# Patient Record
Sex: MALE | Race: WHITE | NOT HISPANIC OR LATINO | URBAN - METROPOLITAN AREA
[De-identification: names, ages, dates, MRNs, and addresses within clinical notes are randomized per-mention and may not be internally consistent; named-entity substitution may affect disease eponyms.]

---

## 2017-02-09 ENCOUNTER — HOSPITAL ENCOUNTER (OUTPATIENT)
Dept: CARDIOLOGY | Facility: HOSPITAL | Age: 67
Discharge: HOME | End: 2017-02-09
Attending: INTERNAL MEDICINE | Admitting: INTERNAL MEDICINE

## 2018-03-29 ENCOUNTER — CLINICAL SUPPORT (OUTPATIENT)
Dept: ONCOLOGY | Facility: HOSPITAL | Age: 68
End: 2018-03-29

## 2018-03-29 ENCOUNTER — HOSPITAL ENCOUNTER (OUTPATIENT)
Dept: ONCOLOGY | Facility: HOSPITAL | Age: 68
Discharge: HOME | End: 2018-03-29
Attending: INTERNAL MEDICINE | Admitting: INTERNAL MEDICINE

## 2018-03-29 ENCOUNTER — HOSPITAL ENCOUNTER (OUTPATIENT)
Dept: ONCOLOGY | Facility: CLINIC | Age: 68
Setting detail: INFUSION SERIES
Discharge: HOME | End: 2018-03-29
Attending: INTERNAL MEDICINE | Admitting: INTERNAL MEDICINE

## 2018-03-29 NOTE — PROGRESS NOTES
PATIENTS ONCOLOGY RECORD LOCATED IN [facility]      Subjective     Name:  CARY FARRELL     Date:  2018  Address:  [address] [place] [address]  Home: [phone]  :  1950 AGE:  67 y.o.        RECORDS OBTAINED:  Patients Oncology Record is located in [facility]

## 2018-04-30 ENCOUNTER — HOSPITAL ENCOUNTER (OUTPATIENT)
Dept: ONCOLOGY | Facility: CLINIC | Age: 68
Setting detail: INFUSION SERIES
Discharge: HOME | End: 2018-04-30
Attending: INTERNAL MEDICINE | Admitting: INTERNAL MEDICINE

## 2018-04-30 ENCOUNTER — HOSPITAL ENCOUNTER (OUTPATIENT)
Dept: ONCOLOGY | Facility: HOSPITAL | Age: 68
Discharge: HOME | End: 2018-04-30
Attending: INTERNAL MEDICINE | Admitting: INTERNAL MEDICINE

## 2018-04-30 ENCOUNTER — CLINICAL SUPPORT (OUTPATIENT)
Dept: ONCOLOGY | Facility: HOSPITAL | Age: 68
End: 2018-04-30

## 2018-04-30 LAB — APTT BLD: 27.6 SEC (ref 24–31)

## 2018-04-30 NOTE — PROGRESS NOTES
PATIENTS ONCOLOGY RECORD LOCATED IN [facility]      Subjective     Name:  CARY FARRELL     Date:  2018  Address:  [address] [place] [address]  Home: [phone]  :  1950 AGE:  68 y.o.        RECORDS OBTAINED:  Patients Oncology Record is located in [facility]

## 2018-07-30 ENCOUNTER — CLINICAL SUPPORT (OUTPATIENT)
Dept: ONCOLOGY | Facility: HOSPITAL | Age: 68
End: 2018-07-30

## 2018-07-30 ENCOUNTER — HOSPITAL ENCOUNTER (OUTPATIENT)
Dept: ONCOLOGY | Facility: CLINIC | Age: 68
Setting detail: INFUSION SERIES
Discharge: HOME | End: 2018-07-30
Attending: INTERNAL MEDICINE | Admitting: INTERNAL MEDICINE

## 2018-07-30 NOTE — PROGRESS NOTES
PATIENTS ONCOLOGY RECORD LOCATED IN [facility]      Subjective     Name:  CARY FARRELL     Date:  2018  Address:  [address] [place] [address]  Home: [phone]  :  1950 AGE:  68 y.o.        RECORDS OBTAINED:  Patients Oncology Record is located in [facility]

## 2018-10-29 ENCOUNTER — CLINICAL SUPPORT (OUTPATIENT)
Dept: ONCOLOGY | Facility: HOSPITAL | Age: 68
End: 2018-10-29

## 2018-10-29 ENCOUNTER — HOSPITAL ENCOUNTER (OUTPATIENT)
Dept: ONCOLOGY | Facility: CLINIC | Age: 68
Setting detail: INFUSION SERIES
Discharge: HOME | End: 2018-10-29
Attending: NURSE PRACTITIONER | Admitting: NURSE PRACTITIONER

## 2018-10-29 ENCOUNTER — HOSPITAL ENCOUNTER (OUTPATIENT)
Dept: ONCOLOGY | Facility: HOSPITAL | Age: 68
Discharge: HOME | End: 2018-10-29
Attending: NURSE PRACTITIONER | Admitting: NURSE PRACTITIONER

## 2018-10-29 LAB
ALBUMIN SERPL-MCNC: 4.1 G/DL (ref 3.5–4.8)
ALBUMIN/GLOB SERPL: 1.5 {RATIO} (ref 1–1.7)
ALP SERPL-CCNC: 96 IU/L (ref 32–91)
ALT SERPL-CCNC: 31 IU/L (ref 17–63)
ANION GAP SERPL CALC-SCNC: 13.1 MMOL/L (ref 10–20)
AST SERPL-CCNC: 25 IU/L (ref 15–41)
BILIRUB SERPL-MCNC: 0.5 MG/DL (ref 0.3–1.2)
BUN SERPL-MCNC: 17 MG/DL (ref 8–20)
BUN/CREAT SERPL: 18.9 (ref 6.2–20.3)
CALCIUM SERPL-MCNC: 9 MG/DL (ref 8.9–10.3)
CHLORIDE SERPL-SCNC: 109 MMOL/L (ref 101–111)
CONV CO2: 21 MMOL/L (ref 22–32)
CONV TOTAL PROTEIN: 6.9 G/DL (ref 6.1–7.9)
CREAT UR-MCNC: 0.9 MG/DL (ref 0.7–1.2)
GLOBULIN UR ELPH-MCNC: 2.8 G/DL (ref 2.5–3.8)
GLUCOSE SERPL-MCNC: 111 MG/DL (ref 65–99)
POTASSIUM SERPL-SCNC: 4.1 MMOL/L (ref 3.6–5.1)
SODIUM SERPL-SCNC: 139 MMOL/L (ref 136–144)

## 2018-10-29 NOTE — PROGRESS NOTES
PATIENTS ONCOLOGY RECORD LOCATED IN [facility]      Subjective     Name:  CARY FARRELL     Date:  10/29/2018  Address:  [address] [address]  Home: [phone]  :  1950 AGE:  68 y.o.        RECORDS OBTAINED:  Patients Oncology Record is located in [facility]  
none

## 2019-01-28 ENCOUNTER — CLINICAL SUPPORT (OUTPATIENT)
Dept: ONCOLOGY | Facility: HOSPITAL | Age: 69
End: 2019-01-28

## 2019-01-28 ENCOUNTER — HOSPITAL ENCOUNTER (OUTPATIENT)
Dept: ONCOLOGY | Facility: CLINIC | Age: 69
Setting detail: INFUSION SERIES
Discharge: HOME | End: 2019-01-28
Attending: INTERNAL MEDICINE | Admitting: INTERNAL MEDICINE

## 2020-07-23 PROBLEM — I10 HYPERTENSION: Status: ACTIVE | Noted: 2017-02-08

## 2020-07-23 PROBLEM — F41.9 ANXIETY DISORDER: Status: ACTIVE | Noted: 2017-04-13

## 2020-07-23 PROBLEM — E78.2 MIXED HYPERLIPIDEMIA: Status: ACTIVE | Noted: 2020-07-23

## 2020-07-23 PROBLEM — R00.2 PALPITATIONS: Status: ACTIVE | Noted: 2017-02-08

## 2020-08-13 ENCOUNTER — OFFICE VISIT (OUTPATIENT)
Dept: CARDIOLOGY | Facility: CLINIC | Age: 70
End: 2020-08-13

## 2020-08-13 VITALS
HEART RATE: 51 BPM | WEIGHT: 241 LBS | BODY MASS INDEX: 32.64 KG/M2 | SYSTOLIC BLOOD PRESSURE: 160 MMHG | HEIGHT: 72 IN | DIASTOLIC BLOOD PRESSURE: 84 MMHG

## 2020-08-13 DIAGNOSIS — R00.1 BRADYCARDIA, SINUS: ICD-10-CM

## 2020-08-13 DIAGNOSIS — I10 ESSENTIAL HYPERTENSION: ICD-10-CM

## 2020-08-13 DIAGNOSIS — E78.2 MIXED HYPERLIPIDEMIA: ICD-10-CM

## 2020-08-13 DIAGNOSIS — R00.2 PALPITATIONS: Primary | ICD-10-CM

## 2020-08-13 PROCEDURE — 93000 ELECTROCARDIOGRAM COMPLETE: CPT | Performed by: INTERNAL MEDICINE

## 2020-08-13 PROCEDURE — 99214 OFFICE O/P EST MOD 30 MIN: CPT | Performed by: INTERNAL MEDICINE

## 2020-08-13 RX ORDER — LOSARTAN POTASSIUM 25 MG/1
25 TABLET ORAL DAILY
Qty: 90 TABLET | Refills: 1 | Status: SHIPPED | OUTPATIENT
Start: 2020-08-13 | End: 2020-09-02 | Stop reason: SDUPTHER

## 2020-08-13 RX ORDER — ASPIRIN 81 MG/1
81 TABLET, CHEWABLE ORAL DAILY
COMMUNITY

## 2020-08-13 RX ORDER — MELOXICAM 15 MG/1
15 TABLET ORAL DAILY
COMMUNITY

## 2020-08-13 NOTE — PROGRESS NOTES
Date of Office Visit: 2020  Encounter Provider: Dr. Abimael Greenberg  Place of Service: [facility] CARDIOLOGY [place]  Patient Name: Viktor Atkins  :1950  Gera Manriquez MD    Chief Complaint   Patient presents with   • Palpitations     consult   • Hypertension   • Hyperlipidemia     History of Present Illness    I am pleased to see Mr. Atkins in my office today as a follow up visit    Patient is an 70 year old white gentleman whose past medical history significant for HTN and palpitations who came today for follow-up.    In , patient developed symptom of chest pain and underwent cardiac catheterization which showed nonobstructive CAD.  In 2016, patient was admitted at [facility] with symptom of palpitation and underwent Holter monitor which showed few PVCs.  Echocardiogram showed EF of 60 to 65%.  Mild mitral regurgitation was noted.  Patient was started on beta-blockers and his symptom of palpitation improved.  Patient was also started on Zoloft 25 mg daily.    I last saw the patient in 2017 but he is lost to follow-up until today.    Patient complains of palpitation.  Patient described this as a fluttering of the heart and skipping of the heartbeat.  Patient also complains of occasional dizziness.  Patient denies any orthopnea, PND, syncope or presyncope.  No leg edema noted.    EKG showed sinus bradycardia with heart rate of 51 bpm    I advised the patient to taper off clonidine over.  Of next 2 weeks.  I would start losartan 25 mg daily.  I would proceed with Holter monitor.  Patient is advised to monitor the blood pressure and bring the logbook.  I will see the patient in 6 weeks.  If he continues to have bradycardia after discontinuation of clonidine, I would decrease Lopressor.  His previous ischemic evaluation has been negative.  Patient also complains of snoring and fatigue and possible sleep apnea.  I advised him to discuss with his wife and if his symptoms are verified by  his wife I would recommend proceed with sleep studies      Past Medical History:   Diagnosis Date   • Benign essential HTN    • Hyperlipidemia, mixed    • Palpitations          Past Surgical History:   Procedure Laterality Date   • CARDIAC CATHETERIZATION     • CHOLECYSTECTOMY     • KNEE SURGERY             Current Outpatient Medications:   •  amLODIPine (NORVASC) 10 MG tablet, 1 tablet Daily., Disp: , Rfl:   •  aspirin 81 MG chewable tablet, Chew 81 mg Daily., Disp: , Rfl:   •  atorvastatin (LIPITOR) 20 MG tablet, 1 tablet Daily., Disp: , Rfl:   •  cloNIDine (CATAPRES) 0.1 MG tablet, 1 tablet 2 (two) times a day., Disp: , Rfl:   •  meloxicam (MOBIC) 15 MG tablet, Take 15 mg by mouth Daily., Disp: , Rfl:   •  metoprolol tartrate (LOPRESSOR) 50 MG tablet, 1 tablet 2 (two) times a day., Disp: , Rfl:   •  Multiple Vitamins-Minerals (MULTIVITAMIN ADULT PO), Take  by mouth Daily., Disp: , Rfl:   •  Omeprazole (EQL Omeprazole) 20 MG tablet delayed-release, 1 tablet Daily., Disp: , Rfl:   •  sertraline (Zoloft) 25 MG tablet, 1 tablet Daily., Disp: , Rfl:   •  losartan (Cozaar) 25 MG tablet, Take 1 tablet by mouth Daily., Disp: 90 tablet, Rfl: 1      Social History     Socioeconomic History   • Marital status:      Spouse name: Not on file   • Number of children: Not on file   • Years of education: Not on file   • Highest education level: Not on file   Tobacco Use   • Smoking status: Former Smoker   • Smokeless tobacco: Never Used   Substance and Sexual Activity   • Alcohol use: Never     Frequency: Never   • Drug use: Never   • Sexual activity: Defer         Review of Systems   Constitution: Negative for chills and fever.   HENT: Negative for ear discharge and nosebleeds.    Eyes: Negative for discharge and redness.   Cardiovascular: Positive for palpitations. Negative for chest pain, orthopnea, paroxysmal nocturnal dyspnea and syncope.   Respiratory: Positive for shortness of breath. Negative for cough and  "wheezing.    Endocrine: Negative for heat intolerance.   Skin: Negative for rash.   Musculoskeletal: Positive for arthritis and joint pain. Negative for myalgias.   Gastrointestinal: Negative for abdominal pain, melena, nausea and vomiting.   Genitourinary: Negative for dysuria and hematuria.   Neurological: Negative for dizziness, light-headedness, numbness and tremors.   Psychiatric/Behavioral: Negative for depression. The patient is not nervous/anxious.        Procedures      ECG 12 Lead  Date/Time: 8/13/2020 4:55 PM  Performed by: Abimael Greenberg MD  Authorized by: Abimael Greenberg MD   Previous ECG: no previous ECG available  Rhythm: sinus rhythm and sinus bradycardia    Clinical impression: normal ECG            ECG 12 Lead    (Results Pending)           Objective:    /84   Pulse 51   Ht 182.9 cm (72\")   Wt 109 kg (241 lb)   BMI 32.69 kg/m²         Physical Exam   Constitutional: He is oriented to person, place, and time. He appears well-developed and well-nourished.   HENT:   Head: Normocephalic and atraumatic.   Eyes: No scleral icterus.   Neck: No thyromegaly present.   Cardiovascular: Normal rate, regular rhythm and normal heart sounds. Exam reveals no gallop and no friction rub.   No murmur heard.  Pulmonary/Chest: Effort normal and breath sounds normal. No respiratory distress. He has no wheezes. He has no rales.   Abdominal: There is no tenderness.   Musculoskeletal: He exhibits no edema.   Lymphadenopathy:     He has no cervical adenopathy.   Neurological: He is alert and oriented to person, place, and time.   Skin: No rash noted. No erythema.   Psychiatric: He has a normal mood and affect.           Assessment:       Diagnosis Plan   1. Palpitations  ECG 12 Lead    losartan (Cozaar) 25 MG tablet    Holter Monitor - 24 Hour   2. Mixed hyperlipidemia  ECG 12 Lead    losartan (Cozaar) 25 MG tablet    Holter Monitor - 24 Hour   3. Essential hypertension  ECG 12 Lead    losartan (Cozaar) 25 MG tablet "    Holter Monitor - 24 Hour   4. Bradycardia, sinus  Holter Monitor - 24 Hour            Plan:       I would recommend to start losartan 25 mg daily.  Clonidine would be discontinued.  Holter monitor would be done.  Consider sleep studies in future

## 2020-09-02 ENCOUNTER — OFFICE VISIT (OUTPATIENT)
Dept: CARDIOLOGY | Facility: CLINIC | Age: 70
End: 2020-09-02

## 2020-09-02 VITALS
HEART RATE: 65 BPM | DIASTOLIC BLOOD PRESSURE: 88 MMHG | HEIGHT: 72 IN | BODY MASS INDEX: 32.91 KG/M2 | SYSTOLIC BLOOD PRESSURE: 148 MMHG | WEIGHT: 243 LBS

## 2020-09-02 DIAGNOSIS — E78.2 MIXED HYPERLIPIDEMIA: ICD-10-CM

## 2020-09-02 DIAGNOSIS — G47.33 SLEEP APNEA, OBSTRUCTIVE: ICD-10-CM

## 2020-09-02 DIAGNOSIS — R00.2 PALPITATIONS: ICD-10-CM

## 2020-09-02 DIAGNOSIS — I10 ESSENTIAL HYPERTENSION: Primary | ICD-10-CM

## 2020-09-02 PROCEDURE — 99213 OFFICE O/P EST LOW 20 MIN: CPT | Performed by: INTERNAL MEDICINE

## 2020-09-02 RX ORDER — LOSARTAN POTASSIUM 25 MG/1
25 TABLET ORAL DAILY
Qty: 90 TABLET | Refills: 1 | Status: SHIPPED | OUTPATIENT
Start: 2020-09-02 | End: 2021-03-03 | Stop reason: SDUPTHER

## 2020-09-02 NOTE — PROGRESS NOTES
Date of Office Visit: 2020  Encounter Provider: Dr. Abimael Greenberg  Place of Service: [facility] CARDIOLOGY [place]  Patient Name: Viktor Atkins  :1950  Gera Manriquez MD    Chief Complaint   Patient presents with   • Palpitations     2 week follow up /holter   • Hypertension   • Hyperlipidemia   • Slow Heart Rate     History of Present Illness    I am pleased to see Mr. Atkins in my office today as a follow up visit    Patient is an 70 year old white gentleman whose past medical history significant for HTN and palpitations who came today for follow-up.    In , patient developed symptom of chest pain and underwent cardiac catheterization which showed nonobstructive CAD.  In , patient was admitted at [facility] with symptom of palpitation and underwent Holter monitor which showed few PVCs.  Echocardiogram showed EF of 60 to 65%.  Mild mitral regurgitation was noted.  Patient was started on beta-blockers and his symptom of palpitation improved.  Patient was also started on Zoloft 25 mg daily.    In 2020, patient underwent Holter monitor and it showed relative bradycardia and short run of atrial tachyarrhythmia.    Since then patient is feeling better.  He has quit taking clonidine and his heart rate has improved.  Patient denies any further episode of dizziness and lightheadedness.  Fatigue and tiredness is improving.  No orthopnea PND no syncope or presyncope.    Patient is doing well.  I will continue current therapy.  Patient is advised to monitor the blood pressure at home.  I will refer the patient to sleep studies/sleep center referral.      Past Medical History:   Diagnosis Date   • Benign essential HTN    • Hyperlipidemia, mixed    • Palpitations          Past Surgical History:   Procedure Laterality Date   • CARDIAC CATHETERIZATION     • CHOLECYSTECTOMY     • KNEE SURGERY             Current Outpatient Medications:   •  amLODIPine (NORVASC) 10 MG tablet, 1 tablet  Daily., Disp: , Rfl:   •  aspirin 81 MG chewable tablet, Chew 81 mg Daily., Disp: , Rfl:   •  atorvastatin (LIPITOR) 20 MG tablet, 1 tablet Daily., Disp: , Rfl:   •  losartan (Cozaar) 25 MG tablet, Take 1 tablet by mouth Daily., Disp: 90 tablet, Rfl: 1  •  meloxicam (MOBIC) 15 MG tablet, Take 15 mg by mouth Daily., Disp: , Rfl:   •  metoprolol tartrate (LOPRESSOR) 50 MG tablet, 1 tablet 2 (two) times a day., Disp: , Rfl:   •  Multiple Vitamins-Minerals (MULTIVITAMIN ADULT PO), Take  by mouth Daily., Disp: , Rfl:   •  Omeprazole (EQL Omeprazole) 20 MG tablet delayed-release, 1 tablet Daily., Disp: , Rfl:   •  sertraline (Zoloft) 25 MG tablet, 1 tablet Daily., Disp: , Rfl:       Social History     Socioeconomic History   • Marital status:      Spouse name: Not on file   • Number of children: Not on file   • Years of education: Not on file   • Highest education level: Not on file   Tobacco Use   • Smoking status: Former Smoker   • Smokeless tobacco: Never Used   Substance and Sexual Activity   • Alcohol use: Never     Frequency: Never   • Drug use: Never   • Sexual activity: Defer         Review of Systems   Constitution: Negative for chills and fever.   HENT: Negative for ear discharge and nosebleeds.    Eyes: Negative for discharge and redness.   Cardiovascular: Negative for chest pain, orthopnea, palpitations, paroxysmal nocturnal dyspnea and syncope.   Respiratory: Negative for cough, shortness of breath and wheezing.    Endocrine: Negative for heat intolerance.   Skin: Negative for rash.   Musculoskeletal: Negative for arthritis and myalgias.   Gastrointestinal: Negative for abdominal pain, melena, nausea and vomiting.   Genitourinary: Negative for dysuria and hematuria.   Neurological: Negative for dizziness, light-headedness, numbness and tremors.   Psychiatric/Behavioral: Negative for depression. The patient is not nervous/anxious.        Procedures    Procedures    No orders to display          "  Objective:    /88   Pulse 65   Ht 182.9 cm (72.01\")   Wt 110 kg (243 lb)   BMI 32.95 kg/m²         Physical Exam   Constitutional: He is oriented to person, place, and time. He appears well-developed and well-nourished.   HENT:   Head: Normocephalic and atraumatic.   Eyes: Right eye exhibits no discharge. No scleral icterus.   Neck: No thyromegaly present.   Cardiovascular: Normal rate, regular rhythm and normal heart sounds. Exam reveals no gallop and no friction rub.   No murmur heard.  Pulmonary/Chest: Effort normal and breath sounds normal. No respiratory distress. He has no wheezes. He has no rales.   Abdominal: There is no tenderness.   Musculoskeletal: He exhibits no edema.   Lymphadenopathy:     He has no cervical adenopathy.   Neurological: He is alert and oriented to person, place, and time.   Skin: No rash noted. No erythema.   Psychiatric: He has a normal mood and affect.           Assessment:       Diagnosis Plan   1. Essential hypertension  losartan (Cozaar) 25 MG tablet   2. Palpitations  losartan (Cozaar) 25 MG tablet   3. Mixed hyperlipidemia  losartan (Cozaar) 25 MG tablet            Plan:       Continue current treatment.  Proceed with sleep consultation.  "

## 2021-02-09 ENCOUNTER — TELEPHONE (OUTPATIENT)
Dept: CARDIOLOGY | Facility: CLINIC | Age: 71
End: 2021-02-09

## 2021-02-09 NOTE — TELEPHONE ENCOUNTER
Patient called and stated that Dr Greenberg wanted him to see Dr Jenna Esteves and he had a appointment but the office called and canceled his appointment in January he stated that he hasn't heard back from that office so I gave the patient the phone number to that office so he could call and reschedule a appointment. But the patient wants to know if he should keep his appointment March 3rd with Dr. Greenberg or reschedule.

## 2021-03-02 NOTE — PROGRESS NOTES
Date of Office Visit: 2021  Encounter Provider: Dr. Abimael Greenberg  Place of Service: [facility] CARDIOLOGY [place]  Patient Name: Viktor Atkins  :1950  Gera Manriquez MD    Chief Complaint   Patient presents with   • Palpitations     6 month follow up   • Hypertension   • Hyperlipidemia     History of Present Illness    I am pleased to see Mr. Atkins in my office today as a follow up visit    Patient is an 70 year old white gentleman whose past medical history significant for HTN and palpitations who came today for follow-up.    In , patient developed symptom of chest pain and underwent cardiac catheterization which showed nonobstructive CAD.  In , patient was admitted at [facility] with symptom of palpitation and underwent Holter monitor which showed few PVCs.  Echocardiogram showed EF of 60 to 65%.  Mild mitral regurgitation was noted.  Patient was started on beta-blockers and his symptom of palpitation improved.  Patient was also started on Zoloft 25 mg daily.    In 2020, patient underwent Holter monitor and it showed relative bradycardia and short run of atrial tachyarrhythmia.    Since the previous visit, patient continues to have palpitation.  However palpitation improved when he had Covid 19 infection after 2020.  Patient has recovered well.  Patient denies any chest pain.  No shortness of breath.  No syncope or presyncope.  No dizziness.  Patient however complained of palpitation and dizziness at the time of premature contraction.    EKG showed heart rate of 66 bpm normal sinus rhythm.    At this stage, his bradycardia is much better.  Clonidine has been discontinued and patient is on losartan.  His blood pressure is still high.  I would recommend to increase losartan to 50 mg daily.  Patient unfortunately could not go through sleep studies because of COVID-19 infection.  He is supposed to see pulmonologist and sleep specialist in 2021        Past  Medical History:   Diagnosis Date   • Benign essential HTN    • Hyperlipidemia, mixed    • Palpitations          Past Surgical History:   Procedure Laterality Date   • CARDIAC CATHETERIZATION     • CHOLECYSTECTOMY     • KNEE SURGERY             Current Outpatient Medications:   •  albuterol sulfate  (90 Base) MCG/ACT inhaler, Inhale 1 puff 4 (Four) Times a Day., Disp: , Rfl:   •  amLODIPine (NORVASC) 10 MG tablet, 1 tablet Daily., Disp: , Rfl:   •  aspirin 81 MG chewable tablet, Chew 81 mg Daily., Disp: , Rfl:   •  atorvastatin (LIPITOR) 20 MG tablet, 1 tablet Daily., Disp: , Rfl:   •  losartan (COZAAR) 50 MG tablet, Take 1 tablet by mouth Daily., Disp: 90 tablet, Rfl: 1  •  meloxicam (MOBIC) 15 MG tablet, Take 15 mg by mouth Daily., Disp: , Rfl:   •  metoprolol tartrate (LOPRESSOR) 50 MG tablet, 1 tablet 2 (two) times a day., Disp: , Rfl:   •  Multiple Vitamins-Minerals (MULTIVITAMIN ADULT PO), Take  by mouth Daily., Disp: , Rfl:   •  Omeprazole (EQL Omeprazole) 20 MG tablet delayed-release, 1 tablet Daily., Disp: , Rfl:   •  sertraline (Zoloft) 25 MG tablet, 1 tablet Daily., Disp: , Rfl:       Social History     Socioeconomic History   • Marital status:      Spouse name: Not on file   • Number of children: Not on file   • Years of education: Not on file   • Highest education level: Not on file   Tobacco Use   • Smoking status: Former Smoker   • Smokeless tobacco: Never Used   Substance and Sexual Activity   • Alcohol use: Never     Frequency: Never   • Drug use: Never   • Sexual activity: Defer         Review of Systems   Constitution: Negative for chills and fever.   HENT: Negative for ear discharge and nosebleeds.    Eyes: Negative for discharge and redness.   Cardiovascular: Negative for chest pain, orthopnea, palpitations, paroxysmal nocturnal dyspnea and syncope.   Respiratory: Positive for shortness of breath. Negative for cough and wheezing.    Endocrine: Negative for heat intolerance.   Skin:  "Negative for rash.   Musculoskeletal: Negative for arthritis and myalgias.   Gastrointestinal: Negative for abdominal pain, melena, nausea and vomiting.   Genitourinary: Negative for dysuria and hematuria.   Neurological: Negative for dizziness, light-headedness, numbness and tremors.   Psychiatric/Behavioral: Negative for depression. The patient is not nervous/anxious.        Procedures      ECG 12 Lead    Date/Time: 3/3/2021 3:14 PM  Performed by: Abimael Greenberg MD  Authorized by: Abimael Greenberg MD   Comparison: compared with previous ECG   Similar to previous ECG  Rhythm: sinus rhythm    Clinical impression: normal ECG            ECG 12 Lead    (Results Pending)           Objective:    /86   Pulse 66   Ht 182.9 cm (72.01\")   Wt 114 kg (251 lb)   BMI 34.03 kg/m²         Constitutional:       Appearance: Well-developed.   Eyes:      General: No scleral icterus.        Right eye: No discharge.   HENT:      Head: Normocephalic and atraumatic.   Neck:      Thyroid: No thyromegaly.      Lymphadenopathy: No cervical adenopathy.   Pulmonary:      Effort: Pulmonary effort is normal. No respiratory distress.      Breath sounds: Normal breath sounds. No wheezing. No rales.   Cardiovascular:      Normal rate. Regular rhythm.      No gallop.   Abdominal:      Tenderness: There is no abdominal tenderness.   Skin:     Findings: No erythema or rash.   Neurological:      Mental Status: Alert and oriented to person, place, and time.             Assessment:       Diagnosis Plan   1. Essential hypertension  ECG 12 Lead    losartan (COZAAR) 50 MG tablet   2. Palpitations  ECG 12 Lead    losartan (COZAAR) 50 MG tablet   3. Mixed hyperlipidemia  ECG 12 Lead    losartan (COZAAR) 50 MG tablet            Plan:       MDM:    1.  Palpitation:    Patient is having premature contraction.  I strongly believe that he has obstructive sleep apnea.  He is scheduled to see sleep doctors in April.  Continue beta-blocker.    2.  " Hypertension:    I would increase losartan to 50 mg daily    3.  Hyperlipidemia:    Continue Lipitor.

## 2021-03-03 ENCOUNTER — OFFICE VISIT (OUTPATIENT)
Dept: CARDIOLOGY | Facility: CLINIC | Age: 71
End: 2021-03-03

## 2021-03-03 VITALS
HEART RATE: 66 BPM | SYSTOLIC BLOOD PRESSURE: 156 MMHG | BODY MASS INDEX: 34 KG/M2 | HEIGHT: 72 IN | WEIGHT: 251 LBS | DIASTOLIC BLOOD PRESSURE: 86 MMHG

## 2021-03-03 DIAGNOSIS — I10 ESSENTIAL HYPERTENSION: ICD-10-CM

## 2021-03-03 DIAGNOSIS — R00.2 PALPITATIONS: ICD-10-CM

## 2021-03-03 DIAGNOSIS — E78.2 MIXED HYPERLIPIDEMIA: ICD-10-CM

## 2021-03-03 DIAGNOSIS — I10 ESSENTIAL HYPERTENSION: Primary | ICD-10-CM

## 2021-03-03 PROCEDURE — 99214 OFFICE O/P EST MOD 30 MIN: CPT | Performed by: INTERNAL MEDICINE

## 2021-03-03 PROCEDURE — 93000 ELECTROCARDIOGRAM COMPLETE: CPT | Performed by: INTERNAL MEDICINE

## 2021-03-03 RX ORDER — LOSARTAN POTASSIUM 50 MG/1
50 TABLET ORAL DAILY
Qty: 90 TABLET | Refills: 1 | Status: SHIPPED | OUTPATIENT
Start: 2021-03-03 | End: 2021-03-03 | Stop reason: SDUPTHER

## 2021-03-03 RX ORDER — ALBUTEROL SULFATE 90 UG/1
1 AEROSOL, METERED RESPIRATORY (INHALATION) 4 TIMES DAILY
COMMUNITY
Start: 2021-01-06

## 2021-03-05 RX ORDER — LOSARTAN POTASSIUM 50 MG/1
50 TABLET ORAL DAILY
Qty: 90 TABLET | Refills: 1 | Status: SHIPPED | OUTPATIENT
Start: 2021-03-05 | End: 2021-03-30

## 2021-03-30 RX ORDER — LOSARTAN POTASSIUM 25 MG/1
TABLET ORAL
Qty: 90 TABLET | Refills: 1 | Status: SHIPPED | OUTPATIENT
Start: 2021-03-30

## 2023-07-25 ENCOUNTER — TREATMENT (OUTPATIENT)
Dept: PHYSICAL THERAPY | Facility: CLINIC | Age: 73
End: 2023-07-25
Payer: MEDICARE

## 2023-07-25 DIAGNOSIS — S76.112D STRAIN OF LEFT QUADRICEPS, SUBSEQUENT ENCOUNTER: ICD-10-CM

## 2023-07-25 DIAGNOSIS — M25.562 ACUTE PAIN OF LEFT KNEE: Primary | ICD-10-CM

## 2023-07-25 DIAGNOSIS — R26.2 DIFFICULTY WALKING: ICD-10-CM

## 2023-07-25 DIAGNOSIS — R26.2 DIFFICULTY WALKING UP STAIRS: ICD-10-CM

## 2023-07-25 PROCEDURE — 97110 THERAPEUTIC EXERCISES: CPT | Performed by: PHYSICAL THERAPIST

## 2023-07-25 PROCEDURE — 97140 MANUAL THERAPY 1/> REGIONS: CPT | Performed by: PHYSICAL THERAPIST

## 2023-07-25 NOTE — PROGRESS NOTES
Physical Therapy Daily Treatment Note      [facility] [place]              [address], [address]                [facility] [place]  [address]        Patient: Viktor Atkins   : 1950  Diagnosis/ICD-10 Code:  Acute pain of left knee [M25.562]  Referring practitioner: Gera Manriquez MD  Date of Initial Visit: Type: THERAPY  Noted: 2023  Today's Date: 2023  Patient seen for 4 sessions         Subjective    Viktor Atkins reports: his leg is doing ok.  It is dong better.  It is a 0/10 right now.  He said on Friday it got up to 6-7/10 when he was up on it a lot.           Objective   See Exercise, Manual, and Modality Logs for complete treatment.     Mild tightness lateral rectus femoris and vastus lateralis, mod tightness quad tendon; Tenderness rectus femoris, vastus lateralis, and quad tendon    Assessment/Plan  Decreased tenderness and tightness as noted above.  Able to progress to standing exercises and use of NK table for strengthening.  He tolerated exercises well with mild discomfort with standing hip flexion only.  Will continued to address tightness with STM and progress strengthening as tolerated especially in weightbearing positions to improve functional mobility.     Progress per Plan of Care           Timed:  Manual Therapy:    8     mins  34172;  Therapeutic Exercise:    25     mins  54754;     Neuromuscular Marques:        mins  39594;    Therapeutic Activity:     5     mins  74314;     Gait Training:           mins  37351;     Ultrasound:          mins  51801;     Work Conditioning/Hardening (initial 2 hours)        mins  14398  Work Conditioning/Hardening (each add'l hour)        mins  10105    Untimed:   Electrical Stimulation:         mins  68794 ( );  Traction          mins 12000    Timed Treatment:   38   mins   Total Treatment:     38   mins    Shiela Cochran PTA  Physical Therapist Assistant

## 2023-07-28 ENCOUNTER — TREATMENT (OUTPATIENT)
Dept: PHYSICAL THERAPY | Facility: CLINIC | Age: 73
End: 2023-07-28
Payer: MEDICARE

## 2023-07-28 DIAGNOSIS — R26.2 DIFFICULTY WALKING: ICD-10-CM

## 2023-07-28 DIAGNOSIS — R26.2 DIFFICULTY WALKING UP STAIRS: ICD-10-CM

## 2023-07-28 DIAGNOSIS — M25.562 ACUTE PAIN OF LEFT KNEE: Primary | ICD-10-CM

## 2023-07-28 DIAGNOSIS — S76.112D STRAIN OF LEFT QUADRICEPS, SUBSEQUENT ENCOUNTER: ICD-10-CM

## 2023-07-28 NOTE — PROGRESS NOTES
Physical Therapy Daily Treatment Note      [facility] [place]              [address], [address]                [facility] [place]  [address]        Patient: Viktor Atkins   : 1950  Diagnosis/ICD-10 Code:  Acute pain of left knee [M25.562]  Referring practitioner: Gera Manriquez MD  Date of Initial Visit: Type: THERAPY  Noted: 2023  Today's Date: 2023  Patient seen for 5 sessions         Subjective    Viktor Atkins reports: he is doing pretty good today.           Objective   See Exercise, Manual, and Modality Logs for complete treatment.       Assessment/Plan  Able to progress exercises without complaints.  Continued tightness in (L) quad address with STM. Will continue to address tightness to improve pain with functional activities and progress exercises as able.      Progress per Plan of Care           Timed:  Manual Therapy:    8     mins  25041;  Therapeutic Exercise:    25     mins  48690;     Neuromuscular Marques:        mins  79003;    Therapeutic Activity:          mins  65518;     Gait Training:           mins  70829;     Ultrasound:          mins  58163;     Work Conditioning/Hardening (initial 2 hours)        mins  70828  Work Conditioning/Hardening (each add'l hour)        mins  62550    Untimed:   Electrical Stimulation:         mins  49989 ( );  Traction          mins 12711    Timed Treatment:   33   mins   Total Treatment:     33   mins    Shiela Cochran PTA  Physical Therapist Assistant

## 2023-08-01 ENCOUNTER — TREATMENT (OUTPATIENT)
Dept: PHYSICAL THERAPY | Facility: CLINIC | Age: 73
End: 2023-08-01
Payer: MEDICARE

## 2023-08-01 DIAGNOSIS — S76.112D STRAIN OF LEFT QUADRICEPS, SUBSEQUENT ENCOUNTER: ICD-10-CM

## 2023-08-01 DIAGNOSIS — R26.2 DIFFICULTY WALKING: ICD-10-CM

## 2023-08-01 DIAGNOSIS — M25.562 ACUTE PAIN OF LEFT KNEE: Primary | ICD-10-CM

## 2023-08-01 DIAGNOSIS — R26.2 DIFFICULTY WALKING UP STAIRS: ICD-10-CM

## 2023-08-03 ENCOUNTER — TREATMENT (OUTPATIENT)
Dept: PHYSICAL THERAPY | Facility: CLINIC | Age: 73
End: 2023-08-03
Payer: MEDICARE

## 2023-08-03 DIAGNOSIS — M25.562 ACUTE PAIN OF LEFT KNEE: Primary | ICD-10-CM

## 2023-08-03 DIAGNOSIS — S76.112D STRAIN OF LEFT QUADRICEPS, SUBSEQUENT ENCOUNTER: ICD-10-CM

## 2023-08-03 DIAGNOSIS — R26.2 DIFFICULTY WALKING UP STAIRS: ICD-10-CM

## 2023-08-03 DIAGNOSIS — R26.2 DIFFICULTY WALKING: ICD-10-CM

## 2023-08-08 ENCOUNTER — TREATMENT (OUTPATIENT)
Dept: PHYSICAL THERAPY | Facility: CLINIC | Age: 73
End: 2023-08-08
Payer: MEDICARE

## 2023-08-08 DIAGNOSIS — S76.112D STRAIN OF LEFT QUADRICEPS, SUBSEQUENT ENCOUNTER: ICD-10-CM

## 2023-08-08 DIAGNOSIS — R26.2 DIFFICULTY WALKING: ICD-10-CM

## 2023-08-08 DIAGNOSIS — R26.2 DIFFICULTY WALKING UP STAIRS: ICD-10-CM

## 2023-08-08 DIAGNOSIS — M25.562 ACUTE PAIN OF LEFT KNEE: Primary | ICD-10-CM

## 2023-08-08 NOTE — PATIENT INSTRUCTIONS
Access Code: [email or web address]  URL: [email or web address]  Date: 08/08/2023  Prepared by: Shiela Cochran    Exercises  - Modified Rayray Stretch  - 1 x daily - 7 x weekly - 2 sets - 1m hold  - Supine Knee Extension Strengthening  - 1 x daily - 7 x weekly - 2 sets - 10 reps  - Bent Knee Fallouts  - 1 x daily - 7 x weekly - 10 reps - 10 hold  - Supine Butterfly Groin Stretch  - 1 x daily - 7 x weekly - 2 sets - 1m hold  - Seated Hamstring Stretch  - 1 x daily - 7 x weekly - 1 sets - 4 reps - 20 hold  - Supine Posterior Pelvic Tilt  - 1 x daily - 7 x weekly - 1 sets - 10 reps - 5 hold  - Supine March  - 1 x daily - 7 x weekly - 1 sets - 20 reps  - Supine Active Straight Leg Raise  - 1 x daily - 7 x weekly - 1 sets - 15 reps  - Straight Leg Raise with External Rotation  - 1 x daily - 7 x weekly - 1 sets - 15 reps  - Sidelying Hip Abduction  - 1 x daily - 7 x weekly - 1 sets - 15 reps  - Clamshell  - 1 x daily - 7 x weekly - 1 sets - 15 reps  - Heel Toe Raises with Counter Support  - 1 x daily - 7 x weekly - 1 sets - 20 reps  - Standing Hip Abduction  - 1 x daily - 7 x weekly - 1 sets - 15 reps  - Standing Hip Extension  - 1 x daily - 7 x weekly - 1 sets - 15 reps  - Standing Hip Flexion AROM  - 1 x daily - 7 x weekly - 1 sets - 15 reps  - Mini Squat with Counter Support  - 1 x daily - 7 x weekly - 1 sets - 10 reps

## 2023-08-08 NOTE — PROGRESS NOTES
Physical Therapy Daily Treatment Note      [facility] [place]              [address], [address]                [facility] [place]  [address]        Patient: Viktor Atkins   : 1950  Diagnosis/ICD-10 Code:  Acute pain of left knee [M25.562]  Referring practitioner: Gera Manriquez MD  Date of Initial Visit: Type: THERAPY  Noted: 2023  Today's Date: 2023  Patient seen for 8 sessions         Subjective    Viktor Atkins reports: no problems since last session.  He reported no pain over the weekend.  He feels safe doing his stairs at home without holding on.  He said he feels 100% better and thinks he is as good as he is going to get.     LEFS=65/80       Objective   See Exercise, Manual, and Modality Logs for complete treatment.       Assessment/Plan  Pt reported no pain or limitations in daily routine.  He demonstrated good strength and balance last session. Reviewed and updated HEP to finalized copy.  He was able to return demonstrate proper technique with all exercises.  No questions or concerns with going on hold to trial HEP for self management.     Pt on hold for 2 weeks with D/C if no problems arise.  Pt to call if he needs to return.    Goals  Plan Goals: STGs 4 weeks:  1. Pt to increase strength L quad from 4- to 4/5 to allow reciprocal pattern on steps-MET 8/3/23  2. LEFS score to improve from 42/80 to > 55/80-MET  3. Pt to state >75% improvement in pain and tenderness at quad-MET  4. Pt to be indep in variety of HEP-MET  LTGs 12 weeks:  1. Pt to gain 4+/5 quad strength lifting moderate weights-MET 8/3/23  2. Pt to be able to safely manage steps with no railing-MET  3. LEFS score > 62/80-MET  4. Pt to state > 90% improvement in pain and tenderness at quad-MET       Timed:  Manual Therapy:         mins  64243;  Therapeutic Exercise:    40     mins  36667;     Neuromuscular Marques:        mins  28114;    Therapeutic Activity:          mins  12521;     Gait Training:           mins  61576;      Ultrasound:          mins  22203;     Work Conditioning/Hardening (initial 2 hours)        mins  41584  Work Conditioning/Hardening (each add'l hour)        mins  16214    Untimed:   Electrical Stimulation:         mins  34083 ( );  Traction          mins 78244    Timed Treatment:   40   mins   Total Treatment:     40   mins    Shiela Cochran PTA  Physical Therapist Assistant

## 2024-03-25 ENCOUNTER — APPOINTMENT (OUTPATIENT)
Dept: CT IMAGING | Facility: HOSPITAL | Age: 74
DRG: 330 | End: 2024-03-25
Payer: MEDICARE

## 2024-03-25 ENCOUNTER — HOSPITAL ENCOUNTER (INPATIENT)
Facility: HOSPITAL | Age: 74
LOS: 7 days | Discharge: HOME | DRG: 330 | End: 2024-04-01
Attending: EMERGENCY MEDICINE | Admitting: STUDENT IN AN ORGANIZED HEALTH CARE EDUCATION/TRAINING PROGRAM
Payer: MEDICARE

## 2024-03-25 DIAGNOSIS — K62.5 RECTAL BLEEDING: ICD-10-CM

## 2024-03-25 DIAGNOSIS — R52 PAIN: ICD-10-CM

## 2024-03-25 DIAGNOSIS — K92.2 GASTROINTESTINAL HEMORRHAGE, UNSPECIFIED GASTROINTESTINAL HEMORRHAGE TYPE: Primary | ICD-10-CM

## 2024-03-25 LAB
ABO GROUP BLD: NORMAL
ALBUMIN SERPL-MCNC: 4.6 G/DL (ref 3.5–5.2)
ALBUMIN/GLOB SERPL: 1.9 G/DL
ALP SERPL-CCNC: 120 U/L (ref 39–117)
ALT SERPL W P-5'-P-CCNC: <5 U/L (ref 1–41)
ANION GAP SERPL CALCULATED.3IONS-SCNC: 12 MMOL/L (ref 5–15)
AST SERPL-CCNC: 29 U/L (ref 1–40)
BASOPHILS # BLD AUTO: 0.07 10*3/MM3 (ref 0–0.2)
BASOPHILS NFR BLD AUTO: 0.7 % (ref 0–1.5)
BILIRUB SERPL-MCNC: 0.2 MG/DL (ref 0–1.2)
BLD GP AB SCN SERPL QL: NEGATIVE
BUN SERPL-MCNC: 17 MG/DL (ref 8–23)
BUN/CREAT SERPL: 15.9 (ref 7–25)
CALCIUM SPEC-SCNC: 9 MG/DL (ref 8.6–10.5)
CHLORIDE SERPL-SCNC: 107 MMOL/L (ref 98–107)
CO2 SERPL-SCNC: 21 MMOL/L (ref 22–29)
CREAT SERPL-MCNC: 1.07 MG/DL (ref 0.76–1.27)
DEPRECATED RDW RBC AUTO: 46.8 FL (ref 37–54)
EGFRCR SERPLBLD CKD-EPI 2021: 73.7 ML/MIN/1.73
EOSINOPHIL # BLD AUTO: 0.3 10*3/MM3 (ref 0–0.4)
EOSINOPHIL NFR BLD AUTO: 3.1 % (ref 0.3–6.2)
ERYTHROCYTE [DISTWIDTH] IN BLOOD BY AUTOMATED COUNT: 14.7 % (ref 12.3–15.4)
GLOBULIN UR ELPH-MCNC: 2.4 GM/DL
GLUCOSE SERPL-MCNC: 164 MG/DL (ref 65–99)
HCT VFR BLD AUTO: 43.5 % (ref 37.5–51)
HGB BLD-MCNC: 14.3 G/DL (ref 13–17.7)
IMM GRANULOCYTES # BLD AUTO: 0.03 10*3/MM3 (ref 0–0.05)
IMM GRANULOCYTES NFR BLD AUTO: 0.3 % (ref 0–0.5)
LARGE PLATELETS: NORMAL
LYMPHOCYTES # BLD AUTO: 2.48 10*3/MM3 (ref 0.7–3.1)
LYMPHOCYTES NFR BLD AUTO: 26 % (ref 19.6–45.3)
MCH RBC QN AUTO: 28.5 PG (ref 26.6–33)
MCHC RBC AUTO-ENTMCNC: 32.9 G/DL (ref 31.5–35.7)
MCV RBC AUTO: 86.8 FL (ref 79–97)
MONOCYTES # BLD AUTO: 0.61 10*3/MM3 (ref 0.1–0.9)
MONOCYTES NFR BLD AUTO: 6.4 % (ref 5–12)
NEUTROPHILS NFR BLD AUTO: 6.06 10*3/MM3 (ref 1.7–7)
NEUTROPHILS NFR BLD AUTO: 63.5 % (ref 42.7–76)
NRBC BLD AUTO-RTO: 0 /100 WBC (ref 0–0.2)
PLATELET # BLD AUTO: 160 10*3/MM3 (ref 140–450)
PMV BLD AUTO: 13.5 FL (ref 6–12)
POTASSIUM SERPL-SCNC: 4.3 MMOL/L (ref 3.5–5.2)
PROT SERPL-MCNC: 7 G/DL (ref 6–8.5)
RBC # BLD AUTO: 5.01 10*6/MM3 (ref 4.14–5.8)
RBC MORPH BLD: NORMAL
RH BLD: POSITIVE
SMALL PLATELETS BLD QL SMEAR: ADEQUATE
SODIUM SERPL-SCNC: 140 MMOL/L (ref 136–145)
T&S EXPIRATION DATE: NORMAL
WBC MORPH BLD: NORMAL
WBC NRBC COR # BLD AUTO: 9.55 10*3/MM3 (ref 3.4–10.8)

## 2024-03-25 PROCEDURE — 85007 BL SMEAR W/DIFF WBC COUNT: CPT | Performed by: NURSE PRACTITIONER

## 2024-03-25 PROCEDURE — 86901 BLOOD TYPING SEROLOGIC RH(D): CPT

## 2024-03-25 PROCEDURE — 86850 RBC ANTIBODY SCREEN: CPT | Performed by: NURSE PRACTITIONER

## 2024-03-25 PROCEDURE — 86900 BLOOD TYPING SEROLOGIC ABO: CPT | Performed by: NURSE PRACTITIONER

## 2024-03-25 PROCEDURE — 85025 COMPLETE CBC W/AUTO DIFF WBC: CPT | Performed by: NURSE PRACTITIONER

## 2024-03-25 PROCEDURE — 74177 CT ABD & PELVIS W/CONTRAST: CPT

## 2024-03-25 PROCEDURE — 80053 COMPREHEN METABOLIC PANEL: CPT | Performed by: NURSE PRACTITIONER

## 2024-03-25 PROCEDURE — G0378 HOSPITAL OBSERVATION PER HR: HCPCS

## 2024-03-25 PROCEDURE — 36415 COLL VENOUS BLD VENIPUNCTURE: CPT

## 2024-03-25 PROCEDURE — 86901 BLOOD TYPING SEROLOGIC RH(D): CPT | Performed by: NURSE PRACTITIONER

## 2024-03-25 PROCEDURE — 25510000001 IOPAMIDOL PER 1 ML: Performed by: EMERGENCY MEDICINE

## 2024-03-25 PROCEDURE — 86900 BLOOD TYPING SEROLOGIC ABO: CPT

## 2024-03-25 PROCEDURE — 99285 EMERGENCY DEPT VISIT HI MDM: CPT

## 2024-03-25 PROCEDURE — 93005 ELECTROCARDIOGRAM TRACING: CPT | Performed by: NURSE PRACTITIONER

## 2024-03-25 RX ORDER — METOPROLOL SUCCINATE 100 MG/1
100 TABLET, EXTENDED RELEASE ORAL DAILY
COMMUNITY
Start: 2023-12-14

## 2024-03-25 RX ORDER — SODIUM CHLORIDE 0.9 % (FLUSH) 0.9 %
10 SYRINGE (ML) INJECTION AS NEEDED
Status: DISCONTINUED | OUTPATIENT
Start: 2024-03-25 | End: 2024-04-01 | Stop reason: HOSPADM

## 2024-03-25 RX ADMIN — IOPAMIDOL 100 ML: 755 INJECTION, SOLUTION INTRAVENOUS at 22:34

## 2024-03-26 ENCOUNTER — ON CAMPUS - OUTPATIENT (OUTPATIENT)
Dept: URBAN - METROPOLITAN AREA HOSPITAL 85 | Facility: HOSPITAL | Age: 74
End: 2024-03-26

## 2024-03-26 DIAGNOSIS — R74.8 ABNORMAL LEVELS OF OTHER SERUM ENZYMES: ICD-10-CM

## 2024-03-26 DIAGNOSIS — Z90.49 ACQUIRED ABSENCE OF OTHER SPECIFIED PARTS OF DIGESTIVE TRACT: ICD-10-CM

## 2024-03-26 DIAGNOSIS — K62.5 HEMORRHAGE OF ANUS AND RECTUM: ICD-10-CM

## 2024-03-26 LAB
ANION GAP SERPL CALCULATED.3IONS-SCNC: 12 MMOL/L (ref 5–15)
ANISOCYTOSIS BLD QL: NORMAL
BASOPHILS # BLD AUTO: 0.05 10*3/MM3 (ref 0–0.2)
BASOPHILS NFR BLD AUTO: 0.6 % (ref 0–1.5)
BUN SERPL-MCNC: 18 MG/DL (ref 8–23)
BUN/CREAT SERPL: 16.1 (ref 7–25)
CALCIUM SPEC-SCNC: 8.8 MG/DL (ref 8.6–10.5)
CHLORIDE SERPL-SCNC: 108 MMOL/L (ref 98–107)
CO2 SERPL-SCNC: 22 MMOL/L (ref 22–29)
CREAT SERPL-MCNC: 1.12 MG/DL (ref 0.76–1.27)
DEPRECATED RDW RBC AUTO: 47.7 FL (ref 37–54)
EGFRCR SERPLBLD CKD-EPI 2021: 69.8 ML/MIN/1.73
EOSINOPHIL # BLD AUTO: 0.21 10*3/MM3 (ref 0–0.4)
EOSINOPHIL NFR BLD AUTO: 2.3 % (ref 0.3–6.2)
ERYTHROCYTE [DISTWIDTH] IN BLOOD BY AUTOMATED COUNT: 14.8 % (ref 12.3–15.4)
GGT SERPL-CCNC: 28 U/L (ref 8–61)
GLUCOSE BLDC GLUCOMTR-MCNC: 103 MG/DL (ref 70–105)
GLUCOSE BLDC GLUCOMTR-MCNC: 175 MG/DL (ref 70–105)
GLUCOSE BLDC GLUCOMTR-MCNC: 90 MG/DL (ref 70–105)
GLUCOSE SERPL-MCNC: 114 MG/DL (ref 65–99)
HCT VFR BLD AUTO: 42.1 % (ref 37.5–51)
HGB BLD-MCNC: 13.2 G/DL (ref 13–17.7)
IMM GRANULOCYTES # BLD AUTO: 0.03 10*3/MM3 (ref 0–0.05)
IMM GRANULOCYTES NFR BLD AUTO: 0.3 % (ref 0–0.5)
LARGE PLATELETS: NORMAL
LYMPHOCYTES # BLD AUTO: 3.08 10*3/MM3 (ref 0.7–3.1)
LYMPHOCYTES NFR BLD AUTO: 34 % (ref 19.6–45.3)
MCH RBC QN AUTO: 27.6 PG (ref 26.6–33)
MCHC RBC AUTO-ENTMCNC: 31.4 G/DL (ref 31.5–35.7)
MCV RBC AUTO: 87.9 FL (ref 79–97)
MICROCYTES BLD QL: NORMAL
MONOCYTES # BLD AUTO: 0.71 10*3/MM3 (ref 0.1–0.9)
MONOCYTES NFR BLD AUTO: 7.8 % (ref 5–12)
NEUTROPHILS NFR BLD AUTO: 4.98 10*3/MM3 (ref 1.7–7)
NEUTROPHILS NFR BLD AUTO: 55 % (ref 42.7–76)
NRBC BLD AUTO-RTO: 0 /100 WBC (ref 0–0.2)
PLATELET # BLD AUTO: 143 10*3/MM3 (ref 140–450)
PMV BLD AUTO: 13.4 FL (ref 6–12)
POTASSIUM SERPL-SCNC: 4.4 MMOL/L (ref 3.5–5.2)
QT INTERVAL: 375 MS
QTC INTERVAL: 510 MS
RBC # BLD AUTO: 4.79 10*6/MM3 (ref 4.14–5.8)
SMALL PLATELETS BLD QL SMEAR: ADEQUATE
SODIUM SERPL-SCNC: 142 MMOL/L (ref 136–145)
WBC MORPH BLD: NORMAL
WBC NRBC COR # BLD AUTO: 9.06 10*3/MM3 (ref 3.4–10.8)

## 2024-03-26 PROCEDURE — 63710000001 INSULIN LISPRO (HUMAN) PER 5 UNITS: Performed by: STUDENT IN AN ORGANIZED HEALTH CARE EDUCATION/TRAINING PROGRAM

## 2024-03-26 PROCEDURE — 63710000001 INSULIN LISPRO (HUMAN) PER 5 UNITS: Performed by: NURSE PRACTITIONER

## 2024-03-26 PROCEDURE — 82948 REAGENT STRIP/BLOOD GLUCOSE: CPT | Performed by: NURSE PRACTITIONER

## 2024-03-26 PROCEDURE — 82948 REAGENT STRIP/BLOOD GLUCOSE: CPT

## 2024-03-26 PROCEDURE — 85025 COMPLETE CBC W/AUTO DIFF WBC: CPT | Performed by: EMERGENCY MEDICINE

## 2024-03-26 PROCEDURE — 80048 BASIC METABOLIC PNL TOTAL CA: CPT | Performed by: EMERGENCY MEDICINE

## 2024-03-26 PROCEDURE — 82977 ASSAY OF GGT: CPT | Performed by: NURSE PRACTITIONER

## 2024-03-26 PROCEDURE — 99222 1ST HOSP IP/OBS MODERATE 55: CPT | Performed by: NURSE PRACTITIONER

## 2024-03-26 PROCEDURE — 85007 BL SMEAR W/DIFF WBC COUNT: CPT | Performed by: EMERGENCY MEDICINE

## 2024-03-26 PROCEDURE — G0378 HOSPITAL OBSERVATION PER HR: HCPCS

## 2024-03-26 RX ORDER — SODIUM CHLORIDE 0.9 % (FLUSH) 0.9 %
10 SYRINGE (ML) INJECTION EVERY 12 HOURS SCHEDULED
Status: DISCONTINUED | OUTPATIENT
Start: 2024-03-26 | End: 2024-04-01 | Stop reason: HOSPADM

## 2024-03-26 RX ORDER — POLYETHYLENE GLYCOL 3350 17 G/17G
17 POWDER, FOR SOLUTION ORAL DAILY PRN
Status: DISCONTINUED | OUTPATIENT
Start: 2024-03-26 | End: 2024-04-01 | Stop reason: HOSPADM

## 2024-03-26 RX ORDER — NICOTINE POLACRILEX 4 MG
15 LOZENGE BUCCAL
Status: DISCONTINUED | OUTPATIENT
Start: 2024-03-26 | End: 2024-04-01 | Stop reason: HOSPADM

## 2024-03-26 RX ORDER — INSULIN LISPRO 100 [IU]/ML
2-9 INJECTION, SOLUTION INTRAVENOUS; SUBCUTANEOUS
Status: DISCONTINUED | OUTPATIENT
Start: 2024-03-26 | End: 2024-04-01 | Stop reason: HOSPADM

## 2024-03-26 RX ORDER — AMLODIPINE BESYLATE 5 MG/1
10 TABLET ORAL
Status: DISCONTINUED | OUTPATIENT
Start: 2024-03-26 | End: 2024-03-28

## 2024-03-26 RX ORDER — PANTOPRAZOLE SODIUM 40 MG/1
40 TABLET, DELAYED RELEASE ORAL
Status: DISCONTINUED | OUTPATIENT
Start: 2024-03-26 | End: 2024-04-01 | Stop reason: HOSPADM

## 2024-03-26 RX ORDER — SODIUM CHLORIDE 0.9 % (FLUSH) 0.9 %
10 SYRINGE (ML) INJECTION AS NEEDED
Status: DISCONTINUED | OUTPATIENT
Start: 2024-03-26 | End: 2024-04-01 | Stop reason: HOSPADM

## 2024-03-26 RX ORDER — ASPIRIN 81 MG/1
81 TABLET, CHEWABLE ORAL DAILY
Status: DISCONTINUED | OUTPATIENT
Start: 2024-03-26 | End: 2024-04-01 | Stop reason: HOSPADM

## 2024-03-26 RX ORDER — CHOLECALCIFEROL (VITAMIN D3) 125 MCG
5 CAPSULE ORAL NIGHTLY PRN
Status: DISCONTINUED | OUTPATIENT
Start: 2024-03-26 | End: 2024-04-01 | Stop reason: HOSPADM

## 2024-03-26 RX ORDER — LOSARTAN POTASSIUM 25 MG/1
25 TABLET ORAL DAILY
Status: DISCONTINUED | OUTPATIENT
Start: 2024-03-26 | End: 2024-03-28

## 2024-03-26 RX ORDER — DEXTROSE MONOHYDRATE 25 G/50ML
25 INJECTION, SOLUTION INTRAVENOUS
Status: DISCONTINUED | OUTPATIENT
Start: 2024-03-26 | End: 2024-04-01 | Stop reason: HOSPADM

## 2024-03-26 RX ORDER — IBUPROFEN 600 MG/1
1 TABLET ORAL
Status: DISCONTINUED | OUTPATIENT
Start: 2024-03-26 | End: 2024-04-01 | Stop reason: HOSPADM

## 2024-03-26 RX ORDER — ATORVASTATIN CALCIUM 20 MG/1
20 TABLET, FILM COATED ORAL NIGHTLY
Status: DISCONTINUED | OUTPATIENT
Start: 2024-03-26 | End: 2024-04-01 | Stop reason: HOSPADM

## 2024-03-26 RX ORDER — SORBITOL SOLUTION 70 %
50 SOLUTION, ORAL MISCELLANEOUS ONCE
Status: COMPLETED | OUTPATIENT
Start: 2024-03-27 | End: 2024-03-27

## 2024-03-26 RX ORDER — BISACODYL 10 MG
10 SUPPOSITORY, RECTAL RECTAL DAILY PRN
Status: DISCONTINUED | OUTPATIENT
Start: 2024-03-26 | End: 2024-04-01 | Stop reason: HOSPADM

## 2024-03-26 RX ORDER — ONDANSETRON 2 MG/ML
4 INJECTION INTRAMUSCULAR; INTRAVENOUS EVERY 6 HOURS PRN
Status: DISCONTINUED | OUTPATIENT
Start: 2024-03-26 | End: 2024-03-27 | Stop reason: SDUPTHER

## 2024-03-26 RX ORDER — SERTRALINE HYDROCHLORIDE 25 MG/1
25 TABLET, FILM COATED ORAL DAILY
Status: DISCONTINUED | OUTPATIENT
Start: 2024-03-26 | End: 2024-04-01 | Stop reason: HOSPADM

## 2024-03-26 RX ORDER — AMOXICILLIN 250 MG
2 CAPSULE ORAL 2 TIMES DAILY PRN
Status: DISCONTINUED | OUTPATIENT
Start: 2024-03-26 | End: 2024-04-01 | Stop reason: HOSPADM

## 2024-03-26 RX ORDER — SODIUM CHLORIDE 9 MG/ML
40 INJECTION, SOLUTION INTRAVENOUS AS NEEDED
Status: DISCONTINUED | OUTPATIENT
Start: 2024-03-26 | End: 2024-04-01 | Stop reason: HOSPADM

## 2024-03-26 RX ORDER — SODIUM, POTASSIUM,MAG SULFATES 17.5-3.13G
1 SOLUTION, RECONSTITUTED, ORAL ORAL EVERY 12 HOURS
Status: COMPLETED | OUTPATIENT
Start: 2024-03-26 | End: 2024-03-26

## 2024-03-26 RX ORDER — ACETAMINOPHEN 325 MG/1
650 TABLET ORAL EVERY 4 HOURS PRN
Status: DISCONTINUED | OUTPATIENT
Start: 2024-03-26 | End: 2024-04-01 | Stop reason: HOSPADM

## 2024-03-26 RX ORDER — METOPROLOL SUCCINATE 50 MG/1
100 TABLET, EXTENDED RELEASE ORAL DAILY
Status: DISCONTINUED | OUTPATIENT
Start: 2024-03-26 | End: 2024-03-28

## 2024-03-26 RX ORDER — BISACODYL 5 MG/1
5 TABLET, DELAYED RELEASE ORAL DAILY PRN
Status: DISCONTINUED | OUTPATIENT
Start: 2024-03-26 | End: 2024-04-01 | Stop reason: HOSPADM

## 2024-03-26 RX ADMIN — ATORVASTATIN CALCIUM 20 MG: 20 TABLET, FILM COATED ORAL at 20:41

## 2024-03-26 RX ADMIN — SODIUM SULFATE, POTASSIUM SULFATE, MAGNESIUM SULFATE 1 BOTTLE: 17.5; 3.13; 1.6 SOLUTION, CONCENTRATE ORAL at 23:06

## 2024-03-26 RX ADMIN — SODIUM SULFATE, POTASSIUM SULFATE, MAGNESIUM SULFATE 1 BOTTLE: 17.5; 3.13; 1.6 SOLUTION, CONCENTRATE ORAL at 11:50

## 2024-03-26 RX ADMIN — LOSARTAN POTASSIUM 25 MG: 25 TABLET, FILM COATED ORAL at 11:50

## 2024-03-26 RX ADMIN — SERTRALINE HYDROCHLORIDE 25 MG: 25 TABLET ORAL at 11:50

## 2024-03-26 RX ADMIN — Medication 10 ML: at 01:05

## 2024-03-26 RX ADMIN — INSULIN LISPRO 2 UNITS: 100 INJECTION, SOLUTION INTRAVENOUS; SUBCUTANEOUS at 11:49

## 2024-03-26 RX ADMIN — METOPROLOL SUCCINATE 100 MG: 50 TABLET, EXTENDED RELEASE ORAL at 11:50

## 2024-03-26 RX ADMIN — ASPIRIN 81 MG CHEWABLE TABLET 81 MG: 81 TABLET CHEWABLE at 11:50

## 2024-03-26 RX ADMIN — Medication 10 ML: at 20:42

## 2024-03-26 RX ADMIN — PANTOPRAZOLE SODIUM 40 MG: 40 TABLET, DELAYED RELEASE ORAL at 11:50

## 2024-03-26 RX ADMIN — AMLODIPINE BESYLATE 10 MG: 5 TABLET ORAL at 11:50

## 2024-03-26 RX ADMIN — Medication 10 ML: at 09:15

## 2024-03-26 NOTE — ED NOTES
Nursing report ED to floor  Viktor Atkins  73 y.o.  male    HPI:   Chief Complaint   Patient presents with    Rectal Bleeding     Pt reports had episode of bright red blood rectally and noted in stool, denies taking blood thinners       Admitting doctor:   Thiago Solomon MD    Admitting diagnosis:   The encounter diagnosis was Gastrointestinal hemorrhage, unspecified gastrointestinal hemorrhage type.    Code status:   Current Code Status       Date Active Code Status Order ID Comments User Context       3/25/2024 2316 CPR (Attempt to Resuscitate) [id number]  Shakira York APRN ED        Question Answer    Code Status (Patient has no pulse and is not breathing) CPR (Attempt to Resuscitate)    Medical Interventions (Patient has pulse or is breathing) Full Support    Level Of Support Discussed With Patient                    Allergies:   Patient has no known allergies.    Isolation:  No active isolations     Fall Risk:  Fall Risk Assessment was completed, and patient is at low risk for falls.   Predictive Model Details         7 (Low) Factor Value    Calculated 3/26/2024 00:50 Age 73    Risk of Fall Model Musculoskeletal Assessment WDL     Active Peripheral IV Present     Imaging order in this encounter Present     Skin Assessment WDL     Financial Class Other     Magnesium not on file     Drug Use No     Tobacco Use Quit     Jabier Scale not on file     Peripheral Vascular Assessment WDL     Cardiac Assessment X     Calcium 9 mg/dL     Clinically Relevant Sex Not Female     Gastrointestinal Assessment WDL     Diastolic BP 84     Number of Distinct Medication Classes administered 1     Chloride 107 mmol/L     ALT <5 U/L     Days after Admission 0.177     Potassium 4.3 mmol/L     Creatinine 1.07 mg/dL     Albumin 4.6 g/dL     Total Bilirubin 0.2 mg/dL     Respiratory Rate 15         Weight:       03/25/24 2015   Weight: 115 kg (253 lb 1.4 oz)       Intake and Output  No intake or output data in the 24 hours  ending 03/26/24 0050    Diet:        Most recent vitals:   Vitals:    03/25/24 2147 03/25/24 2202 03/25/24 2317 03/26/24 0047   BP: 160/96 137/83 142/87 143/84   BP Location:  Right arm Right arm Right arm   Patient Position:  Sitting Sitting Sitting   Pulse: 107 105 111 93   Resp:  16 19 15   Temp:       TempSrc:       SpO2: 92% 91% 92% 91%   Weight:       Height:           Active LDAs/IV Access:   Lines, Drains & Airways       Active LDAs       Name Placement date Placement time Site Days    Peripheral IV 03/25/24 2128 Anterior;Left;Upper Arm 03/25/24 2128  Arm  less than 1                    Skin Condition:   Skin Assessments (last day)       None             Labs (abnormal labs have a star):   Labs Reviewed   COMPREHENSIVE METABOLIC PANEL - Abnormal; Notable for the following components:       Result Value    Glucose 164 (*)     CO2 21.0 (*)     Alkaline Phosphatase 120 (*)     All other components within normal limits    Narrative:     GFR Normal >60  Chronic Kidney Disease <60  Kidney Failure <15    The GFR formula is only valid for adults with stable renal function between ages 18 and 70.   CBC WITH AUTO DIFFERENTIAL - Abnormal; Notable for the following components:    MPV 13.5 (*)     All other components within normal limits   SCAN SLIDE   TYPE AND SCREEN   CBC AND DIFFERENTIAL    Narrative:     The following orders were created for panel order CBC & Differential.  Procedure                               Abnormality         Status                     ---------                               -----------         ------                     CBC Auto Differential[[id number]]        Abnormal            Final result               Scan Slide[[id number]]                                       Final result                 Please view results for these tests on the individual orders.       LOC: Person, Place, Time, and Situation    Telemetry:  Observation Unit    Cardiac Monitoring Ordered: yes    EKG:   ECG 12 Lead  Tachycardia   Preliminary Result   HEART RATE= 111  bpm   RR Interval= 541  ms   DE Interval=   ms   P Horizontal Axis=   deg   P Front Axis=   deg   QRSD Interval= 109  ms   QT Interval= 375  ms   QTcB= 510  ms   QRS Axis= 63  deg   T Wave Axis= 52  deg   - ABNORMAL ECG -   Atrial fibrillation   Prolonged QT interval   Electronically Signed By:    Date and Time of Study: 2024-03-25 21:03:30          Medications Given in the ED:   Medications   sodium chloride 0.9 % flush 10 mL (has no administration in time range)   iopamidol (ISOVUE-370) 76 % injection 100 mL (100 mL Intravenous Given 3/25/24 2234)       Imaging results:  CT Abdomen Pelvis With Contrast    Result Date: 3/25/2024  Impression: Findings compatible with constipation. Sigmoid diverticulosis without diverticulitis. Electronically Signed: Julio Perdomo MD  3/25/2024 10:43 PM EDT  Workstation ID: [id number]     Social issues:   Social History     Socioeconomic History    Marital status:    Tobacco Use    Smoking status: Former    Smokeless tobacco: Never   Vaping Use    Vaping status: Never Used   Substance and Sexual Activity    Alcohol use: Never    Drug use: Never    Sexual activity: Defer       NIH Stroke Scale:  Interval: (not recorded)  1a. Level of Consciousness: (not recorded)  1b. LOC Questions: (not recorded)  1c. LOC Commands: (not recorded)  2. Best Gaze: (not recorded)  3. Visual: (not recorded)  4. Facial Palsy: (not recorded)  5a. Motor Arm, Left: (not recorded)  5b. Motor Arm, Right: (not recorded)  6a. Motor Leg, Left: (not recorded)  6b. Motor Leg, Right: (not recorded)  7. Limb Ataxia: (not recorded)  8. Sensory: (not recorded)  9. Best Language: (not recorded)  10. Dysarthria: (not recorded)  11. Extinction and Inattention (formerly Neglect): (not recorded)    Total (NIH Stroke Scale): (not recorded)     Additional notable assessment information:       Nursing report ED to floor:    OBS Nurse, Marilyn Willett LPN    03/26/24 00:50 EDT Nursing report ED to floor  Viktor Atkins  73 y.o.  male    HPI:   Chief Complaint   Patient presents with    Rectal Bleeding     Pt reports had episode of bright red blood rectally and noted in stool, denies taking blood thinners       Admitting doctor:   Thiago Solomon MD    Admitting diagnosis:   The encounter diagnosis was Gastrointestinal hemorrhage, unspecified gastrointestinal hemorrhage type.    Code status:   Current Code Status       Date Active Code Status Order ID Comments User Context       3/25/2024 2316 CPR (Attempt to Resuscitate) [id number]  Shakira York APRN ED        Question Answer    Code Status (Patient has no pulse and is not breathing) CPR (Attempt to Resuscitate)    Medical Interventions (Patient has pulse or is breathing) Full Support    Level Of Support Discussed With Patient                    Allergies:   Patient has no known allergies.    Isolation:  No active isolations     Fall Risk:  Fall Risk Assessment was completed, and patient is at low risk for falls.   Predictive Model Details         7 (Low) Factor Value    Calculated 3/26/2024 00:50 Age 73    Risk of Fall Model Musculoskeletal Assessment WDL     Active Peripheral IV Present     Imaging order in this encounter Present     Skin Assessment WDL     Financial Class Other     Magnesium not on file     Drug Use No     Tobacco Use Quit     Jabier Scale not on file     Peripheral Vascular Assessment WDL     Cardiac Assessment X     Calcium 9 mg/dL     Clinically Relevant Sex Not Female     Gastrointestinal Assessment WDL     Diastolic BP 84     Number of Distinct Medication Classes administered 1     Chloride 107 mmol/L     ALT <5 U/L     Days after Admission 0.177     Potassium 4.3 mmol/L     Creatinine 1.07 mg/dL     Albumin 4.6 g/dL     Total Bilirubin 0.2 mg/dL     Respiratory Rate 15         Weight:       03/25/24 2015   Weight: 115 kg (253 lb 1.4 oz)       Intake and Output  No intake or output  data in the 24 hours ending 03/26/24 0050    Diet:        Most recent vitals:   Vitals:    03/25/24 2147 03/25/24 2202 03/25/24 2317 03/26/24 0047   BP: 160/96 137/83 142/87 143/84   BP Location:  Right arm Right arm Right arm   Patient Position:  Sitting Sitting Sitting   Pulse: 107 105 111 93   Resp:  16 19 15   Temp:       TempSrc:       SpO2: 92% 91% 92% 91%   Weight:       Height:           Active LDAs/IV Access:   Lines, Drains & Airways       Active LDAs       Name Placement date Placement time Site Days    Peripheral IV 03/25/24 2128 Anterior;Left;Upper Arm 03/25/24 2128  Arm  less than 1                    Skin Condition:   Skin Assessments (last day)       None             Labs (abnormal labs have a star):   Labs Reviewed   COMPREHENSIVE METABOLIC PANEL - Abnormal; Notable for the following components:       Result Value    Glucose 164 (*)     CO2 21.0 (*)     Alkaline Phosphatase 120 (*)     All other components within normal limits    Narrative:     GFR Normal >60  Chronic Kidney Disease <60  Kidney Failure <15    The GFR formula is only valid for adults with stable renal function between ages 18 and 70.   CBC WITH AUTO DIFFERENTIAL - Abnormal; Notable for the following components:    MPV 13.5 (*)     All other components within normal limits   SCAN SLIDE   TYPE AND SCREEN   CBC AND DIFFERENTIAL    Narrative:     The following orders were created for panel order CBC & Differential.  Procedure                               Abnormality         Status                     ---------                               -----------         ------                     CBC Auto Differential[[id number]]        Abnormal            Final result               Scan Slide[[id number]]                                       Final result                 Please view results for these tests on the individual orders.       LOC: Person, Place, Time, and Situation    Telemetry:  Observation Unit    Cardiac Monitoring Ordered:  yes    EKG:   ECG 12 Lead Tachycardia   Preliminary Result   HEART RATE= 111  bpm   RR Interval= 541  ms   SC Interval=   ms   P Horizontal Axis=   deg   P Front Axis=   deg   QRSD Interval= 109  ms   QT Interval= 375  ms   QTcB= 510  ms   QRS Axis= 63  deg   T Wave Axis= 52  deg   - ABNORMAL ECG -   Atrial fibrillation   Prolonged QT interval   Electronically Signed By:    Date and Time of Study: 2024-03-25 21:03:30          Medications Given in the ED:   Medications   sodium chloride 0.9 % flush 10 mL (has no administration in time range)   iopamidol (ISOVUE-370) 76 % injection 100 mL (100 mL Intravenous Given 3/25/24 2234)       Imaging results:  CT Abdomen Pelvis With Contrast    Result Date: 3/25/2024  Impression: Findings compatible with constipation. Sigmoid diverticulosis without diverticulitis. Electronically Signed: Julio Perdomo MD  3/25/2024 10:43 PM EDT  Workstation ID: [id number]     Social issues:   Social History     Socioeconomic History    Marital status:    Tobacco Use    Smoking status: Former    Smokeless tobacco: Never   Vaping Use    Vaping status: Never Used   Substance and Sexual Activity    Alcohol use: Never    Drug use: Never    Sexual activity: Defer       NIH Stroke Scale:  Interval: (not recorded)  1a. Level of Consciousness: (not recorded)  1b. LOC Questions: (not recorded)  1c. LOC Commands: (not recorded)  2. Best Gaze: (not recorded)  3. Visual: (not recorded)  4. Facial Palsy: (not recorded)  5a. Motor Arm, Left: (not recorded)  5b. Motor Arm, Right: (not recorded)  6a. Motor Leg, Left: (not recorded)  6b. Motor Leg, Right: (not recorded)  7. Limb Ataxia: (not recorded)  8. Sensory: (not recorded)  9. Best Language: (not recorded)  10. Dysarthria: (not recorded)  11. Extinction and Inattention (formerly Neglect): (not recorded)    Total (NIH Stroke Scale): (not recorded)     Additional notable assessment information:       Nursing report ED to floor:  OBS Nurse Marilyn      Brendan Willett LPN   03/26/24 00:50 EDT

## 2024-03-26 NOTE — CASE MANAGEMENT/SOCIAL WORK
Discharge Planning Assessment   Leif     Patient Name: Viktor Atkins  MRN: [id number]  Today's Date: 3/26/2024    Admit Date: 3/25/2024    Plan: Return home with spouse   Discharge Needs Assessment       Row Name 03/26/24 1130       Living Environment    People in Home spouse    Name(s) of People in Home Ninfa    Current Living Arrangements home    Potentially Unsafe Housing Conditions none    In the past 12 months has the electric, gas, oil, or water company threatened to shut off services in your home? No    Primary Care Provided by self    Provides Primary Care For no one    Family Caregiver if Needed spouse    Quality of Family Relationships helpful;involved;supportive    Able to Return to Prior Arrangements yes       Resource/Environmental Concerns    Resource/Environmental Concerns none    Transportation Concerns none       Transportation Needs    In the past 12 months, has lack of transportation kept you from medical appointments or from getting medications? no    In the past 12 months, has lack of transportation kept you from meetings, work, or from getting things needed for daily living? No       Food Insecurity    Within the past 12 months, you worried that your food would run out before you got the money to buy more. Never true    Within the past 12 months, the food you bought just didn't last and you didn't have money to get more. Never true       Transition Planning    Patient/Family Anticipates Transition to home with family    Patient/Family Anticipated Services at Transition none    Transportation Anticipated car, drives self;family or friend will provide       Discharge Needs Assessment    Readmission Within the Last 30 Days no previous admission in last 30 days    Equipment Currently Used at Home none    Anticipated Changes Related to Illness none    Equipment Needed After Discharge none                   Discharge Plan       Row Name 03/26/24 1131       Plan    Plan Return home with spouse     Patient/Family in Agreement with Plan yes    Plan Comments Barriers: Clear Liquid diet with plans for colonoscopy tomorrow.  GI following. CM met with . and Mrs. Atkins who reside together. He drives and is independent with  no medical equipment. He denies any financial concerns. PCP and Pharmacy verified.                Demographic Summary       Row Name 03/26/24 1130       General Information    Admission Type observation    Arrived From emergency department    Required Notices Provided Observation Status Notice    Referral Source admission list    Reason for Consult discharge planning    Preferred Language English       Contact Information    Permission Granted to Share Info With                    Functional Status       Row Name 03/26/24 1130       Functional Status    Usual Activity Tolerance excellent    Current Activity Tolerance moderate       Functional Status, IADL    Medications independent    Meal Preparation independent;assistive person    Housekeeping independent;assistive person    Laundry independent;assistive person    Shopping independent;assistive person    IADL Comments independent       Mental Status    General Appearance WDL WDL       Mental Status Summary    Recent Changes in Mental Status/Cognitive Functioning no changes                          Kelsy SWANSON,RN Case Manager  [facility]  Phone: Desk- [phone] cell- [phone]

## 2024-03-26 NOTE — ED PROVIDER NOTES
Subjective   Chief Complaint   Patient presents with    Rectal Bleeding     Pt reports had episode of bright red blood rectally and noted in stool, denies taking blood thinners       History of Present Illness  Patient is a 73-year-old male presents emergency department with complaint of bright red rectal bleeding, blood in the stool.  He reports this began this afternoon.  He reports he had rectal bleeding, and also had blood in agreement to have a bowel movement.  He describes it as bright red.  Denies any abdominal pain.  Denies taking any anticoagulation.  He does take aspirin daily.    He denies having a colonoscopy.  Review of Systems   Constitutional:  Negative for fever.   Respiratory:  Negative for shortness of breath.    Cardiovascular:  Negative for chest pain.   Gastrointestinal:  Positive for anal bleeding, blood in stool and diarrhea. Negative for abdominal pain, nausea and vomiting.   Genitourinary:  Negative for dysuria.   Musculoskeletal:  Negative for back pain and neck pain.   Skin:  Negative for color change and rash.   Neurological:  Negative for dizziness, syncope, weakness and light-headedness.       Past Medical History:   Diagnosis Date    Benign essential HTN     Hyperlipidemia, mixed     Palpitations        No Known Allergies    Past Surgical History:   Procedure Laterality Date    CARDIAC CATHETERIZATION      CHOLECYSTECTOMY      KNEE SURGERY         History reviewed. No pertinent family history.    Social History     Socioeconomic History    Marital status:    Tobacco Use    Smoking status: Former    Smokeless tobacco: Never   Vaping Use    Vaping status: Never Used   Substance and Sexual Activity    Alcohol use: Never    Drug use: Never    Sexual activity: Defer           Objective   Physical Exam  Vitals and nursing note reviewed.   Constitutional:       Appearance: Normal appearance. He is not toxic-appearing.   HENT:      Head: Normocephalic and atraumatic.      Nose: Nose  "normal.      Mouth/Throat:      Mouth: Mucous membranes are moist.      Pharynx: Oropharynx is clear.   Eyes:      Extraocular Movements: Extraocular movements intact.      Conjunctiva/sclera: Conjunctivae normal.      Pupils: Pupils are equal, round, and reactive to light.   Cardiovascular:      Rate and Rhythm: Normal rate and regular rhythm.      Heart sounds: Normal heart sounds. No murmur heard.     No friction rub. No gallop.   Pulmonary:      Effort: Pulmonary effort is normal.      Breath sounds: Normal breath sounds.   Abdominal:      General: Bowel sounds are normal.      Palpations: Abdomen is soft.   Genitourinary:     Rectum: Guaiac result positive. No tenderness.      Comments: Chaperone with Morena RN at bedside.  No rectal bleeding noted.  Bedside Hemoccult positive  Musculoskeletal:         General: Normal range of motion.      Cervical back: Normal range of motion and neck supple.   Skin:     General: Skin is warm and dry.      Capillary Refill: Capillary refill takes less than 2 seconds.   Neurological:      Mental Status: He is alert and oriented to person, place, and time.         Procedures           ED Course  ED Course as of 03/26/24 0457   Mon Mar 25, 2024   2308 EKG atrial fibrillation rate 111.  No previous available.  Corroborated with ED attending physician [LB]      ED Course User Index  [LB] Shakira York Alycia, KAREN                                 /95 (BP Location: Right arm, Patient Position: Lying)   Pulse 90   Temp 97.8 °F (36.6 °C) (Oral)   Resp 18   Ht 180.3 cm (70.98\")   Wt 114 kg (250 lb 10.6 oz)   SpO2 93%   BMI 34.98 kg/m²   Medications   sodium chloride 0.9 % flush 10 mL (has no administration in time range)   sodium chloride 0.9 % flush 10 mL (10 mL Intravenous Given 3/26/24 0105)   sodium chloride 0.9 % flush 10 mL (has no administration in time range)   sodium chloride 0.9 % infusion 40 mL (has no administration in time range)   acetaminophen (TYLENOL) " tablet 650 mg (has no administration in time range)   ondansetron (ZOFRAN) injection 4 mg (has no administration in time range)   melatonin tablet 5 mg (has no administration in time range)   sennosides-docusate (PERICOLACE) 8.6-50 MG per tablet 2 tablet (has no administration in time range)     And   polyethylene glycol (MIRALAX) packet 17 g (has no administration in time range)     And   bisacodyl (DULCOLAX) EC tablet 5 mg (has no administration in time range)     And   bisacodyl (DULCOLAX) suppository 10 mg (has no administration in time range)   iopamidol (ISOVUE-370) 76 % injection 100 mL (100 mL Intravenous Given 3/25/24 2234)     CT Abdomen Pelvis With Contrast    Result Date: 3/25/2024  Impression: Findings compatible with constipation. Sigmoid diverticulosis without diverticulitis. Electronically Signed: Julio Perdomo MD  3/25/2024 10:43 PM EDT  Workstation ID: [id number]   Lab Results (last 24 hours)       Procedure Component Value Units Date/Time    CBC & Differential [[id number]]  (Abnormal) Collected: 03/25/24 2130    Specimen: Blood Updated: 03/25/24 2203    Narrative:      The following orders were created for panel order CBC & Differential.  Procedure                               Abnormality         Status                     ---------                               -----------         ------                     CBC Auto Differential[[id number]]        Abnormal            Final result               Scan Slide[[id number]]                                       Final result                 Please view results for these tests on the individual orders.    Comprehensive Metabolic Panel [[id number]]  (Abnormal) Collected: 03/25/24 2130    Specimen: Blood Updated: 03/25/24 2209     Glucose 164 mg/dL      BUN 17 mg/dL      Creatinine 1.07 mg/dL      Sodium 140 mmol/L      Potassium 4.3 mmol/L      Comment: Slight hemolysis detected by analyzer. Result may be falsely elevated.        Chloride 107 mmol/L      CO2  21.0 mmol/L      Calcium 9.0 mg/dL      Total Protein 7.0 g/dL      Albumin 4.6 g/dL      ALT (SGPT) <5 U/L      AST (SGOT) 29 U/L      Comment: Slight hemolysis detected by analyzer. Result may be falsely elevated.        Alkaline Phosphatase 120 U/L      Total Bilirubin 0.2 mg/dL      Globulin 2.4 gm/dL      A/G Ratio 1.9 g/dL      BUN/Creatinine Ratio 15.9     Anion Gap 12.0 mmol/L      eGFR 73.7 mL/min/1.73     Narrative:      GFR Normal >60  Chronic Kidney Disease <60  Kidney Failure <15    The GFR formula is only valid for adults with stable renal function between ages 18 and 70.    CBC Auto Differential [[id number]]  (Abnormal) Collected: 03/25/24 2130    Specimen: Blood Updated: 03/25/24 2203     WBC 9.55 10*3/mm3      RBC 5.01 10*6/mm3      Hemoglobin 14.3 g/dL      Hematocrit 43.5 %      MCV 86.8 fL      MCH 28.5 pg      MCHC 32.9 g/dL      RDW 14.7 %      RDW-SD 46.8 fl      MPV 13.5 fL      Platelets 160 10*3/mm3      Neutrophil % 63.5 %      Lymphocyte % 26.0 %      Monocyte % 6.4 %      Eosinophil % 3.1 %      Basophil % 0.7 %      Immature Grans % 0.3 %      Neutrophils, Absolute 6.06 10*3/mm3      Lymphocytes, Absolute 2.48 10*3/mm3      Monocytes, Absolute 0.61 10*3/mm3      Eosinophils, Absolute 0.30 10*3/mm3      Basophils, Absolute 0.07 10*3/mm3      Immature Grans, Absolute 0.03 10*3/mm3      nRBC 0.0 /100 WBC     Scan Slide [[id number]] Collected: 03/25/24 2130    Specimen: Blood Updated: 03/25/24 2203     RBC Morphology Normal     WBC Morphology Normal     Platelet Estimate Adequate     Large Platelets Slight/1+    Basic Metabolic Panel [[id number]] Collected: 03/26/24 0440    Specimen: Blood from Arm, Left Updated: 03/26/24 0451    CBC Auto Differential [[id number]] Collected: 03/26/24 0440    Specimen: Blood from Arm, Left Updated: 03/26/24 0451    Scan Slide [[id number]] Collected: 03/26/24 0440    Specimen: Blood from Arm, Left Updated: 03/26/24 0455                      Medical Decision  Making  Problems Addressed:  Gastrointestinal hemorrhage, unspecified gastrointestinal hemorrhage type: complicated acute illness or injury    Amount and/or Complexity of Data Reviewed  Labs: ordered.  Radiology: ordered.  ECG/medicine tests: ordered.    Risk  OTC drugs.  Prescription drug management.  Decision regarding hospitalization.      Imaging: CT Abdomen Pelvis With Contrast    Result Date: 3/25/2024  Impression: Findings compatible with constipation. Sigmoid diverticulosis without diverticulitis. Electronically Signed: Julio Perdomo MD  3/25/2024 10:43 PM EDT  Workstation ID: [id number]         Patient presents to the ED for the above complaint, underwent the above, exam and workup.  Placed on appropriate monitoring.  Differential diagnoses considered for patient presentation, this list is not all inclusive of diagnoses considered: Hemorrhoid, fissure, colitis.  Patient has no gross rectal bleeding on exam.  He did have a positive Hemoccult at the bedside.  Will place in ED observation for repeat labs morning, GI consult.  Disposition: Placed in ED observation  Note Disclaimer: At [facility], we believe that sharing information builds trust and better relationships. You are receiving this note because you recently visited [facility]. It is possible you will see health information before a provider has talked with you about it. This kind of information can be easy to misunderstand. To help you fully understand what it means for your health, we urge you to discuss this note with your provider.Note dictated utilizing Dragon Dictation. Appropriate PPE worn during patient interactions.        Final diagnoses:   Gastrointestinal hemorrhage, unspecified gastrointestinal hemorrhage type       ED Disposition  ED Disposition       ED Disposition   Decision to Admit    Condition   --    Comment   --               No follow-up provider specified.       Medication List      No changes were made to your  prescriptions during this visit.            Shakira York, APRN  03/26/24 0458

## 2024-03-26 NOTE — PLAN OF CARE
Goal Outcome Evaluation:  Plan of Care Reviewed With: patient           Outcome Evaluation: Pt has had no complaints this shift. Pt had BM with bright red blood this morning. Scheduled for colonoscopy for 3/27 at 1100. Surprep started at 1150 today. Pt tolerating well. Pt reports that stool is nearly clear approx 5 hours after 1st bottle, but does have bright red blood. He has no c/o pain.

## 2024-03-26 NOTE — H&P
[facility] H&P    Patient Name: Viktor Atkins  : 1950  MRN: [id number]  Primary Care Physician: Gera Manriquez MD  Date of admission: 3/25/2024     Patient Care Team:  Gera Manriquez MD as PCP - General (Internal Medicine)  Gera Manriquez MD as PCP - Family Medicine          Subjective   History Present Illness     Chief Complaint:   Chief Complaint   Patient presents with    Rectal Bleeding     Pt reports had episode of bright red blood rectally and noted in stool, denies taking blood thinners     Rectal bleeding     Rectal Bleeding  Pertinent negatives include no abdominal pain, nausea or vomiting.     ED 2024   History of Present Illness  Patient is a 73-year-old male presents emergency department with complaint of bright red rectal bleeding, blood in the stool.  He reports this began this afternoon.  He reports he had rectal bleeding, and also had blood in agreement to have a bowel movement.  He describes it as bright red.  Denies any abdominal pain.  Denies taking any anticoagulation.  He does take aspirin daily.     Observation 2024  Patient agrees with HPI noted above including significant amount of bright red blood with onset yesterday.  Denies any pain with bowel movements.  Reports that he has had 2 bowel movements this morning and states that blood still present but the amount has decreased.    Review of Systems   Constitutional: Negative for malaise/fatigue.   Gastrointestinal:  Positive for diarrhea and hematochezia. Negative for bloating, abdominal pain, melena, nausea and vomiting.   All other systems reviewed and are negative.          Personal History     Past Medical History:   Past Medical History:   Diagnosis Date    Benign essential HTN     Hyperlipidemia, mixed     Palpitations        Surgical History:      Past Surgical History:   Procedure Laterality Date    CARDIAC CATHETERIZATION      CHOLECYSTECTOMY      KNEE SURGERY             Family History: family  history is not on file. Otherwise pertinent FHx was reviewed and unremarkable.     Social History:  reports that he has quit smoking. He has never used smokeless tobacco. He reports that he does not drink alcohol and does not use drugs.      Medications:  Prior to Admission medications    Medication Sig Start Date End Date Taking? Authorizing Provider   amLODIPine (NORVASC) 10 MG tablet 1 tablet Daily. 2/8/17  Yes Dylon Worley MD   aspirin 81 MG chewable tablet Chew 1 tablet Daily.   Yes Dylon Worley MD   atorvastatin (LIPITOR) 20 MG tablet Take 1 tablet by mouth Every Night. 2/8/17  Yes Dylon Worley MD   losartan (COZAAR) 25 MG tablet TAKE 1 TABLET EVERY DAY 3/30/21  Yes Abimael Greenberg MD   meloxicam (MOBIC) 15 MG tablet Take 1 tablet by mouth Daily.   Yes Dylon Worley MD   metFORMIN (GLUCOPHAGE) 500 MG tablet Take 1 tablet by mouth Daily With Breakfast.   Yes Dylon Worley MD   metoprolol succinate XL (TOPROL-XL) 100 MG 24 hr tablet Take 1 tablet by mouth Daily. 12/14/23  Yes Dylon Worley MD   Multiple Vitamins-Minerals (MULTIVITAMIN ADULT PO) Take 1 tablet by mouth Daily.   Yes Dylon Worley MD   Omeprazole (EQL Omeprazole) 20 MG tablet delayed-release 20 mg Daily. 2/8/17  Yes Dylon Worley MD   sertraline (Zoloft) 25 MG tablet 1 tablet Daily. 4/13/17  Yes Dylon Worley MD       Allergies:  No Known Allergies    Objective   Objective     Vital Signs  Temp:  [96.6 °F (35.9 °C)-98 °F (36.7 °C)] 98 °F (36.7 °C)  Heart Rate:  [] 98  Resp:  [15-19] 18  BP: (137-160)/(83-99) 156/93  SpO2:  [91 %-96 %] 95 %  on   ;   Device (Oxygen Therapy): room air  Body mass index is 34.98 kg/m².    Physical Exam  Vitals and nursing note reviewed.   Constitutional:       Appearance: Normal appearance. He is obese.   HENT:      Head: Normocephalic and atraumatic.      Right Ear: External ear normal.      Left Ear: External ear normal.      Nose: Nose  normal.      Mouth/Throat:      Mouth: Mucous membranes are moist.   Eyes:      Extraocular Movements: Extraocular movements intact.   Cardiovascular:      Rate and Rhythm: Normal rate and regular rhythm.      Pulses: Normal pulses.      Heart sounds: Normal heart sounds.   Pulmonary:      Effort: Pulmonary effort is normal.      Breath sounds: Normal breath sounds.   Abdominal:      General: Abdomen is flat. Bowel sounds are normal.      Palpations: Abdomen is soft.      Comments: Denies any distension   Musculoskeletal:         General: Normal range of motion.      Cervical back: Normal range of motion.   Skin:     General: Skin is warm.   Neurological:      Mental Status: He is alert and oriented to person, place, and time.   Psychiatric:         Behavior: Behavior normal.         Thought Content: Thought content normal.         Judgment: Judgment normal.           Results Review:  I have personally reviewed most recent lab results and radiology images and interpretations and agree with findings, most notably: CMP, CBC, CT abdomen pelvis, EKG.    Results from last 7 days   Lab Units 03/26/24  0440   WBC 10*3/mm3 9.06   HEMOGLOBIN g/dL 13.2   HEMATOCRIT % 42.1   PLATELETS 10*3/mm3 143     Results from last 7 days   Lab Units 03/26/24  0440 03/25/24  2130   SODIUM mmol/L 142 140   POTASSIUM mmol/L 4.4 4.3   CHLORIDE mmol/L 108* 107   CO2 mmol/L 22.0 21.0*   BUN mg/dL 18 17   CREATININE mg/dL 1.12 1.07   GLUCOSE mg/dL 114* 164*   CALCIUM mg/dL 8.8 9.0   ALK PHOS U/L  --  120*   ALT (SGPT) U/L  --  <5   AST (SGOT) U/L  --  29     Estimated Creatinine Clearance: 76.6 mL/min (by C-G formula based on SCr of 1.12 mg/dL).  Brief Urine Lab Results       None            Microbiology Results (last 10 days)       ** No results found for the last 240 hours. **            ECG/EMG Results (most recent)       Procedure Component Value Units Date/Time    ECG 12 Lead Tachycardia [[id number]] Collected: 03/25/24 2103     Updated:  03/26/24 0717     QT Interval 375 ms      QTC Interval 510 ms     Narrative:      HEART RATE= 111  bpm  RR Interval= 541  ms  AL Interval=   ms  P Horizontal Axis=   deg  P Front Axis=   deg  QRSD Interval= 109  ms  QT Interval= 375  ms  QTcB= 510  ms  QRS Axis= 63  deg  T Wave Axis= 52  deg  - ABNORMAL ECG -  Atrial fibrillation  Prolonged QT interval  No previous ECG available for comparison  Electronically Signed By: Thiago Solomon (REDD) 26-Mar-2024 07:17:38  Date and Time of Study: 2024-03-25 21:03:30                    CT Abdomen Pelvis With Contrast    Result Date: 3/25/2024  Impression: Findings compatible with constipation. Sigmoid diverticulosis without diverticulitis. Electronically Signed: Julio Perdomo MD  3/25/2024 10:43 PM EDT  Workstation ID: [id number]       Estimated Creatinine Clearance: 76.6 mL/min (by C-G formula based on SCr of 1.12 mg/dL).    Assessment & Plan   Assessment/Plan       Active Hospital Problems    Diagnosis  POA    **GI bleeding [K92.2]  Yes    Rectal bleeding [K62.5]  Unknown      Resolved Hospital Problems   No resolved problems to display.       Rectal bleeding  Lab Results   Component Value Date    HGB 13.2 03/26/2024    MCV 87.9 03/26/2024    MCHC 31.4 (L) 03/26/2024   -Hemoglobin is stable, 14.3 yesterday and 13.2 today  -CMP generally unremarkable  -CT abdomen and pelvis compatible with constipation.  Sigmoid diverticulosis without diverticulitis  -EKG showed atrial for A-fib with heart rate of 111.  No previous EKG for comparison.  Heart rate 90s in the monitor.  Continue metoprolol. Follow-up with PCP  -Per ED notes patient's Hemoccult was positive for blood  -GI was consulted in the ED  -Plans to proceed with colonoscopy in a.m.  -Clear liquid for now and n.p.o. after midnight  -Bowel prep ordered by ID    Hypertension  -Moderately controlled   BP Readings from Last 1 Encounters:   03/26/24 153/96   - Continue Norvasc and losartan  - Monitor while admitted     Diabetes  mellitus  -Moderately controlled   Lab Results   Component Value Date    GLUCOSE 114 (H) 03/26/2024    GLUCOSE 164 (H) 03/25/2024    GLUCOSE 111 (H) 10/29/2018   -On metformin  -Diabetic diet  -SSI  -Monitor before meals and at bedtime     Hyperlipidemia  -Continue Lipitor    Depression/anxiety  -Continue Zoloft    Obesity  -BMI 34.98  -Lifestyle modifications      VTE Prophylaxis -   Mechanical Order History:        Ordered        03/26/24 0051  Place Sequential Compression Device  Once            03/26/24 0051  Maintain Sequential Compression Device  Continuous                          Pharmalogical Order History:       None            CODE STATUS:    Code Status and Medical Interventions:   Ordered at: 03/25/24 2316     Level Of Support Discussed With:    Patient     Code Status (Patient has no pulse and is not breathing):    CPR (Attempt to Resuscitate)     Medical Interventions (Patient has pulse or is breathing):    Full Support       This patient has been examined wearing personal protective equipment.     I discussed the patient's findings and my recommendations with patient and nursing staff.      Signature:Electronically signed by KAREN Silva, 03/26/24, 10:18 AM EDT.

## 2024-03-26 NOTE — PLAN OF CARE
Problem: Adult Inpatient Plan of Care  Goal: Plan of Care Review  Outcome: Ongoing, Progressing  Flowsheets (Taken 3/26/2024 0120)  Plan of Care Reviewed With: patient  Outcome Evaluation: Patient admitted to room 232. Oriented to unit and call system. Patient states understanding about GI cinsult for the AM and that he needs to remain NPO. Will continue to monitor  Goal: Patient-Specific Goal (Individualized)  Outcome: Ongoing, Progressing  Goal: Absence of Hospital-Acquired Illness or Injury  Outcome: Ongoing, Progressing  Intervention: Identify and Manage Fall Risk  Recent Flowsheet Documentation  Taken 3/26/2024 0112 by Marilyn Iniguez RN  Safety Promotion/Fall Prevention: safety round/check completed  Intervention: Prevent Skin Injury  Recent Flowsheet Documentation  Taken 3/26/2024 0112 by Marilyn Iniguez RN  Body Position: position changed independently  Intervention: Prevent and Manage VTE (Venous Thromboembolism) Risk  Recent Flowsheet Documentation  Taken 3/26/2024 0112 by Marilyn Iniguez RN  Activity Management: ambulated in room  VTE Prevention/Management: sequential compression devices off  Range of Motion: active ROM (range of motion) encouraged  Intervention: Prevent Infection  Recent Flowsheet Documentation  Taken 3/26/2024 0112 by Marilyn Iniguez RN  Infection Prevention:   rest/sleep promoted   hand hygiene promoted   single patient room provided  Goal: Optimal Comfort and Wellbeing  Outcome: Ongoing, Progressing  Intervention: Provide Person-Centered Care  Recent Flowsheet Documentation  Taken 3/26/2024 0112 by Marilyn Iniguez RN  Trust Relationship/Rapport:   care explained   choices provided   questions answered   questions encouraged   reassurance provided   thoughts/feelings acknowledged  Goal: Readiness for Transition of Care  Outcome: Ongoing, Progressing  Intervention: Mutually Develop Transition Plan  Recent Flowsheet Documentation  Taken 3/26/2024 0107 by  Marilyn Iniguez RN  Transportation Anticipated: family or friend will provide  Patient/Family Anticipated Services at Transition: none  Patient/Family Anticipates Transition to: home  Taken 3/26/2024 0106 by Marilyn Iniguez RN  Equipment Currently Used at Home: none     Problem: Fall Injury Risk  Goal: Absence of Fall and Fall-Related Injury  Outcome: Ongoing, Progressing  Intervention: Identify and Manage Contributors  Recent Flowsheet Documentation  Taken 3/26/2024 0112 by Marilyn Iniguez RN  Medication Review/Management: medications reviewed  Intervention: Promote Injury-Free Environment  Recent Flowsheet Documentation  Taken 3/26/2024 0112 by Marilyn Iniguez RN  Safety Promotion/Fall Prevention: safety round/check completed     Problem: Nausea and Vomiting  Goal: Fluid and Electrolyte Balance  Outcome: Ongoing, Progressing     Problem: Adjustment to Illness (Gastrointestinal Bleeding)  Goal: Optimal Coping with Acute Illness  Outcome: Ongoing, Progressing     Problem: Bleeding (Gastrointestinal Bleeding)  Goal: Hemostasis  Outcome: Ongoing, Progressing   Goal Outcome Evaluation:  Plan of Care Reviewed With: patient           Outcome Evaluation: Patient admitted to room 232. Oriented to unit and call system. Patient states understanding about GI cinsult for the AM and that he needs to remain NPO. Will continue to monitor

## 2024-03-26 NOTE — CONSULTS
GI CONSULT  NOTE:    Referring Provider:  Shakira York NP    Chief complaint: Rectal bleeding    Subjective .     History of present illness: Viktor Atkins is a 73 y.o. male with history of hypertension, hyperlipidemia, and cholecystectomy who presents with complaints of rectal bleeding.  The patient reports an acute onset of bright red blood per rectum yesterday.  He reports at least 3 episodes of rectal bleeding yesterday.  Had 1 bowel movement this morning with a smaller amount of bright red blood per rectum.  Denies recent diarrhea.  No abdominal pain.  No nausea/vomiting, heartburn, or dysphagia.  Denies unintentional weight loss.  No family history of colon cancer.      Endo History:  No previous endoscopy.    Past Medical History:  Past Medical History:   Diagnosis Date    Benign essential HTN     Hyperlipidemia, mixed     Palpitations        Past Surgical History:  Past Surgical History:   Procedure Laterality Date    CARDIAC CATHETERIZATION      CHOLECYSTECTOMY      KNEE SURGERY         Social History:  Social History     Tobacco Use    Smoking status: Former    Smokeless tobacco: Never   Vaping Use    Vaping status: Never Used   Substance Use Topics    Alcohol use: Never    Drug use: Never       Family History:  History reviewed. No pertinent family history.    Medications:  Medications Prior to Admission   Medication Sig Dispense Refill Last Dose    amLODIPine (NORVASC) 10 MG tablet 1 tablet Daily.   3/25/2024    aspirin 81 MG chewable tablet Chew 1 tablet Daily.   3/25/2024    atorvastatin (LIPITOR) 20 MG tablet Take 1 tablet by mouth Every Night.   3/25/2024    losartan (COZAAR) 25 MG tablet TAKE 1 TABLET EVERY DAY 90 tablet 1 3/25/2024    meloxicam (MOBIC) 15 MG tablet Take 1 tablet by mouth Daily.   3/25/2024    metFORMIN (GLUCOPHAGE) 500 MG tablet Take 1 tablet by mouth Daily With Breakfast.   3/25/2024    metoprolol succinate XL (TOPROL-XL) 100 MG 24 hr tablet Take 1 tablet by mouth Daily.    "3/25/2024    Multiple Vitamins-Minerals (MULTIVITAMIN ADULT PO) Take 1 tablet by mouth Daily.   3/25/2024    Omeprazole (EQL Omeprazole) 20 MG tablet delayed-release 20 mg Daily.   3/25/2024    sertraline (Zoloft) 25 MG tablet 1 tablet Daily.   3/25/2024       Scheduled Meds:amLODIPine, 10 mg, Oral, Q24H  aspirin, 81 mg, Oral, Daily  atorvastatin, 20 mg, Oral, Nightly  losartan, 25 mg, Oral, Daily  metoprolol succinate XL, 100 mg, Oral, Daily  pantoprazole, 40 mg, Oral, Q AM  sertraline, 25 mg, Oral, Daily  sodium chloride, 10 mL, Intravenous, Q12H  sodium-potassium-magnesium sulfates, 1 bottle, Oral, Q12H      Continuous Infusions:   PRN Meds:.  acetaminophen    senna-docusate sodium **AND** polyethylene glycol **AND** bisacodyl **AND** bisacodyl    melatonin    ondansetron    [COMPLETED] Insert Peripheral IV **AND** sodium chloride    sodium chloride    sodium chloride    ALLERGIES:  Patient has no known allergies.    ROS:  The following systems were reviewed and negative;   Constitution:  No fevers, chills, no unintentional weight loss  Skin: no rash, no jaundice  Eyes:  No blurry vision, no eye pain  HENT:  No change in hearing or smell  Resp:  No dyspnea or cough  CV:  No chest pain or palpitations  :  No dysuria, hematuria  Musculoskeletal:  No leg cramps or arthralgias  Neuro:  No tremor, no numbness  Psych:  No depression or confusion    Objective     Vital Signs:   Vitals:    03/25/24 2317 03/26/24 0047 03/26/24 0112 03/26/24 0500   BP: 142/87 143/84 148/95 156/93   BP Location: Right arm Right arm Right arm Right arm   Patient Position: Sitting Sitting Lying Lying   Pulse: 111 93 90 98   Resp: 19 15 18 18   Temp:   97.8 °F (36.6 °C) 98 °F (36.7 °C)   TempSrc:   Oral Oral   SpO2: 92% 91% 93% 95%   Weight:   114 kg (250 lb 10.6 oz)    Height:   180.3 cm (70.98\")        Physical Exam:       General Appearance:    Awake and alert, in no acute distress   Head:    Normocephalic, without obvious abnormality, " "atraumatic   Throat:   No oral lesions, no thrush, oral mucosa moist   Lungs:     Respirations regular, even and unlabored   Chest Wall:    No abnormalities observed   Abdomen:     Soft, non-tender, no rebound or guarding, non-distended   Rectal:     Deferred   Extremities:   Moves all extremities, no edema, no cyanosis   Pulses:   Pulses palpable and equal bilaterally   Skin:   No rash, no jaundice, normal palpation   Lymph nodes:   No cervical, supraclavicular or submandibular palpable adenopathy   Neurologic:   Cranial nerves 2 - 12 grossly intact, no asterixis       Results Review:   I reviewed the patient's labs and imaging.  CBC    Results from last 7 days   Lab Units 03/26/24  0440 03/25/24  2130   WBC 10*3/mm3 9.06 9.55   HEMOGLOBIN g/dL 13.2 14.3   PLATELETS 10*3/mm3 143 160     CMP   Results from last 7 days   Lab Units 03/26/24  0440 03/25/24  2130   SODIUM mmol/L 142 140   POTASSIUM mmol/L 4.4 4.3   CHLORIDE mmol/L 108* 107   CO2 mmol/L 22.0 21.0*   BUN mg/dL 18 17   CREATININE mg/dL 1.12 1.07   GLUCOSE mg/dL 114* 164*   ALBUMIN g/dL  --  4.6   BILIRUBIN mg/dL  --  0.2   ALK PHOS U/L  --  120*   AST (SGOT) U/L  --  29   ALT (SGPT) U/L  --  <5     Cr Clearance Estimated Creatinine Clearance: 76.6 mL/min (by C-G formula based on SCr of 1.12 mg/dL).  Coag     HbA1C No results found for: \"HGBA1C\"  Blood Glucose No results found for: \"POCGLU\"  Infection     UA      Imaging Results (Last 72 Hours)       Procedure Component Value Units Date/Time    CT Abdomen Pelvis With Contrast [[id number]] Collected: 03/25/24 2238     Updated: 03/25/24 2246    Narrative:      CT ABDOMEN PELVIS W CONTRAST    Date of Exam: 3/25/2024 10:15 PM EDT    Indication: gi bleeding.    Comparison: None available.    Technique: Axial CT images were obtained of the abdomen and pelvis following the uneventful intravenous administration of iodinated contrast. Sagittal and coronal reconstructions were performed.  Automated exposure control " and iterative reconstruction   methods were used.        Findings:    Lung Bases:  No focal consolidation or effusion. Cardiac silhouette appears enlarged.    Peritoneum:  No free intraperitoneal air or fluid.     Abdominal wall:  Unremarkable.    Liver:  Liver is normal in size and contour. No focal lesions. The portal vein is patent.    Biliary/Gallbladder:  The gallbladder is surgically absent. The biliary tree is nondilated.    Pancreas:  Pancreas is within normal limits. There is no evidence of pancreatic mass or peripancreatic inflammatory changes.    Spleen:  Spleen is normal in size and contour.    Gastrointestinal/Mesentery:   No evidence of bowel obstruction or gross inflammatory changes. The appendix appears within normal limits. There is mild diffuse fecal retention. There is sigmoid diverticulosis without evidence of diverticulitis.    Adrenals:  Adrenal glands are unremarkable.    Kidneys:  The kidneys are in anatomic position. No evidence of nephrolithiasis. No evidence of hydronephrosis or significant perinephric fat stranding.    Bladder:   The urinary bladder is unremarkable.    Reproductive organs:    The prostate and seminal vesicles are within normal limits.    Lymph Nodes:  No significant adenopathy is identified.     Vasculature:  Unremarkable.    Bony Structures:    No acute fracture or aggressive lesions.        Impression:      Impression:  Findings compatible with constipation.    Sigmoid diverticulosis without diverticulitis.            Electronically Signed: Julio Perdomo MD    3/25/2024 10:43 PM EDT    Workstation ID: [id number]            ASSESSMENT:  -Rectal bleeding  -Hypertension  -Hyperlipidemia  -Elevated alk phos  -History of cholecystectomy    PLAN:  Patient is a 73-year-old male with history of hypertension, hyperlipidemia, and cholecystectomy who presented on 3/25 with complaints of rectal bleeding.    CT abdomen/pelvis W on admission shows constipation and diverticulosis  without evidence of acute diverticulitis.  Hemoglobin 13.2.  Continue to monitor H/H and transfuse as needed.  We will plan colonoscopy for further evaluation.  Clear liquid diet.  N.p.o. following completion of bowel prep.  Mild elevation of alk phos noted.  Check GGT.  Continue PPI.  Supportive care.      I discussed the patients findings and my recommendations with the patient.  I will discuss case with Dr. Quintero and change plan accordingly.    We appreciate the referral.    Electronically signed by KAREN Marr, 03/26/24, 9:56 AM EDT.

## 2024-03-27 ENCOUNTER — APPOINTMENT (OUTPATIENT)
Dept: CT IMAGING | Facility: HOSPITAL | Age: 74
DRG: 330 | End: 2024-03-27
Payer: MEDICARE

## 2024-03-27 ENCOUNTER — ANESTHESIA EVENT (OUTPATIENT)
Dept: GASTROENTEROLOGY | Facility: HOSPITAL | Age: 74
End: 2024-03-27
Payer: MEDICARE

## 2024-03-27 ENCOUNTER — ANESTHESIA (OUTPATIENT)
Dept: GASTROENTEROLOGY | Facility: HOSPITAL | Age: 74
End: 2024-03-27
Payer: MEDICARE

## 2024-03-27 PROBLEM — K92.2 GI BLEED: Status: ACTIVE | Noted: 2024-03-27

## 2024-03-27 LAB
ALBUMIN SERPL-MCNC: 4.6 G/DL (ref 3.5–5.2)
ALBUMIN/GLOB SERPL: 1.8 G/DL
ALP SERPL-CCNC: 107 U/L (ref 39–117)
ALT SERPL W P-5'-P-CCNC: 46 U/L (ref 1–41)
ANION GAP SERPL CALCULATED.3IONS-SCNC: 13 MMOL/L (ref 5–15)
AST SERPL-CCNC: 40 U/L (ref 1–40)
BASOPHILS # BLD AUTO: 0.05 10*3/MM3 (ref 0–0.2)
BASOPHILS NFR BLD AUTO: 0.5 % (ref 0–1.5)
BILIRUB SERPL-MCNC: 0.6 MG/DL (ref 0–1.2)
BUN SERPL-MCNC: 15 MG/DL (ref 8–23)
BUN/CREAT SERPL: 15.5 (ref 7–25)
CALCIUM SPEC-SCNC: 9.3 MG/DL (ref 8.6–10.5)
CEA SERPL-MCNC: 5.21 NG/ML
CHLORIDE SERPL-SCNC: 107 MMOL/L (ref 98–107)
CO2 SERPL-SCNC: 22 MMOL/L (ref 22–29)
CREAT SERPL-MCNC: 0.97 MG/DL (ref 0.76–1.27)
DEPRECATED RDW RBC AUTO: 47.7 FL (ref 37–54)
EGFRCR SERPLBLD CKD-EPI 2021: 82.9 ML/MIN/1.73
EOSINOPHIL # BLD AUTO: 0.23 10*3/MM3 (ref 0–0.4)
EOSINOPHIL NFR BLD AUTO: 2.5 % (ref 0.3–6.2)
ERYTHROCYTE [DISTWIDTH] IN BLOOD BY AUTOMATED COUNT: 14.9 % (ref 12.3–15.4)
GLOBULIN UR ELPH-MCNC: 2.6 GM/DL
GLUCOSE BLDC GLUCOMTR-MCNC: 115 MG/DL (ref 70–105)
GLUCOSE BLDC GLUCOMTR-MCNC: 88 MG/DL (ref 70–105)
GLUCOSE BLDC GLUCOMTR-MCNC: 98 MG/DL (ref 70–105)
GLUCOSE SERPL-MCNC: 100 MG/DL (ref 65–99)
HCT VFR BLD AUTO: 46.6 % (ref 37.5–51)
HGB BLD-MCNC: 14.5 G/DL (ref 13–17.7)
IMM GRANULOCYTES # BLD AUTO: 0.03 10*3/MM3 (ref 0–0.05)
IMM GRANULOCYTES NFR BLD AUTO: 0.3 % (ref 0–0.5)
LARGE PLATELETS: NORMAL
LYMPHOCYTES # BLD AUTO: 3.34 10*3/MM3 (ref 0.7–3.1)
LYMPHOCYTES NFR BLD AUTO: 36.3 % (ref 19.6–45.3)
MAGNESIUM SERPL-MCNC: 2.4 MG/DL (ref 1.6–2.4)
MCH RBC QN AUTO: 27.2 PG (ref 26.6–33)
MCHC RBC AUTO-ENTMCNC: 31.1 G/DL (ref 31.5–35.7)
MCV RBC AUTO: 87.3 FL (ref 79–97)
MONOCYTES # BLD AUTO: 0.67 10*3/MM3 (ref 0.1–0.9)
MONOCYTES NFR BLD AUTO: 7.3 % (ref 5–12)
NEUTROPHILS NFR BLD AUTO: 4.88 10*3/MM3 (ref 1.7–7)
NEUTROPHILS NFR BLD AUTO: 53.1 % (ref 42.7–76)
NRBC BLD AUTO-RTO: 0 /100 WBC (ref 0–0.2)
PLATELET # BLD AUTO: 164 10*3/MM3 (ref 140–450)
PMV BLD AUTO: 13.6 FL (ref 6–12)
POTASSIUM SERPL-SCNC: 4.4 MMOL/L (ref 3.5–5.2)
PROT SERPL-MCNC: 7.2 G/DL (ref 6–8.5)
QT INTERVAL: 333 MS
QTC INTERVAL: 443 MS
RBC # BLD AUTO: 5.34 10*6/MM3 (ref 4.14–5.8)
RBC MORPH BLD: NORMAL
SMALL PLATELETS BLD QL SMEAR: ADEQUATE
SODIUM SERPL-SCNC: 142 MMOL/L (ref 136–145)
TSH SERPL DL<=0.05 MIU/L-ACNC: 4.85 UIU/ML (ref 0.27–4.2)
WBC MORPH BLD: NORMAL
WBC NRBC COR # BLD AUTO: 9.2 10*3/MM3 (ref 3.4–10.8)

## 2024-03-27 PROCEDURE — 88342 IMHCHEM/IMCYTCHM 1ST ANTB: CPT | Performed by: INTERNAL MEDICINE

## 2024-03-27 PROCEDURE — 83735 ASSAY OF MAGNESIUM: CPT | Performed by: NURSE PRACTITIONER

## 2024-03-27 PROCEDURE — 25810000003 SODIUM CHLORIDE 0.9 % SOLUTION: Performed by: INTERNAL MEDICINE

## 2024-03-27 PROCEDURE — 80053 COMPREHEN METABOLIC PANEL: CPT | Performed by: NURSE PRACTITIONER

## 2024-03-27 PROCEDURE — 84443 ASSAY THYROID STIM HORMONE: CPT | Performed by: NURSE PRACTITIONER

## 2024-03-27 PROCEDURE — 25010000002 PROPOFOL 1000 MG/100ML EMULSION: Performed by: NURSE ANESTHETIST, CERTIFIED REGISTERED

## 2024-03-27 PROCEDURE — 88305 TISSUE EXAM BY PATHOLOGIST: CPT | Performed by: INTERNAL MEDICINE

## 2024-03-27 PROCEDURE — 0DBP8ZX EXCISION OF RECTUM, VIA NATURAL OR ARTIFICIAL OPENING ENDOSCOPIC, DIAGNOSTIC: ICD-10-PCS | Performed by: INTERNAL MEDICINE

## 2024-03-27 PROCEDURE — 82378 CARCINOEMBRYONIC ANTIGEN: CPT | Performed by: INTERNAL MEDICINE

## 2024-03-27 PROCEDURE — 85025 COMPLETE CBC W/AUTO DIFF WBC: CPT | Performed by: NURSE PRACTITIONER

## 2024-03-27 PROCEDURE — 93005 ELECTROCARDIOGRAM TRACING: CPT | Performed by: ANESTHESIOLOGY

## 2024-03-27 PROCEDURE — 0DBK8ZX EXCISION OF ASCENDING COLON, VIA NATURAL OR ARTIFICIAL OPENING ENDOSCOPIC, DIAGNOSTIC: ICD-10-PCS | Performed by: INTERNAL MEDICINE

## 2024-03-27 PROCEDURE — 82948 REAGENT STRIP/BLOOD GLUCOSE: CPT

## 2024-03-27 PROCEDURE — 0DBN8ZX EXCISION OF SIGMOID COLON, VIA NATURAL OR ARTIFICIAL OPENING ENDOSCOPIC, DIAGNOSTIC: ICD-10-PCS | Performed by: INTERNAL MEDICINE

## 2024-03-27 PROCEDURE — 82948 REAGENT STRIP/BLOOD GLUCOSE: CPT | Performed by: INTERNAL MEDICINE

## 2024-03-27 PROCEDURE — 71250 CT THORAX DX C-: CPT

## 2024-03-27 PROCEDURE — 85007 BL SMEAR W/DIFF WBC COUNT: CPT | Performed by: NURSE PRACTITIONER

## 2024-03-27 PROCEDURE — 0DBL8ZX EXCISION OF TRANSVERSE COLON, VIA NATURAL OR ARTIFICIAL OPENING ENDOSCOPIC, DIAGNOSTIC: ICD-10-PCS | Performed by: INTERNAL MEDICINE

## 2024-03-27 RX ORDER — EPHEDRINE SULFATE 5 MG/ML
5 INJECTION INTRAVENOUS ONCE AS NEEDED
Status: DISCONTINUED | OUTPATIENT
Start: 2024-03-27 | End: 2024-03-27 | Stop reason: HOSPADM

## 2024-03-27 RX ORDER — LABETALOL HYDROCHLORIDE 5 MG/ML
5 INJECTION, SOLUTION INTRAVENOUS
Status: DISCONTINUED | OUTPATIENT
Start: 2024-03-27 | End: 2024-03-27 | Stop reason: HOSPADM

## 2024-03-27 RX ORDER — METOPROLOL TARTRATE 1 MG/ML
5 INJECTION, SOLUTION INTRAVENOUS ONCE
Status: COMPLETED | OUTPATIENT
Start: 2024-03-27 | End: 2024-03-27

## 2024-03-27 RX ORDER — ONDANSETRON 2 MG/ML
4 INJECTION INTRAMUSCULAR; INTRAVENOUS EVERY 6 HOURS PRN
Status: DISCONTINUED | OUTPATIENT
Start: 2024-03-27 | End: 2024-03-30 | Stop reason: SDUPTHER

## 2024-03-27 RX ORDER — HYDRALAZINE HYDROCHLORIDE 20 MG/ML
5 INJECTION INTRAMUSCULAR; INTRAVENOUS
Status: DISCONTINUED | OUTPATIENT
Start: 2024-03-27 | End: 2024-03-27 | Stop reason: HOSPADM

## 2024-03-27 RX ORDER — ONDANSETRON 2 MG/ML
4 INJECTION INTRAMUSCULAR; INTRAVENOUS ONCE AS NEEDED
Status: DISCONTINUED | OUTPATIENT
Start: 2024-03-27 | End: 2024-03-27 | Stop reason: HOSPADM

## 2024-03-27 RX ORDER — IPRATROPIUM BROMIDE AND ALBUTEROL SULFATE 2.5; .5 MG/3ML; MG/3ML
3 SOLUTION RESPIRATORY (INHALATION) ONCE AS NEEDED
Status: DISCONTINUED | OUTPATIENT
Start: 2024-03-27 | End: 2024-03-27 | Stop reason: HOSPADM

## 2024-03-27 RX ORDER — SODIUM CHLORIDE 9 MG/ML
9 INJECTION, SOLUTION INTRAVENOUS ONCE
Status: COMPLETED | OUTPATIENT
Start: 2024-03-27 | End: 2024-03-27

## 2024-03-27 RX ORDER — PROPOFOL 10 MG/ML
INJECTION, EMULSION INTRAVENOUS AS NEEDED
Status: DISCONTINUED | OUTPATIENT
Start: 2024-03-27 | End: 2024-03-27 | Stop reason: SURG

## 2024-03-27 RX ORDER — ONDANSETRON 4 MG/1
4 TABLET, ORALLY DISINTEGRATING ORAL EVERY 6 HOURS PRN
Status: DISCONTINUED | OUTPATIENT
Start: 2024-03-27 | End: 2024-04-01 | Stop reason: HOSPADM

## 2024-03-27 RX ORDER — DIPHENHYDRAMINE HYDROCHLORIDE 50 MG/ML
12.5 INJECTION INTRAMUSCULAR; INTRAVENOUS
Status: DISCONTINUED | OUTPATIENT
Start: 2024-03-27 | End: 2024-03-27 | Stop reason: HOSPADM

## 2024-03-27 RX ORDER — LIDOCAINE HYDROCHLORIDE 20 MG/ML
INJECTION, SOLUTION EPIDURAL; INFILTRATION; INTRACAUDAL; PERINEURAL AS NEEDED
Status: DISCONTINUED | OUTPATIENT
Start: 2024-03-27 | End: 2024-03-27 | Stop reason: SURG

## 2024-03-27 RX ADMIN — SODIUM CHLORIDE 700 ML: 9 INJECTION, SOLUTION INTRAVENOUS at 13:22

## 2024-03-27 RX ADMIN — PROPOFOL INJECTABLE EMULSION 80 MG: 10 INJECTION, EMULSION INTRAVENOUS at 12:59

## 2024-03-27 RX ADMIN — ATORVASTATIN CALCIUM 20 MG: 20 TABLET, FILM COATED ORAL at 21:34

## 2024-03-27 RX ADMIN — PROPOFOL INJECTABLE EMULSION 100 MG: 10 INJECTION, EMULSION INTRAVENOUS at 13:06

## 2024-03-27 RX ADMIN — LIDOCAINE HYDROCHLORIDE 60 MG: 20 INJECTION, SOLUTION EPIDURAL; INFILTRATION; INTRACAUDAL; PERINEURAL at 12:54

## 2024-03-27 RX ADMIN — AMLODIPINE BESYLATE 10 MG: 5 TABLET ORAL at 14:59

## 2024-03-27 RX ADMIN — SODIUM CHLORIDE 9 ML/HR: 9 INJECTION, SOLUTION INTRAVENOUS at 11:29

## 2024-03-27 RX ADMIN — SODIUM CHLORIDE 100 ML/HR: 9 INJECTION, SOLUTION INTRAVENOUS at 12:49

## 2024-03-27 RX ADMIN — METOROPROLOL TARTRATE 5 MG: 5 INJECTION, SOLUTION INTRAVENOUS at 12:49

## 2024-03-27 RX ADMIN — PROPOFOL INJECTABLE EMULSION 120 MG: 10 INJECTION, EMULSION INTRAVENOUS at 12:54

## 2024-03-27 RX ADMIN — SERTRALINE HYDROCHLORIDE 25 MG: 25 TABLET ORAL at 14:59

## 2024-03-27 RX ADMIN — Medication 10 ML: at 09:00

## 2024-03-27 RX ADMIN — PROPOFOL INJECTABLE EMULSION 100 MG: 10 INJECTION, EMULSION INTRAVENOUS at 13:13

## 2024-03-27 RX ADMIN — LOSARTAN POTASSIUM 25 MG: 25 TABLET, FILM COATED ORAL at 14:59

## 2024-03-27 RX ADMIN — SORBITOL SOLUTION (BULK) 50 ML: 70 SOLUTION at 04:17

## 2024-03-27 NOTE — ANESTHESIA POSTPROCEDURE EVALUATION
Patient: Viktor Atkins    Procedure Summary       Date: 03/27/24 Room / Location: [facility] ENDOSCOPY 1 / [facility] ENDOSCOPY    Anesthesia Start: 1249 Anesthesia Stop: 1331    Procedure: COLONOSCOPY with polypectomy x 18 and sigmoid colon mass biopsies with endscopic spot tattooing Diagnosis:       Rectal bleeding      (Rectal bleeding [K62.5])    Surgeons: Fili Quintero MD Provider: Shorty Luong MD    Anesthesia Type: general, MAC ASA Status: 3            Anesthesia Type: general, MAC    Vitals  Vitals Value Taken Time   /81 03/27/24 1347   Temp     Pulse 98 03/27/24 1347   Resp 14 03/27/24 1347   SpO2 98 % 03/27/24 1347           Post Anesthesia Care and Evaluation    Patient location during evaluation: PACU  Patient participation: complete - patient participated  Level of consciousness: awake  Pain scale: See nurse's notes for pain score.  Pain management: adequate    Airway patency: patent  Anesthetic complications: No anesthetic complications  PONV Status: none  Cardiovascular status: acceptable  Respiratory status: acceptable and spontaneous ventilation  Hydration status: acceptable    Comments: Patient seen and examined postoperatively; vital signs stable; SpO2 greater than or equal to 90%; cardiopulmonary status stable; nausea/vomiting adequately controlled; pain adequately controlled; no apparent anesthesia complications; patient discharged from anesthesia care when discharge criteria were met

## 2024-03-27 NOTE — PROGRESS NOTES
[facility] H&P    Patient Name: Viktor Atkins  : 1950  MRN: [id number]  Primary Care Physician: Gera Manriquez MD  Date of admission: 3/25/2024     Patient Care Team:  Gera Manriquez MD as PCP - General (Internal Medicine)  Gera Manriquez MD as PCP - Family Medicine          Subjective   History Present Illness     Chief Complaint:   Chief Complaint   Patient presents with    Rectal Bleeding     Pt reports had episode of bright red blood rectally and noted in stool, denies taking blood thinners     Rectal bleeding    History of Present Illness    ED 2024   History of Present Illness  Patient is a 73-year-old male presents emergency department with complaint of bright red rectal bleeding, blood in the stool.  He reports this began this afternoon.  He reports he had rectal bleeding, and also had blood in agreement to have a bowel movement.  He describes it as bright red.  Denies any abdominal pain.  Denies taking any anticoagulation.  He does take aspirin daily.     Observation 2024  Patient agrees with HPI noted above including significant amount of bright red blood with onset yesterday.  Denies any pain with bowel movements.  Reports that he has had 2 bowel movements this morning and states that blood still present but the amount has decreased.    Observation 3/27/24  GI following, colonoscopy performed. Mass was seen. Colorectal surgery consulted. Cardiology consulted due to new onset afib.      ROS  Constitutional: Negative for malaise/fatigue.   Gastrointestinal:  Positive for diarrhea and hematochezia. Negative for bloating, abdominal pain, melena, nausea and vomiting.   All other systems reviewed and are negative        Personal History     Past Medical History:   Past Medical History:   Diagnosis Date    Benign essential HTN     Hyperlipidemia, mixed     Palpitations        Surgical History:      Past Surgical History:   Procedure Laterality Date    CARDIAC CATHETERIZATION       CHOLECYSTECTOMY      KNEE SURGERY             Family History: family history is not on file. Otherwise pertinent FHx was reviewed and unremarkable.     Social History:  reports that he has quit smoking. He has never used smokeless tobacco. He reports that he does not drink alcohol and does not use drugs.      Medications:  Prior to Admission medications    Medication Sig Start Date End Date Taking? Authorizing Provider   amLODIPine (NORVASC) 10 MG tablet 1 tablet Daily. 2/8/17  Yes Dylon Worley MD   aspirin 81 MG chewable tablet Chew 1 tablet Daily.   Yes Dylon Worley MD   atorvastatin (LIPITOR) 20 MG tablet Take 1 tablet by mouth Every Night. 2/8/17  Yes Dylon Worley MD   losartan (COZAAR) 25 MG tablet TAKE 1 TABLET EVERY DAY 3/30/21  Yes Abimael Greenberg MD   meloxicam (MOBIC) 15 MG tablet Take 1 tablet by mouth Daily.   Yes Dylon Worley MD   metFORMIN (GLUCOPHAGE) 500 MG tablet Take 1 tablet by mouth Daily With Breakfast.   Yes Dylon Worley MD   metoprolol succinate XL (TOPROL-XL) 100 MG 24 hr tablet Take 1 tablet by mouth Daily. 12/14/23  Yes Dylon Worley MD   Multiple Vitamins-Minerals (MULTIVITAMIN ADULT PO) Take 1 tablet by mouth Daily.   Yes Dylon Worley MD   Omeprazole (EQL Omeprazole) 20 MG tablet delayed-release 20 mg Daily. 2/8/17  Yes Dylon Worley MD   sertraline (Zoloft) 25 MG tablet 1 tablet Daily. 4/13/17  Yes Dylon Worley MD       Allergies:  No Known Allergies    Objective   Objective     Vital Signs  Temp:  [97.4 °F (36.3 °C)-98.5 °F (36.9 °C)] 97.6 °F (36.4 °C)  Heart Rate:  [] 102  Resp:  [11-20] 13  BP: (112-155)/() 148/90  SpO2:  [94 %-100 %] 99 %  on   ;   Device (Oxygen Therapy): room air  Body mass index is 34.98 kg/m².    Physical Exam  Vitals and nursing note reviewed.   Constitutional:       Appearance: Normal appearance. He is obese.   HENT:      Head: Normocephalic and atraumatic.      Right  Ear: External ear normal.      Left Ear: External ear normal.      Nose: Nose normal.      Mouth/Throat:      Mouth: Mucous membranes are moist.   Eyes:      Extraocular Movements: Extraocular movements intact.   Cardiovascular:      Rate and Rhythm: Normal rate and regular rhythm.      Pulses: Normal pulses.      Heart sounds: Normal heart sounds.   Pulmonary:      Effort: Pulmonary effort is normal.      Breath sounds: Normal breath sounds.   Abdominal:      General: Abdomen is flat. Bowel sounds are normal.      Palpations: Abdomen is soft.      Comments: Denies any distension   Musculoskeletal:         General: Normal range of motion.      Cervical back: Normal range of motion.   Skin:     General: Skin is warm.   Neurological:      Mental Status: He is alert and oriented to person, place, and time.   Psychiatric:         Behavior: Behavior normal.         Thought Content: Thought content normal.         Judgment: Judgment normal.     Results Review:  I have personally reviewed most recent cardiac tracings, lab results, and radiology images and interpretations and agree with findings, most notably: cbc, cmp, ct abd, ekg.    Results from last 7 days   Lab Units 03/27/24  0442   WBC 10*3/mm3 9.20   HEMOGLOBIN g/dL 14.5   HEMATOCRIT % 46.6   PLATELETS 10*3/mm3 164     Results from last 7 days   Lab Units 03/27/24  0442   SODIUM mmol/L 142   POTASSIUM mmol/L 4.4   CHLORIDE mmol/L 107   CO2 mmol/L 22.0   BUN mg/dL 15   CREATININE mg/dL 0.97   GLUCOSE mg/dL 100*   CALCIUM mg/dL 9.3   ALK PHOS U/L 107   ALT (SGPT) U/L 46*   AST (SGOT) U/L 40     Estimated Creatinine Clearance: 88.4 mL/min (by C-G formula based on SCr of 0.97 mg/dL).  Brief Urine Lab Results       None            Microbiology Results (last 10 days)       ** No results found for the last 240 hours. **            ECG/EMG Results (most recent)       Procedure Component Value Units Date/Time    ECG 12 Lead Tachycardia [[id number]] Collected: 03/25/24 2615      Updated: 03/26/24 0717     QT Interval 375 ms      QTC Interval 510 ms     Narrative:      HEART RATE= 111  bpm  RR Interval= 541  ms  WI Interval=   ms  P Horizontal Axis=   deg  P Front Axis=   deg  QRSD Interval= 109  ms  QT Interval= 375  ms  QTcB= 510  ms  QRS Axis= 63  deg  T Wave Axis= 52  deg  - ABNORMAL ECG -  Atrial fibrillation  Prolonged QT interval  No previous ECG available for comparison  Electronically Signed By: Thiago Solomon (REDD) 26-Mar-2024 07:17:38  Date and Time of Study: 2024-03-25 21:03:30    ECG 12 Lead Rhythm Change [[id number]] Collected: 03/27/24 1150     Updated: 03/27/24 1151     QT Interval 333 ms      QTC Interval 443 ms     Narrative:      HEART RATE= 106  bpm  RR Interval= 564  ms  WI Interval=   ms  P Horizontal Axis=   deg  P Front Axis=   deg  QRSD Interval= 102  ms  QT Interval= 333  ms  QTcB= 443  ms  QRS Axis= 55  deg  T Wave Axis= 81  deg  - ABNORMAL ECG -  Atrial fibrillation  Ventricular premature complex  Low voltage, extremity leads  When compared with ECG of 25-Mar-2024 21:03:30,  Significant repolarization change  Significant axis, voltage or hypertrophy change  Electronically Signed By:   Date and Time of Study: 2024-03-27 11:50:13                    CT Abdomen Pelvis With Contrast    Result Date: 3/25/2024  Impression: Findings compatible with constipation. Sigmoid diverticulosis without diverticulitis. Electronically Signed: Julio Perdomo MD  3/25/2024 10:43 PM EDT  Workstation ID: [id number]       Estimated Creatinine Clearance: 88.4 mL/min (by C-G formula based on SCr of 0.97 mg/dL).    Assessment & Plan   Assessment/Plan       Active Hospital Problems    Diagnosis  POA    **GI bleeding [K92.2]  Yes    Rectal bleeding [K62.5]  Unknown      Resolved Hospital Problems   No resolved problems to display.     Rectal bleeding        Lab Results   Component Value Date     HGB 13.2 03/26/2024     MCV 87.9 03/26/2024     MCHC 31.4 (L) 03/26/2024   -Hemoglobin is stable, 14.3  yesterday and 13.2 today  -CMP generally unremarkable  -CT abdomen and pelvis compatible with constipation.  Sigmoid diverticulosis without diverticulitis  -EKG showed atrial for A-fib with heart rate of 111.  No previous EKG for comparison.  Heart rate 90s in the monitor.  Continue metoprolol. Follow-up with PCP  -Per ED notes patient's Hemoccult was positive for blood  -GI was consulted   -Plans to proceed with colonoscopy in a.m.  -Clear liquid for now and n.p.o. after midnight  - colonoscopy performed and mass was seen  - colorectal surgery consulted     Afib with rvr  - no hx of afib  - cardiology consulted  - echo pending  - resume BB, will start anticoagulation therapy when cleared by GI, colorectal sx    Hypertension  -Moderately controlled       BP Readings from Last 1 Encounters:   03/26/24 153/96   - Continue Norvasc and losartan  - Monitor while admitted      Diabetes mellitus  -Moderately controlled         Lab Results   Component Value Date     GLUCOSE 114 (H) 03/26/2024     GLUCOSE 164 (H) 03/25/2024     GLUCOSE 111 (H) 10/29/2018   -On metformin  -Diabetic diet  -SSI  -Monitor before meals and at bedtime      Hyperlipidemia  -Continue Lipitor     Depression/anxiety  -Continue Zoloft     Obesity  -BMI 34.98  -Lifestyle modifications            VTE Prophylaxis -   Mechanical Order History:        Ordered        03/26/24 0051  Place Sequential Compression Device  Once            03/26/24 0051  Maintain Sequential Compression Device  Continuous                          Pharmalogical Order History:       None            CODE STATUS:    Code Status and Medical Interventions:   Ordered at: 03/25/24 6256     Level Of Support Discussed With:    Patient     Code Status (Patient has no pulse and is not breathing):    CPR (Attempt to Resuscitate)     Medical Interventions (Patient has pulse or is breathing):    Full Support       This patient has been examined wearing personal protective equipment.     I  discussed the patient's findings and my recommendations with patient and nursing staff.      Signature:Electronically signed by Agatha Seo PA-C, 03/27/24, 2:31 PM EDT.

## 2024-03-27 NOTE — ANESTHESIA PREPROCEDURE EVALUATION
Anesthesia Evaluation     Patient summary reviewed and Nursing notes reviewed   NPO Solid Status: > 8 hours  NPO Liquid Status: > 8 hours           Airway   Mallampati: II  TM distance: >3 FB  Neck ROM: full  No difficulty expected  Dental - normal exam     Pulmonary    Cardiovascular     (+) hypertension, hyperlipidemia      Neuro/Psych  (+) psychiatric history Anxiety  GI/Hepatic/Renal/Endo    (+) obesity, GI bleeding     Musculoskeletal     Abdominal    Substance History      OB/GYN          Other                    Anesthesia Plan    ASA 3     general and MAC     intravenous induction     Anesthetic plan, risks, benefits, and alternatives have been provided, discussed and informed consent has been obtained with: patient.    Plan discussed with CRNA.    CODE STATUS:    Level Of Support Discussed With: Patient  Code Status (Patient has no pulse and is not breathing): CPR (Attempt to Resuscitate)  Medical Interventions (Patient has pulse or is breathing): Full Support

## 2024-03-27 NOTE — PLAN OF CARE
Goal Outcome Evaluation:  Plan of Care Reviewed With: patient           Outcome Evaluation: Pt has rested throughout the night without complaints VSS Pt scheduled for a colonoscopy this am at 1100  continues with bowel prep almost clear still having some bright red blood  will await further orders from MD

## 2024-03-27 NOTE — PROGRESS NOTES
"    [facility] Medicine Services  Consult Note    Patient Name: Viktor Atkins  : 1950  MRN: [id number]  Primary Care Physician:  Gera Manriquez MD  Referring Physician: No Known Provider  Date of admission: 3/25/2024  Date and Time of Care: 3/27/2024 at 3:36pm    Consults      Reason for Consult/ Chief Complaint: admit to inpatient    Consult Requested By: ZAY Monroy    Subjective:     History of Present Illness:   Per the documentation by Irma Chu APRN, dated 2024 A/P section:,  \"Hemoglobin is stable, 14.3 yesterday and 13.2 today  -CMP generally unremarkable  -CT abdomen and pelvis compatible with constipation.  Sigmoid diverticulosis without diverticulitis  -EKG showed atrial for A-fib with heart rate of 111.  No previous EKG for comparison.  Heart rate 90s in the monitor.  Continue metoprolol. Follow-up with PCP  -Per ED notes patient's Hemoccult was positive for blood  -GI was consulted in the ED  -Plans to proceed with colonoscopy in a.m.  -Clear liquid for now and n.p.o. after midnight  -Bowel prep ordered by ID    Hypertension  -Moderately controlled       BP Readings from Last 1 Encounters:   24 153/96   - Continue Norvasc and losartan  - Monitor while admitted      Diabetes mellitus  -Moderately controlled  -On metformin  -Diabetic diet  -SSI  -Monitor before meals and at bedtime      Hyperlipidemia  -Continue Lipitor     Depression/anxiety  -Continue Zoloft     Obesity  -BMI 34.98  -Lifestyle modifications    \"    Patient is accompanied by his family.  Patient reports that he started having bowel movements on Monday, 3 days ago.,  When he came to the emergency room.  Today he had a colonoscopy and 18 polyps removed and he was told that he has a mass and will now need to speak with a surgeon.  States that this was his first colonoscopy.  Continues to have scant hematochezia after the colonoscopy.  Takes aspirin every day but no other blood thinners.    On " arrival to the emergency room he was diagnosed with A-fib with RVR, new per patient.  States that he previously had some flutters but this is the first time he is learned about A-fib.  We discussed [id number] score of 2, indication for anticoagulation and GI bleed at this time, possible surgery coming up.  Will defer the anticoagulation after his surgery.  We discussed sleep study as outpatient eventually.  Denies any fevers, chills, night sweats, dyspnea, chest pain, nausea, abdominal pain, dysuria, hematuria, or edema.     Review of Systems:   Review of Systems    Personal History:     Past Medical History:   Diagnosis Date    Benign essential HTN     Hyperlipidemia, mixed     Palpitations        Past Surgical History:   Procedure Laterality Date    CARDIAC CATHETERIZATION      CHOLECYSTECTOMY      KNEE SURGERY         Family History: family history is not on file. Otherwise pertinent FHx was reviewed and not pertinent to current issue.    Social History:  reports that he has quit smoking. He has never used smokeless tobacco. He reports that he does not drink alcohol and does not use drugs.    Home Medications:   Omeprazole, amLODIPine, aspirin, atorvastatin, losartan, meloxicam, metFORMIN, metoprolol succinate XL, multivitamin with minerals, and sertraline    Allergies:  No Known Allergies      Objective:     Vital Signs  Temp:  [97.4 °F (36.3 °C)-98.5 °F (36.9 °C)] 97.6 °F (36.4 °C)  Heart Rate:  [] 102  Resp:  [11-20] 13  BP: (112-155)/() 148/90   Body mass index is 34.98 kg/m².    Physical Exam  General: Well appearing, well nourished, in no distress. Appears stated age     HEENT: Head: Normocephalic, atraumatic. Conjunctiva clear, sclera non-icteric, EOM intact, PERRL, hearing intact. Nose without external lesions. Mucous membranes moist, no mucosal lesions,    Neck: Supple, without lesions   Heart: normal rate, irregular rhythm, no murmurs / rubs / gallops    Lungs: No signs of respiratory  distress. Moving air well. Clear without crackles, rhonchi, wheezes   Abdomen: Bowel sounds normal, no tenderness, no distension, no masses   Musculoskeletal: No edema. No joint swelling, or pain on palpation. No tenderness on palpation over the spinal processes  Skin: Warm, dry, intact without rashes or lesions. Appropriate color for ethnicity.   Neurologic: Alert and oriented x3, CN 2-12 normal. Motor function intact bilaterally. Sensation intact bilaterally.   Psychiatric: Appropriate mood and affect.     Scheduled Meds   amLODIPine, 10 mg, Oral, Q24H  aspirin, 81 mg, Oral, Daily  atorvastatin, 20 mg, Oral, Nightly  insulin lispro, 2-9 Units, Subcutaneous, 4x Daily AC & at Bedtime  losartan, 25 mg, Oral, Daily  metoprolol succinate XL, 100 mg, Oral, Daily  pantoprazole, 40 mg, Oral, Q AM  sertraline, 25 mg, Oral, Daily  sodium chloride, 10 mL, Intravenous, Q12H       PRN Meds     acetaminophen    senna-docusate sodium **AND** polyethylene glycol **AND** bisacodyl **AND** bisacodyl    dextrose    dextrose    glucagon (human recombinant)    melatonin    ondansetron ODT **OR** ondansetron    [COMPLETED] Insert Peripheral IV **AND** sodium chloride    sodium chloride    sodium chloride   Infusions         Diagnostic Data    Results from last 7 days   Lab Units 03/27/24  0442   WBC 10*3/mm3 9.20   HEMOGLOBIN g/dL 14.5   HEMATOCRIT % 46.6   PLATELETS 10*3/mm3 164   GLUCOSE mg/dL 100*   CREATININE mg/dL 0.97   BUN mg/dL 15   SODIUM mmol/L 142   POTASSIUM mmol/L 4.4   AST (SGOT) U/L 40   ALT (SGPT) U/L 46*   ALK PHOS U/L 107   BILIRUBIN mg/dL 0.6   ANION GAP mmol/L 13.0       CT Abdomen Pelvis With Contrast    Result Date: 3/25/2024  Impression: Findings compatible with constipation. Sigmoid diverticulosis without diverticulitis. Electronically Signed: Julio Perdomo MD  3/25/2024 10:43 PM EDT  Workstation ID: [id number]       I reviewed the patient's new clinical results.  I reviewed the patient's new imaging results and  agree with the interpretation.    Assessment/Plan:     Active and Resolved Problems  Active Hospital Problems    Diagnosis  POA    **GI bleeding [K92.2]  Yes    Rectal bleeding [K62.5]  Unknown      Resolved Hospital Problems   No resolved problems to display.       72yo man with history of CAD, hypertension, hyperlipidemia, depression who presented with GI bleed found to be in A-fib with RVR as well now s/p colonoscopy pending evaluation by colorectal surgery.    # GI bleed  # Sigmoid colon mass  Patient with acute onset of red blood per rectum 3 days ago, on aspirin for his CAD, with colonoscopy today s/p 18 polyps removed, with distal sigmoid colon mass, likely culprit.  CT abdomen pelvis on admission with contrast without evidence of metastatic disease.  Colorectal surgery consulted.  Follow-up their recommendations.  Not anemic at this time.     - follow up pathology, CEA  - AM labs: CBC, BMP    #Afib    Was in RVR on presentation, resolved.     CHADS2-Vasc score 2, 2.2% stroke risk per year. Discussed anticoagulation, will defer after patient surgery.  Diabetes patient is agreeable to it after his surgery.  Rates are now well-controlled.  Continue Toprol 100 mg daily.  Echo completed but pending.    Cardiology following as well.    - telemetry  - follow up TTE  - Continue Toprol 100mg daily  - sleep study as outpatient    # CAD  # Hypertension  Chest pain free.   - continue home ASA, losartan, Toprol, and statin    # Hyperlipidemia  Continue home statin.    # Depression  Continue Zoloft.        DVT prophylaxis:  Mechanical DVT prophylaxis orders are present.         Code status is   Code Status and Medical Interventions:   Ordered at: 03/25/24 4089     Level Of Support Discussed With:    Patient     Code Status (Patient has no pulse and is not breathing):    CPR (Attempt to Resuscitate)     Medical Interventions (Patient has pulse or is breathing):    Full Support       Plan for disposition:TBD.    Time: 30  minutes        Signature: Electronically signed by Dayana Lowry MD, 03/27/24, 15:36 EDT.  [facility] Hospitalist Team

## 2024-03-27 NOTE — CASE MANAGEMENT/SOCIAL WORK
Continued Stay Note  [facility]     Patient Name: Viktor Atkins  MRN: [id number]  Today's Date: 3/27/2024    Admit Date: 3/25/2024    Plan: Return home with spouse   Discharge Plan       Row Name 03/27/24 1429       Plan    Plan Comments Barriers: Colonoscopy today then Colorectal Surgery Consult and General Surgery consult for mass.                          Kelsy SWANSON,RN Case Manager  [facility]  Phone: Desk- [phone] cell- [phone]

## 2024-03-27 NOTE — CONSULTS
Referring Provider: Baltazar Menendez MD    Reason for Consultation:      Atrial fibrillation  Preprocedure risk assessment requested      Patient Care Team:  Gera Manriquez MD as PCP - General (Internal Medicine)  Gera Manriquez MD as PCP - Family Medicine      SUBJECTIVE     Chief Complaint: Rectal bleeding    History of present illness:    Patient is 73 years old white gentleman whose past medical history is significant for hypertension, hyperlipidemia, nonobstructive CAD, who was admitted with rectal bleed and was recommended to have sigmoidoscopy/colonoscopy.  Cardiology consultation is requested because of new onset atrial fibrillation on admission.    Patient denies any symptom of chest pain or tightness or heaviness.  Mild shortness of breath noted.  Decreased exercise capacity.  No orthopnea PND no leg edema noted.  Mild shortness of breath reported.    In 2012, patient developed symptom of chest pain and underwent cardiac catheterization which showed nonobstructive CAD.  In 2016, patient was admitted at [facility] with symptom of palpitation and underwent Holter monitor which showed few PVCs.  Echocardiogram showed EF of 60 to 65%.  Mild mitral regurgitation was noted.  Patient was started on beta-blockers and his symptom of palpitation improved.  Patient was also started on Zoloft 25 mg daily.     In July 2020, patient underwent Holter monitor and it showed relative bradycardia and short run of atrial tachyarrhythmia.    Review of Systems   Constitutional: Negative for chills and fever.   HENT:  Negative for ear discharge and nosebleeds.    Eyes:  Negative for discharge and redness.   Cardiovascular:  Positive for palpitations. Negative for chest pain, orthopnea, paroxysmal nocturnal dyspnea and syncope.   Respiratory:  Positive for shortness of breath. Negative for cough and wheezing.    Endocrine: Negative for heat intolerance.   Skin:  Negative for rash.   Musculoskeletal:  Negative  for arthritis and myalgias.   Gastrointestinal:  Negative for abdominal pain, melena, nausea and vomiting.   Genitourinary:  Negative for dysuria and hematuria.   Neurological:  Negative for dizziness, light-headedness, numbness and tremors.   Psychiatric/Behavioral:  Negative for depression. The patient is not nervous/anxious.            Review of systems:    Constitutional: C/o recent weakness, fatigue, denies fever, rigors, chills   Eyes: No vision changes, eye pain   ENT/oropharynx: No difficulty swallowing, sore throat, epistaxis, changes in hearing   Cardiovascular: No chest pain, chest tightness, palpitations, paroxysmal nocturnal dyspnea, orthopnea, diaphoresis, dizziness / syncopal episode   Respiratory: No shortness of breath, dyspnea on exertion, cough, wheezing, hemoptysis   Gastrointestinal: No abdominal pain, nausea, vomiting, diarrhea, c/o bloody stools   Genitourinary: No hematuria, dysuria   Neurological: No headache, tremors, numbness, one-sided weakness    Musculoskeletal: No cramps, myalgias, joint pain, joint swelling   Integument: No rash, edema        Personal History:      Past Medical History:   Diagnosis Date    Benign essential HTN     Hyperlipidemia, mixed     Palpitations        Past Surgical History:   Procedure Laterality Date    CARDIAC CATHETERIZATION      CHOLECYSTECTOMY      KNEE SURGERY         History reviewed. No pertinent family history.    Social History     Tobacco Use    Smoking status: Former    Smokeless tobacco: Never   Vaping Use    Vaping status: Never Used   Substance Use Topics    Alcohol use: Never    Drug use: Never        Home meds:  Prior to Admission medications    Medication Sig Start Date End Date Taking? Authorizing Provider   amLODIPine (NORVASC) 10 MG tablet 1 tablet Daily. 2/8/17  Yes ProviderDylon MD   aspirin 81 MG chewable tablet Chew 1 tablet Daily.   Yes ProviderDylon MD   atorvastatin (LIPITOR) 20 MG tablet Take 1 tablet by mouth Every  Night. 2/8/17  Yes Dylon Worley MD   losartan (COZAAR) 25 MG tablet TAKE 1 TABLET EVERY DAY 3/30/21  Yes Abimael Greenberg MD   meloxicam (MOBIC) 15 MG tablet Take 1 tablet by mouth Daily.   Yes Dylon Worley MD   metFORMIN (GLUCOPHAGE) 500 MG tablet Take 1 tablet by mouth Daily With Breakfast.   Yes Dylon Worley MD   metoprolol succinate XL (TOPROL-XL) 100 MG 24 hr tablet Take 1 tablet by mouth Daily. 12/14/23  Yes Dylon Worley MD   Multiple Vitamins-Minerals (MULTIVITAMIN ADULT PO) Take 1 tablet by mouth Daily.   Yes Dylon Worley MD   Omeprazole (EQL Omeprazole) 20 MG tablet delayed-release 20 mg Daily. 2/8/17  Yes Dylon Worley MD   sertraline (Zoloft) 25 MG tablet 1 tablet Daily. 4/13/17  Yes Dylon Worley MD       Allergies:     Patient has no known allergies.    Scheduled Meds:aspirin, 81 mg, Oral, Daily  atorvastatin, 20 mg, Oral, Nightly  insulin lispro, 2-9 Units, Subcutaneous, 4x Daily AC & at Bedtime  metoprolol tartrate, 25 mg, Oral, Q6H  pantoprazole, 40 mg, Oral, Q AM  sertraline, 25 mg, Oral, Daily  sodium chloride, 10 mL, Intravenous, Q12H      Continuous Infusions:dilTIAZem, 5-15 mg/hr, Last Rate: 5 mg/hr (03/28/24 0813)      PRN Meds:  acetaminophen    senna-docusate sodium **AND** polyethylene glycol **AND** bisacodyl **AND** bisacodyl    dextrose    dextrose    glucagon (human recombinant)    melatonin    ondansetron ODT **OR** ondansetron    [COMPLETED] Insert Peripheral IV **AND** sodium chloride    sodium chloride    sodium chloride      OBJECTIVE    Vital Signs  Vitals:    03/27/24 2100 03/28/24 0129 03/28/24 0627 03/28/24 0825   BP: 137/81 132/87 138/81 126/69   BP Location: Left arm Left arm Left arm    Patient Position: Lying Lying Lying    Pulse: 112 100 (!) 146 94   Resp: 16 16 17    Temp: 97.1 °F (36.2 °C) 97.5 °F (36.4 °C) 97.8 °F (36.6 °C)    TempSrc: Oral Oral Oral    SpO2: 95% 96% 94% 96%   Weight:       Height:      "      Flowsheet Rows      Flowsheet Row First Filed Value   Admission Height 180.3 cm (71\") Documented at 03/25/2024 2015   Admission Weight 115 kg (253 lb 1.4 oz) Documented at 03/25/2024 2015            No intake or output data in the 24 hours ending 03/28/24 0954       Telemetry:  AF intermittent RVR    Physical Exam:  The patient is alert, oriented and in no distress.  Vital signs as noted above.  Head and neck revealed no carotid bruits or jugular venous distention.    Lungs clear.  No wheezing.  Breath sounds are normal bilaterally.  Heart: Giller Marcelino irregular rhythm mildly tachycardic  Abdomen: Soft and nontender.  No organomegaly is present.  Extremities with good peripheral pulses without any pedal edema.  Skin: Warm and dry.  Musculoskeletal system is grossly normal.  CNS grossly normal.     Constitutional:       Appearance: Well-developed.   Eyes:      General: No scleral icterus.        Right eye: No discharge.   HENT:      Head: Normocephalic and atraumatic.   Neck:      Thyroid: No thyromegaly.      Lymphadenopathy: No cervical adenopathy.   Pulmonary:      Effort: Pulmonary effort is normal. No respiratory distress.      Breath sounds: Normal breath sounds. No wheezing. No rales.   Cardiovascular:      Normal rate. Irregularly irregular rhythm.      No gallop.    Edema:     Peripheral edema absent.   Abdominal:      Tenderness: There is no abdominal tenderness.   Skin:     Findings: No erythema or rash.   Neurological:      Mental Status: Alert and oriented to person, place, and time.           Results Review:  I have personally reviewed the results from the time of this admission to 3/28/2024 09:54 EDT and agree with these findings:  [x]  Laboratory  []  Microbiology  []  Radiology  [x]  EKG/Telemetry   [x]  Cardiology/Vascular   []  Pathology  []  Old records  []  Other:    Most notable findings include:     Lab Results (last 24 hours)       Procedure Component Value Units Date/Time    POC Glucose " 4x Daily Before Meals & at Bedtime [[id number]]  (Abnormal) Collected: 03/28/24 0812    Specimen: Blood Updated: 03/28/24 0813     Glucose 109 mg/dL      Comment: Serial Number: [id number]:  [id number]       CBC & Differential [[id number]]  (Abnormal) Collected: 03/28/24 0401    Specimen: Blood Updated: 03/28/24 0603    Narrative:      The following orders were created for panel order CBC & Differential.  Procedure                               Abnormality         Status                     ---------                               -----------         ------                     CBC Auto Differential[[id number]]        Abnormal            Final result               Scan Slide[[id number]]                                       Final result                 Please view results for these tests on the individual orders.    CBC Auto Differential [[id number]]  (Abnormal) Collected: 03/28/24 0401    Specimen: Blood Updated: 03/28/24 0603     WBC 10.06 10*3/mm3      RBC 4.76 10*6/mm3      Hemoglobin 12.9 g/dL      Hematocrit 41.8 %      MCV 87.8 fL      MCH 27.1 pg      MCHC 30.9 g/dL      RDW 14.6 %      RDW-SD 47.4 fl      MPV 13.8 fL      Platelets 137 10*3/mm3      Neutrophil % 58.6 %      Lymphocyte % 31.6 %      Monocyte % 7.3 %      Eosinophil % 1.9 %      Basophil % 0.3 %      Immature Grans % 0.3 %      Neutrophils, Absolute 5.90 10*3/mm3      Lymphocytes, Absolute 3.18 10*3/mm3      Monocytes, Absolute 0.73 10*3/mm3      Eosinophils, Absolute 0.19 10*3/mm3      Basophils, Absolute 0.03 10*3/mm3      Immature Grans, Absolute 0.03 10*3/mm3      nRBC 0.0 /100 WBC     Scan Slide [[id number]] Collected: 03/28/24 0401    Specimen: Blood Updated: 03/28/24 0603     Anisocytosis Slight/1+     Dacrocytes Slight/1+     Microcytes Slight/1+     Poikilocytes Slight/1+     WBC Morphology Normal     Platelet Estimate Decreased    Basic Metabolic Panel [[id number]]  (Abnormal) Collected: 03/28/24 0401    Specimen: Blood Updated:  03/28/24 0453     Glucose 95 mg/dL      BUN 13 mg/dL      Creatinine 0.89 mg/dL      Sodium 143 mmol/L      Potassium 4.2 mmol/L      Chloride 108 mmol/L      CO2 24.0 mmol/L      Calcium 8.8 mg/dL      BUN/Creatinine Ratio 14.6     Anion Gap 11.0 mmol/L      eGFR 91.1 mL/min/1.73     Narrative:      GFR Normal >60  Chronic Kidney Disease <60  Kidney Failure <15    The GFR formula is only valid for adults with stable renal function between ages 18 and 70.    POC Glucose Once [[id number]]  (Normal) Collected: 03/27/24 2126    Specimen: Blood Updated: 03/27/24 2128     Glucose 88 mg/dL      Comment: Serial Number: [id number]:  [id number]       CEA [[id number]] Collected: 03/27/24 1427    Specimen: Blood Updated: 03/27/24 2045     CEA 5.21 ng/mL     Narrative:      CEA Reference Range:    Non Smokers:   Less than 3 ng/mL  Smokers:       Less than 5 ng/mL  Results may be falsely decreased if patient taking Biotin.    Testing Method: [employer] Electrochemiluminescence Immunoassay(ECLIA)  Values obtained with different assay methods or kits cannot be used interchangeably.    POC Glucose 4x Daily Before Meals & at Bedtime [[id number]]  (Abnormal) Collected: 03/27/24 1701    Specimen: Blood Updated: 03/27/24 1702     Glucose 115 mg/dL      Comment: Serial Number: [id number]:  [id number]       Tissue Pathology Exam [[id number]] Collected: 03/27/24 1309    Specimen: Polyp from Large Intestine, Right / Ascending Colon; Polyp from Large Intestine, Transverse Colon; Polyp from Large Intestine, Sigmoid Colon; Tissue from Large Intestine, Sigmoid Colon; Tissue from Large Intestine, Rectum Updated: 03/27/24 1432    TSH [[id number]]  (Abnormal) Collected: 03/27/24 0442    Specimen: Blood from Hand, Right Updated: 03/27/24 1253     TSH 4.850 uIU/mL     Magnesium [[id number]]  (Normal) Collected: 03/27/24 0442    Specimen: Blood from Hand, Right Updated: 03/27/24 1241     Magnesium 2.4 mg/dL             Imaging Results  (Last 24 Hours)       Procedure Component Value Units Date/Time    CT Chest Without Contrast Diagnostic [[id number]] Collected: 03/27/24 1651     Updated: 03/27/24 1701    Narrative:      CT CHEST WO CONTRAST DIAGNOSTIC    Date of Exam: 3/27/2024 4:49 PM EDT    Indication: colon mass, assess for metastatic disease.    Comparison: Chest radiograph performed on December 28, 2020    Technique: Axial CT images were obtained of the chest without contrast administration.  Sagittal and coronal reconstructions were performed.  Automated exposure control and iterative reconstruction methods were used.      Findings:    Thyroid: The visualized portion of the thyroid is unremarkable.    Hilum, Mediastinum, Heart, and Great vessels: No pathologically enlarged lymph nodes. Normal heart size. No pericardial effusion. Unremarkable thoracic aorta and pulmonary arteries.    Lung Parenchyma and Pleura: No focal consolidation. No suspicious pulmonary nodules. No significant pleural effusion.  Central airways are patent.     Upper Abdomen: No acute process.     Soft tissues: Unremarkable.    Osseous structures: No acute fracture or aggressive lesions. Mild to moderate degenerative changes of the thoracic spine are visualized.      Impression:      Impression:    No acute chest process evident. No CT evidence of metastatic disease to the chest.      Electronically Signed: Julio Perdomo MD    3/27/2024 4:57 PM EDT    Workstation ID: [id number]            LAB RESULTS (LAST 7 DAYS)    CBC  Results from last 7 days   Lab Units 03/28/24  0401 03/27/24  0442 03/26/24  0440 03/25/24  2130   WBC 10*3/mm3 10.06 9.20 9.06 9.55   RBC 10*6/mm3 4.76 5.34 4.79 5.01   HEMOGLOBIN g/dL 12.9* 14.5 13.2 14.3   HEMATOCRIT % 41.8 46.6 42.1 43.5   MCV fL 87.8 87.3 87.9 86.8   PLATELETS 10*3/mm3 137* 164 143 160       BMP  Results from last 7 days   Lab Units 03/28/24  0401 03/27/24  0442 03/26/24  0440 03/25/24  2130   SODIUM mmol/L 143 142 142 140   POTASSIUM  mmol/L 4.2 4.4 4.4 4.3   CHLORIDE mmol/L 108* 107 108* 107   CO2 mmol/L 24.0 22.0 22.0 21.0*   BUN mg/dL 13 15 18 17   CREATININE mg/dL 0.89 0.97 1.12 1.07   GLUCOSE mg/dL 95 100* 114* 164*   MAGNESIUM mg/dL  --  2.4  --   --        CMP   Results from last 7 days   Lab Units 03/28/24  0401 03/27/24  0442 03/26/24  0440 03/25/24  2130   SODIUM mmol/L 143 142 142 140   POTASSIUM mmol/L 4.2 4.4 4.4 4.3   CHLORIDE mmol/L 108* 107 108* 107   CO2 mmol/L 24.0 22.0 22.0 21.0*   BUN mg/dL 13 15 18 17   CREATININE mg/dL 0.89 0.97 1.12 1.07   GLUCOSE mg/dL 95 100* 114* 164*   ALBUMIN g/dL  --  4.6  --  4.6   BILIRUBIN mg/dL  --  0.6  --  0.2   ALK PHOS U/L  --  107  --  120*   AST (SGOT) U/L  --  40  --  29   ALT (SGPT) U/L  --  46*  --  <5       BNP        TROPONIN        CoAg        Creatinine Clearance  Estimated Creatinine Clearance: 96.4 mL/min (by C-G formula based on SCr of 0.89 mg/dL).    ABG          Radiology  CT Chest Without Contrast Diagnostic    Result Date: 3/27/2024  Impression: No acute chest process evident. No CT evidence of metastatic disease to the chest. Electronically Signed: Julio Perdomo MD  3/27/2024 4:57 PM EDT  Workstation ID: [id number]       EKG  I personally viewed and interpreted the patient's EKG/Telemetry data:  ECG 12 Lead Rhythm Change   Preliminary Result   HEART RATE= 106  bpm   RR Interval= 564  ms   HI Interval=   ms   P Horizontal Axis=   deg   P Front Axis=   deg   QRSD Interval= 102  ms   QT Interval= 333  ms   QTcB= 443  ms   QRS Axis= 55  deg   T Wave Axis= 81  deg   - ABNORMAL ECG -   Atrial fibrillation   Ventricular premature complex   Low voltage, extremity leads   When compared with ECG of 25-Mar-2024 21:03:30,   Significant repolarization change   Significant axis, voltage or hypertrophy change   Electronically Signed By:    Date and Time of Study: 2024-03-27 11:50:13      ECG 12 Lead Tachycardia   Final Result   HEART RATE= 111  bpm   RR Interval= 541  ms   HI Interval=   ms    P Horizontal Axis=   deg   P Front Axis=   deg   QRSD Interval= 109  ms   QT Interval= 375  ms   QTcB= 510  ms   QRS Axis= 63  deg   T Wave Axis= 52  deg   - ABNORMAL ECG -   Atrial fibrillation   Prolonged QT interval   No previous ECG available for comparison   Electronically Signed By: Thiago Solomon (REDD) 26-Mar-2024 07:17:38   Date and Time of Study: 2024-03-25 21:03:30      Telemetry Scan   Final Result      Telemetry Scan   Final Result                      Echocardiogram:          Stress Test:        Cardiac Catheterization:  No results found for this or any previous visit.    [id number] SCORE   [id number] Score: 2 (3/27/2024  1:22 PM)        Other:      ASSESSMENT & PLAN:    Principal Problem:    GI bleeding  Active Problems:    Rectal bleeding    GI bleed    Atrial fibrillation with RVR  Duration unknown  Was present on admission 3/25/2024  No prior history of A-fib  [id number] equals 2  .  Echocardiogram will be obtained    Rectal bleeding  Colonoscopy pending  Preprocedure cardiovascular risk assessment requested  Patient is not having any signs or symptoms to suggest unstable angina  No prior history of known CAD or MI  Ventricular rates are mildly elevated  Did not receive home beta-blocker this morning due to n.p.o. status  Will give one-time IV Lopressor  Okay to proceed with colonoscopy    Hypertension  Blood pressure is mildly elevated  She has not received oral medications this morning due to n.p.o. status    Coronary artery disease  Reported to be nonobstructive by previous cath in 2012 done at [facility]      Plan:  Echocardiogram to be obtained  Resume home beta-blocker when taking oral medications  Unclear duration of atrial fibrillation but consistent with patient's complaints of recent fatigue, activity intolerance and shortness of breath  Okay to proceed with colonoscopy  Will consider anticoagulation for atrial fibrillation when/if okay with gastroenterology  Consider  noninvasive ischemic evaluation in future  We will consider additional antiarrhythmic therapy for atrial fibrillation after echocardiogram is reviewed  Will check TSH and magnesium level  Additional recommendations per Dr. Greenberg    Patient is seen and examined and findings are verified.  All data is reviewed by me personally.  Assessment and plan formulated by APC was done after discussion with attending.  I spent more than 50% of time in taking care of the patient.    Patient is admitted with rectal bleed and was noted to be in atrial fibrillation.  Patient was admitted 2 days ago but they called for the cardiology consultation prior to the procedure yesterday.    Heart rate is irregular and in 100 to 110 bpm.  Blood pressure is stable.    MDM:    1.  Newly diagnosed atrial fibrillation:    Patient does not have previous history of atrial fibrillation.  He is currently in A-fib rate is relatively controlled.  I would recommend to give intravenous Lopressor and control the heart rate and proceed with procedure.  I would consider anticoagulation after the procedure.    2.  Preoperative/procedure clearance:    I would recommend that patient is clinically doing well no sign and symptom of angina pectoris or congestive heart failure patient is cleared for the surgery.    I would recommend that patient should proceed with echocardiogram today.    3.  Colonic mass:    Patient is being considered for possible colonic mass resection.  Patient is not having active chest pain I would recommend to proceed with echocardiogram if echocardiogram is unremarkable patient is cleared for the surgery.  I would recommend that if possible patient should proceed with surgery sooner than later.  Otherwise patient has to go on anticoagulation with Eliquis.  Patient Lauri VASc is high.    4.  Hypertension:    Blood pressure is controlled.    Electronically signed by Abimael Greenberg MD, 03/28/24, 9:54 AM EDT.          Abimael Greenberg,  MD  03/28/24  09:54 EDT

## 2024-03-28 ENCOUNTER — INPATIENT HOSPITAL (OUTPATIENT)
Dept: URBAN - METROPOLITAN AREA HOSPITAL 84 | Facility: HOSPITAL | Age: 74
End: 2024-03-28

## 2024-03-28 ENCOUNTER — APPOINTMENT (OUTPATIENT)
Dept: CARDIOLOGY | Facility: HOSPITAL | Age: 74
DRG: 330 | End: 2024-03-28
Payer: MEDICARE

## 2024-03-28 DIAGNOSIS — Z90.49 ACQUIRED ABSENCE OF OTHER SPECIFIED PARTS OF DIGESTIVE TRACT: ICD-10-CM

## 2024-03-28 DIAGNOSIS — R74.8 ABNORMAL LEVELS OF OTHER SERUM ENZYMES: ICD-10-CM

## 2024-03-28 DIAGNOSIS — K63.89 OTHER SPECIFIED DISEASES OF INTESTINE: ICD-10-CM

## 2024-03-28 LAB
ANION GAP SERPL CALCULATED.3IONS-SCNC: 11 MMOL/L (ref 5–15)
ANISOCYTOSIS BLD QL: NORMAL
BASOPHILS # BLD AUTO: 0.03 10*3/MM3 (ref 0–0.2)
BASOPHILS NFR BLD AUTO: 0.3 % (ref 0–1.5)
BH CV ECHO MEAS - ACS: 2.14 CM
BH CV ECHO MEAS - AO MAX PG: 2.7 MMHG
BH CV ECHO MEAS - AO MEAN PG: 1.71 MMHG
BH CV ECHO MEAS - AO ROOT DIAM: 4.4 CM
BH CV ECHO MEAS - AO V2 MAX: 82.5 CM/SEC
BH CV ECHO MEAS - AO V2 VTI: 14.7 CM
BH CV ECHO MEAS - AVA(I,D): 3.4 CM2
BH CV ECHO MEAS - EDV(CUBED): 145.9 ML
BH CV ECHO MEAS - EDV(MOD-SP2): 129.7 ML
BH CV ECHO MEAS - EDV(MOD-SP4): 172.4 ML
BH CV ECHO MEAS - EF(MOD-BP): 46 %
BH CV ECHO MEAS - EF(MOD-SP2): 48 %
BH CV ECHO MEAS - EF(MOD-SP4): 50.3 %
BH CV ECHO MEAS - ESV(CUBED): 51.1 ML
BH CV ECHO MEAS - ESV(MOD-SP2): 67.5 ML
BH CV ECHO MEAS - ESV(MOD-SP4): 85.6 ML
BH CV ECHO MEAS - FS: 29.5 %
BH CV ECHO MEAS - IVS/LVPW: 1.03 CM
BH CV ECHO MEAS - IVSD: 1.11 CM
BH CV ECHO MEAS - LA DIMENSION: 4.9 CM
BH CV ECHO MEAS - LV MASS(C)D: 222.2 GRAMS
BH CV ECHO MEAS - LV MAX PG: 1.68 MMHG
BH CV ECHO MEAS - LV MEAN PG: 0.96 MMHG
BH CV ECHO MEAS - LV V1 MAX: 64.8 CM/SEC
BH CV ECHO MEAS - LV V1 VTI: 11.9 CM
BH CV ECHO MEAS - LVIDD: 5.3 CM
BH CV ECHO MEAS - LVIDS: 3.7 CM
BH CV ECHO MEAS - LVOT AREA: 4.2 CM2
BH CV ECHO MEAS - LVOT DIAM: 2.31 CM
BH CV ECHO MEAS - LVPWD: 1.07 CM
BH CV ECHO MEAS - MR MAX PG: 70.8 MMHG
BH CV ECHO MEAS - MR MAX VEL: 420.7 CM/SEC
BH CV ECHO MEAS - MV DEC TIME: 0.18 SEC
BH CV ECHO MEAS - MV E MAX VEL: 92.7 CM/SEC
BH CV ECHO MEAS - MV MAX PG: 3.4 MMHG
BH CV ECHO MEAS - MV MEAN PG: 1.25 MMHG
BH CV ECHO MEAS - MV V2 VTI: 21.8 CM
BH CV ECHO MEAS - MVA(VTI): 2.3 CM2
BH CV ECHO MEAS - PA ACC TIME: 0.1 SEC
BH CV ECHO MEAS - PA V2 MAX: 67.2 CM/SEC
BH CV ECHO MEAS - PI END-D VEL: 132.7 CM/SEC
BH CV ECHO MEAS - QP/QS: 1.37
BH CV ECHO MEAS - RAP SYSTOLE: 28 MMHG
BH CV ECHO MEAS - RV MAX PG: 1.25 MMHG
BH CV ECHO MEAS - RV V1 MAX: 55.8 CM/SEC
BH CV ECHO MEAS - RV V1 VTI: 10.3 CM
BH CV ECHO MEAS - RVDD: 3.3 CM
BH CV ECHO MEAS - RVOT DIAM: 2.9 CM
BH CV ECHO MEAS - RVSP: 3 MMHG
BH CV ECHO MEAS - SV(LVOT): 50.1 ML
BH CV ECHO MEAS - SV(MOD-SP2): 62.2 ML
BH CV ECHO MEAS - SV(MOD-SP4): 86.7 ML
BH CV ECHO MEAS - SV(RVOT): 68.6 ML
BH CV ECHO MEAS - TR MAX PG: 25.3 MMHG
BH CV ECHO MEAS - TR MAX VEL: 251.6 CM/SEC
BUN SERPL-MCNC: 13 MG/DL (ref 8–23)
BUN/CREAT SERPL: 14.6 (ref 7–25)
CALCIUM SPEC-SCNC: 8.8 MG/DL (ref 8.6–10.5)
CHLORIDE SERPL-SCNC: 108 MMOL/L (ref 98–107)
CO2 SERPL-SCNC: 24 MMOL/L (ref 22–29)
CREAT SERPL-MCNC: 0.89 MG/DL (ref 0.76–1.27)
DACRYOCYTES BLD QL SMEAR: NORMAL
DEPRECATED RDW RBC AUTO: 47.4 FL (ref 37–54)
EGFRCR SERPLBLD CKD-EPI 2021: 91.1 ML/MIN/1.73
EOSINOPHIL # BLD AUTO: 0.19 10*3/MM3 (ref 0–0.4)
EOSINOPHIL NFR BLD AUTO: 1.9 % (ref 0.3–6.2)
ERYTHROCYTE [DISTWIDTH] IN BLOOD BY AUTOMATED COUNT: 14.6 % (ref 12.3–15.4)
GLUCOSE BLDC GLUCOMTR-MCNC: 109 MG/DL (ref 70–105)
GLUCOSE BLDC GLUCOMTR-MCNC: 132 MG/DL (ref 70–105)
GLUCOSE BLDC GLUCOMTR-MCNC: 134 MG/DL (ref 70–105)
GLUCOSE BLDC GLUCOMTR-MCNC: 89 MG/DL (ref 70–105)
GLUCOSE SERPL-MCNC: 95 MG/DL (ref 65–99)
HCT VFR BLD AUTO: 41.8 % (ref 37.5–51)
HGB BLD-MCNC: 12.9 G/DL (ref 13–17.7)
IMM GRANULOCYTES # BLD AUTO: 0.03 10*3/MM3 (ref 0–0.05)
IMM GRANULOCYTES NFR BLD AUTO: 0.3 % (ref 0–0.5)
LYMPHOCYTES # BLD AUTO: 3.18 10*3/MM3 (ref 0.7–3.1)
LYMPHOCYTES NFR BLD AUTO: 31.6 % (ref 19.6–45.3)
MCH RBC QN AUTO: 27.1 PG (ref 26.6–33)
MCHC RBC AUTO-ENTMCNC: 30.9 G/DL (ref 31.5–35.7)
MCV RBC AUTO: 87.8 FL (ref 79–97)
MICROCYTES BLD QL: NORMAL
MONOCYTES # BLD AUTO: 0.73 10*3/MM3 (ref 0.1–0.9)
MONOCYTES NFR BLD AUTO: 7.3 % (ref 5–12)
NEUTROPHILS NFR BLD AUTO: 5.9 10*3/MM3 (ref 1.7–7)
NEUTROPHILS NFR BLD AUTO: 58.6 % (ref 42.7–76)
NRBC BLD AUTO-RTO: 0 /100 WBC (ref 0–0.2)
PLATELET # BLD AUTO: 137 10*3/MM3 (ref 140–450)
PMV BLD AUTO: 13.8 FL (ref 6–12)
POIKILOCYTOSIS BLD QL SMEAR: NORMAL
POTASSIUM SERPL-SCNC: 4.2 MMOL/L (ref 3.5–5.2)
RBC # BLD AUTO: 4.76 10*6/MM3 (ref 4.14–5.8)
SMALL PLATELETS BLD QL SMEAR: NORMAL
SODIUM SERPL-SCNC: 143 MMOL/L (ref 136–145)
WBC MORPH BLD: NORMAL
WBC NRBC COR # BLD AUTO: 10.06 10*3/MM3 (ref 3.4–10.8)

## 2024-03-28 PROCEDURE — 93306 TTE W/DOPPLER COMPLETE: CPT

## 2024-03-28 PROCEDURE — 99222 1ST HOSP IP/OBS MODERATE 55: CPT | Performed by: INTERNAL MEDICINE

## 2024-03-28 PROCEDURE — 82948 REAGENT STRIP/BLOOD GLUCOSE: CPT

## 2024-03-28 PROCEDURE — 85007 BL SMEAR W/DIFF WBC COUNT: CPT | Performed by: INTERNAL MEDICINE

## 2024-03-28 PROCEDURE — 25010000002 SULFUR HEXAFLUORIDE MICROSPH 60.7-25 MG RECONSTITUTED SUSPENSION: Performed by: INTERNAL MEDICINE

## 2024-03-28 PROCEDURE — 82948 REAGENT STRIP/BLOOD GLUCOSE: CPT | Performed by: INTERNAL MEDICINE

## 2024-03-28 PROCEDURE — 80048 BASIC METABOLIC PNL TOTAL CA: CPT | Performed by: INTERNAL MEDICINE

## 2024-03-28 PROCEDURE — 93306 TTE W/DOPPLER COMPLETE: CPT | Performed by: INTERNAL MEDICINE

## 2024-03-28 PROCEDURE — 99232 SBSQ HOSP IP/OBS MODERATE 35: CPT | Performed by: NURSE PRACTITIONER

## 2024-03-28 PROCEDURE — 99222 1ST HOSP IP/OBS MODERATE 55: CPT | Performed by: STUDENT IN AN ORGANIZED HEALTH CARE EDUCATION/TRAINING PROGRAM

## 2024-03-28 PROCEDURE — 85025 COMPLETE CBC W/AUTO DIFF WBC: CPT | Performed by: INTERNAL MEDICINE

## 2024-03-28 RX ORDER — DILTIAZEM HCL/D5W 125 MG/125
5-15 PLASTIC BAG, INJECTION (ML) INTRAVENOUS
Status: DISCONTINUED | OUTPATIENT
Start: 2024-03-28 | End: 2024-03-29

## 2024-03-28 RX ORDER — DILTIAZEM HYDROCHLORIDE 5 MG/ML
10 INJECTION INTRAVENOUS ONCE
Status: COMPLETED | OUTPATIENT
Start: 2024-03-28 | End: 2024-03-28

## 2024-03-28 RX ADMIN — LOSARTAN POTASSIUM 25 MG: 25 TABLET, FILM COATED ORAL at 08:12

## 2024-03-28 RX ADMIN — ASPIRIN 81 MG CHEWABLE TABLET 81 MG: 81 TABLET CHEWABLE at 08:12

## 2024-03-28 RX ADMIN — METOPROLOL TARTRATE 25 MG: 25 TABLET, FILM COATED ORAL at 08:12

## 2024-03-28 RX ADMIN — Medication 5 MG/HR: at 08:12

## 2024-03-28 RX ADMIN — METOPROLOL TARTRATE 25 MG: 25 TABLET, FILM COATED ORAL at 21:48

## 2024-03-28 RX ADMIN — SERTRALINE HYDROCHLORIDE 25 MG: 25 TABLET ORAL at 08:13

## 2024-03-28 RX ADMIN — DILTIAZEM HYDROCHLORIDE 10 MG: 5 INJECTION INTRAVENOUS at 08:12

## 2024-03-28 RX ADMIN — PANTOPRAZOLE SODIUM 40 MG: 40 TABLET, DELAYED RELEASE ORAL at 08:17

## 2024-03-28 RX ADMIN — Medication 10 ML: at 08:13

## 2024-03-28 RX ADMIN — METOPROLOL TARTRATE 25 MG: 25 TABLET, FILM COATED ORAL at 14:49

## 2024-03-28 RX ADMIN — ATORVASTATIN CALCIUM 20 MG: 20 TABLET, FILM COATED ORAL at 21:48

## 2024-03-28 RX ADMIN — SULFUR HEXAFLUORIDE 4 ML: KIT at 11:35

## 2024-03-28 NOTE — PROGRESS NOTES
Patient continues to have bleeding with BM.   Echo done. Plan for stress test tomorrow.   Hopefully pathology returns tomorrow and we will plan for OR on 3/30 for robotic low anterior resection,     - continue with liquid diet   - follow up with echo results

## 2024-03-28 NOTE — PROGRESS NOTES
[facility] MEDICINE SERVICE  DAILY PROGRESS NOTE    NAME: Viktor Atkins  : 1950  MRN: [id number]      LOS: 1 day     PROVIDER OF SERVICE: Baltazar Menendez MD    Chief Complaint: GI bleeding    Subjective:     Interval History:  History taken from: patient    Patient is accompanied by his family.  Patient reports that he started having bowel movements on Monday, 3 days ago.,  When he came to the emergency room.  Today he had a colonoscopy and 18 polyps removed and he was told that he has a mass and will now need to speak with a surgeon.  States that this was his first colonoscopy.  Continues to have scant hematochezia after the colonoscopy.  Takes aspirin every day but no other blood thinners.     On arrival to the emergency room he was diagnosed with A-fib with RVR, new per patient.  States that he previously had some flutters but this is the first time he is learned about A-fib.  We discussed [id number] score of 2, indication for anticoagulation and GI bleed at this time, possible surgery coming up.  Will defer the anticoagulation after his surgery.  We discussed sleep study as outpatient eventually.  Denies any fevers, chills, night sweats, dyspnea, chest pain, nausea, abdominal pain, dysuria, hematuria, or edema.     3/28/24 patient seen and examined in bed no acute distress, vital signs stable, discussed with RN, discussed with cardiology.  He is cleared for surgery.  Awaiting for surgical input.      Review of Systems   Constitutional: Negative.  Negative for appetite change, chills and fever.   HENT:  Negative for congestion.    Eyes:  Negative for pain and redness.   Respiratory: Negative.  Negative for cough, chest tightness and shortness of breath.    Gastrointestinal:  Positive for abdominal pain. Negative for diarrhea, nausea and vomiting.   Endocrine: Negative for cold intolerance and heat intolerance.   Genitourinary:  Negative for dysuria, flank pain and frequency.    Musculoskeletal:  Negative for back pain and myalgias.   Neurological:  Negative for dizziness, weakness and headaches.   Psychiatric/Behavioral:  Negative for sleep disturbance. The patient is not nervous/anxious.      Objective:     Vital Signs  Temp:  [96.9 °F (36.1 °C)-98.5 °F (36.9 °C)] 97.8 °F (36.6 °C)  Heart Rate:  [] 94  Resp:  [11-17] 17  BP: (112-148)/(69-97) 126/69   Body mass index is 34.89 kg/m².    Physical Exam  General: Well appearing, well nourished, in no distress. Appears stated age     HEENT: Head: Normocephalic, atraumatic. Conjunctiva clear, sclera non-icteric, EOM intact, PERRL, hearing intact. Nose without external lesions. Mucous membranes moist, no mucosal lesions,    Neck: Supple, without lesions   Heart: normal rate, irregular rhythm, no murmurs / rubs / gallops    Lungs: No signs of respiratory distress. Moving air well. Clear without crackles, rhonchi, wheezes   Abdomen: Bowel sounds normal, no tenderness, no distension, no masses   Musculoskeletal: No edema. No joint swelling, or pain on palpation. No tenderness on palpation over the spinal processes  Skin: Warm, dry, intact without rashes or lesions. Appropriate color for ethnicity.   Neurologic: Alert and oriented x3, CN 2-12 normal. Motor function intact bilaterally. Sensation intact bilaterally.   Psychiatric: Appropriate mood and affect.   Scheduled Meds   aspirin, 81 mg, Oral, Daily  atorvastatin, 20 mg, Oral, Nightly  insulin lispro, 2-9 Units, Subcutaneous, 4x Daily AC & at Bedtime  metoprolol tartrate, 25 mg, Oral, Q6H  pantoprazole, 40 mg, Oral, Q AM  sertraline, 25 mg, Oral, Daily  sodium chloride, 10 mL, Intravenous, Q12H       PRN Meds     acetaminophen    senna-docusate sodium **AND** polyethylene glycol **AND** bisacodyl **AND** bisacodyl    dextrose    dextrose    glucagon (human recombinant)    melatonin    ondansetron ODT **OR** ondansetron    [COMPLETED] Insert Peripheral IV **AND** sodium chloride    sodium  "chloride    sodium chloride   Infusions  dilTIAZem, 5-15 mg/hr, Last Rate: 5 mg/hr (03/28/24 0813)          Diagnostic Data    Results from last 7 days   Lab Units 03/28/24  0401 03/27/24  0442   WBC 10*3/mm3 10.06 9.20   HEMOGLOBIN g/dL 12.9* 14.5   HEMATOCRIT % 41.8 46.6   PLATELETS 10*3/mm3 137* 164   GLUCOSE mg/dL 95 100*   CREATININE mg/dL 0.89 0.97   BUN mg/dL 13 15   SODIUM mmol/L 143 142   POTASSIUM mmol/L 4.2 4.4   AST (SGOT) U/L  --  40   ALT (SGPT) U/L  --  46*   ALK PHOS U/L  --  107   BILIRUBIN mg/dL  --  0.6   ANION GAP mmol/L 11.0 13.0     CT Chest Without Contrast Diagnostic    Result Date: 3/27/2024  Impression: No acute chest process evident. No CT evidence of metastatic disease to the chest. Electronically Signed: Julio Perdomo MD  3/27/2024 4:57 PM EDT  Workstation ID: [id number]     I reviewed the patient's new clinical results.    Assessment/Plan:     Active and Resolved Problems  Active Hospital Problems    Diagnosis  POA    **GI bleeding [K92.2]  Yes    GI bleed [K92.2]  Yes    Rectal bleeding [K62.5]  Unknown      Resolved Hospital Problems   No resolved problems to display.     History of Present Illness:   Per the documentation by Irma Chu APRN, dated 03/26/2024 A/P section:,  \"Hemoglobin is stable, 14.3 yesterday and 13.2 today  -CMP generally unremarkable  -CT abdomen and pelvis compatible with constipation.  Sigmoid diverticulosis without diverticulitis  -EKG showed atrial for A-fib with heart rate of 111.  No previous EKG for comparison.  Heart rate 90s in the monitor.  Continue metoprolol. Follow-up with PCP  -Per ED notes patient's Hemoccult was positive for blood  -GI was consulted in the ED  -Plans to proceed with colonoscopy >  Suspect rectal bleeding secondary to distal sigmoid colon mass.  CT abdomen pelvis on admission with contrast without evidence of metastatic disease.  Colon polyps x 18  Mild sigmoid diverticulosis  Internal hemorrhoids  Per surgery>long discussion " regarding the colonoscopy findings with the patient and his family members. Pathology results are pending at the moment. CT scans of the chest, abdomen, and pelvis revealed no signs of metastatic disease or spread.   -Since the patient is hemodynamically stable and there's no active GI bleed, we will await the pathology results to determine the appropriate surgical approach. While oncological sigmoid resection seems inevitable, the MSI status of the tumor and findings from other polyps could alter our surgical strategy and influence the risk assessment for metachronous colon cancer.  -Clear liquid for now and n.p.o. after midnight  -Bowel prep ordered by ID     Hypertension-Moderately controlled   - Continue Norvasc and losartan  - Monitor while admitted      Diabetes mellitus-Moderately controlled  -On metformin  -Diabetic diet  -SSI  -Monitor before meals and at bedtime      Hyperlipidemia  -Continue Lipitor     Depression/anxiety  -Continue Zoloft     Obesity-BMI 34.98  -Lifestyle modifications     DVT prophylaxis:  Mechanical DVT prophylaxis orders are present.    Code status is   Code Status and Medical Interventions:   Ordered at: 03/25/24 6151     Level Of Support Discussed With:    Patient     Code Status (Patient has no pulse and is not breathing):    CPR (Attempt to Resuscitate)     Medical Interventions (Patient has pulse or is breathing):    Full Support       Plan for disposition:home in 2 days    Time: 30 minutes    Signature: Electronically signed by Baltazar Menendez MD, 03/28/24, 10:56 EDT.  [facility] Hospitalist Team

## 2024-03-28 NOTE — PLAN OF CARE
Problem: Adult Inpatient Plan of Care  Goal: Absence of Hospital-Acquired Illness or Injury  Intervention: Identify and Manage Fall Risk  Recent Flowsheet Documentation  Taken 3/28/2024 1400 by Shauna Acosta RN  Safety Promotion/Fall Prevention: safety round/check completed  Taken 3/28/2024 1200 by Shauna Acosta RN  Safety Promotion/Fall Prevention: safety round/check completed  Taken 3/28/2024 1000 by Shauna Acosta RN  Safety Promotion/Fall Prevention: safety round/check completed  Taken 3/28/2024 0800 by Shauna Acosta RN  Safety Promotion/Fall Prevention: safety round/check completed  Intervention: Prevent Skin Injury  Recent Flowsheet Documentation  Taken 3/28/2024 1200 by Shauna Acosta RN  Body Position: position changed independently  Taken 3/28/2024 0800 by Shauna Acosta RN  Body Position: position changed independently  Intervention: Prevent and Manage VTE (Venous Thromboembolism) Risk  Recent Flowsheet Documentation  Taken 3/28/2024 1200 by Shauna Acosta RN  Activity Management: sitting, edge of bed  Taken 3/28/2024 0800 by Shauna Acosta RN  Activity Management: up in chair  Range of Motion: active ROM (range of motion) encouraged  Goal: Optimal Comfort and Wellbeing  Intervention: Provide Person-Centered Care  Recent Flowsheet Documentation  Taken 3/28/2024 0800 by Shauna Acosta RN  Trust Relationship/Rapport:   care explained   emotional support provided   choices provided   questions answered   empathic listening provided   questions encouraged   reassurance provided   thoughts/feelings acknowledged   Goal Outcome Evaluation:            Patient to have stress test in the am.

## 2024-03-28 NOTE — CASE MANAGEMENT/SOCIAL WORK
Continued Stay Note  [facility]     Patient Name: Viktor Atkins  MRN: [id number]  Today's Date: 3/28/2024    Admit Date: 3/25/2024    Plan: Return home with spouse   Discharge Plan       Row Name 03/28/24 1145       Plan    Plan Comments Barriers: Stress test pending. CM met with  and Mrs. Atkins, explained IMM and Mrs. Atkins signed copy.                          Kelsy SWANSON,RN Case Manager  [facility]  Phone: Desk- [phone] cell- [phone]

## 2024-03-28 NOTE — CONSULTS
Colorectal Surgery Consultation Note    ID:  Viktor Atkins;   : 1950  DATE OF VISIT: 3/28/2024    Chief Complaint  Rectal Bleeding (Pt reports had episode of bright red blood rectally and noted in stool, denies taking blood thinners)       History of Present Illness  Viktor Atkins is a 73 y.o. male who I was asked to see in consultation by Dr. Worley, No Known to evaluate for sigmoid mass with bleeding.    Patient started to have bleeding with bowel movement since 3/25/2024.  He reports similar episodes in the past on and off.  Denies any weight loss.  Denies any abdominal pain.  Reports no loss of appetite, no nausea or vomiting.  Patient never had colonoscopy before.     Upon presentation he was noted to have a hemoglobin of 14.3.  A CT abdomen pelvis was performed and showed no colonic mass, sigmoid diverticulosis without diverticulitis.     Patient underwent colonoscopy with Dr. Quintero and was noted to have 18 polyps ranging in size from 5 to 18 mm as well as a sigmoid mass measuring 4 cm, ulcerated, 25 cm from the anal verge.    A staging CT of the chest shows no metastatic disease.     He denies any family history of any malignancy.     CEA and biopsy is currently pending.      Past Medical History  Past Medical History:   Diagnosis Date    Benign essential HTN     Hyperlipidemia, mixed     Palpitations      Past Surgical History  Past Surgical History:   Procedure Laterality Date    CARDIAC CATHETERIZATION      CHOLECYSTECTOMY      KNEE SURGERY       Family History  History reviewed. No pertinent family history.  No colorectal cancer history in immediate family members.  Social History  Social History     Tobacco Use    Smoking status: Former    Smokeless tobacco: Never   Vaping Use    Vaping status: Never Used   Substance Use Topics    Alcohol use: Never    Drug use: Never     For further details please see Health History Questionnaire scanned in Epic.  Medication  List  @medcurrent@  Allergies  No Known Allergies  Review of Systems  All systems reviewed and are otherwise negative, pertinent positives noted in the HPI and Health History Questionnaire scanned in Epic.    Physical Exam  General:  No acute distress  Head: Normocephalic, atraumatic  Neuro: Alert and oriented    Abdomen:  Soft, non-tender, non-distended, no masses, no hernias, no hepatomegaly, no splenomegaly. No abnormal, audible bowel sounds.      Assessment  -sigmoid colon mass and 18 polyps on colonoscopy   - atrial fibrillation     Plan / Recommendations      I had a long discussion regarding the colonoscopy findings with the patient and his family members. Pathology results are pending at the moment. CT scans of the chest, abdomen, and pelvis revealed no signs of metastatic disease or spread.    Since the patient is hemodynamically stable and there's no active GI bleed, we will await the pathology results to determine the appropriate surgical approach. While oncological sigmoid resection seems inevitable, the MSI status of the tumor and findings from other polyps could alter our surgical strategy and influence the risk assessment for metachronous colon cancer.    3. Cardiology workup and optimization for surgery    4. Follow up CEA marker     5. Ok for discharge from surgical stand point if no major episode of bleeding. I will plan on seeing him in clinic early next week to optimize him medically and nutritionally before surgery. We will coordinate with cardiology regarding time of surgery and if there is a need for anticoagulation.     6. I have also personally reviewed the colonoscopy reports, laboratory studies, CT scans noting ulcerated sigmoid mass and no metastatic spread.     Level of service justified based on 60 minutes spent in patient care on this date of service including, but not limited to: preparing to see the patient, obtaining and/or reviewing history, performing medically appropriate  examination, ordering tests/medicine/procedures, independently interpreting results, documenting clinical information in EHR, and counseling/education of patient/family/caregiver (excluding time spent on other separate services such as performing procedures or test interpretation, if applicable)     Aakash Burden MD  Colon and Rectal Surgery   [address], [address]  T: [phone]

## 2024-03-28 NOTE — PROGRESS NOTES
LOS: 1 day   Patient Care Team:  Gera Manriquez MD as PCP - General (Internal Medicine)  Gera Manriquez MD as PCP - Family Medicine      Subjective     Interval History:     Subjective: Patient with no new complaints.  Denies any abdominal pain or nausea/vomiting.  No overt GI bleeding.      ROS:   No chest pain, shortness of breath, or cough.        Medication Review:     Current Facility-Administered Medications:     acetaminophen (TYLENOL) tablet 650 mg, 650 mg, Oral, Q4H PRN, Fili Quintero MD    aspirin chewable tablet 81 mg, 81 mg, Oral, Daily, Fili Quintero MD, 81 mg at 03/28/24 0812    atorvastatin (LIPITOR) tablet 20 mg, 20 mg, Oral, Nightly, Fili Quintero MD, 20 mg at 03/27/24 2134    sennosides-docusate (PERICOLACE) 8.6-50 MG per tablet 2 tablet, 2 tablet, Oral, BID PRN **AND** polyethylene glycol (MIRALAX) packet 17 g, 17 g, Oral, Daily PRN **AND** bisacodyl (DULCOLAX) EC tablet 5 mg, 5 mg, Oral, Daily PRN **AND** bisacodyl (DULCOLAX) suppository 10 mg, 10 mg, Rectal, Daily PRN, Fili Quintero MD    dextrose (D50W) (25 g/50 mL) IV injection 25 g, 25 g, Intravenous, Q15 Min PRN, Fili Quintero MD    dextrose (GLUTOSE) oral gel 15 g, 15 g, Oral, Q15 Min PRN, Fili Quintero MD    dilTIAZem (CARDIZEM) 125 mg in 125 mL D5W infusion, 5-15 mg/hr, Intravenous, Titrated, Abimael Greenberg MD, Last Rate: 5 mL/hr at 03/28/24 0813, 5 mg/hr at 03/28/24 0813    glucagon (GLUCAGEN) injection 1 mg, 1 mg, Intramuscular, Q15 Min PRN, Fili Quintero MD    insulin lispro (HUMALOG/ADMELOG) injection 2-9 Units, 2-9 Units, Subcutaneous, 4x Daily AC & at Bedtime, Fili Quintero MD, 2 Units at 03/26/24 1149    melatonin tablet 5 mg, 5 mg, Oral, Nightly PRN, Fili Quintero MD    metoprolol tartrate (LOPRESSOR) tablet 25 mg, 25 mg, Oral, Q6H, Abimael Greenberg MD, 25 mg at 03/28/24 0812    ondansetron ODT (ZOFRAN-ODT) disintegrating tablet 4 mg, 4 mg, Oral, Q6H PRN **OR**  "ondansetron (ZOFRAN) injection 4 mg, 4 mg, Intravenous, Q6H PRN, Fili Quintero MD    pantoprazole (PROTONIX) EC tablet 40 mg, 40 mg, Oral, Q AM, Fili Quintero MD, 40 mg at 03/28/24 0817    sertraline (ZOLOFT) tablet 25 mg, 25 mg, Oral, Daily, Fili Quintero MD, 25 mg at 03/28/24 0813    [COMPLETED] Insert Peripheral IV, , , Once **AND** sodium chloride 0.9 % flush 10 mL, 10 mL, Intravenous, PRN, Fili Quintero MD    sodium chloride 0.9 % flush 10 mL, 10 mL, Intravenous, Q12H, Fili Quintero MD, 10 mL at 03/28/24 0813    sodium chloride 0.9 % flush 10 mL, 10 mL, Intravenous, PRN, Fili Quintero MD    sodium chloride 0.9 % infusion 40 mL, 40 mL, Intravenous, PRN, Fili Quintero MD, 700 mL at 03/27/24 1322      Objective     Vital Signs  Vitals:    03/27/24 2100 03/28/24 0129 03/28/24 0627 03/28/24 0825   BP: 137/81 132/87 138/81 126/69   BP Location: Left arm Left arm Left arm    Patient Position: Lying Lying Lying    Pulse: 112 100 (!) 146 94   Resp: 16 16 17    Temp: 97.1 °F (36.2 °C) 97.5 °F (36.4 °C) 97.8 °F (36.6 °C)    TempSrc: Oral Oral Oral    SpO2: 95% 96% 94% 96%   Weight:  113 kg (250 lb)     Height:  180.3 cm (70.98\")         Physical Exam:     General Appearance:    Awake and alert, in no acute distress   Head:    Normocephalic, without obvious abnormality   Eyes:          Conjunctivae normal, anicteric sclera   Throat:   No oral lesions, no thrush, oral mucosa moist   Neck:   No adenopathy, supple, no JVD   Lungs:     respirations regular, even and unlabored   Abdomen:     Soft, non-tender, no rebound or guarding, non-distended   Rectal:     Deferred   Extremities:   No edema, no cyanosis   Skin:   No bruising or rash, no jaundice        Results Review:    CBC    Results from last 7 days   Lab Units 03/28/24  0401 03/27/24  0442 03/26/24  0440 03/25/24  2130   WBC 10*3/mm3 10.06 9.20 9.06 9.55   HEMOGLOBIN g/dL 12.9* 14.5 13.2 14.3   PLATELETS 10*3/mm3 " "137* 164 143 160     CMP   Results from last 7 days   Lab Units 03/28/24  0401 03/27/24  0442 03/26/24  0440 03/25/24  2130   SODIUM mmol/L 143 142 142 140   POTASSIUM mmol/L 4.2 4.4 4.4 4.3   CHLORIDE mmol/L 108* 107 108* 107   CO2 mmol/L 24.0 22.0 22.0 21.0*   BUN mg/dL 13 15 18 17   CREATININE mg/dL 0.89 0.97 1.12 1.07   GLUCOSE mg/dL 95 100* 114* 164*   ALBUMIN g/dL  --  4.6  --  4.6   BILIRUBIN mg/dL  --  0.6  --  0.2   ALK PHOS U/L  --  107  --  120*   AST (SGOT) U/L  --  40  --  29   ALT (SGPT) U/L  --  46*  --  <5   MAGNESIUM mg/dL  --  2.4  --   --      Cr Clearance Estimated Creatinine Clearance: 95.9 mL/min (by C-G formula based on SCr of 0.89 mg/dL).  Coag     HbA1C No results found for: \"HGBA1C\"      Infection     UA      Microbiology Results (last 10 days)       ** No results found for the last 240 hours. **          Imaging Results (Last 72 Hours)       Procedure Component Value Units Date/Time    CT Chest Without Contrast Diagnostic [[id number]] Collected: 03/27/24 1651     Updated: 03/27/24 1701    Narrative:      CT CHEST WO CONTRAST DIAGNOSTIC    Date of Exam: 3/27/2024 4:49 PM EDT    Indication: colon mass, assess for metastatic disease.    Comparison: Chest radiograph performed on December 28, 2020    Technique: Axial CT images were obtained of the chest without contrast administration.  Sagittal and coronal reconstructions were performed.  Automated exposure control and iterative reconstruction methods were used.      Findings:    Thyroid: The visualized portion of the thyroid is unremarkable.    Hilum, Mediastinum, Heart, and Great vessels: No pathologically enlarged lymph nodes. Normal heart size. No pericardial effusion. Unremarkable thoracic aorta and pulmonary arteries.    Lung Parenchyma and Pleura: No focal consolidation. No suspicious pulmonary nodules. No significant pleural effusion.  Central airways are patent.     Upper Abdomen: No acute process.     Soft tissues: " Unremarkable.    Osseous structures: No acute fracture or aggressive lesions. Mild to moderate degenerative changes of the thoracic spine are visualized.      Impression:      Impression:    No acute chest process evident. No CT evidence of metastatic disease to the chest.      Electronically Signed: Julio Perdomo MD    3/27/2024 4:57 PM EDT    Workstation ID: [id number]    CT Abdomen Pelvis With Contrast [[id number]] Collected: 03/25/24 2238     Updated: 03/25/24 2246    Narrative:      CT ABDOMEN PELVIS W CONTRAST    Date of Exam: 3/25/2024 10:15 PM EDT    Indication: gi bleeding.    Comparison: None available.    Technique: Axial CT images were obtained of the abdomen and pelvis following the uneventful intravenous administration of iodinated contrast. Sagittal and coronal reconstructions were performed.  Automated exposure control and iterative reconstruction   methods were used.        Findings:    Lung Bases:  No focal consolidation or effusion. Cardiac silhouette appears enlarged.    Peritoneum:  No free intraperitoneal air or fluid.     Abdominal wall:  Unremarkable.    Liver:  Liver is normal in size and contour. No focal lesions. The portal vein is patent.    Biliary/Gallbladder:  The gallbladder is surgically absent. The biliary tree is nondilated.    Pancreas:  Pancreas is within normal limits. There is no evidence of pancreatic mass or peripancreatic inflammatory changes.    Spleen:  Spleen is normal in size and contour.    Gastrointestinal/Mesentery:   No evidence of bowel obstruction or gross inflammatory changes. The appendix appears within normal limits. There is mild diffuse fecal retention. There is sigmoid diverticulosis without evidence of diverticulitis.    Adrenals:  Adrenal glands are unremarkable.    Kidneys:  The kidneys are in anatomic position. No evidence of nephrolithiasis. No evidence of hydronephrosis or significant perinephric fat stranding.    Bladder:   The urinary bladder is  unremarkable.    Reproductive organs:    The prostate and seminal vesicles are within normal limits.    Lymph Nodes:  No significant adenopathy is identified.     Vasculature:  Unremarkable.    Bony Structures:    No acute fracture or aggressive lesions.        Impression:      Impression:  Findings compatible with constipation.    Sigmoid diverticulosis without diverticulitis.            Electronically Signed: Julio Perdomo MD    3/25/2024 10:43 PM EDT    Workstation ID: [id number]            Assessment & Plan     ASSESSMENT:  -Rectal bleeding  -Hypertension  -Hyperlipidemia  -Elevated alk phos  -New onset A-fib  -History of cholecystectomy     PLAN:  Patient is a 73-year-old male with history of hypertension, hyperlipidemia, and cholecystectomy who presented on 3/25 with complaints of rectal bleeding. CT abdomen/pelvis W on admission shows constipation and diverticulosis without evidence of acute diverticulitis.     Patient with no new complaints.  Denies abdominal pain, nausea/vomiting, or overt GI bleeding.  S/p colonoscopy yesterday showing distal sigmoid colon mass, colon polyps x 18, diverticulosis, and internal hemorrhoids.  CT chest does not show any evidence of metastatic disease.  CEA normal at 5.21.  Colorectal surgery was consulted planning resection once patient has been cleared from a cardiac standpoint.  Cardiology following.  2D echo planned for today.  Okay for diet as tolerated from GI standpoint.  Not much else to add from a GI standpoint as an inpatient at this time.  Will be available as needed.      Electronically signed by KAREN Marr, 03/28/24, 11:13 AM EDT.

## 2024-03-29 ENCOUNTER — APPOINTMENT (OUTPATIENT)
Dept: NUCLEAR MEDICINE | Facility: HOSPITAL | Age: 74
DRG: 330 | End: 2024-03-29
Payer: MEDICARE

## 2024-03-29 ENCOUNTER — ANESTHESIA EVENT (OUTPATIENT)
Dept: PERIOP | Facility: HOSPITAL | Age: 74
End: 2024-03-29
Payer: MEDICARE

## 2024-03-29 LAB
ANION GAP SERPL CALCULATED.3IONS-SCNC: 10 MMOL/L (ref 5–15)
BASOPHILS # BLD AUTO: 0.03 10*3/MM3 (ref 0–0.2)
BASOPHILS NFR BLD AUTO: 0.3 % (ref 0–1.5)
BH CV NUCLEAR PRIOR STUDY: 3
BH CV REST NUCLEAR ISOTOPE DOSE: 11 MCI
BH CV STRESS BP STAGE 1: NORMAL
BH CV STRESS BP STAGE 2: NORMAL
BH CV STRESS COMMENTS STAGE 1: NORMAL
BH CV STRESS COMMENTS STAGE 2: NORMAL
BH CV STRESS DOSE REGADENOSON STAGE 1: 0.4
BH CV STRESS DURATION MIN STAGE 1: 0
BH CV STRESS DURATION MIN STAGE 2: 4
BH CV STRESS DURATION SEC STAGE 1: 10
BH CV STRESS DURATION SEC STAGE 2: 0
BH CV STRESS HR STAGE 1: 124
BH CV STRESS HR STAGE 2: 132
BH CV STRESS NUCLEAR ISOTOPE DOSE: 33 MCI
BH CV STRESS PROTOCOL 1: NORMAL
BH CV STRESS RECOVERY BP: NORMAL MMHG
BH CV STRESS RECOVERY HR: 126 BPM
BH CV STRESS STAGE 1: 1
BH CV STRESS STAGE 2: 2
BUN SERPL-MCNC: 15 MG/DL (ref 8–23)
BUN/CREAT SERPL: 14.6 (ref 7–25)
CALCIUM SPEC-SCNC: 9 MG/DL (ref 8.6–10.5)
CHLORIDE SERPL-SCNC: 108 MMOL/L (ref 98–107)
CO2 SERPL-SCNC: 23 MMOL/L (ref 22–29)
CREAT SERPL-MCNC: 1.03 MG/DL (ref 0.76–1.27)
DEPRECATED RDW RBC AUTO: 47.9 FL (ref 37–54)
EGFRCR SERPLBLD CKD-EPI 2021: 77.2 ML/MIN/1.73
EOSINOPHIL # BLD AUTO: 0.23 10*3/MM3 (ref 0–0.4)
EOSINOPHIL NFR BLD AUTO: 2.4 % (ref 0.3–6.2)
ERYTHROCYTE [DISTWIDTH] IN BLOOD BY AUTOMATED COUNT: 14.9 % (ref 12.3–15.4)
GLUCOSE BLDC GLUCOMTR-MCNC: 100 MG/DL (ref 70–105)
GLUCOSE BLDC GLUCOMTR-MCNC: 103 MG/DL (ref 70–105)
GLUCOSE BLDC GLUCOMTR-MCNC: 121 MG/DL (ref 70–105)
GLUCOSE BLDC GLUCOMTR-MCNC: 171 MG/DL (ref 70–105)
GLUCOSE BLDC GLUCOMTR-MCNC: 97 MG/DL (ref 70–105)
GLUCOSE SERPL-MCNC: 101 MG/DL (ref 65–99)
HCT VFR BLD AUTO: 40.2 % (ref 37.5–51)
HGB BLD-MCNC: 12.6 G/DL (ref 13–17.7)
IMM GRANULOCYTES # BLD AUTO: 0.03 10*3/MM3 (ref 0–0.05)
IMM GRANULOCYTES NFR BLD AUTO: 0.3 % (ref 0–0.5)
LARGE PLATELETS: NORMAL
LV EF NUC BP: 32 %
LYMPHOCYTES # BLD AUTO: 3.37 10*3/MM3 (ref 0.7–3.1)
LYMPHOCYTES NFR BLD AUTO: 34.7 % (ref 19.6–45.3)
MAXIMAL PREDICTED HEART RATE: 148 BPM
MCH RBC QN AUTO: 27.6 PG (ref 26.6–33)
MCHC RBC AUTO-ENTMCNC: 31.3 G/DL (ref 31.5–35.7)
MCV RBC AUTO: 88 FL (ref 79–97)
MONOCYTES # BLD AUTO: 0.84 10*3/MM3 (ref 0.1–0.9)
MONOCYTES NFR BLD AUTO: 8.6 % (ref 5–12)
NEUTROPHILS NFR BLD AUTO: 5.22 10*3/MM3 (ref 1.7–7)
NEUTROPHILS NFR BLD AUTO: 53.7 % (ref 42.7–76)
NRBC BLD AUTO-RTO: 0 /100 WBC (ref 0–0.2)
PERCENT MAX PREDICTED HR: 89.19 %
PLATELET # BLD AUTO: 150 10*3/MM3 (ref 140–450)
PMV BLD AUTO: 14 FL (ref 6–12)
POTASSIUM SERPL-SCNC: 4.5 MMOL/L (ref 3.5–5.2)
RBC # BLD AUTO: 4.57 10*6/MM3 (ref 4.14–5.8)
RBC MORPH BLD: NORMAL
SMALL PLATELETS BLD QL SMEAR: ADEQUATE
SODIUM SERPL-SCNC: 141 MMOL/L (ref 136–145)
STRESS BASELINE BP: NORMAL MMHG
STRESS BASELINE HR: 125 BPM
STRESS PERCENT HR: 105 %
STRESS POST PEAK BP: NORMAL MMHG
STRESS POST PEAK HR: 132 BPM
STRESS TARGET HR: 126 BPM
WBC MORPH BLD: NORMAL
WBC NRBC COR # BLD AUTO: 9.72 10*3/MM3 (ref 3.4–10.8)

## 2024-03-29 PROCEDURE — A9502 TC99M TETROFOSMIN: HCPCS | Performed by: INTERNAL MEDICINE

## 2024-03-29 PROCEDURE — 80048 BASIC METABOLIC PNL TOTAL CA: CPT | Performed by: INTERNAL MEDICINE

## 2024-03-29 PROCEDURE — 0 TECHNETIUM TETROFOSMIN KIT: Performed by: INTERNAL MEDICINE

## 2024-03-29 PROCEDURE — 93017 CV STRESS TEST TRACING ONLY: CPT

## 2024-03-29 PROCEDURE — 78452 HT MUSCLE IMAGE SPECT MULT: CPT

## 2024-03-29 PROCEDURE — 25010000002 REGADENOSON 0.4 MG/5ML SOLUTION: Performed by: INTERNAL MEDICINE

## 2024-03-29 PROCEDURE — 78452 HT MUSCLE IMAGE SPECT MULT: CPT | Performed by: INTERNAL MEDICINE

## 2024-03-29 PROCEDURE — 99233 SBSQ HOSP IP/OBS HIGH 50: CPT | Performed by: STUDENT IN AN ORGANIZED HEALTH CARE EDUCATION/TRAINING PROGRAM

## 2024-03-29 PROCEDURE — 85025 COMPLETE CBC W/AUTO DIFF WBC: CPT | Performed by: INTERNAL MEDICINE

## 2024-03-29 PROCEDURE — 82948 REAGENT STRIP/BLOOD GLUCOSE: CPT | Performed by: INTERNAL MEDICINE

## 2024-03-29 PROCEDURE — 93018 CV STRESS TEST I&R ONLY: CPT | Performed by: INTERNAL MEDICINE

## 2024-03-29 PROCEDURE — 99232 SBSQ HOSP IP/OBS MODERATE 35: CPT | Performed by: INTERNAL MEDICINE

## 2024-03-29 PROCEDURE — 82948 REAGENT STRIP/BLOOD GLUCOSE: CPT

## 2024-03-29 PROCEDURE — 85007 BL SMEAR W/DIFF WBC COUNT: CPT | Performed by: INTERNAL MEDICINE

## 2024-03-29 RX ORDER — NEOMYCIN SULFATE 500 MG/1
1000 TABLET ORAL
Qty: 6 TABLET | Refills: 0 | Status: COMPLETED | OUTPATIENT
Start: 2024-03-29 | End: 2024-03-29

## 2024-03-29 RX ORDER — ERYTHROMYCIN 250 MG/1
500 CAPSULE, DELAYED RELEASE ORAL
Qty: 6 CAPSULE | Refills: 0 | Status: COMPLETED | OUTPATIENT
Start: 2024-03-29 | End: 2024-03-29

## 2024-03-29 RX ORDER — REGADENOSON 0.08 MG/ML
0.4 INJECTION, SOLUTION INTRAVENOUS
Status: COMPLETED | OUTPATIENT
Start: 2024-03-29 | End: 2024-03-29

## 2024-03-29 RX ADMIN — Medication 5 MG/HR: at 03:43

## 2024-03-29 RX ADMIN — ATORVASTATIN CALCIUM 20 MG: 20 TABLET, FILM COATED ORAL at 21:18

## 2024-03-29 RX ADMIN — TETROFOSMIN 1 DOSE: 1.38 INJECTION, POWDER, LYOPHILIZED, FOR SOLUTION INTRAVENOUS at 09:10

## 2024-03-29 RX ADMIN — ERYTHROMYCIN 500 MG: 250 CAPSULE, DELAYED RELEASE PELLETS ORAL at 17:40

## 2024-03-29 RX ADMIN — NEOMYCIN SULFATE 1000 MG: 500 TABLET ORAL at 19:53

## 2024-03-29 RX ADMIN — METOPROLOL TARTRATE 25 MG: 25 TABLET, FILM COATED ORAL at 16:08

## 2024-03-29 RX ADMIN — ERYTHROMYCIN 500 MG: 250 CAPSULE, DELAYED RELEASE PELLETS ORAL at 19:53

## 2024-03-29 RX ADMIN — NEOMYCIN SULFATE 1000 MG: 500 TABLET ORAL at 17:40

## 2024-03-29 RX ADMIN — Medication 10 ML: at 09:53

## 2024-03-29 RX ADMIN — Medication 10 ML: at 21:55

## 2024-03-29 RX ADMIN — SERTRALINE HYDROCHLORIDE 25 MG: 25 TABLET ORAL at 09:53

## 2024-03-29 RX ADMIN — TETROFOSMIN 1 DOSE: 1.38 INJECTION, POWDER, LYOPHILIZED, FOR SOLUTION INTRAVENOUS at 08:13

## 2024-03-29 RX ADMIN — METOPROLOL TARTRATE 25 MG: 25 TABLET, FILM COATED ORAL at 09:53

## 2024-03-29 RX ADMIN — METOPROLOL TARTRATE 75 MG: 50 TABLET, FILM COATED ORAL at 21:18

## 2024-03-29 RX ADMIN — NEOMYCIN SULFATE 1000 MG: 500 TABLET ORAL at 18:45

## 2024-03-29 RX ADMIN — REGADENOSON 0.4 MG: 0.08 INJECTION, SOLUTION INTRAVENOUS at 09:10

## 2024-03-29 RX ADMIN — ERYTHROMYCIN 500 MG: 250 CAPSULE, DELAYED RELEASE PELLETS ORAL at 18:45

## 2024-03-29 RX ADMIN — PANTOPRAZOLE SODIUM 40 MG: 40 TABLET, DELAYED RELEASE ORAL at 05:59

## 2024-03-29 NOTE — CONSULTS
"Nutrition Services    Patient Name: Viktor Atkins  YOB: 1950  MRN: [id number]  Admission date: 3/25/2024    Monitor for clinical readiness for diet advancement.     NUTRITION SCREENING      Trending Narrative: 3/29: Pt assessed due to ongoing NPO and/or liquid diet status. Pt with concern for GI bleeding and upcoming GI surgery; currently on clears, but likely able to advance back to solid food diet soon. When well, pt normally eats a regular-type diet, without any chewing/swallowing problems or other barriers to intake. Pt had recent colonoscopy and a mass was found - surgical intervention planned for 3/30.        PO Diet: Diet: Liquid; Clear Liquid; Fluid Consistency: Thin (IDDSI 0)   PO Supplements: None currently    Trending PO Intake:  Minimal by nature of clear liquid diet        Nutritionally-Pertinent Medications RDN Reviewed, C/W clinical course         Labs (reviewed below): Reviewed      Results from last 7 days   Lab Units 03/29/24 0425 03/28/24 0401 03/27/24 0442 03/26/24 0440 03/25/24  2130   SODIUM mmol/L 141 143 142   < > 140   POTASSIUM mmol/L 4.5 4.2 4.4   < > 4.3   CHLORIDE mmol/L 108* 108* 107   < > 107   CO2 mmol/L 23.0 24.0 22.0   < > 21.0*   BUN mg/dL 15 13 15   < > 17   CREATININE mg/dL 1.03 0.89 0.97   < > 1.07   CALCIUM mg/dL 9.0 8.8 9.3   < > 9.0   BILIRUBIN mg/dL  --   --  0.6  --  0.2   ALK PHOS U/L  --   --  107  --  120*   ALT (SGPT) U/L  --   --  46*  --  <5   AST (SGOT) U/L  --   --  40  --  29   GLUCOSE mg/dL 101* 95 100*   < > 164*    < > = values in this interval not displayed.     Results from last 7 days   Lab Units 03/29/24 0425 03/28/24 0401 03/27/24 0442   MAGNESIUM mg/dL  --   --  2.4   HEMOGLOBIN g/dL 12.6*   < > 14.5   HEMATOCRIT % 40.2   < > 46.6    < > = values in this interval not displayed.     No results found for: \"HGBA1C\"       GI Function:  BM 3/28 (ongoing bloody stools)        Skin: Incision       Weight Review: Estimated body mass index is " "34.89 kg/m² as calculated from the following:    Height as of this encounter: 180.3 cm (70.98\").    Weight as of this encounter: 113 kg (250 lb).    Comment:   3/29: Scale weight 113 kg -- limited extended weight Hx     Wt Readings from Last 30 Encounters:   03/28/24 0129 113 kg (250 lb)   03/26/24 0112 114 kg (250 lb 10.6 oz)   03/25/24 2015 115 kg (253 lb 1.4 oz)   03/03/21 1408 114 kg (251 lb)   09/02/20 1345 110 kg (243 lb)   08/13/20 1312 109 kg (241 lb)              Trending Physical   Appearance, NFPE 3/29: NFPE completed and not consistent with nutrition diagnosis of malnutrition at this time using AND/ASPEN criteria               Nutrition Problem Statement: Altered GI function R/t alteration in GI anatomical structure; as evidenced by mass and ongoing GIB; as evidenced by clinical course and planned GI surgical intervention.         Nutrition Intervention: Continue clear liquids until approved by surgeon for diet advancement.           Monitoring/Evaluation PO intake, Pertinent labs, Weight, Skin status, GI status, POC/GOC        RD to follow up per protocol.    Electronically signed by:  Domonique Morales RD  03/29/24 15:53 EDT      "

## 2024-03-29 NOTE — PROGRESS NOTES
CARDIOLOGY FOLLOW-UP PROGRESS NOTE      Reason for follow-up:    Atrial Fibrillation  Preoperative cardiovascular risk assessment     Attending: Baltazar Menendez MD      Subjective .     Denies chest pain or dyspnea     Review of Systems   Constitutional: Negative for chills and fever.   HENT:  Negative for ear discharge and nosebleeds.    Eyes:  Negative for discharge and redness.   Cardiovascular:  Negative for chest pain, orthopnea, palpitations, paroxysmal nocturnal dyspnea and syncope.   Respiratory:  Negative for cough, shortness of breath and wheezing.    Endocrine: Negative for heat intolerance.   Skin:  Negative for rash.   Musculoskeletal:  Negative for arthritis and myalgias.   Gastrointestinal:  Negative for abdominal pain, melena, nausea and vomiting.   Genitourinary:  Negative for dysuria and hematuria.   Neurological:  Negative for dizziness, light-headedness, numbness and tremors.   Psychiatric/Behavioral:  Negative for depression. The patient is not nervous/anxious.      Pertinent items are noted in HPI, all other systems reviewed and negative  Allergies: Patient has no known allergies.    Scheduled Meds:aspirin, 81 mg, Oral, Daily  atorvastatin, 20 mg, Oral, Nightly  insulin lispro, 2-9 Units, Subcutaneous, 4x Daily AC & at Bedtime  metoprolol tartrate, 25 mg, Oral, Q6H  pantoprazole, 40 mg, Oral, Q AM  sertraline, 25 mg, Oral, Daily  sodium chloride, 10 mL, Intravenous, Q12H        Continuous Infusions:dilTIAZem, 5-15 mg/hr, Last Rate: 5 mg/hr (03/29/24 0343)        PRN Meds:.  acetaminophen    senna-docusate sodium **AND** polyethylene glycol **AND** bisacodyl **AND** bisacodyl    dextrose    dextrose    glucagon (human recombinant)    melatonin    ondansetron ODT **OR** ondansetron    [COMPLETED] Insert Peripheral IV **AND** sodium chloride    sodium chloride    sodium chloride    Objective     VITAL SIGNS  Patient Vitals for the past 24 hrs:   BP Temp Temp src Pulse Resp SpO2   03/29/24  "0547 147/91 97.8 °F (36.6 °C) Oral 95 19 92 %   03/29/24 0357 145/87 98 °F (36.7 °C) -- 90 18 98 %   03/28/24 2242 147/90 98.1 °F (36.7 °C) Oral 93 18 96 %   03/28/24 2149 145/82 -- -- -- 20 --   03/28/24 1805 121/83 99.5 °F (37.5 °C) Oral 86 18 97 %   03/28/24 1654 119/84 98 °F (36.7 °C) Oral 70 18 97 %   03/28/24 1221 152/74 -- -- 87 18 95 %   03/28/24 1130 127/77 -- -- 79 -- 96 %   03/28/24 1100 122/71 -- -- 85 -- 96 %        Flowsheet Rows      Flowsheet Row First Filed Value   Admission Height 180.3 cm (71\") Documented at 03/25/2024 2015   Admission Weight 115 kg (253 lb 1.4 oz) Documented at 03/25/2024 2015            Body mass index is 34.89 kg/m².    No intake or output data in the 24 hours ending 03/29/24 0921     TELEMETRY:     Atrial fibrillation with RVR    Physical Exam:  Cardiovascular:      Irregularly irregular rhythm.            Results Review:   I reviewed the patient's new clinical results.    CBC    Results from last 7 days   Lab Units 03/29/24 0425 03/28/24 0401 03/27/24 0442 03/26/24 0440 03/25/24  2130   WBC 10*3/mm3 9.72 10.06 9.20 9.06 9.55   HEMOGLOBIN g/dL 12.6* 12.9* 14.5 13.2 14.3   PLATELETS 10*3/mm3 150 137* 164 143 160     BMP   Results from last 7 days   Lab Units 03/29/24 0425 03/28/24 0401 03/27/24 0442 03/26/24 0440 03/25/24  2130   SODIUM mmol/L 141 143 142 142 140   POTASSIUM mmol/L 4.5 4.2 4.4 4.4 4.3   CHLORIDE mmol/L 108* 108* 107 108* 107   CO2 mmol/L 23.0 24.0 22.0 22.0 21.0*   BUN mg/dL 15 13 15 18 17   CREATININE mg/dL 1.03 0.89 0.97 1.12 1.07   GLUCOSE mg/dL 101* 95 100* 114* 164*   MAGNESIUM mg/dL  --   --  2.4  --   --      Cr Clearance Estimated Creatinine Clearance: 82.9 mL/min (by C-G formula based on SCr of 1.03 mg/dL).  Coag     HbA1C No results found for: \"HGBA1C\"  Blood Glucose   Glucose   Date/Time Value Ref Range Status   03/29/2024 0719 103 70 - 105 mg/dL Final     Comment:     Serial Number: [id number]:  [id number]   03/28/2024 2030 134 (H) 70 " "- 105 mg/dL Final     Comment:     Serial Number: [id number]:  [id number]   03/28/2024 1651 89 70 - 105 mg/dL Final     Comment:     Serial Number: [id number]:  [id number]   03/28/2024 1220 132 (H) 70 - 105 mg/dL Final     Comment:     Serial Number: [id number]:  [id number]   03/28/2024 0812 109 (H) 70 - 105 mg/dL Final     Comment:     Serial Number: [id number]:  [id number]   03/27/2024 2126 88 70 - 105 mg/dL Final     Comment:     Serial Number: [id number]:  [id number]   03/27/2024 1701 115 (H) 70 - 105 mg/dL Final     Comment:     Serial Number: [id number]:  [id number]   03/27/2024 0727 98 70 - 105 mg/dL Final     Comment:     Serial Number: [id number]:  [id number]     Infection     CMP   Results from last 7 days   Lab Units 03/29/24  0425 03/28/24  0401 03/27/24  0442 03/26/24  0440 03/25/24  2130   SODIUM mmol/L 141 143 142 142 140   POTASSIUM mmol/L 4.5 4.2 4.4 4.4 4.3   CHLORIDE mmol/L 108* 108* 107 108* 107   CO2 mmol/L 23.0 24.0 22.0 22.0 21.0*   BUN mg/dL 15 13 15 18 17   CREATININE mg/dL 1.03 0.89 0.97 1.12 1.07   GLUCOSE mg/dL 101* 95 100* 114* 164*   ALBUMIN g/dL  --   --  4.6  --  4.6   BILIRUBIN mg/dL  --   --  0.6  --  0.2   ALK PHOS U/L  --   --  107  --  120*   AST (SGOT) U/L  --   --  40  --  29   ALT (SGPT) U/L  --   --  46*  --  <5     ABG      UA      ERIK  No results found for: \"POCMETH\", \"POCAMPHET\", \"POCBARBITUR\", \"POCBENZO\", \"POCCOCAINE\", \"POCOPIATES\", \"POCOXYCODO\", \"POCPHENCYC\", \"POCPROPOXY\", \"POCTHC\", \"POCTRICYC\"  Lysis Labs   Results from last 7 days   Lab Units 03/29/24  0425 03/28/24  0401 03/27/24  0442 03/26/24  0440 03/25/24  2130   HEMOGLOBIN g/dL 12.6* 12.9* 14.5 13.2 14.3   PLATELETS 10*3/mm3 150 137* 164 143 160   CREATININE mg/dL 1.03 0.89 0.97 1.12 1.07     Radiology(recent) CT Chest Without Contrast Diagnostic    Result Date: 3/27/2024  Impression: No acute chest process evident. No CT evidence of metastatic disease to the chest. " Electronically Signed: Julio Perdomo MD  3/27/2024 4:57 PM EDT  Workstation ID: [id number]     Imaging Results (Last 24 Hours)       ** No results found for the last 24 hours. **                  EKG                     I personally viewed and interpreted the patient's EKG/Telemetry data:        ECHOCARDIOGRAM:     Results for orders placed during the hospital encounter of 03/25/24    Adult Transthoracic Echo Complete W/ Cont if Necessary Per Protocol    Interpretation Summary    Left ventricular systolic function is low normal. Calculated left ventricular EF = 46% Left ventricular ejection fraction appears to be 46 - 50%.    The left ventricular cavity is mildly dilated.    Left ventricular diastolic dysfunction is noted.    The left atrial cavity is dilated.    Estimated right ventricular systolic pressure from tricuspid regurgitation is normal (<35 mmHg).    Dilation of the aortic root is present.  4.4 cm    Patient in atrial fibrillation during this study.        STRESS MYOVIEW:      CARDIAC CATHETERIZATION:      OTHER:         Assessment & Plan            GI bleeding    Rectal bleeding    GI bleed        ASSESSMENT:    Atrial fibrillation with RVR  Duration unknown  Was present on admission 3/25/2024  No prior history of A-fib  LQT5HC2-MBCw equals 2  Echocardiogram EF 45 to 50% aortic root 4.4 cm  Currently on IV Cardizem    Preop cardiovascular risk assessment requested  No signs or symptoms to suggest unstable angina  Lexiscan Myoview pending     Rectal bleeding  Colonoscopy revealed: Mass   Colorectal surgery following  preprocedure cardiovascular risk assessment requested  Patient is not having any signs or symptoms to suggest unstable angina     Hypertension  Continue current therapy     Coronary artery disease  Reported to be nonobstructive by previous cath in 2012 done at [facility]         PLAN:    Dr. Greenberg to review stress test  Surgery now has been moved up with anticipated surgeryTomorrow  if cleared  Stress test showed no reversible ischemia.  Acceptable risk to proceed with surgery  Timing still being determined  Post operatively cardiology will need to see pt; will need to start anticoagulation for atrial fibrillation when ok from a surgical standpoint post op  If surgery deferred, ok for d/c from cards standpoint  Would continue oral metoprolol and resume home losartan  Mild LV dysfunction noted on echo EF 45-50%    Patient is seen and examined and findings are verified.  All data is reviewed by me personally.  Assessment and plan formulated by APC was done after discussion with attending.  I spent more than 50% of time in taking care of the patient.    MDM:    1.  Preop clearance:    Patient underwent stress test and it showed no ischemia.  Fixed apical defect noted.  Mild LV enlargement and dysfunction was noted.  Patient can be cleared for surgery continue beta-blocker.    2.  Cardiomyopathy:    Patient has mild left ventricular dysfunction with EF of 45 to 50%.  This may be due to tachycardia mediated cardiomyopathy due to uncontrolled atrial fibrillation.    3.  Persistent atrial fibrillation:    Patient has relatively newly diagnosed atrial fibrillation.  After the surgery tomorrow next week, patient should be started back on anticoagulation.  I would consider cardioversion in future    4.  Hypertension:    Blood pressure is controlled    5.  Colonic mass:    Patient is cleared for the surgery       I discussed the patient's findings and my recommendations with patient and family                  Electronically signed by KAREN Corea, 03/29/24, 9:21 AM EDT.          KAREN Corea  03/29/24  09:21 EDT

## 2024-03-29 NOTE — PROGRESS NOTES
Colorectal Surgery Progress Note    Pt. Name/Age/:  Viktor Atkins   73 y.o.    1950         Med. Record Number:   [id number]  Date of admission:  3/25/2024      Service(s): Colorectal Surgery      Subjective    Continues to have bloody B,   Hemodynamically stable     Path with tubular adenoma in polyps and invasive adenocarcinoma, MSI status pending     Echo results noted     Objective  Vitals:     Patient Vitals for the past 24 hrs:   BP Temp Temp src Pulse Resp SpO2   24 0547 147/91 97.8 °F (36.6 °C) Oral 95 19 92 %   24 0357 145/87 98 °F (36.7 °C) -- 90 18 98 %   24 2242 147/90 98.1 °F (36.7 °C) Oral 93 18 96 %   24 2149 145/82 -- -- -- 20 --   24 1805 121/83 99.5 °F (37.5 °C) Oral 86 18 97 %   24 1654 119/84 98 °F (36.7 °C) Oral 70 18 97 %   24 1221 152/74 -- -- 87 18 95 %   24 1130 127/77 -- -- 79 -- 96 %   24 1100 122/71 -- -- 85 -- 96 %   24 0825 126/69 -- -- 94 -- 96 %           Wt. Admission: Weight: 115 kg (253 lb 1.4 oz)     Wt. Current: Weight: 113 kg (250 lb)   Body mass index is 34.89 kg/m².      I&O:No intake or output data in the 24 hours ending 24 0734  No intake/output data recorded.      Exam  General:  No acute distress  Head: Normocephalic, atraumatic  Neuro: Alert and oriented     Abdomen:  Soft, non-tender, non-distended, no masses, no hernias, no hepatomegaly, no splenomegaly. No abnormal, audible bowel sounds.       Labs:     [unfilled]          Scheduled Meds:aspirin, 81 mg, Oral, Daily  atorvastatin, 20 mg, Oral, Nightly  insulin lispro, 2-9 Units, Subcutaneous, 4x Daily AC & at Bedtime  metoprolol tartrate, 25 mg, Oral, Q6H  pantoprazole, 40 mg, Oral, Q AM  sertraline, 25 mg, Oral, Daily  sodium chloride, 10 mL, Intravenous, Q12H      Continuous Infusions:dilTIAZem, 5-15 mg/hr, Last Rate: 5 mg/hr (24 0343)      PRN Meds:.  acetaminophen    senna-docusate sodium **AND** polyethylene glycol **AND** bisacodyl  **AND** bisacodyl    dextrose    dextrose    glucagon (human recombinant)    melatonin    ondansetron ODT **OR** ondansetron    [COMPLETED] Insert Peripheral IV **AND** sodium chloride    sodium chloride    sodium chloride          Assessment  -sigmoid colon mass and 18 polyps on colonoscopy   - atrial fibrillation     Plan / Recommendations    - Plan for OR tomorrow if cleared by cardiology and no optimization required   - ERAS protocol, will order PO antibiotics today, multimodal pain regimen   - ensure immuno nutrition and probiotics   - recommend DVT prophylaxis with Lovenox   - NPO after midnight     07:34 EDT; 3/29/2024        Aakash Burden MD  Colon and Rectal Surgery   [facility]

## 2024-03-29 NOTE — THERAPY DISCHARGE NOTE
Patient Name: Viktor Atkins  : 1950    MRN: [id number]                              Today's Date: 3/29/2024       Admit Date: 3/25/2024    Visit Dx:     ICD-10-CM ICD-9-CM   1. Gastrointestinal hemorrhage, unspecified gastrointestinal hemorrhage type  K92.2 578.9   2. Rectal bleeding  K62.5 569.3     Patient Active Problem List   Diagnosis    Anxiety disorder    Essential hypertension    Palpitations    Mixed hyperlipidemia    GI bleeding    Rectal bleeding    GI bleed     Past Medical History:   Diagnosis Date    Benign essential HTN     Hyperlipidemia, mixed     Palpitations      Past Surgical History:   Procedure Laterality Date    CARDIAC CATHETERIZATION      CHOLECYSTECTOMY      COLONOSCOPY N/A 3/27/2024    Procedure: COLONOSCOPY with polypectomy x 18 and sigmoid colon mass biopsies with endscopic spot tattooing;  Surgeon: Fili Quintero MD;  [facility] ENDOSCOPY;  Service: Gastroenterology;  Laterality: N/A;  sigmoid mass at 25 cm    KNEE SURGERY        General Information    No documentation.                  Mobility    No documentation.                  Obj/Interventions    No documentation.                  Goals/Plan    No documentation.                  Clinical Impression       Row Name 24 1039          Plan of Care Review    Outcome Evaluation Pt and family report he has been walking around room independently. Pt denies weakness, falls, or unsteadiness. Does not feel he needs PT. Family concurs. Will sign off.  -CM               User Key  (r) = Recorded By, (t) = Taken By, (c) = Cosigned By      Initials Name Provider Type    Clara Cazares, PT Physical Therapist                   Outcome Measures    No documentation.                   PT Recommendation and Plan     Outcome Evaluation: Pt and family report he has been walking around room independently. Pt denies weakness, falls, or unsteadiness. Does not feel he needs PT. Family concurs. Will sign off.     Time  Calculation:              PT G-Codes  AM-PAC 6 Clicks Score (PT): 18         Clara Sellers, PT  3/29/2024

## 2024-03-29 NOTE — PLAN OF CARE
Problem: Adult Inpatient Plan of Care  Goal: Plan of Care Review  Outcome: Ongoing, Progressing  Flowsheets (Taken 3/29/2024 0050)  Outcome Evaluation: plans for myoview in am . continues [facility] cardiezem gtt.family at side  Goal: Patient-Specific Goal (Individualized)  Outcome: Ongoing, Progressing  Goal: Absence of Hospital-Acquired Illness or Injury  Outcome: Ongoing, Progressing  Intervention: Identify and Manage Fall Risk  Recent Flowsheet Documentation  Taken 3/29/2024 0000 by Chery Guerrero RN  Safety Promotion/Fall Prevention: safety round/check completed  Taken 3/28/2024 2200 by Chery Guerrero RN  Safety Promotion/Fall Prevention:   safety round/check completed   nonskid shoes/slippers when out of bed   lighting adjusted  Taken 3/28/2024 2000 by Chery Guerrero RN  Safety Promotion/Fall Prevention:   safety round/check completed   nonskid shoes/slippers when out of bed   lighting adjusted  Intervention: Prevent Skin Injury  Recent Flowsheet Documentation  Taken 3/28/2024 2000 by Chery Guerrero RN  Body Position: (in bedside chair) dangle, side of bed  Intervention: Prevent and Manage VTE (Venous Thromboembolism) Risk  Recent Flowsheet Documentation  Taken 3/29/2024 0000 by Chery Guerrero RN  Activity Management:   up in chair   back to bed  Taken 3/28/2024 2200 by Chery Guerrero RN  Activity Management: bedrest  Taken 3/28/2024 2000 by Chery Guerrero RN  Activity Management: bedrest  Intervention: Prevent Infection  Recent Flowsheet Documentation  Taken 3/29/2024 0000 by Chery Guerrero RN  Infection Prevention:   single patient room provided   rest/sleep promoted  Taken 3/28/2024 2000 by Chery Guerrero RN  Infection Prevention:   rest/sleep promoted   single patient room provided  Goal: Optimal Comfort and Wellbeing  3/29/2024 0050 by Chery Guerrero RN  Outcome: Ongoing, Progressing  3/29/2024 0049 by Chery Guerrero RN  Outcome: Ongoing,  Progressing  Goal: Readiness for Transition of Care  3/29/2024 0050 by Chery Guerrero RN  Outcome: Ongoing, Progressing  3/29/2024 0049 by Chery Guerrero RN  Outcome: Ongoing, Progressing     Problem: Fall Injury Risk  Goal: Absence of Fall and Fall-Related Injury  3/29/2024 0050 by Chery Guerrero RN  Outcome: Ongoing, Progressing  3/29/2024 0049 by Chery Guerrero RN  Outcome: Ongoing, Progressing  Intervention: Promote Injury-Free Environment  Recent Flowsheet Documentation  Taken 3/29/2024 0000 by Chery Guerrero RN  Safety Promotion/Fall Prevention: safety round/check completed  Taken 3/28/2024 2200 by Chery Guerrero RN  Safety Promotion/Fall Prevention:   safety round/check completed   nonskid shoes/slippers when out of bed   lighting adjusted  Taken 3/28/2024 2000 by Chery Guerrero RN  Safety Promotion/Fall Prevention:   safety round/check completed   nonskid shoes/slippers when out of bed   lighting adjusted     Problem: Nausea and Vomiting  Goal: Fluid and Electrolyte Balance  3/29/2024 0050 by Chery Guerrero RN  Outcome: Ongoing, Progressing  3/29/2024 0049 by Chery Guerrero RN  Outcome: Ongoing, Progressing     Problem: Adjustment to Illness (Gastrointestinal Bleeding)  Goal: Optimal Coping with Acute Illness  3/29/2024 0050 by Chery Guerrero RN  Outcome: Ongoing, Progressing  3/29/2024 0049 by Chery Guerrero RN  Outcome: Ongoing, Progressing     Problem: Bleeding (Gastrointestinal Bleeding)  Goal: Hemostasis  3/29/2024 0050 by Chery Guerrero RN  Outcome: Ongoing, Progressing  3/29/2024 0049 by Chery Guerrero RN  Outcome: Ongoing, Progressing     Problem: Adjustment to Illness (Sepsis/Septic Shock)  Goal: Optimal Coping  3/29/2024 0050 by Chery Guerrero RN  Outcome: Ongoing, Progressing  3/29/2024 0049 by Chery Guerrero RN  Outcome: Ongoing, Progressing     Problem: Bleeding (Sepsis/Septic Shock)  Goal: Absence of Bleeding  3/29/2024  0050 by Chery Guerrero RN  Outcome: Ongoing, Progressing  3/29/2024 0049 by Chery Guerrero RN  Outcome: Ongoing, Progressing     Problem: Glycemic Control Impaired (Sepsis/Septic Shock)  Goal: Blood Glucose Level Within Desired Range  3/29/2024 0050 by Chery Guerrero RN  Outcome: Ongoing, Progressing  3/29/2024 0049 by Chery Guerrero RN  Outcome: Ongoing, Progressing     Problem: Infection Progression (Sepsis/Septic Shock)  Goal: Absence of Infection Signs and Symptoms  3/29/2024 0050 by Chery Guerrero RN  Outcome: Ongoing, Progressing  3/29/2024 0049 by Chery Guerrero RN  Outcome: Ongoing, Progressing  Intervention: Initiate Sepsis Management  Recent Flowsheet Documentation  Taken 3/29/2024 0000 by Chery Guerrero RN  Infection Prevention:   single patient room provided   rest/sleep promoted  Taken 3/28/2024 2000 by Chery Guerrero RN  Infection Prevention:   rest/sleep promoted   single patient room provided  Intervention: Promote Recovery  Recent Flowsheet Documentation  Taken 3/29/2024 0000 by Chery Guerrero RN  Activity Management:   up in chair   back to bed  Taken 3/28/2024 2200 by Chery Guerrero RN  Activity Management: bedrest  Taken 3/28/2024 2000 by Chery Guerrero RN  Activity Management: bedrest     Problem: Nutrition Impaired (Sepsis/Septic Shock)  Goal: Optimal Nutrition Intake  3/29/2024 0050 by Chery Guerrero RN  Outcome: Ongoing, Progressing  3/29/2024 0049 by Chery Guerrero RN  Outcome: Ongoing, Progressing     Problem: Skin Injury Risk Increased  Goal: Skin Health and Integrity  3/29/2024 0050 by Chery Guerrero RN  Outcome: Ongoing, Progressing  3/29/2024 0049 by Chery Guerrero RN  Outcome: Ongoing, Progressing  Intervention: Optimize Skin Protection  Recent Flowsheet Documentation  Taken 3/28/2024 2000 by Chery Guerrero RN  Head of Bed (HOB) Positioning: HOB at 45 degrees   Goal Outcome Evaluation:              Outcome  Evaluation: plans for myoview in am . continues [facility] cardiezem gtt.family at side

## 2024-03-29 NOTE — PROGRESS NOTES
[facility] MEDICINE SERVICE  DAILY PROGRESS NOTE    NAME: Viktor Atkins  : 1950  MRN: [id number]      LOS: 2 days     PROVIDER OF SERVICE: Baltazar Menendez MD    Chief Complaint: GI bleeding    Subjective:     Interval History:  History taken from: patient    Patient is accompanied by his family.  Patient reports that he started having bowel movements on Monday, 3 days ago.,  When he came to the emergency room.  Today he had a colonoscopy and 18 polyps removed and he was told that he has a mass and will now need to speak with a surgeon.  States that this was his first colonoscopy.  Continues to have scant hematochezia after the colonoscopy.  Takes aspirin every day but no other blood thinners.     On arrival to the emergency room he was diagnosed with A-fib with RVR, new per patient.  States that he previously had some flutters but this is the first time he is learned about A-fib.  We discussed [id number] score of 2, indication for anticoagulation and GI bleed at this time, possible surgery coming up.  Will defer the anticoagulation after his surgery.  We discussed sleep study as outpatient eventually.  Denies any fevers, chills, night sweats, dyspnea, chest pain, nausea, abdominal pain, dysuria, hematuria, or edema.     3/28/24 patient seen and examined in bed no acute distress, vital signs stable, discussed with RN, discussed with cardiology.  He is cleared for surgery.  Awaiting for surgical input.  3/29/24 patient seen and examined in bed no acute distress, vital signs stable, discussed with RN, for possible surgery tomorrow.    Review of Systems   Constitutional: Negative.  Negative for appetite change, chills and fever.   HENT:  Negative for congestion.    Eyes:  Negative for pain and redness.   Respiratory: Negative.  Negative for cough, chest tightness and shortness of breath.    Gastrointestinal:  Positive for abdominal pain. Negative for diarrhea, nausea and vomiting.   Endocrine:  Negative for cold intolerance and heat intolerance.   Genitourinary:  Negative for dysuria, flank pain and frequency.   Musculoskeletal:  Negative for back pain and myalgias.   Neurological:  Negative for dizziness, weakness and headaches.   Psychiatric/Behavioral:  Negative for sleep disturbance. The patient is not nervous/anxious.      Objective:     Vital Signs  Temp:  [96.7 °F (35.9 °C)-99.5 °F (37.5 °C)] 96.7 °F (35.9 °C)  Heart Rate:  [] 84  Resp:  [15-20] 15  BP: (119-152)/(74-91) 134/78   Body mass index is 34.89 kg/m².    Physical Exam  General: Well appearing, well nourished, in no distress. Appears stated age     HEENT: Head: Normocephalic, atraumatic. Conjunctiva clear, sclera non-icteric, EOM intact, PERRL, hearing intact. Nose without external lesions. Mucous membranes moist, no mucosal lesions,    Neck: Supple, without lesions   Heart: normal rate, irregular rhythm, no murmurs / rubs / gallops    Lungs: No signs of respiratory distress. Moving air well. Clear without crackles, rhonchi, wheezes   Abdomen: Bowel sounds normal, no tenderness, no distension, no masses   Musculoskeletal: No edema. No joint swelling, or pain on palpation. No tenderness on palpation over the spinal processes  Skin: Warm, dry, intact without rashes or lesions. Appropriate color for ethnicity.   Neurologic: Alert and oriented x3, CN 2-12 normal. Motor function intact bilaterally. Sensation intact bilaterally.   Psychiatric: Appropriate mood and affect.   Scheduled Meds   aspirin, 81 mg, Oral, Daily  atorvastatin, 20 mg, Oral, Nightly  insulin lispro, 2-9 Units, Subcutaneous, 4x Daily AC & at Bedtime  metoprolol tartrate, 25 mg, Oral, Q6H  pantoprazole, 40 mg, Oral, Q AM  sertraline, 25 mg, Oral, Daily  sodium chloride, 10 mL, Intravenous, Q12H       PRN Meds     acetaminophen    senna-docusate sodium **AND** polyethylene glycol **AND** bisacodyl **AND** bisacodyl    dextrose    dextrose    glucagon (human recombinant)     "melatonin    ondansetron ODT **OR** ondansetron    [COMPLETED] Insert Peripheral IV **AND** sodium chloride    sodium chloride    sodium chloride   Infusions  dilTIAZem, 5-15 mg/hr, Last Rate: 5 mg/hr (03/29/24 0343)        Diagnostic Data    Results from last 7 days   Lab Units 03/29/24  0425 03/28/24  0401 03/27/24  0442   WBC 10*3/mm3 9.72   < > 9.20   HEMOGLOBIN g/dL 12.6*   < > 14.5   HEMATOCRIT % 40.2   < > 46.6   PLATELETS 10*3/mm3 150   < > 164   GLUCOSE mg/dL 101*   < > 100*   CREATININE mg/dL 1.03   < > 0.97   BUN mg/dL 15   < > 15   SODIUM mmol/L 141   < > 142   POTASSIUM mmol/L 4.5   < > 4.4   AST (SGOT) U/L  --   --  40   ALT (SGPT) U/L  --   --  46*   ALK PHOS U/L  --   --  107   BILIRUBIN mg/dL  --   --  0.6   ANION GAP mmol/L 10.0   < > 13.0    < > = values in this interval not displayed.     CT Chest Without Contrast Diagnostic    Result Date: 3/27/2024  Impression: No acute chest process evident. No CT evidence of metastatic disease to the chest. Electronically Signed: Julio Perdomo MD  3/27/2024 4:57 PM EDT  Workstation ID: [id number]     I reviewed the patient's new clinical results.    Assessment/Plan:     Active and Resolved Problems  Active Hospital Problems    Diagnosis  POA    **GI bleeding [K92.2]  Yes    GI bleed [K92.2]  Yes    Rectal bleeding [K62.5]  Unknown      Resolved Hospital Problems   No resolved problems to display.     History of Present Illness:   Per the documentation by Irma Chu APRN, dated 03/26/2024 A/P section:,  \"Hemoglobin is stable, 14.3 yesterday and 13.2 today  -CMP generally unremarkable  -CT abdomen and pelvis compatible with constipation.  Sigmoid diverticulosis without diverticulitis  -EKG showed atrial for A-fib with heart rate of 111.  No previous EKG for comparison.  Heart rate 90s in the monitor.  Continue metoprolol. Follow-up with PCP  -Per ED notes patient's Hemoccult was positive for blood  -GI was consulted in the ED  -Plans to proceed with " colonoscopy >  Suspect rectal bleeding secondary to distal sigmoid colon mass.  CT abdomen pelvis on admission with contrast without evidence of metastatic disease.  Colon polyps x 18  Mild sigmoid diverticulosis  Internal hemorrhoids  Per surgery>long discussion regarding the colonoscopy findings with the patient and his family members. Pathology results are pending at the moment. CT scans of the chest, abdomen, and pelvis revealed no signs of metastatic disease or spread.   -Since the patient is hemodynamically stable and there's no active GI bleed, we will await the pathology results to determine the appropriate surgical approach. While oncological sigmoid resection seems inevitable, the MSI status of the tumor and findings from other polyps could alter our surgical strategy and influence the risk assessment for metachronous colon cancer.  -Clear liquid for now and n.p.o. after midnight>Plan for OR tomorrow if cleared by cardiology and no optimization required   -Bowel prep ordered by ID     Hypertension-Moderately controlled   - Continue Norvasc and losartan  - Monitor while admitted      Diabetes mellitus-Moderately controlled  -On metformin  -Diabetic diet  -SSI  -Monitor before meals and at bedtime      Hyperlipidemia  -Continue Lipitor     Depression/anxiety  -Continue Zoloft     Obesity-BMI 34.98  -Lifestyle modifications     DVT prophylaxis:  Mechanical DVT prophylaxis orders are present.    Code status is   Code Status and Medical Interventions:   Ordered at: 03/25/24 1225     Level Of Support Discussed With:    Patient     Code Status (Patient has no pulse and is not breathing):    CPR (Attempt to Resuscitate)     Medical Interventions (Patient has pulse or is breathing):    Full Support       Plan for disposition:home in 2 days    Time: 30 minutes    Signature: Electronically signed by Baltazar Menendez MD, 03/29/24, 11:58 EDT.  [facility]ist Team

## 2024-03-29 NOTE — CASE MANAGEMENT/SOCIAL WORK
Continued Stay Note  [facility]     Patient Name: Viktor Atkins  MRN: [id number]  Today's Date: 3/29/2024    Admit Date: 3/25/2024    Plan: Return home with spouse   Discharge Plan       Row Name 03/29/24 1124       Plan    Plan Comments Barriers:  Tomorrow COLON RESECTION LOW ANTERIOR LAPAROSCOPIC, COLONAL ANASTOMOSIS, DIVERTING LOOP ILEOSTOMY WITH DAVINCI ROBOT; SIGMOIDOSCOPY                          Kelsy SWANSON,RN Case Manager  [facility]  Phone: Desk- [phone] cell- [phone]

## 2024-03-30 ENCOUNTER — ANESTHESIA (OUTPATIENT)
Dept: PERIOP | Facility: HOSPITAL | Age: 74
End: 2024-03-30
Payer: MEDICARE

## 2024-03-30 LAB
ANION GAP SERPL CALCULATED.3IONS-SCNC: 11 MMOL/L (ref 5–15)
ANION GAP SERPL CALCULATED.3IONS-SCNC: 13 MMOL/L (ref 5–15)
BASOPHILS # BLD AUTO: 0.02 10*3/MM3 (ref 0–0.2)
BASOPHILS # BLD AUTO: 0.04 10*3/MM3 (ref 0–0.2)
BASOPHILS NFR BLD AUTO: 0.2 % (ref 0–1.5)
BASOPHILS NFR BLD AUTO: 0.4 % (ref 0–1.5)
BUN SERPL-MCNC: 13 MG/DL (ref 8–23)
BUN SERPL-MCNC: 13 MG/DL (ref 8–23)
BUN/CREAT SERPL: 11.5 (ref 7–25)
BUN/CREAT SERPL: 12.4 (ref 7–25)
CALCIUM SPEC-SCNC: 8.2 MG/DL (ref 8.6–10.5)
CALCIUM SPEC-SCNC: 8.9 MG/DL (ref 8.6–10.5)
CHLORIDE SERPL-SCNC: 108 MMOL/L (ref 98–107)
CHLORIDE SERPL-SCNC: 108 MMOL/L (ref 98–107)
CO2 SERPL-SCNC: 18 MMOL/L (ref 22–29)
CO2 SERPL-SCNC: 21 MMOL/L (ref 22–29)
CREAT SERPL-MCNC: 1.05 MG/DL (ref 0.76–1.27)
CREAT SERPL-MCNC: 1.13 MG/DL (ref 0.76–1.27)
DEPRECATED RDW RBC AUTO: 48 FL (ref 37–54)
DEPRECATED RDW RBC AUTO: 48.3 FL (ref 37–54)
EGFRCR SERPLBLD CKD-EPI 2021: 69.1 ML/MIN/1.73
EGFRCR SERPLBLD CKD-EPI 2021: 75.4 ML/MIN/1.73
ELLIPTOCYTES BLD QL SMEAR: NORMAL
EOSINOPHIL # BLD AUTO: 0 10*3/MM3 (ref 0–0.4)
EOSINOPHIL # BLD AUTO: 0.21 10*3/MM3 (ref 0–0.4)
EOSINOPHIL NFR BLD AUTO: 0 % (ref 0.3–6.2)
EOSINOPHIL NFR BLD AUTO: 1.9 % (ref 0.3–6.2)
ERYTHROCYTE [DISTWIDTH] IN BLOOD BY AUTOMATED COUNT: 14.9 % (ref 12.3–15.4)
ERYTHROCYTE [DISTWIDTH] IN BLOOD BY AUTOMATED COUNT: 14.9 % (ref 12.3–15.4)
GLUCOSE BLDC GLUCOMTR-MCNC: 141 MG/DL (ref 70–105)
GLUCOSE BLDC GLUCOMTR-MCNC: 143 MG/DL (ref 70–105)
GLUCOSE SERPL-MCNC: 206 MG/DL (ref 65–99)
GLUCOSE SERPL-MCNC: 98 MG/DL (ref 65–99)
HCT VFR BLD AUTO: 38 % (ref 37.5–51)
HCT VFR BLD AUTO: 42.5 % (ref 37.5–51)
HGB BLD-MCNC: 11.8 G/DL (ref 13–17.7)
HGB BLD-MCNC: 13.2 G/DL (ref 13–17.7)
IMM GRANULOCYTES # BLD AUTO: 0.04 10*3/MM3 (ref 0–0.05)
IMM GRANULOCYTES # BLD AUTO: 0.05 10*3/MM3 (ref 0–0.05)
IMM GRANULOCYTES NFR BLD AUTO: 0.3 % (ref 0–0.5)
IMM GRANULOCYTES NFR BLD AUTO: 0.4 % (ref 0–0.5)
LYMPHOCYTES # BLD AUTO: 1.57 10*3/MM3 (ref 0.7–3.1)
LYMPHOCYTES # BLD AUTO: 3.28 10*3/MM3 (ref 0.7–3.1)
LYMPHOCYTES NFR BLD AUTO: 12.7 % (ref 19.6–45.3)
LYMPHOCYTES NFR BLD AUTO: 28.9 % (ref 19.6–45.3)
MCH RBC QN AUTO: 27.4 PG (ref 26.6–33)
MCH RBC QN AUTO: 27.6 PG (ref 26.6–33)
MCHC RBC AUTO-ENTMCNC: 31.1 G/DL (ref 31.5–35.7)
MCHC RBC AUTO-ENTMCNC: 31.1 G/DL (ref 31.5–35.7)
MCV RBC AUTO: 88.4 FL (ref 79–97)
MCV RBC AUTO: 88.8 FL (ref 79–97)
MONOCYTES # BLD AUTO: 0.62 10*3/MM3 (ref 0.1–0.9)
MONOCYTES # BLD AUTO: 0.94 10*3/MM3 (ref 0.1–0.9)
MONOCYTES NFR BLD AUTO: 5 % (ref 5–12)
MONOCYTES NFR BLD AUTO: 8.3 % (ref 5–12)
NEUTROPHILS NFR BLD AUTO: 10.11 10*3/MM3 (ref 1.7–7)
NEUTROPHILS NFR BLD AUTO: 6.83 10*3/MM3 (ref 1.7–7)
NEUTROPHILS NFR BLD AUTO: 60.1 % (ref 42.7–76)
NEUTROPHILS NFR BLD AUTO: 81.8 % (ref 42.7–76)
NRBC BLD AUTO-RTO: 0 /100 WBC (ref 0–0.2)
NRBC BLD AUTO-RTO: 0 /100 WBC (ref 0–0.2)
PLATELET # BLD AUTO: 140 10*3/MM3 (ref 140–450)
PLATELET # BLD AUTO: 141 10*3/MM3 (ref 140–450)
PMV BLD AUTO: 13.5 FL (ref 6–12)
PMV BLD AUTO: 13.9 FL (ref 6–12)
POTASSIUM SERPL-SCNC: 4.3 MMOL/L (ref 3.5–5.2)
POTASSIUM SERPL-SCNC: 4.4 MMOL/L (ref 3.5–5.2)
RBC # BLD AUTO: 4.28 10*6/MM3 (ref 4.14–5.8)
RBC # BLD AUTO: 4.81 10*6/MM3 (ref 4.14–5.8)
SMALL PLATELETS BLD QL SMEAR: ADEQUATE
SODIUM SERPL-SCNC: 139 MMOL/L (ref 136–145)
SODIUM SERPL-SCNC: 140 MMOL/L (ref 136–145)
WBC MORPH BLD: NORMAL
WBC NRBC COR # BLD AUTO: 11.35 10*3/MM3 (ref 3.4–10.8)
WBC NRBC COR # BLD AUTO: 12.36 10*3/MM3 (ref 3.4–10.8)

## 2024-03-30 PROCEDURE — 25010000002 ONDANSETRON PER 1 MG: Performed by: ANESTHESIOLOGIST ASSISTANT

## 2024-03-30 PROCEDURE — 25810000003 SODIUM CHLORIDE 0.9 % SOLUTION: Performed by: ANESTHESIOLOGIST ASSISTANT

## 2024-03-30 PROCEDURE — 99232 SBSQ HOSP IP/OBS MODERATE 35: CPT | Performed by: INTERNAL MEDICINE

## 2024-03-30 PROCEDURE — P9041 ALBUMIN (HUMAN),5%, 50ML: HCPCS | Performed by: ANESTHESIOLOGIST ASSISTANT

## 2024-03-30 PROCEDURE — 0DBN4ZZ EXCISION OF SIGMOID COLON, PERCUTANEOUS ENDOSCOPIC APPROACH: ICD-10-PCS | Performed by: STUDENT IN AN ORGANIZED HEALTH CARE EDUCATION/TRAINING PROGRAM

## 2024-03-30 PROCEDURE — 25010000002 HYDROMORPHONE 1 MG/ML SOLUTION: Performed by: ANESTHESIOLOGIST ASSISTANT

## 2024-03-30 PROCEDURE — 25010000002 PROPOFOL 200 MG/20ML EMULSION: Performed by: ANESTHESIOLOGIST ASSISTANT

## 2024-03-30 PROCEDURE — 15860 IV NJX TST VASC FLO FLAP/GRF: CPT | Performed by: STUDENT IN AN ORGANIZED HEALTH CARE EDUCATION/TRAINING PROGRAM

## 2024-03-30 PROCEDURE — 25010000002 ALBUMIN HUMAN 5% PER 50 ML: Performed by: ANESTHESIOLOGIST ASSISTANT

## 2024-03-30 PROCEDURE — 82948 REAGENT STRIP/BLOOD GLUCOSE: CPT

## 2024-03-30 PROCEDURE — 85025 COMPLETE CBC W/AUTO DIFF WBC: CPT | Performed by: INTERNAL MEDICINE

## 2024-03-30 PROCEDURE — 85025 COMPLETE CBC W/AUTO DIFF WBC: CPT | Performed by: STUDENT IN AN ORGANIZED HEALTH CARE EDUCATION/TRAINING PROGRAM

## 2024-03-30 PROCEDURE — 44207 L COLECTOMY/COLOPROCTOSTOMY: CPT | Performed by: STUDENT IN AN ORGANIZED HEALTH CARE EDUCATION/TRAINING PROGRAM

## 2024-03-30 PROCEDURE — 0DJD8ZZ INSPECTION OF LOWER INTESTINAL TRACT, VIA NATURAL OR ARTIFICIAL OPENING ENDOSCOPIC: ICD-10-PCS | Performed by: STUDENT IN AN ORGANIZED HEALTH CARE EDUCATION/TRAINING PROGRAM

## 2024-03-30 PROCEDURE — 25010000002 HEPARIN (PORCINE) 1000-0.9 UT/500ML-% SOLUTION: Performed by: ANESTHESIOLOGY

## 2024-03-30 PROCEDURE — 25010000002 HEPARIN (PORCINE) PER 1000 UNITS: Performed by: STUDENT IN AN ORGANIZED HEALTH CARE EDUCATION/TRAINING PROGRAM

## 2024-03-30 PROCEDURE — 25010000002 FENTANYL CITRATE (PF) 100 MCG/2ML SOLUTION: Performed by: ANESTHESIOLOGIST ASSISTANT

## 2024-03-30 PROCEDURE — 25010000002 INDOCYANINE GREEN 25 MG RECONSTITUTED SOLUTION: Performed by: ANESTHESIOLOGIST ASSISTANT

## 2024-03-30 PROCEDURE — 25010000002 SUGAMMADEX 200 MG/2ML SOLUTION: Performed by: ANESTHESIOLOGIST ASSISTANT

## 2024-03-30 PROCEDURE — 88309 TISSUE EXAM BY PATHOLOGIST: CPT | Performed by: STUDENT IN AN ORGANIZED HEALTH CARE EDUCATION/TRAINING PROGRAM

## 2024-03-30 PROCEDURE — 25010000002 DEXAMETHASONE PER 1 MG: Performed by: ANESTHESIOLOGIST ASSISTANT

## 2024-03-30 PROCEDURE — 80048 BASIC METABOLIC PNL TOTAL CA: CPT | Performed by: INTERNAL MEDICINE

## 2024-03-30 PROCEDURE — 25810000003 SODIUM CHLORIDE 0.9 % SOLUTION 250 ML FLEX CONT: Performed by: ANESTHESIOLOGIST ASSISTANT

## 2024-03-30 PROCEDURE — 25010000002 BUPIVACAINE 0.25 % SOLUTION: Performed by: STUDENT IN AN ORGANIZED HEALTH CARE EDUCATION/TRAINING PROGRAM

## 2024-03-30 PROCEDURE — 25810000003 LACTATED RINGERS PER 1000 ML: Performed by: ANESTHESIOLOGIST ASSISTANT

## 2024-03-30 PROCEDURE — 8E0W4CZ ROBOTIC ASSISTED PROCEDURE OF TRUNK REGION, PERCUTANEOUS ENDOSCOPIC APPROACH: ICD-10-PCS | Performed by: STUDENT IN AN ORGANIZED HEALTH CARE EDUCATION/TRAINING PROGRAM

## 2024-03-30 PROCEDURE — 80048 BASIC METABOLIC PNL TOTAL CA: CPT | Performed by: STUDENT IN AN ORGANIZED HEALTH CARE EDUCATION/TRAINING PROGRAM

## 2024-03-30 PROCEDURE — 25010000002 PHENYLEPHRINE 10 MG/ML SOLUTION 5 ML VIAL: Performed by: ANESTHESIOLOGIST ASSISTANT

## 2024-03-30 PROCEDURE — 25010000002 ESMOLOL 100 MG/10ML SOLUTION: Performed by: ANESTHESIOLOGIST ASSISTANT

## 2024-03-30 PROCEDURE — 0DBP4ZZ EXCISION OF RECTUM, PERCUTANEOUS ENDOSCOPIC APPROACH: ICD-10-PCS | Performed by: STUDENT IN AN ORGANIZED HEALTH CARE EDUCATION/TRAINING PROGRAM

## 2024-03-30 PROCEDURE — 88305 TISSUE EXAM BY PATHOLOGIST: CPT | Performed by: STUDENT IN AN ORGANIZED HEALTH CARE EDUCATION/TRAINING PROGRAM

## 2024-03-30 PROCEDURE — 85007 BL SMEAR W/DIFF WBC COUNT: CPT | Performed by: STUDENT IN AN ORGANIZED HEALTH CARE EDUCATION/TRAINING PROGRAM

## 2024-03-30 PROCEDURE — 25010000002 CEFTRIAXONE PER 250 MG: Performed by: STUDENT IN AN ORGANIZED HEALTH CARE EDUCATION/TRAINING PROGRAM

## 2024-03-30 PROCEDURE — 45330 DIAGNOSTIC SIGMOIDOSCOPY: CPT | Performed by: STUDENT IN AN ORGANIZED HEALTH CARE EDUCATION/TRAINING PROGRAM

## 2024-03-30 DEVICE — STAPLER 60 RELOAD GREEN
Type: IMPLANTABLE DEVICE | Site: ABDOMEN | Status: FUNCTIONAL
Brand: SUREFORM

## 2024-03-30 DEVICE — DEV WND/CLS CONTRL TISS STRATAFIX SYMM PDS PLS CTX 60CM VIL: Type: IMPLANTABLE DEVICE | Site: ABDOMEN | Status: FUNCTIONAL

## 2024-03-30 DEVICE — CELLULAR STAPLER WITH TRI-STAPLE TECHNOLOGY
Type: IMPLANTABLE DEVICE | Site: ABDOMEN | Status: FUNCTIONAL
Brand: EEA

## 2024-03-30 RX ORDER — FENTANYL CITRATE 50 UG/ML
INJECTION, SOLUTION INTRAMUSCULAR; INTRAVENOUS AS NEEDED
Status: DISCONTINUED | OUTPATIENT
Start: 2024-03-30 | End: 2024-03-30 | Stop reason: SURG

## 2024-03-30 RX ORDER — OXYCODONE HYDROCHLORIDE 5 MG/1
10 TABLET ORAL EVERY 4 HOURS PRN
Status: DISCONTINUED | OUTPATIENT
Start: 2024-03-30 | End: 2024-03-30 | Stop reason: HOSPADM

## 2024-03-30 RX ORDER — PREGABALIN 75 MG/1
75 CAPSULE ORAL ONCE
Status: COMPLETED | OUTPATIENT
Start: 2024-03-30 | End: 2024-03-30

## 2024-03-30 RX ORDER — ONDANSETRON 2 MG/ML
4 INJECTION INTRAMUSCULAR; INTRAVENOUS ONCE AS NEEDED
Status: DISCONTINUED | OUTPATIENT
Start: 2024-03-30 | End: 2024-03-30 | Stop reason: HOSPADM

## 2024-03-30 RX ORDER — SCOLOPAMINE TRANSDERMAL SYSTEM 1 MG/1
1 PATCH, EXTENDED RELEASE TRANSDERMAL CONTINUOUS
Status: DISCONTINUED | OUTPATIENT
Start: 2024-03-30 | End: 2024-04-01 | Stop reason: HOSPADM

## 2024-03-30 RX ORDER — PROMETHAZINE HYDROCHLORIDE 25 MG/1
25 TABLET ORAL ONCE AS NEEDED
Status: DISCONTINUED | OUTPATIENT
Start: 2024-03-30 | End: 2024-03-30 | Stop reason: HOSPADM

## 2024-03-30 RX ORDER — EPHEDRINE SULFATE 5 MG/ML
5 INJECTION INTRAVENOUS ONCE AS NEEDED
Status: DISCONTINUED | OUTPATIENT
Start: 2024-03-30 | End: 2024-03-30 | Stop reason: HOSPADM

## 2024-03-30 RX ORDER — FENTANYL CITRATE 50 UG/ML
100 INJECTION, SOLUTION INTRAMUSCULAR; INTRAVENOUS
Status: DISCONTINUED | OUTPATIENT
Start: 2024-03-30 | End: 2024-03-30 | Stop reason: HOSPADM

## 2024-03-30 RX ORDER — DIPHENHYDRAMINE HYDROCHLORIDE 50 MG/ML
12.5 INJECTION INTRAMUSCULAR; INTRAVENOUS ONCE AS NEEDED
Status: DISCONTINUED | OUTPATIENT
Start: 2024-03-30 | End: 2024-03-30 | Stop reason: HOSPADM

## 2024-03-30 RX ORDER — BUPIVACAINE HYDROCHLORIDE 2.5 MG/ML
INJECTION, SOLUTION INFILTRATION; PERINEURAL AS NEEDED
Status: DISCONTINUED | OUTPATIENT
Start: 2024-03-30 | End: 2024-03-30 | Stop reason: HOSPADM

## 2024-03-30 RX ORDER — NALOXONE HCL 0.4 MG/ML
0.4 VIAL (ML) INJECTION
Status: DISCONTINUED | OUTPATIENT
Start: 2024-03-30 | End: 2024-04-01 | Stop reason: HOSPADM

## 2024-03-30 RX ORDER — FLUMAZENIL 0.1 MG/ML
0.5 INJECTION INTRAVENOUS AS NEEDED
Status: DISCONTINUED | OUTPATIENT
Start: 2024-03-30 | End: 2024-03-30 | Stop reason: HOSPADM

## 2024-03-30 RX ORDER — NALOXONE HCL 0.4 MG/ML
0.4 VIAL (ML) INJECTION AS NEEDED
Status: DISCONTINUED | OUTPATIENT
Start: 2024-03-30 | End: 2024-03-30 | Stop reason: HOSPADM

## 2024-03-30 RX ORDER — HEPARIN SODIUM 5000 [USP'U]/ML
5000 INJECTION, SOLUTION INTRAVENOUS; SUBCUTANEOUS ONCE
Status: COMPLETED | OUTPATIENT
Start: 2024-03-30 | End: 2024-03-30

## 2024-03-30 RX ORDER — ALVIMOPAN 12 MG/1
12 CAPSULE ORAL ONCE
Status: COMPLETED | OUTPATIENT
Start: 2024-03-30 | End: 2024-03-30

## 2024-03-30 RX ORDER — FENTANYL CITRATE 50 UG/ML
50 INJECTION, SOLUTION INTRAMUSCULAR; INTRAVENOUS
Status: DISCONTINUED | OUTPATIENT
Start: 2024-03-30 | End: 2024-03-30 | Stop reason: HOSPADM

## 2024-03-30 RX ORDER — DEXAMETHASONE SODIUM PHOSPHATE 4 MG/ML
INJECTION, SOLUTION INTRA-ARTICULAR; INTRALESIONAL; INTRAMUSCULAR; INTRAVENOUS; SOFT TISSUE AS NEEDED
Status: DISCONTINUED | OUTPATIENT
Start: 2024-03-30 | End: 2024-03-30 | Stop reason: SURG

## 2024-03-30 RX ORDER — OXYCODONE HYDROCHLORIDE 5 MG/1
5 TABLET ORAL ONCE AS NEEDED
Status: DISCONTINUED | OUTPATIENT
Start: 2024-03-30 | End: 2024-03-30 | Stop reason: HOSPADM

## 2024-03-30 RX ORDER — SODIUM CHLORIDE 9 MG/ML
INJECTION, SOLUTION INTRAVENOUS CONTINUOUS PRN
Status: DISCONTINUED | OUTPATIENT
Start: 2024-03-30 | End: 2024-03-30 | Stop reason: SURG

## 2024-03-30 RX ORDER — DIPHENHYDRAMINE HCL 25 MG
25 CAPSULE ORAL
Status: DISCONTINUED | OUTPATIENT
Start: 2024-03-30 | End: 2024-03-30 | Stop reason: HOSPADM

## 2024-03-30 RX ORDER — ONDANSETRON 2 MG/ML
4 INJECTION INTRAMUSCULAR; INTRAVENOUS EVERY 6 HOURS PRN
Status: DISCONTINUED | OUTPATIENT
Start: 2024-03-30 | End: 2024-04-01 | Stop reason: HOSPADM

## 2024-03-30 RX ORDER — ALVIMOPAN 12 MG/1
12 CAPSULE ORAL 2 TIMES DAILY
Qty: 14 CAPSULE | Refills: 0 | Status: DISCONTINUED | OUTPATIENT
Start: 2024-03-31 | End: 2024-04-01 | Stop reason: HOSPADM

## 2024-03-30 RX ORDER — ONDANSETRON 2 MG/ML
INJECTION INTRAMUSCULAR; INTRAVENOUS AS NEEDED
Status: DISCONTINUED | OUTPATIENT
Start: 2024-03-30 | End: 2024-03-30 | Stop reason: SURG

## 2024-03-30 RX ORDER — OXYCODONE HYDROCHLORIDE 5 MG/1
7.5 TABLET ORAL EVERY 4 HOURS PRN
Status: DISCONTINUED | OUTPATIENT
Start: 2024-03-30 | End: 2024-04-01 | Stop reason: HOSPADM

## 2024-03-30 RX ORDER — ACETAMINOPHEN 325 MG/1
650 TABLET ORAL ONCE AS NEEDED
Status: DISCONTINUED | OUTPATIENT
Start: 2024-03-30 | End: 2024-03-30 | Stop reason: HOSPADM

## 2024-03-30 RX ORDER — INDOCYANINE GREEN AND WATER 25 MG
KIT INJECTION AS NEEDED
Status: DISCONTINUED | OUTPATIENT
Start: 2024-03-30 | End: 2024-03-30 | Stop reason: SURG

## 2024-03-30 RX ORDER — PROMETHAZINE HYDROCHLORIDE 25 MG/1
25 SUPPOSITORY RECTAL ONCE AS NEEDED
Status: DISCONTINUED | OUTPATIENT
Start: 2024-03-30 | End: 2024-03-30 | Stop reason: HOSPADM

## 2024-03-30 RX ORDER — ROCURONIUM BROMIDE 10 MG/ML
INJECTION, SOLUTION INTRAVENOUS AS NEEDED
Status: DISCONTINUED | OUTPATIENT
Start: 2024-03-30 | End: 2024-03-30 | Stop reason: SURG

## 2024-03-30 RX ORDER — METHOCARBAMOL 750 MG/1
750 TABLET, FILM COATED ORAL 4 TIMES DAILY
Status: DISCONTINUED | OUTPATIENT
Start: 2024-03-30 | End: 2024-04-01 | Stop reason: HOSPADM

## 2024-03-30 RX ORDER — LIDOCAINE HYDROCHLORIDE 10 MG/ML
INJECTION, SOLUTION EPIDURAL; INFILTRATION; INTRACAUDAL; PERINEURAL AS NEEDED
Status: DISCONTINUED | OUTPATIENT
Start: 2024-03-30 | End: 2024-03-30 | Stop reason: SURG

## 2024-03-30 RX ORDER — MEPERIDINE HYDROCHLORIDE 25 MG/ML
12.5 INJECTION INTRAMUSCULAR; INTRAVENOUS; SUBCUTANEOUS
Status: DISCONTINUED | OUTPATIENT
Start: 2024-03-30 | End: 2024-03-30 | Stop reason: HOSPADM

## 2024-03-30 RX ORDER — PREGABALIN 25 MG/1
25 CAPSULE ORAL EVERY 8 HOURS SCHEDULED
Status: DISCONTINUED | OUTPATIENT
Start: 2024-03-30 | End: 2024-04-01 | Stop reason: HOSPADM

## 2024-03-30 RX ORDER — ACETAMINOPHEN 650 MG/1
650 SUPPOSITORY RECTAL EVERY 4 HOURS PRN
Status: DISCONTINUED | OUTPATIENT
Start: 2024-03-30 | End: 2024-03-30 | Stop reason: HOSPADM

## 2024-03-30 RX ORDER — DIPHENHYDRAMINE HYDROCHLORIDE 50 MG/ML
12.5 INJECTION INTRAMUSCULAR; INTRAVENOUS
Status: DISCONTINUED | OUTPATIENT
Start: 2024-03-30 | End: 2024-03-30 | Stop reason: HOSPADM

## 2024-03-30 RX ORDER — MIDAZOLAM HYDROCHLORIDE 1 MG/ML
1 INJECTION INTRAMUSCULAR; INTRAVENOUS
Status: DISCONTINUED | OUTPATIENT
Start: 2024-03-30 | End: 2024-03-30 | Stop reason: HOSPADM

## 2024-03-30 RX ORDER — PHENYLEPHRINE HCL IN 0.9% NACL 1 MG/10 ML
SYRINGE (ML) INTRAVENOUS AS NEEDED
Status: DISCONTINUED | OUTPATIENT
Start: 2024-03-30 | End: 2024-03-30 | Stop reason: SURG

## 2024-03-30 RX ORDER — SODIUM CHLORIDE, SODIUM LACTATE, POTASSIUM CHLORIDE, CALCIUM CHLORIDE 600; 310; 30; 20 MG/100ML; MG/100ML; MG/100ML; MG/100ML
INJECTION, SOLUTION INTRAVENOUS CONTINUOUS PRN
Status: DISCONTINUED | OUTPATIENT
Start: 2024-03-30 | End: 2024-03-30 | Stop reason: SURG

## 2024-03-30 RX ORDER — ACETAMINOPHEN 500 MG
1000 TABLET ORAL EVERY 6 HOURS
Status: DISCONTINUED | OUTPATIENT
Start: 2024-03-30 | End: 2024-04-01 | Stop reason: HOSPADM

## 2024-03-30 RX ORDER — ALBUMIN, HUMAN INJ 5% 5 %
SOLUTION INTRAVENOUS CONTINUOUS PRN
Status: DISCONTINUED | OUTPATIENT
Start: 2024-03-30 | End: 2024-03-30 | Stop reason: SURG

## 2024-03-30 RX ORDER — METRONIDAZOLE 500 MG/100ML
500 INJECTION, SOLUTION INTRAVENOUS ONCE
Status: DISCONTINUED | OUTPATIENT
Start: 2024-03-30 | End: 2024-03-31

## 2024-03-30 RX ORDER — HYDRALAZINE HYDROCHLORIDE 20 MG/ML
5 INJECTION INTRAMUSCULAR; INTRAVENOUS
Status: DISCONTINUED | OUTPATIENT
Start: 2024-03-30 | End: 2024-03-30 | Stop reason: HOSPADM

## 2024-03-30 RX ORDER — IPRATROPIUM BROMIDE AND ALBUTEROL SULFATE 2.5; .5 MG/3ML; MG/3ML
3 SOLUTION RESPIRATORY (INHALATION) ONCE AS NEEDED
Status: DISCONTINUED | OUTPATIENT
Start: 2024-03-30 | End: 2024-03-30 | Stop reason: HOSPADM

## 2024-03-30 RX ORDER — ACETAMINOPHEN 500 MG
1000 TABLET ORAL EVERY 6 HOURS
Status: DISCONTINUED | OUTPATIENT
Start: 2024-03-30 | End: 2024-03-30 | Stop reason: HOSPADM

## 2024-03-30 RX ORDER — LABETALOL HYDROCHLORIDE 5 MG/ML
5 INJECTION, SOLUTION INTRAVENOUS
Status: DISCONTINUED | OUTPATIENT
Start: 2024-03-30 | End: 2024-03-30 | Stop reason: HOSPADM

## 2024-03-30 RX ORDER — HEPARIN SODIUM 200 [USP'U]/100ML
INJECTION, SOLUTION INTRAVENOUS
Status: COMPLETED | OUTPATIENT
Start: 2024-03-30 | End: 2024-03-30

## 2024-03-30 RX ORDER — LORAZEPAM 1 MG/1
1 TABLET ORAL EVERY 6 HOURS PRN
Status: DISCONTINUED | OUTPATIENT
Start: 2024-03-30 | End: 2024-03-30 | Stop reason: HOSPADM

## 2024-03-30 RX ORDER — PROPOFOL 10 MG/ML
INJECTION, EMULSION INTRAVENOUS AS NEEDED
Status: DISCONTINUED | OUTPATIENT
Start: 2024-03-30 | End: 2024-03-30 | Stop reason: SURG

## 2024-03-30 RX ORDER — HEPARIN SODIUM 5000 [USP'U]/ML
5000 INJECTION, SOLUTION INTRAVENOUS; SUBCUTANEOUS EVERY 8 HOURS SCHEDULED
Status: DISCONTINUED | OUTPATIENT
Start: 2024-03-31 | End: 2024-04-01 | Stop reason: HOSPADM

## 2024-03-30 RX ORDER — ESMOLOL HYDROCHLORIDE 10 MG/ML
INJECTION INTRAVENOUS AS NEEDED
Status: DISCONTINUED | OUTPATIENT
Start: 2024-03-30 | End: 2024-03-30 | Stop reason: SURG

## 2024-03-30 RX ADMIN — Medication 150 MCG: at 12:12

## 2024-03-30 RX ADMIN — HEPARIN SODIUM 5000 UNITS: 5000 INJECTION, SOLUTION INTRAVENOUS; SUBCUTANEOUS at 07:31

## 2024-03-30 RX ADMIN — Medication 200 MCG: at 08:16

## 2024-03-30 RX ADMIN — ONDANSETRON 4 MG: 2 INJECTION INTRAMUSCULAR; INTRAVENOUS at 12:13

## 2024-03-30 RX ADMIN — SCOPALAMINE 1 PATCH: 1 PATCH, EXTENDED RELEASE TRANSDERMAL at 07:32

## 2024-03-30 RX ADMIN — DEXAMETHASONE SODIUM PHOSPHATE 4 MG: 4 INJECTION, SOLUTION INTRAMUSCULAR; INTRAVENOUS at 08:17

## 2024-03-30 RX ADMIN — ROCURONIUM BROMIDE 10 MG: 50 INJECTION INTRAVENOUS at 11:45

## 2024-03-30 RX ADMIN — ACETAMINOPHEN 1000 MG: 500 TABLET, FILM COATED ORAL at 16:11

## 2024-03-30 RX ADMIN — SODIUM CHLORIDE: 9 INJECTION, SOLUTION INTRAVENOUS at 12:17

## 2024-03-30 RX ADMIN — Medication 100 MCG: at 11:07

## 2024-03-30 RX ADMIN — ROCURONIUM BROMIDE 20 MG: 50 INJECTION INTRAVENOUS at 09:09

## 2024-03-30 RX ADMIN — Medication 100 MCG: at 11:14

## 2024-03-30 RX ADMIN — ROCURONIUM BROMIDE 20 MG: 50 INJECTION INTRAVENOUS at 10:38

## 2024-03-30 RX ADMIN — METHOCARBAMOL 750 MG: 750 TABLET, FILM COATED ORAL at 21:35

## 2024-03-30 RX ADMIN — Medication 150 MCG: at 11:45

## 2024-03-30 RX ADMIN — FENTANYL CITRATE 50 MCG: 50 INJECTION, SOLUTION INTRAMUSCULAR; INTRAVENOUS at 12:42

## 2024-03-30 RX ADMIN — PROPOFOL 50 MG: 10 INJECTION, EMULSION INTRAVENOUS at 10:55

## 2024-03-30 RX ADMIN — METHOCARBAMOL 750 MG: 750 TABLET, FILM COATED ORAL at 17:58

## 2024-03-30 RX ADMIN — ROCURONIUM BROMIDE 10 MG: 50 INJECTION INTRAVENOUS at 09:30

## 2024-03-30 RX ADMIN — Medication 150 MCG: at 08:37

## 2024-03-30 RX ADMIN — ROCURONIUM BROMIDE 20 MG: 50 INJECTION INTRAVENOUS at 08:41

## 2024-03-30 RX ADMIN — PROPOFOL 150 MCG/KG/MIN: 10 INJECTION, EMULSION INTRAVENOUS at 08:09

## 2024-03-30 RX ADMIN — ROCURONIUM BROMIDE 50 MG: 50 INJECTION INTRAVENOUS at 08:05

## 2024-03-30 RX ADMIN — ALBUMIN (HUMAN): 12.5 INJECTION, SOLUTION INTRAVENOUS at 08:32

## 2024-03-30 RX ADMIN — ATORVASTATIN CALCIUM 20 MG: 20 TABLET, FILM COATED ORAL at 21:36

## 2024-03-30 RX ADMIN — INDOCYANINE GREEN AND WATER 5 MG: KIT at 11:05

## 2024-03-30 RX ADMIN — Medication 5 MG: at 21:36

## 2024-03-30 RX ADMIN — SUGAMMADEX 200 MG: 100 INJECTION, SOLUTION INTRAVENOUS at 12:17

## 2024-03-30 RX ADMIN — METOPROLOL TARTRATE 75 MG: 50 TABLET, FILM COATED ORAL at 21:35

## 2024-03-30 RX ADMIN — SODIUM CHLORIDE: 9 INJECTION, SOLUTION INTRAVENOUS at 08:13

## 2024-03-30 RX ADMIN — ROCURONIUM BROMIDE 20 MG: 50 INJECTION INTRAVENOUS at 09:58

## 2024-03-30 RX ADMIN — LIDOCAINE HYDROCHLORIDE 50 MG: 10 INJECTION, SOLUTION EPIDURAL; INFILTRATION; INTRACAUDAL; PERINEURAL at 08:05

## 2024-03-30 RX ADMIN — Medication 150 MCG: at 09:41

## 2024-03-30 RX ADMIN — ALBUMIN (HUMAN): 12.5 INJECTION, SOLUTION INTRAVENOUS at 10:34

## 2024-03-30 RX ADMIN — HYDROMORPHONE HYDROCHLORIDE 0.5 MG: 1 INJECTION, SOLUTION INTRAMUSCULAR; INTRAVENOUS; SUBCUTANEOUS at 13:05

## 2024-03-30 RX ADMIN — FENTANYL CITRATE 50 MCG: 50 INJECTION, SOLUTION INTRAMUSCULAR; INTRAVENOUS at 08:03

## 2024-03-30 RX ADMIN — FENTANYL CITRATE 50 MCG: 50 INJECTION, SOLUTION INTRAMUSCULAR; INTRAVENOUS at 10:57

## 2024-03-30 RX ADMIN — ACETAMINOPHEN 1000 MG: 500 TABLET, FILM COATED ORAL at 21:36

## 2024-03-30 RX ADMIN — PROPOFOL 50 MCG/KG/MIN: 10 INJECTION, EMULSION INTRAVENOUS at 09:32

## 2024-03-30 RX ADMIN — PREGABALIN 25 MG: 25 CAPSULE ORAL at 16:11

## 2024-03-30 RX ADMIN — Medication 150 MCG: at 08:27

## 2024-03-30 RX ADMIN — Medication 10 ML: at 21:36

## 2024-03-30 RX ADMIN — FENTANYL CITRATE 50 MCG: 50 INJECTION, SOLUTION INTRAMUSCULAR; INTRAVENOUS at 08:05

## 2024-03-30 RX ADMIN — HYDROMORPHONE HYDROCHLORIDE 0.5 MG: 1 INJECTION, SOLUTION INTRAMUSCULAR; INTRAVENOUS; SUBCUTANEOUS at 13:47

## 2024-03-30 RX ADMIN — HEPARIN SODIUM 1000 UNITS: 200 INJECTION, SOLUTION INTRAVENOUS at 08:13

## 2024-03-30 RX ADMIN — Medication 200 MCG: at 08:13

## 2024-03-30 RX ADMIN — PREGABALIN 25 MG: 25 CAPSULE ORAL at 21:36

## 2024-03-30 RX ADMIN — ROCURONIUM BROMIDE 10 MG: 50 INJECTION INTRAVENOUS at 11:15

## 2024-03-30 RX ADMIN — SODIUM CHLORIDE: 9 INJECTION, SOLUTION INTRAVENOUS at 11:26

## 2024-03-30 RX ADMIN — PHENYLEPHRINE HYDROCHLORIDE 0.5 MCG/KG/MIN: 10 INJECTION INTRAVENOUS at 08:17

## 2024-03-30 RX ADMIN — Medication 150 MCG: at 12:00

## 2024-03-30 RX ADMIN — Medication 150 MCG: at 09:14

## 2024-03-30 RX ADMIN — CEFTRIAXONE SODIUM 2 G: 2 INJECTION, POWDER, FOR SOLUTION INTRAMUSCULAR; INTRAVENOUS at 08:02

## 2024-03-30 RX ADMIN — HYDROMORPHONE HYDROCHLORIDE 0.5 MG: 1 INJECTION, SOLUTION INTRAMUSCULAR; INTRAVENOUS; SUBCUTANEOUS at 13:25

## 2024-03-30 RX ADMIN — ACETAMINOPHEN 1000 MG: 500 TABLET, FILM COATED ORAL at 07:31

## 2024-03-30 RX ADMIN — ALVIMOPAN 12 MG: 12 CAPSULE ORAL at 07:31

## 2024-03-30 RX ADMIN — PROPOFOL 200 MG: 10 INJECTION, EMULSION INTRAVENOUS at 08:05

## 2024-03-30 RX ADMIN — Medication 100 MCG: at 11:36

## 2024-03-30 RX ADMIN — SODIUM CHLORIDE, SODIUM LACTATE, POTASSIUM CHLORIDE, AND CALCIUM CHLORIDE: .6; .31; .03; .02 INJECTION, SOLUTION INTRAVENOUS at 07:58

## 2024-03-30 RX ADMIN — PREGABALIN 75 MG: 75 CAPSULE ORAL at 07:31

## 2024-03-30 RX ADMIN — ESMOLOL HYDROCHLORIDE 20 MG: 100 INJECTION, SOLUTION INTRAVENOUS at 08:11

## 2024-03-30 NOTE — PROGRESS NOTES
Referring Provider: Baltazar Menendez MD    Reason for follow-up:  Preoperative and perioperative cardiovascular follow-up.  Colon carcinoma.     Patient Care Team:  Gera Manriquez MD as PCP - General (Internal Medicine)  Gera Manriquez MD as PCP - Family Medicine    Subjective .      ROS  Abdominal surgeries.    Today the patient has been without any chest discomfort ,shortness of breath, palpitations, dizziness or syncope.  Denies having any headache ,,nausea, vomiting , diarrhea constipation, loss of weight or loss of appetite.  Denies having any excessive bruising ,hematuria or blood in the stool.    Review of all systems negative except as indicated    History  Past Medical History:   Diagnosis Date    Benign essential HTN     Hyperlipidemia, mixed     Palpitations        Past Surgical History:   Procedure Laterality Date    CARDIAC CATHETERIZATION      CHOLECYSTECTOMY      COLONOSCOPY N/A 3/27/2024    Procedure: COLONOSCOPY with polypectomy x 18 and sigmoid colon mass biopsies with endscopic spot tattooing;  Surgeon: Fili Quintero MD;  Location: [facility] ENDOSCOPY;  Service: Gastroenterology;  Laterality: N/A;  sigmoid mass at 25 cm    KNEE SURGERY         History reviewed. No pertinent family history.    Social History     Tobacco Use    Smoking status: Former    Smokeless tobacco: Never   Vaping Use    Vaping status: Never Used   Substance Use Topics    Alcohol use: Never    Drug use: Never        Medications Prior to Admission   Medication Sig Dispense Refill Last Dose    amLODIPine (NORVASC) 10 MG tablet 1 tablet Daily.   3/25/2024    aspirin 81 MG chewable tablet Chew 1 tablet Daily.   3/25/2024    atorvastatin (LIPITOR) 20 MG tablet Take 1 tablet by mouth Every Night.   3/25/2024    losartan (COZAAR) 25 MG tablet TAKE 1 TABLET EVERY DAY 90 tablet 1 3/25/2024    meloxicam (MOBIC) 15 MG tablet Take 1 tablet by mouth Daily.   3/25/2024    metFORMIN (GLUCOPHAGE) 500 MG tablet Take 1 tablet by  mouth Daily With Breakfast.   3/25/2024    metoprolol succinate XL (TOPROL-XL) 100 MG 24 hr tablet Take 1 tablet by mouth Daily.   3/25/2024    Multiple Vitamins-Minerals (MULTIVITAMIN ADULT PO) Take 1 tablet by mouth Daily.   3/25/2024    Omeprazole (EQL Omeprazole) 20 MG tablet delayed-release 20 mg Daily.   3/25/2024    sertraline (Zoloft) 25 MG tablet 1 tablet Daily.   3/25/2024       Allergies  Patient has no known allergies.    Scheduled Meds:[Transfer Hold] aspirin, 81 mg, Oral, Daily  [Transfer Hold] atorvastatin, 20 mg, Oral, Nightly  [Transfer Hold] insulin lispro, 2-9 Units, Subcutaneous, 4x Daily AC & at Bedtime  metoprolol tartrate, 75 mg, Oral, Q12H  metroNIDAZOLE, 500 mg, Intravenous, Once  [Transfer Hold] pantoprazole, 40 mg, Oral, Q AM  [Transfer Hold] sertraline, 25 mg, Oral, Daily  [Transfer Hold] sodium chloride, 10 mL, Intravenous, Q12H      Continuous Infusions:phenylephrine, 0.5-3 mcg/kg/min  Scopolamine, 1 patch      PRN Meds:.  acetaminophen **OR** acetaminophen    [Transfer Hold] acetaminophen    [Transfer Hold] senna-docusate sodium **AND** [Transfer Hold] polyethylene glycol **AND** [Transfer Hold] bisacodyl **AND** [Transfer Hold] bisacodyl    [Transfer Hold] dextrose    [Transfer Hold] dextrose    diphenhydrAMINE    diphenhydrAMINE    diphenhydrAMINE    ePHEDrine Sulfate (Pressors)    fentaNYL citrate (PF)    fentanyl    flumazenil    [Transfer Hold] glucagon (human recombinant)    hydrALAZINE    HYDROmorphone    ipratropium-albuterol    labetalol    lidocaine (cardiac)    LORazepam    [Transfer Hold] melatonin    meperidine    metoprolol tartrate    midazolam    naloxone    [Transfer Hold] ondansetron ODT **OR** [Transfer Hold] ondansetron    ondansetron    oxyCODONE    oxyCODONE    phenylephrine    promethazine **OR** promethazine    [COMPLETED] Insert Peripheral IV **AND** [Transfer Hold] sodium chloride    [Transfer Hold] sodium chloride    [Transfer Hold] sodium  "chloride    Objective     VITAL SIGNS  Vitals:    03/30/24 1345 03/30/24 1350 03/30/24 1355 03/30/24 1400   BP: 133/76 120/77 110/74 118/79   BP Location:    Left arm   Patient Position:    Lying   Pulse: 105 103 110 108   Resp: 15 15 17 18   Temp:    97.6 °F (36.4 °C)   TempSrc:    Oral   SpO2: 93% 93% 93% 94%   Weight:       Height:           Flowsheet Rows      Flowsheet Row First Filed Value   Admission Height 180.3 cm (71\") Documented at 03/25/2024 2015   Admission Weight 115 kg (253 lb 1.4 oz) Documented at 03/25/2024 2015              Intake/Output Summary (Last 24 hours) at 3/30/2024 1418  Last data filed at 3/30/2024 1242  Gross per 24 hour   Intake 3100 ml   Output 750 ml   Net 2350 ml        TELEMETRY: Atrial fibrillation  Physical Exam:  The patient is alert, oriented and in no distress.  Vital signs as noted above.  Exogenous obesity (BMI 35)  Head and neck revealed no carotid bruits or jugular venous distention.  No thyromegaly or lymphadenopathy is present  Lungs clear.  No wheezing.  Breath sounds are normal bilaterally.  Heart normal first and second heart sounds.  No murmur. No precordial rub is present.  No gallop is present.  Abdomen soft and nontender.  No organomegaly is present.  Extremities with good peripheral pulses without any pedal edema.  Skin warm and dry.  Musculoskeletal system is grossly normal  CNS grossly normal    Reviewed and updated.    Results Review:   I reviewed the patient's new clinical results.  Lab Results (last 24 hours)       Procedure Component Value Units Date/Time    Basic Metabolic Panel [[id number]]  (Abnormal) Collected: 03/30/24 0459    Specimen: Blood Updated: 03/30/24 0627     Glucose 98 mg/dL      BUN 13 mg/dL      Creatinine 1.05 mg/dL      Sodium 140 mmol/L      Potassium 4.3 mmol/L      Chloride 108 mmol/L      CO2 21.0 mmol/L      Calcium 8.9 mg/dL      BUN/Creatinine Ratio 12.4     Anion Gap 11.0 mmol/L      eGFR 75.4 mL/min/1.73     Narrative:      GFR " Normal >60  Chronic Kidney Disease <60  Kidney Failure <15    The GFR formula is only valid for adults with stable renal function between ages 18 and 70.    CBC & Differential [[id number]]  (Abnormal) Collected: 03/30/24 0459    Specimen: Blood Updated: 03/30/24 0614    Narrative:      The following orders were created for panel order CBC & Differential.  Procedure                               Abnormality         Status                     ---------                               -----------         ------                     CBC Auto Differential[[id number]]        Abnormal            Final result               Scan Slide[[id number]]                                                                    Please view results for these tests on the individual orders.    CBC Auto Differential [[id number]]  (Abnormal) Collected: 03/30/24 0459    Specimen: Blood Updated: 03/30/24 0614     WBC 11.35 10*3/mm3      RBC 4.81 10*6/mm3      Hemoglobin 13.2 g/dL      Hematocrit 42.5 %      MCV 88.4 fL      MCH 27.4 pg      MCHC 31.1 g/dL      RDW 14.9 %      RDW-SD 48.0 fl      MPV 13.9 fL      Platelets 141 10*3/mm3      Neutrophil % 60.1 %      Lymphocyte % 28.9 %      Monocyte % 8.3 %      Eosinophil % 1.9 %      Basophil % 0.4 %      Immature Grans % 0.4 %      Neutrophils, Absolute 6.83 10*3/mm3      Lymphocytes, Absolute 3.28 10*3/mm3      Monocytes, Absolute 0.94 10*3/mm3      Eosinophils, Absolute 0.21 10*3/mm3      Basophils, Absolute 0.04 10*3/mm3      Immature Grans, Absolute 0.05 10*3/mm3      nRBC 0.0 /100 WBC     POC Glucose Once [[id number]]  (Abnormal) Collected: 03/29/24 2017    Specimen: Blood Updated: 03/29/24 2018     Glucose 121 mg/dL      Comment: Serial Number: [id number]:  [id number]       POC Glucose Once [[id number]]  (Normal) Collected: 03/29/24 1624    Specimen: Blood Updated: 03/29/24 1626     Glucose 97 mg/dL      Comment: Serial Number: [id number]:  [id number]               Imaging Results (Last  24 Hours)       ** No results found for the last 24 hours. **        LAB RESULTS (LAST 7 DAYS)    CBC  Results from last 7 days   Lab Units 03/30/24 0459 03/29/24 0425 03/28/24 0401 03/27/24 0442 03/26/24 0440 03/25/24  2130   WBC 10*3/mm3 11.35* 9.72 10.06 9.20 9.06 9.55   RBC 10*6/mm3 4.81 4.57 4.76 5.34 4.79 5.01   HEMOGLOBIN g/dL 13.2 12.6* 12.9* 14.5 13.2 14.3   HEMATOCRIT % 42.5 40.2 41.8 46.6 42.1 43.5   MCV fL 88.4 88.0 87.8 87.3 87.9 86.8   PLATELETS 10*3/mm3 141 150 137* 164 143 160       BMP  Results from last 7 days   Lab Units 03/30/24 0459 03/29/24 0425 03/28/24 0401 03/27/24 0442 03/26/24 0440 03/25/24  2130   SODIUM mmol/L 140 141 143 142 142 140   POTASSIUM mmol/L 4.3 4.5 4.2 4.4 4.4 4.3   CHLORIDE mmol/L 108* 108* 108* 107 108* 107   CO2 mmol/L 21.0* 23.0 24.0 22.0 22.0 21.0*   BUN mg/dL 13 15 13 15 18 17   CREATININE mg/dL 1.05 1.03 0.89 0.97 1.12 1.07   GLUCOSE mg/dL 98 101* 95 100* 114* 164*   MAGNESIUM mg/dL  --   --   --  2.4  --   --        CMP   Results from last 7 days   Lab Units 03/30/24 0459 03/29/24 0425 03/28/24 0401 03/27/24 0442 03/26/24 0440 03/25/24  2130   SODIUM mmol/L 140 141 143 142 142 140   POTASSIUM mmol/L 4.3 4.5 4.2 4.4 4.4 4.3   CHLORIDE mmol/L 108* 108* 108* 107 108* 107   CO2 mmol/L 21.0* 23.0 24.0 22.0 22.0 21.0*   BUN mg/dL 13 15 13 15 18 17   CREATININE mg/dL 1.05 1.03 0.89 0.97 1.12 1.07   GLUCOSE mg/dL 98 101* 95 100* 114* 164*   ALBUMIN g/dL  --   --   --  4.6  --  4.6   BILIRUBIN mg/dL  --   --   --  0.6  --  0.2   ALK PHOS U/L  --   --   --  107  --  120*   AST (SGOT) U/L  --   --   --  40  --  29   ALT (SGPT) U/L  --   --   --  46*  --  <5         BNP        TROPONIN        CoAg        Creatinine Clearance  Estimated Creatinine Clearance: 81.3 mL/min (by C-G formula based on SCr of 1.05 mg/dL).    ABG        Radiology  No radiology results for the last day        EKG      I personally viewed and interpreted the patient's EKG/Telemetry data:  "Atrial fibrillation    ECHOCARDIOGRAM:    Results for orders placed during the hospital encounter of 03/25/24    Adult Transthoracic Echo Complete W/ Cont if Necessary Per Protocol    Interpretation Summary    Left ventricular systolic function is low normal. Calculated left ventricular EF = 46% Left ventricular ejection fraction appears to be 46 - 50%.    The left ventricular cavity is mildly dilated.    Left ventricular diastolic dysfunction is noted.    The left atrial cavity is dilated.    Estimated right ventricular systolic pressure from tricuspid regurgitation is normal (<35 mmHg).    Dilation of the aortic root is present.  4.4 cm    Patient in atrial fibrillation during this study.          STRESS TEST  Results for orders placed during the hospital encounter of 03/25/24    Stress Test With Myocardial Perfusion One Day    Interpretation Summary    Myocardial perfusion imaging indicates a normal myocardial perfusion study with no evidence of ischemia. Impressions are consistent with a low risk study.    Left ventricular ejection fraction is moderately reduced (Calculated EF = 32%).    This is normal Cardiolite imaging stress test with no evidence of ischemia or myocardial infarction.  Small fixed apical defect is noted which is thought to be attenuation artifact left ventricle size and function is normal on gated SPECT imaging.  No wall motion abnormality was noted.  Clinical correlation recommended.  Further recommendation as per ordering physician. .    Findings consistent with an equivocal ECG stress test.        Cardiolite (Tc-99m sestamibi) stress test    CARDIAC CATHETERIZATION  No results found for this or any previous visit.                OTHER:         Assessment & Plan     Principal Problem:    GI bleeding  Active Problems:    Rectal bleeding    GI bleed    Reviewed primary cardiologist note.    \"Atrial fibrillation with RVR  Duration unknown  Was present on admission 3/25/2024  No prior history of " A-fib  [id number] equals 2  Echocardiogram EF 45 to 50% aortic root 4.4 cm  Currently on IV Cardizem     Preop cardiovascular risk assessment requested  No signs or symptoms to suggest unstable angina  Lexiscan Myoview pending     Rectal bleeding  Colonoscopy revealed: Mass   Colorectal surgery following  preprocedure cardiovascular risk assessment requested  Patient is not having any signs or symptoms to suggest unstable angina     Hypertension  Continue current therapy     Coronary artery disease  Reported to be nonobstructive by previous cath in 2012 done at [facility]           PLAN:     Dr. Greenberg to review stress test  Surgery now has been moved up with anticipated surgeryTomorrow if cleared  Stress test showed no reversible ischemia.  Acceptable risk to proceed with surgery  Timing still being determined  Post operatively cardiology will need to see pt; will need to start anticoagulation for atrial fibrillation when ok from a surgical standpoint post op  If surgery deferred, ok for d/c from cards standpoint  Would continue oral metoprolol and resume home losartan  Mild LV dysfunction noted on echo EF 45-50%     Patient is seen and examined and findings are verified.  All data is reviewed by me personally.  Assessment and plan formulated by APC was done after discussion with attending.  I spent more than 50% of time in taking care of the patient.     MDM:     1.  Preop clearance:     Patient underwent stress test and it showed no ischemia.  Fixed apical defect noted.  Mild LV enlargement and dysfunction was noted.  Patient can be cleared for surgery continue beta-blocker.     2.  Cardiomyopathy:     Patient has mild left ventricular dysfunction with EF of 45 to 50%.  This may be due to tachycardia mediated cardiomyopathy due to uncontrolled atrial fibrillation.     3.  Persistent atrial fibrillation:     Patient has relatively newly diagnosed atrial fibrillation.  After the surgery tomorrow next  "week, patient should be started back on anticoagulation.  I would consider cardioversion in future     4.  Hypertension:     Blood pressure is controlled     5.  Colonic mass:     Patient is cleared for the surgery\"    ]]]]]]]]]]]]]]]]]]]]]]  3/30/2024.  Patient recently presented with rectal bleed.  Patient was found to have colon carcinoma.  Patient had colon resection 3/30/2024.  Patient did not have any perioperative cardiovascular problems.    Persistent and probably chronic atrial fibrillation-asymptomatic.  Not sure of the exact duration.    Cardiomyopathy left ventricle ejection fraction 45 to 50%.  Probably tachycardia induced.    Hypertension-well-controlled.    Current medications aspirin atorvastatin subcu heparin insulin metoprolol tartrate 75 mg p.o. every 12 hours pantoprazole sertraline    Observe her rhythm closely.    Anticoagulation and attempts to convert to sinus rhythm after patient surgical  Intervention improved.    Have discussed with patient's family at bedside.    ]]]]]]]]]]]]]]]]]]]]]]]                Krissy Montemayor MD  03/30/24  14:18 EDT                "

## 2024-03-30 NOTE — PLAN OF CARE
Goal Outcome Evaluation:  Plan of Care Reviewed With: patient        Progress: no change  Outcome Evaluation: pt stable and abed. VSS tolerating clears. will continue to monitor.

## 2024-03-30 NOTE — ANESTHESIA PROCEDURE NOTES
Arterial Line    Pre-sedation assessment completed: 3/30/2024 8:00 AM    Patient reassessed immediately prior to procedure    Patient location during procedure: OR  Start time: 3/30/2024 8:08 AM  Stop Time:3/30/2024 8:13 AM       Line placed for hemodynamic monitoring.  Performed By   Anesthesiologist: Tomás Hunt MD   Preanesthetic Checklist  Completed: patient identified, IV checked, site marked, risks and benefits discussed, surgical consent, monitors and equipment checked, pre-op evaluation and timeout performed  Arterial Line Prep    Sterile Tech: cap, gloves, sterile barriers and mask  Prep: ChloraPrep  Patient monitoring: EKG, continuous pulse oximetry and blood pressure monitoring  Arterial Line Procedure   Laterality:right  Location:  radial artery  Catheter size: 20 G   Guidance: palpation technique  Number of attempts: 1  Successful placement: yes  Heparin (Porcine) in NaCl 1000-0.9 UT/500ML-% for arterial line pressure bag - Intra-arterial, Wrist Right   1,000 Units - 3/30/2024 8:13:00 AM Images: still images not obtained  Post Assessment   Dressing Type: wrist guard applied, secured with tape and occlusive dressing applied.   Complications no  Circ/Move/Sens Assessment: normal and unchanged.   Patient Tolerance: patient tolerated the procedure well with no apparent complications

## 2024-03-30 NOTE — OP NOTE
CRS Operative Note   Viktor Atkins    Age/Gender 73 y.o. male MRN [id number]   Location [facility] MAIN OR Attending Baltazar Menendez MD   Date of Surgery: 3/30/2024  Pre-op Diagnosis: GI bleeding [K92.2]  Gastrointestinal hemorrhage, unspecified gastrointestinal hemorrhage type [K92.2]  GI bleed [K92.2]   Post-op Diagnosis: same  Procedure:   Robotic low anterior resection  Flexible Sigmoidoscopy   ICG use to test for blood supply   Surgeon: Aakash Burden MD   Assistants: Dr. Beto Gorman, who assisted in abdominal entry, port placement, suction, irrigation, retraction, anastomosis, abdominal closure.   Anesthesia: General and Local  IV Fluids: refer to anesthesia record  Estimated Blood Loss: 75 cc   Drains: none  Findings:   No metastatic disease   Tattoo noted 5 cm distal to the tumor   Bulky mesenteric and abdominal wall and pelvic sidewall adhesion   Tumor in the Recto-sigmoid/upper rectum, with anterior surface dimpling and scarring   Complications: No complications  Specimens: Sigmoid colon and upper rectum   Grafts or implants: none    SCOAP [Rectal cancer]:     1. Procedure was not done for palliation.  2. Preoperative neoadjuvant treatment was not used.  3. Preoperative fecal continence was adequate.  4. Patient did not have radiation therapy.  5. There is no metastatic disease on staging CT   6. The tumor was not fixed to underlying structures.   7. A total mesorectal excision was performed.  8. The TME capsule was intact.  9. A protective stoma was not used.  10. The anastomosis was air-leak tested.   11. Proximal and distal bowel was tested for perfusion with ICG.     Indications  73 y.o. male who presented to the hospital with GI bleed. Colonoscopy showed a colon mass in the rectosigmoid. Biopsy was consistent with invasive adenocarcinoma. Staging workup didn't show metastatic disease. He continued to have bleeding per rectum.     Description of Procedure  After appropriate consent  including risks, benefits and alternatives, patient was brought to the OR, and placed in lithotomy position. Anesthesia was administered.    All pao prominences were padded, antibiotics were given, and scds were placed.     The patient was prepped and drapped in usual sterile fashion.  A 4 cm pfannenstiel incision was made. Dissection was carried down to the level of anterior sheath with blunt and electrocautery dissection. Incision was made just on the anterior sheath, superior and inferior fascial flaps were raised being careful to preserve the rectus abdominis, and abdominal entry was obtained with a muscle-splitting technique.    A small wound protector and Faisal cap were placed. A 12 mm port was placed here and the abdomen insufflated to 15 mm Hg.    The abdomen was explored laparoscopically. Adhesions were seen in the pelvis from the left pelvic side wall to the proximal sigmoid colon. There was no liver or abdominal wall mass.     Local anesthetic was applied at the level of peritoneum, 10 cc for each port. A total of three 8mm and one 12mm robotic ports were placed under direct vision in a transverse line from the LUQ to the RLQ. The right-most port was 12 mm. An Airseal port was placed via the Faisal cap.     The patient was positioned in steep trendelenberg and right side down. The small bowel was moved out of the pelvis with laparoscopic bowel graspers. The Robot Xi was docked to the left of the patient.     The peritoneum along the right side of the rectosigmoid was incised with monopolar scissors. The left colon was mobilized off the retroperitoneum in a medial to lateral technique. The left ureter was identified and dissected away from the mesentery. The inferior mesenteric artery and vein were isolated and divided with the robotic vessel sealer at their origin for high ligation. The dissection was carried laterally towards the abdominal wall.  The dissection continued from the lateral to medial  approach, dissecting the white line of toldt and again, identifying the left ureter. Then the lateral peritoneal attachments to the rectum were then incised with cautery.     The tumor was noted in rectosigmoid junction/upper rectum. There was distal tattoo from the tumor. The lateral attachments were incised. The posterior plan was developed to pelvic floor. The anterior peritoneal reflection was excised sharply.      A distal transection point was chosen 5 cm below the tumor and slightly below the tattoo. The mesentery of the rectum was divided with vessel sealer to the rectal wall. The rectum was divided with 60 mm robotic green load stapler in two fires.    A proximal transection point proximal to the area of the TONY pedicle was chosen. The mesentery of the distal descending colon was divided with vessel sealer to the colon  wall. IcG was then injected to ensure adequate perfusion proximal to the level of our desired proximal transection point. IcG also ensured adequate perfusion to the rectal stump.     In preparation for intra-corporeal anastomosis, a colotomy was created in the specimen, large enough to pass the anvil. 4 cm proximal to the colotomy a small colotomy was made. The avil was dropped into the abdominal cavity via the pfannenstiel incision. A empty clip applier was used to grasp and place the anvil into the colon. This was gently moved proximally and the anvil guided out of the colon.     Proximal transection of colon was completed with a 60 mm robotic green load stapler. A 28 mm EEA stapler was then passed per rectum and mated with the anvil. Stapler was fired and the presence of two complete donuts were confirmed.    Flexible sigmoidoscopy showed a colorectal anastomosis that was hemostatic. Air leak test was negative.  The abdomen was exsufflated and robot was undocked.    We then identified the stapled colon end and brought out the specimen via the pfannenstiel incision. The RLQ 12 mm port was  closed with a 0 vicryl passed with a Carlitos-Dae needle under laparoscopic guidance.       Both surgeons then changed gown and glove and switched to clean closure tray. The pfannenstiel incision was closed with 2x 0 vicryl to close peritoneum and #1 stratifix to close fascia and irrigated. Skin was closed with 4-0 monocryl and dermabond.     The rest of the local anesthetic was applied and the case concluded.    Counts: Instrument, sponge, and needle counts were reported to be correct prior to closure and at the conclusion of the case.  Disposition  The patient was taken to Recovery Room in good condition.      Aakash Burden MD  Colon and Rectal Surgery   [facility]

## 2024-03-30 NOTE — ANESTHESIA PROCEDURE NOTES
Airway  Urgency: elective    Date/Time: 3/30/2024 8:07 AM  End Time:3/30/2024 8:07 AM  Airway not difficult    General Information and Staff    Patient location during procedure: OR  Anesthesiologist: Tomás Hunt MD  CRNA/CAA: Kathy Storey CAA    Indications and Patient Condition  Indications for airway management: airway protection    Preoxygenated: yes  Mask difficulty assessment: 1 - vent by mask    Final Airway Details  Final airway type: endotracheal airway      Successful airway: ETT  Cuffed: yes   Successful intubation technique: video laryngoscopy  Facilitating devices/methods: intubating stylet  Endotracheal tube insertion site: oral  Blade: Martinez  Blade size: 4  ETT size (mm): 7.5  Cormack-Lehane Classification: grade I - full view of glottis  Placement verified by: chest auscultation, capnometry and palpation of cuff   Measured from: lips  ETT/EBT  to lips (cm): 22  Number of attempts at approach: 1  Assessment: lips, teeth, and gum same as pre-op and atraumatic intubation

## 2024-03-30 NOTE — ANESTHESIA POSTPROCEDURE EVALUATION
Patient: Viktor Atkins    Procedure Summary       Date: 03/30/24 Room / Location: [facility] OR 08 / [facility] MAIN OR    Anesthesia Start: 0758 Anesthesia Stop: 1254    Procedures:       COLON RESECTION LOW ANTERIOR LAPAROSCOPIC, COLONAL ANASTOMOSIS, DIVERTING LOOP ILEOSTOMY WITH DAVINCI ROBOT (Abdomen)      SIGMOIDOSCOPY (Anus) Diagnosis:     Surgeons: Aakash Burden MD Provider: Tomás Hunt MD    Anesthesia Type: generalAmisha ASA Status: 3            Anesthesia Type: general, [place]    Vitals  Vitals Value Taken Time   /62 03/30/24 1317   Temp 97.2 °F (36.2 °C) 03/30/24 1249   Pulse 89 03/30/24 1320   Resp 20 03/30/24 1310   SpO2 94 % 03/30/24 1320   Vitals shown include unfiled device data.        Post Anesthesia Care and Evaluation    Patient location during evaluation: PACU  Patient participation: complete - patient participated  Level of consciousness: awake  Pain scale: See nurse's notes for pain score.  Pain management: adequate    Airway patency: patent  Anesthetic complications: No anesthetic complications  PONV Status: none  Cardiovascular status: acceptable  Respiratory status: acceptable and spontaneous ventilation  Hydration status: acceptable    Comments: Patient seen and examined postoperatively; vital signs stable; SpO2 greater than or equal to 90%; cardiopulmonary status stable; nausea/vomiting adequately controlled; pain adequately controlled; no apparent anesthesia complications; patient discharged from anesthesia care when discharge criteria were met

## 2024-03-30 NOTE — ANESTHESIA PREPROCEDURE EVALUATION
Anesthesia Evaluation     NPO Solid Status: > 8 hours  NPO Liquid Status: > 8 hours           Airway   Mallampati: I  TM distance: >3 FB  Neck ROM: full  No difficulty expected  Dental - normal exam     Pulmonary - normal exam   Cardiovascular - normal exam    (+) hypertension well controlled, dysrhythmias Atrial Fib, hyperlipidemia      Neuro/Psych  (+) psychiatric history Anxiety  GI/Hepatic/Renal/Endo    (+) GI bleeding lower     ROS Comment: Colon cancer    Musculoskeletal     Abdominal  - normal exam    Bowel sounds: normal.   Substance History      OB/GYN          Other                    Anesthesia Plan    ASA 3     general and Amisha   total IV anesthesia  intravenous induction     Anesthetic plan, risks, benefits, and alternatives have been provided, discussed and informed consent has been obtained with: patient.  Pre-procedure education provided  Plan discussed with CRNA.    CODE STATUS:    Level Of Support Discussed With: Patient  Code Status (Patient has no pulse and is not breathing): CPR (Attempt to Resuscitate)  Medical Interventions (Patient has pulse or is breathing): Full Support

## 2024-03-30 NOTE — PLAN OF CARE
Goal Outcome Evaluation:  Plan of Care Reviewed With: patient           Outcome Evaluation: Pt alert x 4 able to voice all needs. Pt has been awake most of the night no complains of pain or discomfort. Pt has surgery this am I gianni continue to silvestre.

## 2024-03-30 NOTE — PROGRESS NOTES
[facility] MEDICINE SERVICE  DAILY PROGRESS NOTE    NAME: Viktor Atkins  : 1950  MRN: [id number]      LOS: 3 days     PROVIDER OF SERVICE: Baltazar Menendez MD    Chief Complaint: GI bleeding    Subjective:     Interval History:  History taken from: patient    Patient is accompanied by his family.  Patient reports that he started having bowel movements on Monday, 3 days ago.,  When he came to the emergency room.  Today he had a colonoscopy and 18 polyps removed and he was told that he has a mass and will now need to speak with a surgeon.  States that this was his first colonoscopy.  Continues to have scant hematochezia after the colonoscopy.  Takes aspirin every day but no other blood thinners.     On arrival to the emergency room he was diagnosed with A-fib with RVR, new per patient.  States that he previously had some flutters but this is the first time he is learned about A-fib.  We discussed [id number] score of 2, indication for anticoagulation and GI bleed at this time, possible surgery coming up.  Will defer the anticoagulation after his surgery.  We discussed sleep study as outpatient eventually.  Denies any fevers, chills, night sweats, dyspnea, chest pain, nausea, abdominal pain, dysuria, hematuria, or edema.     3/28/24 patient seen and examined in bed no acute distress, vital signs stable, discussed with RN, discussed with cardiology.  He is cleared for surgery.  Awaiting for surgical input.  3/29/24 patient seen and examined in bed no acute distress, vital signs stable, discussed with RN, for possible surgery tomorrow.  3/30/24 Plan for OR today, COLON RESECTION LOW ANTERIOR LAPAROSCOPIC, COLONAL ANASTOMOSIS, DIVERTING LOOP ILEOSTOMY WITH DAVINCI ROBOT; SIGMOIDOSCOPY DW RN, VSS,    Review of Systems   Constitutional: Negative.  Negative for appetite change, chills and fever.   HENT:  Negative for congestion.    Eyes:  Negative for pain and redness.   Respiratory: Negative.  Negative  for cough, chest tightness and shortness of breath.    Gastrointestinal:  Positive for abdominal pain. Negative for diarrhea, nausea and vomiting.   Endocrine: Negative for cold intolerance and heat intolerance.   Genitourinary:  Negative for dysuria, flank pain and frequency.   Musculoskeletal:  Negative for back pain and myalgias.   Neurological:  Negative for dizziness, weakness and headaches.   Psychiatric/Behavioral:  Negative for sleep disturbance. The patient is not nervous/anxious.      Objective:     Vital Signs  Temp:  [96.4 °F (35.8 °C)-98.7 °F (37.1 °C)] 97.2 °F (36.2 °C)  Heart Rate:  [] 104  Resp:  [12-27] 20  BP: ()/(59-93) 98/59  Flow (L/min):  [6] 6   Body mass index is 34.89 kg/m².    Physical Exam  General: Well appearing, well nourished, in no distress. Appears stated age     HEENT: Head: Normocephalic, atraumatic. Conjunctiva clear, sclera non-icteric, EOM intact, PERRL, hearing intact. Nose without external lesions. Mucous membranes moist, no mucosal lesions,    Neck: Supple, without lesions   Heart: normal rate, irregular rhythm, no murmurs / rubs / gallops    Lungs: No signs of respiratory distress. Moving air well. Clear without crackles, rhonchi, wheezes   Abdomen: Bowel sounds normal, no tenderness, no distension, no masses   Musculoskeletal: No edema. No joint swelling, or pain on palpation. No tenderness on palpation over the spinal processes  Skin: Warm, dry, intact without rashes or lesions. Appropriate color for ethnicity.   Neurologic: Alert and oriented x3, CN 2-12 normal. Motor function intact bilaterally. Sensation intact bilaterally.   Psychiatric: Appropriate mood and affect.   Scheduled Meds   [Transfer Hold] aspirin, 81 mg, Oral, Daily  [Transfer Hold] atorvastatin, 20 mg, Oral, Nightly  [Transfer Hold] insulin lispro, 2-9 Units, Subcutaneous, 4x Daily AC & at Bedtime  metoprolol tartrate, 75 mg, Oral, Q12H  metroNIDAZOLE, 500 mg, Intravenous, Once  [Transfer Hold]  pantoprazole, 40 mg, Oral, Q AM  [Transfer Hold] sertraline, 25 mg, Oral, Daily  [Transfer Hold] sodium chloride, 10 mL, Intravenous, Q12H       PRN Meds     acetaminophen **OR** acetaminophen    [Transfer Hold] acetaminophen    [Transfer Hold] senna-docusate sodium **AND** [Transfer Hold] polyethylene glycol **AND** [Transfer Hold] bisacodyl **AND** [Transfer Hold] bisacodyl    bupivacaine    [Transfer Hold] dextrose    [Transfer Hold] dextrose    diphenhydrAMINE    diphenhydrAMINE    diphenhydrAMINE    ePHEDrine Sulfate (Pressors)    fentaNYL citrate (PF)    fentanyl    flumazenil    [Transfer Hold] glucagon (human recombinant)    hydrALAZINE    HYDROmorphone    ipratropium-albuterol    labetalol    lidocaine (cardiac)    LORazepam    [Transfer Hold] melatonin    meperidine    metoprolol tartrate    midazolam    naloxone    [Transfer Hold] ondansetron ODT **OR** [Transfer Hold] ondansetron    ondansetron    oxyCODONE    oxyCODONE    phenylephrine    promethazine **OR** promethazine    [COMPLETED] Insert Peripheral IV **AND** [Transfer Hold] sodium chloride    [Transfer Hold] sodium chloride    [Transfer Hold] sodium chloride   Infusions  phenylephrine, 0.5-3 mcg/kg/min  Scopolamine, 1 patch        Diagnostic Data    Results from last 7 days   Lab Units 03/30/24  0459 03/28/24  0401 03/27/24  0442   WBC 10*3/mm3 11.35*   < > 9.20   HEMOGLOBIN g/dL 13.2   < > 14.5   HEMATOCRIT % 42.5   < > 46.6   PLATELETS 10*3/mm3 141   < > 164   GLUCOSE mg/dL 98   < > 100*   CREATININE mg/dL 1.05   < > 0.97   BUN mg/dL 13   < > 15   SODIUM mmol/L 140   < > 142   POTASSIUM mmol/L 4.3   < > 4.4   AST (SGOT) U/L  --   --  40   ALT (SGPT) U/L  --   --  46*   ALK PHOS U/L  --   --  107   BILIRUBIN mg/dL  --   --  0.6   ANION GAP mmol/L 11.0   < > 13.0    < > = values in this interval not displayed.     No radiology results for the last day    I reviewed the patient's new clinical results.    Assessment/Plan:     Active and Resolved  "Problems  Active Hospital Problems    Diagnosis  POA    **GI bleeding [K92.2]  Yes    GI bleed [K92.2]  Yes    Rectal bleeding [K62.5]  Unknown      Resolved Hospital Problems   No resolved problems to display.     History of Present Illness:   Per the documentation by Irma Chu APRN, dated 03/26/2024 A/P section:,  \"Hemoglobin is stable, 14.3 yesterday and 13.2 today  -CMP generally unremarkable  -CT abdomen and pelvis compatible with constipation.  Sigmoid diverticulosis without diverticulitis  -EKG showed atrial for A-fib with heart rate of 111.  No previous EKG for comparison.  Heart rate 90s in the monitor.  Continue metoprolol. Follow-up with PCP  -Per ED notes patient's Hemoccult was positive for blood  -GI was consulted in the ED  -Plans to proceed with colonoscopy >  Suspect rectal bleeding secondary to distal sigmoid colon mass.  CT abdomen pelvis on admission with contrast without evidence of metastatic disease.  Colon polyps x 18  Mild sigmoid diverticulosis  Internal hemorrhoids  Per surgery>long discussion regarding the colonoscopy findings with the patient and his family members. Pathology results are pending at the moment. CT scans of the chest, abdomen, and pelvis revealed no signs of metastatic disease or spread.   -Since the patient is hemodynamically stable and there's no active GI bleed, we will await the pathology results to determine the appropriate surgical approach. While oncological sigmoid resection seems inevitable, the MSI status of the tumor and findings from other polyps could alter our surgical strategy and influence the risk assessment for metachronous colon cancer.  -Clear liquid for now and n.p.o. after midnight>Plan for OR COLON RESECTION LOW ANTERIOR LAPAROSCOPIC, COLONAL ANASTOMOSIS, DIVERTING LOOP ILEOSTOMY WITH DAVINCI ROBOT; SIGMOIDOSCOPY      Hypertension-Moderately controlled   - Continue Norvasc and losartan  - Monitor while admitted      Diabetes " mellitus-Moderately controlled  -On metformin  -Diabetic diet  -SSI  -Monitor before meals and at bedtime      Hyperlipidemia  -Continue Lipitor     Depression/anxiety  -Continue Zoloft     Obesity-BMI 34.98  -Lifestyle modifications     DVT prophylaxis:  Mechanical DVT prophylaxis orders are present.    Code status is   Code Status and Medical Interventions:   Ordered at: 03/25/24 3018     Level Of Support Discussed With:    Patient     Code Status (Patient has no pulse and is not breathing):    CPR (Attempt to Resuscitate)     Medical Interventions (Patient has pulse or is breathing):    Full Support       Plan for disposition:home in 2 days    Time: 30 minutes    Signature: Electronically signed by Baltazar Menendez MD, 03/30/24, 13:14 EDT.  [facility] Hospitalist Team

## 2024-03-31 LAB
ANION GAP SERPL CALCULATED.3IONS-SCNC: 13 MMOL/L (ref 5–15)
BASOPHILS # BLD AUTO: 0.04 10*3/MM3 (ref 0–0.2)
BASOPHILS NFR BLD AUTO: 0.3 % (ref 0–1.5)
BUN SERPL-MCNC: 22 MG/DL (ref 8–23)
BUN/CREAT SERPL: 15.5 (ref 7–25)
CALCIUM SPEC-SCNC: 8.8 MG/DL (ref 8.6–10.5)
CHLORIDE SERPL-SCNC: 105 MMOL/L (ref 98–107)
CO2 SERPL-SCNC: 20 MMOL/L (ref 22–29)
CREAT SERPL-MCNC: 1.42 MG/DL (ref 0.76–1.27)
D-LACTATE SERPL-SCNC: 1.1 MMOL/L (ref 0.5–2)
DACRYOCYTES BLD QL SMEAR: NORMAL
DEPRECATED RDW RBC AUTO: 49.9 FL (ref 37–54)
EGFRCR SERPLBLD CKD-EPI 2021: 52.5 ML/MIN/1.73
EOSINOPHIL # BLD AUTO: 0.04 10*3/MM3 (ref 0–0.4)
EOSINOPHIL NFR BLD AUTO: 0.3 % (ref 0.3–6.2)
ERYTHROCYTE [DISTWIDTH] IN BLOOD BY AUTOMATED COUNT: 15.2 % (ref 12.3–15.4)
GLUCOSE BLDC GLUCOMTR-MCNC: 105 MG/DL (ref 70–105)
GLUCOSE BLDC GLUCOMTR-MCNC: 119 MG/DL (ref 70–105)
GLUCOSE BLDC GLUCOMTR-MCNC: 125 MG/DL (ref 70–105)
GLUCOSE BLDC GLUCOMTR-MCNC: 95 MG/DL (ref 70–105)
GLUCOSE SERPL-MCNC: 136 MG/DL (ref 65–99)
HCT VFR BLD AUTO: 36.4 % (ref 37.5–51)
HGB BLD-MCNC: 11.3 G/DL (ref 13–17.7)
IMM GRANULOCYTES # BLD AUTO: 0.07 10*3/MM3 (ref 0–0.05)
IMM GRANULOCYTES NFR BLD AUTO: 0.5 % (ref 0–0.5)
LARGE PLATELETS: NORMAL
LYMPHOCYTES # BLD AUTO: 2.69 10*3/MM3 (ref 0.7–3.1)
LYMPHOCYTES NFR BLD AUTO: 17.6 % (ref 19.6–45.3)
MCH RBC QN AUTO: 27.5 PG (ref 26.6–33)
MCHC RBC AUTO-ENTMCNC: 31 G/DL (ref 31.5–35.7)
MCV RBC AUTO: 88.6 FL (ref 79–97)
MONOCYTES # BLD AUTO: 0.8 10*3/MM3 (ref 0.1–0.9)
MONOCYTES NFR BLD AUTO: 5.2 % (ref 5–12)
NEUTROPHILS NFR BLD AUTO: 11.63 10*3/MM3 (ref 1.7–7)
NEUTROPHILS NFR BLD AUTO: 76.1 % (ref 42.7–76)
NRBC BLD AUTO-RTO: 0 /100 WBC (ref 0–0.2)
PLATELET # BLD AUTO: 137 10*3/MM3 (ref 140–450)
PMV BLD AUTO: 13.8 FL (ref 6–12)
POIKILOCYTOSIS BLD QL SMEAR: NORMAL
POTASSIUM SERPL-SCNC: 4 MMOL/L (ref 3.5–5.2)
RBC # BLD AUTO: 4.11 10*6/MM3 (ref 4.14–5.8)
SMALL PLATELETS BLD QL SMEAR: NORMAL
SODIUM SERPL-SCNC: 138 MMOL/L (ref 136–145)
WBC MORPH BLD: NORMAL
WBC NRBC COR # BLD AUTO: 15.27 10*3/MM3 (ref 3.4–10.8)

## 2024-03-31 PROCEDURE — 82948 REAGENT STRIP/BLOOD GLUCOSE: CPT

## 2024-03-31 PROCEDURE — 80048 BASIC METABOLIC PNL TOTAL CA: CPT | Performed by: STUDENT IN AN ORGANIZED HEALTH CARE EDUCATION/TRAINING PROGRAM

## 2024-03-31 PROCEDURE — 25010000002 HEPARIN (PORCINE) PER 1000 UNITS: Performed by: STUDENT IN AN ORGANIZED HEALTH CARE EDUCATION/TRAINING PROGRAM

## 2024-03-31 PROCEDURE — 83605 ASSAY OF LACTIC ACID: CPT

## 2024-03-31 PROCEDURE — 82948 REAGENT STRIP/BLOOD GLUCOSE: CPT | Performed by: STUDENT IN AN ORGANIZED HEALTH CARE EDUCATION/TRAINING PROGRAM

## 2024-03-31 PROCEDURE — 99232 SBSQ HOSP IP/OBS MODERATE 35: CPT | Performed by: INTERNAL MEDICINE

## 2024-03-31 PROCEDURE — 85007 BL SMEAR W/DIFF WBC COUNT: CPT | Performed by: STUDENT IN AN ORGANIZED HEALTH CARE EDUCATION/TRAINING PROGRAM

## 2024-03-31 PROCEDURE — 85025 COMPLETE CBC W/AUTO DIFF WBC: CPT | Performed by: STUDENT IN AN ORGANIZED HEALTH CARE EDUCATION/TRAINING PROGRAM

## 2024-03-31 RX ORDER — DILTIAZEM HYDROCHLORIDE 120 MG/1
120 CAPSULE, COATED, EXTENDED RELEASE ORAL
Status: DISCONTINUED | OUTPATIENT
Start: 2024-03-31 | End: 2024-04-01 | Stop reason: HOSPADM

## 2024-03-31 RX ORDER — METHOCARBAMOL 750 MG/1
750 TABLET, FILM COATED ORAL 4 TIMES DAILY PRN
Qty: 40 TABLET | Refills: 0 | Status: SHIPPED | OUTPATIENT
Start: 2024-03-31 | End: 2024-05-10

## 2024-03-31 RX ORDER — OXYCODONE HYDROCHLORIDE 5 MG/1
5 TABLET ORAL EVERY 8 HOURS PRN
Qty: 30 TABLET | Refills: 0 | Status: SHIPPED | OUTPATIENT
Start: 2024-03-31 | End: 2024-04-08

## 2024-03-31 RX ADMIN — METOPROLOL TARTRATE 75 MG: 50 TABLET, FILM COATED ORAL at 09:55

## 2024-03-31 RX ADMIN — SERTRALINE HYDROCHLORIDE 25 MG: 25 TABLET ORAL at 09:55

## 2024-03-31 RX ADMIN — PREGABALIN 25 MG: 25 CAPSULE ORAL at 06:12

## 2024-03-31 RX ADMIN — METHOCARBAMOL 750 MG: 750 TABLET, FILM COATED ORAL at 12:52

## 2024-03-31 RX ADMIN — ACETAMINOPHEN 1000 MG: 500 TABLET, FILM COATED ORAL at 14:58

## 2024-03-31 RX ADMIN — ASPIRIN 81 MG CHEWABLE TABLET 81 MG: 81 TABLET CHEWABLE at 09:55

## 2024-03-31 RX ADMIN — PREGABALIN 25 MG: 25 CAPSULE ORAL at 14:58

## 2024-03-31 RX ADMIN — PANTOPRAZOLE SODIUM 40 MG: 40 TABLET, DELAYED RELEASE ORAL at 06:12

## 2024-03-31 RX ADMIN — PREGABALIN 25 MG: 25 CAPSULE ORAL at 21:06

## 2024-03-31 RX ADMIN — HEPARIN SODIUM 5000 UNITS: 5000 INJECTION INTRAVENOUS; SUBCUTANEOUS at 21:06

## 2024-03-31 RX ADMIN — ATORVASTATIN CALCIUM 20 MG: 20 TABLET, FILM COATED ORAL at 21:06

## 2024-03-31 RX ADMIN — METHOCARBAMOL 750 MG: 750 TABLET, FILM COATED ORAL at 17:47

## 2024-03-31 RX ADMIN — ALVIMOPAN 12 MG: 12 CAPSULE ORAL at 09:55

## 2024-03-31 RX ADMIN — ACETAMINOPHEN 1000 MG: 500 TABLET, FILM COATED ORAL at 21:07

## 2024-03-31 RX ADMIN — METOPROLOL TARTRATE 75 MG: 50 TABLET, FILM COATED ORAL at 21:06

## 2024-03-31 RX ADMIN — ACETAMINOPHEN 1000 MG: 500 TABLET, FILM COATED ORAL at 04:08

## 2024-03-31 RX ADMIN — HEPARIN SODIUM 5000 UNITS: 5000 INJECTION INTRAVENOUS; SUBCUTANEOUS at 15:14

## 2024-03-31 RX ADMIN — ACETAMINOPHEN 1000 MG: 500 TABLET, FILM COATED ORAL at 09:55

## 2024-03-31 RX ADMIN — DILTIAZEM HYDROCHLORIDE 120 MG: 120 CAPSULE, EXTENDED RELEASE ORAL at 12:52

## 2024-03-31 RX ADMIN — METHOCARBAMOL 750 MG: 750 TABLET, FILM COATED ORAL at 21:06

## 2024-03-31 RX ADMIN — METHOCARBAMOL 750 MG: 750 TABLET, FILM COATED ORAL at 09:55

## 2024-03-31 RX ADMIN — HEPARIN SODIUM 5000 UNITS: 5000 INJECTION INTRAVENOUS; SUBCUTANEOUS at 06:12

## 2024-03-31 RX ADMIN — Medication 10 ML: at 21:07

## 2024-03-31 NOTE — PLAN OF CARE
Goal Outcome Evaluation:              Outcome Evaluation: Pt resting in room with family at bedside. No complaints of pain. VSS, call light within reach, pt able to make needs known. Plan of care on going.

## 2024-03-31 NOTE — PROGRESS NOTES
Colorectal Surgery Progress Note    Pt. Name/Age/:  Viktor Atkins   73 y.o.    1950         Med. Record Number:   [id number]  Date of admission:  3/25/2024      Service(s): Colorectal Surgery      Subjective    Patient doing well  Out of bed and ambulating   No nausea   Tolerating diet   Passing flatus   Voided freely   Some soreness over incisions     Objective  Vitals:     Patient Vitals for the past 24 hrs:   BP Temp Temp src Pulse Resp SpO2   24 0737 113/78 97.5 °F (36.4 °C) Oral 90 19 95 %   24 0403 109/73 97.4 °F (36.3 °C) Oral 83 18 95 %   24 0005 109/73 98.2 °F (36.8 °C) Oral 90 20 93 %   24 2126 146/90 98 °F (36.7 °C) Oral 118 20 96 %   24 1630 127/85 98.5 °F (36.9 °C) Oral 104 18 96 %   24 1452 131/76 98.8 °F (37.1 °C) Oral 96 18 95 %   24 1400 118/79 97.6 °F (36.4 °C) Oral 108 18 94 %   24 1355 110/74 -- -- 110 17 93 %   24 1350 120/77 -- -- 103 15 93 %   24 1345 133/76 -- -- 105 15 93 %   24 1340 125/76 -- -- 103 16 92 %   24 1335 119/77 -- -- 104 18 92 %   24 1330 -- -- -- -- -- 92 %   24 1325 98/74 -- -- 107 15 94 %   24 1320 106/67 -- -- 89 18 94 %   24 1315 104/62 -- -- 92 16 94 %   24 1310 98/59 -- -- 104 20 93 %   24 1305 106/69 -- -- 99 20 95 %   24 1300 108/61 -- -- 104 26 93 %   24 1255 112/65 -- -- 89 27 93 %   24 1249 113/60 97.2 °F (36.2 °C) Oral 104 21 93 %           Wt. Admission: Weight: 115 kg (253 lb 1.4 oz)     Wt. Current: Weight: 113 kg (250 lb)   Body mass index is 34.89 kg/m².      I&O:  Intake/Output Summary (Last 24 hours) at 3/31/2024 0746  Last data filed at 3/31/2024 0005  Gross per 24 hour   Intake 3100 ml   Output 1250 ml   Net 1850 ml     1901 -  0700  In: 3100 [I.V.:2500]  Out: 1250 [Urine:1100]      Exam  General:  No acute distress, resting comfortably.  Cardiovascular: regular rate.  Respiratory: no increased work of  breathing.  Abdomen:  Soft, appropriate incisional tenderness, non-distended. No diffuse guarding or rebound tenderness. Incisions c/d/I.  Extremities: warm, well-perfused extremities, no pitting edema.      Labs:     [unfilled]          Scheduled Meds:acetaminophen, 1,000 mg, Oral, Q6H  alvimopan, 12 mg, Oral, BID  aspirin, 81 mg, Oral, Daily  atorvastatin, 20 mg, Oral, Nightly  heparin (porcine), 5,000 Units, Subcutaneous, Q8H  insulin lispro, 2-9 Units, Subcutaneous, 4x Daily AC & at Bedtime  methocarbamol, 750 mg, Oral, 4x Daily  metoprolol tartrate, 75 mg, Oral, Q12H  metroNIDAZOLE, 500 mg, Intravenous, Once  pantoprazole, 40 mg, Oral, Q AM  pregabalin, 25 mg, Oral, Q8H  sertraline, 25 mg, Oral, Daily  sodium chloride, 10 mL, Intravenous, Q12H      Continuous Infusions:phenylephrine, 0.5-3 mcg/kg/min  Scopolamine, 1 patch      PRN Meds:.  acetaminophen    senna-docusate sodium **AND** polyethylene glycol **AND** bisacodyl **AND** bisacodyl    dextrose    dextrose    glucagon (human recombinant)    HYDROmorphone **AND** naloxone    melatonin    ondansetron    ondansetron ODT **OR** [DISCONTINUED] ondansetron    oxyCODONE    phenylephrine    [COMPLETED] Insert Peripheral IV **AND** sodium chloride    sodium chloride    sodium chloride          Assessment  73 y.o. male who presented with GI bleed was found to have invasive adenocarcinoma. Staging workup didn't show metastatic disease. He is post robotic low anterior resection with anastomosis   Plan / Recommendations    - recovering well  - continue with regular diet   - encourage out of bed and incentive spirometry   - plan for anticoagulation on POD 5   - Clear for discharge from colorectal surgery stand point. Pain meds sent to pharmacy.       07:46 EDT; 3/31/2024        Aakash Burden MD  Colon and Rectal Surgery   [facility]

## 2024-03-31 NOTE — PROGRESS NOTES
Referring Provider: Jacqueline Dietrich MD    Reason for follow-up:  Preoperative and perioperative cardiovascular follow-up.  Colon carcinoma.  Status post colectomy  Atrial fibrillation  Anticoagulation management     Patient Care Team:  Gera Manriquez MD as PCP - General (Internal Medicine)  Gera Manriquez MD as PCP - Family Medicine    Subjective .      ROS    Abdominal soreness.    Today, the patient has been without any chest discomfort ,shortness of breath, palpitations, dizziness or syncope.  Denies having any headache ,,nausea, vomiting , diarrhea constipation, loss of weight or loss of appetite.  Denies having any excessive bruising ,hematuria or blood in the stool.    Review of all systems negative except as indicated    History  Past Medical History:   Diagnosis Date    Benign essential HTN     Hyperlipidemia, mixed     Palpitations        Past Surgical History:   Procedure Laterality Date    CARDIAC CATHETERIZATION      CHOLECYSTECTOMY      COLONOSCOPY N/A 3/27/2024    Procedure: COLONOSCOPY with polypectomy x 18 and sigmoid colon mass biopsies with endscopic spot tattooing;  Surgeon: Fili Quintero MD;  Location: [facility] ENDOSCOPY;  Service: Gastroenterology;  Laterality: N/A;  sigmoid mass at 25 cm    KNEE SURGERY         History reviewed. No pertinent family history.    Social History     Tobacco Use    Smoking status: Former    Smokeless tobacco: Never   Vaping Use    Vaping status: Never Used   Substance Use Topics    Alcohol use: Never    Drug use: Never        Medications Prior to Admission   Medication Sig Dispense Refill Last Dose    amLODIPine (NORVASC) 10 MG tablet 1 tablet Daily.   3/25/2024    aspirin 81 MG chewable tablet Chew 1 tablet Daily.   3/25/2024    atorvastatin (LIPITOR) 20 MG tablet Take 1 tablet by mouth Every Night.   3/25/2024    losartan (COZAAR) 25 MG tablet TAKE 1 TABLET EVERY DAY 90 tablet 1 3/25/2024    meloxicam (MOBIC) 15 MG tablet Take 1 tablet by mouth Daily.    3/25/2024    metFORMIN (GLUCOPHAGE) 500 MG tablet Take 1 tablet by mouth Daily With Breakfast.   3/25/2024    metoprolol succinate XL (TOPROL-XL) 100 MG 24 hr tablet Take 1 tablet by mouth Daily.   3/25/2024    Multiple Vitamins-Minerals (MULTIVITAMIN ADULT PO) Take 1 tablet by mouth Daily.   3/25/2024    Omeprazole (EQL Omeprazole) 20 MG tablet delayed-release 20 mg Daily.   3/25/2024    sertraline (Zoloft) 25 MG tablet 1 tablet Daily.   3/25/2024       Allergies  Patient has no known allergies.    Scheduled Meds:acetaminophen, 1,000 mg, Oral, Q6H  alvimopan, 12 mg, Oral, BID  aspirin, 81 mg, Oral, Daily  atorvastatin, 20 mg, Oral, Nightly  heparin (porcine), 5,000 Units, Subcutaneous, Q8H  insulin lispro, 2-9 Units, Subcutaneous, 4x Daily AC & at Bedtime  methocarbamol, 750 mg, Oral, 4x Daily  metoprolol tartrate, 75 mg, Oral, Q12H  metroNIDAZOLE, 500 mg, Intravenous, Once  pantoprazole, 40 mg, Oral, Q AM  pregabalin, 25 mg, Oral, Q8H  sertraline, 25 mg, Oral, Daily  sodium chloride, 10 mL, Intravenous, Q12H      Continuous Infusions:phenylephrine, 0.5-3 mcg/kg/min  Scopolamine, 1 patch      PRN Meds:.  acetaminophen    senna-docusate sodium **AND** polyethylene glycol **AND** bisacodyl **AND** bisacodyl    dextrose    dextrose    glucagon (human recombinant)    HYDROmorphone **AND** naloxone    melatonin    ondansetron    ondansetron ODT **OR** [DISCONTINUED] ondansetron    oxyCODONE    phenylephrine    [COMPLETED] Insert Peripheral IV **AND** sodium chloride    sodium chloride    sodium chloride    Objective     VITAL SIGNS  Vitals:    03/30/24 2126 03/31/24 0005 03/31/24 0403 03/31/24 0737   BP: 146/90 109/73 109/73 113/78   BP Location: Left arm Left arm Left arm Left arm   Patient Position: Lying Lying Lying Sitting   Pulse: 118 90 83 90   Resp: 20 20 18 19   Temp: 98 °F (36.7 °C) 98.2 °F (36.8 °C) 97.4 °F (36.3 °C) 97.5 °F (36.4 °C)   TempSrc: Oral Oral Oral Oral   SpO2: 96% 93% 95% 95%   Weight:      "  Height:           Flowsheet Rows      Flowsheet Row First Filed Value   Admission Height 180.3 cm (71\") Documented at 03/25/2024 2015   Admission Weight 115 kg (253 lb 1.4 oz) Documented at 03/25/2024 2015              Intake/Output Summary (Last 24 hours) at 3/31/2024 0742  Last data filed at 3/31/2024 0005  Gross per 24 hour   Intake 3100 ml   Output 1250 ml   Net 1850 ml        TELEMETRY: Atrial fibrillation with controlled ventricular response.    Physical Exam:  The patient is alert, oriented and in no distress.  Vital signs as noted above.  Exogenous obesity (BMI 35)  Head and neck revealed no carotid bruits or jugular venous distention.  No thyromegaly or lymphadenopathy is present  Lungs clear.  No wheezing.  Breath sounds are normal bilaterally.  Heart normal first and second heart sounds.  No murmur. No precordial rub is present.  No gallop is present.  Abdomen soft and nontender.  No organomegaly is present.  Extremities with good peripheral pulses without any pedal edema.  Skin warm and dry.  Musculoskeletal system is grossly normal  CNS grossly normal    Reviewed and updated.    Results Review:   I reviewed the patient's new clinical results.  Lab Results (last 24 hours)       Procedure Component Value Units Date/Time    POC Glucose 4x Daily Before Meals & at Bedtime [[id number]]  (Normal) Collected: 03/31/24 0737    Specimen: Blood Updated: 03/31/24 0738     Glucose 105 mg/dL      Comment: Serial Number: [id number]:  [id number]       CBC & Differential [[id number]]  (Abnormal) Collected: 03/30/24 2254    Specimen: Blood from Arm, Right Updated: 03/30/24 2336    Narrative:      The following orders were created for panel order CBC & Differential.  Procedure                               Abnormality         Status                     ---------                               -----------         ------                     CBC Auto Differential[[id number]]        Abnormal            Final result         "       Scan Slide[[id number]]                                       Final result                 Please view results for these tests on the individual orders.    CBC Auto Differential [[id number]]  (Abnormal) Collected: 03/30/24 2254    Specimen: Blood from Arm, Right Updated: 03/30/24 2336     WBC 12.36 10*3/mm3      RBC 4.28 10*6/mm3      Hemoglobin 11.8 g/dL      Hematocrit 38.0 %      MCV 88.8 fL      MCH 27.6 pg      MCHC 31.1 g/dL      RDW 14.9 %      RDW-SD 48.3 fl      MPV 13.5 fL      Platelets 140 10*3/mm3      Comment: Result checked          Neutrophil % 81.8 %      Lymphocyte % 12.7 %      Monocyte % 5.0 %      Eosinophil % 0.0 %      Basophil % 0.2 %      Immature Grans % 0.3 %      Neutrophils, Absolute 10.11 10*3/mm3      Lymphocytes, Absolute 1.57 10*3/mm3      Monocytes, Absolute 0.62 10*3/mm3      Eosinophils, Absolute 0.00 10*3/mm3      Basophils, Absolute 0.02 10*3/mm3      Immature Grans, Absolute 0.04 10*3/mm3      nRBC 0.0 /100 WBC     Scan Slide [[id number]] Collected: 03/30/24 2254    Specimen: Blood from Arm, Right Updated: 03/30/24 2336     Elliptocytes Slight/1+     WBC Morphology Normal     Platelet Estimate Adequate    Basic Metabolic Panel [[id number]]  (Abnormal) Collected: 03/30/24 2254    Specimen: Blood from Arm, Right Updated: 03/30/24 2332     Glucose 206 mg/dL      BUN 13 mg/dL      Creatinine 1.13 mg/dL      Sodium 139 mmol/L      Potassium 4.4 mmol/L      Chloride 108 mmol/L      CO2 18.0 mmol/L      Calcium 8.2 mg/dL      BUN/Creatinine Ratio 11.5     Anion Gap 13.0 mmol/L      eGFR 69.1 mL/min/1.73     Narrative:      GFR Normal >60  Chronic Kidney Disease <60  Kidney Failure <15    The GFR formula is only valid for adults with stable renal function between ages 18 and 70.    POC Glucose Once [[id number]]  (Abnormal) Collected: 03/30/24 2137    Specimen: Blood Updated: 03/30/24 2139     Glucose 143 mg/dL      Comment: Serial Number: [id number]:  [id number]       POC Glucose  Once [[id number]]  (Abnormal) Collected: 03/30/24 1633    Specimen: Blood Updated: 03/30/24 1634     Glucose 141 mg/dL      Comment: Serial Number: [id number]:  [id number]               Imaging Results (Last 24 Hours)       ** No results found for the last 24 hours. **        LAB RESULTS (LAST 7 DAYS)    CBC  Results from last 7 days   Lab Units 03/30/24 2254 03/30/24 0459 03/29/24 0425 03/28/24 0401 03/27/24 0442 03/26/24 0440 03/25/24  2130   WBC 10*3/mm3 12.36* 11.35* 9.72 10.06 9.20 9.06 9.55   RBC 10*6/mm3 4.28 4.81 4.57 4.76 5.34 4.79 5.01   HEMOGLOBIN g/dL 11.8* 13.2 12.6* 12.9* 14.5 13.2 14.3   HEMATOCRIT % 38.0 42.5 40.2 41.8 46.6 42.1 43.5   MCV fL 88.8 88.4 88.0 87.8 87.3 87.9 86.8   PLATELETS 10*3/mm3 140 141 150 137* 164 143 160       BMP  Results from last 7 days   Lab Units 03/30/24 2254 03/30/24 0459 03/29/24 0425 03/28/24 0401 03/27/24 0442 03/26/24 0440 03/25/24  2130   SODIUM mmol/L 139 140 141 143 142 142 140   POTASSIUM mmol/L 4.4 4.3 4.5 4.2 4.4 4.4 4.3   CHLORIDE mmol/L 108* 108* 108* 108* 107 108* 107   CO2 mmol/L 18.0* 21.0* 23.0 24.0 22.0 22.0 21.0*   BUN mg/dL 13 13 15 13 15 18 17   CREATININE mg/dL 1.13 1.05 1.03 0.89 0.97 1.12 1.07   GLUCOSE mg/dL 206* 98 101* 95 100* 114* 164*   MAGNESIUM mg/dL  --   --   --   --  2.4  --   --        CMP   Results from last 7 days   Lab Units 03/30/24 2254 03/30/24 0459 03/29/24  0425 03/28/24  0401 03/27/24  0442 03/26/24  0440 03/25/24  2130   SODIUM mmol/L 139 140 141 143 142 142 140   POTASSIUM mmol/L 4.4 4.3 4.5 4.2 4.4 4.4 4.3   CHLORIDE mmol/L 108* 108* 108* 108* 107 108* 107   CO2 mmol/L 18.0* 21.0* 23.0 24.0 22.0 22.0 21.0*   BUN mg/dL 13 13 15 13 15 18 17   CREATININE mg/dL 1.13 1.05 1.03 0.89 0.97 1.12 1.07   GLUCOSE mg/dL 206* 98 101* 95 100* 114* 164*   ALBUMIN g/dL  --   --   --   --  4.6  --  4.6   BILIRUBIN mg/dL  --   --   --   --  0.6  --  0.2   ALK PHOS U/L  --   --   --   --  107  --  120*   AST (SGOT) U/L  --   --    --   --  40  --  29   ALT (SGPT) U/L  --   --   --   --  46*  --  <5         BNP        TROPONIN        CoAg        Creatinine Clearance  Estimated Creatinine Clearance: 75.6 mL/min (by C-G formula based on SCr of 1.13 mg/dL).    ABG        Radiology  No radiology results for the last day        EKG      I personally viewed and interpreted the patient's EKG/Telemetry data: Atrial fibrillation    ECHOCARDIOGRAM:    Results for orders placed during the hospital encounter of 03/25/24    Adult Transthoracic Echo Complete W/ Cont if Necessary Per Protocol    Interpretation Summary    Left ventricular systolic function is low normal. Calculated left ventricular EF = 46% Left ventricular ejection fraction appears to be 46 - 50%.    The left ventricular cavity is mildly dilated.    Left ventricular diastolic dysfunction is noted.    The left atrial cavity is dilated.    Estimated right ventricular systolic pressure from tricuspid regurgitation is normal (<35 mmHg).    Dilation of the aortic root is present.  4.4 cm    Patient in atrial fibrillation during this study.          STRESS TEST  Results for orders placed during the hospital encounter of 03/25/24    Stress Test With Myocardial Perfusion One Day    Interpretation Summary    Myocardial perfusion imaging indicates a normal myocardial perfusion study with no evidence of ischemia. Impressions are consistent with a low risk study.    Left ventricular ejection fraction is moderately reduced (Calculated EF = 32%).    This is normal Cardiolite imaging stress test with no evidence of ischemia or myocardial infarction.  Small fixed apical defect is noted which is thought to be attenuation artifact left ventricle size and function is normal on gated SPECT imaging.  No wall motion abnormality was noted.  Clinical correlation recommended.  Further recommendation as per ordering physician. .    Findings consistent with an equivocal ECG stress test.        Cardiolite (Tc-99m  "sestamibi) stress test    CARDIAC CATHETERIZATION  No results found for this or any previous visit.                OTHER:         Assessment & Plan     Principal Problem:    GI bleeding  Active Problems:    Rectal bleeding    GI bleed    Reviewed primary cardiologist note.    \"Atrial fibrillation with RVR  Duration unknown  Was present on admission 3/25/2024  No prior history of A-fib  [id number] equals 2  Echocardiogram EF 45 to 50% aortic root 4.4 cm  Currently on IV Cardizem     Preop cardiovascular risk assessment requested  No signs or symptoms to suggest unstable angina  Lexiscan Myoview pending     Rectal bleeding  Colonoscopy revealed: Mass   Colorectal surgery following  preprocedure cardiovascular risk assessment requested  Patient is not having any signs or symptoms to suggest unstable angina     Hypertension  Continue current therapy     Coronary artery disease  Reported to be nonobstructive by previous cath in 2012 done at [facility]           PLAN:     Dr. Greenberg to review stress test  Surgery now has been moved up with anticipated surgeryTomorrow if cleared  Stress test showed no reversible ischemia.  Acceptable risk to proceed with surgery  Timing still being determined  Post operatively cardiology will need to see pt; will need to start anticoagulation for atrial fibrillation when ok from a surgical standpoint post op  If surgery deferred, ok for d/c from cards standpoint  Would continue oral metoprolol and resume home losartan  Mild LV dysfunction noted on echo EF 45-50%     Patient is seen and examined and findings are verified.  All data is reviewed by me personally.  Assessment and plan formulated by APC was done after discussion with attending.  I spent more than 50% of time in taking care of the patient.     MDM:     1.  Preop clearance:     Patient underwent stress test and it showed no ischemia.  Fixed apical defect noted.  Mild LV enlargement and dysfunction was noted.  Patient " "can be cleared for surgery continue beta-blocker.     2.  Cardiomyopathy:     Patient has mild left ventricular dysfunction with EF of 45 to 50%.  This may be due to tachycardia mediated cardiomyopathy due to uncontrolled atrial fibrillation.     3.  Persistent atrial fibrillation:     Patient has relatively newly diagnosed atrial fibrillation.  After the surgery tomorrow next week, patient should be started back on anticoagulation.  I would consider cardioversion in future     4.  Hypertension:     Blood pressure is controlled     5.  Colonic mass:     Patient is cleared for the surgery\"    ]]]]]]]]]]]]]]]]]]]]]]  3/31/2024.  Patient recently presented with rectal bleed.  Patient was found to have colon carcinoma.  Patient had colon resection 3/30/2024.  Patient did not have any perioperative cardiovascular problems.    Persistent and probably chronic atrial fibrillation-asymptomatic.  Not sure of the exact duration.    Cardiomyopathy left ventricle ejection fraction 45 to 50%.  Probably tachycardia induced.    Hypertension-well-controlled.  113/78    Hemoglobin 11.8  BUN/creatinine 13/1.13    Current medications aspirin atorvastatin subcu heparin insulin metoprolol tartrate 75 mg p.o. every 12 hours pantoprazole sertraline    Observe her rhythm closely.    Anticoagulation and attempts to convert to sinus rhythm after patient surgical  Intervention improved.    Have discussed with patient's family at bedside    Reviewed and updated-3/31/2024.    Dr. Greenberg primary cardiologist will see the patient tomorrow.    ]]]]]]]]]]]]]]]]]]]]]]]                Krissy Montemayor MD  03/31/24  07:42 EDT                "

## 2024-03-31 NOTE — DISCHARGE INSTRUCTIONS
Post-Operative Discharge Instructions: Colon resection  [facility]      Your successful surgery marks an important step toward your recovery. To ensure a smooth and nielsen recovery process, please follow these post-operative discharge instructions:    1. Wound Care:    -Keep the incision areas clean and dry.  -Report any signs of infection such as increased redness, swelling, or discharge.  - Its ok to take a shower.     2. Pain Management:    -Take prescribed pain medications as directed.  - Take tylenol and motrin alternatively every three hours. You can take prescribed narcotics only if you have breakthrough pain.   - Do not drive if you have taken narcotic pain meds.       3. Activity:    - Begin with light activities like short walks around the house.  - Gradually increase activity levels as tolerated, but avoid strenuous activities for the first few weeks.  - Avoid heavy lifting (more than 10 pounds) until cleared by your surgeon.    4. Diet:    - Continue with regular diet. If you start feeling bloated or nauseas, back down to clear liquid diet and progress to a full liquid diet as tolerated.Gradually reintroduce solid foods.  - Stay well-hydrated and avoid carbonated beverages initially.    5. Medications:    -Resume regular medications as instructed by your healthcare provider.    6. Follow-up Appointments:    - Schedule and attend all follow-up appointments with your surgeon and healthcare team.  - Discuss any concerns or questions you may have about your recovery.    7. Signs of Complications:    Seek immediate medical attention if you experience:  -Persistent fever  -Increased abdominal pain  -Worsening redness, swelling, or discharge at the incision site  -Persistent nausea or vomiting  -Difficulty breathing or chest pain    8. Rest and Self-Care:    -Allow yourself time to rest and recover.  -Prioritize self-care and listen to your body.  -Reach out to friends, family, or support groups for  assistance and emotional support.    Remember, these instructions are general guidelines, and your surgeon may provide specific recommendations tailored to your case. If you have any questions or concerns during your recovery, don't hesitate to contact your healthcare provider.    Wishing you a smooth and speedy recovery!  Aakash Burden MD   Colon and Rectal Surgery   [facility] [place], [address]  T: [phone]

## 2024-03-31 NOTE — PLAN OF CARE
Goal Outcome Evaluation:            Patient is A & O X 4. The patient has no complaints of pain on this shift. Patient has been up ambulating in the room and back to bed. Patient did sit up in the chair for a few hrs before bed time. The patients wife is at bedside with him. Patient is able to make his needs known and the call light is with in reach. Will continue with the patients care.

## 2024-03-31 NOTE — PROGRESS NOTES
[facility] MEDICINE SERVICE  DAILY PROGRESS NOTE    NAME: Viktor Atkins  : 1950  MRN: [id number]      LOS: 4 days     PROVIDER OF SERVICE: Jacqueline Dietrich MD    Chief Complaint: GI bleeding    Subjective:     Interval History:  History taken from: patient    Patient is accompanied by his family.  Patient reports that he started having bowel movements on Monday, 3 days ago.,  When he came to the emergency room.  Today he had a colonoscopy and 18 polyps removed and he was told that he has a mass and will now need to speak with a surgeon.  States that this was his first colonoscopy.  Continues to have scant hematochezia after the colonoscopy.  Takes aspirin every day but no other blood thinners.     On arrival to the emergency room he was diagnosed with A-fib with RVR, new per patient.  States that he previously had some flutters but this is the first time he is learned about A-fib.  We discussed [id number] score of 2, indication for anticoagulation and GI bleed at this time, possible surgery coming up.  Will defer the anticoagulation after his surgery.  We discussed sleep study as outpatient eventually.  Denies any fevers, chills, night sweats, dyspnea, chest pain, nausea, abdominal pain, dysuria, hematuria, or edema.     3/28/24 patient seen and examined in bed no acute distress, vital signs stable, discussed with RN, discussed with cardiology.  He is cleared for surgery.  Awaiting for surgical input.  3/29/24 patient seen and examined in bed no acute distress, vital signs stable, discussed with RN, for possible surgery tomorrow.  3/30/24 Plan for OR today, COLON RESECTION LOW ANTERIOR LAPAROSCOPIC, COLONAL ANASTOMOSIS, DIVERTING LOOP ILEOSTOMY WITH DAVINCI ROBOT; SIGMOIDOSCOPY DW RN, VSS      3/31 patient seen and examined, resting in bed comfortably.  Denying any pain in the abdomen, tolerating diet okay.  States that he had a bowel movement but it was grossly bloody.      Objective:     Vital  Signs  Temp:  [97.2 °F (36.2 °C)-98.8 °F (37.1 °C)] 98.7 °F (37.1 °C)  Heart Rate:  [] 99  Resp:  [14-27] 14  BP: ()/(59-90) 120/70  Flow (L/min):  [1-6] 1   Body mass index is 34.89 kg/m².    Physical Exam  General: Well appearing, well nourished, in no distress. Appears stated age     HEENT: Head: Normocephalic, atraumatic. Conjunctiva clear, sclera non-icteric, EOM intact, PERRL, hearing intact. Nose without external lesions. Mucous membranes moist, no mucosal lesions,    Neck: Supple, without lesions   Heart: normal rate, irregular rhythm, no murmurs / rubs / gallops    Lungs: No signs of respiratory distress. Moving air well. Clear without crackles, rhonchi, wheezes   Abdomen: Bowel sounds normal, no tenderness, no distension, no masses   Musculoskeletal: No edema. No joint swelling, or pain on palpation. No tenderness on palpation over the spinal processes  Skin: Warm, dry, intact without rashes or lesions. Appropriate color for ethnicity.   Neurologic: Alert and oriented x3, CN 2-12 normal. Motor function intact bilaterally. Sensation intact bilaterally.   Psychiatric: Appropriate mood and affect.   Scheduled Meds   acetaminophen, 1,000 mg, Oral, Q6H  alvimopan, 12 mg, Oral, BID  aspirin, 81 mg, Oral, Daily  atorvastatin, 20 mg, Oral, Nightly  dilTIAZem CD, 120 mg, Oral, Q24H  heparin (porcine), 5,000 Units, Subcutaneous, Q8H  insulin lispro, 2-9 Units, Subcutaneous, 4x Daily AC & at Bedtime  methocarbamol, 750 mg, Oral, 4x Daily  metoprolol tartrate, 75 mg, Oral, Q12H  metroNIDAZOLE, 500 mg, Intravenous, Once  pantoprazole, 40 mg, Oral, Q AM  pregabalin, 25 mg, Oral, Q8H  sertraline, 25 mg, Oral, Daily  sodium chloride, 10 mL, Intravenous, Q12H       PRN Meds     acetaminophen    senna-docusate sodium **AND** polyethylene glycol **AND** bisacodyl **AND** bisacodyl    dextrose    dextrose    glucagon (human recombinant)    HYDROmorphone **AND** naloxone    melatonin    ondansetron    ondansetron  ODT **OR** [DISCONTINUED] ondansetron    oxyCODONE    phenylephrine    [COMPLETED] Insert Peripheral IV **AND** sodium chloride    sodium chloride    sodium chloride   Infusions  phenylephrine, 0.5-3 mcg/kg/min  Scopolamine, 1 patch        Diagnostic Data    Results from last 7 days   Lab Units 03/30/24  2254 03/28/24  0401 03/27/24  0442   WBC 10*3/mm3 12.36*   < > 9.20   HEMOGLOBIN g/dL 11.8*   < > 14.5   HEMATOCRIT % 38.0   < > 46.6   PLATELETS 10*3/mm3 140   < > 164   GLUCOSE mg/dL 206*   < > 100*   CREATININE mg/dL 1.13   < > 0.97   BUN mg/dL 13   < > 15   SODIUM mmol/L 139   < > 142   POTASSIUM mmol/L 4.4   < > 4.4   AST (SGOT) U/L  --   --  40   ALT (SGPT) U/L  --   --  46*   ALK PHOS U/L  --   --  107   BILIRUBIN mg/dL  --   --  0.6   ANION GAP mmol/L 13.0   < > 13.0    < > = values in this interval not displayed.     No radiology results for the last day    I reviewed the patient's new clinical results.    Assessment/Plan:     Active and Resolved Problems  Active Hospital Problems    Diagnosis  POA    **GI bleeding [K92.2]  Yes    GI bleed [K92.2]  Yes    Rectal bleeding [K62.5]  Unknown      Resolved Hospital Problems   No resolved problems to display.       73-year-old male presented with GI bleed, found to have invasive adenocarcinoma, had staging workup done that did not show any metastatic disease, he is s/p robotic low anterior resection with anastomosis.  Abdomen currently looks good.  Colorectal surgery has signed off.  --Patient is tolerating diet okay.  --Denying any nausea vomiting and has had bowel movement ever continues to be grossly bloody.    Patient was also found to have new onset A-fib with RVR, was on Cardizem drip prior to the surgery, and the Cardizem drip has not been turned off however his heart rate is uncontrolled, will start him on oral Cardizem long-acting.  Monitor heart rate.  -He will likely require anticoagulation however currently having grossly bloody bowel movements which  is expected after surgery.,   -Will defer further to cardiology and surgery-the timing for initiation for blood thinners    Leukocytosis, slightly worse as compared to yesterday, likely reactive.  Monitor.     Hypertension-Moderately controlled   - Continue Norvasc and losartan  - Monitor while admitted      Diabetes mellitus-Moderately controlled  -On metformin  -Diabetic and cardiac diet  -SSI  -Monitor before meals and at bedtime      Hyperlipidemia  -Continue Lipitor     Depression/anxiety  -Continue Zoloft     Obesity-BMI 34.98  -Lifestyle modifications     DVT prophylaxis:  Medical and mechanical DVT prophylaxis orders are present.    Code status is   Code Status and Medical Interventions:   Ordered at: 03/30/24 1444     Level Of Support Discussed With:    Patient     Code Status (Patient has no pulse and is not breathing):    CPR (Attempt to Resuscitate)     Medical Interventions (Patient has pulse or is breathing):    Full       Plan for disposition: Once heart rate controlled and blood thinners initiated    Time: 30 minutes    Signature: Electronically signed by Jacqueline Dietrich MD, 03/31/24, 12:17 EDT.  [facility] Hospitalist Team

## 2024-04-01 ENCOUNTER — READMISSION MANAGEMENT (OUTPATIENT)
Dept: CALL CENTER | Facility: HOSPITAL | Age: 74
End: 2024-04-01
Payer: MEDICARE

## 2024-04-01 VITALS
HEIGHT: 71 IN | OXYGEN SATURATION: 97 % | TEMPERATURE: 98.7 F | RESPIRATION RATE: 24 BRPM | SYSTOLIC BLOOD PRESSURE: 124 MMHG | BODY MASS INDEX: 35 KG/M2 | DIASTOLIC BLOOD PRESSURE: 73 MMHG | HEART RATE: 95 BPM | WEIGHT: 250 LBS

## 2024-04-01 LAB
GLUCOSE BLDC GLUCOMTR-MCNC: 114 MG/DL (ref 70–105)
GLUCOSE BLDC GLUCOMTR-MCNC: 132 MG/DL (ref 70–105)
LAB AP CASE REPORT: NORMAL
LAB AP DIAGNOSIS COMMENT: NORMAL
PATH REPORT.ADDENDUM SPEC: NORMAL
PATH REPORT.FINAL DX SPEC: NORMAL
PATH REPORT.GROSS SPEC: NORMAL

## 2024-04-01 PROCEDURE — 25010000002 HEPARIN (PORCINE) PER 1000 UNITS: Performed by: STUDENT IN AN ORGANIZED HEALTH CARE EDUCATION/TRAINING PROGRAM

## 2024-04-01 PROCEDURE — 82948 REAGENT STRIP/BLOOD GLUCOSE: CPT | Performed by: STUDENT IN AN ORGANIZED HEALTH CARE EDUCATION/TRAINING PROGRAM

## 2024-04-01 RX ORDER — DILTIAZEM HYDROCHLORIDE 180 MG/1
180 CAPSULE, EXTENDED RELEASE ORAL DAILY
Qty: 90 CAPSULE | Refills: 1 | Status: SHIPPED | OUTPATIENT
Start: 2024-04-01

## 2024-04-01 RX ORDER — DILTIAZEM HYDROCHLORIDE 120 MG/1
120 CAPSULE, COATED, EXTENDED RELEASE ORAL
Status: COMPLETED | OUTPATIENT
Start: 2024-04-01 | End: 2024-04-01

## 2024-04-01 RX ORDER — DABIGATRAN ETEXILATE 150 MG/1
150 CAPSULE ORAL 2 TIMES DAILY
Qty: 60 CAPSULE | Refills: 3 | Status: SHIPPED | OUTPATIENT
Start: 2024-04-01 | End: 2024-04-01 | Stop reason: HOSPADM

## 2024-04-01 RX ADMIN — METHOCARBAMOL 750 MG: 750 TABLET, FILM COATED ORAL at 11:56

## 2024-04-01 RX ADMIN — SERTRALINE HYDROCHLORIDE 25 MG: 25 TABLET ORAL at 09:59

## 2024-04-01 RX ADMIN — ACETAMINOPHEN 1000 MG: 500 TABLET, FILM COATED ORAL at 03:38

## 2024-04-01 RX ADMIN — ALVIMOPAN 12 MG: 12 CAPSULE ORAL at 09:59

## 2024-04-01 RX ADMIN — HEPARIN SODIUM 5000 UNITS: 5000 INJECTION INTRAVENOUS; SUBCUTANEOUS at 13:47

## 2024-04-01 RX ADMIN — PANTOPRAZOLE SODIUM 40 MG: 40 TABLET, DELAYED RELEASE ORAL at 06:00

## 2024-04-01 RX ADMIN — ASPIRIN 81 MG CHEWABLE TABLET 81 MG: 81 TABLET CHEWABLE at 09:59

## 2024-04-01 RX ADMIN — DILTIAZEM HYDROCHLORIDE 120 MG: 120 CAPSULE, EXTENDED RELEASE ORAL at 09:59

## 2024-04-01 RX ADMIN — PREGABALIN 25 MG: 25 CAPSULE ORAL at 06:00

## 2024-04-01 RX ADMIN — METOPROLOL TARTRATE 75 MG: 50 TABLET, FILM COATED ORAL at 09:59

## 2024-04-01 RX ADMIN — DILTIAZEM HYDROCHLORIDE 120 MG: 120 CAPSULE, EXTENDED RELEASE ORAL at 11:56

## 2024-04-01 RX ADMIN — PREGABALIN 25 MG: 25 CAPSULE ORAL at 13:47

## 2024-04-01 RX ADMIN — ACETAMINOPHEN 1000 MG: 500 TABLET, FILM COATED ORAL at 09:58

## 2024-04-01 RX ADMIN — HEPARIN SODIUM 5000 UNITS: 5000 INJECTION INTRAVENOUS; SUBCUTANEOUS at 06:00

## 2024-04-01 RX ADMIN — METHOCARBAMOL 750 MG: 750 TABLET, FILM COATED ORAL at 09:59

## 2024-04-01 RX ADMIN — Medication 10 ML: at 10:01

## 2024-04-01 NOTE — PLAN OF CARE
PT screened pt as pt was seen ambulating within hallway ad laney. He reports his mobility is at/near baseline and denies difficulty with balance or LE strength. Discussed log roll and abdominal incision precautions and pt was receptive of education. PT will complete orders at this time.    Satisfactory

## 2024-04-01 NOTE — PLAN OF CARE
Goal Outcome Evaluation:      Patient is A & O X 4. The patient has had no complaints of pain on this shift. Patient has been up ambulating in his room, to the bathroom and back to the chair or bed. The patient is able to make his needs known and the call light is with in reach. Will continue with the patients care.

## 2024-04-01 NOTE — CASE MANAGEMENT/SOCIAL WORK
Case Management Discharge Note      Final Note: HOME         Selected Continued Care - Discharged on 4/1/2024 Admission date: 3/25/2024 - Discharge disposition: Home or Self Care            Transportation Services  Private: Car    Final Discharge Disposition Code: 01 - home or self-care

## 2024-04-01 NOTE — PROGRESS NOTES
Colorectal Surgery Progress Note    Pt. Name/Age/:  Viktor Atkins   73 y.o.    1950         Med. Record Number:   [id number]  Date of admission:  3/25/2024      Service(s): Colorectal Surgery      Subjective    Patient doing well  Out of bed and ambulating   No nausea   Tolerating diet   Passing flatus   Voided freely   Some soreness over incisions   Had some blood mixed stool yesterday     Objective  Vitals:     Patient Vitals for the past 24 hrs:   BP Temp Temp src Pulse Resp SpO2   24 1117 124/73 98.7 °F (37.1 °C) Oral 95 24 97 %   24 1001 143/76 -- -- 114 -- --   24 1950 159/84 98.6 °F (37 °C) Oral -- (!) 30 98 %           Wt. Admission: Weight: 115 kg (253 lb 1.4 oz)     Wt. Current: Weight: 113 kg (250 lb)   Body mass index is 34.89 kg/m².      I&O:  Intake/Output Summary (Last 24 hours) at 2024 1158  Last data filed at 2024 0940  Gross per 24 hour   Intake 720 ml   Output --   Net 720 ml      1901 -  0700  In: 720 [P.O.:720]  Out: 500 [Urine:500]      Exam  General:  No acute distress, resting comfortably.  Cardiovascular: regular rate.  Respiratory: no increased work of breathing.  Abdomen:  Soft, appropriate incisional tenderness, non-distended. No diffuse guarding or rebound tenderness. Incisions c/d/I.  Extremities: warm, well-perfused extremities, no pitting edema.      Labs:     [unfilled]          Scheduled Meds:acetaminophen, 1,000 mg, Oral, Q6H  alvimopan, 12 mg, Oral, BID  aspirin, 81 mg, Oral, Daily  atorvastatin, 20 mg, Oral, Nightly  dilTIAZem CD, 120 mg, Oral, Q24H  heparin (porcine), 5,000 Units, Subcutaneous, Q8H  insulin lispro, 2-9 Units, Subcutaneous, 4x Daily AC & at Bedtime  methocarbamol, 750 mg, Oral, 4x Daily  metoprolol tartrate, 75 mg, Oral, Q12H  pantoprazole, 40 mg, Oral, Q AM  pregabalin, 25 mg, Oral, Q8H  sertraline, 25 mg, Oral, Daily  sodium chloride, 10 mL, Intravenous, Q12H      Continuous Infusions:phenylephrine, 0.5-3  mcg/kg/min  Scopolamine, 1 patch      PRN Meds:.  acetaminophen    senna-docusate sodium **AND** polyethylene glycol **AND** bisacodyl **AND** bisacodyl    dextrose    dextrose    glucagon (human recombinant)    HYDROmorphone **AND** naloxone    melatonin    ondansetron    ondansetron ODT **OR** [DISCONTINUED] ondansetron    oxyCODONE    phenylephrine    [COMPLETED] Insert Peripheral IV **AND** sodium chloride    sodium chloride    sodium chloride          Assessment  73 y.o. male who presented with GI bleed was found to have invasive adenocarcinoma. Staging workup didn't show metastatic disease. He is post robotic low anterior resection with anastomosis   Plan / Recommendations    - recovering well  - continue with regular diet   - encourage out of bed and incentive spirometry   - plan for anticoagulation on POD 5   - Clear for discharge from colorectal surgery stand point. Pain meds sent to pharmacy.       11:58 EDT; 4/1/2024        Aakash Burden MD  Colon and Rectal Surgery   Jackie Yadav

## 2024-04-01 NOTE — PROGRESS NOTES
Nutrition Services  Patient Name: Viktor Atkins  YOB: 1950  MRN: [id number]  Admission date: 3/25/2024    PROGRESS NOTE      Encounter Information: Progress note to check on diet advancement. Pt has been able to resume solid food (Healthy Heart) diet and is eating well, with 100% intake x 2 meals today. Will continue to monitor intakes. So far tolerating well per provider notes.       PO Diet: Diet: Cardiac; Healthy Heart (2-3 Na+); Fluid Consistency: Thin (IDDSI 0)   PO Supplements: None ordered    PO Intake:  100% x 2 meals since diet advanced        Current nutrition support:    Nutrition support review:        Labs (reviewed below): Reviewed and C/W clinical course    Noted new hyperglycemia >200mg/dL today -- possibly R/t stressors; monitor       GI Function:  Stool Output  Stool Unmeasured Occurrence: 1 (03/28/24 1451)  Bowel Incontinence: No (03/27/24 1028)  Stool Amount: small (03/28/24 1451)          Nutrition Intervention Updates: Continue diet as tolerated.       Results from last 7 days   Lab Units 03/30/24  2254 03/30/24  0459 03/29/24  0425 03/28/24  0401 03/27/24  0442 03/26/24  0440 03/25/24  2130   SODIUM mmol/L 139 140 141   < > 142   < > 140   POTASSIUM mmol/L 4.4 4.3 4.5   < > 4.4   < > 4.3   CHLORIDE mmol/L 108* 108* 108*   < > 107   < > 107   CO2 mmol/L 18.0* 21.0* 23.0   < > 22.0   < > 21.0*   BUN mg/dL 13 13 15   < > 15   < > 17   CREATININE mg/dL 1.13 1.05 1.03   < > 0.97   < > 1.07   CALCIUM mg/dL 8.2* 8.9 9.0   < > 9.3   < > 9.0   BILIRUBIN mg/dL  --   --   --   --  0.6  --  0.2   ALK PHOS U/L  --   --   --   --  107  --  120*   ALT (SGPT) U/L  --   --   --   --  46*  --  <5   AST (SGOT) U/L  --   --   --   --  40  --  29   GLUCOSE mg/dL 206* 98 101*   < > 100*   < > 164*    < > = values in this interval not displayed.     Results from last 7 days   Lab Units 03/31/24  2219 03/28/24  0401 03/27/24  0442   MAGNESIUM mg/dL  --   --  2.4   HEMOGLOBIN g/dL 11.3*   < > 14.5  "  HEMATOCRIT % 36.4*   < > 46.6    < > = values in this interval not displayed.     No results found for: \"COVID19\"  No results found for: \"HGBA1C\"    RD to follow up per protocol.    Electronically signed by:  Domonique Morales RD  03/31/24     "

## 2024-04-01 NOTE — DISCHARGE SUMMARY
[facility] Medicine Services  Discharge Summary    Date of Service: 2024  Patient Name: Viktor Atkins  : 1950  MRN: [id number]    Date of Admission: 3/25/2024  Discharge Diagnosis:   Acute lower GI bleed secondary to colorectal mass  Invasive moderately differentiated adenocarcinoma of the colon  New onset A-fib with RVR  Reactive leukocytosis  Hypertension  DM type II, controlled  Dyslipidemia  Depression with anxiety  Obesity    Date of Discharge: 2024  Primary Care Physician: Gera Manriquez MD      Presenting Problem:   GI bleeding [K92.2]  Gastrointestinal hemorrhage, unspecified gastrointestinal hemorrhage type [K92.2]  GI bleed [K92.2]    Active and Resolved Hospital Problems:  Active Hospital Problems    Diagnosis POA    **GI bleeding [K92.2] Yes    GI bleed [K92.2] Yes    Rectal bleeding [K62.5] Unknown      Resolved Hospital Problems   No resolved problems to display.         Hospital Course     HPI:  Patient is a 73-year-old male who presented to the hospital complaints of lower GI bleeding with hematochezia.  Please see H&P for details.    Hospital Course:  The patient underwent CT of the abdomen which showed evidence of constipation.  He was seen by GI for hematochezia and underwent colonoscopy on 3/27 which showed several polyps that were excised as well as a distal sigmoid mass, concerning for malignancy.  Pathology confirms invasive moderately differentiated adenocarcinoma of the colon.  Staging showed no other evidence of distal metastatic disease.  He was seen by colorectal surgery and underwent partial colectomy with anastomosis on 3/30.  Postoperatively the patient is doing well.  He did have some loose stool but otherwise was tolerating p.o. intake well with minimal pain.  During his hospital stay he also developed new onset A-fib with RVR.  He was rate controlled with metoprolol and will be transitioned back to his home dose of oral Toprol-XL.  He was also  started on Cardizem for additional rate control.  He will follow-up with colorectal surgery in 2 to 3 days to determine the next course of action based on final pathology results from surgery.  He will also need to follow-up with cardiology in 1 month.  He is otherwise stable for discharge.        DISCHARGE Follow Up Recommendations for labs and diagnostics: Follow-up with cardiology in 1 month and follow-up with colorectal surgery in 2 to 3 days.      Reasons For Change In Medications and Indications for New Medications:      Day of Discharge     Vital Signs:  Temp:  [98.6 °F (37 °C)-98.7 °F (37.1 °C)] 98.7 °F (37.1 °C)  Heart Rate:  [] 95  Resp:  [24-30] 24  BP: (124-159)/(73-84) 124/73    Physical Exam:  Physical Exam   General Appearance:  Alert, cooperative, no distress, appears stated age  Head:  Normocephalic, without obvious abnormality, atraumatic  Eyes:  PERRL, conjunctiva/corneas clear, EOM's intact, fundi benign, both eyes  Ears:  Normal TM's and external ear canals, both ears  Nose: Nares normal, septum midline, mucosa normal, no drainage or sinus tenderness  Throat: Lips, mucosa, and tongue normal; teeth and gums normal  Neck: Supple, symmetrical, trachea midline, no adenopathy, thyroid: not enlarged, symmetric, no tenderness/mass/nodules, no carotid bruit or JVD  Lungs:   Clear to auscultation bilaterally, respirations unlabored  Heart:  Regular rate and rhythm, S1, S2 normal, no murmur, rub or gallop  Abdomen:  Soft, non-tender, bowel sounds active all four quadrants,  no masses, no organomegaly, surgical abdominal incisions intact  Extremities: Extremities normal, atraumatic, no cyanosis or edema  Pulses: 2+ and symmetric  Skin: Skin color, texture, turgor normal, no rashes or lesions  Neurologic: Normal        Pertinent  and/or Most Recent Results     LAB RESULTS:      Lab 03/31/24  2310 03/31/24  2219 03/30/24  2254 03/30/24  0459 03/29/24  0425 03/28/24  0401   WBC  --  15.27* 12.36* 11.35*  9.72 10.06   HEMOGLOBIN  --  11.3* 11.8* 13.2 12.6* 12.9*   HEMATOCRIT  --  36.4* 38.0 42.5 40.2 41.8   PLATELETS  --  137* 140 141 150 137*   NEUTROS ABS  --  11.63* 10.11* 6.83 5.22 5.90   IMMATURE GRANS (ABS)  --  0.07* 0.04 0.05 0.03 0.03   LYMPHS ABS  --  2.69 1.57 3.28* 3.37* 3.18*   MONOS ABS  --  0.80 0.62 0.94* 0.84 0.73   EOS ABS  --  0.04 0.00 0.21 0.23 0.19   MCV  --  88.6 88.8 88.4 88.0 87.8   LACTATE 1.1  --   --   --   --   --          Lab 03/31/24 2219 03/30/24 2254 03/30/24 0459 03/29/24 0425 03/28/24  0401 03/27/24 0442   SODIUM 138 139 140 141 143 142   POTASSIUM 4.0 4.4 4.3 4.5 4.2 4.4   CHLORIDE 105 108* 108* 108* 108* 107   CO2 20.0* 18.0* 21.0* 23.0 24.0 22.0   ANION GAP 13.0 13.0 11.0 10.0 11.0 13.0   BUN 22 13 13 15 13 15   CREATININE 1.42* 1.13 1.05 1.03 0.89 0.97   EGFR 52.5* 69.1 75.4 77.2 91.1 82.9   GLUCOSE 136* 206* 98 101* 95 100*   CALCIUM 8.8 8.2* 8.9 9.0 8.8 9.3   MAGNESIUM  --   --   --   --   --  2.4   TSH  --   --   --   --   --  4.850*         Lab 03/27/24 0442 03/25/24 2130   TOTAL PROTEIN 7.2 7.0   ALBUMIN 4.6 4.6   GLOBULIN 2.6 2.4   ALT (SGPT) 46* <5   AST (SGOT) 40 29   BILIRUBIN 0.6 0.2   ALK PHOS 107 120*                 Lab 03/25/24 2130   ABO TYPING O   RH TYPING Positive   ANTIBODY SCREEN Negative         Brief Urine Lab Results       None          Microbiology Results (last 10 days)       ** No results found for the last 240 hours. **            CT Chest Without Contrast Diagnostic    Result Date: 3/27/2024  Impression: Impression: No acute chest process evident. No CT evidence of metastatic disease to the chest. Electronically Signed: Julio Perdomo MD  3/27/2024 4:57 PM EDT  Workstation ID: [id number]    CT Abdomen Pelvis With Contrast    Result Date: 3/25/2024  Impression: Impression: Findings compatible with constipation. Sigmoid diverticulosis without diverticulitis. Electronically Signed: Julio Perdomo MD  3/25/2024 10:43 PM EDT  Workstation ID: [id number]              Results for orders placed during the hospital encounter of 03/25/24    Adult Transthoracic Echo Complete W/ Cont if Necessary Per Protocol    Interpretation Summary    Left ventricular systolic function is low normal. Calculated left ventricular EF = 46% Left ventricular ejection fraction appears to be 46 - 50%.    The left ventricular cavity is mildly dilated.    Left ventricular diastolic dysfunction is noted.    The left atrial cavity is dilated.    Estimated right ventricular systolic pressure from tricuspid regurgitation is normal (<35 mmHg).    Dilation of the aortic root is present.  4.4 cm    Patient in atrial fibrillation during this study.      Labs Pending at Discharge:  Pending Labs       Order Current Status    Tissue Pathology Exam In process            Procedures Performed  Procedure(s):  COLON RESECTION LOW ANTERIOR LAPAROSCOPIC, COLONAL ANASTOMOSIS, DIVERTING LOOP ILEOSTOMY WITH DAVINCI ROBOT  SIGMOIDOSCOPY         Consults:   Consults       Date and Time Order Name Status Description    3/27/2024  2:37 PM Inpatient Hospitalist Consult      3/27/2024  1:58 PM Inpatient Colorectal Surgery Consult Completed     3/27/2024  1:32 PM Inpatient General Surgery Consult      3/27/2024 11:57 AM Inpatient Cardiology Consult Completed     3/26/2024 12:51 AM Inpatient Gastroenterology Consult Completed               Discharge Details        Discharge Medications        New Medications        Instructions Start Date   apixaban 5 MG tablet tablet  Commonly known as: ELIQUIS   5 mg, Oral, 2 Times Daily      dilTIAZem  MG 24 hr capsule  Commonly known as: DILACOR XR   180 mg, Oral, Daily      methocarbamol 750 MG tablet  Commonly known as: ROBAXIN   750 mg, Oral, 4 Times Daily PRN      oxyCODONE 5 MG immediate release tablet  Commonly known as: Roxicodone   5 mg, Oral, Every 8 Hours PRN             Continue These Medications        Instructions Start Date   aspirin 81 MG chewable tablet   81 mg,  Oral, Daily      atorvastatin 20 MG tablet  Commonly known as: LIPITOR   20 mg, Oral, Nightly      EQL Omeprazole 20 MG tablet delayed-release  Generic drug: Omeprazole   1 tablet, Daily      losartan 25 MG tablet  Commonly known as: COZAAR   TAKE 1 TABLET EVERY DAY      meloxicam 15 MG tablet  Commonly known as: MOBIC   15 mg, Oral, Daily      metFORMIN 500 MG tablet  Commonly known as: GLUCOPHAGE   500 mg, Oral, Daily With Breakfast      metoprolol succinate  MG 24 hr tablet  Commonly known as: TOPROL-XL   100 mg, Oral, Daily      multivitamin with minerals tablet tablet   1 tablet, Oral, Daily      Zoloft 25 MG tablet  Generic drug: sertraline   1 tablet, Daily             Stop These Medications      amLODIPine 10 MG tablet  Commonly known as: NORVASC              No Known Allergies      Discharge Disposition:     Home or Self Care    Diet:  Hospital:  Diet Order   Procedures    Diet: Cardiac; Healthy Heart (2-3 Na+); Fluid Consistency: Thin (IDDSI 0)         Discharge Activity:         CODE STATUS:  Code Status and Medical Interventions:   Ordered at: 03/30/24 1444     Level Of Support Discussed With:    Patient     Code Status (Patient has no pulse and is not breathing):    CPR (Attempt to Resuscitate)     Medical Interventions (Patient has pulse or is breathing):    Full         Future Appointments   Date Time Provider Department Center   4/4/2024  1:15 PM Aakash Burden MD [facility]       Additional Instructions for the Follow-ups that You Need to Schedule       Discharge Follow-up with Specialty: Follow-up with colorectal surgery in 2 to 3 days; 2 Days   As directed      Specialty: Follow-up with colorectal surgery in 2 to 3 days   Follow Up: 2 Days        Discharge Follow-up with Specified Provider: Follow-up with cardiology Dr. Greenberg in 1 month; 1 Month   As directed      To: Follow-up with cardiology Dr. Greenberg in 1 month   Follow Up: 1 Month                Time spent on Discharge including face to  face service:  >30 minutes    Signature: Electronically signed by Peyman Castorena MD, 04/01/24, 12:53 EDT.  [facility] Hospitalist Team

## 2024-04-02 LAB
LAB AP CASE REPORT: NORMAL
LAB AP SYNOPTIC CHECKLIST: NORMAL
PATH REPORT.FINAL DX SPEC: NORMAL
PATH REPORT.GROSS SPEC: NORMAL

## 2024-04-02 NOTE — OUTREACH NOTE
Prep Survey      Flowsheet Row Responses   [facility] facility patient discharged from? [facility]   Is LACE score < 7 ? No   Eligibility Readm Mgmt   Discharge diagnosis GI bleeding,  COLON RESECTION LOW ANTERIOR LAPAROSCOPIC, COLONAL ANASTOMOSIS, DIVERTING LOOP ILEOSTOMY   Does the patient have one of the following disease processes/diagnoses(primary or secondary)? General Surgery   Does the patient have Home health ordered? No   Is there a DME ordered? No   Prep survey completed? Yes            Santa SHORE - Registered Nurse

## 2024-04-03 ENCOUNTER — LAB (OUTPATIENT)
Dept: LAB | Facility: HOSPITAL | Age: 74
End: 2024-04-03
Payer: MEDICARE

## 2024-04-03 DIAGNOSIS — C18.7 MALIGNANT NEOPLASM OF SIGMOID COLON: Primary | ICD-10-CM

## 2024-04-03 DIAGNOSIS — C18.7 MALIGNANT NEOPLASM OF SIGMOID COLON: ICD-10-CM

## 2024-04-03 LAB
ANION GAP SERPL CALCULATED.3IONS-SCNC: 15 MMOL/L (ref 5–15)
BASOPHILS # BLD AUTO: 0.03 10*3/MM3 (ref 0–0.2)
BASOPHILS NFR BLD AUTO: 0.2 % (ref 0–1.5)
BUN SERPL-MCNC: 13 MG/DL (ref 8–23)
BUN/CREAT SERPL: 12.5 (ref 7–25)
CALCIUM SPEC-SCNC: 9 MG/DL (ref 8.6–10.5)
CHLORIDE SERPL-SCNC: 108 MMOL/L (ref 98–107)
CO2 SERPL-SCNC: 19 MMOL/L (ref 22–29)
CREAT SERPL-MCNC: 1.04 MG/DL (ref 0.76–1.27)
DEPRECATED RDW RBC AUTO: 45 FL (ref 37–54)
EGFRCR SERPLBLD CKD-EPI 2021: 76.3 ML/MIN/1.73
EOSINOPHIL # BLD AUTO: 0.18 10*3/MM3 (ref 0–0.4)
EOSINOPHIL NFR BLD AUTO: 1.3 % (ref 0.3–6.2)
ERYTHROCYTE [DISTWIDTH] IN BLOOD BY AUTOMATED COUNT: 14.5 % (ref 12.3–15.4)
GLUCOSE SERPL-MCNC: 126 MG/DL (ref 65–99)
HCT VFR BLD AUTO: 34.8 % (ref 37.5–51)
HGB BLD-MCNC: 11.4 G/DL (ref 13–17.7)
IMM GRANULOCYTES # BLD AUTO: 0.11 10*3/MM3 (ref 0–0.05)
IMM GRANULOCYTES NFR BLD AUTO: 0.8 % (ref 0–0.5)
LYMPHOCYTES # BLD AUTO: 3.48 10*3/MM3 (ref 0.7–3.1)
LYMPHOCYTES NFR BLD AUTO: 25.5 % (ref 19.6–45.3)
MCH RBC QN AUTO: 27.9 PG (ref 26.6–33)
MCHC RBC AUTO-ENTMCNC: 32.8 G/DL (ref 31.5–35.7)
MCV RBC AUTO: 85.1 FL (ref 79–97)
MONOCYTES # BLD AUTO: 0.93 10*3/MM3 (ref 0.1–0.9)
MONOCYTES NFR BLD AUTO: 6.8 % (ref 5–12)
NEUTROPHILS NFR BLD AUTO: 65.4 % (ref 42.7–76)
NEUTROPHILS NFR BLD AUTO: 8.93 10*3/MM3 (ref 1.7–7)
NRBC BLD AUTO-RTO: 0 /100 WBC (ref 0–0.2)
PLATELET # BLD AUTO: 259 10*3/MM3 (ref 140–450)
PMV BLD AUTO: 14.3 FL (ref 6–12)
POTASSIUM SERPL-SCNC: 3.5 MMOL/L (ref 3.5–5.2)
RBC # BLD AUTO: 4.09 10*6/MM3 (ref 4.14–5.8)
SODIUM SERPL-SCNC: 142 MMOL/L (ref 136–145)
WBC NRBC COR # BLD AUTO: 13.66 10*3/MM3 (ref 3.4–10.8)

## 2024-04-03 PROCEDURE — 85025 COMPLETE CBC W/AUTO DIFF WBC: CPT

## 2024-04-03 PROCEDURE — 36415 COLL VENOUS BLD VENIPUNCTURE: CPT

## 2024-04-03 PROCEDURE — 80048 BASIC METABOLIC PNL TOTAL CA: CPT

## 2024-04-04 ENCOUNTER — OFFICE VISIT (OUTPATIENT)
Age: 74
End: 2024-04-04
Payer: MEDICARE

## 2024-04-04 ENCOUNTER — HOSPITAL ENCOUNTER (OUTPATIENT)
Dept: CT IMAGING | Facility: HOSPITAL | Age: 74
Discharge: HOME | End: 2024-04-04
Admitting: STUDENT IN AN ORGANIZED HEALTH CARE EDUCATION/TRAINING PROGRAM
Payer: MEDICARE

## 2024-04-04 VITALS
SYSTOLIC BLOOD PRESSURE: 121 MMHG | DIASTOLIC BLOOD PRESSURE: 82 MMHG | BODY MASS INDEX: 33.74 KG/M2 | OXYGEN SATURATION: 99 % | HEIGHT: 71 IN | TEMPERATURE: 98 F | HEART RATE: 93 BPM | WEIGHT: 241 LBS

## 2024-04-04 DIAGNOSIS — C18.7 MALIGNANT NEOPLASM OF SIGMOID COLON: Primary | ICD-10-CM

## 2024-04-04 PROCEDURE — 25510000001 IOPAMIDOL PER 1 ML: Performed by: STUDENT IN AN ORGANIZED HEALTH CARE EDUCATION/TRAINING PROGRAM

## 2024-04-04 PROCEDURE — 74177 CT ABD & PELVIS W/CONTRAST: CPT

## 2024-04-04 PROCEDURE — 3074F SYST BP LT 130 MM HG: CPT | Performed by: STUDENT IN AN ORGANIZED HEALTH CARE EDUCATION/TRAINING PROGRAM

## 2024-04-04 PROCEDURE — 99024 POSTOP FOLLOW-UP VISIT: CPT | Performed by: STUDENT IN AN ORGANIZED HEALTH CARE EDUCATION/TRAINING PROGRAM

## 2024-04-04 PROCEDURE — 3079F DIAST BP 80-89 MM HG: CPT | Performed by: STUDENT IN AN ORGANIZED HEALTH CARE EDUCATION/TRAINING PROGRAM

## 2024-04-04 RX ORDER — METOCLOPRAMIDE 10 MG/1
10 TABLET ORAL
Qty: 30 TABLET | Refills: 0 | Status: SHIPPED | OUTPATIENT
Start: 2024-04-04 | End: 2024-04-18

## 2024-04-04 RX ORDER — METOCLOPRAMIDE 10 MG/1
10 TABLET ORAL
Qty: 14 TABLET | Refills: 0 | Status: SHIPPED | OUTPATIENT
Start: 2024-04-04 | End: 2024-04-04

## 2024-04-04 RX ORDER — SIMETHICONE 80 MG
80 TABLET,CHEWABLE ORAL EVERY 6 HOURS PRN
Qty: 30 TABLET | Refills: 2 | Status: SHIPPED | OUTPATIENT
Start: 2024-04-04 | End: 2024-05-04

## 2024-04-04 RX ORDER — ALPRAZOLAM 0.25 MG/1
0.25 TABLET ORAL 3 TIMES DAILY PRN
Qty: 10 TABLET | Refills: 0 | Status: SHIPPED | OUTPATIENT
Start: 2024-04-04 | End: 2024-04-14

## 2024-04-04 RX ORDER — SCOLOPAMINE TRANSDERMAL SYSTEM 1 MG/1
1 PATCH, EXTENDED RELEASE TRANSDERMAL
Qty: 4 PATCH | Refills: 1 | Status: SHIPPED | OUTPATIENT
Start: 2024-04-04 | End: 2025-04-04

## 2024-04-04 RX ORDER — ONDANSETRON 4 MG/1
4 TABLET, FILM COATED ORAL DAILY PRN
Qty: 30 TABLET | Refills: 1 | Status: SHIPPED | OUTPATIENT
Start: 2024-04-04 | End: 2025-04-04

## 2024-04-04 RX ADMIN — IOPAMIDOL 100 ML: 755 INJECTION, SOLUTION INTRAVENOUS at 15:29

## 2024-04-05 ENCOUNTER — READMISSION MANAGEMENT (OUTPATIENT)
Dept: CALL CENTER | Facility: HOSPITAL | Age: 74
End: 2024-04-05
Payer: MEDICARE

## 2024-04-05 ENCOUNTER — PREP FOR SURGERY (OUTPATIENT)
Dept: OTHER | Facility: HOSPITAL | Age: 74
End: 2024-04-05
Payer: MEDICARE

## 2024-04-05 ENCOUNTER — HOSPITAL ENCOUNTER (OUTPATIENT)
Facility: HOSPITAL | Age: 74
Setting detail: HOSPITAL OUTPATIENT SURGERY
End: 2024-04-05
Attending: STUDENT IN AN ORGANIZED HEALTH CARE EDUCATION/TRAINING PROGRAM | Admitting: STUDENT IN AN ORGANIZED HEALTH CARE EDUCATION/TRAINING PROGRAM
Payer: MEDICARE

## 2024-04-05 DIAGNOSIS — C18.7 MALIGNANT NEOPLASM OF SIGMOID COLON: ICD-10-CM

## 2024-04-05 DIAGNOSIS — C18.7 MALIGNANT NEOPLASM OF SIGMOID COLON: Primary | ICD-10-CM

## 2024-04-05 PROBLEM — C18.9 COLON CANCER: Status: ACTIVE | Noted: 2024-04-05

## 2024-04-05 RX ORDER — SODIUM CHLORIDE 0.9 % (FLUSH) 0.9 %
3-10 SYRINGE (ML) INJECTION AS NEEDED
OUTPATIENT
Start: 2024-04-05

## 2024-04-05 RX ORDER — SODIUM CHLORIDE 0.9 % (FLUSH) 0.9 %
3 SYRINGE (ML) INJECTION EVERY 12 HOURS SCHEDULED
OUTPATIENT
Start: 2024-04-05

## 2024-04-05 RX ORDER — SODIUM CHLORIDE 9 MG/ML
40 INJECTION, SOLUTION INTRAVENOUS AS NEEDED
OUTPATIENT
Start: 2024-04-05

## 2024-04-05 NOTE — OUTREACH NOTE
General Surgery Week 1 Survey      Flowsheet Row Responses   [facility] patient discharged from? [facility]   Does the patient have one of the following disease processes/diagnoses(primary or secondary)? General Surgery   Week 1 attempt successful? No   Unsuccessful attempts Attempt 1            Jessica SPRAGUE - Registered Nurse

## 2024-04-05 NOTE — PROGRESS NOTES
Colorectal Surgery Followup Note    ID:  Viktor Atkins;   : 1950  DATE OF VISIT: 2024    Chief Complaint  Post-op (COLON RESECTION LOW ANTERIOR LAPAROSCOPIC, COLONAL ANASTOMOSIS, DIVERTING LOOP ILEOSTOMY WITH DAVINCI ROBOT)       Subjective    Patient is very anxious to discuss pathology report.  He reports passing flatus and moving his bowels.  His bowel movement has been none bloody.  He reports nausea and belching.  He reports decreased appetite.  He has been out of bed and ambulating.  Denies any fevers or chills.  Reports pain over the incision sites.   Exam  General: Anxious and in distress  Head: Normocephalic, atraumatic  Neuro: Alert and oriented    Abdomen:  Soft, non-tender, non-distended, no hernias, crepitus over the bilateral flank and scrotum, ecchymosis over the incision sites    Tissue Pathology Exam: [id number]  Order: [id number]  Status: Final result       Visible to patient: No (scheduled for 2024  3:55 PM)       Next appt: 2024 at 01:00 PM in Cardiology (Abimael Greenberg MD)       Dx: Pain    Specimen Information: A: Large Intestine, Sigmoid Colon; Tissue    B: Large Intestine, Left / Descending Colon; Tissue    C: Large Intestine, Left / Descending Colon; Tissue   0 Result Notes      Component    Case Report   Surgical Pathology Report                         Case: [id number]                                   Authorizing Provider:  Aakash Burden MD           Collected:           2024 12:12 PM           Ordering Location:     [facility] MAIN  Received:            2024 11:28 AM                                  OR                                                                           Pathologist:           Anish Brown MD                                                             Specimens:   1) - Large Intestine, Sigmoid Colon, and upper rectum  - fresh for permanent                        2) - Large Intestine, Left / Descending Colon, DISTAL RING OF  "ANASTAMOSIS COLON                      3) - Large Intestine, Left / Descending Colon, PROXIMAL RING ANASTAMOSIS COLON            Final Diagnosis   Specimen 1 (sigmoid and upper rectum, low anterior resection):  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), completely excised  See synoptic report for additional details     Specimen 2 (\"distal ring of anastomosis\", resection):  Colonic mucosa with no significant pathologic changes     Specimen 3 (quotes proximal ring anastomosis\", resection):  Colonic mucosa with no significant pathologic changes     RAMEZ   Electronically signed by Anish Brown MD on 4/2/2024 at 1555   Synoptic Checklist   COLON AND RECTUM: Resection   8th Edition - Protocol posted: 12/13/2023COLON AND RECTUM: RESECTION - All Specimens  SPECIMEN   Procedure  Low anterior resection   Macroscopic Evaluation of Mesorectum  Complete   TUMOR   Tumor Site  Sigmoid colon   Histologic Type  Adenocarcinoma   Histologic Grade  G2, moderately differentiated   Tumor Size  Greatest dimension (Centimeters): 3.8 cm   Additional Dimension (Centimeters)  3.6 cm     1 cm   Tumor Extent  Invades through muscularis propria into the pericolonic or perirectal tissue   Macroscopic Tumor Perforation  Not identified   Lymphatic and / or Vascular Invasion  Not identified   Perineural Invasion  Not identified   Tumor Budding Score  High (10 or more)   Treatment Effect  No known presurgical therapy   MARGINS   Margin Status for Invasive Carcinoma  All margins negative for invasive carcinoma   Closest Margin(s) to Invasive Carcinoma  Distal   Distance from Invasive Carcinoma to Closest Margin  At least 4 cm   Margin Status for Non-Invasive Tumor  All margins negative for high-grade dysplasia / intramucosal carcinoma and low-grade dysplasia   REGIONAL LYMPH NODES   Regional Lymph Node Status  Tumor present in regional lymph node(s)   Number of Lymph Nodes with Tumor  4   Number of Lymph Nodes Examined  22 "   Tumor Deposits  Not identified   pTNM CLASSIFICATION (AJCC 8th Edition)   Reporting of pT, pN, and (when applicable) pM categories is based on information available to the pathologist at the time the report is issued. As per the AJCC (Chapter 1, 8th Ed.) it is the managing physician’s responsibility to establish the final pathologic stage based upon all pertinent information, including but potentially not limited to this pathology report.   pT Category  pT3   pN Category  pN2a   ADDITIONAL FINDINGS   Additional Findings  None identified   .      Gross Description    1. Large Intestine, Sigmoid Colon.  Received in formalin designated sigmoid colon and upper rectum is a segment of large bowel received without orientation which measures 18 cm in length and varies from 2.2 to 3 cm in diameter.  Both ends are stapled, the serosa is pink and glistening.  There is a generous amount of yellowish-brown pericolonic fatty tissue.  Toward 1 undesignated resection margin is a defect in the wall of the bowel measuring 2.8 cm in greatest dimension and is located 1.5 cm from the nearest undesignated resection margin.  The fat is stripped revealing an area of black tattoo ink toward 1 undesignated end.  The bowel is opened revealing tan mucosa with usual rugal folds.  No lesions are found associated with the defect in the bowel however toward the opposite margin is an ulcerated mass lesion which measures 3.8 x 3.6 x 1 cm and is located 4 cm from the closest resection margin.  No other polyps or masses are identified.  Sectioning through the mass reveals a whitish cut surface.  Grossly the lesion extends through muscularis propria into pericolonic fatty tissue.  The margin closest to the luminal defect is submitted in cassette A, the opposite margin closer to the mass is submitted in cassette B, representative sections of tumor are submitted in cassettes C through D, lymph node candidates are submitted as follows: Cassettes F, G  and H grossly positive nodes, serially sectioned 1 per cassette, cassettes I through K smaller nodes submitted whole.  RAMEZ     2. Large Intestine, Left / Descending Colon.  Received in formalin designated distal ring of anastomosis is a glistening yellowish-tan fragment of mucosa measuring 2.2 x 1.7 x 1 cm.  Sectioning reveals no abnormalities.  Representative tissue is submitted in 1 cassette.  RAMEZ     3. Large Intestine, Left / Descending Colon.  Received in formalin designated proximal ring anastomosis: Is an ovoid portion of glistening yellowish-tan tissue measuring 1.1 x 1 x 0.4 cm. It is bisected and submitted in 1 cassette.  RAMEZ        Assessment  -73-year-old male with rectosigmoid malignancy status post robotic low anterior resection, postop day 6    Plan / Recommendations  -Clinically doing okay however I am concerned that he is still belching and nauseous.  He is however passing flatus and moving his bowels without any fevers or chills.  His vital signs have been normal.  I have reviewed his laboratory work from yesterday which showed a white count of 13 which is down from 15 upon discharge.  I have obtained a CT abdomen pelvis to rule out any postoperative complication.  CT abdomen pelvis shows colonic ileus with a small postoperative fluid measuring 2 x 2 cm in the pelvis.     -I have sent scopolamine patch and Zofran to help him with nausea.     -Instructed him to ambulate    -Minimize narcotic use    -If he is not improving I will obtain rectal contrast enema to rule out anastomotic complication.     -I have reviewed his pathology report in detail with him and his family members.  His pathology shows pT3pN2 with tumor budding without any lymphovascular invasion.  Margins were adequate and negative.  Given these results, he will need postoperative adjuvant chemotherapy.  However refer him to medical oncology.  We will plan on placing a Chemo-Port before the start of anticoagulation.     Aakash Burden,  MD  Colon and Rectal Surgery   Jackie Yadav

## 2024-04-08 ENCOUNTER — OFFICE VISIT (OUTPATIENT)
Age: 74
End: 2024-04-08
Payer: MEDICARE

## 2024-04-08 ENCOUNTER — HOSPITAL ENCOUNTER (INPATIENT)
Facility: HOSPITAL | Age: 74
LOS: 11 days | Discharge: HOME | End: 2024-04-19
Attending: EMERGENCY MEDICINE | Admitting: HOSPITALIST
Payer: MEDICARE

## 2024-04-08 ENCOUNTER — APPOINTMENT (OUTPATIENT)
Dept: CT IMAGING | Facility: HOSPITAL | Age: 74
End: 2024-04-08
Payer: MEDICARE

## 2024-04-08 VITALS
SYSTOLIC BLOOD PRESSURE: 97 MMHG | HEIGHT: 71 IN | BODY MASS INDEX: 32.62 KG/M2 | DIASTOLIC BLOOD PRESSURE: 68 MMHG | TEMPERATURE: 96.8 F | WEIGHT: 233 LBS | OXYGEN SATURATION: 97 % | HEART RATE: 86 BPM

## 2024-04-08 DIAGNOSIS — K91.89 LEAK OF ANASTOMOSIS BETWEEN GASTROINTESTINAL STRUCTURES: ICD-10-CM

## 2024-04-08 DIAGNOSIS — C18.7 MALIGNANT NEOPLASM OF SIGMOID COLON: Primary | ICD-10-CM

## 2024-04-08 DIAGNOSIS — L08.9 WOUND INFECTION: ICD-10-CM

## 2024-04-08 DIAGNOSIS — E87.6 HYPOKALEMIA: ICD-10-CM

## 2024-04-08 DIAGNOSIS — T14.8XXA WOUND INFECTION: ICD-10-CM

## 2024-04-08 DIAGNOSIS — I48.91 ATRIAL FIBRILLATION WITH RAPID VENTRICULAR RESPONSE: Primary | ICD-10-CM

## 2024-04-08 LAB
ALBUMIN SERPL-MCNC: 3.7 G/DL (ref 3.5–5.2)
ALBUMIN/GLOB SERPL: 1.1 G/DL
ALP SERPL-CCNC: 135 U/L (ref 39–117)
ALT SERPL W P-5'-P-CCNC: 63 U/L (ref 1–41)
ANION GAP SERPL CALCULATED.3IONS-SCNC: 18 MMOL/L (ref 5–15)
AST SERPL-CCNC: 67 U/L (ref 1–40)
BACTERIA UR QL AUTO: ABNORMAL /HPF
BASOPHILS # BLD AUTO: 0.07 10*3/MM3 (ref 0–0.2)
BASOPHILS NFR BLD AUTO: 0.4 % (ref 0–1.5)
BILIRUB SERPL-MCNC: 0.5 MG/DL (ref 0–1.2)
BILIRUB UR QL STRIP: NEGATIVE
BUN SERPL-MCNC: 19 MG/DL (ref 8–23)
BUN/CREAT SERPL: 13.3 (ref 7–25)
CALCIUM SPEC-SCNC: 9.7 MG/DL (ref 8.6–10.5)
CHLORIDE SERPL-SCNC: 104 MMOL/L (ref 98–107)
CLARITY UR: CLEAR
CO2 SERPL-SCNC: 18 MMOL/L (ref 22–29)
COLOR UR: YELLOW
CREAT SERPL-MCNC: 1.43 MG/DL (ref 0.76–1.27)
DEPRECATED RDW RBC AUTO: 51.8 FL (ref 37–54)
EGFRCR SERPLBLD CKD-EPI 2021: 52.1 ML/MIN/1.73
EOSINOPHIL # BLD AUTO: 0.26 10*3/MM3 (ref 0–0.4)
EOSINOPHIL NFR BLD AUTO: 1.3 % (ref 0.3–6.2)
ERYTHROCYTE [DISTWIDTH] IN BLOOD BY AUTOMATED COUNT: 16.6 % (ref 12.3–15.4)
GLOBULIN UR ELPH-MCNC: 3.3 GM/DL
GLUCOSE SERPL-MCNC: 136 MG/DL (ref 65–99)
GLUCOSE UR STRIP-MCNC: NEGATIVE MG/DL
HCT VFR BLD AUTO: 39.8 % (ref 37.5–51)
HGB BLD-MCNC: 12.6 G/DL (ref 13–17.7)
HGB UR QL STRIP.AUTO: NEGATIVE
HYALINE CASTS UR QL AUTO: ABNORMAL /LPF
IMM GRANULOCYTES # BLD AUTO: 0.13 10*3/MM3 (ref 0–0.05)
IMM GRANULOCYTES NFR BLD AUTO: 0.7 % (ref 0–0.5)
KETONES UR QL STRIP: ABNORMAL
LEUKOCYTE ESTERASE UR QL STRIP.AUTO: ABNORMAL
LIPASE SERPL-CCNC: 38 U/L (ref 13–60)
LYMPHOCYTES # BLD AUTO: 4.13 10*3/MM3 (ref 0.7–3.1)
LYMPHOCYTES NFR BLD AUTO: 20.8 % (ref 19.6–45.3)
MAGNESIUM SERPL-MCNC: 2.4 MG/DL (ref 1.6–2.4)
MCH RBC QN AUTO: 27.3 PG (ref 26.6–33)
MCHC RBC AUTO-ENTMCNC: 31.7 G/DL (ref 31.5–35.7)
MCV RBC AUTO: 86.3 FL (ref 79–97)
MONOCYTES # BLD AUTO: 1.1 10*3/MM3 (ref 0.1–0.9)
MONOCYTES NFR BLD AUTO: 5.5 % (ref 5–12)
NEUTROPHILS NFR BLD AUTO: 14.2 10*3/MM3 (ref 1.7–7)
NEUTROPHILS NFR BLD AUTO: 71.3 % (ref 42.7–76)
NITRITE UR QL STRIP: NEGATIVE
NRBC BLD AUTO-RTO: 0 /100 WBC (ref 0–0.2)
PH UR STRIP.AUTO: 6.5 [PH] (ref 5–8)
PLATELET # BLD AUTO: 409 10*3/MM3 (ref 140–450)
PMV BLD AUTO: 12 FL (ref 6–12)
POTASSIUM SERPL-SCNC: 2.5 MMOL/L (ref 3.5–5.2)
PROT SERPL-MCNC: 7 G/DL (ref 6–8.5)
PROT UR QL STRIP: ABNORMAL
RBC # BLD AUTO: 4.61 10*6/MM3 (ref 4.14–5.8)
RBC # UR STRIP: ABNORMAL /HPF
REF LAB TEST METHOD: ABNORMAL
SODIUM SERPL-SCNC: 140 MMOL/L (ref 136–145)
SP GR UR STRIP: 1.02 (ref 1–1.03)
SQUAMOUS #/AREA URNS HPF: ABNORMAL /HPF
UROBILINOGEN UR QL STRIP: ABNORMAL
WBC # UR STRIP: ABNORMAL /HPF
WBC NRBC COR # BLD AUTO: 19.89 10*3/MM3 (ref 3.4–10.8)
WHOLE BLOOD HOLD COAG: NORMAL

## 2024-04-08 PROCEDURE — 99024 POSTOP FOLLOW-UP VISIT: CPT | Performed by: STUDENT IN AN ORGANIZED HEALTH CARE EDUCATION/TRAINING PROGRAM

## 2024-04-08 PROCEDURE — 83690 ASSAY OF LIPASE: CPT | Performed by: PHYSICIAN ASSISTANT

## 2024-04-08 PROCEDURE — 87077 CULTURE AEROBIC IDENTIFY: CPT | Performed by: PHYSICIAN ASSISTANT

## 2024-04-08 PROCEDURE — 36415 COLL VENOUS BLD VENIPUNCTURE: CPT

## 2024-04-08 PROCEDURE — 25010000002 PIPERACILLIN SOD-TAZOBACTAM PER 1 G: Performed by: EMERGENCY MEDICINE

## 2024-04-08 PROCEDURE — 87186 SC STD MICRODIL/AGAR DIL: CPT | Performed by: PHYSICIAN ASSISTANT

## 2024-04-08 PROCEDURE — 25510000001 IOPAMIDOL PER 1 ML: Performed by: EMERGENCY MEDICINE

## 2024-04-08 PROCEDURE — 87040 BLOOD CULTURE FOR BACTERIA: CPT | Performed by: PHYSICIAN ASSISTANT

## 2024-04-08 PROCEDURE — 80053 COMPREHEN METABOLIC PANEL: CPT | Performed by: PHYSICIAN ASSISTANT

## 2024-04-08 PROCEDURE — 74177 CT ABD & PELVIS W/CONTRAST: CPT

## 2024-04-08 PROCEDURE — 99291 CRITICAL CARE FIRST HOUR: CPT

## 2024-04-08 PROCEDURE — 87070 CULTURE OTHR SPECIMN AEROBIC: CPT | Performed by: PHYSICIAN ASSISTANT

## 2024-04-08 PROCEDURE — 93005 ELECTROCARDIOGRAM TRACING: CPT | Performed by: EMERGENCY MEDICINE

## 2024-04-08 PROCEDURE — 25010000002 POTASSIUM CHLORIDE 10 MEQ/100ML SOLUTION: Performed by: EMERGENCY MEDICINE

## 2024-04-08 PROCEDURE — 83735 ASSAY OF MAGNESIUM: CPT | Performed by: EMERGENCY MEDICINE

## 2024-04-08 PROCEDURE — 87075 CULTR BACTERIA EXCEPT BLOOD: CPT | Performed by: INTERNAL MEDICINE

## 2024-04-08 PROCEDURE — 25810000003 SODIUM CHLORIDE 0.9 % SOLUTION: Performed by: INTERNAL MEDICINE

## 2024-04-08 PROCEDURE — 25810000003 LACTATED RINGERS SOLUTION: Performed by: EMERGENCY MEDICINE

## 2024-04-08 PROCEDURE — 87205 SMEAR GRAM STAIN: CPT | Performed by: PHYSICIAN ASSISTANT

## 2024-04-08 PROCEDURE — 81001 URINALYSIS AUTO W/SCOPE: CPT | Performed by: PHYSICIAN ASSISTANT

## 2024-04-08 PROCEDURE — 85025 COMPLETE CBC W/AUTO DIFF WBC: CPT | Performed by: PHYSICIAN ASSISTANT

## 2024-04-08 RX ORDER — POTASSIUM CHLORIDE 7.45 MG/ML
10 INJECTION INTRAVENOUS ONCE
Qty: 100 ML | Refills: 0 | Status: COMPLETED | OUTPATIENT
Start: 2024-04-08 | End: 2024-04-09

## 2024-04-08 RX ORDER — NITROGLYCERIN 0.4 MG/1
0.4 TABLET SUBLINGUAL
Status: DISCONTINUED | OUTPATIENT
Start: 2024-04-08 | End: 2024-04-19 | Stop reason: HOSPADM

## 2024-04-08 RX ORDER — METHOCARBAMOL 500 MG/1
500 TABLET, FILM COATED ORAL EVERY 8 HOURS PRN
Status: DISCONTINUED | OUTPATIENT
Start: 2024-04-08 | End: 2024-04-10

## 2024-04-08 RX ORDER — ONDANSETRON 4 MG/1
4 TABLET, ORALLY DISINTEGRATING ORAL EVERY 8 HOURS PRN
Status: DISCONTINUED | OUTPATIENT
Start: 2024-04-08 | End: 2024-04-19 | Stop reason: HOSPADM

## 2024-04-08 RX ORDER — POTASSIUM CHLORIDE 7.45 MG/ML
10 INJECTION INTRAVENOUS ONCE
Qty: 100 ML | Refills: 0 | Status: COMPLETED | OUTPATIENT
Start: 2024-04-08 | End: 2024-04-08

## 2024-04-08 RX ORDER — AMOXICILLIN 250 MG
2 CAPSULE ORAL 2 TIMES DAILY PRN
Status: DISCONTINUED | OUTPATIENT
Start: 2024-04-08 | End: 2024-04-19 | Stop reason: HOSPADM

## 2024-04-08 RX ORDER — SODIUM CHLORIDE 9 MG/ML
40 INJECTION, SOLUTION INTRAVENOUS AS NEEDED
Status: DISCONTINUED | OUTPATIENT
Start: 2024-04-08 | End: 2024-04-19 | Stop reason: HOSPADM

## 2024-04-08 RX ORDER — SIMETHICONE 80 MG
80 TABLET,CHEWABLE ORAL EVERY 6 HOURS PRN
Status: DISCONTINUED | OUTPATIENT
Start: 2024-04-08 | End: 2024-04-19 | Stop reason: HOSPADM

## 2024-04-08 RX ORDER — BISACODYL 10 MG
10 SUPPOSITORY, RECTAL RECTAL DAILY PRN
Status: DISCONTINUED | OUTPATIENT
Start: 2024-04-08 | End: 2024-04-19 | Stop reason: HOSPADM

## 2024-04-08 RX ORDER — SODIUM CHLORIDE 0.9 % (FLUSH) 0.9 %
10 SYRINGE (ML) INJECTION AS NEEDED
Status: DISCONTINUED | OUTPATIENT
Start: 2024-04-08 | End: 2024-04-19 | Stop reason: HOSPADM

## 2024-04-08 RX ORDER — DILTIAZEM HYDROCHLORIDE 5 MG/ML
25 INJECTION INTRAVENOUS ONCE
Status: COMPLETED | OUTPATIENT
Start: 2024-04-08 | End: 2024-04-08

## 2024-04-08 RX ORDER — MORPHINE SULFATE 2 MG/ML
2 INJECTION, SOLUTION INTRAMUSCULAR; INTRAVENOUS EVERY 4 HOURS PRN
Status: ACTIVE | OUTPATIENT
Start: 2024-04-08 | End: 2024-04-15

## 2024-04-08 RX ORDER — SODIUM CHLORIDE 9 MG/ML
125 INJECTION, SOLUTION INTRAVENOUS CONTINUOUS
Status: DISPENSED | OUTPATIENT
Start: 2024-04-08 | End: 2024-04-09

## 2024-04-08 RX ORDER — PANTOPRAZOLE SODIUM 40 MG/1
40 TABLET, DELAYED RELEASE ORAL
Status: DISCONTINUED | OUTPATIENT
Start: 2024-04-09 | End: 2024-04-10

## 2024-04-08 RX ORDER — CHOLECALCIFEROL (VITAMIN D3) 125 MCG
5 CAPSULE ORAL NIGHTLY PRN
Status: DISCONTINUED | OUTPATIENT
Start: 2024-04-08 | End: 2024-04-19 | Stop reason: HOSPADM

## 2024-04-08 RX ORDER — BISACODYL 5 MG/1
5 TABLET, DELAYED RELEASE ORAL DAILY PRN
Status: DISCONTINUED | OUTPATIENT
Start: 2024-04-08 | End: 2024-04-19 | Stop reason: HOSPADM

## 2024-04-08 RX ORDER — DILTIAZEM HCL/D5W 125 MG/125
5-15 PLASTIC BAG, INJECTION (ML) INTRAVENOUS
Status: DISCONTINUED | OUTPATIENT
Start: 2024-04-08 | End: 2024-04-16

## 2024-04-08 RX ORDER — POLYETHYLENE GLYCOL 3350 17 G/17G
17 POWDER, FOR SOLUTION ORAL DAILY PRN
Status: DISCONTINUED | OUTPATIENT
Start: 2024-04-08 | End: 2024-04-19 | Stop reason: HOSPADM

## 2024-04-08 RX ORDER — DILTIAZEM HYDROCHLORIDE 5 MG/ML
20 INJECTION INTRAVENOUS ONCE
Status: COMPLETED | OUTPATIENT
Start: 2024-04-08 | End: 2024-04-08

## 2024-04-08 RX ORDER — METOPROLOL SUCCINATE 25 MG/1
25 TABLET, EXTENDED RELEASE ORAL DAILY
Status: DISCONTINUED | OUTPATIENT
Start: 2024-04-08 | End: 2024-04-09

## 2024-04-08 RX ORDER — SERTRALINE HYDROCHLORIDE 25 MG/1
25 TABLET, FILM COATED ORAL DAILY
Status: DISCONTINUED | OUTPATIENT
Start: 2024-04-09 | End: 2024-04-19 | Stop reason: HOSPADM

## 2024-04-08 RX ORDER — METOCLOPRAMIDE 10 MG/1
10 TABLET ORAL
Status: DISCONTINUED | OUTPATIENT
Start: 2024-04-09 | End: 2024-04-10

## 2024-04-08 RX ORDER — LOSARTAN POTASSIUM 25 MG/1
25 TABLET ORAL DAILY
COMMUNITY
End: 2024-04-19 | Stop reason: HOSPADM

## 2024-04-08 RX ORDER — SCOLOPAMINE TRANSDERMAL SYSTEM 1 MG/1
1 PATCH, EXTENDED RELEASE TRANSDERMAL
Qty: 120 PATCH | Refills: 0 | Status: DISCONTINUED | OUTPATIENT
Start: 2024-04-10 | End: 2024-04-15

## 2024-04-08 RX ORDER — ATORVASTATIN CALCIUM 20 MG/1
20 TABLET, FILM COATED ORAL NIGHTLY
Status: DISCONTINUED | OUTPATIENT
Start: 2024-04-08 | End: 2024-04-19 | Stop reason: HOSPADM

## 2024-04-08 RX ORDER — ALPRAZOLAM 0.25 MG/1
0.25 TABLET ORAL 3 TIMES DAILY PRN
Status: DISPENSED | OUTPATIENT
Start: 2024-04-08 | End: 2024-04-14

## 2024-04-08 RX ORDER — ASPIRIN 81 MG/1
81 TABLET, CHEWABLE ORAL DAILY
Status: DISCONTINUED | OUTPATIENT
Start: 2024-04-09 | End: 2024-04-19 | Stop reason: HOSPADM

## 2024-04-08 RX ORDER — SODIUM CHLORIDE 0.9 % (FLUSH) 0.9 %
10 SYRINGE (ML) INJECTION EVERY 12 HOURS SCHEDULED
Status: DISCONTINUED | OUTPATIENT
Start: 2024-04-08 | End: 2024-04-19 | Stop reason: HOSPADM

## 2024-04-08 RX ORDER — OXYCODONE HYDROCHLORIDE 5 MG/1
5 TABLET ORAL EVERY 8 HOURS PRN
Status: DISCONTINUED | OUTPATIENT
Start: 2024-04-08 | End: 2024-04-09 | Stop reason: SDUPTHER

## 2024-04-08 RX ADMIN — SODIUM CHLORIDE, POTASSIUM CHLORIDE, SODIUM LACTATE AND CALCIUM CHLORIDE 1000 ML: 600; 310; 30; 20 INJECTION, SOLUTION INTRAVENOUS at 18:04

## 2024-04-08 RX ADMIN — IOPAMIDOL 100 ML: 755 INJECTION, SOLUTION INTRAVENOUS at 16:36

## 2024-04-08 RX ADMIN — POTASSIUM CHLORIDE 10 MEQ: 7.46 INJECTION, SOLUTION INTRAVENOUS at 21:37

## 2024-04-08 RX ADMIN — POTASSIUM CHLORIDE 10 MEQ: 7.46 INJECTION, SOLUTION INTRAVENOUS at 19:14

## 2024-04-08 RX ADMIN — Medication 5 MG/HR: at 17:20

## 2024-04-08 RX ADMIN — ATORVASTATIN CALCIUM 20 MG: 20 TABLET, FILM COATED ORAL at 20:16

## 2024-04-08 RX ADMIN — METOPROLOL SUCCINATE 25 MG: 25 TABLET, FILM COATED, EXTENDED RELEASE ORAL at 20:16

## 2024-04-08 RX ADMIN — SODIUM CHLORIDE 125 ML/HR: 9 INJECTION, SOLUTION INTRAVENOUS at 21:41

## 2024-04-08 RX ADMIN — DILTIAZEM HYDROCHLORIDE 25 MG: 5 INJECTION INTRAVENOUS at 18:05

## 2024-04-08 RX ADMIN — OXYCODONE 5 MG: 5 TABLET ORAL at 20:16

## 2024-04-08 RX ADMIN — POTASSIUM CHLORIDE 10 MEQ: 10 INJECTION, SOLUTION INTRAVENOUS at 22:51

## 2024-04-08 RX ADMIN — Medication 15 MG/HR: at 19:43

## 2024-04-08 RX ADMIN — POTASSIUM CHLORIDE 10 MEQ: 10 INJECTION, SOLUTION INTRAVENOUS at 23:54

## 2024-04-08 RX ADMIN — DILTIAZEM HYDROCHLORIDE 20 MG: 5 INJECTION INTRAVENOUS at 17:14

## 2024-04-08 RX ADMIN — Medication 10 MG/HR: at 18:31

## 2024-04-08 RX ADMIN — POTASSIUM CHLORIDE 10 MEQ: 7.46 INJECTION, SOLUTION INTRAVENOUS at 18:05

## 2024-04-08 RX ADMIN — PIPERACILLIN AND TAZOBACTAM 3.38 G: 3; .375 INJECTION, POWDER, FOR SOLUTION INTRAVENOUS at 17:26

## 2024-04-08 NOTE — ED PROVIDER NOTES
Subjective Due to significant overcrowding in the emergency department patient was initially seen and evaluated in triage.  Provider in triage recommended patient placement in the treatment area to initiate therapy and movement to an ER bed as soon as possible.    Provider in Triage Note  Patient is a 73-year-old male who was sent over by Dr. Burden.  He had surgery last week to remove a mass on his colon.  He saw his surgeon today who reports that he drained possible infection and sent him over for CT imaging and blood work.      History of Present Illness  Chief complaint provider in triage note reviewed and agreed.  Chief complaint is drainage from abdominal wound sent by my surgeon    History of present illness this is a 73-year-old gentleman who was admitted at the end of March 2024 he had a GI bleed he was found to have a colon mass and colon cancer he had partial colectomy he went home 1 April.  Patient had been doing well to the last few days has had some drainage from the wound that look like purulent drainage.  He went to the surgeon's office today.  He reports that this was opened up and packed and he had a lot of drainage out of it and he was sent to the ER for some blood work and CT.  Patient denies any fevers he denies any chills or sweats he denies any vomiting or diarrhea no black or bloody stool.  No urinary complaints.  No cough congestion chest pain or shortness of breath        Review of Systems   Constitutional:  Negative for chills and fever.   Respiratory:  Negative for chest tightness and shortness of breath.    Cardiovascular:  Negative for chest pain and palpitations.   Gastrointestinal:  Negative for abdominal pain and vomiting.   Genitourinary:  Negative for difficulty urinating and dysuria.   Skin:  Positive for wound.   Neurological:  Negative for dizziness and light-headedness.       Past Medical History:   Diagnosis Date    Arthritis     Benign essential HTN     Cancer     Diabetes  mellitus     GERD (gastroesophageal reflux disease)     Hyperlipidemia, mixed     Palpitations        No Known Allergies    Past Surgical History:   Procedure Laterality Date    CARDIAC CATHETERIZATION      CHOLECYSTECTOMY      COLON RESECTION WITH ILEOSTOMY N/A 3/30/2024    Procedure: COLON RESECTION LOW ANTERIOR LAPAROSCOPIC, COLONAL ANASTOMOSIS, DIVERTING LOOP ILEOSTOMY WITH DAVINCI ROBOT;  Surgeon: Aakash Burden MD;  Location: [facility] MAIN OR;  Service: Robotics - DaVinci;  Laterality: N/A;    COLONOSCOPY N/A 3/27/2024    Procedure: COLONOSCOPY with polypectomy x 18 and sigmoid colon mass biopsies with endscopic spot tattooing;  Surgeon: Fili Quintero MD;  Location: [facility] ENDOSCOPY;  Service: Gastroenterology;  Laterality: N/A;  sigmoid mass at 25 cm    KNEE SURGERY      SIGMOIDOSCOPY N/A 3/30/2024    Procedure: SIGMOIDOSCOPY;  Surgeon: Aakash Burden MD;  Location: [facility] MAIN OR;  Service: Gastroenterology;  Laterality: N/A;       No family history on file.    Social History     Socioeconomic History    Marital status:    Tobacco Use    Smoking status: Former    Smokeless tobacco: Never   Vaping Use    Vaping status: Never Used   Substance and Sexual Activity    Alcohol use: Never    Drug use: Never    Sexual activity: Defer     Prior to Admission medications    Medication Sig Start Date End Date Taking? Authorizing Provider   aspirin 81 MG chewable tablet Chew 1 tablet Daily.   Yes ProviderDylon MD   atorvastatin (LIPITOR) 20 MG tablet Take 1 tablet by mouth Every Night. 2/8/17  Yes yDlon Worley MD   dilTIAZem XR (DILACOR XR) 180 MG 24 hr capsule Take 1 capsule by mouth Daily. 4/1/24  Yes Peyman Castorena MD   losartan (COZAAR) 25 MG tablet Take 1 tablet by mouth Daily.   Yes ProviderDylon MD   metFORMIN (GLUCOPHAGE) 500 MG tablet Take 1 tablet by mouth Daily With Breakfast.   Yes Dylon Worley MD   methocarbamol (ROBAXIN) 750 MG tablet Take 1 tablet by mouth 4  (Four) Times a Day As Needed for Muscle Spasms for up to 40 days. 3/31/24 5/10/24 Yes Aakash Burden MD   metoclopramide (REGLAN) 10 MG tablet Take 1 tablet by mouth 3 (Three) Times a Day With Meals for 14 days. 4/4/24 4/18/24 Yes Aakash Burden MD   metoprolol succinate XL (TOPROL-XL) 100 MG 24 hr tablet Take 1 tablet by mouth Daily. 12/14/23  Yes Dylon Worley MD   Multiple Vitamins-Minerals (MULTIVITAMIN ADULT PO) Take 1 tablet by mouth Daily.   Yes Dylon Worley MD   Omeprazole (EQL Omeprazole) 20 MG tablet delayed-release 20 mg Daily. 2/8/17  Yes Dylon Worley MD   ondansetron (Zofran) 4 MG tablet Take 1 tablet by mouth Daily As Needed for Nausea or Vomiting. 4/4/24 4/4/25 Yes Aakash Burden MD   Scopolamine 1 MG/3DAYS patch Place 1 patch on the skin as directed by provider Every 72 (Seventy-Two) Hours. 4/4/24 4/4/25 Yes Aakash Burden MD   sertraline (Zoloft) 25 MG tablet 1 tablet Daily. 4/13/17  Yes Dylon Worley MD   ALPRAZolam (Xanax) 0.25 MG tablet Take 1 tablet by mouth 3 (Three) Times a Day As Needed for Anxiety for up to 10 days. 4/4/24 4/14/24  Aakash Burden MD   simethicone (Gas-X) 80 MG chewable tablet Chew 1 tablet Every 6 (Six) Hours As Needed for Flatulence for up to 30 days. 4/4/24 5/4/24  Aakash Burden MD   apixaban (ELIQUIS) 5 MG tablet tablet Take 1 tablet by mouth 2 (Two) Times a Day. 4/1/24 4/8/24  Peyman Castorena MD   losartan (COZAAR) 25 MG tablet TAKE 1 TABLET EVERY DAY 3/30/21 4/8/24  Abimael Greenberg MD   meloxicam (MOBIC) 15 MG tablet Take 1 tablet by mouth Daily.  4/8/24  Dylon Worley MD   oxyCODONE (Roxicodone) 5 MG immediate release tablet Take 1 tablet by mouth Every 8 (Eight) Hours As Needed for Moderate Pain for up to 30 days. 3/31/24 4/8/24  Aakash Burden MD          Objective   Physical Exam  Constitutional this is a 73-year-old gentleman awake alert no acute distress.  Triage vital signs reviewed.  HEENT extraocular muscles are intact pupils  equal react sclera clear neck supple no adenopathy no JVD no bruits or meningeal signs lungs clear no retraction heart irregular tachycardic without murmur abdomen soft nontender good bowel sounds no peritoneal findings he has a wound in the lower abdomen has been open and packed there is no current drainage from it.  No surrounding erythema.  Extremities pulses equal upper and lower extremities no edema cords or Homans' sign skin warm dry without rashes neurologic awake alert follows commands no facial symmetry speech normal without focal weakness.  Procedures           ED Course  ED Course as of 04/08/24 2357   Mon Apr 08, 2024   1600 Spoke with Dr. Burden who wanted CT with contrast rectal enema. CT tech was going to discuss with radiologist due to recent surgery.  [MG]      ED Course User Index  [MG] Kelsy Camejo PA-C      Results for orders placed or performed during the hospital encounter of 04/08/24   Wound Culture - Swab, Abdominal Wall    Specimen: Abdominal Wall; Swab   Result Value Ref Range    Gram Stain Few (2+) WBCs per low power field    Comprehensive Metabolic Panel    Specimen: Blood   Result Value Ref Range    Glucose 136 (H) 65 - 99 mg/dL    BUN 19 8 - 23 mg/dL    Creatinine 1.43 (H) 0.76 - 1.27 mg/dL    Sodium 140 136 - 145 mmol/L    Potassium 2.5 (C) 3.5 - 5.2 mmol/L    Chloride 104 98 - 107 mmol/L    CO2 18.0 (L) 22.0 - 29.0 mmol/L    Calcium 9.7 8.6 - 10.5 mg/dL    Total Protein 7.0 6.0 - 8.5 g/dL    Albumin 3.7 3.5 - 5.2 g/dL    ALT (SGPT) 63 (H) 1 - 41 U/L    AST (SGOT) 67 (H) 1 - 40 U/L    Alkaline Phosphatase 135 (H) 39 - 117 U/L    Total Bilirubin 0.5 0.0 - 1.2 mg/dL    Globulin 3.3 gm/dL    A/G Ratio 1.1 g/dL    BUN/Creatinine Ratio 13.3 7.0 - 25.0    Anion Gap 18.0 (H) 5.0 - 15.0 mmol/L    eGFR 52.1 (L) >60.0 mL/min/1.73   Lipase    Specimen: Blood   Result Value Ref Range    Lipase 38 13 - 60 U/L   Urinalysis With Microscopic If Indicated (No Culture) - Urine, Clean Catch     Specimen: Urine, Clean Catch   Result Value Ref Range    Color, UA Yellow Yellow, Straw    Appearance, UA Clear Clear    pH, UA 6.5 5.0 - 8.0    Specific Gravity, UA 1.019 1.005 - 1.030    Glucose, UA Negative Negative    Ketones, UA Trace (A) Negative    Bilirubin, UA Negative Negative    Blood, UA Negative Negative    Protein, UA 30 mg/dL (1+) (A) Negative    Leuk Esterase, UA Trace (A) Negative    Nitrite, UA Negative Negative    Urobilinogen, UA 0.2 E.U./dL 0.2 - 1.0 E.U./dL   CBC Auto Differential    Specimen: Blood   Result Value Ref Range    WBC 19.89 (H) 3.40 - 10.80 10*3/mm3    RBC 4.61 4.14 - 5.80 10*6/mm3    Hemoglobin 12.6 (L) 13.0 - 17.7 g/dL    Hematocrit 39.8 37.5 - 51.0 %    MCV 86.3 79.0 - 97.0 fL    MCH 27.3 26.6 - 33.0 pg    MCHC 31.7 31.5 - 35.7 g/dL    RDW 16.6 (H) 12.3 - 15.4 %    RDW-SD 51.8 37.0 - 54.0 fl    MPV 12.0 6.0 - 12.0 fL    Platelets 409 140 - 450 10*3/mm3    Neutrophil % 71.3 42.7 - 76.0 %    Lymphocyte % 20.8 19.6 - 45.3 %    Monocyte % 5.5 5.0 - 12.0 %    Eosinophil % 1.3 0.3 - 6.2 %    Basophil % 0.4 0.0 - 1.5 %    Immature Grans % 0.7 (H) 0.0 - 0.5 %    Neutrophils, Absolute 14.20 (H) 1.70 - 7.00 10*3/mm3    Lymphocytes, Absolute 4.13 (H) 0.70 - 3.10 10*3/mm3    Monocytes, Absolute 1.10 (H) 0.10 - 0.90 10*3/mm3    Eosinophils, Absolute 0.26 0.00 - 0.40 10*3/mm3    Basophils, Absolute 0.07 0.00 - 0.20 10*3/mm3    Immature Grans, Absolute 0.13 (H) 0.00 - 0.05 10*3/mm3    nRBC 0.0 0.0 - 0.2 /100 WBC   Urinalysis, Microscopic Only - Urine, Clean Catch    Specimen: Urine, Clean Catch   Result Value Ref Range    RBC, UA 0-2 None Seen, 0-2 /HPF    WBC, UA 3-5 (A) None Seen, 0-2 /HPF    Bacteria, UA None Seen None Seen /HPF    Squamous Epithelial Cells, UA 0-2 None Seen, 0-2 /HPF    Hyaline Casts, UA 3-6 None Seen /LPF    Methodology Manual Light Microscopy    Magnesium    Specimen: Blood   Result Value Ref Range    Magnesium 2.4 1.6 - 2.4 mg/dL   ECG 12 Lead Tachycardia   Result Value  Ref Range    QT Interval 314 ms    QTC Interval 518 ms   Light Blue Top   Result Value Ref Range    Extra Tube Hold for add-ons.      CT Abdomen Pelvis With Contrast    Result Date: 4/8/2024  Impression: Postsurgical changes of rectosigmoid mass resection. The previously described presacral fluid collection contains oral contrast consistent with leak. This collection measures 2 x 3.2 x 4 cm. Collection is minimally enlarged in size when compared to prior  study. Findings compatible with small bowel and colonic ileus. Electronically Signed: Julio Perdomo MD  4/8/2024 4:55 PM EDT  Workstation ID: [id number]   Medications   sodium chloride 0.9 % flush 10 mL (has no administration in time range)   dilTIAZem (CARDIZEM) 125 mg in 125 mL D5W infusion (15 mg/hr Intravenous New Bag 4/8/24 1943)   potassium chloride 10 mEq in 100 mL IVPB (10 mEq Intravenous New Bag 4/8/24 1805)     And   potassium chloride 10 mEq in 100 mL IVPB (10 mEq Intravenous New Bag 4/8/24 1914)     And   potassium chloride 10 mEq in 100 mL IVPB (10 mEq Intravenous New Bag 4/8/24 2137)     And   potassium chloride 10 mEq in 100 mL IVPB (10 mEq Intravenous New Bag 4/8/24 2251)     And   potassium chloride 10 mEq in 100 mL IVPB (10 mEq Intravenous New Bag 4/8/24 2354)     And   potassium chloride 10 mEq in 100 mL IVPB (has no administration in time range)   ALPRAZolam (XANAX) tablet 0.25 mg (has no administration in time range)   aspirin chewable tablet 81 mg (has no administration in time range)   atorvastatin (LIPITOR) tablet 20 mg (20 mg Oral Given 4/8/24 2016)   methocarbamol (ROBAXIN) tablet 500 mg (has no administration in time range)   metoclopramide (REGLAN) tablet 10 mg (has no administration in time range)   metoprolol succinate XL (TOPROL-XL) 24 hr tablet 25 mg (25 mg Oral Given 4/8/24 2016)   pantoprazole (PROTONIX) EC tablet 40 mg (has no administration in time range)   ondansetron ODT (ZOFRAN-ODT) disintegrating tablet 4 mg (has no  administration in time range)   oxyCODONE (ROXICODONE) immediate release tablet 5 mg (5 mg Oral Given 4/8/24 2016)   scopolamine patch 1 mg/72 hr (has no administration in time range)   sertraline (ZOLOFT) tablet 25 mg (has no administration in time range)   simethicone (MYLICON) chewable tablet 80 mg (has no administration in time range)   sodium chloride 0.9 % flush 10 mL (has no administration in time range)   sodium chloride 0.9 % flush 10 mL (has no administration in time range)   sodium chloride 0.9 % infusion 40 mL (has no administration in time range)   nitroglycerin (NITROSTAT) SL tablet 0.4 mg (has no administration in time range)   Potassium Replacement - Follow Nurse / BPA Driven Protocol (has no administration in time range)   Magnesium Standard Dose Replacement - Follow Nurse / BPA Driven Protocol (has no administration in time range)   Phosphorus Replacement - Follow Nurse / BPA Driven Protocol (has no administration in time range)   Calcium Replacement - Follow Nurse / BPA Driven Protocol (has no administration in time range)   sennosides-docusate (PERICOLACE) 8.6-50 MG per tablet 2 tablet (has no administration in time range)     And   polyethylene glycol (MIRALAX) packet 17 g (has no administration in time range)     And   bisacodyl (DULCOLAX) EC tablet 5 mg (has no administration in time range)     And   bisacodyl (DULCOLAX) suppository 10 mg (has no administration in time range)   melatonin tablet 5 mg (has no administration in time range)   sodium chloride 0.9 % infusion (125 mL/hr Intravenous New Bag 4/8/24 2141)   morphine injection 2 mg (has no administration in time range)   iopamidol (ISOVUE-370) 76 % injection 100 mL (100 mL Intravenous Given 4/8/24 1636)   lactated ringers bolus 1,000 mL (1,000 mL Intravenous New Bag 4/8/24 1804)   dilTIAZem (CARDIZEM) injection 20 mg (20 mg Intravenous Given 4/8/24 1714)   piperacillin-tazobactam (ZOSYN) 3.375 g IVPB in 100 mL NS MBP (CD) (3.375 g  Intravenous New Bag 4/8/24 1726)   dilTIAZem (CARDIZEM) injection 25 mg (25 mg Intravenous Given 4/8/24 1805)                                     EKG my interpretation atrial fibrillation rate of 164 incomplete left bundle branch block QTc of 518 abnormal EKG but unchanged from previous EKG on 3/27/2024 other than faster rate.       Medical Decision Making  Medical decision making.  IV established.  Labs obtained all independent reviewed by me.  Basic metabolic profile unremarkable and a potassium of 2.5 CO2 of 18 blood sugar 136 creatinine 1.4 lipase normal patient ALT is 63 AST is 67 alk phos 135 urine negative.  CBC white count was 19.8 hemoglobin 12.6.  Patient had an EKG my interpretation showed atrial fibrillation rapid ventricular response rate of 1 6043 left bundle branch block is unchanged from previous on 3/27/2024 other than the rate is faster today's abnormal.  The patient underwent a CT abdomen pelvis read by the radiologist reviewed by me postsurgical changes in the rectosigmoid mass resection previously described presacral fluid can collection contains oral contrast consistent with a leak 2 x 3.2 x 4 cm.  There is some ileus as well.  The patient had been started on Cardizem patient was given 20 mg IV and placed on a drip at 5.  The patient's heart rate still remained in the 140s despite that he had a rebolus of 25 mg IV and a drip increased as well.  Patient's rate did come down to about the 100-1 10 range.  And I suspect that this A-fib is acting up secondary to his infection.  Patient has been started on Zosyn per surgeon's request.  Surgeon here Dr. Burden seen the patient in the ER.  Patient will be admitted and he will go to surgery tomorrow.  Continued on IV Cardizem to help control with atrial fibrillation antibiotics I will see evidence of any acute peritonitis on exam to pneumonia or CHF or DVT or pulmonary embolism right dissection although not a complete list of all possibilities.  Stable  otherwise unremarkable ER course.  Critical care 30 minutes.  Patient also started on IV potassium replacement.    Problems Addressed:  Atrial fibrillation with rapid ventricular response: complicated acute illness or injury  Hypokalemia: complicated acute illness or injury  Leak of anastomosis between gastrointestinal structures: complicated acute illness or injury  Wound infection: complicated acute illness or injury    Amount and/or Complexity of Data Reviewed  External Data Reviewed: ECG.  Labs: ordered. Decision-making details documented in ED Course.  Radiology: ordered and independent interpretation performed. Decision-making details documented in ED Course.  ECG/medicine tests: ordered and independent interpretation performed. Decision-making details documented in ED Course.    Risk  Drug therapy requiring intensive monitoring for toxicity.  Decision regarding hospitalization.        Final diagnoses:   Atrial fibrillation with rapid ventricular response   Hypokalemia   Wound infection   Leak of anastomosis between gastrointestinal structures       ED Disposition  ED Disposition       ED Disposition   Decision to Admit    Condition   --    Comment   Level of Care: Med/Surg [1]   Diagnosis: Wound infection [[id number]]   Admitting Physician: NIC DAVILA [[id number]]   Attending Physician: NIC DAVILA [[id number]]   Certification: I Certify That Inpatient Hospital Services Are Medically Necessary For Greater Than 2 Midnights                 No follow-up provider specified.       Medication List        ASK your doctor about these medications      losartan 25 MG tablet  Commonly known as: COZAAR  Ask about: Which instructions should I use?                 Thiago Solomon MD  04/08/24 2126

## 2024-04-08 NOTE — H&P
[facility] Medicine Services  History & Physical    Patient Name: Viktor Atkins  : 1950  MRN: [id number]  Primary Care Physician:  Gera Manriquez MD  Date of admission: 2024  Date and Time of Service: 2024 at 5:30pm    Subjective      Chief Complaint: discharge from incision     History of Present Illness: Viktor Atkins is a 73 y.o. male with multiple medical problems, PMH significant for HTN, DM, HLD who presented to [facility] on 2024 who was sent over by Dr. Burden.  He had surgery last week to remove a mass on his colon.  He saw his surgeon today who reports that he drained possible infection and packet the wound, patient was sent over for CT imaging and blood work. .in ER patient developed AF RVR was started on Cardizem drip.     EKG: atrial fibrillation rate of 164 incomplete left bundle branch block QTc of 518 abnormal EKG but unchanged from previous EKG on 3/27/2024 other than faster rate.   CT-Postsurgical changes of rectosigmoid mass resection. The previously described presacral fluid collection contains oral contrast consistent with leak. This collection measures 2 x 3.2 x 4 cm. Collection is minimally enlarged in size when compared to prior  study. Findings compatible with small bowel and colonic ileus.       Review of Systems   Constitutional: Negative.  Negative for appetite change, chills and fever.   HENT:  Negative for congestion.    Eyes:  Negative for pain and redness.   Respiratory: Negative.  Negative for cough, chest tightness and shortness of breath.    Gastrointestinal:  Negative for abdominal pain, diarrhea, nausea and vomiting.   Endocrine: Negative for cold intolerance and heat intolerance.   Genitourinary:  Negative for dysuria, flank pain and frequency.   Musculoskeletal:  Negative for back pain and myalgias.   Neurological:  Negative for dizziness, weakness and headaches.   Psychiatric/Behavioral:  Negative for sleep disturbance. The patient is  not nervous/anxious.        Personal History     Past Medical History:   Diagnosis Date    Arthritis     Benign essential HTN     Cancer     Diabetes mellitus     GERD (gastroesophageal reflux disease)     Hyperlipidemia, mixed     Palpitations        Past Surgical History:   Procedure Laterality Date    CARDIAC CATHETERIZATION      CHOLECYSTECTOMY      COLON RESECTION WITH ILEOSTOMY N/A 3/30/2024    Procedure: COLON RESECTION LOW ANTERIOR LAPAROSCOPIC, COLONAL ANASTOMOSIS, DIVERTING LOOP ILEOSTOMY WITH DAVINCI ROBOT;  Surgeon: Aakash Burden MD;  Location: [facility] MAIN OR;  Service: Robotics - DaVinci;  Laterality: N/A;    COLONOSCOPY N/A 3/27/2024    Procedure: COLONOSCOPY with polypectomy x 18 and sigmoid colon mass biopsies with endscopic spot tattooing;  Surgeon: Fili Quintero MD;  Location: [facility] ENDOSCOPY;  Service: Gastroenterology;  Laterality: N/A;  sigmoid mass at 25 cm    KNEE SURGERY      SIGMOIDOSCOPY N/A 3/30/2024    Procedure: SIGMOIDOSCOPY;  Surgeon: Aakash Burden MD;  Location: [facility] MAIN OR;  Service: Gastroenterology;  Laterality: N/A;       Family History: family history is not on file. Otherwise pertinent FHx was reviewed and not pertinent to current issue.    Social History:  reports that he has quit smoking. He has never used smokeless tobacco. He reports that he does not drink alcohol and does not use drugs.    Home Medications:  Prior to Admission Medications       Prescriptions Last Dose Informant Patient Reported? Taking?    ALPRAZolam (Xanax) 0.25 MG tablet   No No    Take 1 tablet by mouth 3 (Three) Times a Day As Needed for Anxiety for up to 10 days.    apixaban (ELIQUIS) 5 MG tablet tablet   No No    Take 1 tablet by mouth 2 (Two) Times a Day.    aspirin 81 MG chewable tablet   Yes No    Chew 1 tablet Daily.    atorvastatin (LIPITOR) 20 MG tablet   Yes No    Take 1 tablet by mouth Every Night.    dilTIAZem XR (DILACOR XR) 180 MG 24 hr capsule   No No    Take 1 capsule by mouth  Daily.    losartan (COZAAR) 25 MG tablet   No No    TAKE 1 TABLET EVERY DAY    meloxicam (MOBIC) 15 MG tablet   Yes No    Take 1 tablet by mouth Daily.    metFORMIN (GLUCOPHAGE) 500 MG tablet   Yes No    Take 1 tablet by mouth Daily With Breakfast.    methocarbamol (ROBAXIN) 750 MG tablet   No No    Take 1 tablet by mouth 4 (Four) Times a Day As Needed for Muscle Spasms for up to 40 days.    metoclopramide (REGLAN) 10 MG tablet   No No    Take 1 tablet by mouth 3 (Three) Times a Day With Meals for 14 days.    metoprolol succinate XL (TOPROL-XL) 100 MG 24 hr tablet   Yes No    Take 1 tablet by mouth Daily.    Multiple Vitamins-Minerals (MULTIVITAMIN ADULT PO)   Yes No    Take 1 tablet by mouth Daily.    Omeprazole (EQL Omeprazole) 20 MG tablet delayed-release   Yes No    20 mg Daily.    ondansetron (Zofran) 4 MG tablet   No No    Take 1 tablet by mouth Daily As Needed for Nausea or Vomiting.    oxyCODONE (Roxicodone) 5 MG immediate release tablet   No No    Take 1 tablet by mouth Every 8 (Eight) Hours As Needed for Moderate Pain for up to 30 days.    Scopolamine 1 MG/3DAYS patch   No No    Place 1 patch on the skin as directed by provider Every 72 (Seventy-Two) Hours.    sertraline (Zoloft) 25 MG tablet   Yes No    1 tablet Daily.    simethicone (Gas-X) 80 MG chewable tablet   No No    Chew 1 tablet Every 6 (Six) Hours As Needed for Flatulence for up to 30 days.              Allergies:  No Known Allergies    Objective      Vitals:   Temp:  [96.8 °F (36 °C)-97.6 °F (36.4 °C)] 97.6 °F (36.4 °C)  Heart Rate:  [] 117  Resp:  [20] 20  BP: ()/(67-81) 127/73  Body mass index is 32.32 kg/m².  Physical Exam  Vitals and nursing note reviewed.   Constitutional:       General: He is not in acute distress.     Appearance: He is well-developed. He is obese. He is not ill-appearing, toxic-appearing or diaphoretic.   HENT:      Head: Normocephalic and atraumatic.      Nose: Nose normal. No congestion or rhinorrhea.       Mouth/Throat:      Mouth: Mucous membranes are moist.      Pharynx: No oropharyngeal exudate.   Eyes:      General: No scleral icterus.        Right eye: No discharge.         Left eye: No discharge.      Extraocular Movements: Extraocular movements intact.      Conjunctiva/sclera: Conjunctivae normal.      Pupils: Pupils are equal, round, and reactive to light.   Neck:      Thyroid: No thyromegaly.      Vascular: No carotid bruit or JVD.      Trachea: No tracheal deviation.   Cardiovascular:      Rate and Rhythm: Normal rate and regular rhythm.      Pulses: Normal pulses.      Heart sounds: Normal heart sounds. No murmur heard.     No friction rub. No gallop.   Pulmonary:      Effort: Pulmonary effort is normal. No respiratory distress.      Breath sounds: Normal breath sounds. No stridor. No wheezing, rhonchi or rales.   Chest:      Chest wall: No tenderness.   Abdominal:      General: Bowel sounds are normal. There is no distension.      Palpations: Abdomen is soft. There is no mass.      Tenderness: There is no abdominal tenderness. There is no guarding or rebound.      Hernia: No hernia is present.   Musculoskeletal:         General: No swelling, tenderness, deformity or signs of injury. Normal range of motion.      Cervical back: Normal range of motion and neck supple. No rigidity. No muscular tenderness.      Right lower leg: No edema.      Left lower leg: No edema.   Lymphadenopathy:      Cervical: No cervical adenopathy.   Skin:     General: Skin is warm and dry.      Coloration: Skin is not jaundiced or pale.      Findings: Bruising present. No erythema or rash.      Comments: Abd wound packet, saturated gauze     Neurological:      General: No focal deficit present.      Mental Status: He is alert and oriented to person, place, and time. Mental status is at baseline.      Cranial Nerves: No cranial nerve deficit.      Sensory: No sensory deficit.      Motor: No weakness or abnormal muscle tone.       Coordination: Coordination normal.   Psychiatric:         Mood and Affect: Mood normal.         Behavior: Behavior normal.         Thought Content: Thought content normal.         Judgment: Judgment normal.       Diagnostic Data:  Lab Results (last 24 hours)       Procedure Component Value Units Date/Time    Magnesium [[id number]]  (Normal) Collected: 04/08/24 1529    Specimen: Blood Updated: 04/08/24 1754     Magnesium 2.4 mg/dL     Extra Tubes [[id number]] Collected: 04/08/24 1529    Specimen: Blood from Arm, Right Updated: 04/08/24 1631    Narrative:      The following orders were created for panel order Extra Tubes.  Procedure                               Abnormality         Status                     ---------                               -----------         ------                     Light Blue Top[[id number]]                                   Final result                 Please view results for these tests on the individual orders.    Light Blue Top [[id number]] Collected: 04/08/24 1529    Specimen: Blood from Arm, Right Updated: 04/08/24 1631     Extra Tube Hold for add-ons.     Comment: Auto resulted       Urinalysis, Microscopic Only - Urine, Clean Catch [[id number]]  (Abnormal) Collected: 04/08/24 1549    Specimen: Urine, Clean Catch Updated: 04/08/24 1629     RBC, UA 0-2 /HPF      WBC, UA 3-5 /HPF      Bacteria, UA None Seen /HPF      Squamous Epithelial Cells, UA 0-2 /HPF      Hyaline Casts, UA 3-6 /LPF      Methodology Manual Light Microscopy    Urinalysis With Microscopic If Indicated (No Culture) - Urine, Clean Catch [[id number]]  (Abnormal) Collected: 04/08/24 1549    Specimen: Urine, Clean Catch Updated: 04/08/24 1610     Color, UA Yellow     Appearance, UA Clear     pH, UA 6.5     Specific Gravity, UA 1.019     Glucose, UA Negative     Ketones, UA Trace     Bilirubin, UA Negative     Blood, UA Negative     Protein, UA 30 mg/dL (1+)     Leuk Esterase, UA Trace     Nitrite, UA Negative     Urobilinogen,  UA 0.2 E.U./dL    Comprehensive Metabolic Panel [[id number]]  (Abnormal) Collected: 04/08/24 1529    Specimen: Blood Updated: 04/08/24 1607     Glucose 136 mg/dL      BUN 19 mg/dL      Creatinine 1.43 mg/dL      Sodium 140 mmol/L      Potassium 2.5 mmol/L      Chloride 104 mmol/L      CO2 18.0 mmol/L      Calcium 9.7 mg/dL      Total Protein 7.0 g/dL      Albumin 3.7 g/dL      ALT (SGPT) 63 U/L      AST (SGOT) 67 U/L      Alkaline Phosphatase 135 U/L      Total Bilirubin 0.5 mg/dL      Globulin 3.3 gm/dL      A/G Ratio 1.1 g/dL      BUN/Creatinine Ratio 13.3     Anion Gap 18.0 mmol/L      eGFR 52.1 mL/min/1.73     Narrative:      GFR Normal >60  Chronic Kidney Disease <60  Kidney Failure <15    The GFR formula is only valid for adults with stable renal function between ages 18 and 70.    Lipase [[id number]]  (Normal) Collected: 04/08/24 1529    Specimen: Blood Updated: 04/08/24 1604     Lipase 38 U/L     CBC & Differential [[id number]]  (Abnormal) Collected: 04/08/24 1529    Specimen: Blood Updated: 04/08/24 1544    Narrative:      The following orders were created for panel order CBC & Differential.  Procedure                               Abnormality         Status                     ---------                               -----------         ------                     CBC Auto Differential[[id number]]        Abnormal            Final result                 Please view results for these tests on the individual orders.    CBC Auto Differential [[id number]]  (Abnormal) Collected: 04/08/24 1529    Specimen: Blood Updated: 04/08/24 1544     WBC 19.89 10*3/mm3      RBC 4.61 10*6/mm3      Hemoglobin 12.6 g/dL      Hematocrit 39.8 %      MCV 86.3 fL      MCH 27.3 pg      MCHC 31.7 g/dL      RDW 16.6 %      RDW-SD 51.8 fl      MPV 12.0 fL      Platelets 409 10*3/mm3      Neutrophil % 71.3 %      Lymphocyte % 20.8 %      Monocyte % 5.5 %      Eosinophil % 1.3 %      Basophil % 0.4 %      Immature Grans % 0.7 %       Neutrophils, Absolute 14.20 10*3/mm3      Lymphocytes, Absolute 4.13 10*3/mm3      Monocytes, Absolute 1.10 10*3/mm3      Eosinophils, Absolute 0.26 10*3/mm3      Basophils, Absolute 0.07 10*3/mm3      Immature Grans, Absolute 0.13 10*3/mm3      nRBC 0.0 /100 WBC     Blood Culture - Blood, Arm, Right [[id number]] Collected: 04/08/24 1529    Specimen: Blood from Arm, Right Updated: 04/08/24 1538    Blood Culture - Blood, Arm, Right [[id number]] Collected: 04/08/24 1529    Specimen: Blood from Arm, Right Updated: 04/08/24 1538             Imaging Results (Last 24 Hours)       Procedure Component Value Units Date/Time    CT Abdomen Pelvis With Contrast [[id number]] Collected: 04/08/24 1645     Updated: 04/08/24 1657    Narrative:      CT ABDOMEN PELVIS W CONTRAST    Date of Exam: 4/8/2024 4:33 PM EDT    Indication: colon mass last saturday [facility] concerned for infection.    Comparison: Contrast-enhanced CT of the abdomen and pelvis performed on April 4, 2024. Additional comparison with contrast-enhanced CT of the abdomen and pelvis performed on March 25, 2024    Technique: Axial CT images were obtained of the abdomen and pelvis following the uneventful intravenous administration of iodinated contrast. Sagittal and coronal reconstructions were performed.  Automated exposure control and iterative reconstruction   methods were used.        Findings:    Lung Bases:  The visualized lung bases and lower mediastinal structures are unremarkable.    Peritoneum:  No free intraperitoneal air or fluid.     Abdominal wall:  Subcutaneous air in the anterior lateral abdominal wall bilaterally is again visualized, decreased in volume when compared to prior study.    Liver:  Liver is normal in size and contour. No focal lesions.    Biliary/Gallbladder:  The gallbladder is surgically absent. The biliary tree is nondilated.    Pancreas:  Pancreas is within normal limits. There is no evidence of pancreatic mass or peripancreatic inflammatory  changes.    Spleen:  Spleen is normal in size and contour.    Gastrointestinal/Mesentery:   Postsurgical changes of rectosigmoid neoplasm resection are again visualized. Previously described presacral collection is again visualized and contains oral contrast. Findings compatible with leak. This collection measures 2 x 3.2 x 4 cm in maximal AP,   transverse, and craniocaudal dimension. There is mild to moderate dilatation of the distal small bowel loops and diffuse moderate dilatation of the colon. Anastomosis appears patent. Oral contrast is visualized throughout the colon and in the rectum.   Sigmoid diverticulosis is again visualized without evidence of diverticulitis.    Adrenals:  Adrenal glands are unremarkable.    Kidneys:  The kidneys are in anatomic position. No evidence of nephrolithiasis. No evidence of hydronephrosis or significant perinephric fat stranding.    Bladder:   The urinary bladder is unremarkable.    Reproductive organs:    The prostate and seminal vesicles are within normal limits.    Lymph Nodes:  No significant adenopathy is identified.     Vasculature:  Mild aortic atheromatous changes.    Bony Structures:    No acute fracture or aggressive lesions. Moderate degenerative changes of the lumbar spine are visualized.        Impression:      Impression:    Postsurgical changes of rectosigmoid mass resection. The previously described presacral fluid collection contains oral contrast consistent with leak. This collection measures 2 x 3.2 x 4 cm. Collection is minimally enlarged in size when compared to prior   study.    Findings compatible with small bowel and colonic ileus.                Electronically Signed: Julio Perdomo MD    4/8/2024 4:55 PM EDT    Workstation ID: [id number]              Assessment & Plan        This is a 73 y.o. male with:    Active and Resolved Problems  Active Hospital Problems    Diagnosis  POA    Atrial fibrillation with rapid ventricular response [I48.91]  Unknown     Colon cancer [C18.9]  Yes    Mixed hyperlipidemia [E78.2]  Yes    Anxiety disorder [F41.9]  Yes    Essential hypertension [I10]  Yes    Palpitations [R00.2]  Yes      Resolved Hospital Problems   No resolved problems to display.     Wound infection,   s/p robotic low anterior resection of bleeding rectosigmoid mass.   - path is T3N2. In light of his pathology findings and need for chemotherapy,  sub clinical anastomotic leak and wound infection.    Colorectal surgeon consulting: Will need diversion to very least.   Ct abd-Postsurgical changes of rectosigmoid mass resection. The previously described presacral fluid collection contains oral contrast consistent with leak. This collection measures 2 x 3.2 x 4 cm. Collection is minimally enlarged in size when compared to prior study. Findings compatible with small bowel and colonic ileus.  - NPO excepts meds  - IVF   -IV abx  -infectious disease consult  -blood cultures, wound cultures  - electrolyte replacements     #AF RVR   -cardizem drip  -cardiology consult  -serial trop    #HTN-hold home meds, monitor BP closely, 103/59, on cardizem drip    #HLD-continue statins, check lipid p    #DM2-RISS glucometer AC/HS hold po meds    #Hypokalemia replace an monitor    Plan per surgery  for OR tomorrow for diagnostic laparoscopy, washout drain placement and diversion  - ostomy marking and teaching in am     DVT prophylaxis:  No DVT prophylaxis order currently exists.      The patient desires to be as follows:    CODE STATUS:           Ninfa Atkins (Spouse)[phone] (Mobile) , who can be contacted, is the designated person to make medical decisions on the patient's behalf if He is incapable of doing so. This was clarified with patient and/or next of kin on 4/8/2024 during the course of this H&P.    Admission Status:  I believe this patient meets ADMIT status.    Expected Length of Stay: 5    PDMP and Medication Dispenses via Sidebar reviewed and consistent with patient  reported medications.    I discussed the patient's findings and my recommendations with patient.      Signature:     This document has been electronically signed by Baltazar Menendez MD on April 8, 2024 18:18 EDT   [facility]

## 2024-04-08 NOTE — PROGRESS NOTES
Patient well known to my service, he s/p robotic low anterior resection of bleeding rectosigmoid mass. His path is T3N2. Unfortunately he has developed sub clinical anastomotic leak and wound infection. In light of his pathology findings and need for chemotherapy, he will need diversion to very least.     Plan  - NPO excepts meds  - IVF   - electrolyte replacements   - afib control   - plan for OR tomorrow for diagnostic laparoscopy, washout drain placement and diversion  - ostomy marking and teaching in am

## 2024-04-08 NOTE — ED NOTES
Bedside shift report done with Nany; pt requesting ice chips, provider notified; awaiting callback

## 2024-04-08 NOTE — PROGRESS NOTES
Colorectal Surgery Followup Note    ID:  Viktor Atkins;   : 1950  DATE OF VISIT: 2024    Chief Complaint  Follow-up (Drainage from incision site/)       Subjective    Mr. Atkins presented because of discharge from incision. He reports no fevers or chills. He is still passing flatus and moving his bowel. Reports on and off nausea. Reports poor appetite   Exam  General:  No acute distress  Head: Normocephalic, atraumatic  Neuro: Alert and oriented    Abdomen:  Soft, non-tender, non-distended, pfannenstiel incision with purulent discharge.    Incision open at bedside and copious purulent discharge expresses, Fascia intact. Wound irrigated and washed and packed with curlex.        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       Aakash Burden MD  Colon and Rectal Surgery   Jackie Yadav

## 2024-04-08 NOTE — Clinical Note
Level of Care: Med/Surg [1]   Diagnosis: Wound infection [882232]   Admitting Physician: NIC DAVILA [[id number]]   Attending Physician: NIC DAVILA [[id number]]   Certification: I Certify That Inpatient Hospital Services Are Medically Necessary For Greater Than 2 Midnights

## 2024-04-09 ENCOUNTER — ANESTHESIA EVENT (OUTPATIENT)
Dept: PERIOP | Facility: HOSPITAL | Age: 74
End: 2024-04-09
Payer: MEDICARE

## 2024-04-09 ENCOUNTER — READMISSION MANAGEMENT (OUTPATIENT)
Dept: CALL CENTER | Facility: HOSPITAL | Age: 74
End: 2024-04-09
Payer: MEDICARE

## 2024-04-09 ENCOUNTER — ANESTHESIA (OUTPATIENT)
Dept: PERIOP | Facility: HOSPITAL | Age: 74
End: 2024-04-09
Payer: MEDICARE

## 2024-04-09 LAB
AMYLASE SERPL-CCNC: 56 U/L (ref 28–100)
ANION GAP SERPL CALCULATED.3IONS-SCNC: 15 MMOL/L (ref 5–15)
BUN SERPL-MCNC: 18 MG/DL (ref 8–23)
BUN/CREAT SERPL: 15.8 (ref 7–25)
CA-I SERPL ISE-MCNC: 1.22 MMOL/L (ref 1.2–1.3)
CALCIUM SPEC-SCNC: 8.7 MG/DL (ref 8.6–10.5)
CHLORIDE SERPL-SCNC: 107 MMOL/L (ref 98–107)
CHOLEST SERPL-MCNC: 96 MG/DL (ref 0–200)
CK SERPL-CCNC: 73 U/L (ref 20–200)
CO2 SERPL-SCNC: 18 MMOL/L (ref 22–29)
CREAT SERPL-MCNC: 1.14 MG/DL (ref 0.76–1.27)
D-LACTATE SERPL-SCNC: 0.9 MMOL/L (ref 0.5–2)
EGFRCR SERPLBLD CKD-EPI 2021: 68.3 ML/MIN/1.73
FERRITIN SERPL-MCNC: 281.4 NG/ML (ref 30–400)
GLUCOSE BLDC GLUCOMTR-MCNC: 118 MG/DL (ref 70–105)
GLUCOSE BLDC GLUCOMTR-MCNC: 134 MG/DL (ref 70–105)
GLUCOSE BLDC GLUCOMTR-MCNC: 155 MG/DL (ref 70–105)
GLUCOSE BLDC GLUCOMTR-MCNC: 155 MG/DL (ref 70–105)
GLUCOSE SERPL-MCNC: 118 MG/DL (ref 65–99)
HBA1C MFR BLD: 5.8 % (ref 4.8–5.6)
HDLC SERPL-MCNC: 23 MG/DL (ref 40–60)
IRON 24H UR-MRATE: 22 MCG/DL (ref 59–158)
IRON SATN MFR SERPL: 9 % (ref 20–50)
LDLC SERPL CALC-MCNC: 44 MG/DL (ref 0–100)
LDLC/HDLC SERPL: 1.7 {RATIO}
NT-PROBNP SERPL-MCNC: 643.4 PG/ML (ref 0–900)
POTASSIUM SERPL-SCNC: 2.5 MMOL/L (ref 3.5–5.2)
POTASSIUM SERPL-SCNC: 3.1 MMOL/L (ref 3.5–5.2)
PROCALCITONIN SERPL-MCNC: 0.19 NG/ML (ref 0–0.25)
QT INTERVAL: 314 MS
QTC INTERVAL: 518 MS
SODIUM SERPL-SCNC: 140 MMOL/L (ref 136–145)
TIBC SERPL-MCNC: 235 MCG/DL (ref 298–536)
TRANSFERRIN SERPL-MCNC: 158 MG/DL (ref 200–360)
TRIGL SERPL-MCNC: 170 MG/DL (ref 0–150)
TROPONIN T SERPL HS-MCNC: 18 NG/L
TROPONIN T SERPL HS-MCNC: 22 NG/L
TSH SERPL DL<=0.05 MIU/L-ACNC: 2.15 UIU/ML (ref 0.27–4.2)
VLDLC SERPL-MCNC: 29 MG/DL (ref 5–40)

## 2024-04-09 PROCEDURE — 82150 ASSAY OF AMYLASE: CPT | Performed by: INTERNAL MEDICINE

## 2024-04-09 PROCEDURE — 82948 REAGENT STRIP/BLOOD GLUCOSE: CPT

## 2024-04-09 PROCEDURE — 25810000003 SODIUM CHLORIDE 0.9 % SOLUTION: Performed by: NURSE ANESTHETIST, CERTIFIED REGISTERED

## 2024-04-09 PROCEDURE — 94799 UNLISTED PULMONARY SVC/PX: CPT

## 2024-04-09 PROCEDURE — 25010000002 PIPERACILLIN SOD-TAZOBACTAM PER 1 G: Performed by: STUDENT IN AN ORGANIZED HEALTH CARE EDUCATION/TRAINING PROGRAM

## 2024-04-09 PROCEDURE — 80048 BASIC METABOLIC PNL TOTAL CA: CPT | Performed by: INTERNAL MEDICINE

## 2024-04-09 PROCEDURE — 25010000002 BUPIVACAINE (PF) 0.25 % SOLUTION: Performed by: STUDENT IN AN ORGANIZED HEALTH CARE EDUCATION/TRAINING PROGRAM

## 2024-04-09 PROCEDURE — 25810000003 SODIUM CHLORIDE 0.9 % SOLUTION: Performed by: INTERNAL MEDICINE

## 2024-04-09 PROCEDURE — 25010000002 ONDANSETRON PER 1 MG: Performed by: NURSE ANESTHETIST, CERTIFIED REGISTERED

## 2024-04-09 PROCEDURE — 25010000002 DEXAMETHASONE PER 1 MG: Performed by: NURSE ANESTHETIST, CERTIFIED REGISTERED

## 2024-04-09 PROCEDURE — 83036 HEMOGLOBIN GLYCOSYLATED A1C: CPT | Performed by: INTERNAL MEDICINE

## 2024-04-09 PROCEDURE — 84443 ASSAY THYROID STIM HORMONE: CPT | Performed by: INTERNAL MEDICINE

## 2024-04-09 PROCEDURE — 25810000003 LACTATED RINGERS PER 1000 ML: Performed by: NURSE ANESTHETIST, CERTIFIED REGISTERED

## 2024-04-09 PROCEDURE — 83605 ASSAY OF LACTIC ACID: CPT | Performed by: INTERNAL MEDICINE

## 2024-04-09 PROCEDURE — 83540 ASSAY OF IRON: CPT | Performed by: INTERNAL MEDICINE

## 2024-04-09 PROCEDURE — 25010000002 PROPOFOL 200 MG/20ML EMULSION: Performed by: NURSE ANESTHETIST, CERTIFIED REGISTERED

## 2024-04-09 PROCEDURE — 0D9E40Z DRAINAGE OF LARGE INTESTINE WITH DRAINAGE DEVICE, PERCUTANEOUS ENDOSCOPIC APPROACH: ICD-10-PCS | Performed by: STUDENT IN AN ORGANIZED HEALTH CARE EDUCATION/TRAINING PROGRAM

## 2024-04-09 PROCEDURE — 99222 1ST HOSP IP/OBS MODERATE 55: CPT | Performed by: NURSE PRACTITIONER

## 2024-04-09 PROCEDURE — 25010000002 HYDROMORPHONE 1 MG/ML SOLUTION: Performed by: NURSE ANESTHETIST, CERTIFIED REGISTERED

## 2024-04-09 PROCEDURE — 84484 ASSAY OF TROPONIN QUANT: CPT | Performed by: HOSPITALIST

## 2024-04-09 PROCEDURE — 82728 ASSAY OF FERRITIN: CPT | Performed by: INTERNAL MEDICINE

## 2024-04-09 PROCEDURE — 44188 LAP COLOSTOMY: CPT | Performed by: STUDENT IN AN ORGANIZED HEALTH CARE EDUCATION/TRAINING PROGRAM

## 2024-04-09 PROCEDURE — 84466 ASSAY OF TRANSFERRIN: CPT | Performed by: INTERNAL MEDICINE

## 2024-04-09 PROCEDURE — 25010000002 SUGAMMADEX 200 MG/2ML SOLUTION: Performed by: NURSE ANESTHETIST, CERTIFIED REGISTERED

## 2024-04-09 PROCEDURE — 82550 ASSAY OF CK (CPK): CPT | Performed by: INTERNAL MEDICINE

## 2024-04-09 PROCEDURE — 82948 REAGENT STRIP/BLOOD GLUCOSE: CPT | Performed by: NURSE ANESTHETIST, CERTIFIED REGISTERED

## 2024-04-09 PROCEDURE — 25010000002 POTASSIUM CHLORIDE 10 MEQ/100ML SOLUTION: Performed by: EMERGENCY MEDICINE

## 2024-04-09 PROCEDURE — 25010000002 POTASSIUM CHLORIDE 10 MEQ/100ML SOLUTION: Performed by: HOSPITALIST

## 2024-04-09 PROCEDURE — 97162 PT EVAL MOD COMPLEX 30 MIN: CPT | Performed by: PHYSICAL THERAPIST

## 2024-04-09 PROCEDURE — 84132 ASSAY OF SERUM POTASSIUM: CPT | Performed by: HOSPITALIST

## 2024-04-09 PROCEDURE — 25010000002 PHENYLEPHRINE 10 MG/ML SOLUTION 5 ML VIAL: Performed by: NURSE ANESTHETIST, CERTIFIED REGISTERED

## 2024-04-09 PROCEDURE — 25010000002 SUCCINYLCHOLINE PER 20 MG: Performed by: NURSE ANESTHETIST, CERTIFIED REGISTERED

## 2024-04-09 PROCEDURE — 80061 LIPID PANEL: CPT | Performed by: INTERNAL MEDICINE

## 2024-04-09 PROCEDURE — 94640 AIRWAY INHALATION TREATMENT: CPT

## 2024-04-09 PROCEDURE — 82948 REAGENT STRIP/BLOOD GLUCOSE: CPT | Performed by: HOSPITALIST

## 2024-04-09 PROCEDURE — 84145 PROCALCITONIN (PCT): CPT | Performed by: INTERNAL MEDICINE

## 2024-04-09 PROCEDURE — 25810000003 LACTATED RINGERS PER 1000 ML: Performed by: STUDENT IN AN ORGANIZED HEALTH CARE EDUCATION/TRAINING PROGRAM

## 2024-04-09 PROCEDURE — 25010000002 HEPARIN (PORCINE) PER 1000 UNITS: Performed by: STUDENT IN AN ORGANIZED HEALTH CARE EDUCATION/TRAINING PROGRAM

## 2024-04-09 PROCEDURE — 82330 ASSAY OF CALCIUM: CPT | Performed by: INTERNAL MEDICINE

## 2024-04-09 PROCEDURE — 84484 ASSAY OF TROPONIN QUANT: CPT | Performed by: INTERNAL MEDICINE

## 2024-04-09 PROCEDURE — 25010000002 MAGNESIUM SULFATE 2 GM/50ML SOLUTION: Performed by: NURSE ANESTHETIST, CERTIFIED REGISTERED

## 2024-04-09 PROCEDURE — 0D1L4Z4 BYPASS TRANSVERSE COLON TO CUTANEOUS, PERCUTANEOUS ENDOSCOPIC APPROACH: ICD-10-PCS | Performed by: STUDENT IN AN ORGANIZED HEALTH CARE EDUCATION/TRAINING PROGRAM

## 2024-04-09 PROCEDURE — 83880 ASSAY OF NATRIURETIC PEPTIDE: CPT | Performed by: INTERNAL MEDICINE

## 2024-04-09 PROCEDURE — 25010000002 FENTANYL CITRATE (PF) 100 MCG/2ML SOLUTION: Performed by: NURSE ANESTHETIST, CERTIFIED REGISTERED

## 2024-04-09 PROCEDURE — 25810000003 SODIUM CHLORIDE 0.9 % SOLUTION 250 ML FLEX CONT: Performed by: NURSE ANESTHETIST, CERTIFIED REGISTERED

## 2024-04-09 DEVICE — PROXIMATE RELOADABLE LINEAR CUTTER WITH SAFETY LOCK-OUT, 75MM
Type: IMPLANTABLE DEVICE | Site: ABDOMEN | Status: FUNCTIONAL
Brand: PROXIMATE

## 2024-04-09 RX ORDER — HYDRALAZINE HYDROCHLORIDE 20 MG/ML
5 INJECTION INTRAMUSCULAR; INTRAVENOUS
Status: DISCONTINUED | OUTPATIENT
Start: 2024-04-09 | End: 2024-04-09 | Stop reason: HOSPADM

## 2024-04-09 RX ORDER — PROPOFOL 10 MG/ML
INJECTION, EMULSION INTRAVENOUS AS NEEDED
Status: DISCONTINUED | OUTPATIENT
Start: 2024-04-09 | End: 2024-04-09 | Stop reason: SURG

## 2024-04-09 RX ORDER — FLUMAZENIL 0.1 MG/ML
0.2 INJECTION INTRAVENOUS AS NEEDED
Status: DISCONTINUED | OUTPATIENT
Start: 2024-04-09 | End: 2024-04-09 | Stop reason: HOSPADM

## 2024-04-09 RX ORDER — BUPIVACAINE HYDROCHLORIDE 2.5 MG/ML
INJECTION, SOLUTION EPIDURAL; INFILTRATION; INTRACAUDAL AS NEEDED
Status: DISCONTINUED | OUTPATIENT
Start: 2024-04-09 | End: 2024-04-09 | Stop reason: HOSPADM

## 2024-04-09 RX ORDER — IPRATROPIUM BROMIDE AND ALBUTEROL SULFATE 2.5; .5 MG/3ML; MG/3ML
3 SOLUTION RESPIRATORY (INHALATION) ONCE AS NEEDED
Status: COMPLETED | OUTPATIENT
Start: 2024-04-09 | End: 2024-04-09

## 2024-04-09 RX ORDER — POTASSIUM CHLORIDE 20 MEQ/1
40 TABLET, EXTENDED RELEASE ORAL EVERY 4 HOURS
Qty: 6 TABLET | Refills: 0 | Status: DISPENSED | OUTPATIENT
Start: 2024-04-09 | End: 2024-04-09

## 2024-04-09 RX ORDER — DEXTROSE MONOHYDRATE 25 G/50ML
25 INJECTION, SOLUTION INTRAVENOUS
Status: DISCONTINUED | OUTPATIENT
Start: 2024-04-09 | End: 2024-04-19 | Stop reason: HOSPADM

## 2024-04-09 RX ORDER — LABETALOL HYDROCHLORIDE 5 MG/ML
5 INJECTION, SOLUTION INTRAVENOUS
Status: DISCONTINUED | OUTPATIENT
Start: 2024-04-09 | End: 2024-04-09 | Stop reason: HOSPADM

## 2024-04-09 RX ORDER — LIDOCAINE HYDROCHLORIDE 20 MG/ML
INJECTION, SOLUTION EPIDURAL; INFILTRATION; INTRACAUDAL; PERINEURAL AS NEEDED
Status: DISCONTINUED | OUTPATIENT
Start: 2024-04-09 | End: 2024-04-09 | Stop reason: SURG

## 2024-04-09 RX ORDER — DROPERIDOL 2.5 MG/ML
0.62 INJECTION, SOLUTION INTRAMUSCULAR; INTRAVENOUS
Status: DISCONTINUED | OUTPATIENT
Start: 2024-04-09 | End: 2024-04-09 | Stop reason: HOSPADM

## 2024-04-09 RX ORDER — ONDANSETRON 2 MG/ML
4 INJECTION INTRAMUSCULAR; INTRAVENOUS ONCE AS NEEDED
Status: DISCONTINUED | OUTPATIENT
Start: 2024-04-09 | End: 2024-04-09 | Stop reason: HOSPADM

## 2024-04-09 RX ORDER — SODIUM CHLORIDE, SODIUM LACTATE, POTASSIUM CHLORIDE, CALCIUM CHLORIDE 600; 310; 30; 20 MG/100ML; MG/100ML; MG/100ML; MG/100ML
50 INJECTION, SOLUTION INTRAVENOUS CONTINUOUS
Status: DISCONTINUED | OUTPATIENT
Start: 2024-04-09 | End: 2024-04-12

## 2024-04-09 RX ORDER — ONDANSETRON 2 MG/ML
INJECTION INTRAMUSCULAR; INTRAVENOUS AS NEEDED
Status: DISCONTINUED | OUTPATIENT
Start: 2024-04-09 | End: 2024-04-09 | Stop reason: SURG

## 2024-04-09 RX ORDER — ONDANSETRON 2 MG/ML
4 INJECTION INTRAMUSCULAR; INTRAVENOUS EVERY 6 HOURS PRN
Status: DISCONTINUED | OUTPATIENT
Start: 2024-04-09 | End: 2024-04-19 | Stop reason: HOSPADM

## 2024-04-09 RX ORDER — NALOXONE HCL 0.4 MG/ML
0.2 VIAL (ML) INJECTION AS NEEDED
Status: DISCONTINUED | OUTPATIENT
Start: 2024-04-09 | End: 2024-04-09 | Stop reason: HOSPADM

## 2024-04-09 RX ORDER — NICOTINE POLACRILEX 4 MG
15 LOZENGE BUCCAL
Status: DISCONTINUED | OUTPATIENT
Start: 2024-04-09 | End: 2024-04-19 | Stop reason: HOSPADM

## 2024-04-09 RX ORDER — SODIUM CHLORIDE 9 MG/ML
INJECTION, SOLUTION INTRAVENOUS CONTINUOUS PRN
Status: DISCONTINUED | OUTPATIENT
Start: 2024-04-09 | End: 2024-04-09 | Stop reason: SURG

## 2024-04-09 RX ORDER — DIPHENHYDRAMINE HYDROCHLORIDE 50 MG/ML
12.5 INJECTION INTRAMUSCULAR; INTRAVENOUS
Status: DISCONTINUED | OUTPATIENT
Start: 2024-04-09 | End: 2024-04-09 | Stop reason: HOSPADM

## 2024-04-09 RX ORDER — ACETAMINOPHEN 500 MG
1000 TABLET ORAL EVERY 6 HOURS
Status: DISCONTINUED | OUTPATIENT
Start: 2024-04-09 | End: 2024-04-18

## 2024-04-09 RX ORDER — HYDROCODONE BITARTRATE AND ACETAMINOPHEN 5; 325 MG/1; MG/1
1 TABLET ORAL ONCE AS NEEDED
Status: DISCONTINUED | OUTPATIENT
Start: 2024-04-09 | End: 2024-04-09 | Stop reason: HOSPADM

## 2024-04-09 RX ORDER — PROMETHAZINE HYDROCHLORIDE 25 MG/1
25 SUPPOSITORY RECTAL ONCE AS NEEDED
Status: DISCONTINUED | OUTPATIENT
Start: 2024-04-09 | End: 2024-04-09 | Stop reason: HOSPADM

## 2024-04-09 RX ORDER — PROMETHAZINE HYDROCHLORIDE 25 MG/1
25 TABLET ORAL ONCE AS NEEDED
Status: DISCONTINUED | OUTPATIENT
Start: 2024-04-09 | End: 2024-04-09 | Stop reason: HOSPADM

## 2024-04-09 RX ORDER — NALOXONE HCL 0.4 MG/ML
0.4 VIAL (ML) INJECTION
Status: DISCONTINUED | OUTPATIENT
Start: 2024-04-09 | End: 2024-04-19 | Stop reason: HOSPADM

## 2024-04-09 RX ORDER — IBUPROFEN 600 MG/1
1 TABLET ORAL
Status: DISCONTINUED | OUTPATIENT
Start: 2024-04-09 | End: 2024-04-19 | Stop reason: HOSPADM

## 2024-04-09 RX ORDER — SODIUM CHLORIDE, SODIUM LACTATE, POTASSIUM CHLORIDE, CALCIUM CHLORIDE 600; 310; 30; 20 MG/100ML; MG/100ML; MG/100ML; MG/100ML
INJECTION, SOLUTION INTRAVENOUS CONTINUOUS PRN
Status: DISCONTINUED | OUTPATIENT
Start: 2024-04-09 | End: 2024-04-09 | Stop reason: SURG

## 2024-04-09 RX ORDER — SUCCINYLCHOLINE CHLORIDE 20 MG/ML
INJECTION INTRAMUSCULAR; INTRAVENOUS AS NEEDED
Status: DISCONTINUED | OUTPATIENT
Start: 2024-04-09 | End: 2024-04-09 | Stop reason: SURG

## 2024-04-09 RX ORDER — FENTANYL CITRATE 50 UG/ML
INJECTION, SOLUTION INTRAMUSCULAR; INTRAVENOUS AS NEEDED
Status: DISCONTINUED | OUTPATIENT
Start: 2024-04-09 | End: 2024-04-09 | Stop reason: SURG

## 2024-04-09 RX ORDER — DEXAMETHASONE SODIUM PHOSPHATE 4 MG/ML
INJECTION, SOLUTION INTRA-ARTICULAR; INTRALESIONAL; INTRAMUSCULAR; INTRAVENOUS; SOFT TISSUE AS NEEDED
Status: DISCONTINUED | OUTPATIENT
Start: 2024-04-09 | End: 2024-04-09 | Stop reason: SURG

## 2024-04-09 RX ORDER — POTASSIUM CHLORIDE 7.45 MG/ML
10 INJECTION INTRAVENOUS
Status: COMPLETED | OUTPATIENT
Start: 2024-04-09 | End: 2024-04-09

## 2024-04-09 RX ORDER — BUPIVACAINE HYDROCHLORIDE AND EPINEPHRINE 2.5; 5 MG/ML; UG/ML
INJECTION, SOLUTION EPIDURAL; INFILTRATION; INTRACAUDAL; PERINEURAL AS NEEDED
Status: DISCONTINUED | OUTPATIENT
Start: 2024-04-09 | End: 2024-04-09 | Stop reason: HOSPADM

## 2024-04-09 RX ORDER — FENTANYL CITRATE 50 UG/ML
50 INJECTION, SOLUTION INTRAMUSCULAR; INTRAVENOUS
Status: DISCONTINUED | OUTPATIENT
Start: 2024-04-09 | End: 2024-04-09 | Stop reason: HOSPADM

## 2024-04-09 RX ORDER — POTASSIUM CHLORIDE 29.8 MG/ML
20 INJECTION INTRAVENOUS ONCE
Status: CANCELLED | OUTPATIENT
Start: 2024-04-09 | End: 2024-04-09

## 2024-04-09 RX ORDER — MAGNESIUM SULFATE HEPTAHYDRATE 40 MG/ML
INJECTION, SOLUTION INTRAVENOUS AS NEEDED
Status: DISCONTINUED | OUTPATIENT
Start: 2024-04-09 | End: 2024-04-09 | Stop reason: SURG

## 2024-04-09 RX ORDER — ROCURONIUM BROMIDE 10 MG/ML
INJECTION, SOLUTION INTRAVENOUS AS NEEDED
Status: DISCONTINUED | OUTPATIENT
Start: 2024-04-09 | End: 2024-04-09 | Stop reason: SURG

## 2024-04-09 RX ORDER — HEPARIN SODIUM 5000 [USP'U]/ML
5000 INJECTION, SOLUTION INTRAVENOUS; SUBCUTANEOUS EVERY 8 HOURS SCHEDULED
Status: DISCONTINUED | OUTPATIENT
Start: 2024-04-09 | End: 2024-04-10

## 2024-04-09 RX ORDER — OXYCODONE HYDROCHLORIDE 5 MG/1
5 TABLET ORAL EVERY 4 HOURS PRN
Status: DISPENSED | OUTPATIENT
Start: 2024-04-09 | End: 2024-04-14

## 2024-04-09 RX ORDER — INSULIN LISPRO 100 [IU]/ML
2-7 INJECTION, SOLUTION INTRAVENOUS; SUBCUTANEOUS EVERY 6 HOURS SCHEDULED
Status: DISCONTINUED | OUTPATIENT
Start: 2024-04-09 | End: 2024-04-19 | Stop reason: HOSPADM

## 2024-04-09 RX ADMIN — SERTRALINE HYDROCHLORIDE 25 MG: 25 TABLET ORAL at 09:11

## 2024-04-09 RX ADMIN — METOCLOPRAMIDE 10 MG: 10 TABLET ORAL at 12:36

## 2024-04-09 RX ADMIN — POTASSIUM CHLORIDE 10 MEQ: 7.46 INJECTION, SOLUTION INTRAVENOUS at 11:48

## 2024-04-09 RX ADMIN — PHENYLEPHRINE HYDROCHLORIDE 1 MCG/KG/MIN: 10 INJECTION INTRAVENOUS at 13:40

## 2024-04-09 RX ADMIN — HEPARIN SODIUM 5000 UNITS: 5000 INJECTION INTRAVENOUS; SUBCUTANEOUS at 07:38

## 2024-04-09 RX ADMIN — PROPOFOL 200 MG: 10 INJECTION, EMULSION INTRAVENOUS at 13:40

## 2024-04-09 RX ADMIN — Medication 15 MG/HR: at 02:28

## 2024-04-09 RX ADMIN — SODIUM CHLORIDE, POTASSIUM CHLORIDE, SODIUM LACTATE AND CALCIUM CHLORIDE 50 ML/HR: 600; 310; 30; 20 INJECTION, SOLUTION INTRAVENOUS at 19:19

## 2024-04-09 RX ADMIN — ROCURONIUM BROMIDE 10 MG: 10 INJECTION, SOLUTION INTRAVENOUS at 15:07

## 2024-04-09 RX ADMIN — LIDOCAINE HYDROCHLORIDE 100 MG: 20 INJECTION, SOLUTION EPIDURAL; INFILTRATION; INTRACAUDAL; PERINEURAL at 13:40

## 2024-04-09 RX ADMIN — ACETAMINOPHEN 1000 MG: 500 TABLET, FILM COATED ORAL at 21:22

## 2024-04-09 RX ADMIN — ASPIRIN 81 MG CHEWABLE TABLET 81 MG: 81 TABLET CHEWABLE at 09:11

## 2024-04-09 RX ADMIN — HYDROMORPHONE HYDROCHLORIDE 0.2 MG: 1 INJECTION, SOLUTION INTRAMUSCULAR; INTRAVENOUS; SUBCUTANEOUS at 16:08

## 2024-04-09 RX ADMIN — SUGAMMADEX 200 MG: 100 INJECTION, SOLUTION INTRAVENOUS at 15:49

## 2024-04-09 RX ADMIN — HYDROMORPHONE HYDROCHLORIDE 0.2 MG: 1 INJECTION, SOLUTION INTRAMUSCULAR; INTRAVENOUS; SUBCUTANEOUS at 15:36

## 2024-04-09 RX ADMIN — FENTANYL CITRATE 100 MCG: 50 INJECTION, SOLUTION INTRAMUSCULAR; INTRAVENOUS at 13:40

## 2024-04-09 RX ADMIN — HEPARIN SODIUM 5000 UNITS: 5000 INJECTION INTRAVENOUS; SUBCUTANEOUS at 21:36

## 2024-04-09 RX ADMIN — METOPROLOL TARTRATE 25 MG: 25 TABLET, FILM COATED ORAL at 09:11

## 2024-04-09 RX ADMIN — POTASSIUM CHLORIDE 40 MEQ: 1500 TABLET, EXTENDED RELEASE ORAL at 07:23

## 2024-04-09 RX ADMIN — HYDROMORPHONE HYDROCHLORIDE 0.2 MG: 1 INJECTION, SOLUTION INTRAMUSCULAR; INTRAVENOUS; SUBCUTANEOUS at 15:45

## 2024-04-09 RX ADMIN — DEXAMETHASONE SODIUM PHOSPHATE 4 MG: 4 INJECTION, SOLUTION INTRAMUSCULAR; INTRAVENOUS at 13:45

## 2024-04-09 RX ADMIN — PIPERACILLIN AND TAZOBACTAM 3.38 G: 3; .375 INJECTION, POWDER, LYOPHILIZED, FOR SOLUTION INTRAVENOUS at 13:50

## 2024-04-09 RX ADMIN — POTASSIUM CHLORIDE 10 MEQ: 7.46 INJECTION, SOLUTION INTRAVENOUS at 09:59

## 2024-04-09 RX ADMIN — Medication 10 ML: at 21:36

## 2024-04-09 RX ADMIN — POTASSIUM CHLORIDE 10 MEQ: 7.46 INJECTION, SOLUTION INTRAVENOUS at 01:07

## 2024-04-09 RX ADMIN — ROCURONIUM BROMIDE 50 MG: 10 INJECTION, SOLUTION INTRAVENOUS at 13:45

## 2024-04-09 RX ADMIN — ONDANSETRON 4 MG: 2 INJECTION INTRAMUSCULAR; INTRAVENOUS at 16:00

## 2024-04-09 RX ADMIN — SODIUM CHLORIDE 125 ML/HR: 9 INJECTION, SOLUTION INTRAVENOUS at 07:30

## 2024-04-09 RX ADMIN — POTASSIUM CHLORIDE 40 MEQ: 1500 TABLET, EXTENDED RELEASE ORAL at 10:00

## 2024-04-09 RX ADMIN — PANTOPRAZOLE SODIUM 40 MG: 40 TABLET, DELAYED RELEASE ORAL at 05:06

## 2024-04-09 RX ADMIN — MAGNESIUM SULFATE HEPTAHYDRATE 2 G: 40 INJECTION, SOLUTION INTRAVENOUS at 13:45

## 2024-04-09 RX ADMIN — ONDANSETRON 4 MG: 2 INJECTION INTRAMUSCULAR; INTRAVENOUS at 13:45

## 2024-04-09 RX ADMIN — SUCCINYLCHOLINE CHLORIDE 100 MG: 20 INJECTION, SOLUTION INTRAMUSCULAR; INTRAVENOUS at 13:40

## 2024-04-09 RX ADMIN — SODIUM CHLORIDE, SODIUM LACTATE, POTASSIUM CHLORIDE, AND CALCIUM CHLORIDE: .6; .31; .03; .02 INJECTION, SOLUTION INTRAVENOUS at 14:20

## 2024-04-09 RX ADMIN — IPRATROPIUM BROMIDE AND ALBUTEROL SULFATE 3 ML: .5; 3 SOLUTION RESPIRATORY (INHALATION) at 16:42

## 2024-04-09 RX ADMIN — HYDROMORPHONE HYDROCHLORIDE 0.2 MG: 1 INJECTION, SOLUTION INTRAMUSCULAR; INTRAVENOUS; SUBCUTANEOUS at 16:01

## 2024-04-09 RX ADMIN — Medication 15 MG/HR: at 11:42

## 2024-04-09 RX ADMIN — SODIUM CHLORIDE: 9 INJECTION, SOLUTION INTRAVENOUS at 13:45

## 2024-04-09 RX ADMIN — ATORVASTATIN CALCIUM 20 MG: 20 TABLET, FILM COATED ORAL at 21:36

## 2024-04-09 RX ADMIN — METOCLOPRAMIDE 10 MG: 10 TABLET ORAL at 07:23

## 2024-04-09 RX ADMIN — HYDROMORPHONE HYDROCHLORIDE 0.2 MG: 1 INJECTION, SOLUTION INTRAMUSCULAR; INTRAVENOUS; SUBCUTANEOUS at 15:52

## 2024-04-09 RX ADMIN — PIPERACILLIN AND TAZOBACTAM 3.38 G: 3; .375 INJECTION, POWDER, LYOPHILIZED, FOR SOLUTION INTRAVENOUS at 15:50

## 2024-04-09 RX ADMIN — ROCURONIUM BROMIDE 10 MG: 10 INJECTION, SOLUTION INTRAVENOUS at 14:36

## 2024-04-09 NOTE — ANESTHESIA PREPROCEDURE EVALUATION
Anesthesia Evaluation     NPO Solid Status: > 8 hours  NPO Liquid Status: > 8 hours           Airway   Mallampati: I  TM distance: >3 FB  Neck ROM: full  No difficulty expected  Dental - normal exam     Pulmonary - normal exam   Cardiovascular - normal exam    (+) hypertension well controlled, dysrhythmias Atrial Fib, hyperlipidemia      Neuro/Psych  (+) psychiatric history Anxiety  GI/Hepatic/Renal/Endo    (+) GERD, diabetes mellitus type 2    Musculoskeletal     Abdominal  - normal exam    Bowel sounds: normal.   Substance History      OB/GYN          Other   arthritis,   history of cancer                Anesthesia Plan    ASA 4     general     intravenous induction     Anesthetic plan, risks, benefits, and alternatives have been provided, discussed and informed consent has been obtained with: patient.  Pre-procedure education provided  Plan discussed with CRNA.    CODE STATUS:    Code Status (Patient has no pulse and is not breathing): CPR (Attempt to Resuscitate)  Medical Interventions (Patient has pulse or is breathing): Full Support

## 2024-04-09 NOTE — NURSING NOTE
73-year-old male with history of colorectal cancer.  Patient presented to the hospital with purulence noted from his incision.  A consult was received to assess the patient and lalit for possible transverse as well as possible ileostomy.  Patient was marked in both upper quadrant as well as the right lower quadrant and basic teaching was initiated with the patient and family members.  We will continue to follow the patient for ostomy education following surgery

## 2024-04-09 NOTE — CASE MANAGEMENT/SOCIAL WORK
Discharge Planning Assessment   Leif     Patient Name: Viktor Atkins  MRN: [id number]  Today's Date: 4/9/2024    Admit Date: 4/8/2024    Plan: D/C Plan: Return home with spouse. Needs help with Eliquis- can't afford.   Discharge Needs Assessment       Row Name 04/09/24 0951       Living Environment    People in Home spouse    Name(s) of People in Home Ninfa    Current Living Arrangements home    Potentially Unsafe Housing Conditions none    In the past 12 months has the electric, gas, oil, or water company threatened to shut off services in your home? No    Primary Care Provided by self    Provides Primary Care For no one    Family Caregiver if Needed spouse;child(skyler), adult    Family Caregiver Names Innfa and Audra    Quality of Family Relationships helpful;involved;supportive    Able to Return to Prior Arrangements yes       Resource/Environmental Concerns    Resource/Environmental Concerns none    Transportation Concerns none       Transportation Needs    In the past 12 months, has lack of transportation kept you from medical appointments or from getting medications? no    In the past 12 months, has lack of transportation kept you from meetings, work, or from getting things needed for daily living? No       Food Insecurity    Within the past 12 months, you worried that your food would run out before you got the money to buy more. Never true    Within the past 12 months, the food you bought just didn't last and you didn't have money to get more. Never true       Transition Planning    Patient/Family Anticipates Transition to home with family    Patient/Family Anticipated Services at Transition none    Transportation Anticipated family or friend will provide       Discharge Needs Assessment    Readmission Within the Last 30 Days previous discharge plan unsuccessful    Equipment Currently Used at Home none    Concerns to be Addressed denies needs/concerns at this time    Anticipated Changes Related to  Illness none    Equipment Needed After Discharge none    Provided Post Acute Provider List? N/A    Provided Post Acute Provider Quality & Resource List? N/A                   Discharge Plan       Row Name 04/09/24 0952       Plan    Plan D/C Plan: Return home with spouse. Needs help with Eliquis- can't afford.    Provided Post Acute Provider List? N/A    Provided Post Acute Provider Quality & Resource List? N/A    Plan Comments CM spoke with patient;s daughter on the phone to discuss admission assessment and discharge planning. Daughter confirms PCP and pharmacy. Daughter has declined meds to bed program at this time. Patient did mention the patient will not be able to afford the Eliquis the doctor plans to start becuse it will be $600/month. Patient denies any additional needs for services or DME at this time. Daughter can transport at d/c. D/C barriers: 4/9 surgery, IV medications, IV drips, K+ 2.5.               Expected Discharge Date and Time       Expected Discharge Date Expected Discharge Time    Apr 11, 2024            Demographic Summary       Row Name 04/09/24 0950       General Information    Admission Type inpatient    Arrived From emergency department    Referral Source admission list    Reason for Consult discharge planning    Preferred Language English       Contact Information    Permission Granted to Share Info With                    Functional Status       Row Name 04/09/24 0950       Functional Status    Usual Activity Tolerance good    Current Activity Tolerance good       Functional Status, IADL    Medications independent    Meal Preparation independent    Housekeeping independent    Laundry independent    Shopping independent       Mental Status    General Appearance WDL WDL       Mental Status Summary    Recent Changes in Mental Status/Cognitive Functioning no changes       Employment/    Employment Status retired;, previous service           Current or  Previous  Service none                   Patient Forms       Row Name 04/09/24 0856       Patient Forms    Important Message from Medicare (IMM) Delivered  IMM delivered by LILA Sheehan RN      [address] [address]  Phone: [phone]  Fax: [phone]

## 2024-04-09 NOTE — BRIEF OP NOTE
COLOSTOMY LAPAROSCOPIC  Progress Note    Viktor Atkins  4/9/2024    Pre-op Diagnosis:   Superficial surgical site infection   Ileus        Post-Op Diagnosis Codes:   Same     Procedure/CPT® Codes:        Procedure(s):  DIAGNOSTIC LAPAROSCOPY, DIVERTING colostomy, drain placement               Surgeon(s):  Beto Gorman MD Almaz, Biruk, MD    Anesthesia: General    Staff:   Circulator: Shavonne Christina RN; Arlin Escobedo RN; Lewis Ramirez RN  Scrub Person: Nikki Monroe; Loco Osman RN  Assistant: Dixie Ayala  Assistant: Dixie Ayala      Estimated Blood Loss: 100 mL    Urine Voided: 300 mL    Specimens:                None           Drains:   Closed/Suction Drain 1 RLQ Bulb 19 Fr. (Active)   Site Description Unable to view 04/09/24 1647   Dressing Status Clean 04/09/24 1647   Drainage Appearance Serosanguineous 04/09/24 1647   Status To bulb suction 04/09/24 1647   Output (mL) 75 mL 04/09/24 1617       Open Drain Inferior;Midline Abdomen (Active)   Site Description Unable to view 04/09/24 1647   Dressing Status Clean;Dry;Intact 04/09/24 1647       Colostomy LUQ (Active)   Stomal Appliance 2 piece;Clean;Dry;Intact 04/09/24 1647   Stoma Appearance moist;red;protruding above skin level 04/09/24 1647   Peristomal Assessment TROY 04/09/24 1647   Stoma Function no stool 04/09/24 1647       [REMOVED] Urethral Catheter Silicone 16 Fr. (Removed)   Daily Indications Selected surgeries ( tract, abdomen) 03/31/24 0059   Site Assessment Clean;Skin intact 03/31/24 0059   Collection Container Standard drainage bag 03/31/24 0059   Securement Method Securing device 03/31/24 0059   Catheter care complete Yes 03/31/24 0059   Irrigation/Instillation Indication patency maintenance 03/30/24 1249   Output (mL) 50 mL 03/31/24 0005       [REMOVED] Urethral Catheter Silicone 16 Fr. (Removed)       Findings:     No intraabdominal collection   Colonic and small bowel ileus   Transverse end loop colostomy          Complications: None seen     Assistant: Dixie Ayala  was responsible for performing the following activities: Retraction, Suction, Irrigation, and Suturing and their skilled assistance was necessary for the success of this case.    Aakash Burden MD     Date: 4/9/2024  Time: 16:54 EDT

## 2024-04-09 NOTE — CONSULTS
Hematology/Oncology Inpatient Consultation    Patient name: Viktor Atkins  : 1950  MRN: [id number]  Referring Provider: Dr. Denny  Reason for Consultation: Rectosigmoid adenocarcinoma    Chief complaint: Abdominal wound infection    History of present illness:    Viktor Atkins is a 73 y.o. male who presented to [facility] on 2024 from colorectal surgery office.  Patient had robotic resection of rectosigmoid mass on 3/30/2024.  He saw his surgeon who gaited and wash the wound.  Recommended ED evaluation to evaluate anastomosis.      CT abdomen pelvis with contrast on 2024 showed interval postsurgical changes of postresection of a rectosigmoid mass.  Small fluid collection in the presacral region posterior to the anastomotic site with an air-fluid level measuring 2.8 x 2 x 2 3.5 cm.  Colon ileus.  Extensive subcutaneous air along the abdominal wall and extending into the scrotal fat.    2024 CT abdomen pelvis with contrast once again showed postsurgical changes of rectosigmoid mass resection.  Previously described presacral fluid collection contains oral contrast consistent with leak.  The collection measures 2 x 3.2 x 4 cm and is minimally enlarged when compared to prior study.  Findings also compatible with small bowel and colonic ileus.    24  Hematology/Oncology was consulted.     Patient initially presented to the hospital 3/25/2024 with acute lower GI bleed.  CT imaging had findings compatible with constipation, sigmoid diverticulosis without diverticulitis.  No significant adenopathy was identified.  Patient underwent colonoscopy 3/27/2024 which showed several polyps that were excised as well as a distal sigmoid mass, concerning for malignancy.  CEA was 5.21 ng/mL staging scans showed no other evidence of distal metastatic disease.  He was seen by Dr. Burden colorectal surgeon, and underwent partial colectomy with anastomosis on 3/30/2024. Final pathology  (SF  [id number]) confirmed invasive moderately differentiated adenocarcinoma, pT3N2a disease.  No loss of nuclear expression of MMR proteins: Low probability of microsatellite instability high.   Postoperatively the patient did well.  He during that hospitalization he also developed new onset atrial fibrillation with RVR.  He was rate controlled with metoprolol and transition back to his home dose of oral Toprol XL.  He was also started on Cardizem.  Eliquis has been on hold due to surgical interventions.    He/She  has a past medical history of Arthritis, Benign essential HTN, Cancer, Diabetes mellitus, GERD (gastroesophageal reflux disease), Hyperlipidemia, mixed, and Palpitations.    PCP: Gera Manriquez MD    History:  Past Medical History:   Diagnosis Date    Arthritis     Benign essential HTN     Cancer     Diabetes mellitus     GERD (gastroesophageal reflux disease)     Hyperlipidemia, mixed     Palpitations    ,   Past Surgical History:   Procedure Laterality Date    CARDIAC CATHETERIZATION      CHOLECYSTECTOMY      COLON RESECTION WITH ILEOSTOMY N/A 3/30/2024    Procedure: COLON RESECTION LOW ANTERIOR LAPAROSCOPIC, COLONAL ANASTOMOSIS, DIVERTING LOOP ILEOSTOMY WITH DAVINCI ROBOT;  Surgeon: Aakash Burden MD;  Location: [facility] MAIN OR;  Service: Robotics - DaVinci;  Laterality: N/A;    COLONOSCOPY N/A 3/27/2024    Procedure: COLONOSCOPY with polypectomy x 18 and sigmoid colon mass biopsies with endscopic spot tattooing;  Surgeon: Fili Quintero MD;  Location: [facility] ENDOSCOPY;  Service: Gastroenterology;  Laterality: N/A;  sigmoid mass at 25 cm    KNEE SURGERY      SIGMOIDOSCOPY N/A 3/30/2024    Procedure: SIGMOIDOSCOPY;  Surgeon: Aakash Burden MD;  Location: [facility] MAIN OR;  Service: Gastroenterology;  Laterality: N/A;   , History reviewed. No pertinent family history.,   Social History     Tobacco Use    Smoking status: Former    Smokeless tobacco: Never   Vaping Use    Vaping status: Never Used   Substance  Use Topics    Alcohol use: Never    Drug use: Never   ,   Medications Prior to Admission   Medication Sig Dispense Refill Last Dose    aspirin 81 MG chewable tablet Chew 1 tablet Daily.       atorvastatin (LIPITOR) 20 MG tablet Take 1 tablet by mouth Every Night.       dilTIAZem XR (DILACOR XR) 180 MG 24 hr capsule Take 1 capsule by mouth Daily. 90 capsule 1     losartan (COZAAR) 25 MG tablet Take 1 tablet by mouth Daily.       metFORMIN (GLUCOPHAGE) 500 MG tablet Take 1 tablet by mouth Daily With Breakfast.       methocarbamol (ROBAXIN) 750 MG tablet Take 1 tablet by mouth 4 (Four) Times a Day As Needed for Muscle Spasms for up to 40 days. 40 tablet 0     metoclopramide (REGLAN) 10 MG tablet Take 1 tablet by mouth 3 (Three) Times a Day With Meals for 14 days. 30 tablet 0     metoprolol succinate XL (TOPROL-XL) 100 MG 24 hr tablet Take 1 tablet by mouth Daily.       Multiple Vitamins-Minerals (MULTIVITAMIN ADULT PO) Take 1 tablet by mouth Daily.       Omeprazole (EQL Omeprazole) 20 MG tablet delayed-release 20 mg Daily.       ondansetron (Zofran) 4 MG tablet Take 1 tablet by mouth Daily As Needed for Nausea or Vomiting. 30 tablet 1     Scopolamine 1 MG/3DAYS patch Place 1 patch on the skin as directed by provider Every 72 (Seventy-Two) Hours. 4 patch 1     sertraline (Zoloft) 25 MG tablet 1 tablet Daily.       ALPRAZolam (Xanax) 0.25 MG tablet Take 1 tablet by mouth 3 (Three) Times a Day As Needed for Anxiety for up to 10 days. 10 tablet 0     simethicone (Gas-X) 80 MG chewable tablet Chew 1 tablet Every 6 (Six) Hours As Needed for Flatulence for up to 30 days. 30 tablet 2    , Scheduled Meds:  [Transfer Hold] aspirin, 81 mg, Oral, Daily  [Transfer Hold] atorvastatin, 20 mg, Oral, Nightly  [Transfer Hold] heparin (porcine), 5,000 Units, Subcutaneous, Q8H  [Transfer Hold] insulin lispro, 2-7 Units, Subcutaneous, Q6H  [Transfer Hold] metoclopramide, 10 mg, Oral, TID With Meals  metoprolol tartrate, 25 mg, Oral,  "Q8H  [Transfer Hold] pantoprazole, 40 mg, Oral, Q AM  piperacillin-tazobactam, 3.375 g, Intravenous, Q8H  potassium chloride ER, 40 mEq, Oral, Q4H  [Transfer Hold] Scopolamine, 1 patch, Transdermal, Q72H  [Transfer Hold] sertraline, 25 mg, Oral, Daily  [Transfer Hold] sodium chloride, 10 mL, Intravenous, Q12H    , Continuous Infusions:  dilTIAZem, 5-15 mg/hr, Last Rate: 15 mg/hr (04/09/24 1142)  sodium chloride, 125 mL/hr, Last Rate: 125 mL/hr (04/09/24 1142)    , PRN Meds:    [Transfer Hold] ALPRAZolam    [Transfer Hold] senna-docusate sodium **AND** [Transfer Hold] polyethylene glycol **AND** [Transfer Hold] bisacodyl **AND** [Transfer Hold] bisacodyl    [Transfer Hold] Calcium Replacement - Follow Nurse / BPA Driven Protocol    [Transfer Hold] dextrose    [Transfer Hold] dextrose    [Transfer Hold] glucagon (human recombinant)    [Transfer Hold] Magnesium Standard Dose Replacement - Follow Nurse / BPA Driven Protocol    [Transfer Hold] melatonin    [Transfer Hold] methocarbamol    [Transfer Hold] Morphine    [Transfer Hold] nitroglycerin    [Transfer Hold] ondansetron ODT    [Transfer Hold] oxyCODONE    [Transfer Hold] Phosphorus Replacement - Follow Nurse / BPA Driven Protocol    Potassium Replacement - Follow Nurse / BPA Driven Protocol    [Transfer Hold] simethicone    [COMPLETED] Insert Peripheral IV **AND** [Transfer Hold] sodium chloride    [Transfer Hold] sodium chloride    [Transfer Hold] sodium chloride   Allergies:  Patient has no known allergies.    Subjective     ROS:  Review of Systems     Objective   Vital Signs:   /69 (BP Location: Right arm, Patient Position: Lying)   Pulse 94   Temp 97.9 °F (36.6 °C) (Oral)   Resp 18   Ht 180.3 cm (71\")   Wt 105 kg (231 lb 7.7 oz)   SpO2 95%   BMI 32.29 kg/m²     Physical Exam: (performed by MD)  Physical Exam    Results Review:  Lab Results (last 48 hours)       Procedure Component Value Units Date/Time    POC Glucose Once [[id number]]  (Abnormal) " Collected: 04/09/24 1309    Specimen: Blood Updated: 04/09/24 1311     Glucose 118 mg/dL      Comment: Serial Number: [id number]:  [id number]       Single High Sensitivity Troponin T [[id number]]  (Abnormal) Collected: 04/09/24 1201    Specimen: Blood from Arm, Left Updated: 04/09/24 1232     HS Troponin T 22 ng/L     Narrative:      High Sensitive Troponin T Reference Range:  <14.0 ng/L- Negative Female for AMI  <22.0 ng/L- Negative Male for AMI  >=14 - Abnormal Female indicating possible myocardial injury.  >=22 - Abnormal Male indicating possible myocardial injury.   Clinicians would have to utilize clinical acumen, EKG, Troponin, and serial changes to determine if it is an Acute Myocardial Infarction or myocardial injury due to an underlying chronic condition.         Potassium [[id number]]  (Abnormal) Collected: 04/09/24 1201    Specimen: Blood from Arm, Left Updated: 04/09/24 1230     Potassium 3.1 mmol/L     Anaerobic Culture - Swab, Abdominal Wall [[id number]] Collected: 04/08/24 1934    Specimen: Swab from Abdominal Wall Updated: 04/09/24 1157    Wound Culture - Swab, Abdominal Wall [[id number]] Collected: 04/08/24 1934    Specimen: Swab from Abdominal Wall Updated: 04/09/24 0933     Wound Culture Growth present, too young to evaluate     Gram Stain Few (2+) WBCs per low power field    Hemoglobin A1c [[id number]]  (Abnormal) Collected: 04/09/24 0506    Specimen: Blood Updated: 04/09/24 0625     Hemoglobin A1C 5.80 %     Basic Metabolic Panel [[id number]]  (Abnormal) Collected: 04/09/24 0506    Specimen: Blood Updated: 04/09/24 0607     Glucose 118 mg/dL      BUN 18 mg/dL      Creatinine 1.14 mg/dL      Sodium 140 mmol/L      Potassium 2.5 mmol/L      Chloride 107 mmol/L      CO2 18.0 mmol/L      Calcium 8.7 mg/dL      BUN/Creatinine Ratio 15.8     Anion Gap 15.0 mmol/L      eGFR 68.3 mL/min/1.73     Narrative:      GFR Normal >60  Chronic Kidney Disease <60  Kidney Failure <15    The GFR formula is only  valid for adults with stable renal function between ages 18 and 70.    BNP [[id number]]  (Normal) Collected: 04/09/24 0506    Specimen: Blood Updated: 04/09/24 0554     proBNP 643.4 pg/mL     Narrative:      This assay is used as an aid in the diagnosis of individuals suspected of having heart failure. It can be used as an aid in the diagnosis of acute decompensated heart failure (ADHF) in patients presenting with signs and symptoms of ADHF to the emergency department (ED). In addition, NT-proBNP of <300 pg/mL indicates ADHF is not likely.    Age Range Result Interpretation  NT-proBNP Concentration (pg/mL:      <50             Positive            >450                   Gray                 300-450                    Negative             <300    50-75           Positive            >900                  Gray                300-900                  Negative            <300      >75             Positive            >1800                  Gray                300-1800                  Negative            <300    Ferritin [[id number]]  (Normal) Collected: 04/09/24 0506    Specimen: Blood Updated: 04/09/24 0554     Ferritin 281.40 ng/mL     Narrative:      Results may be falsely decreased if patient taking Biotin.      Procalcitonin [[id number]]  (Normal) Collected: 04/09/24 0506    Specimen: Blood Updated: 04/09/24 0554     Procalcitonin 0.19 ng/mL     Narrative:      As a Marker for Sepsis (Non-Neonates):    1. <0.5 ng/mL represents a low risk of severe sepsis and/or septic shock.  2. >2 ng/mL represents a high risk of severe sepsis and/or septic shock.    As a Marker for Lower Respiratory Tract Infections that require antibiotic therapy:    PCT on Admission    Antibiotic Therapy       6-12 Hrs later    >0.5                Strongly Recommended  >0.25 - <0.5        Recommended   0.1 - 0.25          Discouraged              Remeasure/reassess PCT  <0.1                Strongly Discouraged     Remeasure/reassess PCT    As 28 day  "mortality risk marker: \"Change in Procalcitonin Result\" (>80% or <=80%) if Day 0 (or Day 1) and Day 4 values are available. Refer to [email or web address]    Change in PCT <=80%  A decrease of PCT levels below or equal to 80% defines a positive change in PCT test result representing a higher risk for 28-day all-cause mortality of patients diagnosed with severe sepsis for septic shock.    Change in PCT >80%  A decrease of PCT levels of more than 80% defines a negative change in PCT result representing a lower risk for 28-day all-cause mortality of patients diagnosed with severe sepsis or septic shock.       TSH [[id number]]  (Normal) Collected: 04/09/24 0506    Specimen: Blood Updated: 04/09/24 0554     TSH 2.150 uIU/mL     High Sensitivity Troponin T [[id number]]  (Normal) Collected: 04/09/24 0506    Specimen: Blood Updated: 04/09/24 0554     HS Troponin T 18 ng/L     Narrative:      High Sensitive Troponin T Reference Range:  <14.0 ng/L- Negative Female for AMI  <22.0 ng/L- Negative Male for AMI  >=14 - Abnormal Female indicating possible myocardial injury.  >=22 - Abnormal Male indicating possible myocardial injury.   Clinicians would have to utilize clinical acumen, EKG, Troponin, and serial changes to determine if it is an Acute Myocardial Infarction or myocardial injury due to an underlying chronic condition.         CK [[id number]]  (Normal) Collected: 04/09/24 0506    Specimen: Blood Updated: 04/09/24 0553     Creatine Kinase 73 U/L     Amylase [[id number]]  (Normal) Collected: 04/09/24 0506    Specimen: Blood Updated: 04/09/24 0553     Amylase 56 U/L     Iron Profile [[id number]]  (Abnormal) Collected: 04/09/24 0506    Specimen: Blood Updated: 04/09/24 0553     Iron 22 mcg/dL      Iron Saturation (TSAT) 9 %      Transferrin 158 mg/dL      TIBC 235 mcg/dL     Lipid Panel [[id number]]  (Abnormal) Collected: 04/09/24 0506    Specimen: Blood Updated: 04/09/24 0553     Total Cholesterol 96 mg/dL      " Triglycerides 170 mg/dL      HDL Cholesterol 23 mg/dL      LDL Cholesterol  44 mg/dL      VLDL Cholesterol 29 mg/dL      LDL/HDL Ratio 1.70    Narrative:      Cholesterol Reference Ranges  (U.S. Department of Health and Human Services ATP III Classifications)    Desirable          <200 mg/dL  Borderline High    200-239 mg/dL  High Risk          >240 mg/dL      Triglyceride Reference Ranges  (U.S. Department of Health and Human Services ATP III Classifications)    Normal           <150 mg/dL  Borderline High  150-199 mg/dL  High             200-499 mg/dL  Very High        >500 mg/dL    HDL Reference Ranges  (U.S. Department of Health and Human Services ATP III Classifications)    Low     <40 mg/dl (major risk factor for CHD)  High    >60 mg/dl ('negative' risk factor for CHD)        LDL Reference Ranges  (U.S. Department of Health and Human Services ATP III Classifications)    Optimal          <100 mg/dL  Near Optimal     100-129 mg/dL  Borderline High  130-159 mg/dL  High             160-189 mg/dL  Very High        >189 mg/dL    Lactic Acid, Plasma [[id number]]  (Normal) Collected: 04/09/24 0506    Specimen: Blood Updated: 04/09/24 0549     Lactate 0.9 mmol/L     Calcium, Ionized [[id number]]  (Normal) Collected: 04/09/24 0506    Specimen: Blood Updated: 04/09/24 0532     Ionized Calcium 1.22 mmol/L     Magnesium [[id number]]  (Normal) Collected: 04/08/24 1529    Specimen: Blood Updated: 04/08/24 1754     Magnesium 2.4 mg/dL     Extra Tubes [[id number]] Collected: 04/08/24 1529    Specimen: Blood from Arm, Right Updated: 04/08/24 1631    Narrative:      The following orders were created for panel order Extra Tubes.  Procedure                               Abnormality         Status                     ---------                               -----------         ------                     Light Blue Top[[id number]]                                   Final result                 Please view results for these tests on the  individual orders.    Light Blue Top [[id number]] Collected: 04/08/24 1529    Specimen: Blood from Arm, Right Updated: 04/08/24 1631     Extra Tube Hold for add-ons.     Comment: Auto resulted       Urinalysis, Microscopic Only - Urine, Clean Catch [[id number]]  (Abnormal) Collected: 04/08/24 1549    Specimen: Urine, Clean Catch Updated: 04/08/24 1629     RBC, UA 0-2 /HPF      WBC, UA 3-5 /HPF      Bacteria, UA None Seen /HPF      Squamous Epithelial Cells, UA 0-2 /HPF      Hyaline Casts, UA 3-6 /LPF      Methodology Manual Light Microscopy    Urinalysis With Microscopic If Indicated (No Culture) - Urine, Clean Catch [[id number]]  (Abnormal) Collected: 04/08/24 1549    Specimen: Urine, Clean Catch Updated: 04/08/24 1610     Color, UA Yellow     Appearance, UA Clear     pH, UA 6.5     Specific Gravity, UA 1.019     Glucose, UA Negative     Ketones, UA Trace     Bilirubin, UA Negative     Blood, UA Negative     Protein, UA 30 mg/dL (1+)     Leuk Esterase, UA Trace     Nitrite, UA Negative     Urobilinogen, UA 0.2 E.U./dL    Comprehensive Metabolic Panel [[id number]]  (Abnormal) Collected: 04/08/24 1529    Specimen: Blood Updated: 04/08/24 1607     Glucose 136 mg/dL      BUN 19 mg/dL      Creatinine 1.43 mg/dL      Sodium 140 mmol/L      Potassium 2.5 mmol/L      Chloride 104 mmol/L      CO2 18.0 mmol/L      Calcium 9.7 mg/dL      Total Protein 7.0 g/dL      Albumin 3.7 g/dL      ALT (SGPT) 63 U/L      AST (SGOT) 67 U/L      Alkaline Phosphatase 135 U/L      Total Bilirubin 0.5 mg/dL      Globulin 3.3 gm/dL      A/G Ratio 1.1 g/dL      BUN/Creatinine Ratio 13.3     Anion Gap 18.0 mmol/L      eGFR 52.1 mL/min/1.73     Narrative:      GFR Normal >60  Chronic Kidney Disease <60  Kidney Failure <15    The GFR formula is only valid for adults with stable renal function between ages 18 and 70.    Lipase [[id number]]  (Normal) Collected: 04/08/24 1529    Specimen: Blood Updated: 04/08/24 1604     Lipase 38 U/L     CBC &  Differential [[id number]]  (Abnormal) Collected: 04/08/24 1529    Specimen: Blood Updated: 04/08/24 1544    Narrative:      The following orders were created for panel order CBC & Differential.  Procedure                               Abnormality         Status                     ---------                               -----------         ------                     CBC Auto Differential[[id number]]        Abnormal            Final result                 Please view results for these tests on the individual orders.    CBC Auto Differential [[id number]]  (Abnormal) Collected: 04/08/24 1529    Specimen: Blood Updated: 04/08/24 1544     WBC 19.89 10*3/mm3      RBC 4.61 10*6/mm3      Hemoglobin 12.6 g/dL      Hematocrit 39.8 %      MCV 86.3 fL      MCH 27.3 pg      MCHC 31.7 g/dL      RDW 16.6 %      RDW-SD 51.8 fl      MPV 12.0 fL      Platelets 409 10*3/mm3      Neutrophil % 71.3 %      Lymphocyte % 20.8 %      Monocyte % 5.5 %      Eosinophil % 1.3 %      Basophil % 0.4 %      Immature Grans % 0.7 %      Neutrophils, Absolute 14.20 10*3/mm3      Lymphocytes, Absolute 4.13 10*3/mm3      Monocytes, Absolute 1.10 10*3/mm3      Eosinophils, Absolute 0.26 10*3/mm3      Basophils, Absolute 0.07 10*3/mm3      Immature Grans, Absolute 0.13 10*3/mm3      nRBC 0.0 /100 WBC     Blood Culture - Blood, Arm, Right [[id number]] Collected: 04/08/24 1529    Specimen: Blood from Arm, Right Updated: 04/08/24 1538    Blood Culture - Blood, Arm, Right [[id number]] Collected: 04/08/24 1529    Specimen: Blood from Arm, Right Updated: 04/08/24 1538             Pending Results:     Imaging Reviewed:   CT Abdomen Pelvis With Contrast    Result Date: 4/8/2024  Impression: Postsurgical changes of rectosigmoid mass resection. The previously described presacral fluid collection contains oral contrast consistent with leak. This collection measures 2 x 3.2 x 4 cm. Collection is minimally enlarged in size when compared to prior  study. Findings  compatible with small bowel and colonic ileus. Electronically Signed: Julio Perdomo MD  4/8/2024 4:55 PM EDT  Workstation ID: [id number]    CT Abdomen Pelvis With Contrast    Result Date: 4/4/2024  Impression: 1. Interval surgical changes status post resection of a rectosigmoid mass. There is a small focal fluid collection in the presacral region posterior to the anastomotic site with an air-fluid level measuring 2.8 x 2 x 3.5 cm. Further evaluation with a CT abdomen pelvis with oral contrast may provide more detailed delineation of the postsurgical anatomy and suspected fluid collection. 2. Colon ileus 3. Trace free fluid in the abdomen and pelvis and minimal residual free air 4. Extensive subcutaneous air along the abdominal wall and extending into the scrotal fat partially outside of the field-of-view Electronically Signed: James Saldana MD  4/4/2024 4:05 PM EDT  Workstation ID: [id number]          Assessment & Plan   ASSESSMENT  Rectosigmoid adenocarcinoma.  Status post robotic anterior resection for bleeding rectosigmoid mass on 3/30/2024.  Pathology consistent with invasive moderately differentiated adenocarcinoma, pT3N2a disease.    Postop abdominal wound infection.  Colorectal surgery is following.  Planning for OR for diagnostic laparoscopy, washout drain placement and diversion.  Infectious disease following on IV Zosyn awaiting wound and blood cultures.  Reactive leukocytosis secondary to above.  Atrial fibrillation RVR.  Eliquis on hold with upcoming surgery.  Cardiology following    PLAN  Colorectal surgery recommendations planning for OR  Patient may be candidate for adjuvant systemic therapy  Continue to monitor CBC  Supportive care management per primary team  Further recommendations per Dr. Dunn    Electronically signed by Cristin Hook PA-C, 04/09/24        Thank you for this consult. We will be happy to follow along with you.

## 2024-04-09 NOTE — CONSULTS
Referring Provider: Abimael Denny MD    Reason for Consultation:      Atrial fibrillation with RVR      Patient Care Team:  Gera Manriquez MD as PCP - General (Internal Medicine)  Gera Manriquez MD as PCP - Family Medicine      SUBJECTIVE     Chief Complaint: Drainage from surgical incision    History of present illness:  Viktor Atkins is a 73 y.o. male with a history of atrial fibrillation who presented to [facility] with complaint of concern for wound infection.     Patient is 73 years old white gentleman whose past medical history is significant for hypertension, hyperlipidemia, nonobstructive CAD    Patient was recently admitted with rectal bleed and was recommended to have sigmoidoscopy/colonoscopy.  Cardiology consultation was requested at that time because of new onset atrial fibrillation on admission.  Patient was found to have a colon mass and went on to have robotic low anterior resection    Patient was seen as an outpatient by Dr. Burden  he was seen in the office and reported discharge from his incision.  Patient underwent CT of his abdomen and pelvis which showed a 2 x 3.2 x 4 cm fluid collection and findings compatible with small bowel and colonic ileus     Patient denies any symptom of chest pain or tightness or heaviness.  Mild shortness of breath noted.  Decreased exercise capacity.  No orthopnea PND no leg edema noted.  Mild shortness of breath reported.    Overnight he developed RVR and was started on IV Cardizem     In 2012, patient developed symptom of chest pain and underwent cardiac catheterization which showed nonobstructive CAD.  Echocardiogram done on March 28 demonstrated ejection fraction of 45 to 50% dilatation of the aortic root to 4.4 cm and diastolic dysfunction was noted.  Stress Myoview completed on March 29, 2024 showed no reversible ischemia           Review of systems:    Constitutional: No weakness, fatigue, fever, rigors, chills   Eyes: No vision changes,  eye pain   ENT/oropharynx: No difficulty swallowing, sore throat, epistaxis, changes in hearing   Cardiovascular: No chest pain, chest tightness, palpitations, paroxysmal nocturnal dyspnea, orthopnea, diaphoresis, dizziness / syncopal episode   Respiratory: No shortness of breath, dyspnea on exertion, cough, wheezing, hemoptysis   Gastrointestinal: No abdominal pain, nausea, vomiting, diarrhea, bloody stools   Genitourinary: No hematuria, dysuria   Neurological: No headache, tremors, numbness, one-sided weakness    Musculoskeletal: No cramps, myalgias, joint pain, joint swelling   Integument: No rash, edema reported drainage from surgical incision        Personal History:      Past Medical History:   Diagnosis Date    Arthritis     Benign essential HTN     Cancer     Diabetes mellitus     GERD (gastroesophageal reflux disease)     Hyperlipidemia, mixed     Palpitations        Past Surgical History:   Procedure Laterality Date    CARDIAC CATHETERIZATION      CHOLECYSTECTOMY      COLON RESECTION WITH ILEOSTOMY N/A 3/30/2024    Procedure: COLON RESECTION LOW ANTERIOR LAPAROSCOPIC, COLONAL ANASTOMOSIS, DIVERTING LOOP ILEOSTOMY WITH DAVINCI ROBOT;  Surgeon: Aakash Burden MD;  Location: [facility] MAIN OR;  Service: Robotics - DaVinci;  Laterality: N/A;    COLONOSCOPY N/A 3/27/2024    Procedure: COLONOSCOPY with polypectomy x 18 and sigmoid colon mass biopsies with endscopic spot tattooing;  Surgeon: Fili Quintero MD;  Location: [facility] ENDOSCOPY;  Service: Gastroenterology;  Laterality: N/A;  sigmoid mass at 25 cm    KNEE SURGERY      SIGMOIDOSCOPY N/A 3/30/2024    Procedure: SIGMOIDOSCOPY;  Surgeon: Aakash Burden MD;  Location: [facility] MAIN OR;  Service: Gastroenterology;  Laterality: N/A;       History reviewed. No pertinent family history.    Social History     Tobacco Use    Smoking status: Former    Smokeless tobacco: Never   Vaping Use    Vaping status: Never Used   Substance Use Topics    Alcohol use: Never     Drug use: Never        Home meds:  Prior to Admission medications    Medication Sig Start Date End Date Taking? Authorizing Provider   aspirin 81 MG chewable tablet Chew 1 tablet Daily.   Yes Dylon Worley MD   atorvastatin (LIPITOR) 20 MG tablet Take 1 tablet by mouth Every Night. 2/8/17  Yes Dylon Worley MD   dilTIAZem XR (DILACOR XR) 180 MG 24 hr capsule Take 1 capsule by mouth Daily. 4/1/24  Yes Peyman Castorena MD   losartan (COZAAR) 25 MG tablet Take 1 tablet by mouth Daily.   Yes Dylon Worley MD   metFORMIN (GLUCOPHAGE) 500 MG tablet Take 1 tablet by mouth Daily With Breakfast.   Yes Dylon Worley MD   methocarbamol (ROBAXIN) 750 MG tablet Take 1 tablet by mouth 4 (Four) Times a Day As Needed for Muscle Spasms for up to 40 days. 3/31/24 5/10/24 Yes Aakash Burden MD   metoclopramide (REGLAN) 10 MG tablet Take 1 tablet by mouth 3 (Three) Times a Day With Meals for 14 days. 4/4/24 4/18/24 Yes Aakash Burden MD   metoprolol succinate XL (TOPROL-XL) 100 MG 24 hr tablet Take 1 tablet by mouth Daily. 12/14/23  Yes Dylon Worley MD   Multiple Vitamins-Minerals (MULTIVITAMIN ADULT PO) Take 1 tablet by mouth Daily.   Yes Dylon Worley MD   Omeprazole (EQL Omeprazole) 20 MG tablet delayed-release 20 mg Daily. 2/8/17  Yes Dylon Worley MD   ondansetron (Zofran) 4 MG tablet Take 1 tablet by mouth Daily As Needed for Nausea or Vomiting. 4/4/24 4/4/25 Yes Aakash Burden MD   Scopolamine 1 MG/3DAYS patch Place 1 patch on the skin as directed by provider Every 72 (Seventy-Two) Hours. 4/4/24 4/4/25 Yes Aakash Burden MD   sertraline (Zoloft) 25 MG tablet 1 tablet Daily. 4/13/17  Yes Dylon Worley MD   ALPRAZolam (Xanax) 0.25 MG tablet Take 1 tablet by mouth 3 (Three) Times a Day As Needed for Anxiety for up to 10 days. 4/4/24 4/14/24  Aakash Burden MD   simethicone (Gas-X) 80 MG chewable tablet Chew 1 tablet Every 6 (Six) Hours As Needed for Flatulence for up to 30  "days. 4/4/24 5/4/24  Aakash Burden MD       Allergies:     Patient has no known allergies.    Scheduled Meds:aspirin, 81 mg, Oral, Daily  atorvastatin, 20 mg, Oral, Nightly  heparin (porcine), 5,000 Units, Subcutaneous, Q8H  metoclopramide, 10 mg, Oral, TID With Meals  metoprolol tartrate, 25 mg, Oral, Q8H  pantoprazole, 40 mg, Oral, Q AM  piperacillin-tazobactam, 3.375 g, Intravenous, Q8H  potassium chloride ER, 40 mEq, Oral, Q4H  potassium chloride, 10 mEq, Intravenous, Q1H  [START ON 4/10/2024] Scopolamine, 1 patch, Transdermal, Q72H  sertraline, 25 mg, Oral, Daily  sodium chloride, 10 mL, Intravenous, Q12H      Continuous Infusions:dilTIAZem, 5-15 mg/hr, Last Rate: 15 mg/hr (04/09/24 0228)  sodium chloride, 125 mL/hr, Last Rate: 125 mL/hr (04/09/24 0730)      PRN Meds:  ALPRAZolam    senna-docusate sodium **AND** polyethylene glycol **AND** bisacodyl **AND** bisacodyl    Calcium Replacement - Follow Nurse / BPA Driven Protocol    Magnesium Standard Dose Replacement - Follow Nurse / BPA Driven Protocol    melatonin    methocarbamol    Morphine    nitroglycerin    ondansetron ODT    oxyCODONE    Phosphorus Replacement - Follow Nurse / BPA Driven Protocol    Potassium Replacement - Follow Nurse / BPA Driven Protocol    simethicone    [COMPLETED] Insert Peripheral IV **AND** sodium chloride    sodium chloride    sodium chloride      OBJECTIVE    Vital Signs  Vitals:    04/09/24 0400 04/09/24 0526 04/09/24 0700 04/09/24 0838   BP: 120/66   128/70   BP Location:    Right arm   Patient Position:    Lying   Pulse: 104      Resp: 18  18 23   Temp: 98 °F (36.7 °C)      TempSrc: Oral      SpO2: 95%   95%   Weight:  105 kg (231 lb 7.7 oz)     Height:           Flowsheet Rows      Flowsheet Row First Filed Value   Admission Height 180.3 cm (71\") Documented at 04/08/2024 1426   Admission Weight 105 kg (231 lb 11.3 oz) Documented at 04/08/2024 1426            No intake or output data in the 24 hours ending 04/09/24 1005   "   Telemetry: A-fib with RVR    Physical Exam:  The patient is alert, oriented and in no distress.  Vital signs as noted above.  Head and neck revealed no carotid bruits or jugular venous distention.  No thyromegaly or lymphadenopathy is present  Lungs clear.  No wheezing.  Breath sounds are normal bilaterally.  Heart: Irregularly irregular rhythm tachycardic   abdomen: Distended and tender no organomegaly is present.  Extremities with good peripheral pulses without any pedal edema.  Skin: Warm and dry.  Musculoskeletal system is grossly normal.  CNS grossly normal.       Results Review:  I have personally reviewed the results from the time of this admission to 4/9/2024 10:05 EDT and agree with these findings:  []  Laboratory  []  Microbiology  []  Radiology  []  EKG/Telemetry   []  Cardiology/Vascular   []  Pathology  []  Old records  []  Other:    Most notable findings include:     Lab Results (last 24 hours)       Procedure Component Value Units Date/Time    Wound Culture - Swab, Abdominal Wall [[id number]] Collected: 04/08/24 1934    Specimen: Swab from Abdominal Wall Updated: 04/09/24 0933     Wound Culture Growth present, too young to evaluate     Gram Stain Few (2+) WBCs per low power field    Hemoglobin A1c [[id number]]  (Abnormal) Collected: 04/09/24 0506    Specimen: Blood Updated: 04/09/24 0625     Hemoglobin A1C 5.80 %     Basic Metabolic Panel [[id number]]  (Abnormal) Collected: 04/09/24 0506    Specimen: Blood Updated: 04/09/24 0607     Glucose 118 mg/dL      BUN 18 mg/dL      Creatinine 1.14 mg/dL      Sodium 140 mmol/L      Potassium 2.5 mmol/L      Chloride 107 mmol/L      CO2 18.0 mmol/L      Calcium 8.7 mg/dL      BUN/Creatinine Ratio 15.8     Anion Gap 15.0 mmol/L      eGFR 68.3 mL/min/1.73     Narrative:      GFR Normal >60  Chronic Kidney Disease <60  Kidney Failure <15    The GFR formula is only valid for adults with stable renal function between ages 18 and 70.    BNP [[id number]]  (Normal)  "Collected: 04/09/24 0506    Specimen: Blood Updated: 04/09/24 0554     proBNP 643.4 pg/mL     Narrative:      This assay is used as an aid in the diagnosis of individuals suspected of having heart failure. It can be used as an aid in the diagnosis of acute decompensated heart failure (ADHF) in patients presenting with signs and symptoms of ADHF to the emergency department (ED). In addition, NT-proBNP of <300 pg/mL indicates ADHF is not likely.    Age Range Result Interpretation  NT-proBNP Concentration (pg/mL:      <50             Positive            >450                   Gray                 300-450                    Negative             <300    50-75           Positive            >900                  Gray                300-900                  Negative            <300      >75             Positive            >1800                  Gray                300-1800                  Negative            <300    Ferritin [[id number]]  (Normal) Collected: 04/09/24 0506    Specimen: Blood Updated: 04/09/24 0554     Ferritin 281.40 ng/mL     Narrative:      Results may be falsely decreased if patient taking Biotin.      Procalcitonin [[id number]]  (Normal) Collected: 04/09/24 0506    Specimen: Blood Updated: 04/09/24 0554     Procalcitonin 0.19 ng/mL     Narrative:      As a Marker for Sepsis (Non-Neonates):    1. <0.5 ng/mL represents a low risk of severe sepsis and/or septic shock.  2. >2 ng/mL represents a high risk of severe sepsis and/or septic shock.    As a Marker for Lower Respiratory Tract Infections that require antibiotic therapy:    PCT on Admission    Antibiotic Therapy       6-12 Hrs later    >0.5                Strongly Recommended  >0.25 - <0.5        Recommended   0.1 - 0.25          Discouraged              Remeasure/reassess PCT  <0.1                Strongly Discouraged     Remeasure/reassess PCT    As 28 day mortality risk marker: \"Change in Procalcitonin Result\" (>80% or <=80%) if Day 0 (or Day 1) and " Day 4 values are available. Refer to http://www.[facility]-pct-calculator.com    Change in PCT <=80%  A decrease of PCT levels below or equal to 80% defines a positive change in PCT test result representing a higher risk for 28-day all-cause mortality of patients diagnosed with severe sepsis for septic shock.    Change in PCT >80%  A decrease of PCT levels of more than 80% defines a negative change in PCT result representing a lower risk for 28-day all-cause mortality of patients diagnosed with severe sepsis or septic shock.       TSH [[id number]]  (Normal) Collected: 04/09/24 0506    Specimen: Blood Updated: 04/09/24 0554     TSH 2.150 uIU/mL     High Sensitivity Troponin T [[id number]]  (Normal) Collected: 04/09/24 0506    Specimen: Blood Updated: 04/09/24 0554     HS Troponin T 18 ng/L     Narrative:      High Sensitive Troponin T Reference Range:  <14.0 ng/L- Negative Female for AMI  <22.0 ng/L- Negative Male for AMI  >=14 - Abnormal Female indicating possible myocardial injury.  >=22 - Abnormal Male indicating possible myocardial injury.   Clinicians would have to utilize clinical acumen, EKG, Troponin, and serial changes to determine if it is an Acute Myocardial Infarction or myocardial injury due to an underlying chronic condition.         CK [[id number]]  (Normal) Collected: 04/09/24 0506    Specimen: Blood Updated: 04/09/24 0553     Creatine Kinase 73 U/L     Amylase [[id number]]  (Normal) Collected: 04/09/24 0506    Specimen: Blood Updated: 04/09/24 0553     Amylase 56 U/L     Iron Profile [[id number]]  (Abnormal) Collected: 04/09/24 0506    Specimen: Blood Updated: 04/09/24 0553     Iron 22 mcg/dL      Iron Saturation (TSAT) 9 %      Transferrin 158 mg/dL      TIBC 235 mcg/dL     Lipid Panel [[id number]]  (Abnormal) Collected: 04/09/24 0506    Specimen: Blood Updated: 04/09/24 0553     Total Cholesterol 96 mg/dL      Triglycerides 170 mg/dL      HDL Cholesterol 23 mg/dL      LDL Cholesterol  44 mg/dL      VLDL  Cholesterol 29 mg/dL      LDL/HDL Ratio 1.70    Narrative:      Cholesterol Reference Ranges  (U.S. Department of Health and Human Services ATP III Classifications)    Desirable          <200 mg/dL  Borderline High    200-239 mg/dL  High Risk          >240 mg/dL      Triglyceride Reference Ranges  (U.S. Department of Health and Human Services ATP III Classifications)    Normal           <150 mg/dL  Borderline High  150-199 mg/dL  High             200-499 mg/dL  Very High        >500 mg/dL    HDL Reference Ranges  (U.S. Department of Health and Human Services ATP III Classifications)    Low     <40 mg/dl (major risk factor for CHD)  High    >60 mg/dl ('negative' risk factor for CHD)        LDL Reference Ranges  (U.S. Department of Health and Human Services ATP III Classifications)    Optimal          <100 mg/dL  Near Optimal     100-129 mg/dL  Borderline High  130-159 mg/dL  High             160-189 mg/dL  Very High        >189 mg/dL    Lactic Acid, Plasma [[id number]]  (Normal) Collected: 04/09/24 0506    Specimen: Blood Updated: 04/09/24 0549     Lactate 0.9 mmol/L     Calcium, Ionized [[id number]]  (Normal) Collected: 04/09/24 0506    Specimen: Blood Updated: 04/09/24 0532     Ionized Calcium 1.22 mmol/L     Magnesium [[id number]]  (Normal) Collected: 04/08/24 1529    Specimen: Blood Updated: 04/08/24 1754     Magnesium 2.4 mg/dL     Extra Tubes [[id number]] Collected: 04/08/24 1529    Specimen: Blood from Arm, Right Updated: 04/08/24 1631    Narrative:      The following orders were created for panel order Extra Tubes.  Procedure                               Abnormality         Status                     ---------                               -----------         ------                     Light Blue Top[[id number]]                                   Final result                 Please view results for these tests on the individual orders.    Light Blue Top [[id number]] Collected: 04/08/24 1529    Specimen: Blood from  Arm, Right Updated: 04/08/24 1631     Extra Tube Hold for add-ons.     Comment: Auto resulted       Urinalysis, Microscopic Only - Urine, Clean Catch [[id number]]  (Abnormal) Collected: 04/08/24 1549    Specimen: Urine, Clean Catch Updated: 04/08/24 1629     RBC, UA 0-2 /HPF      WBC, UA 3-5 /HPF      Bacteria, UA None Seen /HPF      Squamous Epithelial Cells, UA 0-2 /HPF      Hyaline Casts, UA 3-6 /LPF      Methodology Manual Light Microscopy    Urinalysis With Microscopic If Indicated (No Culture) - Urine, Clean Catch [[id number]]  (Abnormal) Collected: 04/08/24 1549    Specimen: Urine, Clean Catch Updated: 04/08/24 1610     Color, UA Yellow     Appearance, UA Clear     pH, UA 6.5     Specific Gravity, UA 1.019     Glucose, UA Negative     Ketones, UA Trace     Bilirubin, UA Negative     Blood, UA Negative     Protein, UA 30 mg/dL (1+)     Leuk Esterase, UA Trace     Nitrite, UA Negative     Urobilinogen, UA 0.2 E.U./dL    Comprehensive Metabolic Panel [[id number]]  (Abnormal) Collected: 04/08/24 1529    Specimen: Blood Updated: 04/08/24 1607     Glucose 136 mg/dL      BUN 19 mg/dL      Creatinine 1.43 mg/dL      Sodium 140 mmol/L      Potassium 2.5 mmol/L      Chloride 104 mmol/L      CO2 18.0 mmol/L      Calcium 9.7 mg/dL      Total Protein 7.0 g/dL      Albumin 3.7 g/dL      ALT (SGPT) 63 U/L      AST (SGOT) 67 U/L      Alkaline Phosphatase 135 U/L      Total Bilirubin 0.5 mg/dL      Globulin 3.3 gm/dL      A/G Ratio 1.1 g/dL      BUN/Creatinine Ratio 13.3     Anion Gap 18.0 mmol/L      eGFR 52.1 mL/min/1.73     Narrative:      GFR Normal >60  Chronic Kidney Disease <60  Kidney Failure <15    The GFR formula is only valid for adults with stable renal function between ages 18 and 70.    Lipase [[id number]]  (Normal) Collected: 04/08/24 1529    Specimen: Blood Updated: 04/08/24 1604     Lipase 38 U/L     CBC & Differential [[id number]]  (Abnormal) Collected: 04/08/24 1529    Specimen: Blood Updated: 04/08/24 1544     Narrative:      The following orders were created for panel order CBC & Differential.  Procedure                               Abnormality         Status                     ---------                               -----------         ------                     CBC Auto Differential[[id number]]        Abnormal            Final result                 Please view results for these tests on the individual orders.    CBC Auto Differential [[id number]]  (Abnormal) Collected: 04/08/24 1529    Specimen: Blood Updated: 04/08/24 1544     WBC 19.89 10*3/mm3      RBC 4.61 10*6/mm3      Hemoglobin 12.6 g/dL      Hematocrit 39.8 %      MCV 86.3 fL      MCH 27.3 pg      MCHC 31.7 g/dL      RDW 16.6 %      RDW-SD 51.8 fl      MPV 12.0 fL      Platelets 409 10*3/mm3      Neutrophil % 71.3 %      Lymphocyte % 20.8 %      Monocyte % 5.5 %      Eosinophil % 1.3 %      Basophil % 0.4 %      Immature Grans % 0.7 %      Neutrophils, Absolute 14.20 10*3/mm3      Lymphocytes, Absolute 4.13 10*3/mm3      Monocytes, Absolute 1.10 10*3/mm3      Eosinophils, Absolute 0.26 10*3/mm3      Basophils, Absolute 0.07 10*3/mm3      Immature Grans, Absolute 0.13 10*3/mm3      nRBC 0.0 /100 WBC     Blood Culture - Blood, Arm, Right [[id number]] Collected: 04/08/24 1529    Specimen: Blood from Arm, Right Updated: 04/08/24 1538    Blood Culture - Blood, Arm, Right [[id number]] Collected: 04/08/24 1529    Specimen: Blood from Arm, Right Updated: 04/08/24 1538            Imaging Results (Last 24 Hours)       Procedure Component Value Units Date/Time    CT Abdomen Pelvis With Contrast [[id number]] Collected: 04/08/24 1645     Updated: 04/08/24 1657    Narrative:      CT ABDOMEN PELVIS W CONTRAST    Date of Exam: 4/8/2024 4:33 PM EDT    Indication: colon mass last saturday [facility] concerned for infection.    Comparison: Contrast-enhanced CT of the abdomen and pelvis performed on April 4, 2024. Additional comparison with contrast-enhanced CT of the abdomen and pelvis  performed on March 25, 2024    Technique: Axial CT images were obtained of the abdomen and pelvis following the uneventful intravenous administration of iodinated contrast. Sagittal and coronal reconstructions were performed.  Automated exposure control and iterative reconstruction   methods were used.        Findings:    Lung Bases:  The visualized lung bases and lower mediastinal structures are unremarkable.    Peritoneum:  No free intraperitoneal air or fluid.     Abdominal wall:  Subcutaneous air in the anterior lateral abdominal wall bilaterally is again visualized, decreased in volume when compared to prior study.    Liver:  Liver is normal in size and contour. No focal lesions.    Biliary/Gallbladder:  The gallbladder is surgically absent. The biliary tree is nondilated.    Pancreas:  Pancreas is within normal limits. There is no evidence of pancreatic mass or peripancreatic inflammatory changes.    Spleen:  Spleen is normal in size and contour.    Gastrointestinal/Mesentery:   Postsurgical changes of rectosigmoid neoplasm resection are again visualized. Previously described presacral collection is again visualized and contains oral contrast. Findings compatible with leak. This collection measures 2 x 3.2 x 4 cm in maximal AP,   transverse, and craniocaudal dimension. There is mild to moderate dilatation of the distal small bowel loops and diffuse moderate dilatation of the colon. Anastomosis appears patent. Oral contrast is visualized throughout the colon and in the rectum.   Sigmoid diverticulosis is again visualized without evidence of diverticulitis.    Adrenals:  Adrenal glands are unremarkable.    Kidneys:  The kidneys are in anatomic position. No evidence of nephrolithiasis. No evidence of hydronephrosis or significant perinephric fat stranding.    Bladder:   The urinary bladder is unremarkable.    Reproductive organs:    The prostate and seminal vesicles are within normal limits.    Lymph Nodes:  No  significant adenopathy is identified.     Vasculature:  Mild aortic atheromatous changes.    Bony Structures:    No acute fracture or aggressive lesions. Moderate degenerative changes of the lumbar spine are visualized.        Impression:      Impression:    Postsurgical changes of rectosigmoid mass resection. The previously described presacral fluid collection contains oral contrast consistent with leak. This collection measures 2 x 3.2 x 4 cm. Collection is minimally enlarged in size when compared to prior   study.    Findings compatible with small bowel and colonic ileus.                Electronically Signed: Julio Perdomo MD    4/8/2024 4:55 PM EDT    Workstation ID: [id number]            LAB RESULTS (LAST 7 DAYS)    CBC  Results from last 7 days   Lab Units 04/08/24  1529 04/03/24  1429   WBC 10*3/mm3 19.89* 13.66*   RBC 10*6/mm3 4.61 4.09*   HEMOGLOBIN g/dL 12.6* 11.4*   HEMATOCRIT % 39.8 34.8*   MCV fL 86.3 85.1   PLATELETS 10*3/mm3 409 259       BMP  Results from last 7 days   Lab Units 04/09/24  0506 04/08/24  1529 04/03/24  1429   SODIUM mmol/L 140 140 142   POTASSIUM mmol/L 2.5* 2.5* 3.5   CHLORIDE mmol/L 107 104 108*   CO2 mmol/L 18.0* 18.0* 19.0*   BUN mg/dL 18 19 13   CREATININE mg/dL 1.14 1.43* 1.04   GLUCOSE mg/dL 118* 136* 126*   MAGNESIUM mg/dL  --  2.4  --        CMP   Results from last 7 days   Lab Units 04/09/24  0506 04/08/24  1529 04/03/24  1429   SODIUM mmol/L 140 140 142   POTASSIUM mmol/L 2.5* 2.5* 3.5   CHLORIDE mmol/L 107 104 108*   CO2 mmol/L 18.0* 18.0* 19.0*   BUN mg/dL 18 19 13   CREATININE mg/dL 1.14 1.43* 1.04   GLUCOSE mg/dL 118* 136* 126*   ALBUMIN g/dL  --  3.7  --    BILIRUBIN mg/dL  --  0.5  --    ALK PHOS U/L  --  135*  --    AST (SGOT) U/L  --  67*  --    ALT (SGPT) U/L  --  63*  --    AMYLASE U/L 56  --   --    LIPASE U/L  --  38  --        BNP        TROPONIN  Results from last 7 days   Lab Units 04/09/24  0506   CK TOTAL U/L 73   HSTROP T ng/L 18       [facility]         Creatinine Clearance  Estimated Creatinine Clearance: 72.2 mL/min (by C-G formula based on SCr of 1.14 mg/dL).    ABG          Radiology  CT Abdomen Pelvis With Contrast    Result Date: 4/8/2024  Impression: Postsurgical changes of rectosigmoid mass resection. The previously described presacral fluid collection contains oral contrast consistent with leak. This collection measures 2 x 3.2 x 4 cm. Collection is minimally enlarged in size when compared to prior  study. Findings compatible with small bowel and colonic ileus. Electronically Signed: Julio Perdomo MD  4/8/2024 4:55 PM EDT  Workstation ID: [id number]       EKG  I personally viewed and interpreted the patient's EKG/Telemetry data:  ECG 12 Lead Tachycardia   Final Result   HEART RATE= 164  bpm   RR Interval= 367  ms   NJ Interval=   ms   P Horizontal Axis=   deg   P Front Axis=   deg   QRSD Interval= 108  ms   QT Interval= 314  ms   QTcB= 518  ms   QRS Axis= 20  deg   T Wave Axis= 205  deg   - ABNORMAL ECG -   Atrial fibrillation with rapid V-rate   Incomplete left bundle branch block   Low voltage, extremity leads   The appearance of BBB may be rate related   When compared with ECG of 27-Mar-2024 11:50:13,   Significant change in rhythm   Electronically Signed By: Thiago Solomon (REDD) 09-Apr-2024 07:30:05   Date and Time of Study: 2024-04-08 16:49:10                  Echocardiogram:    Results for orders placed during the hospital encounter of 03/25/24    Adult Transthoracic Echo Complete W/ Cont if Necessary Per Protocol    Interpretation Summary    Left ventricular systolic function is low normal. Calculated left ventricular EF = 46% Left ventricular ejection fraction appears to be 46 - 50%.    The left ventricular cavity is mildly dilated.    Left ventricular diastolic dysfunction is noted.    The left atrial cavity is dilated.    Estimated right ventricular systolic pressure from tricuspid regurgitation is normal (<35 mmHg).    Dilation of the aortic  root is present.  4.4 cm    Patient in atrial fibrillation during this study.        Stress Test:  Results for orders placed during the hospital encounter of 03/25/24    Stress Test With Myocardial Perfusion One Day    Interpretation Summary    Myocardial perfusion imaging indicates a normal myocardial perfusion study with no evidence of ischemia. Impressions are consistent with a low risk study.    Left ventricular ejection fraction is moderately reduced (Calculated EF = 32%).    This is normal Cardiolite imaging stress test with no evidence of ischemia or myocardial infarction.  Small fixed apical defect is noted which is thought to be attenuation artifact left ventricle size and function is normal on gated SPECT imaging.  No wall motion abnormality was noted.  Clinical correlation recommended.  Further recommendation as per ordering physician. .    Findings consistent with an equivocal ECG stress test.        Cardiac Catheterization:  No results found for this or any previous visit.        Other:      ASSESSMENT & PLAN:    Principal Problem:    Wound infection  Active Problems:    Anxiety disorder    Essential hypertension    Palpitations    Mixed hyperlipidemia    Colon cancer    Atrial fibrillation with rapid ventricular response    Atrial fibrillation with RVR  Duration unknown  Was present on admission 3/25/2024  No prior history of A-fib  [id number] equals 2  Has been anticoagulated with Eliquis but will have to reevaluate postop as he cannot afford it  Currently on IV Cardizem    Recent anterior resection of bleeding rectosigmoid mass  Now with wound infection  Now with drainage from surgical incision and fluid collection concerning for abscess   With ileus     hypertension  Will follow     Coronary artery disease  Reported to be nonobstructive by previous cath in 2012 done at [facility]  Stress Myoview last month with no reversible ischemia  Denies chest pain    Type 2  diabetes    Hypokalemia  Replace and monitor    Cardiac standpoint patient is acceptable risk to proceed with surgery as planned  Will follow postoperatively  Resume oral beta-blockers when able to take p.o.  Continue IV Cardizem for now  Additional recommendations per Dr. Dionicio De Anda, APRN  04/09/24  10:05 EDT

## 2024-04-09 NOTE — PLAN OF CARE
Problem: Adult Inpatient Plan of Care  Goal: Plan of Care Review  Outcome: Ongoing, Progressing  Goal: Patient-Specific Goal (Individualized)  Outcome: Ongoing, Progressing  Goal: Absence of Hospital-Acquired Illness or Injury  Outcome: Ongoing, Progressing  Intervention: Identify and Manage Fall Risk  Recent Flowsheet Documentation  Taken 4/9/2024 1200 by Saturnino Garcia RN  Safety Promotion/Fall Prevention: safety round/check completed  Taken 4/9/2024 1000 by Saturnino Garcia RN  Safety Promotion/Fall Prevention: safety round/check completed  Taken 4/9/2024 0800 by Saturnino Garcia RN  Safety Promotion/Fall Prevention: safety round/check completed  Intervention: Prevent Skin Injury  Recent Flowsheet Documentation  Taken 4/9/2024 0800 by Saturnino Garcia RN  Body Position: position changed independently  Intervention: Prevent and Manage VTE (Venous Thromboembolism) Risk  Recent Flowsheet Documentation  Taken 4/9/2024 0800 by Saturnino Garcia RN  Activity Management: bedrest  Goal: Optimal Comfort and Wellbeing  Outcome: Ongoing, Progressing  Intervention: Provide Person-Centered Care  Recent Flowsheet Documentation  Taken 4/9/2024 1200 by Saturnino Garcia RN  Trust Relationship/Rapport: care explained  Taken 4/9/2024 0800 by Saturnino Garcia RN  Trust Relationship/Rapport: care explained  Goal: Readiness for Transition of Care  Outcome: Ongoing, Progressing   Goal Outcome Evaluation:   Pt progressing

## 2024-04-09 NOTE — PROGRESS NOTES
Colorectal Surgery Progress Note    Pt. Name/Age/:  Viktor Atkins   73 y.o.    1950         Med. Record Number:   [id number]  Date of admission:  2024      Service(s): Colorectal Surgery      Subjective    Seen and examined today   Has no complaints     Remains afebrile , normotensive in afib     Objective  Vitals:     Patient Vitals for the past 24 hrs:   BP Temp Temp src Pulse Resp SpO2 Height Weight   24 0526 -- -- -- -- -- -- -- 105 kg (231 lb 7.7 oz)   24 0400 120/66 98 °F (36.7 °C) Oral 104 18 95 % -- --   24 0215 121/69 -- -- 111 -- 94 % -- --   24 0200 123/66 -- -- 100 -- 94 % -- --   24 0105 114/68 -- -- 115 -- 93 % -- --   24 0100 -- -- -- 107 -- 95 % -- --   24 0045 -- -- -- 104 -- 94 % -- --   24 0030 127/66 -- -- 115 -- 95 % -- --   24 0015 128/61 -- -- 95 -- 93 % -- --   24 0000 117/55 -- -- 108 -- 94 % -- --   24 2345 129/78 -- -- 104 -- 94 % -- --   24 2330 132/69 -- -- 109 -- 94 % -- --   24 2315 130/75 -- -- 110 -- 95 % -- --   24 2300 125/75 -- -- 113 -- 93 % -- --   24 2245 136/67 -- -- 117 -- 94 % -- --   24 2230 133/82 -- -- (!) 129 -- 94 % -- --   24 2215 128/66 -- -- (!) 121 -- 92 % -- --   24 133/71 -- -- 110 -- 95 % -- --   245 116/74 -- -- 115 -- 95 % -- --   24 2130 134/73 -- -- 110 -- 96 % -- --   24 124/63 -- -- (!) 126 -- 95 % -- --   24 2100 129/66 -- -- 115 -- 94 % -- --   24 204 128/75 -- -- (!) 125 -- 97 % -- --   24 110/71 -- -- (!) 130 -- 97 % -- --   24 96/60 -- -- (!) 125 -- 98 % -- --   24 136/57 -- -- (!) 130 -- 96 % -- --   24 -- -- -- (!) 132 -- 98 % -- --   24 1943 139/82 -- -- (!) 127 -- -- -- --   24 1930 139/82 -- -- 113 -- 96 % -- --   24 1915 128/68 -- -- (!) 125 -- 94 % -- --   24 1902 121/63 -- -- (!) 126 -- 95 % -- --   24  "103/59 -- -- 120 -- 96 % -- --   04/08/24 1832 134/77 -- -- (!) 145 -- 94 % -- --   04/08/24 1831 134/77 -- -- (!) 135 -- -- -- --   04/08/24 1828 -- -- -- 119 -- 97 % -- --   04/08/24 1818 121/64 -- -- (!) 140 -- 96 % -- --   04/08/24 1816 -- -- -- 117 -- 94 % -- --   04/08/24 1751 -- -- -- (!) 138 -- 93 % -- --   04/08/24 1747 127/73 -- -- (!) 136 -- 95 % -- --   04/08/24 1732 125/67 -- -- (!) 151 -- 97 % -- --   04/08/24 1720 -- -- -- (!) 141 -- -- -- --   04/08/24 1714 114/67 -- -- (!) 167 -- -- -- --   04/08/24 1427 -- 97.6 °F (36.4 °C) -- -- -- -- -- --   04/08/24 1426 113/81 -- Oral 89 20 92 % 180.3 cm (71\") 105 kg (231 lb 11.3 oz)           Wt. Admission: Weight: 105 kg (231 lb 11.3 oz)     Wt. Current: Weight: 105 kg (231 lb 7.7 oz)   Body mass index is 32.29 kg/m².      I&O:No intake or output data in the 24 hours ending 04/09/24 0723  No intake/output data recorded.      Exam  General:  No acute distress, resting comfortably.  Cardiovascular: afib  Respiratory: no increased work of breathing.  Abdomen:  Soft, slightly distended, non tender   Extremities: warm, well-perfused extremities, no pitting edema.      Labs:     [unfilled]          Scheduled Meds:aspirin, 81 mg, Oral, Daily  atorvastatin, 20 mg, Oral, Nightly  metoclopramide, 10 mg, Oral, TID With Meals  metoprolol succinate XL, 25 mg, Oral, Daily  pantoprazole, 40 mg, Oral, Q AM  potassium chloride ER, 40 mEq, Oral, Q4H  [START ON 4/10/2024] Scopolamine, 1 patch, Transdermal, Q72H  sertraline, 25 mg, Oral, Daily  sodium chloride, 10 mL, Intravenous, Q12H      Continuous Infusions:dilTIAZem, 5-15 mg/hr, Last Rate: 15 mg/hr (04/09/24 0228)  sodium chloride, 125 mL/hr, Last Rate: 125 mL/hr (04/08/24 2141)      PRN Meds:.  ALPRAZolam    senna-docusate sodium **AND** polyethylene glycol **AND** bisacodyl **AND** bisacodyl    Calcium Replacement - Follow Nurse / BPA Driven Protocol    Magnesium Standard Dose Replacement - Follow Nurse / BPA Driven " Protocol    melatonin    methocarbamol    Morphine    nitroglycerin    ondansetron ODT    oxyCODONE    Phosphorus Replacement - Follow Nurse / BPA Driven Protocol    Potassium Replacement - Follow Nurse / BPA Driven Protocol    simethicone    [COMPLETED] Insert Peripheral IV **AND** sodium chloride    sodium chloride    sodium chloride          Assessment  73 y.o. male with stage 3 rectosigmoid cancer     Plan / Recommendations    - please repeat K+ level, goal > 4, given in afib. Getting 120 PO this am and received multiple runs last night     - ostomy marking     - DVT prophylaxis ordered     - antibiotics: zosyn ordered    - plan for OR today       07:23 EDT; 4/9/2024        Aakash Burden MD  Colon and Rectal Surgery   [facility]

## 2024-04-09 NOTE — OP NOTE
[facility] Operative Note   Name: Viktor Atkins  : 1950  Date of Surgery: 2024  Pre-op Diagnosis: Hypokalemia [E87.6]  A-fib [I48.91]  Wound infection [T14.8XXA, L08.9]  Atrial fibrillation with rapid ventricular response [I48.91]  Leak of anastomosis between gastrointestinal structures [K91.89]   Post-op Diagnosis: same  Procedure:   Diagnostic Laparoscopy  Pelvic drain placement   Laparoscopic end-loop colostomy  Pulse lavage and washout of pfannenstiel incision wound     Surgeon: Aakash Burden MD ; Beto Gorman MD   Assistants:  Dixie Ayala  was responsible for performing the following activities: Retraction, Suction, Irrigation, and Suturing and their skilled assistance was necessary for the success of this case.   Anesthesia: General  IV Fluids: refer to anesthesia record  Estimated Blood Loss: <50cc  Drains: 19 Fr Gabriel   Implants: none  Specimen: None   Findings     No intraabdominal collection   Colonic and small bowel ileus   Transverse end loop colostomy     Complications   No complications    Procedure:  After appropriate consent including risks, benefits and alternatives was obtained, the patient was brought to the OR, and placed in supine position. Anesthesia was then administered.  All pao prominences were padded, antibiotics were given, and scds placed.     The patient was prepped and drapped in usual sterile fashion.    The verse need was used to insufflate to 15mm Hg. 5 mm optiview trocar was used in the supra umbilicus just to the right of midline using his previous incision.     The abdomen was then explored.  An additional 5 mm port was placed under direct vision of the laparoscope in the left upper quadrant where his colostomy marking was, another 5 mm trocar was placed in the right lower quadrant through his old incision.    The abdomen was explored. There was no intra-abdominal fluid collection. Th colon and small bowel were noted to be dilated.     The patient was placed in  steep trendelenburg, The pelvis was noted with postoperative changes. No narciso purulent or enteral content to suggest anastomotic leak. However given sub clinic leak finding on radiographic and superficial surgical site infection, I elected to divert him with transverse end loop colostomy.     A 19Fr drain was placed into the pelvis using the right lower quadrant port site.     The transverse colon was significantly dilated. The omentum was mobilized off the the distal transverse colon using ligature device.     At this point the colon was able to reach to the anterior abdominal wall. The colostomy was then fashioned by removing the 5 mm port and then excising a circular portion of the overlying skin with division longitudinally of the anterior and posterior fascia with splitting of the rectus muscle. Two fingers were able to be easily passed through this defect.     A small Faisal wound retractor was placed and the abdomen explored once again. The field was hemostatic.    The colon was brought out through the incision however due to the dilatation, this was challenging to exteriorize completely. The fascia was extended proximally. A purse string using 3-0 vicryl was applied over the colon. A decompressive colotomy was placed and the colon was then decompressed.A window was then created into the mesentery. The colon was divided with a firing of the NORA (blue) stapler. Due to the fascia being wide, we placed interrupted 2-0 PDS. All ports were removed and skin closed with skin staples.    The proximal portion of the bowel was brought out as an ostomy by excising a small segment of the bowel along with the staple line and affixing it superiorly and laterally to the dermis with 3-0 vicryl interrupted sutures. The tip of the distal segment of bowel was excised and brought out as a mucous fistula by affixing to left lateral and to the dermis. The inferior aspect of proximal bowel (colostomy) was affixed to the superior  aspect of the mucous fistula. Intervening interrupted 3-0 vicryl stitches were placed radially to secure the colostomy and mucous fistula to the dermis.     An ostomy bag was applied.     The pfannenstiel incision was then irrigated and suctioned with irrecept. A pulse lavage was then used to to debride and wash the wound with saline solution. 1/2 inch penrose drain on both corner of the wound was secured and the skin approximated with skin staples. Dressing was applied.     A debriefing was carried and case concluded.     Counts: Instrument, sponge, and needle counts were reported to be correct prior to closure and at the conclusion of the case.  Disposition  The patient was taken to PACU in good condition.      Aakash Burden MD  Colon and Rectal Surgery   [facility]

## 2024-04-09 NOTE — ANESTHESIA PROCEDURE NOTES
Airway  Urgency: elective    Date/Time: 4/9/2024 1:41 PM  Airway not difficult    General Information and Staff    Patient location during procedure: OR  CRNA/CAA: Ember Jasso, CRNA    Indications and Patient Condition  Indications for airway management: airway protection    Preoxygenated: yes  Mask difficulty assessment: 0 - not attempted    Final Airway Details  Final airway type: endotracheal airway      Successful airway: ETT  Cuffed: yes   Successful intubation technique: RSI and video laryngoscopy  Facilitating devices/methods: intubating stylet  Endotracheal tube insertion site: oral  Blade: Martinez  Blade size: 4  ETT size (mm): 7.5  Cormack-Lehane Classification: grade I - full view of glottis  Placement verified by: chest auscultation and capnometry   Cuff volume (mL): 8  Measured from: lips  ETT/EBT  to lips (cm): 21  Number of attempts at approach: 1    Additional Comments  Cricoid held by Dr. Hunt until confirmation of ETT placement and balloon inflated. Atraumatic placement of ETT, dentition unchanged from preop.

## 2024-04-09 NOTE — CONSULTS
Infectious Diseases Consult Note    Referring Provider: Abimael Denny MD    Reason for Consultation: Abdominal abscess    Patient Care Team:  Gera Manriquez MD as PCP - General (Internal Medicine)  Gera Manriquez MD as PCP - Family Medicine    Chief complaint drainage from suprapubic wound    Subjective     History of present illness:      This is 73-year-old male who was diagnosed with colorectal cancer and required to have robotic surgery for tumor resection on March 30, 2024.  The patient started noticing purulent drainage from the super pubic incision.  He was evaluated at the outpatient by his surgeon on April 8, 2024 and had bedside I&D and was sent to the hospital for surgery.  His CT scan of abdomen pelvis showed anastomosis leak.  Patient was supposed to have surgery today but was postponed since he was hypokalemic.  The patient is hemodynamically stable.    Review of Systems   Review of Systems   Constitutional: Negative.    HENT: Negative.     Eyes: Negative.    Respiratory: Negative.     Cardiovascular: Negative.    Gastrointestinal: Negative.    Genitourinary: Negative.    Musculoskeletal: Negative.    Skin:  Positive for wound.   Neurological: Negative.    Hematological: Negative.    Psychiatric/Behavioral: Negative.         Medications  (Not in a hospital admission)      History  Past Medical History:   Diagnosis Date    Arthritis     Benign essential HTN     Cancer     Diabetes mellitus     GERD (gastroesophageal reflux disease)     Hyperlipidemia, mixed     Palpitations      Past Surgical History:   Procedure Laterality Date    CARDIAC CATHETERIZATION      CHOLECYSTECTOMY      COLON RESECTION WITH ILEOSTOMY N/A 3/30/2024    Procedure: COLON RESECTION LOW ANTERIOR LAPAROSCOPIC, COLONAL ANASTOMOSIS, DIVERTING LOOP ILEOSTOMY WITH DAVINCI ROBOT;  Surgeon: Aakash Burden MD;  Location: [facility] MAIN OR;  Service: Robotics - DaVinci;  Laterality: N/A;    COLONOSCOPY N/A 3/27/2024    Procedure:  COLONOSCOPY with polypectomy x 18 and sigmoid colon mass biopsies with endscopic spot tattooing;  Surgeon: Fili Quintero MD;  Location: [facility] ENDOSCOPY;  Service: Gastroenterology;  Laterality: N/A;  sigmoid mass at 25 cm    KNEE SURGERY      SIGMOIDOSCOPY N/A 3/30/2024    Procedure: SIGMOIDOSCOPY;  Surgeon: Aakash Burden MD;  Location: [facility] MAIN OR;  Service: Gastroenterology;  Laterality: N/A;       Family History  History reviewed. No pertinent family history.    Social History   reports that he has quit smoking. He has never used smokeless tobacco. He reports that he does not drink alcohol and does not use drugs.    Allergies  Patient has no known allergies.    Objective     Vital Signs   Vital Signs (last 24 hours)         04/08 0700  04/09 0659 04/09 0700 04/09 1147   Most Recent      Temp (°F) 97.6 -  98       98 (36.7) 04/09 0400    Heart Rate 89 -  167    94 -  114     94 04/09 1140    Resp 18 -  20    18 -  23     23 04/09 0838    BP 96/60 -  139/82    126/69 -  143/74     126/69 04/09 1140    SpO2 (%) 92 -  98      95     95 04/09 0838            Physical Exam:  Physical Exam  Vitals and nursing note reviewed.   Constitutional:       Appearance: He is well-developed.   HENT:      Head: Normocephalic and atraumatic.   Eyes:      Pupils: Pupils are equal, round, and reactive to light.   Cardiovascular:      Rate and Rhythm: Normal rate and regular rhythm.      Heart sounds: Normal heart sounds.   Pulmonary:      Effort: Pulmonary effort is normal. No respiratory distress.      Breath sounds: Normal breath sounds. No wheezing or rales.   Abdominal:      General: Bowel sounds are normal. There is no distension.      Palpations: Abdomen is soft. There is no mass.      Tenderness: There is no abdominal tenderness. There is no guarding or rebound.      Comments: Incision in the suprapubic area with purulent material.  Wound was packed   Musculoskeletal:         General: No deformity. Normal range of  motion.      Cervical back: Normal range of motion and neck supple.   Skin:     General: Skin is warm.      Findings: No erythema or rash.   Neurological:      Mental Status: He is alert and oriented to person, place, and time.      Cranial Nerves: No cranial nerve deficit.         Microbiology  Microbiology Results (last 10 days)       Procedure Component Value - Date/Time    Wound Culture - Swab, Abdominal Wall [[id number]] Collected: 04/08/24 1934    Lab Status: Preliminary result Specimen: Swab from Abdominal Wall Updated: 04/09/24 0933     Wound Culture Growth present, too young to evaluate     Gram Stain Few (2+) WBCs per low power field            Laboratory  Results from last 7 days   Lab Units 04/08/24  1529   WBC 10*3/mm3 19.89*   HEMOGLOBIN g/dL 12.6*   HEMATOCRIT % 39.8   PLATELETS 10*3/mm3 409     Results from last 7 days   Lab Units 04/09/24  0506   SODIUM mmol/L 140   POTASSIUM mmol/L 2.5*   CHLORIDE mmol/L 107   CO2 mmol/L 18.0*   BUN mg/dL 18   CREATININE mg/dL 1.14   GLUCOSE mg/dL 118*   CALCIUM mg/dL 8.7     Results from last 7 days   Lab Units 04/09/24  0506   SODIUM mmol/L 140   POTASSIUM mmol/L 2.5*   CHLORIDE mmol/L 107   CO2 mmol/L 18.0*   BUN mg/dL 18   CREATININE mg/dL 1.14   GLUCOSE mg/dL 118*   CALCIUM mg/dL 8.7     Results from last 7 days   Lab Units 04/09/24  0506   CK TOTAL U/L 73               Radiology  Imaging Results (Last 72 Hours)       Procedure Component Value Units Date/Time    CT Abdomen Pelvis With Contrast [[id number]] Collected: 04/08/24 1645     Updated: 04/08/24 1657    Narrative:      CT ABDOMEN PELVIS W CONTRAST    Date of Exam: 4/8/2024 4:33 PM EDT    Indication: colon mass last saturday [facility] concerned for infection.    Comparison: Contrast-enhanced CT of the abdomen and pelvis performed on April 4, 2024. Additional comparison with contrast-enhanced CT of the abdomen and pelvis performed on March 25, 2024    Technique: Axial CT images were obtained of the abdomen and  pelvis following the uneventful intravenous administration of iodinated contrast. Sagittal and coronal reconstructions were performed.  Automated exposure control and iterative reconstruction   methods were used.        Findings:    Lung Bases:  The visualized lung bases and lower mediastinal structures are unremarkable.    Peritoneum:  No free intraperitoneal air or fluid.     Abdominal wall:  Subcutaneous air in the anterior lateral abdominal wall bilaterally is again visualized, decreased in volume when compared to prior study.    Liver:  Liver is normal in size and contour. No focal lesions.    Biliary/Gallbladder:  The gallbladder is surgically absent. The biliary tree is nondilated.    Pancreas:  Pancreas is within normal limits. There is no evidence of pancreatic mass or peripancreatic inflammatory changes.    Spleen:  Spleen is normal in size and contour.    Gastrointestinal/Mesentery:   Postsurgical changes of rectosigmoid neoplasm resection are again visualized. Previously described presacral collection is again visualized and contains oral contrast. Findings compatible with leak. This collection measures 2 x 3.2 x 4 cm in maximal AP,   transverse, and craniocaudal dimension. There is mild to moderate dilatation of the distal small bowel loops and diffuse moderate dilatation of the colon. Anastomosis appears patent. Oral contrast is visualized throughout the colon and in the rectum.   Sigmoid diverticulosis is again visualized without evidence of diverticulitis.    Adrenals:  Adrenal glands are unremarkable.    Kidneys:  The kidneys are in anatomic position. No evidence of nephrolithiasis. No evidence of hydronephrosis or significant perinephric fat stranding.    Bladder:   The urinary bladder is unremarkable.    Reproductive organs:    The prostate and seminal vesicles are within normal limits.    Lymph Nodes:  No significant adenopathy is identified.     Vasculature:  Mild aortic atheromatous  changes.    Bony Structures:    No acute fracture or aggressive lesions. Moderate degenerative changes of the lumbar spine are visualized.        Impression:      Impression:    Postsurgical changes of rectosigmoid mass resection. The previously described presacral fluid collection contains oral contrast consistent with leak. This collection measures 2 x 3.2 x 4 cm. Collection is minimally enlarged in size when compared to prior   study.    Findings compatible with small bowel and colonic ileus.                Electronically Signed: Julio Perdomo MD    4/8/2024 4:55 PM EDT    Workstation ID: [id number]            Cardiology      Results Review:  I have reviewed all clinical data, test, lab, and imaging results.       Schedule Meds  aspirin, 81 mg, Oral, Daily  atorvastatin, 20 mg, Oral, Nightly  heparin (porcine), 5,000 Units, Subcutaneous, Q8H  metoclopramide, 10 mg, Oral, TID With Meals  metoprolol tartrate, 25 mg, Oral, Q8H  pantoprazole, 40 mg, Oral, Q AM  piperacillin-tazobactam, 3.375 g, Intravenous, Q8H  potassium chloride ER, 40 mEq, Oral, Q4H  potassium chloride, 10 mEq, Intravenous, Q1H  [START ON 4/10/2024] Scopolamine, 1 patch, Transdermal, Q72H  sertraline, 25 mg, Oral, Daily  sodium chloride, 10 mL, Intravenous, Q12H        Infusion Meds  dilTIAZem, 5-15 mg/hr, Last Rate: 15 mg/hr (04/09/24 1142)  sodium chloride, 125 mL/hr, Last Rate: 125 mL/hr (04/09/24 1142)        PRN Meds    ALPRAZolam    senna-docusate sodium **AND** polyethylene glycol **AND** bisacodyl **AND** bisacodyl    Calcium Replacement - Follow Nurse / BPA Driven Protocol    Magnesium Standard Dose Replacement - Follow Nurse / BPA Driven Protocol    melatonin    methocarbamol    Morphine    nitroglycerin    ondansetron ODT    oxyCODONE    Phosphorus Replacement - Follow Nurse / BPA Driven Protocol    Potassium Replacement - Follow Nurse / BPA Driven Protocol    simethicone    [COMPLETED] Insert Peripheral IV **AND** sodium chloride     sodium chloride    sodium chloride      Assessment & Plan       Assessment    Infected abdomen wall incision with anastomosis leak.  Wound cultures are pending    Reactive leukocytosis secondary to above    S/p resection of colon cancer on March 30, 2024    Atrial fibrillation    Diabetes mellitus type 2    Plan    Continue IV Zosyn 3.375 g every 8 hours waiting on wound culture results  Microbiology lab was contacted to set up the sample  for anaerobic culture  Supportive care  A.m. labs  Case was discussed with family at bedside  The patient is scheduled for surgery but waiting on his potassium to improve    David Borrego MD  04/09/24  11:47 EDT    Note is dictated utilizing voice recognition software/Dragon

## 2024-04-09 NOTE — PROGRESS NOTES
[facility] MEDICINE SERVICE  DAILY PROGRESS NOTE    NAME: Viktor Atkins  : 1950  MRN: [id number]      LOS: 1 day     PROVIDER OF SERVICE: Abimael Denny MD    Chief Complaint: Wound infection    Subjective:     Interval History:  History taken from: patient chart family  Overnight events reviewed  No palpitations no chest pain shortness of breath pain controlled    Review of Systems:   Review of Systems  All negative except as above  Objective:     Vital Signs  Temp:  [96.8 °F (36 °C)-98 °F (36.7 °C)] 97.9 °F (36.6 °C)  Heart Rate:  [] 94  Resp:  [18-23] 18  BP: ()/(55-86) 126/69   Body mass index is 32.29 kg/m².    Physical Exam  Physical Exam  AOx3 NAD  Irregular rhythm rate controlled S1 and S2 audible  Lungs with fair air entry  Abdomen mild generalized tenderness wound covered with dressing  Scheduled Meds   aspirin, 81 mg, Oral, Daily  atorvastatin, 20 mg, Oral, Nightly  heparin (porcine), 5,000 Units, Subcutaneous, Q8H  metoclopramide, 10 mg, Oral, TID With Meals  metoprolol tartrate, 25 mg, Oral, Q8H  pantoprazole, 40 mg, Oral, Q AM  piperacillin-tazobactam, 3.375 g, Intravenous, Q8H  potassium chloride ER, 40 mEq, Oral, Q4H  [START ON 4/10/2024] Scopolamine, 1 patch, Transdermal, Q72H  sertraline, 25 mg, Oral, Daily  sodium chloride, 10 mL, Intravenous, Q12H       PRN Meds     ALPRAZolam    senna-docusate sodium **AND** polyethylene glycol **AND** bisacodyl **AND** bisacodyl    Calcium Replacement - Follow Nurse / BPA Driven Protocol    Magnesium Standard Dose Replacement - Follow Nurse / BPA Driven Protocol    melatonin    methocarbamol    Morphine    nitroglycerin    ondansetron ODT    oxyCODONE    Phosphorus Replacement - Follow Nurse / BPA Driven Protocol    Potassium Replacement - Follow Nurse / BPA Driven Protocol    simethicone    [COMPLETED] Insert Peripheral IV **AND** sodium chloride    sodium chloride    sodium chloride   Infusions  dilTIAZem, 5-15 mg/hr, Last  Rate: 15 mg/hr (04/09/24 1142)  sodium chloride, 125 mL/hr, Last Rate: 125 mL/hr (04/09/24 1142)          Diagnostic Data    Results from last 7 days   Lab Units 04/09/24  1201 04/09/24  0506 04/08/24  1529   WBC 10*3/mm3  --   --  19.89*   HEMOGLOBIN g/dL  --   --  12.6*   HEMATOCRIT %  --   --  39.8   PLATELETS 10*3/mm3  --   --  409   GLUCOSE mg/dL  --  118* 136*   CREATININE mg/dL  --  1.14 1.43*   BUN mg/dL  --  18 19   SODIUM mmol/L  --  140 140   POTASSIUM mmol/L 3.1* 2.5* 2.5*   AST (SGOT) U/L  --   --  67*   ALT (SGPT) U/L  --   --  63*   ALK PHOS U/L  --   --  135*   BILIRUBIN mg/dL  --   --  0.5   ANION GAP mmol/L  --  15.0 18.0*       CT Abdomen Pelvis With Contrast    Result Date: 4/8/2024  Impression: Postsurgical changes of rectosigmoid mass resection. The previously described presacral fluid collection contains oral contrast consistent with leak. This collection measures 2 x 3.2 x 4 cm. Collection is minimally enlarged in size when compared to prior  study. Findings compatible with small bowel and colonic ileus. Electronically Signed: Julio Perdomo MD  4/8/2024 4:55 PM EDT  Workstation ID: [id number]       I reviewed the patient's new clinical results.  I reviewed the patient's new imaging results and agree with the interpretation.    Assessment/Plan:     Active and Resolved Problems  Active Hospital Problems    Diagnosis  POA    **Wound infection [T14.8XXA, L08.9]  Yes    Atrial fibrillation with rapid ventricular response [I48.91]  Unknown    Colon cancer [C18.9]  Yes    Mixed hyperlipidemia [E78.2]  Yes    Anxiety disorder [F41.9]  Yes    Essential hypertension [I10]  Yes    Palpitations [R00.2]  Yes      Resolved Hospital Problems   No resolved problems to display.       #Postop abdominal wound infection  s/p robotic low anterior resection of bleeding rectosigmoid mass.  3/30/2024  CT abdomen pelvis presacral fluid collection 2 x 3.2 x 4 cm minimally enlarged compared to prior imaging 4/4  CRS  following tentative plan for or today pending hypokalemia correction  Infectious disease on board continue IV Zosyn  Follow infectious workup      #Rectosigmoid adenocarcinoma  Status post robotic anterior resection rectosigmoid mass 3/30  Path consistent with invasive moderately differentiated adenocarcinoma  Consult oncology     # A-fib RVR  Currently Eliquis on hold given plan for surgery  Remains on IV Cardizem GGT.  Metoprolol 25 every 8  Cardiology on board     #HTN-monitor BP currently on metoprolol.  Losartan on hold     #HLD: Continue statins     #DM2-ISS lispro low correctional every 6.  POC every 6.  Hypoglycemia protocol     #Hypokalemia replace aggressively with IV and p.o. recheck    DVT prophylaxis:  Medical and mechanical DVT prophylaxis orders are present.         Code status is   Code Status and Medical Interventions:   Ordered at: 04/09/24 1200     Code Status (Patient has no pulse and is not breathing):    CPR (Attempt to Resuscitate)     Medical Interventions (Patient has pulse or is breathing):    Full Support       Plan for disposition:> 2 days    Time: 30 minutes    Signature: Electronically signed by Abimael Denny MD, 04/09/24, 12:54 EDT.  [facility] Hospitalist Team

## 2024-04-09 NOTE — THERAPY EVALUATION
Patient Name: Viktor Atkins  : 1950    MRN: [id number]                              Today's Date: 2024       Admit Date: 2024    Visit Dx:     ICD-10-CM ICD-9-CM   1. Atrial fibrillation with rapid ventricular response  I48.91 427.31   2. Hypokalemia  E87.6 276.8   3. Wound infection  T14.8XXA 958.3    L08.9    4. Leak of anastomosis between gastrointestinal structures  K91.89 997.49     Patient Active Problem List   Diagnosis    Anxiety disorder    Essential hypertension    Palpitations    Mixed hyperlipidemia    GI bleeding    Rectal bleeding    GI bleed    Colon cancer    Atrial fibrillation with rapid ventricular response    Wound infection     Past Medical History:   Diagnosis Date    Arthritis     Benign essential HTN     Cancer     Diabetes mellitus     GERD (gastroesophageal reflux disease)     Hyperlipidemia, mixed     Palpitations      Past Surgical History:   Procedure Laterality Date    CARDIAC CATHETERIZATION      CHOLECYSTECTOMY      COLON RESECTION WITH ILEOSTOMY N/A 3/30/2024    Procedure: COLON RESECTION LOW ANTERIOR LAPAROSCOPIC, COLONAL ANASTOMOSIS, DIVERTING LOOP ILEOSTOMY WITH DAVINCI ROBOT;  Surgeon: Aakash Burden MD;  Location: [facility] MAIN OR;  Service: Robotics - DaVinci;  Laterality: N/A;    COLONOSCOPY N/A 3/27/2024    Procedure: COLONOSCOPY with polypectomy x 18 and sigmoid colon mass biopsies with endscopic spot tattooing;  Surgeon: Fili Quintero MD;  Location: [facility] ENDOSCOPY;  Service: Gastroenterology;  Laterality: N/A;  sigmoid mass at 25 cm    KNEE SURGERY      SIGMOIDOSCOPY N/A 3/30/2024    Procedure: SIGMOIDOSCOPY;  Surgeon: Aakash Burden MD;  Location: [facility] MAIN OR;  Service: Gastroenterology;  Laterality: N/A;      General Information       Row Name 24 1558          Physical Therapy Time and Intention    Document Type evaluation  -AD     Mode of Treatment physical therapy  -AD       Row Name 24 1558          General Information    Patient  Profile Reviewed yes  -AD     Prior Level of Function independent:;gait;community mobility  wife helps put socks and shoes on  -AD     Existing Precautions/Restrictions no known precautions/restrictions  -AD     Barriers to Rehab none identified  -AD       Row Name 04/09/24 1558          Living Environment    People in Home spouse  -AD       Row Name 04/09/24 1558          Home Main Entrance    Number of Stairs, Main Entrance none  -AD       Row Name 04/09/24 1558          Safety Issues, Functional Mobility    Impairments Affecting Function (Mobility) coordination;endurance/activity tolerance;range of motion (ROM);strength  -AD               User Key  (r) = Recorded By, (t) = Taken By, (c) = Cosigned By      Initials Name Provider Type    Krystina Monroy, PT Physical Therapist                   Mobility       Row Name 04/09/24 1559          Bed Mobility    Bed Mobility bed mobility (all) activities  -AD     All Activities, [place] (Bed Mobility) supervision  -AD       Row Name 04/09/24 1559          Sit-Stand Transfer    Sit-Stand [place] (Transfers) supervision  -AD       Row Name 04/09/24 1559          Gait/Stairs (Locomotion)    [place] Level (Gait) contact guard  -AD     Patient was able to Ambulate yes  pt walked to the bathroom with nursing earlier in the shift, however pt would not walk with PT d/t IVs being troublesome (per pt) despite nursing clearing PT to work with pt  -AD     Distance in Feet (Gait) 2  -AD               User Key  (r) = Recorded By, (t) = Taken By, (c) = Cosigned By      Initials Name Provider Type    Krystina Monroy PT Physical Therapist                   Obj/Interventions       Row Name 04/09/24 1600          Range of Motion Comprehensive    Comment, General Range of Motion 25% dec in hip ROM (B)  -AD       Row Name 04/09/24 1600          Strength Comprehensive (MMT)    Comment, General Manual Muscle Testing (MMT) Assessment 4/5 grossly BLE  -AD       Row Name  04/09/24 1600          Balance    Balance Assessment sitting static balance  -AD     Static Sitting Balance independent  -AD               User Key  (r) = Recorded By, (t) = Taken By, (c) = Cosigned By      Initials Name Provider Type    Krystina Monroy PT Physical Therapist                   Goals/Plan       Row Name 04/09/24 1603          Bed Mobility Goal 1 (PT)    Activity/Assistive Device (Bed Mobility Goal 1, PT) bed mobility activities, all  -AD     [place] Level/Cues Needed (Bed Mobility Goal 1, PT) independent  -AD     Time Frame (Bed Mobility Goal 1, PT) long term goal (LTG)  -AD       Row Name 04/09/24 1603          Transfer Goal 1 (PT)    Activity/Assistive Device (Transfer Goal 1, PT) transfers, all  -AD     [place] Level/Cues Needed (Transfer Goal 1, PT) independent  -AD     Time Frame (Transfer Goal 1, PT) long term goal (LTG)  -AD       Row Name 04/09/24 1603          Gait Training Goal 1 (PT)    Activity/Assistive Device (Gait Training Goal 1, PT) gait (walking locomotion)  -AD     [place] Level (Gait Training Goal 1, PT) independent  -AD     Distance (Gait Training Goal 1, PT) 50  -AD     Time Frame (Gait Training Goal 1, PT) long term goal (LTG)  -AD       Row Name 04/09/24 1603          Therapy Assessment/Plan (PT)    Planned Therapy Interventions (PT) balance training;bed mobility training;gait training;postural re-education;patient/family education;neuromuscular re-education;ROM (range of motion);strengthening;stretching;transfer training  -AD               User Key  (r) = Recorded By, (t) = Taken By, (c) = Cosigned By      Initials Name Provider Type    Krystina Monroy, LISSETTE Physical Therapist                   Clinical Impression       Row Name 04/09/24 1600          Pain    Pretreatment Pain Rating 0/10 - no pain  -AD     Posttreatment Pain Rating 0/10 - no pain  -AD       Row Name 04/09/24 1600          Plan of Care Review    Plan of Care Reviewed With patient  -AD      Progress no change  -AD     Outcome Evaluation 73 y.o. male with multiple medical problems, PMH significant for HTN, DM, HLD who presented to [facility] on 4/8/2024 who was sent over by Dr. Burden.  He had surgery last week to remove a mass on his colon.  He saw his surgeon today who reports that he drained possible infection and packet the wound, patient was sent over for CT imaging and blood work. .in ER patient developed AF RVR was started on Cardizem drip. PLOF is (I) with ADLs aside from socks and shoes which his wife helps with, and uses no AD for ambulation at home or in the community. He has 0STE home and lives with his wife. Today he required SBA for bed mobility, sit<>stand, and ambulation 2 feet to reposition to [facility], however pt did not walk in the room with PT d/t his IVs being troublesome and didn't want to mess them up again. PT did clear ambulation and tx with the nurse prior to eval. Pt's nurse reported he did walk to the bathroom with nursing earlier. Recommend home at discharge.  -AD       Row Name 04/09/24 1600          Therapy Assessment/Plan (PT)    Patient/Family Therapy Goals Statement (PT) go home  -AD     Rehab Potential (PT) good, to achieve stated therapy goals  -AD     Criteria for Skilled Interventions Met (PT) yes;skilled treatment is necessary  -AD     Therapy Frequency (PT) 3 times/wk  -AD               User Key  (r) = Recorded By, (t) = Taken By, (c) = Cosigned By      Initials Name Provider Type    AD Krystina Vail, PT Physical Therapist                   Outcome Measures       Row Name 04/09/24 1603 04/09/24 0800       How much help from another person do you currently need...    Turning from your back to your side while in flat bed without using bedrails? 4  -AD 4  -MC    Moving from lying on back to sitting on the side of a flat bed without bedrails? 4  -AD 4  -MC    Moving to and from a bed to a chair (including a wheelchair)? 3  -AD 3  -MC    Standing up from a  chair using your arms (e.g., wheelchair, bedside chair)? 3  -AD 3  -MC    Climbing 3-5 steps with a railing? 3  -AD 3  -MC    To walk in hospital room? 3  -AD 3  -MC    AM-PAC 6 Clicks Score (PT) 20  -AD 20  -MC    Highest Level of Mobility Goal 6 --> Walk 10 steps or more  -AD 6 --> Walk 10 steps or more  -MC      Row Name 04/09/24 1603          Functional Assessment    Outcome Measure Options AM-PAC 6 Clicks Basic Mobility (PT)  -AD               User Key  (r) = Recorded By, (t) = Taken By, (c) = Cosigned By      Initials Name Provider Type    AD Krystina Vail, LISSETTE Physical Therapist    Saturnino Alexander, RN Registered Nurse                                 Physical Therapy Education       Title: PT OT SLP Therapies (In Progress)       Topic: Physical Therapy (In Progress)       Point: Mobility training (Done)       Learning Progress Summary             Patient Acceptance, E, VU by AD at 4/9/2024 1604                         Point: Home exercise program (Not Started)       Learner Progress:  Not documented in this visit.              Point: Body mechanics (Not Started)       Learner Progress:  Not documented in this visit.              Point: Precautions (Not Started)       Learner Progress:  Not documented in this visit.                              User Key       Initials Effective Dates Name Provider Type Discipline    AD 07/11/23 -  Krystina Vail, LISSETTE Physical Therapist PT                  PT Recommendation and Plan  Planned Therapy Interventions (PT): balance training, bed mobility training, gait training, postural re-education, patient/family education, neuromuscular re-education, ROM (range of motion), strengthening, stretching, transfer training  Plan of Care Reviewed With: patient  Progress: no change  Outcome Evaluation: 73 y.o. male with multiple medical problems, PMH significant for HTN, DM, HLD who presented to [facility] on 4/8/2024 who was sent over by Dr. Burden.  He had surgery last  week to remove a mass on his colon.  He saw his surgeon today who reports that he drained possible infection and packet the wound, patient was sent over for CT imaging and blood work. .in ER patient developed AF RVR was started on Cardizem drip. PLOF is (I) with ADLs aside from socks and shoes which his wife helps with, and uses no AD for ambulation at home or in the community. He has 0STE home and lives with his wife. Today he required SBA for bed mobility, sit<>stand, and ambulation 2 feet to reposition to [facility], however pt did not walk in the room with PT d/t his IVs being troublesome and didn't want to mess them up again. PT did clear ambulation and tx with the nurse prior to eval. Pt's nurse reported he did walk to the bathroom with nursing earlier. Recommend home at discharge.     Time Calculation:   PT Evaluation Complexity  History, PT Evaluation Complexity: 1-2 personal factors and/or comorbidities  Examination of Body Systems (PT Eval Complexity): total of 3 or more elements  Clinical Presentation (PT Evaluation Complexity): evolving  Clinical Decision Making (PT Evaluation Complexity): moderate complexity  Overall Complexity (PT Evaluation Complexity): moderate complexity     PT Charges       Row Name 04/09/24 1557             Time Calculation    Start Time 1201  -AD      Stop Time 1210  -AD      Time Calculation (min) 9 min  -AD      PT Received On 04/09/24  -AD      PT - Next Appointment 04/10/24  -AD      PT Goal Re-Cert Due Date 04/23/24  -AD         Time Calculation- PT    Total Timed Code Minutes- PT 0 minute(s)  -AD                User Key  (r) = Recorded By, (t) = Taken By, (c) = Cosigned By      Initials Name Provider Type    AD Krystina Vail, PT Physical Therapist                  Therapy Charges for Today       Code Description Service Date Service Provider Modifiers Qty    [id number] HC PT EVAL MOD COMPLEXITY 3 4/9/2024 Krystina Vail, PT GP 1            PT G-Codes  Outcome Measure  Options: AM-PAC 6 Clicks Basic Mobility (PT)  AM-PAC 6 Clicks Score (PT): 20  PT Discharge Summary  Anticipated Discharge Disposition (PT): home    Krystina Vail, PT  4/9/2024

## 2024-04-09 NOTE — PLAN OF CARE
Goal Outcome Evaluation:  Plan of Care Reviewed With: patient        Progress: no change  Outcome Evaluation: 73 y.o. male with multiple medical problems, PMH significant for HTN, DM, HLD who presented to [facility] on 4/8/2024 who was sent over by Dr. Burden.  He had surgery last week to remove a mass on his colon.  He saw his surgeon today who reports that he drained possible infection and packet the wound, patient was sent over for CT imaging and blood work. .in ER patient developed AF RVR was started on Cardizem drip. PLOF is (I) with ADLs aside from socks and shoes which his wife helps with, and uses no AD for ambulation at home or in the community. He has 0STE home and lives with his wife. Today he required SBA for bed mobility, sit<>stand, and ambulation 2 feet to reposition to HOB, however pt did not walk in the room with PT d/t his IVs being troublesome and didn't want to mess them up again. PT did clear ambulation and tx with the nurse prior to eval. Pt's nurse reported he did walk to the bathroom with nursing earlier. Recommend home at discharge.      Anticipated Discharge Disposition (PT): home

## 2024-04-09 NOTE — PLAN OF CARE
Problem: Adult Inpatient Plan of Care  Goal: Plan of Care Review  Outcome: Ongoing, Progressing  Goal: Patient-Specific Goal (Individualized)  Outcome: Ongoing, Progressing  Goal: Absence of Hospital-Acquired Illness or Injury  Outcome: Ongoing, Progressing  Intervention: Identify and Manage Fall Risk  Recent Flowsheet Documentation  Taken 4/9/2024 0400 by Nany Weber RN  Safety Promotion/Fall Prevention: safety round/check completed  Taken 4/9/2024 0200 by Nany Weber RN  Safety Promotion/Fall Prevention: safety round/check completed  Taken 4/9/2024 0000 by Nany Weber RN  Safety Promotion/Fall Prevention: safety round/check completed  Taken 4/8/2024 2200 by Nany Weber RN  Safety Promotion/Fall Prevention: safety round/check completed  Taken 4/8/2024 2000 by Nany Weber RN  Safety Promotion/Fall Prevention: safety round/check completed  Intervention: Prevent Infection  Recent Flowsheet Documentation  Taken 4/9/2024 0400 by Nany Weber RN  Infection Prevention:   single patient room provided   rest/sleep promoted   personal protective equipment utilized   hand hygiene promoted   environmental surveillance performed   equipment surfaces disinfected  Goal: Optimal Comfort and Wellbeing  Outcome: Ongoing, Progressing  Goal: Readiness for Transition of Care  Outcome: Ongoing, Progressing  Intervention: Mutually Develop Transition Plan  Recent Flowsheet Documentation  Taken 4/9/2024 0155 by Nany Weber RN  Equipment Currently Used at Home: none  Transportation Anticipated: family or friend will provide  Patient/Family Anticipated Services at Transition: none  Patient/Family Anticipates Transition to: home with family     Problem: Pain Acute  Goal: Acceptable Pain Control and Functional Ability  Outcome: Ongoing, Progressing     Problem: Dysrhythmia  Goal: Normalized Cardiac Rhythm  Outcome: Ongoing, Progressing   Goal Outcome Evaluation:

## 2024-04-10 ENCOUNTER — APPOINTMENT (OUTPATIENT)
Dept: GENERAL RADIOLOGY | Facility: HOSPITAL | Age: 74
End: 2024-04-10
Payer: MEDICARE

## 2024-04-10 LAB
ALBUMIN SERPL-MCNC: 3.4 G/DL (ref 3.5–5.2)
ALBUMIN/GLOB SERPL: 1.2 G/DL
ALP SERPL-CCNC: 128 U/L (ref 39–117)
ALT SERPL W P-5'-P-CCNC: 54 U/L (ref 1–41)
ANION GAP SERPL CALCULATED.3IONS-SCNC: 12 MMOL/L (ref 5–15)
AST SERPL-CCNC: 37 U/L (ref 1–40)
BASOPHILS # BLD AUTO: 0.03 10*3/MM3 (ref 0–0.2)
BASOPHILS NFR BLD AUTO: 0.1 % (ref 0–1.5)
BILIRUB SERPL-MCNC: 0.3 MG/DL (ref 0–1.2)
BUN SERPL-MCNC: 17 MG/DL (ref 8–23)
BUN/CREAT SERPL: 14.8 (ref 7–25)
CALCIUM SPEC-SCNC: 8.4 MG/DL (ref 8.6–10.5)
CHLORIDE SERPL-SCNC: 110 MMOL/L (ref 98–107)
CO2 SERPL-SCNC: 19 MMOL/L (ref 22–29)
CREAT SERPL-MCNC: 1.15 MG/DL (ref 0.76–1.27)
DEPRECATED RDW RBC AUTO: 51.9 FL (ref 37–54)
EGFRCR SERPLBLD CKD-EPI 2021: 67.6 ML/MIN/1.73
EOSINOPHIL # BLD AUTO: 0.01 10*3/MM3 (ref 0–0.4)
EOSINOPHIL NFR BLD AUTO: 0 % (ref 0.3–6.2)
ERYTHROCYTE [DISTWIDTH] IN BLOOD BY AUTOMATED COUNT: 16.7 % (ref 12.3–15.4)
GLOBULIN UR ELPH-MCNC: 2.8 GM/DL
GLUCOSE BLDC GLUCOMTR-MCNC: 121 MG/DL (ref 70–105)
GLUCOSE BLDC GLUCOMTR-MCNC: 124 MG/DL (ref 70–105)
GLUCOSE BLDC GLUCOMTR-MCNC: 127 MG/DL (ref 70–105)
GLUCOSE BLDC GLUCOMTR-MCNC: 137 MG/DL (ref 70–105)
GLUCOSE SERPL-MCNC: 139 MG/DL (ref 65–99)
HCT VFR BLD AUTO: 33 % (ref 37.5–51)
HGB BLD-MCNC: 10.7 G/DL (ref 13–17.7)
IMM GRANULOCYTES # BLD AUTO: 0.14 10*3/MM3 (ref 0–0.05)
IMM GRANULOCYTES NFR BLD AUTO: 0.7 % (ref 0–0.5)
LYMPHOCYTES # BLD AUTO: 4.01 10*3/MM3 (ref 0.7–3.1)
LYMPHOCYTES NFR BLD AUTO: 18.8 % (ref 19.6–45.3)
MAGNESIUM SERPL-MCNC: 2.8 MG/DL (ref 1.6–2.4)
MCH RBC QN AUTO: 27.8 PG (ref 26.6–33)
MCHC RBC AUTO-ENTMCNC: 32.4 G/DL (ref 31.5–35.7)
MCV RBC AUTO: 85.7 FL (ref 79–97)
MONOCYTES # BLD AUTO: 0.69 10*3/MM3 (ref 0.1–0.9)
MONOCYTES NFR BLD AUTO: 3.2 % (ref 5–12)
NEUTROPHILS NFR BLD AUTO: 16.47 10*3/MM3 (ref 1.7–7)
NEUTROPHILS NFR BLD AUTO: 77.2 % (ref 42.7–76)
NRBC BLD AUTO-RTO: 0 /100 WBC (ref 0–0.2)
PHOSPHATE SERPL-MCNC: 3.9 MG/DL (ref 2.5–4.5)
PLATELET # BLD AUTO: 395 10*3/MM3 (ref 140–450)
PMV BLD AUTO: 11.7 FL (ref 6–12)
POTASSIUM SERPL-SCNC: 3.6 MMOL/L (ref 3.5–5.2)
PROT SERPL-MCNC: 6.2 G/DL (ref 6–8.5)
RBC # BLD AUTO: 3.85 10*6/MM3 (ref 4.14–5.8)
SODIUM SERPL-SCNC: 141 MMOL/L (ref 136–145)
WBC NRBC COR # BLD AUTO: 21.35 10*3/MM3 (ref 3.4–10.8)

## 2024-04-10 PROCEDURE — 84100 ASSAY OF PHOSPHORUS: CPT | Performed by: STUDENT IN AN ORGANIZED HEALTH CARE EDUCATION/TRAINING PROGRAM

## 2024-04-10 PROCEDURE — 82948 REAGENT STRIP/BLOOD GLUCOSE: CPT

## 2024-04-10 PROCEDURE — 25010000002 ONDANSETRON PER 1 MG: Performed by: STUDENT IN AN ORGANIZED HEALTH CARE EDUCATION/TRAINING PROGRAM

## 2024-04-10 PROCEDURE — 25010000002 PIPERACILLIN SOD-TAZOBACTAM PER 1 G: Performed by: STUDENT IN AN ORGANIZED HEALTH CARE EDUCATION/TRAINING PROGRAM

## 2024-04-10 PROCEDURE — 74018 RADEX ABDOMEN 1 VIEW: CPT

## 2024-04-10 PROCEDURE — 80053 COMPREHEN METABOLIC PANEL: CPT | Performed by: STUDENT IN AN ORGANIZED HEALTH CARE EDUCATION/TRAINING PROGRAM

## 2024-04-10 PROCEDURE — 25010000002 METOCLOPRAMIDE PER 10 MG: Performed by: INTERNAL MEDICINE

## 2024-04-10 PROCEDURE — 25810000003 LACTATED RINGERS PER 1000 ML: Performed by: STUDENT IN AN ORGANIZED HEALTH CARE EDUCATION/TRAINING PROGRAM

## 2024-04-10 PROCEDURE — 83735 ASSAY OF MAGNESIUM: CPT | Performed by: STUDENT IN AN ORGANIZED HEALTH CARE EDUCATION/TRAINING PROGRAM

## 2024-04-10 PROCEDURE — 82948 REAGENT STRIP/BLOOD GLUCOSE: CPT | Performed by: STUDENT IN AN ORGANIZED HEALTH CARE EDUCATION/TRAINING PROGRAM

## 2024-04-10 PROCEDURE — 25010000002 HEPARIN (PORCINE) PER 1000 UNITS: Performed by: STUDENT IN AN ORGANIZED HEALTH CARE EDUCATION/TRAINING PROGRAM

## 2024-04-10 PROCEDURE — 25010000002 ENOXAPARIN PER 10 MG: Performed by: INTERNAL MEDICINE

## 2024-04-10 PROCEDURE — 99232 SBSQ HOSP IP/OBS MODERATE 35: CPT | Performed by: INTERNAL MEDICINE

## 2024-04-10 PROCEDURE — 85025 COMPLETE CBC W/AUTO DIFF WBC: CPT | Performed by: STUDENT IN AN ORGANIZED HEALTH CARE EDUCATION/TRAINING PROGRAM

## 2024-04-10 RX ORDER — METHOCARBAMOL 100 MG/ML
500 INJECTION, SOLUTION INTRAMUSCULAR; INTRAVENOUS EVERY 8 HOURS PRN
Status: DISCONTINUED | OUTPATIENT
Start: 2024-04-10 | End: 2024-04-10

## 2024-04-10 RX ORDER — ENOXAPARIN SODIUM 150 MG/ML
1 INJECTION SUBCUTANEOUS EVERY 12 HOURS
Status: DISCONTINUED | OUTPATIENT
Start: 2024-04-10 | End: 2024-04-11

## 2024-04-10 RX ORDER — PANTOPRAZOLE SODIUM 40 MG/10ML
40 INJECTION, POWDER, LYOPHILIZED, FOR SOLUTION INTRAVENOUS
Status: DISCONTINUED | OUTPATIENT
Start: 2024-04-11 | End: 2024-04-11

## 2024-04-10 RX ORDER — POTASSIUM CHLORIDE 20 MEQ/1
40 TABLET, EXTENDED RELEASE ORAL EVERY 4 HOURS
Status: COMPLETED | OUTPATIENT
Start: 2024-04-10 | End: 2024-04-10

## 2024-04-10 RX ORDER — METOCLOPRAMIDE HYDROCHLORIDE 5 MG/ML
10 INJECTION INTRAMUSCULAR; INTRAVENOUS
Status: DISCONTINUED | OUTPATIENT
Start: 2024-04-10 | End: 2024-04-10

## 2024-04-10 RX ORDER — DILTIAZEM HYDROCHLORIDE 180 MG/1
180 CAPSULE, COATED, EXTENDED RELEASE ORAL
Status: DISCONTINUED | OUTPATIENT
Start: 2024-04-10 | End: 2024-04-10

## 2024-04-10 RX ORDER — METOCLOPRAMIDE HYDROCHLORIDE 5 MG/ML
10 INJECTION INTRAMUSCULAR; INTRAVENOUS EVERY 8 HOURS
Status: DISCONTINUED | OUTPATIENT
Start: 2024-04-10 | End: 2024-04-12

## 2024-04-10 RX ADMIN — ATORVASTATIN CALCIUM 20 MG: 20 TABLET, FILM COATED ORAL at 21:33

## 2024-04-10 RX ADMIN — ACETAMINOPHEN 1000 MG: 500 TABLET, FILM COATED ORAL at 06:00

## 2024-04-10 RX ADMIN — METOPROLOL TARTRATE 2.5 MG: 1 INJECTION, SOLUTION INTRAVENOUS at 17:31

## 2024-04-10 RX ADMIN — METOPROLOL TARTRATE 25 MG: 25 TABLET, FILM COATED ORAL at 01:22

## 2024-04-10 RX ADMIN — ASPIRIN 81 MG CHEWABLE TABLET 81 MG: 81 TABLET CHEWABLE at 08:00

## 2024-04-10 RX ADMIN — OXYCODONE 5 MG: 5 TABLET ORAL at 07:06

## 2024-04-10 RX ADMIN — METOCLOPRAMIDE HYDROCHLORIDE 10 MG: 5 INJECTION INTRAMUSCULAR; INTRAVENOUS at 13:39

## 2024-04-10 RX ADMIN — POTASSIUM CHLORIDE 40 MEQ: 1500 TABLET, EXTENDED RELEASE ORAL at 06:00

## 2024-04-10 RX ADMIN — METOCLOPRAMIDE HYDROCHLORIDE 10 MG: 5 INJECTION INTRAMUSCULAR; INTRAVENOUS at 21:33

## 2024-04-10 RX ADMIN — PIPERACILLIN AND TAZOBACTAM 3.38 G: 3; .375 INJECTION, POWDER, LYOPHILIZED, FOR SOLUTION INTRAVENOUS at 10:52

## 2024-04-10 RX ADMIN — PANTOPRAZOLE SODIUM 40 MG: 40 TABLET, DELAYED RELEASE ORAL at 06:00

## 2024-04-10 RX ADMIN — PIPERACILLIN AND TAZOBACTAM 3.38 G: 3; .375 INJECTION, POWDER, LYOPHILIZED, FOR SOLUTION INTRAVENOUS at 17:31

## 2024-04-10 RX ADMIN — ENOXAPARIN SODIUM 105 MG: 150 INJECTION SUBCUTANEOUS at 21:33

## 2024-04-10 RX ADMIN — METOPROLOL TARTRATE 25 MG: 25 TABLET, FILM COATED ORAL at 08:00

## 2024-04-10 RX ADMIN — PIPERACILLIN AND TAZOBACTAM 3.38 G: 3; .375 INJECTION, POWDER, LYOPHILIZED, FOR SOLUTION INTRAVENOUS at 01:22

## 2024-04-10 RX ADMIN — Medication 5 MG/HR: at 11:49

## 2024-04-10 RX ADMIN — ENOXAPARIN SODIUM 105 MG: 150 INJECTION SUBCUTANEOUS at 10:52

## 2024-04-10 RX ADMIN — Medication 10 ML: at 08:01

## 2024-04-10 RX ADMIN — SERTRALINE HYDROCHLORIDE 25 MG: 25 TABLET ORAL at 08:01

## 2024-04-10 RX ADMIN — POTASSIUM CHLORIDE 40 MEQ: 1500 TABLET, EXTENDED RELEASE ORAL at 08:43

## 2024-04-10 RX ADMIN — Medication 10 MG/HR: at 01:22

## 2024-04-10 RX ADMIN — SODIUM CHLORIDE, POTASSIUM CHLORIDE, SODIUM LACTATE AND CALCIUM CHLORIDE 50 ML/HR: 600; 310; 30; 20 INJECTION, SOLUTION INTRAVENOUS at 14:27

## 2024-04-10 RX ADMIN — ONDANSETRON 4 MG: 2 INJECTION INTRAMUSCULAR; INTRAVENOUS at 08:50

## 2024-04-10 RX ADMIN — METOCLOPRAMIDE 10 MG: 10 TABLET ORAL at 08:01

## 2024-04-10 RX ADMIN — SCOPALAMINE 1 PATCH: 1 PATCH, EXTENDED RELEASE TRANSDERMAL at 08:00

## 2024-04-10 RX ADMIN — HEPARIN SODIUM 5000 UNITS: 5000 INJECTION INTRAVENOUS; SUBCUTANEOUS at 06:00

## 2024-04-10 NOTE — PLAN OF CARE
Goal Outcome Evaluation:              Outcome Evaluation: pt very distended this morning and complaining of pain and nausea. NG tube placed to low intermittent suction. pt NPO with ice chips only. pt unable to void, straight cathed at 0830 and got 350ml out. pt still unable to void on own this evening. maldonado catheter placed per Dr. Castorena orders. pt pleasant and cooperative. cardizem gtt at 10 and LR going at 50.

## 2024-04-10 NOTE — NURSING NOTE
Pt was able to void 100mls of urine at this time. Explained to pt that staff may still need to perform an in and out cath since he only voided so little. Pt refused.

## 2024-04-10 NOTE — PROGRESS NOTES
Infectious Diseases Progress Note      LOS: 2 days   Patient Care Team:  Gera Manriquez MD as PCP - General (Internal Medicine)  Gera Manriquez MD as PCP - Family Medicine    Chief Complaint: Incisional infection    Subjective       The patient has been afebrile for the last 24 hours.  The patient is on 2 L of oxygen by nasal cannula hemodynamically stable, and is tolerating antimicrobial therapy.  Patient is currently in the chair and is feeling better today.  Status post surgery yesterday      Review of Systems:   Review of Systems   Constitutional: Negative.    HENT: Negative.     Eyes: Negative.    Respiratory: Negative.     Cardiovascular: Negative.    Gastrointestinal: Negative.    Endocrine: Negative.    Genitourinary: Negative.    Musculoskeletal: Negative.    Skin:  Positive for wound.   Neurological: Negative.    Psychiatric/Behavioral: Negative.     All other systems reviewed and are negative.       Objective     Vital Signs  Temp:  [96.7 °F (35.9 °C)-98.4 °F (36.9 °C)] 96.7 °F (35.9 °C)  Heart Rate:  [] 111  Resp:  [17-24] 24  BP: (113-146)/(63-93) 130/78    Physical Exam:  Physical Exam  Vitals and nursing note reviewed.   Constitutional:       General: He is not in acute distress.     Appearance: Normal appearance. He is well-developed and normal weight. He is not diaphoretic.   HENT:      Head: Normocephalic and atraumatic.   Eyes:      Conjunctiva/sclera: Conjunctivae normal.      Pupils: Pupils are equal, round, and reactive to light.   Cardiovascular:      Rate and Rhythm: Normal rate and regular rhythm.      Heart sounds: Normal heart sounds, S1 normal and S2 normal.   Pulmonary:      Effort: Pulmonary effort is normal. No respiratory distress.      Breath sounds: Normal breath sounds. No stridor. No wheezing or rales.   Abdominal:      General: Bowel sounds are normal. There is no distension.      Palpations: Abdomen is soft. There is no mass.      Tenderness: There is abdominal  tenderness. There is no guarding.      Comments: Surgical dressing in place.    Drain in place    Colostomy    NG tube   Musculoskeletal:         General: No deformity. Normal range of motion.      Cervical back: Neck supple.   Skin:     General: Skin is warm and dry.      Coloration: Skin is not pale.      Findings: No erythema or rash.   Neurological:      Mental Status: He is alert and oriented to person, place, and time.      Cranial Nerves: No cranial nerve deficit.   Psychiatric:         Mood and Affect: Mood normal.          Results Review:    I have reviewed all clinical data, test, lab, and imaging results.     Radiology  XR Abdomen KUB    Result Date: 4/10/2024  XR ABDOMEN KUB Date of Exam: 4/10/2024 10:18 AM EDT Indication: NGT Comparison: CT 4/8/2024 and prior studies Findings: NG tube is seen extending to just below the diaphragm. [place] appears to be at or just below the level of the GE junction. Mildly distended loops of small bowel are noted. Structures are unremarkable     Impression: NG tube projects just below the diaphragm in the expected position of the body of the stomach. Advancement no additional 5 cm could be considered when clinically feasible Electronically Signed: Loco Mensah MD  4/10/2024 10:47 AM EDT  Workstation ID: [id number]     Cardiology    Laboratory    Results from last 7 days   Lab Units 04/10/24  0142 04/08/24  1529   WBC 10*3/mm3 21.35* 19.89*   HEMOGLOBIN g/dL 10.7* 12.6*   HEMATOCRIT % 33.0* 39.8   PLATELETS 10*3/mm3 395 409     Results from last 7 days   Lab Units 04/10/24  0142 04/09/24  1201 04/09/24  0506 04/08/24  1529   SODIUM mmol/L 141  --  140 140   POTASSIUM mmol/L 3.6 3.1* 2.5* 2.5*   CHLORIDE mmol/L 110*  --  107 104   CO2 mmol/L 19.0*  --  18.0* 18.0*   BUN mg/dL 17  --  18 19   CREATININE mg/dL 1.15  --  1.14 1.43*   GLUCOSE mg/dL 139*  --  118* 136*   ALBUMIN g/dL 3.4*  --   --  3.7   BILIRUBIN mg/dL 0.3  --   --  0.5   ALK PHOS U/L 128*  --   --  135*    AST (SGOT) U/L 37  --   --  67*   ALT (SGPT) U/L 54*  --   --  63*   AMYLASE U/L  --   --  56  --    LIPASE U/L  --   --   --  38   CALCIUM mg/dL 8.4*  --  8.7 9.7     Results from last 7 days   Lab Units 04/09/24  0506   CK TOTAL U/L 73             Microbiology   Microbiology Results (last 10 days)       Procedure Component Value - Date/Time    Wound Culture - Swab, Abdominal Wall [[id number]]  (Abnormal) Collected: 04/08/24 1934    Lab Status: Preliminary result Specimen: Swab from Abdominal Wall Updated: 04/10/24 0939     Wound Culture Rare Gram Positive Cocci     Gram Stain Few (2+) WBCs per low power field    Blood Culture - Blood, Arm, Right [[id number]]  (Normal) Collected: 04/08/24 1529    Lab Status: Preliminary result Specimen: Blood from Arm, Right Updated: 04/10/24 1545     Blood Culture No growth at 2 days    Narrative:      Less than seven (7) mL's of blood was collected.  Insufficient quantity may yield false negative results.    Blood Culture - Blood, Arm, Right [[id number]]  (Normal) Collected: 04/08/24 1529    Lab Status: Preliminary result Specimen: Blood from Arm, Right Updated: 04/10/24 1545     Blood Culture No growth at 2 days    Narrative:      Less than seven (7) mL's of blood was collected.  Insufficient quantity may yield false negative results.            Medication Review:       Schedule Meds  acetaminophen, 1,000 mg, Oral, Q6H  aspirin, 81 mg, Oral, Daily  atorvastatin, 20 mg, Oral, Nightly  enoxaparin, 1 mg/kg, Subcutaneous, Q12H  insulin lispro, 2-7 Units, Subcutaneous, Q6H  metoclopramide, 10 mg, Intravenous, Q8H  metoprolol tartrate, 2.5 mg, Intravenous, Q6H  [START ON 4/11/2024] pantoprazole, 40 mg, Intravenous, Q AM  piperacillin-tazobactam, 3.375 g, Intravenous, Q8H  Scopolamine, 1 patch, Transdermal, Q72H  sertraline, 25 mg, Oral, Daily  sodium chloride, 10 mL, Intravenous, Q12H        Infusion Meds  dilTIAZem, 5-15 mg/hr, Last Rate: 10 mg/hr (04/10/24 7871)  lactated ringers, 50  mL/hr, Last Rate: 50 mL/hr (04/10/24 8007)        PRN Meds    ALPRAZolam    senna-docusate sodium **AND** polyethylene glycol **AND** bisacodyl **AND** bisacodyl    Calcium Replacement - Follow Nurse / BPA Driven Protocol    dextrose    dextrose    glucagon (human recombinant)    HYDROmorphone **AND** naloxone    Magnesium Standard Dose Replacement - Follow Nurse / BPA Driven Protocol    melatonin    Morphine    nitroglycerin    ondansetron    ondansetron ODT    oxyCODONE    Phosphorus Replacement - Follow Nurse / BPA Driven Protocol    Potassium Replacement - Follow Nurse / BPA Driven Protocol    simethicone    [COMPLETED] Insert Peripheral IV **AND** sodium chloride    sodium chloride    sodium chloride        Assessment & Plan       Antimicrobial Therapy   1.  IV Zosyn        2.        3.        4.        5.          Assessment     Infected abdomen wall incision with anastomosis leak.  Wound cultures are positive bacilli.  Blood cultures are negative so far.  Patient is status post diagnostic laparoscopic diverting colostomy, pelvic washout and drain placement on 4/10/2024-no intra abdomen abscess seen     Reactive leukocytosis secondary to above     S/p resection of colon cancer on March 30, 2024     Diabetes mellitus type 2-A1c is 5.8     Plan     Continue IV Zosyn 3.375 g every 8 hours   Waiting on wound culture results including anaerobic culture  Supportive care  A.mJan labs  Case was discussed with family at bedside        Alejandra Alonso, KAREN  04/10/24  16:18 EDT    Note is dictated utilizing voice recognition software/Dragon

## 2024-04-10 NOTE — CASE MANAGEMENT/SOCIAL WORK
Continued Stay Note   Leif     Patient Name: Viktor Atkins  MRN: [id number]  Today's Date: 4/10/2024    Admit Date: 4/8/2024    Plan: Anticipate routine home with spouse.   Discharge Plan       Row Name 04/10/24 1240       Plan    Plan Anticipate routine home with spouse.    Plan Comments Per previous CM, patient reportedly unable to afford Eliquis. CM updated MD, RN, and pharmacist in unit rounds. DC Barriers: POD #1, NG tube, lovenox.                Lupe Asher RN     Office Phone: [phone]  Office Cell: [phone]

## 2024-04-10 NOTE — PROGRESS NOTES
[facility] MEDICINE SERVICE  DAILY PROGRESS NOTE    NAME: Viktor Atkins  : 1950  MRN: [id number]      LOS: 2 days     PROVIDER OF SERVICE: Peyman Castorena MD    Chief Complaint: Wound infection    Subjective:     Interval History: This morning patient developed intractable nausea and vomiting NG tube placed to low wall suction with significant output immediately.    Review of Systems:   Review of Systems  All negative except as above  Objective:     Vital Signs  Temp:  [97 °F (36.1 °C)-98.4 °F (36.9 °C)] 98 °F (36.7 °C)  Heart Rate:  [] 118  Resp:  [17-24] 21  BP: (113-142)/(63-93) 138/81  Flow (L/min):  [2-10] 2   Body mass index is 33.36 kg/m².    Physical Exam  Physical Exam  General Appearance:  Alert, cooperative, no distress, appears stated age  Head:  Normocephalic, without obvious abnormality, atraumatic  Eyes:  PERRL, conjunctiva/corneas clear, EOM's intact, fundi benign, both eyes  Ears:  Normal TM's and external ear canals, both ears  Nose: Nares normal, septum midline, mucosa normal, no drainage or sinus tenderness, NG tube in place  Throat: Lips, mucosa, and tongue normal; teeth and gums normal  Neck: Supple, symmetrical, trachea midline, no adenopathy, thyroid: not enlarged, symmetric, no tenderness/mass/nodules, no carotid bruit or JVD  Lungs:   Clear to auscultation bilaterally, respirations unlabored  Heart:  Regular rate and rhythm, S1, S2 normal, no murmur, rub or gallop  Abdomen:  Soft, distended, diffusely tender, decreased bowel sounds, colostomy with abdominal binder  Extremities: Extremities normal, atraumatic, no cyanosis or edema  Pulses: 2+ and symmetric  Skin: Skin color, texture, turgor normal, no rashes or lesions  Neurologic: Normal    Scheduled Meds   acetaminophen, 1,000 mg, Oral, Q6H  aspirin, 81 mg, Oral, Daily  atorvastatin, 20 mg, Oral, Nightly  enoxaparin, 1 mg/kg, Subcutaneous, Q12H  insulin lispro, 2-7 Units, Subcutaneous, Q6H  metoclopramide, 10 mg,  Intravenous, Q8H  metoprolol tartrate, 2.5 mg, Intravenous, Q6H  [START ON 4/11/2024] pantoprazole, 40 mg, Intravenous, Q AM  piperacillin-tazobactam, 3.375 g, Intravenous, Q8H  Scopolamine, 1 patch, Transdermal, Q72H  sertraline, 25 mg, Oral, Daily  sodium chloride, 10 mL, Intravenous, Q12H       PRN Meds     ALPRAZolam    senna-docusate sodium **AND** polyethylene glycol **AND** bisacodyl **AND** bisacodyl    Calcium Replacement - Follow Nurse / BPA Driven Protocol    dextrose    dextrose    glucagon (human recombinant)    HYDROmorphone **AND** naloxone    Magnesium Standard Dose Replacement - Follow Nurse / BPA Driven Protocol    melatonin    Morphine    nitroglycerin    ondansetron    ondansetron ODT    oxyCODONE    Phosphorus Replacement - Follow Nurse / BPA Driven Protocol    Potassium Replacement - Follow Nurse / BPA Driven Protocol    simethicone    [COMPLETED] Insert Peripheral IV **AND** sodium chloride    sodium chloride    sodium chloride   Infusions  dilTIAZem, 5-15 mg/hr, Last Rate: 5 mg/hr (04/10/24 1149)  lactated ringers, 50 mL/hr, Last Rate: 50 mL/hr (04/09/24 1919)          Diagnostic Data    Results from last 7 days   Lab Units 04/10/24  0142   WBC 10*3/mm3 21.35*   HEMOGLOBIN g/dL 10.7*   HEMATOCRIT % 33.0*   PLATELETS 10*3/mm3 395   GLUCOSE mg/dL 139*   CREATININE mg/dL 1.15   BUN mg/dL 17   SODIUM mmol/L 141   POTASSIUM mmol/L 3.6   AST (SGOT) U/L 37   ALT (SGPT) U/L 54*   ALK PHOS U/L 128*   BILIRUBIN mg/dL 0.3   ANION GAP mmol/L 12.0       XR Abdomen KUB    Result Date: 4/10/2024  Impression: NG tube projects just below the diaphragm in the expected position of the body of the stomach. Advancement no additional 5 cm could be considered when clinically feasible Electronically Signed: Loco Mensah MD  4/10/2024 10:47 AM EDT  Workstation ID: [id number]    CT Abdomen Pelvis With Contrast    Result Date: 4/8/2024  Impression: Postsurgical changes of rectosigmoid mass resection. The previously  described presacral fluid collection contains oral contrast consistent with leak. This collection measures 2 x 3.2 x 4 cm. Collection is minimally enlarged in size when compared to prior  study. Findings compatible with small bowel and colonic ileus. Electronically Signed: Julio Perdomo MD  4/8/2024 4:55 PM EDT  Workstation ID: [id number]       I reviewed the patient's new clinical results.  I reviewed the patient's new imaging results and agree with the interpretation.    Assessment/Plan:     Active and Resolved Problems  Active Hospital Problems    Diagnosis  POA    **Wound infection [T14.8XXA, L08.9]  Yes    Atrial fibrillation with rapid ventricular response [I48.91]  Unknown    Colon cancer [C18.9]  Yes    Mixed hyperlipidemia [E78.2]  Yes    Anxiety disorder [F41.9]  Yes    Essential hypertension [I10]  Yes    Palpitations [R00.2]  Yes      Resolved Hospital Problems   No resolved problems to display.       Postop abdominal wound infection  -Patient underwent low anterior section of a bleeding rectosigmoid mass on 3/30/2024 with pathology showing invasive moderate differentiated adenocarcinoma  -Returned to this hospital for worsening abdominal pain and wound with CT abdomen pelvis indicating presacral fluid collection 2 x 3.2 x 4 cm, concerning for abdominal wall abscess  -Patient underwent diagnostic laparoscopy with pelvic drain placement and laparoscopic and loop colostomy and pulse lavage of incision wound  -Continue IV antibiotics with Zosyn  -Follow-up surgical and blood cultures  -Patient may have some postoperative ileus and NG tube was placed to low suction with significant output  -Continue n.p.o. until bowel function returns    Rectosigmoid adenocarcinoma  -As above, patient underwent resection of rectosigmoid mass on 3/30 with pathology consistent with invasive moderately differentiated adenocarcinoma, pT3N2a  -Oncology consult appreciated  -Patient will need initiation of treatment postdischarge  once he is medically stable     A-fib with RVR  -Patient currently on maximum dose Cardizem  -I will initiate on IV metoprolol as well to attempt to wean off of IV Cardizem  -Once patient is able to tolerate p.o., resume oral metoprolol and Cardizem doses     Hypertension  -Continue IV metoprolol as above, holding losartan until patient is able to tolerate p.o.     Dyslipidemia  -Continue statin therapy once patient able to tolerate p.o.     DM type II, controlled  -Continue sliding scale insulin     Acute hypokalemia  -Repleted    DVT prophylaxis:  Medical and mechanical DVT prophylaxis orders are present.         Code status is   Code Status and Medical Interventions:   Ordered at: 04/09/24 1912     Level Of Support Discussed With:    Patient     Code Status (Patient has no pulse and is not breathing):    CPR (Attempt to Resuscitate)     Medical Interventions (Patient has pulse or is breathing):    Full       Plan for disposition: Pending clinical course, anticipate he will be here for several more days    Time: 30 minutes    Signature: Electronically signed by Peyman Castorena MD, 04/10/24, 13:35 EDT.  [facility] Hospitalist Team

## 2024-04-10 NOTE — NURSING NOTE
Numerous staff attempted to bladder scan pt since he had not had a void yet this shift. Bladder not distended and pt stated he did not feel like he needed to void. Staff unable to obtain accurate bladder scan. Pt requested just a little more time to attempt to void on his own.

## 2024-04-10 NOTE — PLAN OF CARE
Problem: Adult Inpatient Plan of Care  Goal: Plan of Care Review  Outcome: Ongoing, Progressing  Flowsheets (Taken 4/10/2024 0513)  Plan of Care Reviewed With: patient  Goal: Patient-Specific Goal (Individualized)  Outcome: Ongoing, Progressing  Goal: Absence of Hospital-Acquired Illness or Injury  Outcome: Ongoing, Progressing  Intervention: Identify and Manage Fall Risk  Recent Flowsheet Documentation  Taken 4/10/2024 0400 by Sandy Rico RN  Safety Promotion/Fall Prevention:   fall prevention program maintained   safety round/check completed  Taken 4/10/2024 0200 by Sandy Rico RN  Safety Promotion/Fall Prevention:   fall prevention program maintained   safety round/check completed  Taken 4/10/2024 0000 by Sandy Rico RN  Safety Promotion/Fall Prevention:   fall prevention program maintained   safety round/check completed  Taken 4/9/2024 2200 by Sandy Rico RN  Safety Promotion/Fall Prevention:   fall prevention program maintained   safety round/check completed  Taken 4/9/2024 2000 by Sandy Rico RN  Safety Promotion/Fall Prevention:   fall prevention program maintained   safety round/check completed  Intervention: Prevent Skin Injury  Recent Flowsheet Documentation  Taken 4/10/2024 0400 by Sandy Rico RN  Skin Protection: tubing/devices free from skin contact  Taken 4/10/2024 0000 by Sandy Rico RN  Skin Protection:   tubing/devices free from skin contact   skin-to-device areas padded  Taken 4/9/2024 2000 by Sandy Rico RN  Skin Protection:   skin-to-device areas padded   tubing/devices free from skin contact  Intervention: Prevent and Manage VTE (Venous Thromboembolism) Risk  Recent Flowsheet Documentation  Taken 4/10/2024 0400 by Sandy Rico RN  Activity Management: bedrest  VTE Prevention/Management: sequential compression devices off  Range of Motion: active ROM (range of motion) encouraged  Taken 4/10/2024 0000 by Sandy Rico RN  Activity Management: bedrest  VTE Prevention/Management: sequential  compression devices on  Range of Motion: active ROM (range of motion) encouraged  Taken 4/9/2024 2000 by Sandy Rico RN  VTE Prevention/Management:   sequential compression devices on   bilateral  Range of Motion: active ROM (range of motion) encouraged  Intervention: Prevent Infection  Recent Flowsheet Documentation  Taken 4/10/2024 0400 by Sandy Rico RN  Infection Prevention:   environmental surveillance performed   equipment surfaces disinfected   hand hygiene promoted   personal protective equipment utilized   rest/sleep promoted   single patient room provided  Taken 4/10/2024 0200 by Sandy Rico RN  Infection Prevention:   environmental surveillance performed   equipment surfaces disinfected   hand hygiene promoted   personal protective equipment utilized   rest/sleep promoted   single patient room provided  Taken 4/10/2024 0000 by Sandy Rico RN  Infection Prevention:   environmental surveillance performed   equipment surfaces disinfected   hand hygiene promoted   personal protective equipment utilized   rest/sleep promoted   single patient room provided   visitors restricted/screened  Taken 4/9/2024 2200 by Sandy Rico RN  Infection Prevention:   environmental surveillance performed   equipment surfaces disinfected   hand hygiene promoted   personal protective equipment utilized   rest/sleep promoted   single patient room provided  Taken 4/9/2024 2000 by Sandy Rico RN  Infection Prevention:   environmental surveillance performed   equipment surfaces disinfected   hand hygiene promoted   personal protective equipment utilized   rest/sleep promoted   single patient room provided  Goal: Optimal Comfort and Wellbeing  Outcome: Ongoing, Progressing  Intervention: Provide Person-Centered Care  Recent Flowsheet Documentation  Taken 4/10/2024 0400 by Sandy Rico RN  Trust Relationship/Rapport:   care explained   choices provided   emotional support provided   empathic listening provided   questions answered    questions encouraged   reassurance provided   thoughts/feelings acknowledged  Taken 4/10/2024 0000 by Sandy Rico RN  Trust Relationship/Rapport:   care explained   choices provided   emotional support provided   empathic listening provided   questions answered   questions encouraged   reassurance provided   thoughts/feelings acknowledged  Taken 4/9/2024 2000 by Sandy Rico RN  Trust Relationship/Rapport:   care explained   choices provided   emotional support provided   empathic listening provided   questions answered   questions encouraged   reassurance provided   thoughts/feelings acknowledged  Goal: Readiness for Transition of Care  Outcome: Ongoing, Progressing     Problem: Pain Acute  Goal: Acceptable Pain Control and Functional Ability  Outcome: Ongoing, Progressing  Intervention: Prevent or Manage Pain  Recent Flowsheet Documentation  Taken 4/10/2024 0400 by Sandy Rico RN  Medication Review/Management: medications reviewed  Taken 4/10/2024 0200 by Sandy Rico RN  Medication Review/Management: medications reviewed  Taken 4/10/2024 0000 by Sandy Rico RN  Sleep/Rest Enhancement: awakenings minimized  Medication Review/Management: medications reviewed  Taken 4/9/2024 2200 by Sandy Rico RN  Medication Review/Management: medications reviewed  Taken 4/9/2024 2000 by Sandy Rico RN  Sleep/Rest Enhancement:   awakenings minimized   regular sleep/rest pattern promoted  Medication Review/Management: medications reviewed  Intervention: Optimize Psychosocial Wellbeing  Recent Flowsheet Documentation  Taken 4/10/2024 0400 by Sandy Rico RN  Supportive Measures: active listening utilized  Diversional Activities: television  Taken 4/10/2024 0000 by Sandy Rico RN  Supportive Measures: active listening utilized  Taken 4/9/2024 2000 by Sandy Rico RN  Supportive Measures: active listening utilized  Diversional Activities: television     Problem: Dysrhythmia  Goal: Normalized Cardiac Rhythm  Outcome: Ongoing,  Progressing  Intervention: Monitor and Manage Cardiac Rhythm Effect  Recent Flowsheet Documentation  Taken 4/10/2024 0400 by Sandy Rico RN  VTE Prevention/Management: sequential compression devices off  Taken 4/10/2024 0000 by Sandy Rico RN  VTE Prevention/Management: sequential compression devices on  Taken 4/9/2024 2000 by Sandy Rico RN  VTE Prevention/Management:   sequential compression devices on   bilateral     Problem: Fall Injury Risk  Goal: Absence of Fall and Fall-Related Injury  Outcome: Ongoing, Progressing  Intervention: Identify and Manage Contributors  Recent Flowsheet Documentation  Taken 4/10/2024 0400 by Sandy Rico RN  Medication Review/Management: medications reviewed  Taken 4/10/2024 0200 by Sandy Rico RN  Medication Review/Management: medications reviewed  Taken 4/10/2024 0000 by Sandy Rico RN  Medication Review/Management: medications reviewed  Taken 4/9/2024 2200 by Sandy Rico RN  Medication Review/Management: medications reviewed  Taken 4/9/2024 2000 by Sandy Rico RN  Medication Review/Management: medications reviewed  Self-Care Promotion: BADL personal objects within reach  Intervention: Promote Injury-Free Environment  Recent Flowsheet Documentation  Taken 4/10/2024 0400 by Sandy Rico RN  Safety Promotion/Fall Prevention:   fall prevention program maintained   safety round/check completed  Taken 4/10/2024 0200 by Sandy Rico RN  Safety Promotion/Fall Prevention:   fall prevention program maintained   safety round/check completed  Taken 4/10/2024 0000 by Sandy Rico RN  Safety Promotion/Fall Prevention:   fall prevention program maintained   safety round/check completed  Taken 4/9/2024 2200 by Sandy Rico RN  Safety Promotion/Fall Prevention:   fall prevention program maintained   safety round/check completed  Taken 4/9/2024 2000 by Sandy Rico RN  Safety Promotion/Fall Prevention:   fall prevention program maintained   safety round/check completed   Goal Outcome Evaluation:  Plan  of Care Reviewed With: patient                   Pt alert and oriented x4. Pleasant and able to make needs known. Uses call light appropriately. Dressings to abdomen C/D/I. EDUARDO drain intact. Serosanguinous drainage noted. No c/o of any pain this shift. Remains NPO with sips with meds.

## 2024-04-10 NOTE — PROGRESS NOTES
"Colorectal Surgery Progress Note    Pt. Name/Age/:  Viktor Atkins   73 y.o.    1950         Med. Record Number:   [id number]  Date of admission:  2024      Service(s): Colorectal Surgery      Subjective    Took to OR yesterday for dx laparoscopy and diverting colostomy and wound washout.   No clinic anastomotic leak, anastomosis intact. Drain left in pelvis and transverse end loop colostomy created, given subclinical leak and patients clinical status.     Ostomy with gas output   Denies nausea  Reports some pain     Afebrile and normotensive   Potassium 3.6 this am.     Objective  Vitals:     Patient Vitals for the past 24 hrs:   BP Temp Temp src Pulse Resp SpO2 Height Weight   04/10/24 0100 128/71 97.7 °F (36.5 °C) Oral 108 21 95 % -- 109 kg (239 lb 3.2 oz)   24 2345 121/73 -- -- 103 -- 93 % -- --   24 2300 115/63 -- -- 107 -- 94 % -- --   24 2230 119/70 -- -- 105 -- 95 % -- --   24 2200 118/79 -- -- 96 -- 95 % -- --   24 2145 113/93 -- -- 89 -- 96 % -- --   24 2100 115/78 97.7 °F (36.5 °C) Oral 94 24 95 % -- --   24 -- -- -- 93 -- 94 % -- --   24 118/65 -- -- 97 -- 94 % -- --   24 -- -- -- 97 -- 95 % -- --   24 123/69 -- -- 89 -- 94 % -- --   24 1900 119/74 -- -- 105 -- -- -- 108 kg (238 lb 1.6 oz)   24 1814 124/90 98.4 °F (36.9 °C) Oral 111 19 93 % 180.3 cm (71\") --   24 1800 136/79 97.8 °F (36.6 °C) Oral 103 24 94 % -- --   24 1747 130/85 -- -- 109 21 95 % -- --   24 1732 117/83 -- -- 112 19 95 % -- --   24 1717 131/77 -- -- 103 17 94 % -- --   24 1702 128/81 -- -- 110 22 94 % -- --   24 1647 135/75 -- -- 104 24 95 % -- --   24 1645 -- -- -- 100 24 94 % -- --   24 1642 137/66 -- -- 98 24 95 % -- --   24 1632 123/80 -- -- 108 18 95 % -- --   24 1627 123/80 -- -- 100 20 95 % -- --   24 1622 115/73 -- -- 102 20 94 % -- --   24 1617 119/73 97 " °F (36.1 °C) Temporal 102 20 97 % -- --   04/09/24 1140 126/69 97.9 °F (36.6 °C) Oral 94 18 95 % -- --   04/09/24 1115 143/74 -- -- 102 -- -- -- --   04/09/24 1045 139/68 -- -- 103 -- -- -- --   04/09/24 1015 139/73 -- -- 98 -- -- -- --   04/09/24 0945 142/86 -- -- 114 -- -- -- --   04/09/24 0838 128/70 -- -- -- 23 95 % -- --   04/09/24 0700 -- -- -- -- 18 -- -- --           Wt. Admission: Weight: 105 kg (231 lb 11.3 oz)     Wt. Current: Weight: 109 kg (239 lb 3.2 oz)   Body mass index is 33.36 kg/m².      I&O:  Intake/Output Summary (Last 24 hours) at 4/10/2024 0610  Last data filed at 4/10/2024 0300  Gross per 24 hour   Intake 1793.63 ml   Output 775 ml   Net 1018.63 ml     04/08 0701 - 04/09 1900  In: 1793.6 [I.V.:1593.6]  Out: 525 [Urine:300; Drains:125]      Exam  General:  No acute distress, resting comfortably.  Cardiovascular: afib  Respiratory: slightly tachypnea   Abdomen:  distended, soft, ostomy viable with bowel sweats, other incisions clean and cassy and intact   Extremities: warm, well-perfused extremities, no pitting edema.      Labs:     [unfilled]          Scheduled Meds:acetaminophen, 1,000 mg, Oral, Q6H  aspirin, 81 mg, Oral, Daily  atorvastatin, 20 mg, Oral, Nightly  heparin (porcine), 5,000 Units, Subcutaneous, Q8H  insulin lispro, 2-7 Units, Subcutaneous, Q6H  metoclopramide, 10 mg, Oral, TID With Meals  metoprolol tartrate, 25 mg, Oral, Q8H  pantoprazole, 40 mg, Oral, Q AM  piperacillin-tazobactam, 3.375 g, Intravenous, Q8H  potassium chloride, 40 mEq, Oral, Q4H  Scopolamine, 1 patch, Transdermal, Q72H  sertraline, 25 mg, Oral, Daily  sodium chloride, 10 mL, Intravenous, Q12H      Continuous Infusions:dilTIAZem, 5-15 mg/hr, Last Rate: 10 mg/hr (04/10/24 0122)  lactated ringers, 50 mL/hr, Last Rate: 50 mL/hr (04/09/24 1919)      PRN Meds:.  ALPRAZolam    senna-docusate sodium **AND** polyethylene glycol **AND** bisacodyl **AND** bisacodyl    Calcium Replacement - Follow Nurse / BPA Driven  Protocol    dextrose    dextrose    glucagon (human recombinant)    HYDROmorphone **AND** naloxone    Magnesium Standard Dose Replacement - Follow Nurse / BPA Driven Protocol    melatonin    methocarbamol    Morphine    nitroglycerin    ondansetron    ondansetron ODT    oxyCODONE    Phosphorus Replacement - Follow Nurse / BPA Driven Protocol    Potassium Replacement - Follow Nurse / BPA Driven Protocol    simethicone    [COMPLETED] Insert Peripheral IV **AND** sodium chloride    sodium chloride    sodium chloride          Assessment  73 y.o. male with rectosigmoid cancer s/p resection with surgical site infection and radiographic findings of potential anastomotic leak, he is s/p dx laparoscopy, pelvic drain placement, diverting colostomy, wound washout     Plan / Recommendations    - abdominal distention from ileus contributed by severe hypokalemia. Improving in am lab, recommend keeping over 4    - encourage out of bed, aggressive PT     - keep NPO excepts sips with meds until return of bowel function. NGT If nausea/vomiting     - ostomy teaching and care. Discussed with nurses for BID dressing changes. Penrose drain left in the pfannenstiel incision for adequate drainage.     - ok to start therapeutic Lovenox/ hep gtt for afib given high risk      - tight blood glucose control, goal < 180     - continue with antibiotics.     - he will need adjuvant chemotherapy, his Path pT3pN2 with some high risk features including tumor budding discussed with patient. He needs to recover from his surgery as well as infection before clearance to start treatment. I will plan on chemo-port insertion as outpatient.     - Dr. Gorman will follow patient as I will be away out of town.       06:10 EDT; 4/10/2024        Aakash Burden MD  Colon and Rectal Surgery   [facility]

## 2024-04-10 NOTE — NURSING NOTE
"WOCN note:    73 yr old male admitted 4/8/24 from Dr. Burden's office for possible wound infection following surgery last week to remove a mass on his colon. Patient underwent a laparoscopy with end-loop colostomy on 4/9/24.     Patient presents sitting up in chair after just having an NG tube placed. Pouch is intact to LUQ colostomy with flatus present. Patient instructed on \"burping\" the pouch. Initial ostomy teaching performed with daughter at the bedside. All questions answered. Plan to change pouch tomorrow with wife present.   "

## 2024-04-11 ENCOUNTER — APPOINTMENT (OUTPATIENT)
Dept: GENERAL RADIOLOGY | Facility: HOSPITAL | Age: 74
End: 2024-04-11
Payer: MEDICARE

## 2024-04-11 LAB
ABO GROUP BLD: NORMAL
ALBUMIN SERPL-MCNC: 3.3 G/DL (ref 3.5–5.2)
ALBUMIN/GLOB SERPL: 1.2 G/DL
ALP SERPL-CCNC: 122 U/L (ref 39–117)
ALT SERPL W P-5'-P-CCNC: 37 U/L (ref 1–41)
ANION GAP SERPL CALCULATED.3IONS-SCNC: 14 MMOL/L (ref 5–15)
AST SERPL-CCNC: 23 U/L (ref 1–40)
BACTERIA SPEC AEROBE CULT: ABNORMAL
BACTERIA SPEC AEROBE CULT: ABNORMAL
BASOPHILS # BLD AUTO: 0.05 10*3/MM3 (ref 0–0.2)
BASOPHILS NFR BLD AUTO: 0.2 % (ref 0–1.5)
BILIRUB SERPL-MCNC: 0.3 MG/DL (ref 0–1.2)
BLD GP AB SCN SERPL QL: NEGATIVE
BUN SERPL-MCNC: 23 MG/DL (ref 8–23)
BUN/CREAT SERPL: 18.7 (ref 7–25)
CALCIUM SPEC-SCNC: 8.4 MG/DL (ref 8.6–10.5)
CHLORIDE SERPL-SCNC: 113 MMOL/L (ref 98–107)
CO2 SERPL-SCNC: 19 MMOL/L (ref 22–29)
CREAT SERPL-MCNC: 1.23 MG/DL (ref 0.76–1.27)
DEPRECATED RDW RBC AUTO: 54.1 FL (ref 37–54)
EGFRCR SERPLBLD CKD-EPI 2021: 62.4 ML/MIN/1.73
EOSINOPHIL # BLD AUTO: 0.3 10*3/MM3 (ref 0–0.4)
EOSINOPHIL NFR BLD AUTO: 1.5 % (ref 0.3–6.2)
ERYTHROCYTE [DISTWIDTH] IN BLOOD BY AUTOMATED COUNT: 17 % (ref 12.3–15.4)
GLOBULIN UR ELPH-MCNC: 2.7 GM/DL
GLUCOSE BLDC GLUCOMTR-MCNC: 105 MG/DL (ref 70–105)
GLUCOSE BLDC GLUCOMTR-MCNC: 106 MG/DL (ref 70–105)
GLUCOSE BLDC GLUCOMTR-MCNC: 108 MG/DL (ref 70–105)
GLUCOSE BLDC GLUCOMTR-MCNC: 112 MG/DL (ref 70–105)
GLUCOSE SERPL-MCNC: 127 MG/DL (ref 65–99)
GRAM STN SPEC: ABNORMAL
HCT VFR BLD AUTO: 33.4 % (ref 37.5–51)
HGB BLD-MCNC: 10.3 G/DL (ref 13–17.7)
IMM GRANULOCYTES # BLD AUTO: 0.19 10*3/MM3 (ref 0–0.05)
IMM GRANULOCYTES NFR BLD AUTO: 0.9 % (ref 0–0.5)
LYMPHOCYTES # BLD AUTO: 4.64 10*3/MM3 (ref 0.7–3.1)
LYMPHOCYTES NFR BLD AUTO: 22.8 % (ref 19.6–45.3)
MAGNESIUM SERPL-MCNC: 2.7 MG/DL (ref 1.6–2.4)
MCH RBC QN AUTO: 27.1 PG (ref 26.6–33)
MCHC RBC AUTO-ENTMCNC: 30.8 G/DL (ref 31.5–35.7)
MCV RBC AUTO: 87.9 FL (ref 79–97)
MONOCYTES # BLD AUTO: 1.17 10*3/MM3 (ref 0.1–0.9)
MONOCYTES NFR BLD AUTO: 5.7 % (ref 5–12)
NEUTROPHILS NFR BLD AUTO: 14.01 10*3/MM3 (ref 1.7–7)
NEUTROPHILS NFR BLD AUTO: 68.9 % (ref 42.7–76)
NRBC BLD AUTO-RTO: 0 /100 WBC (ref 0–0.2)
PHOSPHATE SERPL-MCNC: 2.7 MG/DL (ref 2.5–4.5)
PLATELET # BLD AUTO: 393 10*3/MM3 (ref 140–450)
PMV BLD AUTO: 11.9 FL (ref 6–12)
POTASSIUM SERPL-SCNC: 3.7 MMOL/L (ref 3.5–5.2)
PROT SERPL-MCNC: 6 G/DL (ref 6–8.5)
RBC # BLD AUTO: 3.8 10*6/MM3 (ref 4.14–5.8)
RH BLD: POSITIVE
SODIUM SERPL-SCNC: 146 MMOL/L (ref 136–145)
T&S EXPIRATION DATE: NORMAL
WBC NRBC COR # BLD AUTO: 20.36 10*3/MM3 (ref 3.4–10.8)

## 2024-04-11 PROCEDURE — 25810000003 LACTATED RINGERS PER 1000 ML: Performed by: STUDENT IN AN ORGANIZED HEALTH CARE EDUCATION/TRAINING PROGRAM

## 2024-04-11 PROCEDURE — 86900 BLOOD TYPING SEROLOGIC ABO: CPT | Performed by: SURGERY

## 2024-04-11 PROCEDURE — 25010000002 PIPERACILLIN SOD-TAZOBACTAM PER 1 G: Performed by: STUDENT IN AN ORGANIZED HEALTH CARE EDUCATION/TRAINING PROGRAM

## 2024-04-11 PROCEDURE — 25010000002 METRONIDAZOLE 500 MG/100ML SOLUTION: Performed by: NURSE PRACTITIONER

## 2024-04-11 PROCEDURE — 84100 ASSAY OF PHOSPHORUS: CPT | Performed by: STUDENT IN AN ORGANIZED HEALTH CARE EDUCATION/TRAINING PROGRAM

## 2024-04-11 PROCEDURE — 99024 POSTOP FOLLOW-UP VISIT: CPT | Performed by: STUDENT IN AN ORGANIZED HEALTH CARE EDUCATION/TRAINING PROGRAM

## 2024-04-11 PROCEDURE — 99232 SBSQ HOSP IP/OBS MODERATE 35: CPT | Performed by: INTERNAL MEDICINE

## 2024-04-11 PROCEDURE — 86850 RBC ANTIBODY SCREEN: CPT | Performed by: SURGERY

## 2024-04-11 PROCEDURE — 97116 GAIT TRAINING THERAPY: CPT

## 2024-04-11 PROCEDURE — 83735 ASSAY OF MAGNESIUM: CPT | Performed by: STUDENT IN AN ORGANIZED HEALTH CARE EDUCATION/TRAINING PROGRAM

## 2024-04-11 PROCEDURE — 80053 COMPREHEN METABOLIC PANEL: CPT | Performed by: STUDENT IN AN ORGANIZED HEALTH CARE EDUCATION/TRAINING PROGRAM

## 2024-04-11 PROCEDURE — 97530 THERAPEUTIC ACTIVITIES: CPT

## 2024-04-11 PROCEDURE — 86901 BLOOD TYPING SEROLOGIC RH(D): CPT | Performed by: SURGERY

## 2024-04-11 PROCEDURE — 63710000001 ONDANSETRON ODT 4 MG TABLET DISPERSIBLE: Performed by: STUDENT IN AN ORGANIZED HEALTH CARE EDUCATION/TRAINING PROGRAM

## 2024-04-11 PROCEDURE — 85025 COMPLETE CBC W/AUTO DIFF WBC: CPT | Performed by: STUDENT IN AN ORGANIZED HEALTH CARE EDUCATION/TRAINING PROGRAM

## 2024-04-11 PROCEDURE — 82948 REAGENT STRIP/BLOOD GLUCOSE: CPT

## 2024-04-11 PROCEDURE — 25010000002 ENOXAPARIN PER 10 MG: Performed by: STUDENT IN AN ORGANIZED HEALTH CARE EDUCATION/TRAINING PROGRAM

## 2024-04-11 PROCEDURE — 25010000002 CEFTRIAXONE PER 250 MG: Performed by: NURSE PRACTITIONER

## 2024-04-11 PROCEDURE — 25010000002 METOCLOPRAMIDE PER 10 MG: Performed by: INTERNAL MEDICINE

## 2024-04-11 PROCEDURE — 74018 RADEX ABDOMEN 1 VIEW: CPT

## 2024-04-11 RX ORDER — DILTIAZEM HYDROCHLORIDE 5 MG/ML
10 INJECTION INTRAVENOUS ONCE
Status: COMPLETED | OUTPATIENT
Start: 2024-04-11 | End: 2024-04-11

## 2024-04-11 RX ORDER — PANTOPRAZOLE SODIUM 40 MG/10ML
40 INJECTION, POWDER, LYOPHILIZED, FOR SOLUTION INTRAVENOUS
Status: DISCONTINUED | OUTPATIENT
Start: 2024-04-11 | End: 2024-04-12 | Stop reason: ALTCHOICE

## 2024-04-11 RX ORDER — HEPARIN SODIUM 5000 [USP'U]/ML
5000 INJECTION, SOLUTION INTRAVENOUS; SUBCUTANEOUS EVERY 8 HOURS SCHEDULED
Status: DISCONTINUED | OUTPATIENT
Start: 2024-04-11 | End: 2024-04-11

## 2024-04-11 RX ORDER — ENOXAPARIN SODIUM 100 MG/ML
1 INJECTION SUBCUTANEOUS EVERY 12 HOURS
Status: DISCONTINUED | OUTPATIENT
Start: 2024-04-11 | End: 2024-04-17

## 2024-04-11 RX ORDER — METRONIDAZOLE 500 MG/100ML
500 INJECTION, SOLUTION INTRAVENOUS EVERY 8 HOURS
Qty: 3900 ML | Refills: 0 | Status: DISCONTINUED | OUTPATIENT
Start: 2024-04-11 | End: 2024-04-17

## 2024-04-11 RX ADMIN — METOPROLOL TARTRATE 5 MG: 1 INJECTION, SOLUTION INTRAVENOUS at 12:23

## 2024-04-11 RX ADMIN — ONDANSETRON 4 MG: 4 TABLET, ORALLY DISINTEGRATING ORAL at 20:43

## 2024-04-11 RX ADMIN — ACETAMINOPHEN 1000 MG: 500 TABLET, FILM COATED ORAL at 12:24

## 2024-04-11 RX ADMIN — Medication 10 MG/HR: at 13:24

## 2024-04-11 RX ADMIN — METOCLOPRAMIDE HYDROCHLORIDE 10 MG: 5 INJECTION INTRAMUSCULAR; INTRAVENOUS at 06:10

## 2024-04-11 RX ADMIN — ACETAMINOPHEN 1000 MG: 500 TABLET, FILM COATED ORAL at 17:06

## 2024-04-11 RX ADMIN — PIPERACILLIN AND TAZOBACTAM 3.38 G: 3; .375 INJECTION, POWDER, LYOPHILIZED, FOR SOLUTION INTRAVENOUS at 09:59

## 2024-04-11 RX ADMIN — ENOXAPARIN SODIUM 100 MG: 100 INJECTION SUBCUTANEOUS at 12:37

## 2024-04-11 RX ADMIN — ATORVASTATIN CALCIUM 20 MG: 20 TABLET, FILM COATED ORAL at 20:43

## 2024-04-11 RX ADMIN — METOCLOPRAMIDE HYDROCHLORIDE 10 MG: 5 INJECTION INTRAMUSCULAR; INTRAVENOUS at 22:35

## 2024-04-11 RX ADMIN — METOPROLOL TARTRATE 5 MG: 1 INJECTION, SOLUTION INTRAVENOUS at 17:06

## 2024-04-11 RX ADMIN — ACETAMINOPHEN 1000 MG: 500 TABLET, FILM COATED ORAL at 00:11

## 2024-04-11 RX ADMIN — METOPROLOL TARTRATE 2.5 MG: 1 INJECTION, SOLUTION INTRAVENOUS at 06:10

## 2024-04-11 RX ADMIN — PANTOPRAZOLE SODIUM 40 MG: 40 INJECTION, POWDER, FOR SOLUTION INTRAVENOUS at 17:06

## 2024-04-11 RX ADMIN — PANTOPRAZOLE SODIUM 40 MG: 40 INJECTION, POWDER, FOR SOLUTION INTRAVENOUS at 06:10

## 2024-04-11 RX ADMIN — METOCLOPRAMIDE HYDROCHLORIDE 10 MG: 5 INJECTION INTRAMUSCULAR; INTRAVENOUS at 13:23

## 2024-04-11 RX ADMIN — Medication 10 ML: at 09:59

## 2024-04-11 RX ADMIN — CEFTRIAXONE SODIUM 2000 MG: 2 INJECTION, POWDER, FOR SOLUTION INTRAMUSCULAR; INTRAVENOUS at 17:12

## 2024-04-11 RX ADMIN — METRONIDAZOLE 500 MG: 500 INJECTION, SOLUTION INTRAVENOUS at 17:07

## 2024-04-11 RX ADMIN — PIPERACILLIN AND TAZOBACTAM 3.38 G: 3; .375 INJECTION, POWDER, LYOPHILIZED, FOR SOLUTION INTRAVENOUS at 02:45

## 2024-04-11 RX ADMIN — Medication 10 ML: at 20:44

## 2024-04-11 RX ADMIN — METOPROLOL TARTRATE 2.5 MG: 1 INJECTION, SOLUTION INTRAVENOUS at 00:18

## 2024-04-11 RX ADMIN — DILTIAZEM HYDROCHLORIDE 10 MG: 5 INJECTION INTRAVENOUS at 12:24

## 2024-04-11 RX ADMIN — Medication 10 MG/HR: at 00:41

## 2024-04-11 RX ADMIN — SODIUM CHLORIDE, POTASSIUM CHLORIDE, SODIUM LACTATE AND CALCIUM CHLORIDE 50 ML/HR: 600; 310; 30; 20 INJECTION, SOLUTION INTRAVENOUS at 15:43

## 2024-04-11 NOTE — NURSING NOTE
Noted pt's HR in 160's. Staff went to check on pt and found him sitting in recliner beside his bed. He was confused and was saying he had to get to school for a test. Pt was reoriented very easily. Told staff he woke up sideways in bed. Pt's HR now in 110's. Staff did notice that drainage in suction tubing is now reddish in color. Checked pt's dressing and drains. Pt states he is in no pain.

## 2024-04-11 NOTE — THERAPY TREATMENT NOTE
"Subjective: Pt agreeable to therapeutic plan of care.    Objective:     Bed mobility - N/A or Not attempted. - pt up in chair  Transfers - SBA  Ambulation - 100 feet CGA - pulse consistently 150-160 bpm - nurse notified and cleared for mobility.     Vitals: Tachycardic    Pain: 3 VAS   Location: abdomen  Intervention for pain: Repositioned, Increased Activity, and Therapeutic Presence    Education: Provided education on the importance of mobility in the acute care setting, Verbal/Tactile Cues, Transfer Training, Gait Training, Energy conservation strategies, and Post-Op Precautions    Assessment: Viktor Atkins presents with functional mobility impairments which indicate the need for skilled intervention. Pt with pulse elevated to 150-160 bpmwith gait progression x100 ft, nurse aware and cleared for mobility. Tolerating session today without incident. Will continue to follow and progress as tolerated.     Plan/Recommendations:   If medically appropriate, Low Intensity Therapy recommended post-acute care - This is recommended as therapy feels this patient would require 2-3 visits per week. OP or HH would be the best option depending on patient's home bound status. Consider, if the patient has other  \"skilled\" needs such as wounds, IV antibiotics, etc. Combined with \"low intensity\" could also equate to a SNF. If patient is medically complex, consider LTAC. Pt requires no DME at discharge.     Pt desires Home with family assist and and Home Health at discharge. Pt cooperative; agreeable to therapeutic recommendations and plan of care.     Post-Tx Position: Up in Chair and Call light and personal items within reach; family present; nurse cleared no chair alarm  PPE: gloves and surgical mask   "

## 2024-04-11 NOTE — CASE MANAGEMENT/SOCIAL WORK
Continued Stay Note  [facility]     Patient Name: Viktor Atkins  MRN: [id number]  Today's Date: 4/11/2024    Admit Date: 4/8/2024    Plan: Anticipate routine home with spouse.   Discharge Plan       Row Name 04/11/24 5789       Plan    Plan Anticipate routine home with spouse.    Plan Comments Barrier to D/C: cardizem gtt, IV abx, clear liquids, NG, POD#2 diverting ostomy.                      Expected Discharge Date and Time       Expected Discharge Date Expected Discharge Time    Apr 14, 2024           Phone communication or documentation only - no physical contact with patient or family.     SANA MahoneyN, RN    [address] [address]    Office: [phone]  Fax: [phone]

## 2024-04-11 NOTE — PLAN OF CARE
Problem: Adult Inpatient Plan of Care  Goal: Plan of Care Review  Outcome: Ongoing, Progressing  Flowsheets (Taken 4/11/2024 0425)  Plan of Care Reviewed With: patient  Goal: Patient-Specific Goal (Individualized)  Outcome: Ongoing, Progressing  Goal: Absence of Hospital-Acquired Illness or Injury  Outcome: Ongoing, Progressing  Intervention: Identify and Manage Fall Risk  Recent Flowsheet Documentation  Taken 4/11/2024 0200 by Sandy Rico RN  Safety Promotion/Fall Prevention:   fall prevention program maintained   safety round/check completed  Taken 4/11/2024 0000 by Sandy Rico RN  Safety Promotion/Fall Prevention:   fall prevention program maintained   safety round/check completed  Taken 4/10/2024 2200 by Sandy Rico RN  Safety Promotion/Fall Prevention:   fall prevention program maintained   safety round/check completed  Taken 4/10/2024 2000 by Sandy Rico RN  Safety Promotion/Fall Prevention:   fall prevention program maintained   safety round/check completed  Intervention: Prevent Skin Injury  Recent Flowsheet Documentation  Taken 4/11/2024 0000 by Sandy Rico RN  Skin Protection: tubing/devices free from skin contact  Taken 4/10/2024 2000 by Sandy Rico RN  Skin Protection: tubing/devices free from skin contact  Intervention: Prevent and Manage VTE (Venous Thromboembolism) Risk  Recent Flowsheet Documentation  Taken 4/11/2024 0000 by Sandy Rico RN  Activity Management: bedrest  VTE Prevention/Management:   sequential compression devices off   patient refused intervention  Range of Motion: active ROM (range of motion) encouraged  Taken 4/10/2024 2000 by Sandy Rico RN  Activity Management: bedrest  VTE Prevention/Management: sequential compression devices off  Range of Motion: active ROM (range of motion) encouraged  Intervention: Prevent Infection  Recent Flowsheet Documentation  Taken 4/11/2024 0200 by Sandy Rico RN  Infection Prevention:   environmental surveillance performed   equipment surfaces  disinfected   hand hygiene promoted   personal protective equipment utilized   rest/sleep promoted   single patient room provided  Taken 4/11/2024 0000 by Sandy Rico RN  Infection Prevention:   environmental surveillance performed   equipment surfaces disinfected   hand hygiene promoted   personal protective equipment utilized   rest/sleep promoted   single patient room provided  Taken 4/10/2024 2200 by Sandy Rico RN  Infection Prevention:   environmental surveillance performed   equipment surfaces disinfected   hand hygiene promoted   personal protective equipment utilized   rest/sleep promoted   single patient room provided  Taken 4/10/2024 2000 by Sandy Rico RN  Infection Prevention:   environmental surveillance performed   equipment surfaces disinfected   hand hygiene promoted   personal protective equipment utilized   rest/sleep promoted   single patient room provided  Goal: Optimal Comfort and Wellbeing  Outcome: Ongoing, Progressing  Intervention: Provide Person-Centered Care  Recent Flowsheet Documentation  Taken 4/11/2024 0000 by Sandy Rico RN  Trust Relationship/Rapport:   care explained   choices provided   emotional support provided   empathic listening provided   questions answered   questions encouraged   reassurance provided   thoughts/feelings acknowledged  Taken 4/10/2024 2000 by Sandy Rico RN  Trust Relationship/Rapport:   care explained   choices provided   emotional support provided   empathic listening provided   questions answered   questions encouraged   reassurance provided   thoughts/feelings acknowledged  Goal: Readiness for Transition of Care  Outcome: Ongoing, Progressing     Problem: Pain Acute  Goal: Acceptable Pain Control and Functional Ability  Outcome: Ongoing, Progressing  Intervention: Prevent or Manage Pain  Recent Flowsheet Documentation  Taken 4/11/2024 0200 by Sandy Rico RN  Medication Review/Management: medications reviewed  Taken 4/11/2024 0000 by Sandy Rico,  RN  Sleep/Rest Enhancement: awakenings minimized  Medication Review/Management: medications reviewed  Taken 4/10/2024 2200 by Sandy Rico RN  Medication Review/Management: medications reviewed  Taken 4/10/2024 2000 by Sandy Rico RN  Sleep/Rest Enhancement: awakenings minimized  Medication Review/Management: medications reviewed  Intervention: Optimize Psychosocial Wellbeing  Recent Flowsheet Documentation  Taken 4/11/2024 0000 by Sandy Rico RN  Supportive Measures: active listening utilized  Taken 4/10/2024 2000 by Sandy Rico RN  Supportive Measures: active listening utilized  Diversional Activities: television     Problem: Dysrhythmia  Goal: Normalized Cardiac Rhythm  Outcome: Ongoing, Progressing  Intervention: Monitor and Manage Cardiac Rhythm Effect  Recent Flowsheet Documentation  Taken 4/11/2024 0000 by Sandy Rico RN  VTE Prevention/Management:   sequential compression devices off   patient refused intervention  Taken 4/10/2024 2000 by Sandy Rico RN  VTE Prevention/Management: sequential compression devices off     Problem: Fall Injury Risk  Goal: Absence of Fall and Fall-Related Injury  Outcome: Ongoing, Progressing  Intervention: Identify and Manage Contributors  Recent Flowsheet Documentation  Taken 4/11/2024 0200 by Sandy Rico RN  Medication Review/Management: medications reviewed  Taken 4/11/2024 0000 by Sandy Rico RN  Medication Review/Management: medications reviewed  Self-Care Promotion: BADL personal objects within reach  Taken 4/10/2024 2200 by Sandy Rico RN  Medication Review/Management: medications reviewed  Taken 4/10/2024 2000 by Sandy Rico RN  Medication Review/Management: medications reviewed  Self-Care Promotion: BADL personal objects within reach  Intervention: Promote Injury-Free Environment  Recent Flowsheet Documentation  Taken 4/11/2024 0200 by Sandy Rico RN  Safety Promotion/Fall Prevention:   fall prevention program maintained   safety round/check completed  Taken 4/11/2024  0000 by Rico, Sandy, RN  Safety Promotion/Fall Prevention:   fall prevention program maintained   safety round/check completed  Taken 4/10/2024 2200 by Sandy Rico RN  Safety Promotion/Fall Prevention:   fall prevention program maintained   safety round/check completed  Taken 4/10/2024 2000 by Sandy Rico RN  Safety Promotion/Fall Prevention:   fall prevention program maintained   safety round/check completed     Problem: Adjustment to Illness (Sepsis/Septic Shock)  Goal: Optimal Coping  Outcome: Ongoing, Progressing  Intervention: Optimize Psychosocial Adjustment to Illness  Recent Flowsheet Documentation  Taken 4/11/2024 0000 by Sandy Rico RN  Supportive Measures: active listening utilized  Family/Support System Care: support provided  Taken 4/10/2024 2000 by Sandy Rico RN  Supportive Measures: active listening utilized  Family/Support System Care: support provided     Problem: Bleeding (Sepsis/Septic Shock)  Goal: Absence of Bleeding  Outcome: Ongoing, Progressing  Intervention: Monitor and Manage Bleeding  Recent Flowsheet Documentation  Taken 4/11/2024 0200 by Sandy Rico RN  Bleeding Precautions: blood pressure closely monitored  Taken 4/11/2024 0000 by Sandy Rico RN  Bleeding Precautions: blood pressure closely monitored  Taken 4/10/2024 2000 by Sandy Rico RN  Bleeding Precautions:   blood pressure closely monitored   monitored for signs of bleeding     Problem: Glycemic Control Impaired (Sepsis/Septic Shock)  Goal: Blood Glucose Level Within Desired Range  Outcome: Ongoing, Progressing  Intervention: Optimize Glycemic Control  Recent Flowsheet Documentation  Taken 4/11/2024 0000 by Sandy Rico RN  Glycemic Management: blood glucose monitored  Taken 4/10/2024 2000 by Sandy Rico RN  Glycemic Management: blood glucose monitored     Problem: Infection Progression (Sepsis/Septic Shock)  Goal: Absence of Infection Signs and Symptoms  Outcome: Ongoing, Progressing  Intervention: Initiate Sepsis  Management  Recent Flowsheet Documentation  Taken 4/11/2024 0200 by Sandy Rico RN  Infection Management: aseptic technique maintained  Infection Prevention:   environmental surveillance performed   equipment surfaces disinfected   hand hygiene promoted   personal protective equipment utilized   rest/sleep promoted   single patient room provided  Taken 4/11/2024 0000 by Sandy Rico RN  Infection Management: aseptic technique maintained  Infection Prevention:   environmental surveillance performed   equipment surfaces disinfected   hand hygiene promoted   personal protective equipment utilized   rest/sleep promoted   single patient room provided  Taken 4/10/2024 2200 by Sandy Rico RN  Infection Prevention:   environmental surveillance performed   equipment surfaces disinfected   hand hygiene promoted   personal protective equipment utilized   rest/sleep promoted   single patient room provided  Taken 4/10/2024 2000 by Sandy Rico RN  Infection Management: aseptic technique maintained  Infection Prevention:   environmental surveillance performed   equipment surfaces disinfected   hand hygiene promoted   personal protective equipment utilized   rest/sleep promoted   single patient room provided  Intervention: Promote Recovery  Recent Flowsheet Documentation  Taken 4/11/2024 0000 by Sandy Rico RN  Activity Management: bedrest  Airway/Ventilation Support: pulmonary hygiene promoted  Sleep/Rest Enhancement: awakenings minimized  Taken 4/10/2024 2000 by Sandy Rico RN  Activity Management: bedrest  Airway/Ventilation Support: pulmonary hygiene promoted  Sleep/Rest Enhancement: awakenings minimized     Problem: Nutrition Impaired (Sepsis/Septic Shock)  Goal: Optimal Nutrition Intake  Outcome: Ongoing, Progressing   Goal Outcome Evaluation:  Plan of Care Reviewed With: patient              Pt continues to have copious amounts of green output from NG. No c/o pain noted. Pt did dip into high 70's-low 80's while asleep.  Applied O2 at 2LPM. Sats now 95%.

## 2024-04-11 NOTE — PLAN OF CARE
"Assessment: Viktor Atkins presents with functional mobility impairments which indicate the need for skilled intervention. Pt with pulse elevated to 150-160 bpmwith gait progression x100 ft, nurse aware and cleared for mobility. Tolerating session today without incident. Will continue to follow and progress as tolerated.     Plan/Recommendations:   If medically appropriate, Low Intensity Therapy recommended post-acute care - This is recommended as therapy feels this patient would require 2-3 visits per week. OP or HH would be the best option depending on patient's home bound status. Consider, if the patient has other  \"skilled\" needs such as wounds, IV antibiotics, etc. Combined with \"low intensity\" could also equate to a SNF. If patient is medically complex, consider LTAC. Pt requires no DME at discharge.     Pt desires Home with family assist and and Home Health at discharge. Pt cooperative; agreeable to therapeutic recommendations and plan of care.                                               "

## 2024-04-11 NOTE — PROGRESS NOTES
LOS: 3 days   Admitting Physician- Peyman Castorena MD    Reason For Followup:    Persistent atrial fibrillation  Postsurgery ileus  S/p laparotomy  Hypertension    Subjective     Patient is complaining of mild abdominal discomfort    Objective     Hemodynamics are stable heart rate is slightly elevated patient still in atrial fibrillation    Review of Systems:   Review of Systems   Constitutional: Negative for chills and fever.   HENT:  Negative for ear discharge and nosebleeds.    Eyes:  Negative for discharge and redness.   Cardiovascular:  Negative for chest pain, orthopnea, palpitations, paroxysmal nocturnal dyspnea and syncope.   Respiratory:  Positive for shortness of breath. Negative for cough and wheezing.    Endocrine: Negative for heat intolerance.   Skin:  Negative for rash.   Musculoskeletal:  Negative for arthritis and myalgias.   Gastrointestinal:  Positive for bloating and abdominal pain. Negative for melena, nausea and vomiting.   Genitourinary:  Negative for dysuria and hematuria.   Neurological:  Negative for dizziness, light-headedness, numbness and tremors.   Psychiatric/Behavioral:  Negative for depression. The patient is not nervous/anxious.          Vital Signs  Vitals:    04/11/24 1224 04/11/24 1300 04/11/24 1324 04/11/24 1700   BP: 147/80 131/73 131/73 131/73   BP Location:       Patient Position:    Sitting   Pulse: (!) 139 106 107 105   Resp:  23     Temp:  97.8 °F (36.6 °C)     TempSrc:  Oral     SpO2:    95%   Weight:       Height:         Wt Readings from Last 1 Encounters:   04/11/24 104 kg (230 lb 2.6 oz)       Intake/Output Summary (Last 24 hours) at 4/11/2024 1905  Last data filed at 4/11/2024 1800  Gross per 24 hour   Intake --   Output 5825 ml   Net -5825 ml     Physical Exam:  Constitutional:       Appearance: Well-developed.   Eyes:      General: No scleral icterus.        Right eye: No discharge.   HENT:      Head: Normocephalic and atraumatic.   Neck:      Thyroid: No  thyromegaly.      Lymphadenopathy: No cervical adenopathy.   Pulmonary:      Effort: Pulmonary effort is normal. No respiratory distress.      Breath sounds: Normal breath sounds. No wheezing. No rales.   Cardiovascular:      Normal rate. Irregularly irregular rhythm.      No gallop.    Edema:     Peripheral edema absent.   Abdominal:      General: There is distension.      Tenderness: There is abdominal tenderness.   Skin:     Findings: No erythema or rash.   Neurological:      Mental Status: Alert and oriented to person, place, and time.         Results Review:   Lab Results (last 24 hours)       Procedure Component Value Units Date/Time    POC Glucose Once [[id number]]  (Normal) Collected: 04/11/24 1717    Specimen: Blood Updated: 04/11/24 1721     Glucose 105 mg/dL      Comment: Serial Number: [id number]:  [id number]       Blood Culture - Blood, Arm, Right [[id number]]  (Normal) Collected: 04/08/24 1529    Specimen: Blood from Arm, Right Updated: 04/11/24 1545     Blood Culture No growth at 3 days    Narrative:      Less than seven (7) mL's of blood was collected.  Insufficient quantity may yield false negative results.    Blood Culture - Blood, Arm, Right [[id number]]  (Normal) Collected: 04/08/24 1529    Specimen: Blood from Arm, Right Updated: 04/11/24 1545     Blood Culture No growth at 3 days    Narrative:      Less than seven (7) mL's of blood was collected.  Insufficient quantity may yield false negative results.    POC Glucose Once [[id number]]  (Abnormal) Collected: 04/11/24 1112    Specimen: Blood Updated: 04/11/24 1114     Glucose 106 mg/dL      Comment: Serial Number: [id number]:  [id number]       Wound Culture - Swab, Abdominal Wall [[id number]]  (Abnormal)  (Susceptibility) Collected: 04/08/24 1934    Specimen: Swab from Abdominal Wall Updated: 04/11/24 0709     Wound Culture Rare Streptococcus sanguinis      Scant growth (1+) Normal Skin Jolynn     Gram Stain Few (2+) WBCs per low power  field    Susceptibility        Streptococcus sanguinis      KATIE      Ceftriaxone Susceptible      Penicillin G Intermediate      Vancomycin Susceptible                           POC Glucose Once [[id number]]  (Abnormal) Collected: 04/11/24 0511    Specimen: Blood Updated: 04/11/24 0512     Glucose 112 mg/dL      Comment: Serial Number: [id number]:  [id number]       Phosphorus [[id number]]  (Normal) Collected: 04/11/24 0243    Specimen: Blood from Arm, Right Updated: 04/11/24 0316     Phosphorus 2.7 mg/dL     Magnesium [[id number]]  (Abnormal) Collected: 04/11/24 0243    Specimen: Blood from Arm, Right Updated: 04/11/24 0315     Magnesium 2.7 mg/dL     Comprehensive Metabolic Panel [[id number]]  (Abnormal) Collected: 04/11/24 0243    Specimen: Blood from Arm, Right Updated: 04/11/24 0315     Glucose 127 mg/dL      BUN 23 mg/dL      Creatinine 1.23 mg/dL      Sodium 146 mmol/L      Potassium 3.7 mmol/L      Chloride 113 mmol/L      CO2 19.0 mmol/L      Calcium 8.4 mg/dL      Total Protein 6.0 g/dL      Albumin 3.3 g/dL      ALT (SGPT) 37 U/L      AST (SGOT) 23 U/L      Alkaline Phosphatase 122 U/L      Total Bilirubin 0.3 mg/dL      Globulin 2.7 gm/dL      A/G Ratio 1.2 g/dL      BUN/Creatinine Ratio 18.7     Anion Gap 14.0 mmol/L      eGFR 62.4 mL/min/1.73     Narrative:      GFR Normal >60  Chronic Kidney Disease <60  Kidney Failure <15    The GFR formula is only valid for adults with stable renal function between ages 18 and 70.    CBC & Differential [[id number]]  (Abnormal) Collected: 04/11/24 0243    Specimen: Blood from Arm, Right Updated: 04/11/24 0255    Narrative:      The following orders were created for panel order CBC & Differential.  Procedure                               Abnormality         Status                     ---------                               -----------         ------                     CBC Auto Differential[[id number]]        Abnormal            Final result                 Please view  results for these tests on the individual orders.    CBC Auto Differential [[id number]]  (Abnormal) Collected: 04/11/24 0243    Specimen: Blood from Arm, Right Updated: 04/11/24 0255     WBC 20.36 10*3/mm3      RBC 3.80 10*6/mm3      Hemoglobin 10.3 g/dL      Hematocrit 33.4 %      MCV 87.9 fL      MCH 27.1 pg      MCHC 30.8 g/dL      RDW 17.0 %      RDW-SD 54.1 fl      MPV 11.9 fL      Platelets 393 10*3/mm3      Neutrophil % 68.9 %      Lymphocyte % 22.8 %      Monocyte % 5.7 %      Eosinophil % 1.5 %      Basophil % 0.2 %      Immature Grans % 0.9 %      Neutrophils, Absolute 14.01 10*3/mm3      Lymphocytes, Absolute 4.64 10*3/mm3      Monocytes, Absolute 1.17 10*3/mm3      Eosinophils, Absolute 0.30 10*3/mm3      Basophils, Absolute 0.05 10*3/mm3      Immature Grans, Absolute 0.19 10*3/mm3      nRBC 0.0 /100 WBC     POC Glucose Once [[id number]]  (Abnormal) Collected: 04/10/24 2319    Specimen: Blood Updated: 04/10/24 2320     Glucose 127 mg/dL      Comment: Serial Number: [id number]:  [id number]             Imaging Results (Last 72 Hours)       Procedure Component Value Units Date/Time    XR Abdomen KUB [[id number]] Collected: 04/11/24 0742     Updated: 04/11/24 0747    Narrative:      XR ABDOMEN KUB    Date of Exam: 4/11/2024 7:35 AM EDT    Indication: post-op ileus    Comparison: 4/10/2024    Findings:  3 films. There is an NG tube with its tip terminating in the gastric body. There is increasing moderately severe small bowel dilatation in the mid abdomen with small bowel loops measuring up to 5.3 cm transversely. There is some contrast in the colon   which is nondilated. There are cholecystectomy clips. There is a surgical drain in the pelvis.    Impression:      Impression:  Increasing small bowel dilatation. However, contrast in the colon is against the diagnosis of bowel obstruction. Findings may represent postoperative ileus.      Electronically Signed: Melissa Zeng MD    4/11/2024 7:45 AM EDT     Workstation ID: [id number]    XR Abdomen KUB [[id number]] Collected: 04/10/24 1044     Updated: 04/10/24 1049    Narrative:      XR ABDOMEN KUB    Date of Exam: 4/10/2024 10:18 AM EDT    Indication: NGT    Comparison: CT 4/8/2024 and prior studies    Findings:  NG tube is seen extending to just below the diaphragm. [place] appears to be at or just below the level of the GE junction.    Mildly distended loops of small bowel are noted. Structures are unremarkable      Impression:      Impression:  NG tube projects just below the diaphragm in the expected position of the body of the stomach. Advancement no additional 5 cm could be considered when clinically feasible      Electronically Signed: Loco Mensah MD    4/10/2024 10:47 AM EDT    Workstation ID: [id number]          ECG/EMG Results (most recent)       Procedure Component Value Units Date/Time    ECG 12 Lead Tachycardia [[id number]] Collected: 04/08/24 1649     Updated: 04/09/24 0730     QT Interval 314 ms      QTC Interval 518 ms     Narrative:      HEART RATE= 164  bpm  RR Interval= 367  ms  WY Interval=   ms  P Horizontal Axis=   deg  P Front Axis=   deg  QRSD Interval= 108  ms  QT Interval= 314  ms  QTcB= 518  ms  QRS Axis= 20  deg  T Wave Axis= 205  deg  - ABNORMAL ECG -  Atrial fibrillation with rapid V-rate  Incomplete left bundle branch block  Low voltage, extremity leads  The appearance of BBB may be rate related  When compared with ECG of 27-Mar-2024 11:50:13,  Significant change in rhythm  Electronically Signed By: Thiago Solomon (REDD) 09-Apr-2024 07:30:05  Date and Time of Study: 2024-04-08 16:49:10    Telemetry Scan [[id number]] Resulted: 04/08/24     Updated: 04/10/24 0613    Telemetry Scan [[id number]] Resulted: 04/08/24     Updated: 04/10/24 0640    Telemetry Scan [[id number]] Resulted: 04/08/24     Updated: 04/10/24 0959    Telemetry Scan [[id number]] Resulted: 04/08/24     Updated: 04/10/24 1126    Telemetry Scan [[id number]] Resulted: 04/08/24      Updated: 04/10/24 1126    Telemetry Scan [[id number]] Resulted: 04/08/24     Updated: 04/11/24 0912    Telemetry Scan [[id number]] Resulted: 04/08/24     Updated: 04/11/24 0935    Telemetry Scan [[id number]] Resulted: 04/08/24     Updated: 04/11/24 0955    Telemetry Scan [[id number]] Resulted: 04/08/24     Updated: 04/11/24 1119    Telemetry Scan [[id number]] Resulted: 04/08/24     Updated: 04/11/24 1150    Telemetry Scan [[id number]] Resulted: 04/08/24     Updated: 04/11/24 1333          CBC    Results from last 7 days   Lab Units 04/11/24  0243 04/10/24  0142 04/08/24  1529   WBC 10*3/mm3 20.36* 21.35* 19.89*   HEMOGLOBIN g/dL 10.3* 10.7* 12.6*   PLATELETS 10*3/mm3 393 395 409     BMP   Results from last 7 days   Lab Units 04/11/24  0243 04/10/24  0142 04/09/24  1201 04/09/24  0506 04/08/24  1529   SODIUM mmol/L 146* 141  --  140 140   POTASSIUM mmol/L 3.7 3.6 3.1* 2.5* 2.5*   CHLORIDE mmol/L 113* 110*  --  107 104   CO2 mmol/L 19.0* 19.0*  --  18.0* 18.0*   BUN mg/dL 23 17  --  18 19   CREATININE mg/dL 1.23 1.15  --  1.14 1.43*   GLUCOSE mg/dL 127* 139*  --  118* 136*   MAGNESIUM mg/dL 2.7* 2.8*  --   --  2.4   PHOSPHORUS mg/dL 2.7 3.9  --   --   --      CMP   Results from last 7 days   Lab Units 04/11/24  0243 04/10/24  0142 04/09/24  1201 04/09/24  0506 04/08/24  1529   SODIUM mmol/L 146* 141  --  140 140   POTASSIUM mmol/L 3.7 3.6 3.1* 2.5* 2.5*   CHLORIDE mmol/L 113* 110*  --  107 104   CO2 mmol/L 19.0* 19.0*  --  18.0* 18.0*   BUN mg/dL 23 17  --  18 19   CREATININE mg/dL 1.23 1.15  --  1.14 1.43*   GLUCOSE mg/dL 127* 139*  --  118* 136*   ALBUMIN g/dL 3.3* 3.4*  --   --  3.7   BILIRUBIN mg/dL 0.3 0.3  --   --  0.5   ALK PHOS U/L 122* 128*  --   --  135*   AST (SGOT) U/L 23 37  --   --  67*   ALT (SGPT) U/L 37 54*  --   --  63*   AMYLASE U/L  --   --   --  56  --    LIPASE U/L  --   --   --   --  38     Cardiac Studies:  Echo- Results for orders placed during the hospital encounter of 03/25/24    Adult  Transthoracic Echo Complete W/ Cont if Necessary Per Protocol    Interpretation Summary    Left ventricular systolic function is low normal. Calculated left ventricular EF = 46% Left ventricular ejection fraction appears to be 46 - 50%.    The left ventricular cavity is mildly dilated.    Left ventricular diastolic dysfunction is noted.    The left atrial cavity is dilated.    Estimated right ventricular systolic pressure from tricuspid regurgitation is normal (<35 mmHg).    Dilation of the aortic root is present.  4.4 cm    Patient in atrial fibrillation during this study.    Stress Myoview-  Cath-      Medication Review:   Scheduled Meds:acetaminophen, 1,000 mg, Oral, Q6H  aspirin, 81 mg, Oral, Daily  atorvastatin, 20 mg, Oral, Nightly  cefTRIAXone, 2,000 mg, Intravenous, Q24H  enoxaparin, 1 mg/kg, Subcutaneous, Q12H  insulin lispro, 2-7 Units, Subcutaneous, Q6H  metoclopramide, 10 mg, Intravenous, Q8H  metoprolol tartrate, 5 mg, Intravenous, Q6H  metroNIDAZOLE, 500 mg, Intravenous, Q8H  pantoprazole, 40 mg, Intravenous, BID AC  Scopolamine, 1 patch, Transdermal, Q72H  sertraline, 25 mg, Oral, Daily  sodium chloride, 10 mL, Intravenous, Q12H      Continuous Infusions:dilTIAZem, 5-15 mg/hr, Last Rate: 10 mg/hr (04/11/24 1324)  lactated ringers, 50 mL/hr, Last Rate: 50 mL/hr (04/11/24 1543)      PRN Meds:.  ALPRAZolam    senna-docusate sodium **AND** polyethylene glycol **AND** bisacodyl **AND** bisacodyl    Calcium Replacement - Follow Nurse / BPA Driven Protocol    dextrose    dextrose    glucagon (human recombinant)    HYDROmorphone **AND** naloxone    Magnesium Standard Dose Replacement - Follow Nurse / BPA Driven Protocol    melatonin    Morphine    nitroglycerin    ondansetron    ondansetron ODT    oxyCODONE    Phosphorus Replacement - Follow Nurse / BPA Driven Protocol    Potassium Replacement - Follow Nurse / BPA Driven Protocol    simethicone    [COMPLETED] Insert Peripheral IV **AND** sodium chloride     sodium chloride    sodium chloride      Assessment & Plan     Wound infection    Anxiety disorder    Essential hypertension    Palpitations    Mixed hyperlipidemia    Colon cancer    Atrial fibrillation with rapid ventricular response    MDM:    1.  Persistent atrial fibrillation:    Patient is having persistent atrial fibrillation.  Patient cannot take p.o. medication at present I would recommend continue intravenous amiodarone.  Patient is on Cardizem    2.  Hypertension:    Blood pressure is controlled    3.  S/p laparotomy:    Patient has ileus and has NG tube.  Patient is not given any p.o. medication.    4.  Carcinoma of colon:    Patient underwent resection.  Patient would need chemotherapy    Abimael Greenberg MD  04/11/24  19:05 EDT

## 2024-04-11 NOTE — PROGRESS NOTES
Infectious Diseases Progress Note      LOS: 3 days   Patient Care Team:  Gera Manriquez MD as PCP - General (Internal Medicine)  Gera Manriquez MD as PCP - Family Medicine    Chief Complaint: Incisional infection    Subjective       The patient has been afebrile for the last 24 hours.  The patient is on room air, hemodynamically stable, and is tolerating antimicrobial therapy.  Patient is in the chair and feeling much better today      Review of Systems:   Review of Systems   Constitutional: Negative.    HENT: Negative.     Eyes: Negative.    Respiratory: Negative.     Cardiovascular: Negative.    Gastrointestinal: Negative.    Endocrine: Negative.    Genitourinary: Negative.    Musculoskeletal: Negative.    Skin:  Positive for wound.   Neurological: Negative.    Psychiatric/Behavioral: Negative.     All other systems reviewed and are negative.       Objective     Vital Signs  Temp:  [97.4 °F (36.3 °C)-98.2 °F (36.8 °C)] 97.8 °F (36.6 °C)  Heart Rate:  [] 107  Resp:  [20-29] 23  BP: (111-147)/() 131/73    Physical Exam:  Physical Exam  Vitals and nursing note reviewed.   Constitutional:       General: He is not in acute distress.     Appearance: Normal appearance. He is well-developed and normal weight. He is not diaphoretic.   HENT:      Head: Normocephalic and atraumatic.   Eyes:      Conjunctiva/sclera: Conjunctivae normal.      Pupils: Pupils are equal, round, and reactive to light.   Cardiovascular:      Rate and Rhythm: Normal rate and regular rhythm.      Heart sounds: Normal heart sounds, S1 normal and S2 normal.   Pulmonary:      Effort: Pulmonary effort is normal. No respiratory distress.      Breath sounds: Normal breath sounds. No stridor. No wheezing or rales.   Abdominal:      General: Bowel sounds are normal. There is no distension.      Palpations: Abdomen is soft. There is no mass.      Tenderness: There is abdominal tenderness. There is no guarding.      Comments: Surgical dressing in  place.    Drain in place    Colostomy    NG tube   Genitourinary:     Comments: Pyle catheter  Musculoskeletal:         General: No deformity. Normal range of motion.      Cervical back: Neck supple.   Skin:     General: Skin is warm and dry.      Coloration: Skin is not pale.      Findings: No erythema or rash.   Neurological:      Mental Status: He is alert and oriented to person, place, and time.      Cranial Nerves: No cranial nerve deficit.   Psychiatric:         Mood and Affect: Mood normal.          Results Review:    I have reviewed all clinical data, test, lab, and imaging results.     Radiology  XR Abdomen KUB    Result Date: 4/11/2024  XR ABDOMEN KUB Date of Exam: 4/11/2024 7:35 AM EDT Indication: post-op ileus Comparison: 4/10/2024 Findings: 3 films. There is an NG tube with its tip terminating in the gastric body. There is increasing moderately severe small bowel dilatation in the mid abdomen with small bowel loops measuring up to 5.3 cm transversely. There is some contrast in the colon which is nondilated. There are cholecystectomy clips. There is a surgical drain in the pelvis.    Impression: Increasing small bowel dilatation. However, contrast in the colon is against the diagnosis of bowel obstruction. Findings may represent postoperative ileus. Electronically Signed: Melissa Zeng MD  4/11/2024 7:45 AM EDT  Workstation ID: [id number]     Cardiology    Laboratory    Results from last 7 days   Lab Units 04/11/24  0243 04/10/24  0142 04/08/24  1529   WBC 10*3/mm3 20.36* 21.35* 19.89*   HEMOGLOBIN g/dL 10.3* 10.7* 12.6*   HEMATOCRIT % 33.4* 33.0* 39.8   PLATELETS 10*3/mm3 393 395 409     Results from last 7 days   Lab Units 04/11/24  0243 04/10/24  0142 04/09/24  1201 04/09/24  0506 04/08/24  1529   SODIUM mmol/L 146* 141  --  140 140   POTASSIUM mmol/L 3.7 3.6 3.1* 2.5* 2.5*   CHLORIDE mmol/L 113* 110*  --  107 104   CO2 mmol/L 19.0* 19.0*  --  18.0* 18.0*   BUN mg/dL 23 17  --  18 19   CREATININE  mg/dL 1.23 1.15  --  1.14 1.43*   GLUCOSE mg/dL 127* 139*  --  118* 136*   ALBUMIN g/dL 3.3* 3.4*  --   --  3.7   BILIRUBIN mg/dL 0.3 0.3  --   --  0.5   ALK PHOS U/L 122* 128*  --   --  135*   AST (SGOT) U/L 23 37  --   --  67*   ALT (SGPT) U/L 37 54*  --   --  63*   AMYLASE U/L  --   --   --  56  --    LIPASE U/L  --   --   --   --  38   CALCIUM mg/dL 8.4* 8.4*  --  8.7 9.7     Results from last 7 days   Lab Units 04/09/24  0506   CK TOTAL U/L 73             Microbiology   Microbiology Results (last 10 days)       Procedure Component Value - Date/Time    Wound Culture - Swab, Abdominal Wall [[id number]]  (Abnormal)  (Susceptibility) Collected: 04/08/24 1934    Lab Status: Final result Specimen: Swab from Abdominal Wall Updated: 04/11/24 0709     Wound Culture Rare Streptococcus sanguinis      Scant growth (1+) Normal Skin Jolynn     Gram Stain Few (2+) WBCs per low power field    Susceptibility        Streptococcus sanguinis      KATIE      Ceftriaxone Susceptible      Penicillin G Intermediate      Vancomycin Susceptible                           Blood Culture - Blood, Arm, Right [[id number]]  (Normal) Collected: 04/08/24 1529    Lab Status: Preliminary result Specimen: Blood from Arm, Right Updated: 04/10/24 1545     Blood Culture No growth at 2 days    Narrative:      Less than seven (7) mL's of blood was collected.  Insufficient quantity may yield false negative results.    Blood Culture - Blood, Arm, Right [[id number]]  (Normal) Collected: 04/08/24 1529    Lab Status: Preliminary result Specimen: Blood from Arm, Right Updated: 04/10/24 1545     Blood Culture No growth at 2 days    Narrative:      Less than seven (7) mL's of blood was collected.  Insufficient quantity may yield false negative results.            Medication Review:       Schedule Meds  acetaminophen, 1,000 mg, Oral, Q6H  aspirin, 81 mg, Oral, Daily  atorvastatin, 20 mg, Oral, Nightly  enoxaparin, 1 mg/kg, Subcutaneous, Q12H  insulin lispro, 2-7  Units, Subcutaneous, Q6H  metoclopramide, 10 mg, Intravenous, Q8H  metoprolol tartrate, 5 mg, Intravenous, Q6H  pantoprazole, 40 mg, Intravenous, BID AC  piperacillin-tazobactam, 3.375 g, Intravenous, Q8H  Scopolamine, 1 patch, Transdermal, Q72H  sertraline, 25 mg, Oral, Daily  sodium chloride, 10 mL, Intravenous, Q12H        Infusion Meds  dilTIAZem, 5-15 mg/hr, Last Rate: 10 mg/hr (04/11/24 1324)  lactated ringers, 50 mL/hr, Last Rate: 50 mL/hr (04/10/24 1427)        PRN Meds    ALPRAZolam    senna-docusate sodium **AND** polyethylene glycol **AND** bisacodyl **AND** bisacodyl    Calcium Replacement - Follow Nurse / BPA Driven Protocol    dextrose    dextrose    glucagon (human recombinant)    HYDROmorphone **AND** naloxone    Magnesium Standard Dose Replacement - Follow Nurse / BPA Driven Protocol    melatonin    Morphine    nitroglycerin    ondansetron    ondansetron ODT    oxyCODONE    Phosphorus Replacement - Follow Nurse / BPA Driven Protocol    Potassium Replacement - Follow Nurse / BPA Driven Protocol    simethicone    [COMPLETED] Insert Peripheral IV **AND** sodium chloride    sodium chloride    sodium chloride        Assessment & Plan       Antimicrobial Therapy   1.  IV  ceftriaxone       2.  IV Flagyl        3.  IV Zosyn        4.        5.          Assessment     Infected abdomen wall incision with anastomosis leak.  Wound cultures are growing Streptococcus sanguinus.  Blood cultures are negative so far.  Patient is status post diagnostic laparoscopic diverting colostomy, pelvic washout and drain placement on 4/10/2024-no intra abdomen abscess seen     Reactive leukocytosis secondary to above.  Trending down     S/p resection of colon cancer on March 30, 2024     Diabetes mellitus type 2-A1c is 5.8     Plan     Discontinue IV Zosyn  Start IV Rocephin 2 g every 24 hours for 14 days of treatment from surgery-last day on 4/23/2024  Start IV Flagyl 500 mg every 8 hours-can be switched to p.o. Flagyl at  discharge  Supportive care  A.m. labs  Case was discussed with family at bedside        KAREN Valles  04/11/24  15:34 EDT    Note is dictated utilizing voice recognition software/Dragon

## 2024-04-11 NOTE — PROGRESS NOTES
LOS: 3 days   Admitting Physician- Peyman Castorena MD    Reason For Followup:    Persistent atrial fibrillation  Postsurgery ileus  S/p laparotomy  Hypertension    Subjective     Patient is complaining of mild abdominal discomfort    Objective     Hemodynamics are stable heart rate is slightly elevated patient still in atrial fibrillation    Review of Systems:   Review of Systems   Constitutional: Negative for chills and fever.   HENT:  Negative for ear discharge and nosebleeds.    Eyes:  Negative for discharge and redness.   Cardiovascular:  Negative for chest pain, orthopnea, palpitations, paroxysmal nocturnal dyspnea and syncope.   Respiratory:  Positive for shortness of breath. Negative for cough and wheezing.    Endocrine: Negative for heat intolerance.   Skin:  Negative for rash.   Musculoskeletal:  Negative for arthritis and myalgias.   Gastrointestinal:  Positive for bloating and abdominal pain. Negative for melena, nausea and vomiting.   Genitourinary:  Negative for dysuria and hematuria.   Neurological:  Negative for dizziness, light-headedness, numbness and tremors.   Psychiatric/Behavioral:  Negative for depression. The patient is not nervous/anxious.          Vital Signs  Vitals:    04/11/24 1224 04/11/24 1300 04/11/24 1324 04/11/24 1700   BP: 147/80 131/73 131/73 131/73   BP Location:       Patient Position:    Sitting   Pulse: (!) 139 106 107 105   Resp:  23     Temp:  97.8 °F (36.6 °C)     TempSrc:  Oral     SpO2:    95%   Weight:       Height:         Wt Readings from Last 1 Encounters:   04/11/24 104 kg (230 lb 2.6 oz)       Intake/Output Summary (Last 24 hours) at 4/11/2024 1910  Last data filed at 4/11/2024 1800  Gross per 24 hour   Intake --   Output 5825 ml   Net -5825 ml     Physical Exam:  Constitutional:       Appearance: Well-developed.   Eyes:      General: No scleral icterus.        Right eye: No discharge.   HENT:      Head: Normocephalic and atraumatic.   Neck:      Thyroid: No  thyromegaly.      Lymphadenopathy: No cervical adenopathy.   Pulmonary:      Effort: Pulmonary effort is normal. No respiratory distress.      Breath sounds: Normal breath sounds. No wheezing. No rales.   Cardiovascular:      Normal rate. Irregularly irregular rhythm.      No gallop.    Edema:     Peripheral edema absent.   Abdominal:      General: There is distension.      Tenderness: There is abdominal tenderness.   Skin:     Findings: No erythema or rash.   Neurological:      Mental Status: Alert and oriented to person, place, and time.         Results Review:   Lab Results (last 24 hours)       Procedure Component Value Units Date/Time    POC Glucose Once [[id number]]  (Normal) Collected: 04/11/24 1717    Specimen: Blood Updated: 04/11/24 1721     Glucose 105 mg/dL      Comment: Serial Number: [id number]:  [id number]       Blood Culture - Blood, Arm, Right [[id number]]  (Normal) Collected: 04/08/24 1529    Specimen: Blood from Arm, Right Updated: 04/11/24 1545     Blood Culture No growth at 3 days    Narrative:      Less than seven (7) mL's of blood was collected.  Insufficient quantity may yield false negative results.    Blood Culture - Blood, Arm, Right [[id number]]  (Normal) Collected: 04/08/24 1529    Specimen: Blood from Arm, Right Updated: 04/11/24 1545     Blood Culture No growth at 3 days    Narrative:      Less than seven (7) mL's of blood was collected.  Insufficient quantity may yield false negative results.    POC Glucose Once [[id number]]  (Abnormal) Collected: 04/11/24 1112    Specimen: Blood Updated: 04/11/24 1114     Glucose 106 mg/dL      Comment: Serial Number: [id number]:  [id number]       Wound Culture - Swab, Abdominal Wall [[id number]]  (Abnormal)  (Susceptibility) Collected: 04/08/24 1934    Specimen: Swab from Abdominal Wall Updated: 04/11/24 0709     Wound Culture Rare Streptococcus sanguinis      Scant growth (1+) Normal Skin Jolynn     Gram Stain Few (2+) WBCs per low power  field    Susceptibility        Streptococcus sanguinis      KATIE      Ceftriaxone Susceptible      Penicillin G Intermediate      Vancomycin Susceptible                           POC Glucose Once [[id number]]  (Abnormal) Collected: 04/11/24 0511    Specimen: Blood Updated: 04/11/24 0512     Glucose 112 mg/dL      Comment: Serial Number: [id number]:  [id number]       Phosphorus [[id number]]  (Normal) Collected: 04/11/24 0243    Specimen: Blood from Arm, Right Updated: 04/11/24 0316     Phosphorus 2.7 mg/dL     Magnesium [[id number]]  (Abnormal) Collected: 04/11/24 0243    Specimen: Blood from Arm, Right Updated: 04/11/24 0315     Magnesium 2.7 mg/dL     Comprehensive Metabolic Panel [[id number]]  (Abnormal) Collected: 04/11/24 0243    Specimen: Blood from Arm, Right Updated: 04/11/24 0315     Glucose 127 mg/dL      BUN 23 mg/dL      Creatinine 1.23 mg/dL      Sodium 146 mmol/L      Potassium 3.7 mmol/L      Chloride 113 mmol/L      CO2 19.0 mmol/L      Calcium 8.4 mg/dL      Total Protein 6.0 g/dL      Albumin 3.3 g/dL      ALT (SGPT) 37 U/L      AST (SGOT) 23 U/L      Alkaline Phosphatase 122 U/L      Total Bilirubin 0.3 mg/dL      Globulin 2.7 gm/dL      A/G Ratio 1.2 g/dL      BUN/Creatinine Ratio 18.7     Anion Gap 14.0 mmol/L      eGFR 62.4 mL/min/1.73     Narrative:      GFR Normal >60  Chronic Kidney Disease <60  Kidney Failure <15    The GFR formula is only valid for adults with stable renal function between ages 18 and 70.    CBC & Differential [[id number]]  (Abnormal) Collected: 04/11/24 0243    Specimen: Blood from Arm, Right Updated: 04/11/24 0255    Narrative:      The following orders were created for panel order CBC & Differential.  Procedure                               Abnormality         Status                     ---------                               -----------         ------                     CBC Auto Differential[[id number]]        Abnormal            Final result                 Please view  results for these tests on the individual orders.    CBC Auto Differential [[id number]]  (Abnormal) Collected: 04/11/24 0243    Specimen: Blood from Arm, Right Updated: 04/11/24 0255     WBC 20.36 10*3/mm3      RBC 3.80 10*6/mm3      Hemoglobin 10.3 g/dL      Hematocrit 33.4 %      MCV 87.9 fL      MCH 27.1 pg      MCHC 30.8 g/dL      RDW 17.0 %      RDW-SD 54.1 fl      MPV 11.9 fL      Platelets 393 10*3/mm3      Neutrophil % 68.9 %      Lymphocyte % 22.8 %      Monocyte % 5.7 %      Eosinophil % 1.5 %      Basophil % 0.2 %      Immature Grans % 0.9 %      Neutrophils, Absolute 14.01 10*3/mm3      Lymphocytes, Absolute 4.64 10*3/mm3      Monocytes, Absolute 1.17 10*3/mm3      Eosinophils, Absolute 0.30 10*3/mm3      Basophils, Absolute 0.05 10*3/mm3      Immature Grans, Absolute 0.19 10*3/mm3      nRBC 0.0 /100 WBC     POC Glucose Once [[id number]]  (Abnormal) Collected: 04/10/24 2319    Specimen: Blood Updated: 04/10/24 2320     Glucose 127 mg/dL      Comment: Serial Number: [id number]:  [id number]             Imaging Results (Last 72 Hours)       Procedure Component Value Units Date/Time    XR Abdomen KUB [[id number]] Collected: 04/11/24 0742     Updated: 04/11/24 0747    Narrative:      XR ABDOMEN KUB    Date of Exam: 4/11/2024 7:35 AM EDT    Indication: post-op ileus    Comparison: 4/10/2024    Findings:  3 films. There is an NG tube with its tip terminating in the gastric body. There is increasing moderately severe small bowel dilatation in the mid abdomen with small bowel loops measuring up to 5.3 cm transversely. There is some contrast in the colon   which is nondilated. There are cholecystectomy clips. There is a surgical drain in the pelvis.    Impression:      Impression:  Increasing small bowel dilatation. However, contrast in the colon is against the diagnosis of bowel obstruction. Findings may represent postoperative ileus.      Electronically Signed: Melissa Zeng MD    4/11/2024 7:45 AM EDT     Workstation ID: [id number]    XR Abdomen KUB [[id number]] Collected: 04/10/24 1044     Updated: 04/10/24 1049    Narrative:      XR ABDOMEN KUB    Date of Exam: 4/10/2024 10:18 AM EDT    Indication: NGT    Comparison: CT 4/8/2024 and prior studies    Findings:  NG tube is seen extending to just below the diaphragm. [place] appears to be at or just below the level of the GE junction.    Mildly distended loops of small bowel are noted. Structures are unremarkable      Impression:      Impression:  NG tube projects just below the diaphragm in the expected position of the body of the stomach. Advancement no additional 5 cm could be considered when clinically feasible      Electronically Signed: Loco Mensah MD    4/10/2024 10:47 AM EDT    Workstation ID: [id number]          ECG/EMG Results (most recent)       Procedure Component Value Units Date/Time    ECG 12 Lead Tachycardia [[id number]] Collected: 04/08/24 1649     Updated: 04/09/24 0730     QT Interval 314 ms      QTC Interval 518 ms     Narrative:      HEART RATE= 164  bpm  RR Interval= 367  ms  WA Interval=   ms  P Horizontal Axis=   deg  P Front Axis=   deg  QRSD Interval= 108  ms  QT Interval= 314  ms  QTcB= 518  ms  QRS Axis= 20  deg  T Wave Axis= 205  deg  - ABNORMAL ECG -  Atrial fibrillation with rapid V-rate  Incomplete left bundle branch block  Low voltage, extremity leads  The appearance of BBB may be rate related  When compared with ECG of 27-Mar-2024 11:50:13,  Significant change in rhythm  Electronically Signed By: Thiago Solomon (REDD) 09-Apr-2024 07:30:05  Date and Time of Study: 2024-04-08 16:49:10    Telemetry Scan [[id number]] Resulted: 04/08/24     Updated: 04/10/24 0613    Telemetry Scan [[id number]] Resulted: 04/08/24     Updated: 04/10/24 0640    Telemetry Scan [[id number]] Resulted: 04/08/24     Updated: 04/10/24 0959    Telemetry Scan [[id number]] Resulted: 04/08/24     Updated: 04/10/24 1126    Telemetry Scan [[id number]] Resulted: 04/08/24      Updated: 04/10/24 1126    Telemetry Scan [[id number]] Resulted: 04/08/24     Updated: 04/11/24 0912    Telemetry Scan [[id number]] Resulted: 04/08/24     Updated: 04/11/24 0935    Telemetry Scan [[id number]] Resulted: 04/08/24     Updated: 04/11/24 0955    Telemetry Scan [[id number]] Resulted: 04/08/24     Updated: 04/11/24 1119    Telemetry Scan [[id number]] Resulted: 04/08/24     Updated: 04/11/24 1150    Telemetry Scan [[id number]] Resulted: 04/08/24     Updated: 04/11/24 1333          CBC    Results from last 7 days   Lab Units 04/11/24  0243 04/10/24  0142 04/08/24  1529   WBC 10*3/mm3 20.36* 21.35* 19.89*   HEMOGLOBIN g/dL 10.3* 10.7* 12.6*   PLATELETS 10*3/mm3 393 395 409     BMP   Results from last 7 days   Lab Units 04/11/24  0243 04/10/24  0142 04/09/24  1201 04/09/24  0506 04/08/24  1529   SODIUM mmol/L 146* 141  --  140 140   POTASSIUM mmol/L 3.7 3.6 3.1* 2.5* 2.5*   CHLORIDE mmol/L 113* 110*  --  107 104   CO2 mmol/L 19.0* 19.0*  --  18.0* 18.0*   BUN mg/dL 23 17  --  18 19   CREATININE mg/dL 1.23 1.15  --  1.14 1.43*   GLUCOSE mg/dL 127* 139*  --  118* 136*   MAGNESIUM mg/dL 2.7* 2.8*  --   --  2.4   PHOSPHORUS mg/dL 2.7 3.9  --   --   --      CMP   Results from last 7 days   Lab Units 04/11/24  0243 04/10/24  0142 04/09/24  1201 04/09/24  0506 04/08/24  1529   SODIUM mmol/L 146* 141  --  140 140   POTASSIUM mmol/L 3.7 3.6 3.1* 2.5* 2.5*   CHLORIDE mmol/L 113* 110*  --  107 104   CO2 mmol/L 19.0* 19.0*  --  18.0* 18.0*   BUN mg/dL 23 17  --  18 19   CREATININE mg/dL 1.23 1.15  --  1.14 1.43*   GLUCOSE mg/dL 127* 139*  --  118* 136*   ALBUMIN g/dL 3.3* 3.4*  --   --  3.7   BILIRUBIN mg/dL 0.3 0.3  --   --  0.5   ALK PHOS U/L 122* 128*  --   --  135*   AST (SGOT) U/L 23 37  --   --  67*   ALT (SGPT) U/L 37 54*  --   --  63*   AMYLASE U/L  --   --   --  56  --    LIPASE U/L  --   --   --   --  38     Cardiac Studies:  Echo- Results for orders placed during the hospital encounter of 03/25/24    Adult  Transthoracic Echo Complete W/ Cont if Necessary Per Protocol    Interpretation Summary    Left ventricular systolic function is low normal. Calculated left ventricular EF = 46% Left ventricular ejection fraction appears to be 46 - 50%.    The left ventricular cavity is mildly dilated.    Left ventricular diastolic dysfunction is noted.    The left atrial cavity is dilated.    Estimated right ventricular systolic pressure from tricuspid regurgitation is normal (<35 mmHg).    Dilation of the aortic root is present.  4.4 cm    Patient in atrial fibrillation during this study.    Stress Myoview-  Cath-      Medication Review:   Scheduled Meds:acetaminophen, 1,000 mg, Oral, Q6H  aspirin, 81 mg, Oral, Daily  atorvastatin, 20 mg, Oral, Nightly  cefTRIAXone, 2,000 mg, Intravenous, Q24H  enoxaparin, 1 mg/kg, Subcutaneous, Q12H  insulin lispro, 2-7 Units, Subcutaneous, Q6H  metoclopramide, 10 mg, Intravenous, Q8H  metoprolol tartrate, 5 mg, Intravenous, Q6H  metroNIDAZOLE, 500 mg, Intravenous, Q8H  pantoprazole, 40 mg, Intravenous, BID AC  Scopolamine, 1 patch, Transdermal, Q72H  sertraline, 25 mg, Oral, Daily  sodium chloride, 10 mL, Intravenous, Q12H      Continuous Infusions:dilTIAZem, 5-15 mg/hr, Last Rate: 10 mg/hr (04/11/24 1324)  lactated ringers, 50 mL/hr, Last Rate: 50 mL/hr (04/11/24 1543)      PRN Meds:.  ALPRAZolam    senna-docusate sodium **AND** polyethylene glycol **AND** bisacodyl **AND** bisacodyl    Calcium Replacement - Follow Nurse / BPA Driven Protocol    dextrose    dextrose    glucagon (human recombinant)    HYDROmorphone **AND** naloxone    Magnesium Standard Dose Replacement - Follow Nurse / BPA Driven Protocol    melatonin    Morphine    nitroglycerin    ondansetron    ondansetron ODT    oxyCODONE    Phosphorus Replacement - Follow Nurse / BPA Driven Protocol    Potassium Replacement - Follow Nurse / BPA Driven Protocol    simethicone    [COMPLETED] Insert Peripheral IV **AND** sodium chloride     sodium chloride    sodium chloride      Assessment & Plan     Wound infection    Anxiety disorder    Essential hypertension    Palpitations    Mixed hyperlipidemia    Colon cancer    Atrial fibrillation with rapid ventricular response    MDM:    1.  Persistent atrial fibrillation:    Patient heart rate is elevated I would continue Cardizem and give bolus of Cardizem 10 mg daily.  Lopressor will be continued.  Digoxin bolus will be given.    2.  Hypertension:    Blood pressure is desirable    3.  S/p laparotomy:    Patient still n.p.o. patient has ileus patient has NG tube    4.  Carcinoma of colon:    Patient underwent resection.  Patient would need chemotherapy    Abimael Greenberg MD  04/11/24  19:10 EDT

## 2024-04-11 NOTE — PLAN OF CARE
Goal Outcome Evaluation:              Outcome Evaluation: NG tube remains in place. advanced diet to clear liquids and okay to give po meds per surgery. pt remains on cardizem gtt. no complaints on shift.

## 2024-04-11 NOTE — NURSING NOTE
WOCN note:    73 yr old male admitted 4/8/24 from Dr. Burden's office for possible wound infection following surgery last week to remove a mass on his colon. Patient underwent a laparoscopy with end-loop colostomy on 4/9/24. WOCN follow up for ostomy management and education.     Wife and daughter are at the bedside and patient is up in the chair. All are well engaged in teaching process and asked appropriate questions. Patient still has NGT in place.     LUQ pouch was removed with a small amount of liquid yellow/brown effluent. The stoma is pink and slightly budded but is retracting on the left edge. The mucocutaneous junction is intact. The skin was cleansed and prepped with skin barrier wipe. There is an area of slight erythema consistent with medical adhesive related skin injury at 4-5:00. An [employer] barrier ring was placed around the stoma and a Clare one piece cut to fit pouch was applied.   Patient and family observed the procedure and were instructed on measuring the stoma, cutting the pouch and frequency of emptying vs changing the pouch. Patient was able to return demonstrate the pouch closure independently. He was instructed on bathing, swimming, diet and pouching options. Patient and family were instructed on crusting process in the event of serina-stomal skin irritation /breakdown. The written materials with pictorial and video instructions were reviewed. We will continue to follow.

## 2024-04-11 NOTE — ANESTHESIA POSTPROCEDURE EVALUATION
Patient: Viktor Atkins    Procedure Summary       Date: 04/09/24 Room / Location: [facility] OR  / [facility] MAIN OR    Anesthesia Start: 1328 Anesthesia Stop: 1625    Procedure: DIAGNOSTIC LAPAROSCOPY, DIVERTING OSTOMY, POSSIBLE REVISION OF ANASTAMOSIS Diagnosis:     Surgeons: Aakash Burden MD Provider: Tomás Hunt MD    Anesthesia Type: general ASA Status: 4            Anesthesia Type: general    Vitals  Vitals Value Taken Time   /79 04/09/24 1800   Temp 97.8 °F (36.6 °C) 04/09/24 1800   Pulse 103 04/09/24 1800   Resp 24 04/09/24 1800   SpO2 94 % 04/09/24 1800           Post Anesthesia Care and Evaluation    Patient location during evaluation: PACU  Patient participation: complete - patient participated  Level of consciousness: awake  Pain scale: See nurse's notes for pain score.  Pain management: adequate    Airway patency: patent  Anesthetic complications: No anesthetic complications  PONV Status: none  Cardiovascular status: acceptable  Respiratory status: acceptable and spontaneous ventilation  Hydration status: acceptable    Comments: Patient seen and examined postoperatively; vital signs stable; SpO2 greater than or equal to 90%; cardiopulmonary status stable; nausea/vomiting adequately controlled; pain adequately controlled; no apparent anesthesia complications; patient discharged from anesthesia care when discharge criteria were met

## 2024-04-11 NOTE — PROGRESS NOTES
[facility] MEDICINE SERVICE  DAILY PROGRESS NOTE    NAME: Viktor Atkins  : 1950  MRN: [id number]      LOS: 3 days     PROVIDER OF SERVICE: Peyman Castorena MD    Chief Complaint: Wound infection    Subjective:     Interval History: Patient feels overall much better today, states that he had significant improvement of his abdominal pain and distention over the last 24 hours.  Overnight there was some concern for possible confusion, however the patient states that he was just attempting to get out of bed and go to the chair, but got tangled up in his telemetry wires and IV pole.  He denies being confused.  He is alert and oriented this morning.    Review of Systems:   Review of Systems  All negative except as above  Objective:     Vital Signs  Temp:  [97.4 °F (36.3 °C)-98.2 °F (36.8 °C)] 97.8 °F (36.6 °C)  Heart Rate:  [] 107  Resp:  [20-29] 23  BP: (111-147)/() 131/73  Flow (L/min):  [2] 2   Body mass index is 32.1 kg/m².    Physical Exam  Physical Exam  General Appearance:  Alert, cooperative, no distress, appears stated age  Head:  Normocephalic, without obvious abnormality, atraumatic  Eyes:  PERRL, conjunctiva/corneas clear, EOM's intact, fundi benign, both eyes  Ears:  Normal TM's and external ear canals, both ears  Nose: Nares normal, septum midline, mucosa normal, no drainage or sinus tenderness, NG tube in place  Throat: Lips, mucosa, and tongue normal; teeth and gums normal  Neck: Supple, symmetrical, trachea midline, no adenopathy, thyroid: not enlarged, symmetric, no tenderness/mass/nodules, no carotid bruit or JVD  Lungs:   Clear to auscultation bilaterally, respirations unlabored  Heart:  Regular rate and rhythm, S1, S2 normal, no murmur, rub or gallop  Abdomen:  Soft, distended, diffusely tender, decreased bowel sounds, colostomy with gas and liquid stool  Extremities: Extremities normal, atraumatic, no cyanosis or edema  Pulses: 2+ and symmetric  Skin: Skin color, texture,  turgor normal, no rashes or lesions  Neurologic: Normal    Scheduled Meds   acetaminophen, 1,000 mg, Oral, Q6H  aspirin, 81 mg, Oral, Daily  atorvastatin, 20 mg, Oral, Nightly  enoxaparin, 1 mg/kg, Subcutaneous, Q12H  insulin lispro, 2-7 Units, Subcutaneous, Q6H  metoclopramide, 10 mg, Intravenous, Q8H  metoprolol tartrate, 5 mg, Intravenous, Q6H  pantoprazole, 40 mg, Intravenous, BID AC  piperacillin-tazobactam, 3.375 g, Intravenous, Q8H  Scopolamine, 1 patch, Transdermal, Q72H  sertraline, 25 mg, Oral, Daily  sodium chloride, 10 mL, Intravenous, Q12H       PRN Meds     ALPRAZolam    senna-docusate sodium **AND** polyethylene glycol **AND** bisacodyl **AND** bisacodyl    Calcium Replacement - Follow Nurse / BPA Driven Protocol    dextrose    dextrose    glucagon (human recombinant)    HYDROmorphone **AND** naloxone    Magnesium Standard Dose Replacement - Follow Nurse / BPA Driven Protocol    melatonin    Morphine    nitroglycerin    ondansetron    ondansetron ODT    oxyCODONE    Phosphorus Replacement - Follow Nurse / BPA Driven Protocol    Potassium Replacement - Follow Nurse / BPA Driven Protocol    simethicone    [COMPLETED] Insert Peripheral IV **AND** sodium chloride    sodium chloride    sodium chloride   Infusions  dilTIAZem, 5-15 mg/hr, Last Rate: 10 mg/hr (04/11/24 1324)  lactated ringers, 50 mL/hr, Last Rate: 50 mL/hr (04/10/24 1427)          Diagnostic Data    Results from last 7 days   Lab Units 04/11/24  0243   WBC 10*3/mm3 20.36*   HEMOGLOBIN g/dL 10.3*   HEMATOCRIT % 33.4*   PLATELETS 10*3/mm3 393   GLUCOSE mg/dL 127*   CREATININE mg/dL 1.23   BUN mg/dL 23   SODIUM mmol/L 146*   POTASSIUM mmol/L 3.7   AST (SGOT) U/L 23   ALT (SGPT) U/L 37   ALK PHOS U/L 122*   BILIRUBIN mg/dL 0.3   ANION GAP mmol/L 14.0       XR Abdomen KUB    Result Date: 4/11/2024  Impression: Increasing small bowel dilatation. However, contrast in the colon is against the diagnosis of bowel obstruction. Findings may represent  postoperative ileus. Electronically Signed: Melissa Zeng MD  4/11/2024 7:45 AM EDT  Workstation ID: [id number]    XR Abdomen KUB    Result Date: 4/10/2024  Impression: NG tube projects just below the diaphragm in the expected position of the body of the stomach. Advancement no additional 5 cm could be considered when clinically feasible Electronically Signed: Loco Mensah MD  4/10/2024 10:47 AM EDT  Workstation ID: [id number]       I reviewed the patient's new clinical results.  I reviewed the patient's new imaging results and agree with the interpretation.    Assessment/Plan:     Active and Resolved Problems  Active Hospital Problems    Diagnosis  POA    **Wound infection [T14.8XXA, L08.9]  Yes    Atrial fibrillation with rapid ventricular response [I48.91]  Unknown    Colon cancer [C18.9]  Yes    Mixed hyperlipidemia [E78.2]  Yes    Anxiety disorder [F41.9]  Yes    Essential hypertension [I10]  Yes    Palpitations [R00.2]  Yes      Resolved Hospital Problems   No resolved problems to display.       Postop abdominal wound infection  -Patient underwent low anterior section of a bleeding rectosigmoid mass on 3/30/2024 with pathology showing invasive moderate differentiated adenocarcinoma  -Returned to this hospital for worsening abdominal pain and wound with CT abdomen pelvis indicating presacral fluid collection 2 x 3.2 x 4 cm, concerning for abdominal wall abscess  -Patient underwent diagnostic laparoscopy with pelvic drain placement and laparoscopic and loop colostomy and pulse lavage of incision wound on 4/9  -Continue IV antibiotics with Zosyn  -Wound cultures growing streptococcal species  -Follow-up surgical and blood cultures  -Patient may have some postoperative ileus and NG tube was placed to low suction with significant output  -Advance to clear liquid diet today but maintain NG tube until patient tolerating adequate amount of p.o. intake    Rectosigmoid adenocarcinoma  -As above, patient underwent  resection of rectosigmoid mass on 3/30 with pathology consistent with invasive moderately differentiated adenocarcinoma, pT3N2a  -Oncology consult appreciated  -Patient will need initiation of treatment postdischarge once he is medically stable     A-fib with RVR  -Patient currently on maximum dose Cardizem  -Started on IV metoprolol and increased dose to 5 mg every 6 hours  -Once patient is able to tolerate adequate amount of p.o., resume home oral doses of metoprolol and Cardizem     Hypertension  -Continue IV metoprolol as above, holding losartan until patient is able to tolerate p.o.     Dyslipidemia  -Continue statin therapy once patient able to tolerate p.o.     DM type II, controlled  -Continue sliding scale insulin     Acute hypokalemia  -Repleted    DVT prophylaxis:  Medical and mechanical DVT prophylaxis orders are present.         Code status is   Code Status and Medical Interventions:   Ordered at: 04/09/24 1912     Level Of Support Discussed With:    Patient     Code Status (Patient has no pulse and is not breathing):    CPR (Attempt to Resuscitate)     Medical Interventions (Patient has pulse or is breathing):    Full       Plan for disposition: Pending clinical course.  Potential DC in 2 to 3 days.    Time: 30 minutes    Signature: Electronically signed by Peyman Castorena MD, 04/11/24, 15:12 EDT.  [facility], [place] Hospitalist Team

## 2024-04-12 LAB
ALBUMIN SERPL-MCNC: 3.4 G/DL (ref 3.5–5.2)
ALBUMIN/GLOB SERPL: 1.2 G/DL
ALP SERPL-CCNC: 114 U/L (ref 39–117)
ALT SERPL W P-5'-P-CCNC: 33 U/L (ref 1–41)
ANION GAP SERPL CALCULATED.3IONS-SCNC: 16 MMOL/L (ref 5–15)
AST SERPL-CCNC: 27 U/L (ref 1–40)
BACTERIA SPEC ANAEROBE CULT: ABNORMAL
BASOPHILS # BLD AUTO: 0.05 10*3/MM3 (ref 0–0.2)
BASOPHILS NFR BLD AUTO: 0.3 % (ref 0–1.5)
BILIRUB SERPL-MCNC: 0.2 MG/DL (ref 0–1.2)
BUN SERPL-MCNC: 15 MG/DL (ref 8–23)
BUN/CREAT SERPL: 16.7 (ref 7–25)
CALCIUM SPEC-SCNC: 8.3 MG/DL (ref 8.6–10.5)
CHLORIDE SERPL-SCNC: 115 MMOL/L (ref 98–107)
CO2 SERPL-SCNC: 19 MMOL/L (ref 22–29)
CREAT SERPL-MCNC: 0.9 MG/DL (ref 0.76–1.27)
DEPRECATED RDW RBC AUTO: 54.5 FL (ref 37–54)
EGFRCR SERPLBLD CKD-EPI 2021: 90.7 ML/MIN/1.73
EOSINOPHIL # BLD AUTO: 0.48 10*3/MM3 (ref 0–0.4)
EOSINOPHIL NFR BLD AUTO: 2.8 % (ref 0.3–6.2)
ERYTHROCYTE [DISTWIDTH] IN BLOOD BY AUTOMATED COUNT: 17.2 % (ref 12.3–15.4)
GLOBULIN UR ELPH-MCNC: 2.8 GM/DL
GLUCOSE BLDC GLUCOMTR-MCNC: 106 MG/DL (ref 70–105)
GLUCOSE BLDC GLUCOMTR-MCNC: 117 MG/DL (ref 70–105)
GLUCOSE BLDC GLUCOMTR-MCNC: 124 MG/DL (ref 70–105)
GLUCOSE BLDC GLUCOMTR-MCNC: 150 MG/DL (ref 70–105)
GLUCOSE SERPL-MCNC: 97 MG/DL (ref 65–99)
HCT VFR BLD AUTO: 33.3 % (ref 37.5–51)
HGB BLD-MCNC: 10.4 G/DL (ref 13–17.7)
IMM GRANULOCYTES # BLD AUTO: 0.12 10*3/MM3 (ref 0–0.05)
IMM GRANULOCYTES NFR BLD AUTO: 0.7 % (ref 0–0.5)
LYMPHOCYTES # BLD AUTO: 4.37 10*3/MM3 (ref 0.7–3.1)
LYMPHOCYTES NFR BLD AUTO: 25.4 % (ref 19.6–45.3)
MAGNESIUM SERPL-MCNC: 2.4 MG/DL (ref 1.6–2.4)
MCH RBC QN AUTO: 27.5 PG (ref 26.6–33)
MCHC RBC AUTO-ENTMCNC: 31.2 G/DL (ref 31.5–35.7)
MCV RBC AUTO: 88.1 FL (ref 79–97)
MONOCYTES # BLD AUTO: 1.01 10*3/MM3 (ref 0.1–0.9)
MONOCYTES NFR BLD AUTO: 5.9 % (ref 5–12)
NEUTROPHILS NFR BLD AUTO: 11.16 10*3/MM3 (ref 1.7–7)
NEUTROPHILS NFR BLD AUTO: 64.9 % (ref 42.7–76)
NRBC BLD AUTO-RTO: 0 /100 WBC (ref 0–0.2)
PHOSPHATE SERPL-MCNC: 2.3 MG/DL (ref 2.5–4.5)
PLATELET # BLD AUTO: 372 10*3/MM3 (ref 140–450)
PMV BLD AUTO: 11.9 FL (ref 6–12)
POTASSIUM SERPL-SCNC: 3.9 MMOL/L (ref 3.5–5.2)
PROT SERPL-MCNC: 6.2 G/DL (ref 6–8.5)
RBC # BLD AUTO: 3.78 10*6/MM3 (ref 4.14–5.8)
SODIUM SERPL-SCNC: 150 MMOL/L (ref 136–145)
WBC NRBC COR # BLD AUTO: 17.19 10*3/MM3 (ref 3.4–10.8)

## 2024-04-12 PROCEDURE — 25010000002 CEFTRIAXONE PER 250 MG: Performed by: NURSE PRACTITIONER

## 2024-04-12 PROCEDURE — 63710000001 INSULIN LISPRO (HUMAN) PER 5 UNITS: Performed by: STUDENT IN AN ORGANIZED HEALTH CARE EDUCATION/TRAINING PROGRAM

## 2024-04-12 PROCEDURE — 82948 REAGENT STRIP/BLOOD GLUCOSE: CPT

## 2024-04-12 PROCEDURE — 99024 POSTOP FOLLOW-UP VISIT: CPT | Performed by: STUDENT IN AN ORGANIZED HEALTH CARE EDUCATION/TRAINING PROGRAM

## 2024-04-12 PROCEDURE — 80053 COMPREHEN METABOLIC PANEL: CPT | Performed by: STUDENT IN AN ORGANIZED HEALTH CARE EDUCATION/TRAINING PROGRAM

## 2024-04-12 PROCEDURE — 25010000002 ENOXAPARIN PER 10 MG: Performed by: STUDENT IN AN ORGANIZED HEALTH CARE EDUCATION/TRAINING PROGRAM

## 2024-04-12 PROCEDURE — 25010000002 METOCLOPRAMIDE PER 10 MG: Performed by: INTERNAL MEDICINE

## 2024-04-12 PROCEDURE — 82948 REAGENT STRIP/BLOOD GLUCOSE: CPT | Performed by: STUDENT IN AN ORGANIZED HEALTH CARE EDUCATION/TRAINING PROGRAM

## 2024-04-12 PROCEDURE — 36410 VNPNXR 3YR/> PHY/QHP DX/THER: CPT

## 2024-04-12 PROCEDURE — 83735 ASSAY OF MAGNESIUM: CPT | Performed by: STUDENT IN AN ORGANIZED HEALTH CARE EDUCATION/TRAINING PROGRAM

## 2024-04-12 PROCEDURE — 85025 COMPLETE CBC W/AUTO DIFF WBC: CPT | Performed by: STUDENT IN AN ORGANIZED HEALTH CARE EDUCATION/TRAINING PROGRAM

## 2024-04-12 PROCEDURE — 84100 ASSAY OF PHOSPHORUS: CPT | Performed by: STUDENT IN AN ORGANIZED HEALTH CARE EDUCATION/TRAINING PROGRAM

## 2024-04-12 PROCEDURE — 99232 SBSQ HOSP IP/OBS MODERATE 35: CPT | Performed by: INTERNAL MEDICINE

## 2024-04-12 PROCEDURE — 25010000002 METRONIDAZOLE 500 MG/100ML SOLUTION: Performed by: NURSE PRACTITIONER

## 2024-04-12 PROCEDURE — C1751 CATH, INF, PER/CENT/MIDLINE: HCPCS

## 2024-04-12 PROCEDURE — 97530 THERAPEUTIC ACTIVITIES: CPT

## 2024-04-12 PROCEDURE — 0 DEXTROSE 5 % SOLUTION: Performed by: INTERNAL MEDICINE

## 2024-04-12 RX ORDER — METOCLOPRAMIDE 10 MG/1
10 TABLET ORAL
Status: DISCONTINUED | OUTPATIENT
Start: 2024-04-12 | End: 2024-04-14

## 2024-04-12 RX ORDER — SODIUM CHLORIDE 0.9 % (FLUSH) 0.9 %
10 SYRINGE (ML) INJECTION AS NEEDED
Status: DISCONTINUED | OUTPATIENT
Start: 2024-04-12 | End: 2024-04-19 | Stop reason: HOSPADM

## 2024-04-12 RX ORDER — DEXTROSE MONOHYDRATE 50 MG/ML
100 INJECTION, SOLUTION INTRAVENOUS CONTINUOUS
Status: DISCONTINUED | OUTPATIENT
Start: 2024-04-12 | End: 2024-04-16

## 2024-04-12 RX ORDER — SODIUM CHLORIDE 0.9 % (FLUSH) 0.9 %
20 SYRINGE (ML) INJECTION AS NEEDED
Status: DISCONTINUED | OUTPATIENT
Start: 2024-04-12 | End: 2024-04-19 | Stop reason: HOSPADM

## 2024-04-12 RX ORDER — METOPROLOL TARTRATE 50 MG/1
50 TABLET, FILM COATED ORAL EVERY 8 HOURS
Status: DISCONTINUED | OUTPATIENT
Start: 2024-04-13 | End: 2024-04-15

## 2024-04-12 RX ORDER — LIDOCAINE HYDROCHLORIDE 10 MG/ML
20 INJECTION, SOLUTION INFILTRATION; PERINEURAL ONCE
Status: DISCONTINUED | OUTPATIENT
Start: 2024-04-12 | End: 2024-04-19 | Stop reason: HOSPADM

## 2024-04-12 RX ORDER — SODIUM CHLORIDE 0.9 % (FLUSH) 0.9 %
10 SYRINGE (ML) INJECTION EVERY 12 HOURS SCHEDULED
Status: DISCONTINUED | OUTPATIENT
Start: 2024-04-12 | End: 2024-04-19 | Stop reason: HOSPADM

## 2024-04-12 RX ORDER — PANTOPRAZOLE SODIUM 40 MG/1
40 TABLET, DELAYED RELEASE ORAL
Status: DISCONTINUED | OUTPATIENT
Start: 2024-04-12 | End: 2024-04-14

## 2024-04-12 RX ADMIN — Medication 10 ML: at 20:57

## 2024-04-12 RX ADMIN — PANTOPRAZOLE SODIUM 40 MG: 40 INJECTION, POWDER, FOR SOLUTION INTRAVENOUS at 06:30

## 2024-04-12 RX ADMIN — METOPROLOL TARTRATE 25 MG: 25 TABLET, FILM COATED ORAL at 09:52

## 2024-04-12 RX ADMIN — METRONIDAZOLE 500 MG: 500 INJECTION, SOLUTION INTRAVENOUS at 17:03

## 2024-04-12 RX ADMIN — DEXTROSE MONOHYDRATE 100 ML/HR: 50 INJECTION, SOLUTION INTRAVENOUS at 13:59

## 2024-04-12 RX ADMIN — Medication 5 MG: at 21:51

## 2024-04-12 RX ADMIN — METOPROLOL TARTRATE 5 MG: 1 INJECTION, SOLUTION INTRAVENOUS at 00:04

## 2024-04-12 RX ADMIN — ENOXAPARIN SODIUM 100 MG: 100 INJECTION SUBCUTANEOUS at 12:46

## 2024-04-12 RX ADMIN — METOCLOPRAMIDE 10 MG: 10 TABLET ORAL at 17:02

## 2024-04-12 RX ADMIN — METOPROLOL TARTRATE 5 MG: 1 INJECTION, SOLUTION INTRAVENOUS at 05:28

## 2024-04-12 RX ADMIN — SERTRALINE HYDROCHLORIDE 25 MG: 25 TABLET ORAL at 09:52

## 2024-04-12 RX ADMIN — ENOXAPARIN SODIUM 100 MG: 100 INJECTION SUBCUTANEOUS at 00:03

## 2024-04-12 RX ADMIN — ACETAMINOPHEN 1000 MG: 500 TABLET, FILM COATED ORAL at 00:03

## 2024-04-12 RX ADMIN — INSULIN LISPRO 2 UNITS: 100 INJECTION, SOLUTION INTRAVENOUS; SUBCUTANEOUS at 18:08

## 2024-04-12 RX ADMIN — CEFTRIAXONE SODIUM 2000 MG: 2 INJECTION, POWDER, FOR SOLUTION INTRAMUSCULAR; INTRAVENOUS at 17:02

## 2024-04-12 RX ADMIN — METOPROLOL TARTRATE 25 MG: 25 TABLET, FILM COATED ORAL at 17:02

## 2024-04-12 RX ADMIN — ATORVASTATIN CALCIUM 20 MG: 20 TABLET, FILM COATED ORAL at 20:57

## 2024-04-12 RX ADMIN — ASPIRIN 81 MG CHEWABLE TABLET 81 MG: 81 TABLET CHEWABLE at 09:52

## 2024-04-12 RX ADMIN — ACETAMINOPHEN 1000 MG: 500 TABLET, FILM COATED ORAL at 04:31

## 2024-04-12 RX ADMIN — METOCLOPRAMIDE HYDROCHLORIDE 10 MG: 5 INJECTION INTRAMUSCULAR; INTRAVENOUS at 04:31

## 2024-04-12 RX ADMIN — METRONIDAZOLE 500 MG: 500 INJECTION, SOLUTION INTRAVENOUS at 09:53

## 2024-04-12 RX ADMIN — METRONIDAZOLE 500 MG: 500 INJECTION, SOLUTION INTRAVENOUS at 01:30

## 2024-04-12 RX ADMIN — PANTOPRAZOLE SODIUM 40 MG: 40 TABLET, DELAYED RELEASE ORAL at 17:02

## 2024-04-12 RX ADMIN — Medication 10 MG/HR: at 15:53

## 2024-04-12 RX ADMIN — Medication 10 MG/HR: at 03:51

## 2024-04-12 RX ADMIN — ACETAMINOPHEN 1000 MG: 500 TABLET, FILM COATED ORAL at 13:59

## 2024-04-12 NOTE — PLAN OF CARE
Goal Outcome Evaluation:  Plan of Care Reviewed With: patient, spouse        Progress: improving          Patient is alert and able to make needs known. Patient has no complaints of pain or discomfort this shift. Patient's presents with bowel sounds throughout, hypoactive, however present in all 4 quads. Small amount of brown liquid stool noted in ostomy appliance. EDUARDO drain remains in place to RLQ with serosanguinous drainage in bulb. NGT remains in place to low wall suction, patient has had large amounts of output, documented in I&O portion of flowsheet, since starting a clear liquid diet. No vomiting noted. Patient has not slept this shift, patient declined prn xanax and or prn melatonin. Patient continues with cardizem gtt this shift with heart rate reaching as high as 140's with cardizem gtt at 10 and scheduled metoprolol iv q 6 hours. No complaints of chest pain or shortness of air, remains in afib.  Wife remains at bedside this shift.  Call light in reach.

## 2024-04-12 NOTE — PLAN OF CARE
Goal Outcome Evaluation:           Progress: no change               Patient is alert however not responding to any orientation questions at this time. Patient has been resting in bed with his eyes open throughout this shift. Non verbal signs and symptoms of pain noted at the start of this writers shift, patient medicated per plan of care and repositioned and turned q 2 hours per patient's current plan of care. Lung sounds diminished with rhonchi noted throughout with tachypnea noted through out the night. No signs or symptoms of shortness of air noted, patient remained on room air with 02 sats at 98%. Call light remains in reach of patient and staff has been rounding on patient frequently.

## 2024-04-12 NOTE — PROGRESS NOTES
LOS: 4 days   Admitting Physician- Hiren Valles MD    Reason For Followup:    Persistent atrial fibrillation  Postsurgery ileus  S/p laparotomy  Hypertension    Subjective     Patient is feeling better    Objective     Patient still in atrial fibrillation    Review of Systems:   Review of Systems   Constitutional: Negative for chills and fever.   HENT:  Negative for ear discharge and nosebleeds.    Eyes:  Negative for discharge and redness.   Cardiovascular:  Negative for chest pain, orthopnea, palpitations, paroxysmal nocturnal dyspnea and syncope.   Respiratory:  Positive for shortness of breath. Negative for cough and wheezing.    Endocrine: Negative for heat intolerance.   Skin:  Negative for rash.   Musculoskeletal:  Negative for arthritis and myalgias.   Gastrointestinal:  Positive for bloating and abdominal pain. Negative for melena, nausea and vomiting.   Genitourinary:  Negative for dysuria and hematuria.   Neurological:  Negative for dizziness, light-headedness, numbness and tremors.   Psychiatric/Behavioral:  Negative for depression. The patient is not nervous/anxious.          Vital Signs  Vitals:    04/12/24 1541 04/12/24 1553 04/12/24 1633 04/12/24 1702   BP:  132/71 135/86 133/73   BP Location:   Right arm    Patient Position:   Sitting    Pulse: 106 (!) 128 102 116   Resp:   21    Temp:   97.5 °F (36.4 °C)    TempSrc:   Oral    SpO2: 95%  94%    Weight:       Height:         Wt Readings from Last 1 Encounters:   04/12/24 104 kg (230 lb 6.1 oz)       Intake/Output Summary (Last 24 hours) at 4/12/2024 1751  Last data filed at 4/12/2024 1633  Gross per 24 hour   Intake 150 ml   Output 5775 ml   Net -5625 ml     Physical Exam:  Constitutional:       Appearance: Well-developed.   Eyes:      General: No scleral icterus.        Right eye: No discharge.   HENT:      Head: Normocephalic and atraumatic.   Neck:      Thyroid: No thyromegaly.      Lymphadenopathy: No cervical adenopathy.   Pulmonary:       Effort: Pulmonary effort is normal. No respiratory distress.      Breath sounds: Normal breath sounds. No wheezing. No rales.   Cardiovascular:      Normal rate. Irregularly irregular rhythm.      No gallop.    Edema:     Peripheral edema absent.   Abdominal:      General: There is distension.      Tenderness: There is abdominal tenderness.   Skin:     Findings: No erythema or rash.   Neurological:      Mental Status: Alert and oriented to person, place, and time.         Results Review:   Lab Results (last 24 hours)       Procedure Component Value Units Date/Time    Blood Culture - Blood, Arm, Right [[id number]]  (Normal) Collected: 04/08/24 1529    Specimen: Blood from Arm, Right Updated: 04/12/24 1545     Blood Culture No growth at 4 days    Narrative:      Less than seven (7) mL's of blood was collected.  Insufficient quantity may yield false negative results.    Blood Culture - Blood, Arm, Right [[id number]]  (Normal) Collected: 04/08/24 1529    Specimen: Blood from Arm, Right Updated: 04/12/24 1545     Blood Culture No growth at 4 days    Narrative:      Less than seven (7) mL's of blood was collected.  Insufficient quantity may yield false negative results.    POC Glucose Q6H [[id number]]  (Abnormal) Collected: 04/12/24 1207    Specimen: Blood Updated: 04/12/24 1209     Glucose 124 mg/dL      Comment: Serial Number: [id number]:  [id number]       Anaerobic Culture - Swab, Abdominal Wall [[id number]]  (Abnormal) Collected: 04/08/24 1934    Specimen: Swab from Abdominal Wall Updated: 04/12/24 0822     Anaerobic Culture Bacteroides fragilis group    POC Glucose Once [[id number]]  (Abnormal) Collected: 04/12/24 0537    Specimen: Blood Updated: 04/12/24 0539     Glucose 106 mg/dL      Comment: Serial Number: [id number]:  [id number]       Comprehensive Metabolic Panel [[id number]]  (Abnormal) Collected: 04/12/24 0400    Specimen: Blood from Arm, Left Updated: 04/12/24 0452     Glucose 97 mg/dL      BUN 15  mg/dL      Creatinine 0.90 mg/dL      Sodium 150 mmol/L      Potassium 3.9 mmol/L      Comment: Slight hemolysis detected by analyzer. Result may be falsely elevated.        Chloride 115 mmol/L      CO2 19.0 mmol/L      Calcium 8.3 mg/dL      Total Protein 6.2 g/dL      Albumin 3.4 g/dL      ALT (SGPT) 33 U/L      AST (SGOT) 27 U/L      Alkaline Phosphatase 114 U/L      Total Bilirubin 0.2 mg/dL      Globulin 2.8 gm/dL      A/G Ratio 1.2 g/dL      BUN/Creatinine Ratio 16.7     Anion Gap 16.0 mmol/L      eGFR 90.7 mL/min/1.73     Narrative:      GFR Normal >60  Chronic Kidney Disease <60  Kidney Failure <15    The GFR formula is only valid for adults with stable renal function between ages 18 and 70.    Magnesium [[id number]]  (Normal) Collected: 04/12/24 0400    Specimen: Blood from Arm, Left Updated: 04/12/24 0452     Magnesium 2.4 mg/dL     Phosphorus [[id number]]  (Abnormal) Collected: 04/12/24 0400    Specimen: Blood from Arm, Left Updated: 04/12/24 0452     Phosphorus 2.3 mg/dL     CBC & Differential [[id number]]  (Abnormal) Collected: 04/12/24 0400    Specimen: Blood from Arm, Left Updated: 04/12/24 0405    Narrative:      The following orders were created for panel order CBC & Differential.  Procedure                               Abnormality         Status                     ---------                               -----------         ------                     CBC Auto Differential[[id number]]        Abnormal            Final result                 Please view results for these tests on the individual orders.    CBC Auto Differential [[id number]]  (Abnormal) Collected: 04/12/24 0400    Specimen: Blood from Arm, Left Updated: 04/12/24 0405     WBC 17.19 10*3/mm3      RBC 3.78 10*6/mm3      Hemoglobin 10.4 g/dL      Hematocrit 33.3 %      MCV 88.1 fL      MCH 27.5 pg      MCHC 31.2 g/dL      RDW 17.2 %      RDW-SD 54.5 fl      MPV 11.9 fL      Platelets 372 10*3/mm3      Neutrophil % 64.9 %      Lymphocyte % 25.4  %      Monocyte % 5.9 %      Eosinophil % 2.8 %      Basophil % 0.3 %      Immature Grans % 0.7 %      Neutrophils, Absolute 11.16 10*3/mm3      Lymphocytes, Absolute 4.37 10*3/mm3      Monocytes, Absolute 1.01 10*3/mm3      Eosinophils, Absolute 0.48 10*3/mm3      Basophils, Absolute 0.05 10*3/mm3      Immature Grans, Absolute 0.12 10*3/mm3      nRBC 0.0 /100 WBC     POC Glucose Once [[id number]]  (Abnormal) Collected: 04/11/24 2332    Specimen: Blood Updated: 04/11/24 2334     Glucose 108 mg/dL      Comment: Serial Number: [id number]:  [id number]             Imaging Results (Last 72 Hours)       Procedure Component Value Units Date/Time    XR Abdomen KUB [[id number]] Collected: 04/11/24 0742     Updated: 04/11/24 0747    Narrative:      XR ABDOMEN KUB    Date of Exam: 4/11/2024 7:35 AM EDT    Indication: post-op ileus    Comparison: 4/10/2024    Findings:  3 films. There is an NG tube with its tip terminating in the gastric body. There is increasing moderately severe small bowel dilatation in the mid abdomen with small bowel loops measuring up to 5.3 cm transversely. There is some contrast in the colon   which is nondilated. There are cholecystectomy clips. There is a surgical drain in the pelvis.    Impression:      Impression:  Increasing small bowel dilatation. However, contrast in the colon is against the diagnosis of bowel obstruction. Findings may represent postoperative ileus.      Electronically Signed: Melissa Zeng MD    4/11/2024 7:45 AM EDT    Workstation ID: [id number]    XR Abdomen KUB [[id number]] Collected: 04/10/24 1044     Updated: 04/10/24 1049    Narrative:      XR ABDOMEN KUB    Date of Exam: 4/10/2024 10:18 AM EDT    Indication: NGT    Comparison: CT 4/8/2024 and prior studies    Findings:  NG tube is seen extending to just below the diaphragm. [place] appears to be at or just below the level of the GE junction.    Mildly distended loops of small bowel are noted. Structures are  unremarkable      Impression:      Impression:  NG tube projects just below the diaphragm in the expected position of the body of the stomach. Advancement no additional 5 cm could be considered when clinically feasible      Electronically Signed: Loco Mensah MD    4/10/2024 10:47 AM EDT    Workstation ID: [id number]          ECG/EMG Results (most recent)       Procedure Component Value Units Date/Time    ECG 12 Lead Tachycardia [[id number]] Collected: 04/08/24 1649     Updated: 04/09/24 0730     QT Interval 314 ms      QTC Interval 518 ms     Narrative:      HEART RATE= 164  bpm  RR Interval= 367  ms  [place] Interval=   ms  P Horizontal Axis=   deg  P Front Axis=   deg  QRSD Interval= 108  ms  QT Interval= 314  ms  QTcB= 518  ms  QRS Axis= 20  deg  T Wave Axis= 205  deg  - ABNORMAL ECG -  Atrial fibrillation with rapid V-rate  Incomplete left bundle branch block  Low voltage, extremity leads  The appearance of BBB may be rate related  When compared with ECG of 27-Mar-2024 11:50:13,  Significant change in rhythm  Electronically Signed By: Thiago Solomon ([facility]) 09-Apr-2024 07:30:05  Date and Time of Study: 2024-04-08 16:49:10    Telemetry Scan [[id number]] Resulted: 04/08/24     Updated: 04/10/24 0613    Telemetry Scan [[id number]] Resulted: 04/08/24     Updated: 04/10/24 0640    Telemetry Scan [[id number]] Resulted: 04/08/24     Updated: 04/10/24 0959    Telemetry Scan [[id number]] Resulted: 04/08/24     Updated: 04/10/24 1126    Telemetry Scan [[id number]] Resulted: 04/08/24     Updated: 04/10/24 1126    Telemetry Scan [[id number]] Resulted: 04/08/24     Updated: 04/11/24 0912    Telemetry Scan [[id number]] Resulted: 04/08/24     Updated: 04/11/24 0935    Telemetry Scan [[id number]] Resulted: 04/08/24     Updated: 04/11/24 0955    Telemetry Scan [[id number]] Resulted: 04/08/24     Updated: 04/11/24 1119    Telemetry Scan [[id number]] Resulted: 04/08/24     Updated: 04/11/24 1150    Telemetry Scan [[id number]] Resulted: 04/08/24      Updated: 04/11/24 1333    Telemetry Scan [[id number]] Resulted: 04/08/24     Updated: 04/12/24 0656    Telemetry Scan [[id number]] Resulted: 04/08/24     Updated: 04/12/24 1249    Telemetry Scan [[id number]] Resulted: 04/08/24     Updated: 04/12/24 1255    Telemetry Scan [[id number]] Resulted: 04/08/24     Updated: 04/12/24 1306    Telemetry Scan [[id number]] Resulted: 04/08/24     Updated: 04/12/24 1403          CBC    Results from last 7 days   Lab Units 04/12/24  0400 04/11/24  0243 04/10/24  0142 04/08/24  1529   WBC 10*3/mm3 17.19* 20.36* 21.35* 19.89*   HEMOGLOBIN g/dL 10.4* 10.3* 10.7* 12.6*   PLATELETS 10*3/mm3 372 393 395 409     BMP   Results from last 7 days   Lab Units 04/12/24  0400 04/11/24  0243 04/10/24  0142 04/09/24  1201 04/09/24  0506 04/08/24  1529   SODIUM mmol/L 150* 146* 141  --  140 140   POTASSIUM mmol/L 3.9 3.7 3.6 3.1* 2.5* 2.5*   CHLORIDE mmol/L 115* 113* 110*  --  107 104   CO2 mmol/L 19.0* 19.0* 19.0*  --  18.0* 18.0*   BUN mg/dL 15 23 17  --  18 19   CREATININE mg/dL 0.90 1.23 1.15  --  1.14 1.43*   GLUCOSE mg/dL 97 127* 139*  --  118* 136*   MAGNESIUM mg/dL 2.4 2.7* 2.8*  --   --  2.4   PHOSPHORUS mg/dL 2.3* 2.7 3.9  --   --   --      CMP   Results from last 7 days   Lab Units 04/12/24  0400 04/11/24  0243 04/10/24  0142 04/09/24  1201 04/09/24  0506 04/08/24  1529   SODIUM mmol/L 150* 146* 141  --  140 140   POTASSIUM mmol/L 3.9 3.7 3.6 3.1* 2.5* 2.5*   CHLORIDE mmol/L 115* 113* 110*  --  107 104   CO2 mmol/L 19.0* 19.0* 19.0*  --  18.0* 18.0*   BUN mg/dL 15 23 17  --  18 19   CREATININE mg/dL 0.90 1.23 1.15  --  1.14 1.43*   GLUCOSE mg/dL 97 127* 139*  --  118* 136*   ALBUMIN g/dL 3.4* 3.3* 3.4*  --   --  3.7   BILIRUBIN mg/dL 0.2 0.3 0.3  --   --  0.5   ALK PHOS U/L 114 122* 128*  --   --  135*   AST (SGOT) U/L 27 23 37  --   --  67*   ALT (SGPT) U/L 33 37 54*  --   --  63*   AMYLASE U/L  --   --   --   --  56  --    LIPASE U/L  --   --   --   --   --  38     Cardiac Studies:  Echo-  Results for orders placed during the hospital encounter of 03/25/24    Adult Transthoracic Echo Complete W/ Cont if Necessary Per Protocol    Interpretation Summary    Left ventricular systolic function is low normal. Calculated left ventricular EF = 46% Left ventricular ejection fraction appears to be 46 - 50%.    The left ventricular cavity is mildly dilated.    Left ventricular diastolic dysfunction is noted.    The left atrial cavity is dilated.    Estimated right ventricular systolic pressure from tricuspid regurgitation is normal (<35 mmHg).    Dilation of the aortic root is present.  4.4 cm    Patient in atrial fibrillation during this study.    Stress Myoview-  Cath-      Medication Review:   Scheduled Meds:acetaminophen, 1,000 mg, Oral, Q6H  aspirin, 81 mg, Oral, Daily  atorvastatin, 20 mg, Oral, Nightly  cefTRIAXone, 2,000 mg, Intravenous, Q24H  enoxaparin, 1 mg/kg, Subcutaneous, Q12H  insulin lispro, 2-7 Units, Subcutaneous, Q6H  lidocaine, 20 mL, Intradermal, Once  metoclopramide, 10 mg, Oral, TID AC  [START ON 4/13/2024] metoprolol tartrate, 50 mg, Oral, Q8H  metroNIDAZOLE, 500 mg, Intravenous, Q8H  pantoprazole, 40 mg, Oral, BID AC  Scopolamine, 1 patch, Transdermal, Q72H  sertraline, 25 mg, Oral, Daily  sodium chloride, 10 mL, Intravenous, Q12H  sodium chloride, 10 mL, Intravenous, Q12H      Continuous Infusions:dextrose, 100 mL/hr, Last Rate: 100 mL/hr (04/12/24 1359)  dilTIAZem, 5-15 mg/hr, Last Rate: 10 mg/hr (04/12/24 1553)      PRN Meds:.  ALPRAZolam    senna-docusate sodium **AND** polyethylene glycol **AND** bisacodyl **AND** bisacodyl    Calcium Replacement - Follow Nurse / BPA Driven Protocol    dextrose    dextrose    glucagon (human recombinant)    HYDROmorphone **AND** naloxone    Magnesium Standard Dose Replacement - Follow Nurse / BPA Driven Protocol    melatonin    Morphine    nitroglycerin    ondansetron    ondansetron ODT    oxyCODONE    Phosphorus Replacement - Follow Nurse / BPA  Driven Protocol    Potassium Replacement - Follow Nurse / BPA Driven Protocol    simethicone    [COMPLETED] Insert Peripheral IV **AND** sodium chloride    sodium chloride    sodium chloride    sodium chloride    sodium chloride      Assessment & Plan     Wound infection    Anxiety disorder    Essential hypertension    Palpitations    Mixed hyperlipidemia    Colon cancer    Atrial fibrillation with rapid ventricular response    MDM:    1.  Persistent atrial fibrillation:    Patient is still in atrial fibrillation and heart rate is elevated continue Cardizem.  I will discontinue intravenous Lopressor and start Lopressor 50 mg every 8.  If needed amiodarone can be added p.o.    2.  Hypertension:    Blood pressure is stable    3.  S/p laparotomy:    Patient still n.p.o. patient has ileus patient has NG tube    4.  Carcinoma of colon:    Patient underwent resection.  Patient would need chemotherapy    Electronically signed by Abimael Greenberg MD, 04/12/24, 5:52 PM EDT.      Abimael Greenberg MD  04/12/24  17:51 EDT

## 2024-04-12 NOTE — PLAN OF CARE
Goal Outcome Evaluation:              Outcome Evaluation: advanced to full liquid diet. no complaints of pain on shift. remains on cardizem gtt and fluids switched to D5W. switched to po metoprolol. possibility to remove ng tube tomorrow.

## 2024-04-12 NOTE — PROGRESS NOTES
Infectious Diseases Progress Note      LOS: 4 days   Patient Care Team:  Gera Manriquez MD as PCP - General (Internal Medicine)  Gera Manriquez MD as PCP - Family Medicine    Chief Complaint: Incisional infection    Subjective       The patient has been afebrile for the last 24 hours.  The patient is on room air, hemodynamically stable, and is tolerating antimicrobial therapy.  No new complaints      Review of Systems:   Review of Systems   Constitutional: Negative.    HENT: Negative.     Eyes: Negative.    Respiratory: Negative.     Cardiovascular: Negative.    Gastrointestinal: Negative.    Endocrine: Negative.    Genitourinary: Negative.    Musculoskeletal: Negative.    Skin:  Positive for wound.   Neurological: Negative.    Psychiatric/Behavioral: Negative.     All other systems reviewed and are negative.       Objective     Vital Signs  Temp:  [97.6 °F (36.4 °C)-98.9 °F (37.2 °C)] 98.9 °F (37.2 °C)  Heart Rate:  [105-145] 128  Resp:  [20-25] 22  BP: (120-149)/(71-93) 132/71    Physical Exam:  Physical Exam  Vitals and nursing note reviewed.   Constitutional:       General: He is not in acute distress.     Appearance: Normal appearance. He is well-developed and normal weight. He is not diaphoretic.   HENT:      Head: Normocephalic and atraumatic.   Eyes:      Conjunctiva/sclera: Conjunctivae normal.      Pupils: Pupils are equal, round, and reactive to light.   Cardiovascular:      Rate and Rhythm: Normal rate and regular rhythm.      Heart sounds: Normal heart sounds, S1 normal and S2 normal.   Pulmonary:      Effort: Pulmonary effort is normal. No respiratory distress.      Breath sounds: Normal breath sounds. No stridor. No wheezing or rales.   Abdominal:      General: Bowel sounds are normal. There is no distension.      Palpations: Abdomen is soft. There is no mass.      Tenderness: There is abdominal tenderness. There is no guarding.      Comments: Surgical dressing in place.    Drain in  place    Colostomy    NG tube   Genitourinary:     Comments: Pyle catheter  Musculoskeletal:         General: No deformity. Normal range of motion.      Cervical back: Neck supple.   Skin:     General: Skin is warm and dry.      Coloration: Skin is not pale.      Findings: No erythema or rash.   Neurological:      Mental Status: He is alert and oriented to person, place, and time.      Cranial Nerves: No cranial nerve deficit.   Psychiatric:         Mood and Affect: Mood normal.          Results Review:    I have reviewed all clinical data, test, lab, and imaging results.     Radiology  No Radiology Exams Resulted Within Past 24 Hours    Cardiology    Laboratory    Results from last 7 days   Lab Units 04/12/24  0400 04/11/24  0243 04/10/24  0142 04/08/24  1529   WBC 10*3/mm3 17.19* 20.36* 21.35* 19.89*   HEMOGLOBIN g/dL 10.4* 10.3* 10.7* 12.6*   HEMATOCRIT % 33.3* 33.4* 33.0* 39.8   PLATELETS 10*3/mm3 372 393 395 409     Results from last 7 days   Lab Units 04/12/24  0400 04/11/24  0243 04/10/24  0142 04/09/24  1201 04/09/24  0506 04/08/24  1529   SODIUM mmol/L 150* 146* 141  --  140 140   POTASSIUM mmol/L 3.9 3.7 3.6 3.1* 2.5* 2.5*   CHLORIDE mmol/L 115* 113* 110*  --  107 104   CO2 mmol/L 19.0* 19.0* 19.0*  --  18.0* 18.0*   BUN mg/dL 15 23 17  --  18 19   CREATININE mg/dL 0.90 1.23 1.15  --  1.14 1.43*   GLUCOSE mg/dL 97 127* 139*  --  118* 136*   ALBUMIN g/dL 3.4* 3.3* 3.4*  --   --  3.7   BILIRUBIN mg/dL 0.2 0.3 0.3  --   --  0.5   ALK PHOS U/L 114 122* 128*  --   --  135*   AST (SGOT) U/L 27 23 37  --   --  67*   ALT (SGPT) U/L 33 37 54*  --   --  63*   AMYLASE U/L  --   --   --   --  56  --    LIPASE U/L  --   --   --   --   --  38   CALCIUM mg/dL 8.3* 8.4* 8.4*  --  8.7 9.7     Results from last 7 days   Lab Units 04/09/24  0506   CK TOTAL U/L 73             Microbiology   Microbiology Results (last 10 days)       Procedure Component Value - Date/Time    Wound Culture - Swab, Abdominal Wall [[id number]]   (Abnormal)  (Susceptibility) Collected: 04/08/24 1934    Lab Status: Final result Specimen: Swab from Abdominal Wall Updated: 04/11/24 0709     Wound Culture Rare Streptococcus sanguinis      Scant growth (1+) Normal Skin Jolynn     Gram Stain Few (2+) WBCs per low power field    Susceptibility        Streptococcus sanguinis      KATIE      Ceftriaxone Susceptible      Penicillin G Intermediate      Vancomycin Susceptible                           Anaerobic Culture - Swab, Abdominal Wall [[id number]]  (Abnormal) Collected: 04/08/24 1934    Lab Status: Final result Specimen: Swab from Abdominal Wall Updated: 04/12/24 0822     Anaerobic Culture Bacteroides fragilis group    Blood Culture - Blood, Arm, Right [[id number]]  (Normal) Collected: 04/08/24 1529    Lab Status: Preliminary result Specimen: Blood from Arm, Right Updated: 04/12/24 1545     Blood Culture No growth at 4 days    Narrative:      Less than seven (7) mL's of blood was collected.  Insufficient quantity may yield false negative results.    Blood Culture - Blood, Arm, Right [[id number]]  (Normal) Collected: 04/08/24 1529    Lab Status: Preliminary result Specimen: Blood from Arm, Right Updated: 04/12/24 1545     Blood Culture No growth at 4 days    Narrative:      Less than seven (7) mL's of blood was collected.  Insufficient quantity may yield false negative results.            Medication Review:       Schedule Meds  acetaminophen, 1,000 mg, Oral, Q6H  aspirin, 81 mg, Oral, Daily  atorvastatin, 20 mg, Oral, Nightly  cefTRIAXone, 2,000 mg, Intravenous, Q24H  enoxaparin, 1 mg/kg, Subcutaneous, Q12H  insulin lispro, 2-7 Units, Subcutaneous, Q6H  lidocaine, 20 mL, Intradermal, Once  metoclopramide, 10 mg, Oral, TID AC  metoprolol tartrate, 25 mg, Oral, Q8H  metroNIDAZOLE, 500 mg, Intravenous, Q8H  pantoprazole, 40 mg, Oral, BID AC  Scopolamine, 1 patch, Transdermal, Q72H  sertraline, 25 mg, Oral, Daily  sodium chloride, 10 mL, Intravenous, Q12H  sodium chloride,  10 mL, Intravenous, Q12H        Infusion Meds  dextrose, 100 mL/hr, Last Rate: 100 mL/hr (04/12/24 1359)  dilTIAZem, 5-15 mg/hr, Last Rate: 10 mg/hr (04/12/24 1553)        PRN Meds    ALPRAZolam    senna-docusate sodium **AND** polyethylene glycol **AND** bisacodyl **AND** bisacodyl    Calcium Replacement - Follow Nurse / BPA Driven Protocol    dextrose    dextrose    glucagon (human recombinant)    HYDROmorphone **AND** naloxone    Magnesium Standard Dose Replacement - Follow Nurse / BPA Driven Protocol    melatonin    Morphine    nitroglycerin    ondansetron    ondansetron ODT    oxyCODONE    Phosphorus Replacement - Follow Nurse / BPA Driven Protocol    Potassium Replacement - Follow Nurse / BPA Driven Protocol    simethicone    [COMPLETED] Insert Peripheral IV **AND** sodium chloride    sodium chloride    sodium chloride    sodium chloride    sodium chloride        Assessment & Plan       Antimicrobial Therapy   1.  IV  ceftriaxone       2.  IV Flagyl        3.          4.        5.          Assessment     Infected abdomen wall incision with anastomosis leak.  Wound cultures are growing Streptococcus sanguinus and Bacteroides fragilis.  Blood cultures are negative so far.  Patient is status post diagnostic laparoscopic diverting colostomy, pelvic washout and drain placement on 4/10/2024-no intra abdomen abscess seen     Reactive leukocytosis secondary to above.  Trending down     S/p resection of colon cancer on March 30, 2024     Diabetes mellitus type 2-A1c is 5.8     Plan     Continue IV Rocephin 2 g every 24 hours for 14 days of treatment from surgery-last day on 4/23/2024  Continue IV Flagyl 500 mg every 8 hours-can be switched to p.o. Flagyl at discharge  Supportive care  A.m. labs      The above note was transcribed for Dr. Borrego-physical exam and review of systems were performed by him        Alejandra Alonso, APRN  04/12/24  16:28 EDT    Note is dictated utilizing voice recognition software/Dragon

## 2024-04-12 NOTE — DISCHARGE PLACEMENT REQUEST
"Viktor Atkins (73 y.o. Male)       Date of Birth   1950    Social Security Number       Address   [address] [place] [address]    Home Phone   [phone]    MRN   [id number]       Sabianism   Alevism    Marital Status                               Admission Date   4/8/24    Admission Type   Emergency    Admitting Provider   Abimael Denny MD    Attending Provider   Hiren Valles MD    Department, Room/Bed   [facility], 2115/1       Discharge Date       Discharge Disposition       Discharge Destination                                 Attending Provider: Hiren Valles MD    Allergies: No Known Allergies    Isolation: None   Infection: None   Code Status: CPR    Ht: 180.3 cm (71\")   Wt: 104 kg (230 lb 6.1 oz)    Admission Cmt: None   Principal Problem: Wound infection [T14.8XXA,L08.9]                   Active Insurance as of 4/8/2024       Primary Coverage       Payor Plan Insurance Group Employer/Plan Group    HUMANA MEDICARE REPLACEMENT HUMANA MED ADV PPO [id number]       Payor Plan Address Payor Plan Phone Number Payor Plan Fax Number Effective Dates    [address] [address] [phone]  1/1/2024 - None Entered    [facility] [address]         Subscriber Name Subscriber Birth Date Member ID       VIKTOR ATKINS 1950 [id number]                     Emergency Contacts        (Rel.) Home Phone Work Phone Mobile Phone    Ninfa Atkins (Spouse) -- -- [phone]    MasoodAudra (Daughter) -- -- [phone]                "

## 2024-04-12 NOTE — THERAPY TREATMENT NOTE
Subjective: Pt agreeable to therapeutic plan of care.    Objective:     Bed mobility - N/A or Not attempted. Pt began and ended session sitting in recliner.  Transfers - Independent STS x 2  Ambulation - 5 feet SBA with no AD    Vitals: WNL    Pain: 0 VAS   Location: N/A  Intervention for pain: N/A    Education: Provided education on the importance of mobility in the acute care setting, Verbal/Tactile Cues, Transfer Training, Gait Training, and Energy conservation strategies    Assessment: Viktor Atkins presents with functional mobility impairments which indicate the need for skilled intervention. Tolerating session today without incident. Session today was limited due to leaking at his incision site. He completes STS from recliner IND this date. After taking several steps, there was leaking from his abdominal incision site. He was returned back to the recliner after the incident and RN made aware. RN reported abdomen needed dressing change before completing any more mobility. PT has been ambulating well with PT staff, therefore deferring PT session for another date. Will continue to follow and progress as tolerated.     Plan/Recommendations:   If medically appropriate, No ongoing therapy recommended post-acute care. No therapy needs. Pt requires no DME at discharge.     Pt desires Home at discharge. Pt cooperative; agreeable to therapeutic recommendations and plan of care.       Modified [place]: N/A = No pre-op stroke/TIA    Post-Tx Position: Up in Chair, Staff Present, and Call light and personal items within reach  PPE: gloves

## 2024-04-12 NOTE — PROGRESS NOTES
General Surgery Progress Note    Name: Viktor Atkins ADMIT: 2024   : 1950  PCP: Gera Manriquez MD    MRN: [id number] LOS: 4 days   AGE/SEX: 73 y.o. male  ROOM:    [facility]    Chief Complaint   Patient presents with    Wound Infection     Pt has drainage from his abd incision.  Pt was sent in by surgeon for eval and possible admit for further surgery. Pt has mass and colon surgery on Saturday.        Subjective     No nausea or vomiting, hungry, small amount of ostomy output    Objective     Scheduled Medications:   acetaminophen, 1,000 mg, Oral, Q6H  aspirin, 81 mg, Oral, Daily  atorvastatin, 20 mg, Oral, Nightly  cefTRIAXone, 2,000 mg, Intravenous, Q24H  enoxaparin, 1 mg/kg, Subcutaneous, Q12H  insulin lispro, 2-7 Units, Subcutaneous, Q6H  metoclopramide, 10 mg, Intravenous, Q8H  metoprolol tartrate, 5 mg, Intravenous, Q6H  metroNIDAZOLE, 500 mg, Intravenous, Q8H  pantoprazole, 40 mg, Intravenous, BID AC  Scopolamine, 1 patch, Transdermal, Q72H  sertraline, 25 mg, Oral, Daily  sodium chloride, 10 mL, Intravenous, Q12H        Active Infusions:  dilTIAZem, 5-15 mg/hr, Last Rate: 10 mg/hr (24 0351)  lactated ringers, 50 mL/hr, Last Rate: 50 mL/hr (24 1543)        As Needed Medications:    ALPRAZolam    senna-docusate sodium **AND** polyethylene glycol **AND** bisacodyl **AND** bisacodyl    Calcium Replacement - Follow Nurse / BPA Driven Protocol    dextrose    dextrose    glucagon (human recombinant)    HYDROmorphone **AND** naloxone    Magnesium Standard Dose Replacement - Follow Nurse / BPA Driven Protocol    melatonin    Morphine    nitroglycerin    ondansetron    ondansetron ODT    oxyCODONE    Phosphorus Replacement - Follow Nurse / BPA Driven Protocol    Potassium Replacement - Follow Nurse / BPA Driven Protocol    simethicone    [COMPLETED] Insert Peripheral IV **AND** sodium chloride    sodium chloride    sodium chloride    Vital Signs  Vital Signs Patient Vitals for the past 24  hrs:   BP Temp Temp src Pulse Resp SpO2 Weight   04/12/24 0538 149/86 97.8 °F (36.6 °C) Oral 110 21 94 % 104 kg (230 lb 6.1 oz)   04/12/24 0115 140/92 97.9 °F (36.6 °C) Oral 120 25 92 % --   04/11/24 2111 134/93 97.6 °F (36.4 °C) Oral 111 20 93 % --   04/11/24 1700 131/73 -- -- 105 -- 95 % --   04/11/24 1324 131/73 -- -- 107 -- -- --   04/11/24 1300 131/73 97.8 °F (36.6 °C) Oral 106 23 -- --   04/11/24 1224 147/80 -- -- (!) 139 -- -- --   04/11/24 0900 115/79 97.4 °F (36.3 °C) Oral 89 20 97 % --     I/O:  I/O last 3 completed shifts:  In: -   Out: 9350 [Urine:1150; Emesis/NG output:7800; Drains:50; Stool:350]    Physical Exam:  Physical Exam  Constitutional:       General: He is not in acute distress.     Appearance: Normal appearance. He is not ill-appearing.   HENT:      Head: Normocephalic and atraumatic.      Right Ear: External ear normal.      Left Ear: External ear normal.   Eyes:      Extraocular Movements: Extraocular movements intact.      Conjunctiva/sclera: Conjunctivae normal.   Cardiovascular:      Rate and Rhythm: Normal rate and regular rhythm.   Pulmonary:      Effort: Pulmonary effort is normal. No respiratory distress.   Abdominal:      General: There is no distension.      Palpations: Abdomen is soft.      Tenderness: There is no abdominal tenderness.   Musculoskeletal:         General: No swelling or deformity.   Skin:     General: Skin is warm and dry.   Neurological:      Mental Status: He is alert and oriented to person, place, and time. Mental status is at baseline.         Results Review:     CBC    Results from last 7 days   Lab Units 04/12/24  0400 04/11/24  0243 04/10/24  0142 04/08/24  1529   WBC 10*3/mm3 17.19* 20.36* 21.35* 19.89*   HEMOGLOBIN g/dL 10.4* 10.3* 10.7* 12.6*   PLATELETS 10*3/mm3 372 393 395 409     BMP   Results from last 7 days   Lab Units 04/12/24  0400 04/11/24  0243 04/10/24  0142 04/09/24  1201 04/09/24  0506 04/08/24  1529   SODIUM mmol/L 150* 146* 141  --  140 140    POTASSIUM mmol/L 3.9 3.7 3.6 3.1* 2.5* 2.5*   CHLORIDE mmol/L 115* 113* 110*  --  107 104   CO2 mmol/L 19.0* 19.0* 19.0*  --  18.0* 18.0*   BUN mg/dL 15 23 17  --  18 19   CREATININE mg/dL 0.90 1.23 1.15  --  1.14 1.43*   GLUCOSE mg/dL 97 127* 139*  --  118* 136*   MAGNESIUM mg/dL 2.4 2.7* 2.8*  --   --  2.4   PHOSPHORUS mg/dL 2.3* 2.7 3.9  --   --   --      Radiology(recent) XR Abdomen KUB    Result Date: 4/11/2024  Impression: Increasing small bowel dilatation. However, contrast in the colon is against the diagnosis of bowel obstruction. Findings may represent postoperative ileus. Electronically Signed: Melissa Zeng MD  4/11/2024 7:45 AM EDT  Workstation ID: [id number]    XR Abdomen KUB    Result Date: 4/10/2024  Impression: NG tube projects just below the diaphragm in the expected position of the body of the stomach. Advancement no additional 5 cm could be considered when clinically feasible Electronically Signed: Loco Mensah MD  4/10/2024 10:47 AM EDT  Workstation ID: [id number]     I reviewed the patient's new clinical results.    Assessment & Plan       Wound infection    Anxiety disorder    Essential hypertension    Palpitations    Mixed hyperlipidemia    Colon cancer    Atrial fibrillation with rapid ventricular response    73 y.o. male with rectosigmoid cancer s/p resection with surgical site infection and radiographic findings of potential anastomotic leak, he is s/p dx laparoscopy, pelvic drain placement, diverting colostomy, wound washout       - abdominal distention from ileus contributed by severe hypokalemia. Improving in am lab, recommend keeping over 4, NG tube output decreasing and less bilious having ostomy output now, however KUB with small bowel distention we will keep his NG today     - encourage out of bed, aggressive PT      - keep NPO excepts sips with meds until return of bowel function. NGT If nausea/vomiting      - ostomy teaching and care. Discussed with nurses for BID dressing  changes. Penrose drain left in the pfannenstiel incision for adequate drainage.      - ok to start therapeutic Lovenox/ hep gtt for afib given high risk       - tight blood glucose control, goal < 180      - continue with antibiotics.       This note was created using Dragon Voice Recognition software.    Beto Gorman MD  04/12/24  07:51 EDT

## 2024-04-12 NOTE — PLAN OF CARE
Assessment: Viktor Atkins presents with functional mobility impairments which indicate the need for skilled intervention. Tolerating session today without incident. Session today was limited due to leaking at his incision site. He completes STS from recliner IND this date. After taking several steps, there was leaking from his abdominal incision site. He was returned back to the recliner after the incident and RN made aware. RN reported abdomen needed dressing change before completing any more mobility. PT has been ambulating well with PT staff, therefore deferring PT session for another date. Will continue to follow and progress as tolerated.     Plan/Recommendations:   If medically appropriate, No ongoing therapy recommended post-acute care. No therapy needs. Pt requires no DME at discharge.     Pt desires Home at discharge. Pt cooperative; agreeable to therapeutic recommendations and plan of care.

## 2024-04-12 NOTE — CONSULTS
Nutrition Services    Patient Name: Viktor Atkins  YOB: 1950  MRN: [id number]  Admission date: 4/8/2024    Comment:    See MSA below.    Severe acute illness-related malnutrition related to acute GI dysfunction as evidenced by PO intake meeting < 50% of estimated energy requirement for > 5 days, >2% unintentional weight loss within 1 week, and moderate muscle/fat wasting per NFPE.    RD to order Boost Breeze BID (provides 500 kcals, 18 g protein if consumed). Will continue to monitor.    CLINICAL NUTRITION ASSESSMENT      Reason for Assessment 4/12 - NPO/clear liquids x 4      H&P      Past Medical History:   Diagnosis Date    Arthritis     Benign essential HTN     Cancer     Diabetes mellitus     GERD (gastroesophageal reflux disease)     Hyperlipidemia, mixed     Palpitations        Past Surgical History:   Procedure Laterality Date    CARDIAC CATHETERIZATION      CHOLECYSTECTOMY      COLON RESECTION WITH ILEOSTOMY N/A 3/30/2024    Procedure: COLON RESECTION LOW ANTERIOR LAPAROSCOPIC, COLONAL ANASTOMOSIS, DIVERTING LOOP ILEOSTOMY WITH DAVINCI ROBOT;  Surgeon: Aakash Burden MD;  Location: [facility] MAIN OR;  Service: Robotics - DaVinci;  Laterality: N/A;    COLONOSCOPY N/A 3/27/2024    Procedure: COLONOSCOPY with polypectomy x 18 and sigmoid colon mass biopsies with endscopic spot tattooing;  Surgeon: Fili Quintero MD;  Location: [facility] ENDOSCOPY;  Service: Gastroenterology;  Laterality: N/A;  sigmoid mass at 25 cm    COLOSTOMY N/A 4/9/2024    Procedure: DIAGNOSTIC LAPAROSCOPY, DIVERTING OSTOMY, POSSIBLE REVISION OF ANASTAMOSIS;  Surgeon: Aakash Burden MD;  Location: [facility] MAIN OR;  Service: General;  Laterality: N/A;    KNEE SURGERY      SIGMOIDOSCOPY N/A 3/30/2024    Procedure: SIGMOIDOSCOPY;  Surgeon: Aakash Burden MD;  Location: [facility] MAIN OR;  Service: Gastroenterology;  Laterality: N/A;        Current Problems   Postop abdominal wound infection  - s/p diagnostic laparoscopy 4/9  - may  "have some postoperative ileus  - NG to suction  - on clear liquid diet    Rectosigmoid adenocarcinoma  - oncology consulted    A-fib with RVR    HTN    Dyslipidemia    T2DM  - continue SSI         Encounter Information        Trending Narrative     4/12 - Pt admitted to [facility] 4/8 following visit with Dr. Burden - pt had surgery last week to remove a mass on his colon. Pt sent to [facility] for CT imaging and blood work, and suspected infection. Dietetic intern visited pt at bedside. Pt has been sipping on liquids through the morning - would like a \"real meal\". Provided pt with Boost Breeze for trial, pt accepted offer of Boost. RD to order Boost Breeze BID (provides 500 kcals, 18 g protein if consumed). Pt reports swallowing is difficult with NG in place at this time in addition to diet. Reports a UBW of 250 lbs - weight loss within the past 5 weeks per patient. Pt has been NPO/clear liquids x 4 days. NFPE completed, consistent with nutrition diagnosis of malnutrition using AND/ASPEN criteria. See MSA below. Will continue to monitor.     Anthropometrics        Current Height, Weight Height: 180.3 cm (71\")  Weight: 104 kg (230 lb 6.1 oz) (04/12/24 0538)       Usual Body Weight (UBW) 250 lbs       Trending Weight Hx     This admission: 4/12 - 230 lbs              PTA: 4.7% weight loss x 1 week (using weight from 4/4 - 241 lbs)  8% weight loss x 2 weeks (using weight from 3/28 - 250 lbs)    Wt Readings from Last 30 Encounters:   04/12/24 0538 104 kg (230 lb 6.1 oz)   04/11/24 0514 104 kg (230 lb 2.6 oz)   04/10/24 0100 109 kg (239 lb 3.2 oz)   04/09/24 1900 108 kg (238 lb 1.6 oz)   04/09/24 0526 105 kg (231 lb 7.7 oz)   04/08/24 1426 105 kg (231 lb 11.3 oz)   04/08/24 1319 106 kg (233 lb)   04/04/24 1304 109 kg (241 lb)   03/28/24 0129 113 kg (250 lb)   03/26/24 0112 114 kg (250 lb 10.6 oz)   03/25/24 2015 115 kg (253 lb 1.4 oz)   03/03/21 1408 114 kg (251 lb)   09/02/20 1345 110 kg (243 lb)   08/13/20 1312 109 kg (241 lb)    "   BMI kg/m2 Body mass index is 32.13 kg/m².       Labs        Pertinent Labs Hypernatremia  Hypophosphatemia - replacement available  Management per attending   Results from last 7 days   Lab Units 04/12/24  0400 04/11/24  0243 04/10/24  0142   SODIUM mmol/L 150* 146* 141   POTASSIUM mmol/L 3.9 3.7 3.6   CHLORIDE mmol/L 115* 113* 110*   CO2 mmol/L 19.0* 19.0* 19.0*   BUN mg/dL 15 23 17   CREATININE mg/dL 0.90 1.23 1.15   CALCIUM mg/dL 8.3* 8.4* 8.4*   BILIRUBIN mg/dL 0.2 0.3 0.3   ALK PHOS U/L 114 122* 128*   ALT (SGPT) U/L 33 37 54*   AST (SGOT) U/L 27 23 37   GLUCOSE mg/dL 97 127* 139*     Results from last 7 days   Lab Units 04/12/24  0400 04/11/24  0243 04/10/24  0142 04/09/24  0506 04/08/24  1529   MAGNESIUM mg/dL 2.4 2.7* 2.8*  --   --    PHOSPHORUS mg/dL 2.3* 2.7 3.9  --    < >   HEMOGLOBIN g/dL 10.4* 10.3* 10.7*  --   --    HEMATOCRIT % 33.3* 33.4* 33.0*  --   --    TRIGLYCERIDES mg/dL  --   --   --  170*  --     < > = values in this interval not displayed.     Lab Results   Component Value Date    HGBA1C 5.80 (H) 04/09/2024        Medications    Scheduled Medications acetaminophen, 1,000 mg, Oral, Q6H  aspirin, 81 mg, Oral, Daily  atorvastatin, 20 mg, Oral, Nightly  cefTRIAXone, 2,000 mg, Intravenous, Q24H  enoxaparin, 1 mg/kg, Subcutaneous, Q12H  insulin lispro, 2-7 Units, Subcutaneous, Q6H  metoclopramide, 10 mg, Intravenous, Q8H  metoprolol tartrate, 5 mg, Intravenous, Q6H  metroNIDAZOLE, 500 mg, Intravenous, Q8H  pantoprazole, 40 mg, Intravenous, BID AC  Scopolamine, 1 patch, Transdermal, Q72H  sertraline, 25 mg, Oral, Daily  sodium chloride, 10 mL, Intravenous, Q12H        Infusions dilTIAZem, 5-15 mg/hr, Last Rate: 10 mg/hr (04/12/24 0531)  lactated ringers, 50 mL/hr, Last Rate: 50 mL/hr (04/11/24 7313)        PRN Medications   ALPRAZolam    senna-docusate sodium **AND** polyethylene glycol **AND** bisacodyl **AND** bisacodyl    Calcium Replacement - Follow Nurse / BPA Driven Protocol    dextrose     dextrose    glucagon (human recombinant)    HYDROmorphone **AND** naloxone    Magnesium Standard Dose Replacement - Follow Nurse / BPA Driven Protocol    melatonin    Morphine    nitroglycerin    ondansetron    ondansetron ODT    oxyCODONE    Phosphorus Replacement - Follow Nurse / BPA Driven Protocol    Potassium Replacement - Follow Nurse / BPA Driven Protocol    simethicone    [COMPLETED] Insert Peripheral IV **AND** sodium chloride    sodium chloride    sodium chloride     Physical Findings        Trending Physical   Appearance, NFPE 4/12 - NFPE completed, consistent with nutrition diagnosis of malnutrition using AND/ASPEN criteria. See MSA below.      --  Edema  No documented edema     Bowel Function + BM 4/12       Tubes NG in place for decompression if needed, pt now on trial of clear liquids   Chewing/Swallowing No documented chewing/swallowing issues     Skin No PIs      --  Current Nutrition Orders & Evaluation of Intake       Oral Nutrition     Food Allergies NKFA   Current PO Diet Diet: Liquid; Clear Liquid; Fluid Consistency: Thin (IDDSI 0)   Supplement No supplement ordered   PO Evaluation     Trending % PO Intake 4/12 - No PO intake recorded on clear liquid diet (x1 day since advanced from NPO)   --  Nutritional Risk Screening        NRS-2002 Score          Nutrition Diagnosis         Nutrition Dx Problem 1 Severe acute illness-related malnutrition related to acute GI dysfunction as evidenced by PO intake meeting < 50% of estimated energy requirement for > 5 days, >2% unintentional weight loss within 1 week, and moderate muscle/fat wasting per NFPE.      Nutrition Dx Problem 2        Intervention Goal         Intervention Goal(s) Encourage PO intake > 50%   Acceptance of ONS     Nutrition Intervention        RD Action NFPE completed  Continue clear liquid diet as tolerated, advancement per attending  Order Boost Breeze BID  Continue to monitor     Nutrition Prescription          Diet Prescription  Clear liquid   Supplement Prescription Boost Breeze BID (provides 500 kcals, 18 g protein if consumed)      --  Monitor/Evaluation        Monitor Per protocol, PO intake, Supplement intake, Weight, GI status, POC/GOC, Swallow function     Malnutrition Severity Assessment      Patient meets criteria for : (P) Severe Malnutrition  Malnutrition Type (Last 8 Hours)       Malnutrition Severity Assessment       Row Name 04/12/24 1449       Malnutrition Severity Assessment    Malnutrition Type Acute Disease or Injury - Related Malnutrition (P)       Row Name 04/12/24 1449       Insufficient Energy Intake     Insufficient Energy Intake Findings Severe (P)     Insufficient Energy Intake  < or equal to 50% of est. energy requirement for > or equal to 5d) (P)       Row Name 04/12/24 1449       Unintentional Weight Loss     Unintentional Weight Loss Findings Severe (P)     Unintentional Weight Loss  Weight loss greater than 2% in one week (P)       Row Name 04/12/24 1449       Muscle Loss    Loss of Muscle Mass Findings Moderate (P)     Sparta Region Moderate - slight depression (P)     Clavicle Bone Region Moderate - some protrusion in females, visible in males (P)     Acromion Bone Region Moderate - acromion may slightly protrude (P)     Patellar Region Moderate - patella more prominent, less muscle definition around patella (P)       Row Name 04/12/24 1449       Fat Loss    Subcutaneous Fat Loss Findings Moderate (P)     Orbital Region  Moderate -  somewhat hollowness, slightly dark circles (P)     Upper Arm Region Moderate - some fat tissue, not ample (P)       Row Name 04/12/24 1449       Criteria Met (Must meet criteria for severity in at least 2 of these categories: M Wasting, Fat Loss, Fluid, Secondary Signs, Wt. Status, Intake)    Patient meets criteria for  Severe Malnutrition (P)                          Electronically signed by:  Fela Richard  04/12/24 08:22 EDT

## 2024-04-12 NOTE — CASE MANAGEMENT/SOCIAL WORK
Social Work Assessment   Leif     Patient Name: Viktor Atkins  MRN: [id number]  Today's Date: 4/12/2024    Admit Date: 4/8/2024     Discharge Plan       Row Name 04/12/24 1546       Plan    Plan Comments LSW informed by CM that patient inquired about a Medicaid screening for eligibility. LSW sent message to Emely/John, requesting they screen patient for eligiblity for secondary coverage with Medicaid. Pending response at this time.             SHANA Good, LSW, [facility]    Phone: [phone]  Cell: [phone]  Fax: [phone]  [email or web address]

## 2024-04-12 NOTE — PROGRESS NOTES
General Surgery Progress Note    Name: Viktor Atkins ADMIT: 2024   : 1950  PCP: Gera Manriquez MD    MRN: [id number] LOS: 4 days   AGE/SEX: 73 y.o. male  ROOM:    [facility]    Chief Complaint   Patient presents with    Wound Infection     Pt has drainage from his abd incision.  Pt was sent in by surgeon for eval and possible admit for further surgery. Pt has mass and colon surgery on Saturday.        Subjective     Still states he is hungry no nausea or bloating, having some ostomy function, NG tube with thinner output but still bilious, drain serosanguineous    Objective     Scheduled Medications:   acetaminophen, 1,000 mg, Oral, Q6H  aspirin, 81 mg, Oral, Daily  atorvastatin, 20 mg, Oral, Nightly  cefTRIAXone, 2,000 mg, Intravenous, Q24H  enoxaparin, 1 mg/kg, Subcutaneous, Q12H  insulin lispro, 2-7 Units, Subcutaneous, Q6H  lidocaine, 20 mL, Intradermal, Once  metoclopramide, 10 mg, Oral, TID AC  metoprolol tartrate, 25 mg, Oral, Q8H  metroNIDAZOLE, 500 mg, Intravenous, Q8H  pantoprazole, 40 mg, Oral, BID AC  Scopolamine, 1 patch, Transdermal, Q72H  sertraline, 25 mg, Oral, Daily  sodium chloride, 10 mL, Intravenous, Q12H  sodium chloride, 10 mL, Intravenous, Q12H        Active Infusions:  dextrose, 100 mL/hr, Last Rate: 100 mL/hr (24 1359)  dilTIAZem, 5-15 mg/hr, Last Rate: 10 mg/hr (24 1553)        As Needed Medications:    ALPRAZolam    senna-docusate sodium **AND** polyethylene glycol **AND** bisacodyl **AND** bisacodyl    Calcium Replacement - Follow Nurse / BPA Driven Protocol    dextrose    dextrose    glucagon (human recombinant)    HYDROmorphone **AND** naloxone    Magnesium Standard Dose Replacement - Follow Nurse / BPA Driven Protocol    melatonin    Morphine    nitroglycerin    ondansetron    ondansetron ODT    oxyCODONE    Phosphorus Replacement - Follow Nurse / BPA Driven Protocol    Potassium Replacement - Follow Nurse / BPA Driven Protocol    simethicone     [COMPLETED] Insert Peripheral IV **AND** sodium chloride    sodium chloride    sodium chloride    sodium chloride    sodium chloride    Vital Signs  Vital Signs Patient Vitals for the past 24 hrs:   BP Temp Temp src Pulse Resp SpO2 Weight   04/12/24 1702 133/73 -- -- 116 -- -- --   04/12/24 1633 135/86 97.5 °F (36.4 °C) Oral 102 21 94 % --   04/12/24 1553 132/71 -- -- (!) 128 -- -- --   04/12/24 1541 -- -- -- 106 -- 95 % --   04/12/24 1300 130/71 98.9 °F (37.2 °C) Oral 112 22 -- --   04/12/24 0952 120/91 -- -- (!) 145 -- -- --   04/12/24 0538 149/86 97.8 °F (36.6 °C) Oral 110 21 94 % 104 kg (230 lb 6.1 oz)   04/12/24 0115 140/92 97.9 °F (36.6 °C) Oral 120 25 92 % --   04/11/24 2111 134/93 97.6 °F (36.4 °C) Oral 111 20 93 % --     I/O:  I/O last 3 completed shifts:  In: -   Out: 9350 [Urine:1150; Emesis/NG output:7800; Drains:50; Stool:350]    Physical Exam:  Physical Exam  Constitutional:       General: He is not in acute distress.     Appearance: Normal appearance. He is not ill-appearing.   HENT:      Head: Normocephalic and atraumatic.      Right Ear: External ear normal.      Left Ear: External ear normal.   Eyes:      Extraocular Movements: Extraocular movements intact.      Conjunctiva/sclera: Conjunctivae normal.   Cardiovascular:      Rate and Rhythm: Normal rate and regular rhythm.   Pulmonary:      Effort: Pulmonary effort is normal. No respiratory distress.   Abdominal:      General: There is no distension.      Palpations: Abdomen is soft.      Tenderness: There is no abdominal tenderness.   Musculoskeletal:         General: No swelling or deformity.   Skin:     General: Skin is warm and dry.   Neurological:      Mental Status: He is alert and oriented to person, place, and time. Mental status is at baseline.         Results Review:     CBC    Results from last 7 days   Lab Units 04/12/24  0400 04/11/24  0243 04/10/24  0142 04/08/24  1529   WBC 10*3/mm3 17.19* 20.36* 21.35* 19.89*   HEMOGLOBIN g/dL 10.4*  10.3* 10.7* 12.6*   PLATELETS 10*3/mm3 372 393 395 409     BMP   Results from last 7 days   Lab Units 04/12/24  0400 04/11/24  0243 04/10/24  0142 04/09/24  1201 04/09/24  0506 04/08/24  1529   SODIUM mmol/L 150* 146* 141  --  140 140   POTASSIUM mmol/L 3.9 3.7 3.6 3.1* 2.5* 2.5*   CHLORIDE mmol/L 115* 113* 110*  --  107 104   CO2 mmol/L 19.0* 19.0* 19.0*  --  18.0* 18.0*   BUN mg/dL 15 23 17  --  18 19   CREATININE mg/dL 0.90 1.23 1.15  --  1.14 1.43*   GLUCOSE mg/dL 97 127* 139*  --  118* 136*   MAGNESIUM mg/dL 2.4 2.7* 2.8*  --   --  2.4   PHOSPHORUS mg/dL 2.3* 2.7 3.9  --   --   --      Radiology(recent) XR Abdomen KUB    Result Date: 4/11/2024  Impression: Increasing small bowel dilatation. However, contrast in the colon is against the diagnosis of bowel obstruction. Findings may represent postoperative ileus. Electronically Signed: Melissa Zeng MD  4/11/2024 7:45 AM EDT  Workstation ID: [id number]     I reviewed the patient's new clinical results.    Assessment & Plan       Wound infection    Anxiety disorder    Essential hypertension    Palpitations    Mixed hyperlipidemia    Colon cancer    Atrial fibrillation with rapid ventricular response      73 y.o. male with rectosigmoid cancer s/p resection with surgical site infection and radiographic findings of potential anastomotic leak, he is s/p dx laparoscopy, pelvic drain placement, diverting colostomy, wound washout         - abdominal distention from ileus contributed by severe hypokalemia. Improving in am lab, recommend keeping over 4, NG tube output decreasing and less bilious having ostomy output now, discussed removing his NG tube today patient is nervous to have to have it replaced if he still has ileus, would like to keep 1 more day can likely remove this tomorrow     - encourage out of bed, aggressive PT      -Sips clears for comfort     - ostomy teaching and care. Discussed with nurses for BID dressing changes. Penrose drain left in the pfannenstiel  incision for adequate drainage.      - ok to start therapeutic Lovenox/ hep gtt for afib given high risk       - tight blood glucose control, goal < 180      - continue with antibiotics.            This note was created using Dragon Voice Recognition software.    Beto Gorman MD  04/12/24  17:04 EDT

## 2024-04-12 NOTE — NURSING NOTE
WOCN note:    73 yr old male admitted 4/8/24 from Dr. Burden's office for possible wound infection following surgery last week to remove a mass on his colon. Patient underwent a laparoscopy with end-loop colostomy on 4/9/24. WOCN follow up for ostomy management and education.   Patient's wife and daughter are present in the room. Pouch to the LUQ stoma is intact and emptied of 50cc brown liquid effluent. Patient and family were instructed on supply ordering process and list of suppliers was provided. Additional instruction given on post op diet, hydration and ostomy resources. All questions were answered. We will continue to follow.

## 2024-04-12 NOTE — PROGRESS NOTES
"[facility] MEDICINE SERVICE  DAILY PROGRESS NOTE      Patient Name: Viktor Atkins  Date of Admission: 4/8/2024  Today's Date: 04/12/24  Length of Stay: 4  Primary Care Physician: Gera Manriquez MD    Subjective   Patient is doing well at this time.  He has no complaints.  No overnight events.      Objective    Temp:  [97.6 °F (36.4 °C)-97.9 °F (36.6 °C)] 97.8 °F (36.6 °C)  Heart Rate:  [105-145] 145  Resp:  [20-25] 21  BP: (120-149)/(73-93) 120/91  Physical Exam  General: NAD  HEENT: AT NC, EOMI  Neck: No JVD  CVS: S1/S2 present, irregular tachycardia, no M/R/G  Lungs: CTA B/L  Abdomen: soft, NT, ND, BS+, ostomy in place with liquid stool  Ext: no edema  Skin: no rashes, bruises or discolorations  Neuro: no focal deficits  Psych: Not agitated         Results Review:  I have reviewed the labs, radiology results, and diagnostic studies.    Laboratory Data:   Results from last 7 days   Lab Units 04/12/24  0400 04/11/24  0243 04/10/24  0142   WBC 10*3/mm3 17.19* 20.36* 21.35*   HEMOGLOBIN g/dL 10.4* 10.3* 10.7*   HEMATOCRIT % 33.3* 33.4* 33.0*   PLATELETS 10*3/mm3 372 393 395        Results from last 7 days   Lab Units 04/12/24  0400 04/11/24  0243 04/10/24  0142   SODIUM mmol/L 150* 146* 141   POTASSIUM mmol/L 3.9 3.7 3.6   CHLORIDE mmol/L 115* 113* 110*   CO2 mmol/L 19.0* 19.0* 19.0*   BUN mg/dL 15 23 17   CREATININE mg/dL 0.90 1.23 1.15   CALCIUM mg/dL 8.3* 8.4* 8.4*   BILIRUBIN mg/dL 0.2 0.3 0.3   ALK PHOS U/L 114 122* 128*   ALT (SGPT) U/L 33 37 54*   AST (SGOT) U/L 27 23 37   GLUCOSE mg/dL 97 127* 139*       Culture Data:   No results found for: \"BLOODCX\"  No results found for: \"URINECX\"  No results found for: \"RESPCX\"  No results found for: \"WOUNDCX\"  No results found for: \"STOOLCX\"  No components found for: \"BODYFLD\"    Radiology Data:   Imaging Results (Last 24 Hours)       ** No results found for the last 24 hours. **            I have reviewed the patient's current medications.     Assessment/Plan "     1) post-op wound infection  - s/p low anterior resection of a bleeding rectosigmoid mass on 3/30 with findings of invasive moderate differentiated adenocarcinoma  - found to have abscess on imaging after returning to hospital for worsening pain  - s/p laparoscopy with pelvic drain placement and loop colostomy on 4/9  - wound cultures growing strept  - ID on board, appreciate recs  - rocephin, flagyl to be completed on 4/23  - advance diet per surgery    2) A-fib with RVR  - cardiology on board, appreciate recs  - cardizem, metoprolol, eliquis    3) rectosigmoid adenocarcinoma  - f/u oncology as outpatient    4) HTN  - cardizem, metoprolol  - hold losartan for now    5) DM  - ISS, accuchecks, diet    6) HLD  - lipitor    7) mood disorders  - home meds    8) GI/DVT ppx  - protonix/lovenox              Electronically signed by Hiren Valles MD, 04/12/24, 10:27 EDT.

## 2024-04-12 NOTE — CASE MANAGEMENT/SOCIAL WORK
Continued Stay Note  [facility]     Patient Name: Viktor Atkins  MRN: [id number]  Today's Date: 4/12/2024    Admit Date: 4/8/2024    Plan: Anticipate routine home with spouse and [facility] (pending acceptance, order per MD).   Discharge Plan       Row Name 04/12/24 1625       Plan    Plan Anticipate routine home with spouse and [facility] (pending acceptance, order per MD).    Patient/Family in Agreement with Plan yes    Provided Post Acute Provider List? N/A    N/A Provider List Comment reviewed home health list verbally    Plan Comments CM met with patient and family at bedside. CM discussed home health, patient agreeable to referrals, no agency preference. [facility] (unable to accept insurance), [facility] (declined due to staffing), [facility] (pending acceptance). Patient reports concerns about hospital stay costs and requests screening for medicaid, CM notified LSW. Patient denies any further d/c needs at this time. Barrier to D/C: IV abx, NG/clear liquids, cardizem gtt.                                         Expected Discharge Date and Time       Expected Discharge Date Expected Discharge Time    Apr 15, 2024           Met with patient in room.  Maintained distance greater than six feet and spent less than 15 minutes in the room.       SANA MahoneyN, RN    [address] [address]    Office: [phone]  Fax: [phone]

## 2024-04-13 ENCOUNTER — APPOINTMENT (OUTPATIENT)
Dept: CT IMAGING | Facility: HOSPITAL | Age: 74
End: 2024-04-13
Payer: MEDICARE

## 2024-04-13 LAB
ALBUMIN SERPL-MCNC: 3.3 G/DL (ref 3.5–5.2)
ALBUMIN/GLOB SERPL: 1.3 G/DL
ALP SERPL-CCNC: 108 U/L (ref 39–117)
ALT SERPL W P-5'-P-CCNC: 43 U/L (ref 1–41)
ANION GAP SERPL CALCULATED.3IONS-SCNC: 12 MMOL/L (ref 5–15)
AST SERPL-CCNC: 37 U/L (ref 1–40)
BACTERIA SPEC AEROBE CULT: NORMAL
BACTERIA SPEC AEROBE CULT: NORMAL
BASOPHILS # BLD AUTO: 0.05 10*3/MM3 (ref 0–0.2)
BASOPHILS NFR BLD AUTO: 0.3 % (ref 0–1.5)
BILIRUB SERPL-MCNC: 0.3 MG/DL (ref 0–1.2)
BUN SERPL-MCNC: 13 MG/DL (ref 8–23)
BUN/CREAT SERPL: 13.3 (ref 7–25)
CALCIUM SPEC-SCNC: 8.3 MG/DL (ref 8.6–10.5)
CHLORIDE SERPL-SCNC: 109 MMOL/L (ref 98–107)
CO2 SERPL-SCNC: 22 MMOL/L (ref 22–29)
CREAT SERPL-MCNC: 0.98 MG/DL (ref 0.76–1.27)
DEPRECATED RDW RBC AUTO: 51.7 FL (ref 37–54)
EGFRCR SERPLBLD CKD-EPI 2021: 81.9 ML/MIN/1.73
EOSINOPHIL # BLD AUTO: 0.6 10*3/MM3 (ref 0–0.4)
EOSINOPHIL NFR BLD AUTO: 4.1 % (ref 0.3–6.2)
ERYTHROCYTE [DISTWIDTH] IN BLOOD BY AUTOMATED COUNT: 16.4 % (ref 12.3–15.4)
GLOBULIN UR ELPH-MCNC: 2.6 GM/DL
GLUCOSE BLDC GLUCOMTR-MCNC: 121 MG/DL (ref 70–105)
GLUCOSE BLDC GLUCOMTR-MCNC: 146 MG/DL (ref 70–105)
GLUCOSE BLDC GLUCOMTR-MCNC: 150 MG/DL (ref 70–105)
GLUCOSE SERPL-MCNC: 146 MG/DL (ref 65–99)
HCT VFR BLD AUTO: 35 % (ref 37.5–51)
HGB BLD-MCNC: 11 G/DL (ref 13–17.7)
IMM GRANULOCYTES # BLD AUTO: 0.09 10*3/MM3 (ref 0–0.05)
IMM GRANULOCYTES NFR BLD AUTO: 0.6 % (ref 0–0.5)
LYMPHOCYTES # BLD AUTO: 3.7 10*3/MM3 (ref 0.7–3.1)
LYMPHOCYTES NFR BLD AUTO: 25.2 % (ref 19.6–45.3)
MAGNESIUM SERPL-MCNC: 2.2 MG/DL (ref 1.6–2.4)
MCH RBC QN AUTO: 27.2 PG (ref 26.6–33)
MCHC RBC AUTO-ENTMCNC: 31.4 G/DL (ref 31.5–35.7)
MCV RBC AUTO: 86.4 FL (ref 79–97)
MONOCYTES # BLD AUTO: 0.9 10*3/MM3 (ref 0.1–0.9)
MONOCYTES NFR BLD AUTO: 6.1 % (ref 5–12)
NEUTROPHILS NFR BLD AUTO: 63.7 % (ref 42.7–76)
NEUTROPHILS NFR BLD AUTO: 9.34 10*3/MM3 (ref 1.7–7)
NRBC BLD AUTO-RTO: 0 /100 WBC (ref 0–0.2)
PHOSPHATE SERPL-MCNC: 2.8 MG/DL (ref 2.5–4.5)
PLATELET # BLD AUTO: 408 10*3/MM3 (ref 140–450)
PMV BLD AUTO: 11.9 FL (ref 6–12)
POTASSIUM SERPL-SCNC: 3.4 MMOL/L (ref 3.5–5.2)
PROT SERPL-MCNC: 5.9 G/DL (ref 6–8.5)
RBC # BLD AUTO: 4.05 10*6/MM3 (ref 4.14–5.8)
SODIUM SERPL-SCNC: 143 MMOL/L (ref 136–145)
WBC NRBC COR # BLD AUTO: 14.68 10*3/MM3 (ref 3.4–10.8)

## 2024-04-13 PROCEDURE — 83735 ASSAY OF MAGNESIUM: CPT | Performed by: STUDENT IN AN ORGANIZED HEALTH CARE EDUCATION/TRAINING PROGRAM

## 2024-04-13 PROCEDURE — 25010000002 METRONIDAZOLE 500 MG/100ML SOLUTION: Performed by: NURSE PRACTITIONER

## 2024-04-13 PROCEDURE — 80053 COMPREHEN METABOLIC PANEL: CPT | Performed by: STUDENT IN AN ORGANIZED HEALTH CARE EDUCATION/TRAINING PROGRAM

## 2024-04-13 PROCEDURE — 25010000002 CEFTRIAXONE PER 250 MG: Performed by: NURSE PRACTITIONER

## 2024-04-13 PROCEDURE — 84100 ASSAY OF PHOSPHORUS: CPT | Performed by: STUDENT IN AN ORGANIZED HEALTH CARE EDUCATION/TRAINING PROGRAM

## 2024-04-13 PROCEDURE — 25010000002 ENOXAPARIN PER 10 MG: Performed by: STUDENT IN AN ORGANIZED HEALTH CARE EDUCATION/TRAINING PROGRAM

## 2024-04-13 PROCEDURE — 82948 REAGENT STRIP/BLOOD GLUCOSE: CPT

## 2024-04-13 PROCEDURE — 0 DEXTROSE 5 % SOLUTION: Performed by: INTERNAL MEDICINE

## 2024-04-13 PROCEDURE — 99024 POSTOP FOLLOW-UP VISIT: CPT | Performed by: SURGERY

## 2024-04-13 PROCEDURE — 99232 SBSQ HOSP IP/OBS MODERATE 35: CPT | Performed by: INTERNAL MEDICINE

## 2024-04-13 PROCEDURE — 74176 CT ABD & PELVIS W/O CONTRAST: CPT

## 2024-04-13 PROCEDURE — 85025 COMPLETE CBC W/AUTO DIFF WBC: CPT | Performed by: STUDENT IN AN ORGANIZED HEALTH CARE EDUCATION/TRAINING PROGRAM

## 2024-04-13 RX ORDER — POTASSIUM CHLORIDE 1.5 G/1.58G
40 POWDER, FOR SOLUTION ORAL EVERY 4 HOURS
Status: COMPLETED | OUTPATIENT
Start: 2024-04-13 | End: 2024-04-13

## 2024-04-13 RX ADMIN — POTASSIUM CHLORIDE 40 MEQ: 1.5 POWDER, FOR SOLUTION ORAL at 09:42

## 2024-04-13 RX ADMIN — CEFTRIAXONE SODIUM 2000 MG: 2 INJECTION, POWDER, FOR SOLUTION INTRAMUSCULAR; INTRAVENOUS at 17:34

## 2024-04-13 RX ADMIN — Medication 10 MG/HR: at 09:48

## 2024-04-13 RX ADMIN — DILTIAZEM HYDROCHLORIDE 30 MG: 30 TABLET, FILM COATED ORAL at 21:00

## 2024-04-13 RX ADMIN — METOCLOPRAMIDE 10 MG: 10 TABLET ORAL at 17:34

## 2024-04-13 RX ADMIN — Medication 10 ML: at 20:56

## 2024-04-13 RX ADMIN — METOCLOPRAMIDE 10 MG: 10 TABLET ORAL at 15:18

## 2024-04-13 RX ADMIN — ATORVASTATIN CALCIUM 20 MG: 20 TABLET, FILM COATED ORAL at 20:56

## 2024-04-13 RX ADMIN — ACETAMINOPHEN 1000 MG: 500 TABLET, FILM COATED ORAL at 00:57

## 2024-04-13 RX ADMIN — ACETAMINOPHEN 1000 MG: 500 TABLET, FILM COATED ORAL at 09:42

## 2024-04-13 RX ADMIN — ENOXAPARIN SODIUM 100 MG: 100 INJECTION SUBCUTANEOUS at 00:57

## 2024-04-13 RX ADMIN — POTASSIUM CHLORIDE 40 MEQ: 1.5 POWDER, FOR SOLUTION ORAL at 15:18

## 2024-04-13 RX ADMIN — DEXTROSE MONOHYDRATE 100 ML/HR: 50 INJECTION, SOLUTION INTRAVENOUS at 00:58

## 2024-04-13 RX ADMIN — PANTOPRAZOLE SODIUM 40 MG: 40 TABLET, DELAYED RELEASE ORAL at 09:42

## 2024-04-13 RX ADMIN — ENOXAPARIN SODIUM 100 MG: 100 INJECTION SUBCUTANEOUS at 15:18

## 2024-04-13 RX ADMIN — METRONIDAZOLE 500 MG: 500 INJECTION, SOLUTION INTRAVENOUS at 17:34

## 2024-04-13 RX ADMIN — METOPROLOL TARTRATE 50 MG: 50 TABLET, FILM COATED ORAL at 09:42

## 2024-04-13 RX ADMIN — ACETAMINOPHEN 1000 MG: 500 TABLET, FILM COATED ORAL at 17:34

## 2024-04-13 RX ADMIN — METRONIDAZOLE 500 MG: 500 INJECTION, SOLUTION INTRAVENOUS at 00:58

## 2024-04-13 RX ADMIN — Medication 10 ML: at 20:57

## 2024-04-13 RX ADMIN — METOCLOPRAMIDE 10 MG: 10 TABLET ORAL at 09:42

## 2024-04-13 RX ADMIN — SERTRALINE HYDROCHLORIDE 25 MG: 25 TABLET ORAL at 09:42

## 2024-04-13 RX ADMIN — METOPROLOL TARTRATE 50 MG: 50 TABLET, FILM COATED ORAL at 00:57

## 2024-04-13 RX ADMIN — METRONIDAZOLE 500 MG: 500 INJECTION, SOLUTION INTRAVENOUS at 09:42

## 2024-04-13 RX ADMIN — ASPIRIN 81 MG CHEWABLE TABLET 81 MG: 81 TABLET CHEWABLE at 09:42

## 2024-04-13 RX ADMIN — Medication 10 ML: at 09:44

## 2024-04-13 RX ADMIN — METOPROLOL TARTRATE 50 MG: 50 TABLET, FILM COATED ORAL at 17:34

## 2024-04-13 RX ADMIN — PANTOPRAZOLE SODIUM 40 MG: 40 TABLET, DELAYED RELEASE ORAL at 17:34

## 2024-04-13 RX ADMIN — DILTIAZEM HYDROCHLORIDE 30 MG: 30 TABLET, FILM COATED ORAL at 15:18

## 2024-04-13 RX ADMIN — SCOPALAMINE 1 PATCH: 1 PATCH, EXTENDED RELEASE TRANSDERMAL at 15:30

## 2024-04-13 NOTE — PLAN OF CARE
Goal Outcome Evaluation:  Plan of Care Reviewed With: patient, spouse     Progress: improving  Outcome Evaluation: Patient alert, oriented x4. Vitals stable. HR remains slightly elevated. Titrating Cardizem gtt per order; currently infusing at 7.5. NG in place; to intermittent suction. Tolerating full liquids. D5 infusing per order. Midline in place; blood return noted. Multiple incision sites; dressing changed per order. Minimal output noted per drains. Colostomy functioning well; liquid brown stool noted. Shadia wound skin c/d/i. Pyle catheter in place for retention; catheter care complete, good UOP noted. Patient up to chair with assist x1. Falls precautions in place. Mild pain noted with activity; scheduled Tylenol given. Relief noted. Wife at bedside, supportive of patient. Patient agreeable to plan of care. Continue to monitor.    Problem: Adult Inpatient Plan of Care  Goal: Plan of Care Review  Outcome: Ongoing, Progressing  Flowsheets (Taken 4/13/2024 0207)  Progress: improving  Plan of Care Reviewed With:   patient   spouse  Outcome Evaluation:   Patient alert, oriented x4. Vitals stable. HR remains slightly elevated. Titrating Cardizem gtt per order; currently infusing at 7.5. NG in place; to intermittent suction. Tolerating full liquids. D5 infusing per order. Midline in place; blood return noted. Multiple incision sites; dressing changed per order. Minimal output noted per drains. Colostomy functioning well; liquid brown stool noted. Shadia wound skin c/d/i. Pyle catheter in place for retention; catheter care complete, good UOP noted. Patient up to chair with assist x1. Falls precautions in place. Mild pain noted with activity; scheduled Tylenol given. Relief noted. Wife at bedside, supportive of patient. Patient agreeable to plan of care. Continue to monitor.  Goal: Patient-Specific Goal (Individualized)  Outcome: Ongoing, Progressing  Goal: Absence of Hospital-Acquired Illness or Injury  Outcome:  Ongoing, Progressing  Intervention: Identify and Manage Fall Risk  Flowsheets  Taken 4/13/2024 0014  Safety Promotion/Fall Prevention: safety round/check completed  Taken 4/12/2024 2220  Safety Promotion/Fall Prevention: safety round/check completed  Taken 4/12/2024 2058  Safety Promotion/Fall Prevention:   safety round/check completed   nonskid shoes/slippers when out of bed   fall prevention program maintained   assistive device/personal items within reach   activity supervised  Intervention: Prevent Skin Injury  Flowsheets  Taken 4/13/2024 0014  Body Position: position changed independently  Taken 4/12/2024 2220  Body Position: position changed independently  Taken 4/12/2024 2058  Body Position: position changed independently  Skin Protection:   adhesive use limited   tubing/devices free from skin contact   transparent dressing maintained  Intervention: Prevent and Manage VTE (Venous Thromboembolism) Risk  Flowsheets  Taken 4/13/2024 0014  Activity Management: bedrest  Taken 4/12/2024 2220  Activity Management: bedrest  Taken 4/12/2024 2058  Activity Management: back to bed  VTE Prevention/Management: patient refused intervention  Intervention: Prevent Infection  Flowsheets  Taken 4/13/2024 0014  Infection Prevention: hand hygiene promoted  Taken 4/12/2024 2220  Infection Prevention: hand hygiene promoted  Taken 4/12/2024 2058  Infection Prevention:   hand hygiene promoted   personal protective equipment utilized  Goal: Optimal Comfort and Wellbeing  Outcome: Ongoing, Progressing  Intervention: Monitor Pain and Promote Comfort  Flowsheets (Taken 4/11/2024 2000 by Sherrell Ribera, RN)  Pain Management Interventions: no interventions per patient request  Intervention: Provide Person-Centered Care  Flowsheets (Taken 4/12/2024 1600 by Alissa Jaffe, RN)  Trust Relationship/Rapport:   care explained   choices provided  Goal: Readiness for Transition of Care  Outcome: Ongoing, Progressing     Problem: Pain  Acute  Goal: Acceptable Pain Control and Functional Ability  Outcome: Ongoing, Progressing  Intervention: Prevent or Manage Pain  Flowsheets  Taken 4/12/2024 2220  Medication Review/Management: medications reviewed  Taken 4/12/2024 2058  Medication Review/Management: medications reviewed  Intervention: Develop Pain Management Plan  Flowsheets (Taken 4/11/2024 2000 by Sherrell Ribera RN)  Pain Management Interventions: no interventions per patient request  Intervention: Optimize Psychosocial Wellbeing  Flowsheets  Taken 4/12/2024 2058 by Tiffany Gacria, RN  Diversional Activities: television  Taken 4/11/2024 0400 by Sandy Rico RN  Supportive Measures: active listening utilized     Problem: Dysrhythmia  Goal: Normalized Cardiac Rhythm  Outcome: Ongoing, Progressing  Intervention: Monitor and Manage Cardiac Rhythm Effect  Flowsheets (Taken 4/12/2024 2058)  VTE Prevention/Management: patient refused intervention     Problem: Fall Injury Risk  Goal: Absence of Fall and Fall-Related Injury  Outcome: Ongoing, Progressing  Intervention: Identify and Manage Contributors  Flowsheets  Taken 4/12/2024 2220  Medication Review/Management: medications reviewed  Taken 4/12/2024 2058  Medication Review/Management: medications reviewed  Self-Care Promotion: independence encouraged  Intervention: Promote Injury-Free Environment  Flowsheets  Taken 4/13/2024 0014  Safety Promotion/Fall Prevention: safety round/check completed  Taken 4/12/2024 2220  Safety Promotion/Fall Prevention: safety round/check completed  Taken 4/12/2024 2058  Safety Promotion/Fall Prevention:   safety round/check completed   nonskid shoes/slippers when out of bed   fall prevention program maintained   assistive device/personal items within reach   activity supervised     Problem: Adjustment to Illness (Sepsis/Septic Shock)  Goal: Optimal Coping  Outcome: Ongoing, Progressing  Intervention: Optimize Psychosocial Adjustment to Illness  Flowsheets  Taken  4/12/2024 0430 by Sherrell Ribera RN  Family/Support System Care:   self-care encouraged   presence promoted   support provided  Taken 4/11/2024 0400 by Sandy Rico RN  Supportive Measures: active listening utilized     Problem: Bleeding (Sepsis/Septic Shock)  Goal: Absence of Bleeding  Outcome: Ongoing, Progressing  Intervention: Monitor and Manage Bleeding  Flowsheets (Taken 4/11/2024 0600 by Sandy Rico, RN)  Bleeding Precautions: blood pressure closely monitored     Problem: Glycemic Control Impaired (Sepsis/Septic Shock)  Goal: Blood Glucose Level Within Desired Range  Outcome: Ongoing, Progressing  Intervention: Optimize Glycemic Control  Flowsheets (Taken 4/11/2024 0400 by Sandy Rico, RN)  Glycemic Management: blood glucose monitored     Problem: Infection Progression (Sepsis/Septic Shock)  Goal: Absence of Infection Signs and Symptoms  Outcome: Ongoing, Progressing  Intervention: Initiate Sepsis Management  Flowsheets  Taken 4/13/2024 0014 by Tiffany Garcia RN  Infection Prevention: hand hygiene promoted  Taken 4/12/2024 2220 by Tiffany Garcia RN  Infection Prevention: hand hygiene promoted  Taken 4/12/2024 2058 by Tiffany Garcia RN  Infection Prevention:   hand hygiene promoted   personal protective equipment utilized  Taken 4/12/2024 1800 by Alissa Jaffe RN  Isolation Precautions: precautions maintained  Taken 4/11/2024 0600 by Sandy Rico RN  Infection Management: aseptic technique maintained  Intervention: Promote Recovery  Flowsheets  Taken 4/13/2024 0014 by Tiffany Garcia RN  Activity Management: bedrest  Taken 4/12/2024 2220 by Tiffany Garcia RN  Activity Management: bedrest  Taken 4/12/2024 2058 by Tiffany Garcia RN  Activity Management: back to bed  Taken 4/11/2024 0400 by Sandy Rico RN  Airway/Ventilation Support: pulmonary hygiene promoted  Sleep/Rest Enhancement: awakenings minimized     Problem: Nutrition Impaired (Sepsis/Septic Shock)  Goal: Optimal Nutrition Intake  Outcome:  Ongoing, Progressing     Problem: Skin Injury Risk Increased  Goal: Skin Health and Integrity  Outcome: Ongoing, Progressing  Intervention: Optimize Skin Protection  Flowsheets (Taken 4/12/2024 2058)  Pressure Reduction Techniques: frequent weight shift encouraged  Pressure Reduction Devices: pressure-redistributing mattress utilized  Skin Protection:   adhesive use limited   tubing/devices free from skin contact   transparent dressing maintained     Problem: Malnutrition  Goal: Improved Nutritional Intake  Outcome: Ongoing, Progressing

## 2024-04-13 NOTE — PROGRESS NOTES
"[facility] MEDICINE SERVICE  DAILY PROGRESS NOTE      Patient Name: iVktor Atkins  Date of Admission: 4/8/2024  Today's Date: 04/13/24  Length of Stay: 5  Primary Care Physician: Gera Manriquez MD    Subjective   Patient is stable today.  He has no complaints.  No overnight events.      Objective    Temp:  [97.5 °F (36.4 °C)-98.9 °F (37.2 °C)] 97.6 °F (36.4 °C)  Heart Rate:  [] 92  Resp:  [21-29] 23  BP: (120-147)/(64-91) 128/83  Physical Exam  General: NAD  HEENT: AT NC, EOMI  Neck: No JVD  CVS: S1/S2 present, irregular tachycardia, no M/R/G  Lungs: CTA B/L  Abdomen: soft, minimally tender at incision site, ND, BS+, ostomy in place with scant liquid stool  Ext: no edema  Skin: no rashes, bruises or discolorations  Neuro: no focal deficits  Psych: Not agitated         Results Review:  I have reviewed the labs, radiology results, and diagnostic studies.    Laboratory Data:   Results from last 7 days   Lab Units 04/13/24  0428 04/12/24  0400 04/11/24  0243   WBC 10*3/mm3 14.68* 17.19* 20.36*   HEMOGLOBIN g/dL 11.0* 10.4* 10.3*   HEMATOCRIT % 35.0* 33.3* 33.4*   PLATELETS 10*3/mm3 408 372 393        Results from last 7 days   Lab Units 04/13/24  0428 04/12/24  0400 04/11/24  0243   SODIUM mmol/L 143 150* 146*   POTASSIUM mmol/L 3.4* 3.9 3.7   CHLORIDE mmol/L 109* 115* 113*   CO2 mmol/L 22.0 19.0* 19.0*   BUN mg/dL 13 15 23   CREATININE mg/dL 0.98 0.90 1.23   CALCIUM mg/dL 8.3* 8.3* 8.4*   BILIRUBIN mg/dL 0.3 0.2 0.3   ALK PHOS U/L 108 114 122*   ALT (SGPT) U/L 43* 33 37   AST (SGOT) U/L 37 27 23   GLUCOSE mg/dL 146* 97 127*       Culture Data:   No results found for: \"BLOODCX\"  No results found for: \"URINECX\"  No results found for: \"RESPCX\"  No results found for: \"WOUNDCX\"  No results found for: \"STOOLCX\"  No components found for: \"BODYFLD\"    Radiology Data:   Imaging Results (Last 24 Hours)       ** No results found for the last 24 hours. **            I have reviewed the patient's current " medications.     Assessment/Plan     1) post-op wound infection  - s/p low anterior resection of a bleeding rectosigmoid mass on 3/30 with findings of invasive moderate differentiated adenocarcinoma  - found to have abscess on imaging after returning to hospital for worsening pain  - surgery on board, appreciate recs  - s/p laparoscopy with pelvic drain placement and loop colostomy on 4/9  - wound cultures growing strept  - ID on board, appreciate recs  - rocephin, flagyl to be completed on 4/23  - advance diet per surgery    2) A-fib with RVR  - cardiology on board, appreciate recs  - cardizem, metoprolol, lovenox    3) rectosigmoid adenocarcinoma  - f/u oncology as outpatient    4) HTN  - cardizem, metoprolol  - hold losartan for now    5) DM  - ISS, accuchecks, diet    6) HLD  - lipitor    7) mood disorders  - home meds    8) GI/DVT ppx  - protonix/lovenox              Electronically signed by Hiren Valles MD, 04/13/24, 09:28 EDT.

## 2024-04-13 NOTE — PROGRESS NOTES
LOS: 5 days   Admitting Physician- Hiren Valles MD                Cardiology assessment and plan    1.  Persistent atrial fibrillation:    2.  Hypertension:    Blood pressure is stable    3.  S/p laparotomy:    Patient still n.p.o. patient has ileus patient has NG tube    4.  Carcinoma of colon:    Patient underwent resection.  Patient would need chemotherapy    Patient denies any new complaints  Heart rate is still intermittently elevated  Postop wound infection  Rectosigmoid adenocarcinoma  Hypertension  Diabetes mellitus  Hyperlipidemia  Tmax is 97.6 pulse is 92-1 45 respirations are 22 blood pressure is 120/86 sats are 96%  Patient currently*medications include Lipitor 20 mg p.o. once a day patient is on metoprolol 50 mg po every 8 hours  Patient is on full dose Lovenox for anticoagulation therapy  Will add some p.o. Cardizem and wean off and discontinue the IV Cardizem  Discussed with the patient and family at bedside  Further recommendations based on patient course            Reason For Followup:    Persistent atrial fibrillation  Postsurgery ileus  S/p laparotomy  Hypertension    Subjective     Patient is feeling better    Objective     Patient still in atrial fibrillation    Review of Systems:   Review of Systems   Constitutional: Negative for chills and fever.   HENT:  Negative for ear discharge and nosebleeds.    Eyes:  Negative for discharge and redness.   Cardiovascular:  Negative for chest pain, orthopnea, palpitations, paroxysmal nocturnal dyspnea and syncope.   Respiratory:  Positive for shortness of breath. Negative for cough and wheezing.    Endocrine: Negative for heat intolerance.   Skin:  Negative for rash.   Musculoskeletal:  Negative for arthritis and myalgias.   Gastrointestinal:  Positive for bloating and abdominal pain. Negative for melena, nausea and vomiting.   Genitourinary:  Negative for dysuria and hematuria.   Neurological:  Negative for dizziness, light-headedness, numbness and  tremors.   Psychiatric/Behavioral:  Negative for depression. The patient is not nervous/anxious.          Vital Signs  Vitals:    04/13/24 0549 04/13/24 0814 04/13/24 0942 04/13/24 0958   BP: 138/64 128/83 121/86    BP Location: Left arm Left arm     Patient Position: Lying Sitting     Pulse: 92  (!) 143 (!) 153   Resp: 24 23     Temp: 97.6 °F (36.4 °C) 97.6 °F (36.4 °C)     TempSrc: Oral Oral     SpO2: 94% 97%     Weight:       Height:         Wt Readings from Last 1 Encounters:   04/12/24 104 kg (230 lb 6.1 oz)       Intake/Output Summary (Last 24 hours) at 4/13/2024 1015  Last data filed at 4/13/2024 1009  Gross per 24 hour   Intake 150 ml   Output 4010 ml   Net -3860 ml     Physical Exam:  Constitutional:       Appearance: Well-developed.   Eyes:      General: No scleral icterus.        Right eye: No discharge.   HENT:      Head: Normocephalic and atraumatic.   Neck:      Thyroid: No thyromegaly.      Lymphadenopathy: No cervical adenopathy.   Pulmonary:      Effort: Pulmonary effort is normal. No respiratory distress.      Breath sounds: Normal breath sounds. No wheezing. No rales.   Cardiovascular:      Normal rate. Irregularly irregular rhythm.      No gallop.    Edema:     Peripheral edema absent.   Abdominal:      General: There is distension.      Tenderness: There is abdominal tenderness.   Skin:     Findings: No erythema or rash.   Neurological:      Mental Status: Alert and oriented to person, place, and time.         Results Review:   Lab Results (last 24 hours)       Procedure Component Value Units Date/Time    POC Glucose Once [[id number]]  (Abnormal) Collected: 04/13/24 0548    Specimen: Blood Updated: 04/13/24 0550     Glucose 121 mg/dL      Comment: Serial Number: [id number]:  [id number]       Comprehensive Metabolic Panel [[id number]]  (Abnormal) Collected: 04/13/24 0428    Specimen: Blood Updated: 04/13/24 0450     Glucose 146 mg/dL      BUN 13 mg/dL      Creatinine 0.98 mg/dL      Sodium  143 mmol/L      Potassium 3.4 mmol/L      Chloride 109 mmol/L      CO2 22.0 mmol/L      Calcium 8.3 mg/dL      Total Protein 5.9 g/dL      Albumin 3.3 g/dL      ALT (SGPT) 43 U/L      AST (SGOT) 37 U/L      Alkaline Phosphatase 108 U/L      Total Bilirubin 0.3 mg/dL      Globulin 2.6 gm/dL      A/G Ratio 1.3 g/dL      BUN/Creatinine Ratio 13.3     Anion Gap 12.0 mmol/L      eGFR 81.9 mL/min/1.73     Narrative:      GFR Normal >60  Chronic Kidney Disease <60  Kidney Failure <15    The GFR formula is only valid for adults with stable renal function between ages 18 and 70.    Magnesium [[id number]]  (Normal) Collected: 04/13/24 0428    Specimen: Blood Updated: 04/13/24 0450     Magnesium 2.2 mg/dL     Phosphorus [[id number]]  (Normal) Collected: 04/13/24 0428    Specimen: Blood Updated: 04/13/24 0450     Phosphorus 2.8 mg/dL     CBC & Differential [[id number]]  (Abnormal) Collected: 04/13/24 0428    Specimen: Blood Updated: 04/13/24 0431    Narrative:      The following orders were created for panel order CBC & Differential.  Procedure                               Abnormality         Status                     ---------                               -----------         ------                     CBC Auto Differential[[id number]]        Abnormal            Final result                 Please view results for these tests on the individual orders.    CBC Auto Differential [[id number]]  (Abnormal) Collected: 04/13/24 0428    Specimen: Blood Updated: 04/13/24 0431     WBC 14.68 10*3/mm3      RBC 4.05 10*6/mm3      Hemoglobin 11.0 g/dL      Hematocrit 35.0 %      MCV 86.4 fL      MCH 27.2 pg      MCHC 31.4 g/dL      RDW 16.4 %      RDW-SD 51.7 fl      MPV 11.9 fL      Platelets 408 10*3/mm3      Neutrophil % 63.7 %      Lymphocyte % 25.2 %      Monocyte % 6.1 %      Eosinophil % 4.1 %      Basophil % 0.3 %      Immature Grans % 0.6 %      Neutrophils, Absolute 9.34 10*3/mm3      Lymphocytes, Absolute 3.70 10*3/mm3       Monocytes, Absolute 0.90 10*3/mm3      Eosinophils, Absolute 0.60 10*3/mm3      Basophils, Absolute 0.05 10*3/mm3      Immature Grans, Absolute 0.09 10*3/mm3      nRBC 0.0 /100 WBC     POC Glucose Once [[id number]]  (Abnormal) Collected: 04/12/24 2348    Specimen: Blood Updated: 04/12/24 2350     Glucose 117 mg/dL      Comment: Serial Number: [id number]:  [id number]       POC Glucose Q6H [[id number]]  (Abnormal) Collected: 04/12/24 1801    Specimen: Blood Updated: 04/12/24 1802     Glucose 150 mg/dL      Comment: Serial Number: [id number]:  [id number]       Blood Culture - Blood, Arm, Right [[id number]]  (Normal) Collected: 04/08/24 1529    Specimen: Blood from Arm, Right Updated: 04/12/24 1545     Blood Culture No growth at 4 days    Narrative:      Less than seven (7) mL's of blood was collected.  Insufficient quantity may yield false negative results.    Blood Culture - Blood, Arm, Right [[id number]]  (Normal) Collected: 04/08/24 1529    Specimen: Blood from Arm, Right Updated: 04/12/24 1545     Blood Culture No growth at 4 days    Narrative:      Less than seven (7) mL's of blood was collected.  Insufficient quantity may yield false negative results.    POC Glucose Q6H [[id number]]  (Abnormal) Collected: 04/12/24 1207    Specimen: Blood Updated: 04/12/24 1209     Glucose 124 mg/dL      Comment: Serial Number: [id number]:  [id number]             Imaging Results (Last 72 Hours)       Procedure Component Value Units Date/Time    XR Abdomen KUB [[id number]] Collected: 04/11/24 0742     Updated: 04/11/24 0747    Narrative:      XR ABDOMEN KUB    Date of Exam: 4/11/2024 7:35 AM EDT    Indication: post-op ileus    Comparison: 4/10/2024    Findings:  3 films. There is an NG tube with its tip terminating in the gastric body. There is increasing moderately severe small bowel dilatation in the mid abdomen with small bowel loops measuring up to 5.3 cm transversely. There is some contrast in the colon   which is  nondilated. There are cholecystectomy clips. There is a surgical drain in the pelvis.    Impression:      Impression:  Increasing small bowel dilatation. However, contrast in the colon is against the diagnosis of bowel obstruction. Findings may represent postoperative ileus.      Electronically Signed: Melissa Zeng MD    4/11/2024 7:45 AM EDT    Workstation ID: [id number]    XR Abdomen KUB [[id number]] Collected: 04/10/24 1044     Updated: 04/10/24 1049    Narrative:      XR ABDOMEN KUB    Date of Exam: 4/10/2024 10:18 AM EDT    Indication: NGT    Comparison: CT 4/8/2024 and prior studies    Findings:  NG tube is seen extending to just below the diaphragm. [place] appears to be at or just below the level of the GE junction.    Mildly distended loops of small bowel are noted. Structures are unremarkable      Impression:      Impression:  NG tube projects just below the diaphragm in the expected position of the body of the stomach. Advancement no additional 5 cm could be considered when clinically feasible      Electronically Signed: Loco Mensah MD    4/10/2024 10:47 AM EDT    Workstation ID: [id number]          ECG/EMG Results (most recent)       Procedure Component Value Units Date/Time    ECG 12 Lead Tachycardia [[id number]] Collected: 04/08/24 1649     Updated: 04/09/24 0730     QT Interval 314 ms      QTC Interval 518 ms     Narrative:      HEART RATE= 164  bpm  RR Interval= 367  ms  AL Interval=   ms  P Horizontal Axis=   deg  P Front Axis=   deg  QRSD Interval= 108  ms  QT Interval= 314  ms  QTcB= 518  ms  QRS Axis= 20  deg  T Wave Axis= 205  deg  - ABNORMAL ECG -  Atrial fibrillation with rapid V-rate  Incomplete left bundle branch block  Low voltage, extremity leads  The appearance of BBB may be rate related  When compared with ECG of 27-Mar-2024 11:50:13,  Significant change in rhythm  Electronically Signed By: Thiago Solomon (REDD) 09-Apr-2024 07:30:05  Date and Time of Study: 2024-04-08 16:49:10     Telemetry Scan [[id number]] Resulted: 04/08/24     Updated: 04/10/24 0613    Telemetry Scan [[id number]] Resulted: 04/08/24     Updated: 04/10/24 0640    Telemetry Scan [[id number]] Resulted: 04/08/24     Updated: 04/10/24 0959    Telemetry Scan [[id number]] Resulted: 04/08/24     Updated: 04/10/24 1126    Telemetry Scan [[id number]] Resulted: 04/08/24     Updated: 04/10/24 1126    Telemetry Scan [[id number]] Resulted: 04/08/24     Updated: 04/11/24 0912    Telemetry Scan [[id number]] Resulted: 04/08/24     Updated: 04/11/24 0935    Telemetry Scan [[id number]] Resulted: 04/08/24     Updated: 04/11/24 0955    Telemetry Scan [[id number]] Resulted: 04/08/24     Updated: 04/11/24 1119    Telemetry Scan [[id number]] Resulted: 04/08/24     Updated: 04/11/24 1150    Telemetry Scan [[id number]] Resulted: 04/08/24     Updated: 04/11/24 1333    Telemetry Scan [[id number]] Resulted: 04/08/24     Updated: 04/12/24 0656    Telemetry Scan [[id number]] Resulted: 04/08/24     Updated: 04/12/24 1249    Telemetry Scan [[id number]] Resulted: 04/08/24     Updated: 04/12/24 1255    Telemetry Scan [[id number]] Resulted: 04/08/24     Updated: 04/12/24 1306    Telemetry Scan [[id number]] Resulted: 04/08/24     Updated: 04/12/24 1403          CBC    Results from last 7 days   Lab Units 04/13/24  0428 04/12/24  0400 04/11/24  0243 04/10/24  0142 04/08/24  1529   WBC 10*3/mm3 14.68* 17.19* 20.36* 21.35* 19.89*   HEMOGLOBIN g/dL 11.0* 10.4* 10.3* 10.7* 12.6*   PLATELETS 10*3/mm3 408 372 393 395 409     BMP   Results from last 7 days   Lab Units 04/13/24  0428 04/12/24  0400 04/11/24  0243 04/10/24  0142 04/09/24  1201 04/09/24  0506 04/08/24  1529   SODIUM mmol/L 143 150* 146* 141  --  140 140   POTASSIUM mmol/L 3.4* 3.9 3.7 3.6 3.1* 2.5* 2.5*   CHLORIDE mmol/L 109* 115* 113* 110*  --  107 104   CO2 mmol/L 22.0 19.0* 19.0* 19.0*  --  18.0* 18.0*   BUN mg/dL 13 15 23 17  --  18 19   CREATININE mg/dL 0.98 0.90 1.23 1.15  --  1.14 1.43*   GLUCOSE mg/dL 146* 97  127* 139*  --  118* 136*   MAGNESIUM mg/dL 2.2 2.4 2.7* 2.8*  --   --  2.4   PHOSPHORUS mg/dL 2.8 2.3* 2.7 3.9  --   --   --      CMP   Results from last 7 days   Lab Units 04/13/24  0428 04/12/24  0400 04/11/24  0243 04/10/24  0142 04/09/24  1201 04/09/24  0506 04/08/24  1529   SODIUM mmol/L 143 150* 146* 141  --  140 140   POTASSIUM mmol/L 3.4* 3.9 3.7 3.6 3.1* 2.5* 2.5*   CHLORIDE mmol/L 109* 115* 113* 110*  --  107 104   CO2 mmol/L 22.0 19.0* 19.0* 19.0*  --  18.0* 18.0*   BUN mg/dL 13 15 23 17  --  18 19   CREATININE mg/dL 0.98 0.90 1.23 1.15  --  1.14 1.43*   GLUCOSE mg/dL 146* 97 127* 139*  --  118* 136*   ALBUMIN g/dL 3.3* 3.4* 3.3* 3.4*  --   --  3.7   BILIRUBIN mg/dL 0.3 0.2 0.3 0.3  --   --  0.5   ALK PHOS U/L 108 114 122* 128*  --   --  135*   AST (SGOT) U/L 37 27 23 37  --   --  67*   ALT (SGPT) U/L 43* 33 37 54*  --   --  63*   AMYLASE U/L  --   --   --   --   --  56  --    LIPASE U/L  --   --   --   --   --   --  38     Cardiac Studies:  Echo- Results for orders placed during the hospital encounter of 03/25/24    Adult Transthoracic Echo Complete W/ Cont if Necessary Per Protocol    Interpretation Summary    Left ventricular systolic function is low normal. Calculated left ventricular EF = 46% Left ventricular ejection fraction appears to be 46 - 50%.    The left ventricular cavity is mildly dilated.    Left ventricular diastolic dysfunction is noted.    The left atrial cavity is dilated.    Estimated right ventricular systolic pressure from tricuspid regurgitation is normal (<35 mmHg).    Dilation of the aortic root is present.  4.4 cm    Patient in atrial fibrillation during this study.    Stress Myoview-  Cath-      Medication Review:   Scheduled Meds:acetaminophen, 1,000 mg, Oral, Q6H  aspirin, 81 mg, Oral, Daily  atorvastatin, 20 mg, Oral, Nightly  cefTRIAXone, 2,000 mg, Intravenous, Q24H  enoxaparin, 1 mg/kg, Subcutaneous, Q12H  insulin lispro, 2-7 Units, Subcutaneous, Q6H  lidocaine, 20 mL,  Intradermal, Once  metoclopramide, 10 mg, Oral, TID AC  metoprolol tartrate, 50 mg, Oral, Q8H  metroNIDAZOLE, 500 mg, Intravenous, Q8H  pantoprazole, 40 mg, Oral, BID AC  potassium chloride, 40 mEq, Oral, Q4H  Scopolamine, 1 patch, Transdermal, Q72H  sertraline, 25 mg, Oral, Daily  sodium chloride, 10 mL, Intravenous, Q12H  sodium chloride, 10 mL, Intravenous, Q12H      Continuous Infusions:dextrose, 100 mL/hr, Last Rate: 100 mL/hr (04/13/24 0058)  dilTIAZem, 5-15 mg/hr, Last Rate: 12 mg/hr (04/13/24 2612)      PRN Meds:.  ALPRAZolam    senna-docusate sodium **AND** polyethylene glycol **AND** bisacodyl **AND** bisacodyl    Calcium Replacement - Follow Nurse / BPA Driven Protocol    dextrose    dextrose    glucagon (human recombinant)    HYDROmorphone **AND** naloxone    Magnesium Standard Dose Replacement - Follow Nurse / BPA Driven Protocol    melatonin    Morphine    nitroglycerin    ondansetron    ondansetron ODT    oxyCODONE    Phosphorus Replacement - Follow Nurse / BPA Driven Protocol    Potassium Replacement - Follow Nurse / BPA Driven Protocol    simethicone    [COMPLETED] Insert Peripheral IV **AND** sodium chloride    sodium chloride    sodium chloride    sodium chloride    sodium chloride      Assessment & Plan     Wound infection    Anxiety disorder    Essential hypertension    Palpitations    Mixed hyperlipidemia    Colon cancer    Atrial fibrillation with rapid ventricular response    MDM:    1.  Persistent atrial fibrillation:    Patient is still in atrial fibrillation and heart rate is elevated continue Cardizem.  I will discontinue intravenous Lopressor and start Lopressor 50 mg every 8.  If needed amiodarone can be added p.o.    2.  Hypertension:    Blood pressure is stable    3.  S/p laparotomy:    Patient still n.p.o. patient has ileus patient has NG tube    4.  Carcinoma of colon:    Patient underwent resection.  Patient would need chemotherapy          Pk Madrigal MD  04/13/24  10:15  EDT

## 2024-04-13 NOTE — PROGRESS NOTES
"-- DO NOT REPLY / DO NOT REPLY ALL --  -- Message is from the [facility] [employer]--    COVID-19 Universal Screening: N/A - Not about scheduling    General Patient Message      Reason for Call: mother returning call, stated the patients can stay as scheduled    Caller Information       Type Contact Phone    02/18/2021 12:13 PM CST Phone (Incoming) Justice Rabago (Mother) [phone] (M)          Alternative phone number: n    Turnaround time given to caller: ""This message will be sent to [facility] Provider's name]. The clinical team will fulfill your request as soon as they review your message. \""    " Infectious Diseases Progress Note      LOS: 5 days   Patient Care Team:  Gera Manriquez MD as PCP - General (Internal Medicine)  Gera Manriquez MD as PCP - Family Medicine    Chief Complaint: Incisional infection    Subjective       The patient has been afebrile for the last 24 hours.  The patient is on room air, hemodynamically stable, and is tolerating antimicrobial therapy.  No new complaints      Review of Systems:   Review of Systems   Constitutional: Negative.    HENT: Negative.     Eyes: Negative.    Respiratory: Negative.     Cardiovascular: Negative.    Gastrointestinal: Negative.    Endocrine: Negative.    Genitourinary: Negative.    Musculoskeletal: Negative.    Skin:  Positive for wound.   Neurological: Negative.    Psychiatric/Behavioral: Negative.     All other systems reviewed and are negative.       Objective     Vital Signs  Temp:  [97.4 °F (36.3 °C)-97.9 °F (36.6 °C)] 97.4 °F (36.3 °C)  Heart Rate:  [] 153  Resp:  [21-29] 21  BP: (106-147)/(64-88) 106/77    Physical Exam:  Physical Exam  Vitals and nursing note reviewed.   Constitutional:       General: He is not in acute distress.     Appearance: Normal appearance. He is well-developed and normal weight. He is not diaphoretic.   HENT:      Head: Normocephalic and atraumatic.   Eyes:      Conjunctiva/sclera: Conjunctivae normal.      Pupils: Pupils are equal, round, and reactive to light.   Cardiovascular:      Rate and Rhythm: Normal rate and regular rhythm.      Heart sounds: Normal heart sounds, S1 normal and S2 normal.   Pulmonary:      Effort: Pulmonary effort is normal. No respiratory distress.      Breath sounds: Normal breath sounds. No stridor. No wheezing or rales.   Abdominal:      General: Bowel sounds are normal. There is no distension.      Palpations: Abdomen is soft. There is no mass.      Tenderness: There is abdominal tenderness. There is no guarding.      Comments: Surgical dressing in place.    Drain in  place    Colostomy    NG tube   Genitourinary:     Comments: Pyle catheter  Musculoskeletal:         General: No deformity. Normal range of motion.      Cervical back: Neck supple.   Skin:     General: Skin is warm and dry.      Coloration: Skin is not pale.      Findings: No erythema or rash.   Neurological:      Mental Status: He is alert and oriented to person, place, and time.      Cranial Nerves: No cranial nerve deficit.   Psychiatric:         Mood and Affect: Mood normal.          Results Review:    I have reviewed all clinical data, test, lab, and imaging results.     Radiology  No Radiology Exams Resulted Within Past 24 Hours    Cardiology    Laboratory    Results from last 7 days   Lab Units 04/13/24  0428 04/12/24  0400 04/11/24  0243 04/10/24  0142 04/08/24  1529   WBC 10*3/mm3 14.68* 17.19* 20.36* 21.35* 19.89*   HEMOGLOBIN g/dL 11.0* 10.4* 10.3* 10.7* 12.6*   HEMATOCRIT % 35.0* 33.3* 33.4* 33.0* 39.8   PLATELETS 10*3/mm3 408 372 393 395 409     Results from last 7 days   Lab Units 04/13/24  0428 04/12/24  0400 04/11/24  0243 04/10/24  0142 04/09/24  1201 04/09/24  0506 04/08/24  1529   SODIUM mmol/L 143 150* 146* 141  --  140 140   POTASSIUM mmol/L 3.4* 3.9 3.7 3.6 3.1* 2.5* 2.5*   CHLORIDE mmol/L 109* 115* 113* 110*  --  107 104   CO2 mmol/L 22.0 19.0* 19.0* 19.0*  --  18.0* 18.0*   BUN mg/dL 13 15 23 17  --  18 19   CREATININE mg/dL 0.98 0.90 1.23 1.15  --  1.14 1.43*   GLUCOSE mg/dL 146* 97 127* 139*  --  118* 136*   ALBUMIN g/dL 3.3* 3.4* 3.3* 3.4*  --   --  3.7   BILIRUBIN mg/dL 0.3 0.2 0.3 0.3  --   --  0.5   ALK PHOS U/L 108 114 122* 128*  --   --  135*   AST (SGOT) U/L 37 27 23 37  --   --  67*   ALT (SGPT) U/L 43* 33 37 54*  --   --  63*   AMYLASE U/L  --   --   --   --   --  56  --    LIPASE U/L  --   --   --   --   --   --  38   CALCIUM mg/dL 8.3* 8.3* 8.4* 8.4*  --  8.7 9.7     Results from last 7 days   Lab Units 04/09/24  0506   CK TOTAL U/L 73             Microbiology   Microbiology  Results (last 10 days)       Procedure Component Value - Date/Time    Wound Culture - Swab, Abdominal Wall [[id number]]  (Abnormal)  (Susceptibility) Collected: 04/08/24 1934    Lab Status: Final result Specimen: Swab from Abdominal Wall Updated: 04/11/24 0709     Wound Culture Rare Streptococcus sanguinis      Scant growth (1+) Normal Skin Jolynn     Gram Stain Few (2+) WBCs per low power field    Susceptibility        Streptococcus sanguinis      KATIE      Ceftriaxone Susceptible      Penicillin G Intermediate      Vancomycin Susceptible                           Anaerobic Culture - Swab, Abdominal Wall [[id number]]  (Abnormal) Collected: 04/08/24 1934    Lab Status: Final result Specimen: Swab from Abdominal Wall Updated: 04/12/24 0822     Anaerobic Culture Bacteroides fragilis group    Blood Culture - Blood, Arm, Right [[id number]]  (Normal) Collected: 04/08/24 1529    Lab Status: Preliminary result Specimen: Blood from Arm, Right Updated: 04/12/24 1545     Blood Culture No growth at 4 days    Narrative:      Less than seven (7) mL's of blood was collected.  Insufficient quantity may yield false negative results.    Blood Culture - Blood, Arm, Right [[id number]]  (Normal) Collected: 04/08/24 1529    Lab Status: Preliminary result Specimen: Blood from Arm, Right Updated: 04/12/24 1545     Blood Culture No growth at 4 days    Narrative:      Less than seven (7) mL's of blood was collected.  Insufficient quantity may yield false negative results.            Medication Review:       Schedule Meds  acetaminophen, 1,000 mg, Oral, Q6H  aspirin, 81 mg, Oral, Daily  atorvastatin, 20 mg, Oral, Nightly  cefTRIAXone, 2,000 mg, Intravenous, Q24H  dilTIAZem, 30 mg, Oral, Q8H  enoxaparin, 1 mg/kg, Subcutaneous, Q12H  insulin lispro, 2-7 Units, Subcutaneous, Q6H  lidocaine, 20 mL, Intradermal, Once  metoclopramide, 10 mg, Oral, TID AC  metoprolol tartrate, 50 mg, Oral, Q8H  metroNIDAZOLE, 500 mg, Intravenous, Q8H  pantoprazole, 40  mg, Oral, BID AC  potassium chloride, 40 mEq, Oral, Q4H  Scopolamine, 1 patch, Transdermal, Q72H  sertraline, 25 mg, Oral, Daily  sodium chloride, 10 mL, Intravenous, Q12H  sodium chloride, 10 mL, Intravenous, Q12H        Infusion Meds  dextrose, 100 mL/hr, Last Rate: 100 mL/hr (04/13/24 0058)  dilTIAZem, 5-15 mg/hr, Last Rate: 12 mg/hr (04/13/24 0958)        PRN Meds    ALPRAZolam    senna-docusate sodium **AND** polyethylene glycol **AND** bisacodyl **AND** bisacodyl    Calcium Replacement - Follow Nurse / BPA Driven Protocol    dextrose    dextrose    glucagon (human recombinant)    HYDROmorphone **AND** naloxone    Magnesium Standard Dose Replacement - Follow Nurse / BPA Driven Protocol    melatonin    Morphine    nitroglycerin    ondansetron    ondansetron ODT    oxyCODONE    Phosphorus Replacement - Follow Nurse / BPA Driven Protocol    Potassium Replacement - Follow Nurse / BPA Driven Protocol    simethicone    [COMPLETED] Insert Peripheral IV **AND** sodium chloride    sodium chloride    sodium chloride    sodium chloride    sodium chloride        Assessment & Plan       Antimicrobial Therapy   1.  IV  ceftriaxone       2.  IV Flagyl        3.          4.        5.          Assessment     Infected abdomen wall incision with anastomosis leak.  Wound cultures are growing Streptococcus sanguinus and Bacteroides fragilis.  Blood cultures are negative so far.  Patient is status post diagnostic laparoscopic diverting colostomy, pelvic washout and drain placement on 4/10/2024-no intra abdomen abscess seen     Reactive leukocytosis secondary to above.  Trending down     S/p resection of colon cancer on March 30, 2024     Diabetes mellitus type 2-A1c is 5.8     Plan     Continue IV Rocephin 2 g every 24 hours for 14 days of treatment from surgery-last day on 4/23/2024  Continue IV Flagyl 500 mg every 8 hours-can be switched to p.o. Flagyl at discharge  Supportive care  A.mJan Borrego  MD  04/13/24  14:07 EDT    Note is dictated utilizing voice recognition software/Dragon

## 2024-04-13 NOTE — PROGRESS NOTES
General Surgery Progress Note    Name: Viktor Atkins ADMIT: 2024   : 1950  PCP: Gera Manriquez MD    MRN: [id number] LOS: 5 days   AGE/SEX: 73 y.o. male  ROOM:    [facility]    Chief Complaint   Patient presents with    Wound Infection     Pt has drainage from his abd incision.  Pt was sent in by surgeon for eval and possible admit for further surgery. Pt has mass and colon surgery on Saturday.        Subjective     Patient seen and examined.  Heart rate in 140s to 150s, otherwise vital signs are stable and he is afebrile.  Overall doing okay this morning, no new complaints.  Admits to short episode of nausea this morning that has since resolved.  Tolerating full liquids, denies nausea and vomiting.  Although patient is tolerating full liquids, he still anxious about having NG tube removed.    Objective     Scheduled Medications:   acetaminophen, 1,000 mg, Oral, Q6H  aspirin, 81 mg, Oral, Daily  atorvastatin, 20 mg, Oral, Nightly  cefTRIAXone, 2,000 mg, Intravenous, Q24H  dilTIAZem, 30 mg, Oral, Q8H  enoxaparin, 1 mg/kg, Subcutaneous, Q12H  insulin lispro, 2-7 Units, Subcutaneous, Q6H  lidocaine, 20 mL, Intradermal, Once  metoclopramide, 10 mg, Oral, TID AC  metoprolol tartrate, 50 mg, Oral, Q8H  metroNIDAZOLE, 500 mg, Intravenous, Q8H  pantoprazole, 40 mg, Oral, BID AC  potassium chloride, 40 mEq, Oral, Q4H  Scopolamine, 1 patch, Transdermal, Q72H  sertraline, 25 mg, Oral, Daily  sodium chloride, 10 mL, Intravenous, Q12H  sodium chloride, 10 mL, Intravenous, Q12H        Active Infusions:  dextrose, 100 mL/hr, Last Rate: 100 mL/hr (24 0058)  dilTIAZem, 5-15 mg/hr, Last Rate: 12 mg/hr (24 0974)        As Needed Medications:    ALPRAZolam    senna-docusate sodium **AND** polyethylene glycol **AND** bisacodyl **AND** bisacodyl    Calcium Replacement - Follow Nurse / BPA Driven Protocol    dextrose    dextrose    glucagon (human recombinant)    HYDROmorphone **AND** naloxone    Magnesium  Standard Dose Replacement - Follow Nurse / BPA Driven Protocol    melatonin    Morphine    nitroglycerin    ondansetron    ondansetron ODT    oxyCODONE    Phosphorus Replacement - Follow Nurse / BPA Driven Protocol    Potassium Replacement - Follow Nurse / BPA Driven Protocol    simethicone    [COMPLETED] Insert Peripheral IV **AND** sodium chloride    sodium chloride    sodium chloride    sodium chloride    sodium chloride    Vital Signs  Vital Signs Patient Vitals for the past 24 hrs:   BP Temp Temp src Pulse Resp SpO2   04/13/24 0958 -- -- -- (!) 153 -- --   04/13/24 0942 121/86 -- -- (!) 143 -- --   04/13/24 0814 128/83 97.6 °F (36.4 °C) Oral -- 23 97 %   04/13/24 0549 138/64 97.6 °F (36.4 °C) Oral 92 24 94 %   04/13/24 0103 147/88 97.7 °F (36.5 °C) Oral 111 (!) 29 96 %   04/12/24 2115 133/73 97.9 °F (36.6 °C) Oral 107 27 96 %   04/12/24 1702 133/73 -- -- 116 -- --   04/12/24 1633 135/86 97.5 °F (36.4 °C) Oral 102 21 94 %   04/12/24 1553 132/71 -- -- (!) 128 -- --   04/12/24 1541 -- -- -- 106 -- 95 %   04/12/24 1300 130/71 98.9 °F (37.2 °C) Oral 112 22 --     I/O:  I/O last 3 completed shifts:  In: 150 [P.O.:150]  Out: 7325 [Urine:800; Emesis/NG output:6000; Drains:50; Stool:475]    Physical Exam:  Physical Exam  Constitutional:       General: He is not in acute distress.  Cardiovascular:      Rate and Rhythm: Tachycardia present.      Comments: Heart rates in 140s to 150s  Pulmonary:      Effort: Pulmonary effort is normal. No respiratory distress.   Abdominal:      Palpations: Abdomen is soft.      Comments: Soft, minimal distention, appropriate tenderness.  Ostomy appears viable, stool noted in appliance.  EDUARDO x 1 present with serosanguineous output.   Neurological:      Mental Status: He is alert and oriented to person, place, and time.   Psychiatric:         Mood and Affect: Mood normal.         Behavior: Behavior normal.         Results Review:     CBC    Results from last 7 days   Lab Units 04/13/24  4202  04/12/24  0400 04/11/24  0243 04/10/24  0142 04/08/24  1529   WBC 10*3/mm3 14.68* 17.19* 20.36* 21.35* 19.89*   HEMOGLOBIN g/dL 11.0* 10.4* 10.3* 10.7* 12.6*   PLATELETS 10*3/mm3 408 372 393 395 409     BMP   Results from last 7 days   Lab Units 04/13/24  0428 04/12/24  0400 04/11/24  0243 04/10/24  0142 04/09/24  1201 04/09/24  0506 04/08/24  1529   SODIUM mmol/L 143 150* 146* 141  --  140 140   POTASSIUM mmol/L 3.4* 3.9 3.7 3.6 3.1* 2.5* 2.5*   CHLORIDE mmol/L 109* 115* 113* 110*  --  107 104   CO2 mmol/L 22.0 19.0* 19.0* 19.0*  --  18.0* 18.0*   BUN mg/dL 13 15 23 17  --  18 19   CREATININE mg/dL 0.98 0.90 1.23 1.15  --  1.14 1.43*   GLUCOSE mg/dL 146* 97 127* 139*  --  118* 136*   MAGNESIUM mg/dL 2.2 2.4 2.7* 2.8*  --   --  2.4   PHOSPHORUS mg/dL 2.8 2.3* 2.7 3.9  --   --   --      Radiology(recent) No radiology results for the last day    I reviewed the patient's new clinical results.    Assessment & Plan       Wound infection    Anxiety disorder    Essential hypertension    Palpitations    Mixed hyperlipidemia    Colon cancer    Atrial fibrillation with rapid ventricular response      73 y.o. male with rectosigmoid cancer s/p resection with surgical site infection and radiographic findings of potential anastomotic leak, he is s/p dx laparoscopy, pelvic drain placement, diverting colostomy, wound washout     Given patient is still anxious about potentially having NG tube replaced if ileus has not resolved, will clamp NG tube for 4 hours.  If patient tolerates without nausea vomiting, will remove NG tube and continue full liquid diet  Pain medication as needed  Encourage ambulation as tolerated, out of bed to chair  PT following  Continue ostomy teaching and care  Continue IV antibiotic per ID, will appreciate recommendations          This note was created using Dragon Voice Recognition software.    ZAY Nguyen  04/13/24  10:41 EDT

## 2024-04-13 NOTE — NURSING NOTE
Noticed new skin breakdown, looks to be from ostomy bag    Skin is open, with purulent drainage

## 2024-04-14 LAB
ALBUMIN SERPL-MCNC: 3.4 G/DL (ref 3.5–5.2)
ALBUMIN/GLOB SERPL: 1.2 G/DL
ALP SERPL-CCNC: 118 U/L (ref 39–117)
ALT SERPL W P-5'-P-CCNC: 41 U/L (ref 1–41)
ANION GAP SERPL CALCULATED.3IONS-SCNC: 13 MMOL/L (ref 5–15)
AST SERPL-CCNC: 33 U/L (ref 1–40)
BASOPHILS # BLD AUTO: 0.03 10*3/MM3 (ref 0–0.2)
BASOPHILS NFR BLD AUTO: 0.2 % (ref 0–1.5)
BILIRUB SERPL-MCNC: 0.3 MG/DL (ref 0–1.2)
BUN SERPL-MCNC: 16 MG/DL (ref 8–23)
BUN/CREAT SERPL: 13.7 (ref 7–25)
CALCIUM SPEC-SCNC: 8.8 MG/DL (ref 8.6–10.5)
CHLORIDE SERPL-SCNC: 109 MMOL/L (ref 98–107)
CO2 SERPL-SCNC: 21 MMOL/L (ref 22–29)
CREAT SERPL-MCNC: 1.17 MG/DL (ref 0.76–1.27)
DEPRECATED RDW RBC AUTO: 52.2 FL (ref 37–54)
EGFRCR SERPLBLD CKD-EPI 2021: 66.2 ML/MIN/1.73
EOSINOPHIL # BLD AUTO: 0.41 10*3/MM3 (ref 0–0.4)
EOSINOPHIL NFR BLD AUTO: 2.9 % (ref 0.3–6.2)
ERYTHROCYTE [DISTWIDTH] IN BLOOD BY AUTOMATED COUNT: 16.4 % (ref 12.3–15.4)
GLOBULIN UR ELPH-MCNC: 2.9 GM/DL
GLUCOSE BLDC GLUCOMTR-MCNC: 104 MG/DL (ref 70–105)
GLUCOSE BLDC GLUCOMTR-MCNC: 105 MG/DL (ref 70–105)
GLUCOSE BLDC GLUCOMTR-MCNC: 108 MG/DL (ref 70–105)
GLUCOSE BLDC GLUCOMTR-MCNC: 109 MG/DL (ref 70–105)
GLUCOSE BLDC GLUCOMTR-MCNC: 121 MG/DL (ref 70–105)
GLUCOSE SERPL-MCNC: 103 MG/DL (ref 65–99)
HCT VFR BLD AUTO: 37.8 % (ref 37.5–51)
HGB BLD-MCNC: 11.8 G/DL (ref 13–17.7)
IMM GRANULOCYTES # BLD AUTO: 0.11 10*3/MM3 (ref 0–0.05)
IMM GRANULOCYTES NFR BLD AUTO: 0.8 % (ref 0–0.5)
LYMPHOCYTES # BLD AUTO: 4.2 10*3/MM3 (ref 0.7–3.1)
LYMPHOCYTES NFR BLD AUTO: 30.2 % (ref 19.6–45.3)
MAGNESIUM SERPL-MCNC: 2.3 MG/DL (ref 1.6–2.4)
MCH RBC QN AUTO: 27.2 PG (ref 26.6–33)
MCHC RBC AUTO-ENTMCNC: 31.2 G/DL (ref 31.5–35.7)
MCV RBC AUTO: 87.1 FL (ref 79–97)
MONOCYTES # BLD AUTO: 0.9 10*3/MM3 (ref 0.1–0.9)
MONOCYTES NFR BLD AUTO: 6.5 % (ref 5–12)
NEUTROPHILS NFR BLD AUTO: 59.4 % (ref 42.7–76)
NEUTROPHILS NFR BLD AUTO: 8.28 10*3/MM3 (ref 1.7–7)
NRBC BLD AUTO-RTO: 0 /100 WBC (ref 0–0.2)
PHOSPHATE SERPL-MCNC: 3.1 MG/DL (ref 2.5–4.5)
PLATELET # BLD AUTO: 436 10*3/MM3 (ref 140–450)
PMV BLD AUTO: 12.1 FL (ref 6–12)
POTASSIUM SERPL-SCNC: 3.9 MMOL/L (ref 3.5–5.2)
PROT SERPL-MCNC: 6.3 G/DL (ref 6–8.5)
RBC # BLD AUTO: 4.34 10*6/MM3 (ref 4.14–5.8)
SODIUM SERPL-SCNC: 143 MMOL/L (ref 136–145)
WBC NRBC COR # BLD AUTO: 13.93 10*3/MM3 (ref 3.4–10.8)

## 2024-04-14 PROCEDURE — 82948 REAGENT STRIP/BLOOD GLUCOSE: CPT

## 2024-04-14 PROCEDURE — 25010000002 METRONIDAZOLE 500 MG/100ML SOLUTION: Performed by: NURSE PRACTITIONER

## 2024-04-14 PROCEDURE — 82948 REAGENT STRIP/BLOOD GLUCOSE: CPT | Performed by: STUDENT IN AN ORGANIZED HEALTH CARE EDUCATION/TRAINING PROGRAM

## 2024-04-14 PROCEDURE — 99232 SBSQ HOSP IP/OBS MODERATE 35: CPT | Performed by: INTERNAL MEDICINE

## 2024-04-14 PROCEDURE — 25010000002 CEFTRIAXONE PER 250 MG: Performed by: NURSE PRACTITIONER

## 2024-04-14 PROCEDURE — 25010000002 DIGOXIN PER 500 MCG: Performed by: INTERNAL MEDICINE

## 2024-04-14 PROCEDURE — 83735 ASSAY OF MAGNESIUM: CPT | Performed by: STUDENT IN AN ORGANIZED HEALTH CARE EDUCATION/TRAINING PROGRAM

## 2024-04-14 PROCEDURE — 84100 ASSAY OF PHOSPHORUS: CPT | Performed by: STUDENT IN AN ORGANIZED HEALTH CARE EDUCATION/TRAINING PROGRAM

## 2024-04-14 PROCEDURE — 25010000002 METOCLOPRAMIDE PER 10 MG: Performed by: INTERNAL MEDICINE

## 2024-04-14 PROCEDURE — 25010000002 ENOXAPARIN PER 10 MG: Performed by: STUDENT IN AN ORGANIZED HEALTH CARE EDUCATION/TRAINING PROGRAM

## 2024-04-14 PROCEDURE — 80053 COMPREHEN METABOLIC PANEL: CPT | Performed by: STUDENT IN AN ORGANIZED HEALTH CARE EDUCATION/TRAINING PROGRAM

## 2024-04-14 PROCEDURE — 85025 COMPLETE CBC W/AUTO DIFF WBC: CPT | Performed by: STUDENT IN AN ORGANIZED HEALTH CARE EDUCATION/TRAINING PROGRAM

## 2024-04-14 RX ORDER — METOCLOPRAMIDE HYDROCHLORIDE 5 MG/ML
10 INJECTION INTRAMUSCULAR; INTRAVENOUS EVERY 6 HOURS
Status: DISCONTINUED | OUTPATIENT
Start: 2024-04-14 | End: 2024-04-17

## 2024-04-14 RX ORDER — DIGOXIN 0.25 MG/ML
250 INJECTION INTRAMUSCULAR; INTRAVENOUS ONCE
Status: COMPLETED | OUTPATIENT
Start: 2024-04-14 | End: 2024-04-14

## 2024-04-14 RX ORDER — FAMOTIDINE 10 MG/ML
20 INJECTION, SOLUTION INTRAVENOUS EVERY 12 HOURS SCHEDULED
Status: DISCONTINUED | OUTPATIENT
Start: 2024-04-14 | End: 2024-04-19 | Stop reason: HOSPADM

## 2024-04-14 RX ADMIN — METOCLOPRAMIDE 10 MG: 5 INJECTION, SOLUTION INTRAMUSCULAR; INTRAVENOUS at 20:58

## 2024-04-14 RX ADMIN — METOPROLOL TARTRATE 50 MG: 50 TABLET, FILM COATED ORAL at 00:55

## 2024-04-14 RX ADMIN — DILTIAZEM HYDROCHLORIDE 30 MG: 30 TABLET, FILM COATED ORAL at 14:31

## 2024-04-14 RX ADMIN — METRONIDAZOLE 500 MG: 500 INJECTION, SOLUTION INTRAVENOUS at 09:17

## 2024-04-14 RX ADMIN — METRONIDAZOLE 500 MG: 500 INJECTION, SOLUTION INTRAVENOUS at 00:54

## 2024-04-14 RX ADMIN — ACETAMINOPHEN 1000 MG: 500 TABLET, FILM COATED ORAL at 05:58

## 2024-04-14 RX ADMIN — Medication 10 ML: at 20:58

## 2024-04-14 RX ADMIN — ENOXAPARIN SODIUM 100 MG: 100 INJECTION SUBCUTANEOUS at 14:32

## 2024-04-14 RX ADMIN — DILTIAZEM HYDROCHLORIDE 30 MG: 30 TABLET, FILM COATED ORAL at 21:06

## 2024-04-14 RX ADMIN — CEFTRIAXONE SODIUM 2000 MG: 2 INJECTION, POWDER, FOR SOLUTION INTRAMUSCULAR; INTRAVENOUS at 17:22

## 2024-04-14 RX ADMIN — Medication 5 MG: at 21:12

## 2024-04-14 RX ADMIN — FAMOTIDINE 20 MG: 10 INJECTION INTRAVENOUS at 10:46

## 2024-04-14 RX ADMIN — Medication 12.5 MG/HR: at 18:08

## 2024-04-14 RX ADMIN — METRONIDAZOLE 500 MG: 500 INJECTION, SOLUTION INTRAVENOUS at 17:22

## 2024-04-14 RX ADMIN — METOPROLOL TARTRATE 50 MG: 50 TABLET, FILM COATED ORAL at 09:17

## 2024-04-14 RX ADMIN — Medication 10 ML: at 09:52

## 2024-04-14 RX ADMIN — METOCLOPRAMIDE 10 MG: 10 TABLET ORAL at 09:17

## 2024-04-14 RX ADMIN — DIGOXIN 250 MCG: 0.25 INJECTION INTRAMUSCULAR; INTRAVENOUS at 14:54

## 2024-04-14 RX ADMIN — METOPROLOL TARTRATE 50 MG: 50 TABLET, FILM COATED ORAL at 17:22

## 2024-04-14 RX ADMIN — DILTIAZEM HYDROCHLORIDE 30 MG: 30 TABLET, FILM COATED ORAL at 05:58

## 2024-04-14 RX ADMIN — Medication 10 ML: at 09:53

## 2024-04-14 RX ADMIN — ASPIRIN 81 MG CHEWABLE TABLET 81 MG: 81 TABLET CHEWABLE at 09:17

## 2024-04-14 RX ADMIN — METOCLOPRAMIDE 10 MG: 5 INJECTION, SOLUTION INTRAMUSCULAR; INTRAVENOUS at 14:31

## 2024-04-14 RX ADMIN — ACETAMINOPHEN 1000 MG: 500 TABLET, FILM COATED ORAL at 00:51

## 2024-04-14 RX ADMIN — ENOXAPARIN SODIUM 100 MG: 100 INJECTION SUBCUTANEOUS at 00:51

## 2024-04-14 RX ADMIN — Medication 15 MG/HR: at 09:30

## 2024-04-14 RX ADMIN — ATORVASTATIN CALCIUM 20 MG: 20 TABLET, FILM COATED ORAL at 20:57

## 2024-04-14 RX ADMIN — ALPRAZOLAM 0.25 MG: 0.25 TABLET ORAL at 02:52

## 2024-04-14 RX ADMIN — PANTOPRAZOLE SODIUM 40 MG: 40 TABLET, DELAYED RELEASE ORAL at 09:17

## 2024-04-14 RX ADMIN — SERTRALINE HYDROCHLORIDE 25 MG: 25 TABLET ORAL at 09:17

## 2024-04-14 RX ADMIN — FAMOTIDINE 20 MG: 10 INJECTION INTRAVENOUS at 20:57

## 2024-04-14 NOTE — PLAN OF CARE
Goal Outcome Evaluation:            Pt on 2L NC. No pt c/o of pain. Pt did received prn med for anxiety, see MAR. Pt on IV ABX and Cardizem gtt. NG tube set to LWS.      Dressing changes to ABD incision completed, pt tolerated well

## 2024-04-14 NOTE — CASE MANAGEMENT/SOCIAL WORK
Continued Stay Note  PRASHANTH Yadav     Patient Name: Viktor Atkins  MRN: [id number]  Today's Date: 4/14/2024    Admit Date: 4/8/2024    Plan: Home with spouse and [facility]; accepted.  order needed. [facility] following for IV antibiotics.   Discharge Plan       Row Name 04/14/24 1536       Plan    Plan Home with spouse and [facility] ; accepted.  order needed. [facility] following for IV antibiotics.

## 2024-04-14 NOTE — PROGRESS NOTES
"[facility] MEDICINE SERVICE  DAILY PROGRESS NOTE      Patient Name: Viktor Atkins  Date of Admission: 4/8/2024  Today's Date: 04/14/24  Length of Stay: 6  Primary Care Physician: Gera Manriquez MD    Subjective   Patient is stable today.  He is having some lower abdominal discomfort and tightness.  No other complaints.  No overnight events.      Objective    Temp:  [97.4 °F (36.3 °C)-99 °F (37.2 °C)] 98.4 °F (36.9 °C)  Heart Rate:  [] 139  Resp:  [21-28] 24  BP: ()/(60-86) 122/73  Physical Exam  General: NAD  HEENT: AT NC, EOMI, NG tube in place  Neck: No JVD  CVS: S1/S2 present, irregular tachycardia, no M/R/G  Lungs: CTA B/L  Abdomen: soft, minimally tender at incision site, ND, BS+, ostomy in place with small amount liquid stool  Ext: no edema  Skin: no rashes, bruises or discolorations  Neuro: no focal deficits  Psych: Not agitated         Results Review:  I have reviewed the labs, radiology results, and diagnostic studies.    Laboratory Data:   Results from last 7 days   Lab Units 04/14/24  0104 04/13/24  0428 04/12/24  0400   WBC 10*3/mm3 13.93* 14.68* 17.19*   HEMOGLOBIN g/dL 11.8* 11.0* 10.4*   HEMATOCRIT % 37.8 35.0* 33.3*   PLATELETS 10*3/mm3 436 408 372        Results from last 7 days   Lab Units 04/14/24  0104 04/13/24  0428 04/12/24  0400   SODIUM mmol/L 143 143 150*   POTASSIUM mmol/L 3.9 3.4* 3.9   CHLORIDE mmol/L 109* 109* 115*   CO2 mmol/L 21.0* 22.0 19.0*   BUN mg/dL 16 13 15   CREATININE mg/dL 1.17 0.98 0.90   CALCIUM mg/dL 8.8 8.3* 8.3*   BILIRUBIN mg/dL 0.3 0.3 0.2   ALK PHOS U/L 118* 108 114   ALT (SGPT) U/L 41 43* 33   AST (SGOT) U/L 33 37 27   GLUCOSE mg/dL 103* 146* 97       Culture Data:   No results found for: \"BLOODCX\"  No results found for: \"URINECX\"  No results found for: \"RESPCX\"  No results found for: \"WOUNDCX\"  No results found for: \"STOOLCX\"  No components found for: \"BODYFLD\"    Radiology Data:   Imaging Results (Last 24 Hours)       Procedure Component Value " Units Date/Time    CT Abdomen Pelvis Without Contrast [[id number]] Collected: 04/13/24 1706     Updated: 04/13/24 1724    Narrative:      CT ABDOMEN PELVIS WO CONTRAST    Date of Exam: 4/13/2024 5:00 PM EDT    Indication: Abdominal pain, post-op.    Comparison: None available.    Technique: Axial CT images were obtained of the abdomen and pelvis without the administration of contrast. Sagittal and coronal reconstructions were performed.  Automated exposure control and iterative reconstruction methods were used.      Findings:  Visualized lung bases are clear. Small amounts of gas are seen throughout the extraperitoneal soft tissues of the abdominal wall compatible with postoperative change.        The liver, pancreas, and spleen are within normal limits. Bilateral adrenal glands appear normal. Kidneys appear normal bilaterally. No evidence of renal or ureteral stone or hydronephrosis. Patient is status post cholecystectomy. Nasogastric tube is in   place in the stomach. Stomach is fluid-filled and mildly distended. There are multiple fluid-filled mildly distended loops of small bowel throughout the abdomen. There is a discrete transition point to normal caliber small bowel within the region of the   distal ileum suggesting partial small bowel obstruction. No abdominal or retroperitoneal adenopathy.    Pelvis: There is a right lower quadrant EDUARDO drain in place. There is contrast material within the colon. There is a left lower quadrant loop colostomy. There are multiple sigmoid diverticula. There are postoperative changes of the sigmoid colon from   partial colonic resection and reanastomosis. No definite contrast extravasation. No free fluid seen within the pelvis. No pelvic or inguinal adenopathy. Pyle catheter is in place in the urinary bladder. There is a EDUARDO drain within the subcutaneous fat   overlying the midline of the lower pelvis. No fluid collection identified.    No lytic or sclerotic bony lesion  identified.    Impression:      Impression:    1. Fluid-filled distended loops of small bowel with a discrete transition point to normal caliber decompressed bowel within the distal ileum. Findings would be compatible with partial small bowel obstruction.  2. Status post sigmoid colon resection and reanastomosis with diverting loop colostomy within the left lower quadrant. No evidence of contrast extravasation. No free fluid in the pelvis.            Electronically Signed: Thaddeus Buchanan MD    4/13/2024 5:22 PM EDT    Workstation ID: [id number]            I have reviewed the patient's current medications.     Assessment/Plan     1) post-op wound infection  - s/p low anterior resection of a bleeding rectosigmoid mass on 3/30 with findings of invasive moderate differentiated adenocarcinoma  - found to have abscess on imaging after returning to hospital for worsening pain  - surgery on board, appreciate recs  - s/p laparoscopy with pelvic drain placement and loop colostomy on 4/9  - wound cultures growing strept  - ID on board, appreciate recs  - rocephin, flagyl to be completed on 4/23  - advance diet per surgery    2) A-fib with RVR  - cardiology on board, appreciate recs  - cardizem, metoprolol, lovenox    3) ileus/SBO  - NPO  - NG suction  - IV fluids  - surgery on board, appreciate recs  - supportive care    4) rectosigmoid adenocarcinoma  - f/u oncology as outpatient    5) HTN  - cardizem, metoprolol  - hold losartan for now    6) DM  - ISS, accuchecks, diet    7) HLD  - lipitor    8) mood disorders  - home meds    9) GI/DVT ppx  - protonix/lovenox              Electronically signed by Hiren Valles MD, 04/14/24, 09:18 EDT.

## 2024-04-14 NOTE — PLAN OF CARE
Problem: Adult Inpatient Plan of Care  Goal: Plan of Care Review  4/14/2024 1652 by Alejandra Avila LPN  Flowsheets  Taken 4/14/2024 1652 by Alejandra Avila LPN  Progress: improving  Taken 4/13/2024 0207 by Tiffany Garcia RN  Plan of Care Reviewed With:   patient   spouse          Progress: improving  Problem: Adult Inpatient Plan of Care  Goal: Absence of Hospital-Acquired Illness or Injury  Intervention: Prevent and Manage VTE (Venous Thromboembolism) Risk  Recent Flowsheet Documentation  Taken 4/14/2024 1654 by Alejandra Avila LPN  Activity Management:   ambulated in room   up in chair   sitting, edge of bed  Taken 4/14/2024 0713 by Alejandra Avila LPN  Activity Management: (ambulated from chair to bed. will ambulate in [place] late today. pts NG hooked up to suctiion) ambulated in room     Problem: Adult Inpatient Plan of Care  Goal: Optimal Comfort and Wellbeing  Intervention: Monitor Pain and Promote Comfort  Recent Flowsheet Documentation  Taken 4/14/2024 1659 by Alejandra Avila LPN  Pain Management Interventions:   pillow support provided   position adjusted   medication offered but refused     Problem: Pain Acute  Goal: Acceptable Pain Control and Functional Ability  Intervention: Prevent or Manage Pain  Recent Flowsheet Documentation  Taken 4/14/2024 1654 by Alejandra Avila LPN  Sleep/Rest Enhancement:   awakenings minimized   consistent schedule promoted   family presence promoted   natural light exposure provided   noise level reduced   regular sleep/rest pattern promoted   relaxation techniques promoted   room darkened     Problem: Glycemic Control Impaired (Sepsis/Septic Shock)  Goal: Blood Glucose Level Within Desired Range  Intervention: Optimize Glycemic Control  Flowsheets (Taken 4/14/2024 1654)  Glycemic Management:   blood glucose monitored   supplemental insulin given     Problem: Infection Progression (Sepsis/Septic Shock)  Goal: Absence of Infection Signs and  Symptoms  Intervention: Promote Recovery  Flowsheets  Taken 4/14/2024 1654  Activity Management:   ambulated in room   up in chair   sitting, edge of bed  Airway/Ventilation Support: cough relief provided  Sleep/Rest Enhancement:   awakenings minimized   consistent schedule promoted   family presence promoted   natural light exposure provided   noise level reduced   regular sleep/rest pattern promoted   relaxation techniques promoted   room darkened  Taken 4/14/2024 0713  Activity Management: (ambulated from chair to bed. will ambulate in [place] late today. pts NG hooked up to suctiion) ambulated in room     Problem: Skin Injury Risk Increased  Goal: Skin Health and Integrity  Intervention: Optimize Skin Protection  Recent Flowsheet Documentation  Taken 4/14/2024 1654 by Alejandra Avila LPN  Head of Bed (HOB) Positioning: HOB at 30-45 degrees     Problem: Impaired Wound Healing  Goal: Optimal Wound Healing  Intervention: Promote Wound Healing  Flowsheets  Taken 4/14/2024 1659  Pain Management Interventions:   pillow support provided   position adjusted   medication offered but refused  Taken 4/14/2024 1654  Activity Management:   ambulated in room   up in chair   sitting, edge of bed  Sleep/Rest Enhancement:   awakenings minimized   consistent schedule promoted   family presence promoted   natural light exposure provided   noise level reduced   regular sleep/rest pattern promoted   relaxation techniques promoted   room darkened  Taken 4/14/2024 0713  Activity Management: (ambulated from chair to bed. will ambulate in [place] late today. pts NG hooked up to suctiion) ambulated in room

## 2024-04-14 NOTE — PROGRESS NOTES
LOS: 6 days   Admitting Physician- Hiren Valles MD                Cardiology assessment and plan    1.  Persistent atrial fibrillation:    2.  Hypertension:    Blood pressure is stable    3.  S/p laparotomy:    Patient still n.p.o. patient has ileus patient has NG tube    4.  Carcinoma of colon:    Patient underwent resection.  Patient would need chemotherapy    Patient denies any new complaints  Heart rate is still intermittently elevated  Abdomen is still distended  Denies any abdominal pain  Postop ileus  Postop wound infection  Rectosigmoid adenocarcinoma  Hypertension  Diabetes mellitus  Hyperlipidemia  Tmax is 99 pulse is  respirations are 26 blood pressure is 98/62 134/66 sats are 99%  Sodium is 143 potassium is 3.9 creatinine is 1.1 LFTs are normal hemoglobin is 11.8 white cell count is 13.9  Patient is on full dose Lovenox for anticoagulation therapy  Patient was started back on IV Cardizem for atrial fibrillation with rapid ventricular response but heart rate is doing better  Notes from surgery reviewed  Discussed with the patient and family at bedside  Further recommendations based on patient course  Patient will be followed up with her primary cardiologist from tomorrow            Reason For Followup:    Persistent atrial fibrillation  Postsurgery ileus  S/p laparotomy  Hypertension    Subjective     Patient is feeling better    Objective     Patient still in atrial fibrillation    Review of Systems:   Review of Systems   Constitutional: Negative for chills and fever.   HENT:  Negative for ear discharge and nosebleeds.    Eyes:  Negative for discharge and redness.   Cardiovascular:  Negative for chest pain, orthopnea, palpitations, paroxysmal nocturnal dyspnea and syncope.   Respiratory:  Positive for shortness of breath. Negative for cough and wheezing.    Endocrine: Negative for heat intolerance.   Skin:  Negative for rash.   Musculoskeletal:  Negative for arthritis and myalgias.    Gastrointestinal:  Positive for bloating and abdominal pain. Negative for melena, nausea and vomiting.   Genitourinary:  Negative for dysuria and hematuria.   Neurological:  Negative for dizziness, light-headedness, numbness and tremors.   Psychiatric/Behavioral:  Negative for depression. The patient is not nervous/anxious.          Vital Signs  Vitals:    04/14/24 0217 04/14/24 0253 04/14/24 0500 04/14/24 0558   BP:  101/76 98/60 134/66   BP Location:   Left arm    Patient Position:   Lying    Pulse: (!) 162 (!) 132 (!) 136 120   Resp:   26    Temp:   99 °F (37.2 °C)    TempSrc:   Oral    SpO2:   99%    Weight:       Height:         Wt Readings from Last 1 Encounters:   04/13/24 105 kg (232 lb 5.8 oz)       Intake/Output Summary (Last 24 hours) at 4/14/2024 0708  Last data filed at 4/14/2024 0600  Gross per 24 hour   Intake 480 ml   Output 3920 ml   Net -3440 ml     Physical Exam:  Constitutional:       Appearance: Well-developed.   Eyes:      General: No scleral icterus.        Right eye: No discharge.   HENT:      Head: Normocephalic and atraumatic.   Neck:      Thyroid: No thyromegaly.      Lymphadenopathy: No cervical adenopathy.   Pulmonary:      Effort: Pulmonary effort is normal. No respiratory distress.      Breath sounds: Normal breath sounds. No wheezing. No rales.   Cardiovascular:      Normal rate. Irregularly irregular rhythm.      No gallop.    Edema:     Peripheral edema absent.   Abdominal:      General: There is distension.      Tenderness: There is abdominal tenderness.   Skin:     Findings: No erythema or rash.   Neurological:      Mental Status: Alert and oriented to person, place, and time.         Results Review:   Lab Results (last 24 hours)       Procedure Component Value Units Date/Time    POC Glucose Once [[id number]]  (Normal) Collected: 04/14/24 0540    Specimen: Blood Updated: 04/14/24 0541     Glucose 104 mg/dL      Comment: Serial Number: [id number]:  [id number]        Comprehensive Metabolic Panel [[id number]]  (Abnormal) Collected: 04/14/24 0104    Specimen: Blood from Arm, Right Updated: 04/14/24 0146     Glucose 103 mg/dL      BUN 16 mg/dL      Creatinine 1.17 mg/dL      Sodium 143 mmol/L      Potassium 3.9 mmol/L      Chloride 109 mmol/L      CO2 21.0 mmol/L      Calcium 8.8 mg/dL      Total Protein 6.3 g/dL      Albumin 3.4 g/dL      ALT (SGPT) 41 U/L      AST (SGOT) 33 U/L      Alkaline Phosphatase 118 U/L      Total Bilirubin 0.3 mg/dL      Globulin 2.9 gm/dL      A/G Ratio 1.2 g/dL      BUN/Creatinine Ratio 13.7     Anion Gap 13.0 mmol/L      eGFR 66.2 mL/min/1.73     Narrative:      GFR Normal >60  Chronic Kidney Disease <60  Kidney Failure <15    The GFR formula is only valid for adults with stable renal function between ages 18 and 70.    Magnesium [[id number]]  (Normal) Collected: 04/14/24 0104    Specimen: Blood from Arm, Right Updated: 04/14/24 0146     Magnesium 2.3 mg/dL     Phosphorus [[id number]]  (Normal) Collected: 04/14/24 0104    Specimen: Blood from Arm, Right Updated: 04/14/24 0146     Phosphorus 3.1 mg/dL     CBC & Differential [[id number]]  (Abnormal) Collected: 04/14/24 0104    Specimen: Blood from Arm, Right Updated: 04/14/24 0116    Narrative:      The following orders were created for panel order CBC & Differential.  Procedure                               Abnormality         Status                     ---------                               -----------         ------                     CBC Auto Differential[[id number]]        Abnormal            Final result                 Please view results for these tests on the individual orders.    CBC Auto Differential [[id number]]  (Abnormal) Collected: 04/14/24 0104    Specimen: Blood from Arm, Right Updated: 04/14/24 0116     WBC 13.93 10*3/mm3      RBC 4.34 10*6/mm3      Hemoglobin 11.8 g/dL      Hematocrit 37.8 %      MCV 87.1 fL      MCH 27.2 pg      MCHC 31.2 g/dL      RDW 16.4 %      RDW-SD 52.2 fl       MPV 12.1 fL      Platelets 436 10*3/mm3      Neutrophil % 59.4 %      Lymphocyte % 30.2 %      Monocyte % 6.5 %      Eosinophil % 2.9 %      Basophil % 0.2 %      Immature Grans % 0.8 %      Neutrophils, Absolute 8.28 10*3/mm3      Lymphocytes, Absolute 4.20 10*3/mm3      Monocytes, Absolute 0.90 10*3/mm3      Eosinophils, Absolute 0.41 10*3/mm3      Basophils, Absolute 0.03 10*3/mm3      Immature Grans, Absolute 0.11 10*3/mm3      nRBC 0.0 /100 WBC     POC Glucose Q6H [[id number]]  (Abnormal) Collected: 04/14/24 0012    Specimen: Blood Updated: 04/14/24 0013     Glucose 109 mg/dL      Comment: Serial Number: [id number]:  [id number]       POC Glucose Once [[id number]]  (Abnormal) Collected: 04/13/24 1724    Specimen: Blood Updated: 04/13/24 1726     Glucose 146 mg/dL      Comment: Serial Number: [id number]:  [id number]       Blood Culture - Blood, Arm, Right [[id number]]  (Normal) Collected: 04/08/24 1529    Specimen: Blood from Arm, Right Updated: 04/13/24 1545     Blood Culture No growth at 5 days    Narrative:      Less than seven (7) mL's of blood was collected.  Insufficient quantity may yield false negative results.    Blood Culture - Blood, Arm, Right [[id number]]  (Normal) Collected: 04/08/24 1529    Specimen: Blood from Arm, Right Updated: 04/13/24 1545     Blood Culture No growth at 5 days    Narrative:      Less than seven (7) mL's of blood was collected.  Insufficient quantity may yield false negative results.    POC Glucose Once [[id number]]  (Abnormal) Collected: 04/13/24 1055    Specimen: Blood Updated: 04/13/24 1057     Glucose 150 mg/dL      Comment: Serial Number: [id number]:  [id number]             Imaging Results (Last 72 Hours)       Procedure Component Value Units Date/Time    CT Abdomen Pelvis Without Contrast [[id number]] Collected: 04/13/24 1706     Updated: 04/13/24 1724    Narrative:      CT ABDOMEN PELVIS WO CONTRAST    Date of Exam: 4/13/2024 5:00 PM EDT    Indication:  Abdominal pain, post-op.    Comparison: None available.    Technique: Axial CT images were obtained of the abdomen and pelvis without the administration of contrast. Sagittal and coronal reconstructions were performed.  Automated exposure control and iterative reconstruction methods were used.      Findings:  Visualized lung bases are clear. Small amounts of gas are seen throughout the extraperitoneal soft tissues of the abdominal wall compatible with postoperative change.        The liver, pancreas, and spleen are within normal limits. Bilateral adrenal glands appear normal. Kidneys appear normal bilaterally. No evidence of renal or ureteral stone or hydronephrosis. Patient is status post cholecystectomy. Nasogastric tube is in   place in the stomach. Stomach is fluid-filled and mildly distended. There are multiple fluid-filled mildly distended loops of small bowel throughout the abdomen. There is a discrete transition point to normal caliber small bowel within the region of the   distal ileum suggesting partial small bowel obstruction. No abdominal or retroperitoneal adenopathy.    Pelvis: There is a right lower quadrant EDUARDO drain in place. There is contrast material within the colon. There is a left lower quadrant loop colostomy. There are multiple sigmoid diverticula. There are postoperative changes of the sigmoid colon from   partial colonic resection and reanastomosis. No definite contrast extravasation. No free fluid seen within the pelvis. No pelvic or inguinal adenopathy. Pyle catheter is in place in the urinary bladder. There is a EDUARDO drain within the subcutaneous fat   overlying the midline of the lower pelvis. No fluid collection identified.    No lytic or sclerotic bony lesion identified.    Impression:      Impression:    1. Fluid-filled distended loops of small bowel with a discrete transition point to normal caliber decompressed bowel within the distal ileum. Findings would be compatible with partial  small bowel obstruction.  2. Status post sigmoid colon resection and reanastomosis with diverting loop colostomy within the left lower quadrant. No evidence of contrast extravasation. No free fluid in the pelvis.            Electronically Signed: Thaddeus Buchanan MD    4/13/2024 5:22 PM EDT    Workstation ID: [id number]    XR Abdomen KUB [[id number]] Collected: 04/11/24 0742     Updated: 04/11/24 0747    Narrative:      XR ABDOMEN KUB    Date of Exam: 4/11/2024 7:35 AM EDT    Indication: post-op ileus    Comparison: 4/10/2024    Findings:  3 films. There is an NG tube with its tip terminating in the gastric body. There is increasing moderately severe small bowel dilatation in the mid abdomen with small bowel loops measuring up to 5.3 cm transversely. There is some contrast in the colon   which is nondilated. There are cholecystectomy clips. There is a surgical drain in the pelvis.    Impression:      Impression:  Increasing small bowel dilatation. However, contrast in the colon is against the diagnosis of bowel obstruction. Findings may represent postoperative ileus.      Electronically Signed: Melissa Zeng MD    4/11/2024 7:45 AM EDT    Workstation ID: [id number]          ECG/EMG Results (most recent)       Procedure Component Value Units Date/Time    ECG 12 Lead Tachycardia [[id number]] Collected: 04/08/24 1649     Updated: 04/09/24 0730     QT Interval 314 ms      QTC Interval 518 ms     Narrative:      HEART RATE= 164  bpm  RR Interval= 367  ms  MO Interval=   ms  P Horizontal Axis=   deg  P Front Axis=   deg  QRSD Interval= 108  ms  QT Interval= 314  ms  QTcB= 518  ms  QRS Axis= 20  deg  T Wave Axis= 205  deg  - ABNORMAL ECG -  Atrial fibrillation with rapid V-rate  Incomplete left bundle branch block  Low voltage, extremity leads  The appearance of BBB may be rate related  When compared with ECG of 27-Mar-2024 11:50:13,  Significant change in rhythm  Electronically Signed By: Thiago Solomon (REDD) 09-Apr-2024  07:30:05  Date and Time of Study: 2024-04-08 16:49:10    Telemetry Scan [[id number]] Resulted: 04/08/24     Updated: 04/10/24 0613    Telemetry Scan [[id number]] Resulted: 04/08/24     Updated: 04/10/24 0640    Telemetry Scan [[id number]] Resulted: 04/08/24     Updated: 04/10/24 0959    Telemetry Scan [[id number]] Resulted: 04/08/24     Updated: 04/10/24 1126    Telemetry Scan [[id number]] Resulted: 04/08/24     Updated: 04/10/24 1126    Telemetry Scan [[id number]] Resulted: 04/08/24     Updated: 04/11/24 0912    Telemetry Scan [[id number]] Resulted: 04/08/24     Updated: 04/11/24 0935    Telemetry Scan [[id number]] Resulted: 04/08/24     Updated: 04/11/24 0955    Telemetry Scan [[id number]] Resulted: 04/08/24     Updated: 04/11/24 1119    Telemetry Scan [[id number]] Resulted: 04/08/24     Updated: 04/11/24 1150    Telemetry Scan [[id number]] Resulted: 04/08/24     Updated: 04/11/24 1333    Telemetry Scan [[id number]] Resulted: 04/08/24     Updated: 04/12/24 0656    Telemetry Scan [[id number]] Resulted: 04/08/24     Updated: 04/12/24 1249    Telemetry Scan [[id number]] Resulted: 04/08/24     Updated: 04/12/24 1255    Telemetry Scan [[id number]] Resulted: 04/08/24     Updated: 04/12/24 1306    Telemetry Scan [[id number]] Resulted: 04/08/24     Updated: 04/12/24 1403          CBC    Results from last 7 days   Lab Units 04/14/24  0104 04/13/24  0428 04/12/24  0400 04/11/24  0243 04/10/24  0142 04/08/24  1529   WBC 10*3/mm3 13.93* 14.68* 17.19* 20.36* 21.35* 19.89*   HEMOGLOBIN g/dL 11.8* 11.0* 10.4* 10.3* 10.7* 12.6*   PLATELETS 10*3/mm3 436 408 372 393 395 409     BMP   Results from last 7 days   Lab Units 04/14/24  0104 04/13/24  0428 04/12/24  0400 04/11/24  0243 04/10/24  0142 04/09/24  1201 04/09/24  0506 04/08/24  1529   SODIUM mmol/L 143 143 150* 146* 141  --  140 140   POTASSIUM mmol/L 3.9 3.4* 3.9 3.7 3.6 3.1* 2.5* 2.5*   CHLORIDE mmol/L 109* 109* 115* 113* 110*  --  107 104   CO2 mmol/L 21.0* 22.0 19.0* 19.0* 19.0*  --   18.0* 18.0*   BUN mg/dL 16 13 15 23 17  --  18 19   CREATININE mg/dL 1.17 0.98 0.90 1.23 1.15  --  1.14 1.43*   GLUCOSE mg/dL 103* 146* 97 127* 139*  --  118* 136*   MAGNESIUM mg/dL 2.3 2.2 2.4 2.7* 2.8*  --   --  2.4   PHOSPHORUS mg/dL 3.1 2.8 2.3* 2.7 3.9  --   --   --      CMP   Results from last 7 days   Lab Units 04/14/24  0104 04/13/24  0428 04/12/24  0400 04/11/24  0243 04/10/24  0142 04/09/24  1201 04/09/24  0506 04/08/24  1529   SODIUM mmol/L 143 143 150* 146* 141  --  140 140   POTASSIUM mmol/L 3.9 3.4* 3.9 3.7 3.6 3.1* 2.5* 2.5*   CHLORIDE mmol/L 109* 109* 115* 113* 110*  --  107 104   CO2 mmol/L 21.0* 22.0 19.0* 19.0* 19.0*  --  18.0* 18.0*   BUN mg/dL 16 13 15 23 17  --  18 19   CREATININE mg/dL 1.17 0.98 0.90 1.23 1.15  --  1.14 1.43*   GLUCOSE mg/dL 103* 146* 97 127* 139*  --  118* 136*   ALBUMIN g/dL 3.4* 3.3* 3.4* 3.3* 3.4*  --   --  3.7   BILIRUBIN mg/dL 0.3 0.3 0.2 0.3 0.3  --   --  0.5   ALK PHOS U/L 118* 108 114 122* 128*  --   --  135*   AST (SGOT) U/L 33 37 27 23 37  --   --  67*   ALT (SGPT) U/L 41 43* 33 37 54*  --   --  63*   AMYLASE U/L  --   --   --   --   --   --  56  --    LIPASE U/L  --   --   --   --   --   --   --  38     Cardiac Studies:  Echo- Results for orders placed during the hospital encounter of 03/25/24    Adult Transthoracic Echo Complete W/ Cont if Necessary Per Protocol    Interpretation Summary    Left ventricular systolic function is low normal. Calculated left ventricular EF = 46% Left ventricular ejection fraction appears to be 46 - 50%.    The left ventricular cavity is mildly dilated.    Left ventricular diastolic dysfunction is noted.    The left atrial cavity is dilated.    Estimated right ventricular systolic pressure from tricuspid regurgitation is normal (<35 mmHg).    Dilation of the aortic root is present.  4.4 cm    Patient in atrial fibrillation during this study.    Stress Myoview-  Cath-      Medication Review:   Scheduled Meds:acetaminophen, 1,000 mg,  Oral, Q6H  aspirin, 81 mg, Oral, Daily  atorvastatin, 20 mg, Oral, Nightly  cefTRIAXone, 2,000 mg, Intravenous, Q24H  dilTIAZem, 30 mg, Oral, Q8H  enoxaparin, 1 mg/kg, Subcutaneous, Q12H  insulin lispro, 2-7 Units, Subcutaneous, Q6H  lidocaine, 20 mL, Intradermal, Once  metoclopramide, 10 mg, Oral, TID AC  metoprolol tartrate, 50 mg, Oral, Q8H  metroNIDAZOLE, 500 mg, Intravenous, Q8H  pantoprazole, 40 mg, Oral, BID AC  Scopolamine, 1 patch, Transdermal, Q72H  sertraline, 25 mg, Oral, Daily  sodium chloride, 10 mL, Intravenous, Q12H  sodium chloride, 10 mL, Intravenous, Q12H      Continuous Infusions:dextrose, 100 mL/hr, Last Rate: Stopped (04/13/24 1925)  dilTIAZem, 5-15 mg/hr, Last Rate: 10 mg/hr (04/14/24 0253)      PRN Meds:.  ALPRAZolam    senna-docusate sodium **AND** polyethylene glycol **AND** bisacodyl **AND** bisacodyl    Calcium Replacement - Follow Nurse / BPA Driven Protocol    dextrose    dextrose    glucagon (human recombinant)    HYDROmorphone **AND** naloxone    Magnesium Standard Dose Replacement - Follow Nurse / BPA Driven Protocol    melatonin    Morphine    nitroglycerin    ondansetron    ondansetron ODT    oxyCODONE    Phosphorus Replacement - Follow Nurse / BPA Driven Protocol    Potassium Replacement - Follow Nurse / BPA Driven Protocol    simethicone    [COMPLETED] Insert Peripheral IV **AND** sodium chloride    sodium chloride    sodium chloride    sodium chloride    sodium chloride      Assessment & Plan     Wound infection    Anxiety disorder    Essential hypertension    Palpitations    Mixed hyperlipidemia    Colon cancer    Atrial fibrillation with rapid ventricular response    MDM:    1.  Persistent atrial fibrillation:    Patient is still in atrial fibrillation and heart rate is elevated continue Cardizem.  I will discontinue intravenous Lopressor and start Lopressor 50 mg every 8.  If needed amiodarone can be added p.o.    2.  Hypertension:    Blood pressure is stable    3.  S/p  laparotomy:    Patient still n.p.o. patient has ileus patient has NG tube    4.  Carcinoma of colon:    Patient underwent resection.  Patient would need chemotherapy          Pk Madrigal MD  04/14/24  07:08 EDT

## 2024-04-14 NOTE — PROGRESS NOTES
Infectious Diseases Progress Note      LOS: 6 days   Patient Care Team:  Gera Manriquez MD as PCP - General (Internal Medicine)  Gera Manriquez MD as PCP - Family Medicine    Chief Complaint: Incisional infection    Subjective       The patient has been afebrile for the last 24 hours.  The patient is on room air, hemodynamically stable, and is tolerating antimicrobial therapy.  Patient was not tolerating a diet and is now back on NG tube to suction      Review of Systems:   Review of Systems   Constitutional: Negative.    HENT: Negative.     Eyes: Negative.    Respiratory: Negative.     Cardiovascular: Negative.    Gastrointestinal: Negative.    Endocrine: Negative.    Genitourinary: Negative.    Musculoskeletal: Negative.    Skin:  Positive for wound.   Neurological: Negative.    Psychiatric/Behavioral: Negative.     All other systems reviewed and are negative.       Objective     Vital Signs  Temp:  [97.5 °F (36.4 °C)-99 °F (37.2 °C)] 98.2 °F (36.8 °C)  Heart Rate:  [] 109  Resp:  [22-28] 25  BP: ()/(58-88) 125/88    Physical Exam:  Physical Exam  Vitals and nursing note reviewed.   Constitutional:       General: He is not in acute distress.     Appearance: Normal appearance. He is well-developed and normal weight. He is not diaphoretic.   HENT:      Head: Normocephalic and atraumatic.   Eyes:      Conjunctiva/sclera: Conjunctivae normal.      Pupils: Pupils are equal, round, and reactive to light.   Cardiovascular:      Rate and Rhythm: Normal rate and regular rhythm.      Heart sounds: Normal heart sounds, S1 normal and S2 normal.   Pulmonary:      Effort: Pulmonary effort is normal. No respiratory distress.      Breath sounds: Normal breath sounds. No stridor. No wheezing or rales.   Abdominal:      General: Bowel sounds are normal. There is distension.      Palpations: Abdomen is soft. There is no mass.      Tenderness: There is abdominal tenderness. There is no guarding.      Comments: Surgical  dressing in place.    Drain in place    Colostomy    NG tube    Abdomen appears more distended today   Genitourinary:     Comments: Pyle catheter  Musculoskeletal:         General: No deformity. Normal range of motion.      Cervical back: Neck supple.   Skin:     General: Skin is warm and dry.      Coloration: Skin is not pale.      Findings: No erythema or rash.   Neurological:      Mental Status: He is alert and oriented to person, place, and time.      Cranial Nerves: No cranial nerve deficit.   Psychiatric:         Mood and Affect: Mood normal.          Results Review:    I have reviewed all clinical data, test, lab, and imaging results.     Radiology  CT Abdomen Pelvis Without Contrast    Result Date: 4/13/2024  CT ABDOMEN PELVIS WO CONTRAST Date of Exam: 4/13/2024 5:00 PM EDT Indication: Abdominal pain, post-op. Comparison: None available. Technique: Axial CT images were obtained of the abdomen and pelvis without the administration of contrast. Sagittal and coronal reconstructions were performed.  Automated exposure control and iterative reconstruction methods were used. Findings: Visualized lung bases are clear. Small amounts of gas are seen throughout the extraperitoneal soft tissues of the abdominal wall compatible with postoperative change. The liver, pancreas, and spleen are within normal limits. Bilateral adrenal glands appear normal. Kidneys appear normal bilaterally. No evidence of renal or ureteral stone or hydronephrosis. Patient is status post cholecystectomy. Nasogastric tube is in place in the stomach. Stomach is fluid-filled and mildly distended. There are multiple fluid-filled mildly distended loops of small bowel throughout the abdomen. There is a discrete transition point to normal caliber small bowel within the region of the distal ileum suggesting partial small bowel obstruction. No abdominal or retroperitoneal adenopathy. Pelvis: There is a right lower quadrant EDUARDO drain in place. There is  contrast material within the colon. There is a left lower quadrant loop colostomy. There are multiple sigmoid diverticula. There are postoperative changes of the sigmoid colon from partial colonic resection and reanastomosis. No definite contrast extravasation. No free fluid seen within the pelvis. No pelvic or inguinal adenopathy. Pyle catheter is in place in the urinary bladder. There is a EDUARDO drain within the subcutaneous fat overlying the midline of the lower pelvis. No fluid collection identified. No lytic or sclerotic bony lesion identified.    Impression: 1. Fluid-filled distended loops of small bowel with a discrete transition point to normal caliber decompressed bowel within the distal ileum. Findings would be compatible with partial small bowel obstruction. 2. Status post sigmoid colon resection and reanastomosis with diverting loop colostomy within the left lower quadrant. No evidence of contrast extravasation. No free fluid in the pelvis. Electronically Signed: Thaddeus Buchanan MD  4/13/2024 5:22 PM EDT  Workstation ID: [id number]     Cardiology    Laboratory    Results from last 7 days   Lab Units 04/14/24  0104 04/13/24  0428 04/12/24  0400 04/11/24  0243 04/10/24  0142 04/08/24  1529   WBC 10*3/mm3 13.93* 14.68* 17.19* 20.36* 21.35* 19.89*   HEMOGLOBIN g/dL 11.8* 11.0* 10.4* 10.3* 10.7* 12.6*   HEMATOCRIT % 37.8 35.0* 33.3* 33.4* 33.0* 39.8   PLATELETS 10*3/mm3 436 408 372 393 395 409     Results from last 7 days   Lab Units 04/14/24  0104 04/13/24  0428 04/12/24  0400 04/11/24  0243 04/10/24  0142 04/09/24  1201 04/09/24  0506 04/08/24  1529   SODIUM mmol/L 143 143 150* 146* 141  --  140 140   POTASSIUM mmol/L 3.9 3.4* 3.9 3.7 3.6 3.1* 2.5* 2.5*   CHLORIDE mmol/L 109* 109* 115* 113* 110*  --  107 104   CO2 mmol/L 21.0* 22.0 19.0* 19.0* 19.0*  --  18.0* 18.0*   BUN mg/dL 16 13 15 23 17  --  18 19   CREATININE mg/dL 1.17 0.98 0.90 1.23 1.15  --  1.14 1.43*   GLUCOSE mg/dL 103* 146* 97 127* 139*  --  118*  136*   ALBUMIN g/dL 3.4* 3.3* 3.4* 3.3* 3.4*  --   --  3.7   BILIRUBIN mg/dL 0.3 0.3 0.2 0.3 0.3  --   --  0.5   ALK PHOS U/L 118* 108 114 122* 128*  --   --  135*   AST (SGOT) U/L 33 37 27 23 37  --   --  67*   ALT (SGPT) U/L 41 43* 33 37 54*  --   --  63*   AMYLASE U/L  --   --   --   --   --   --  56  --    LIPASE U/L  --   --   --   --   --   --   --  38   CALCIUM mg/dL 8.8 8.3* 8.3* 8.4* 8.4*  --  8.7 9.7     Results from last 7 days   Lab Units 04/09/24  0506   CK TOTAL U/L 73             Microbiology   Microbiology Results (last 10 days)       Procedure Component Value - Date/Time    Wound Culture - Swab, Abdominal Wall [[id number]]  (Abnormal)  (Susceptibility) Collected: 04/08/24 1934    Lab Status: Final result Specimen: Swab from Abdominal Wall Updated: 04/11/24 0709     Wound Culture Rare Streptococcus sanguinis      Scant growth (1+) Normal Skin Jolynn     Gram Stain Few (2+) WBCs per low power field    Susceptibility        Streptococcus sanguinis      KATIE      Ceftriaxone Susceptible      Penicillin G Intermediate      Vancomycin Susceptible                           Anaerobic Culture - Swab, Abdominal Wall [[id number]]  (Abnormal) Collected: 04/08/24 1934    Lab Status: Final result Specimen: Swab from Abdominal Wall Updated: 04/12/24 0822     Anaerobic Culture Bacteroides fragilis group    Blood Culture - Blood, Arm, Right [[id number]]  (Normal) Collected: 04/08/24 1529    Lab Status: Final result Specimen: Blood from Arm, Right Updated: 04/13/24 1545     Blood Culture No growth at 5 days    Narrative:      Less than seven (7) mL's of blood was collected.  Insufficient quantity may yield false negative results.    Blood Culture - Blood, Arm, Right [[id number]]  (Normal) Collected: 04/08/24 1529    Lab Status: Final result Specimen: Blood from Arm, Right Updated: 04/13/24 1545     Blood Culture No growth at 5 days    Narrative:      Less than seven (7) mL's of blood was collected.  Insufficient quantity  may yield false negative results.            Medication Review:       Schedule Meds  acetaminophen, 1,000 mg, Oral, Q6H  aspirin, 81 mg, Oral, Daily  atorvastatin, 20 mg, Oral, Nightly  cefTRIAXone, 2,000 mg, Intravenous, Q24H  dilTIAZem, 30 mg, Oral, Q8H  enoxaparin, 1 mg/kg, Subcutaneous, Q12H  famotidine, 20 mg, Intravenous, Q12H  insulin lispro, 2-7 Units, Subcutaneous, Q6H  lidocaine, 20 mL, Intradermal, Once  metoclopramide, 10 mg, Intravenous, Q6H  metoprolol tartrate, 50 mg, Oral, Q8H  metroNIDAZOLE, 500 mg, Intravenous, Q8H  Scopolamine, 1 patch, Transdermal, Q72H  sertraline, 25 mg, Oral, Daily  sodium chloride, 10 mL, Intravenous, Q12H  sodium chloride, 10 mL, Intravenous, Q12H        Infusion Meds  dextrose, 100 mL/hr, Last Rate: Stopped (04/13/24 1925)  dilTIAZem, 5-15 mg/hr, Last Rate: 10 mg/hr (04/14/24 1057)        PRN Meds    ALPRAZolam    senna-docusate sodium **AND** polyethylene glycol **AND** bisacodyl **AND** bisacodyl    Calcium Replacement - Follow Nurse / BPA Driven Protocol    dextrose    dextrose    glucagon (human recombinant)    HYDROmorphone **AND** naloxone    Magnesium Standard Dose Replacement - Follow Nurse / BPA Driven Protocol    melatonin    Morphine    nitroglycerin    ondansetron    ondansetron ODT    oxyCODONE    Phosphorus Replacement - Follow Nurse / BPA Driven Protocol    Potassium Replacement - Follow Nurse / BPA Driven Protocol    simethicone    [COMPLETED] Insert Peripheral IV **AND** sodium chloride    sodium chloride    sodium chloride    sodium chloride    sodium chloride        Assessment & Plan       Antimicrobial Therapy   1.  IV  ceftriaxone       2.  IV Flagyl        3.          4.        5.          Assessment     Infected abdomen wall incision with anastomosis leak.  Wound cultures are growing Streptococcus sanguinus and Bacteroides fragilis.  Blood cultures are negative so far.  Patient is status post diagnostic laparoscopic diverting colostomy, pelvic washout  and drain placement on 4/10/2024-no intra abdomen abscess seen.  CT of the abdomen pelvis on 4/13/2024 which showed possible partial small bowel obstruction.  Patient is back to an NG tube to suction     Reactive leukocytosis secondary to above.  Trending down     S/p resection of colon cancer on March 30, 2024     Diabetes mellitus type 2-A1c is 5.8     Plan     Continue IV Rocephin 2 g every 24 hours for 14 days of treatment from surgery-last day on 4/23/2024  Continue IV Flagyl 500 mg every 8 hours-can be switched to p.o. Flagyl at discharge  Supportive care  A.m. labs  Case discussed with patient and family members at bedside        Alejandra Alonso, KAREN  04/14/24  14:12 EDT    Note is dictated utilizing voice recognition software/Dragon

## 2024-04-14 NOTE — PROGRESS NOTES
General Surgery Inpatient Progress Note      Name: Viktor Atkins ADMIT: 2024   : 1950  PCP: Gera Manriquez MD    MRN: [id number] LOS: 6 days   AGE/SEX: 73 y.o. male  ROOM:    [facility]      Patient Care Team:  Gera Manriquez MD as PCP - General (Internal Medicine)  Gera Manriquez MD as PCP - Family Medicine  Chief Complaint   Patient presents with    Wound Infection     Pt has drainage from his abd incision.  Pt was sent in by surgeon for eval and possible admit for further surgery. Pt has mass and colon surgery on Saturday.          Subjective:  Patient seen and examined.  No acute events overnight.  Patient reports he feels distended but denies any nausea.  High NG tube output which remains bilious.  No output from ostomy over the last 24 hours.  Discerned about skin tear secondary to adhesives.    Medications:  acetaminophen, 1,000 mg, Oral, Q6H  aspirin, 81 mg, Oral, Daily  atorvastatin, 20 mg, Oral, Nightly  cefTRIAXone, 2,000 mg, Intravenous, Q24H  dilTIAZem, 30 mg, Oral, Q8H  enoxaparin, 1 mg/kg, Subcutaneous, Q12H  famotidine, 20 mg, Intravenous, Q12H  insulin lispro, 2-7 Units, Subcutaneous, Q6H  lidocaine, 20 mL, Intradermal, Once  metoclopramide, 10 mg, Intravenous, Q6H  metoprolol tartrate, 50 mg, Oral, Q8H  metroNIDAZOLE, 500 mg, Intravenous, Q8H  Scopolamine, 1 patch, Transdermal, Q72H  sertraline, 25 mg, Oral, Daily  sodium chloride, 10 mL, Intravenous, Q12H  sodium chloride, 10 mL, Intravenous, Q12H         ALPRAZolam    senna-docusate sodium **AND** polyethylene glycol **AND** bisacodyl **AND** bisacodyl    Calcium Replacement - Follow Nurse / BPA Driven Protocol    dextrose    dextrose    glucagon (human recombinant)    HYDROmorphone **AND** naloxone    Magnesium Standard Dose Replacement - Follow Nurse / BPA Driven Protocol    melatonin    Morphine    nitroglycerin    ondansetron    ondansetron ODT    oxyCODONE    Phosphorus Replacement - Follow Nurse / BPA Driven Protocol     Potassium Replacement - Follow Nurse / BPA Driven Protocol    simethicone    [COMPLETED] Insert Peripheral IV **AND** sodium chloride    sodium chloride    sodium chloride    sodium chloride    sodium chloride     Vitals:  Temp:  [97.4 °F (36.3 °C)-99 °F (37.2 °C)] 98.4 °F (36.9 °C)  Heart Rate:  [] 109  Resp:  [21-28] 24  BP: ()/(58-82) 120/78     Physical Exam:  No acute distress, alert  Nonlabored respirations  NG tube in place to suction with light green bilious output  Abdomen soft, distended, nontender to palpation  Laparoscopic incision sites with staples in place and healing well  Lower abdominal incision with staples in place and Penrose drain in place, some serosanguineous drainage  EDUARDO drain serosanguineous    Labs:  Results from last 7 days   Lab Units 04/14/24  0104 04/13/24  0428 04/12/24  0400   WBC 10*3/mm3 13.93* 14.68* 17.19*   HEMOGLOBIN g/dL 11.8* 11.0* 10.4*   HEMATOCRIT % 37.8 35.0* 33.3*   PLATELETS 10*3/mm3 436 408 372     Results from last 7 days   Lab Units 04/14/24  0104 04/13/24  0428 04/12/24  0400   SODIUM mmol/L 143 143 150*   POTASSIUM mmol/L 3.9 3.4* 3.9   CHLORIDE mmol/L 109* 109* 115*   CO2 mmol/L 21.0* 22.0 19.0*   BUN mg/dL 16 13 15   CREATININE mg/dL 1.17 0.98 0.90   CALCIUM mg/dL 8.8 8.3* 8.3*   BILIRUBIN mg/dL 0.3 0.3 0.2   ALK PHOS U/L 118* 108 114   ALT (SGPT) U/L 41 43* 33   AST (SGOT) U/L 33 37 27   GLUCOSE mg/dL 103* 146* 97     Imaging:  CT abdomen/pelvis 4/13/2024  Impression:  1. Fluid-filled distended loops of small bowel with a discrete transition point to normal caliber decompressed bowel within the distal ileum. Findings would be compatible with partial small bowel obstruction.  2. Status post sigmoid colon resection and reanastomosis with diverting loop colostomy within the left lower quadrant. No evidence of contrast extravasation. No free fluid in the pelvis.    Assessment and Plan:  73 y.o. male status post robotic low anterior resection on  3/30/2024 complicated by anastomotic leak and wound infection.  Subsequent diagnostic laparoscopy, pelvic drain placement, laparoscopic and loop colostomy on 4/9/2024.  With postop ileus.    -Continue present management with NG tube to suction, IV fluids  -Continue antibiotics and EDUARDO drain    This note was created using Dragon Voice Recognition software.    Eboni Iglesias MD  04/14/24  11:09 EDT

## 2024-04-15 PROBLEM — E43 SEVERE MALNUTRITION: Status: ACTIVE | Noted: 2024-04-15

## 2024-04-15 LAB
ALBUMIN SERPL-MCNC: 3.4 G/DL (ref 3.5–5.2)
ALBUMIN/GLOB SERPL: 1.4 G/DL
ALP SERPL-CCNC: 104 U/L (ref 39–117)
ALT SERPL W P-5'-P-CCNC: 42 U/L (ref 1–41)
ANION GAP SERPL CALCULATED.3IONS-SCNC: 13 MMOL/L (ref 5–15)
AST SERPL-CCNC: 37 U/L (ref 1–40)
BASOPHILS # BLD AUTO: 0.06 10*3/MM3 (ref 0–0.2)
BASOPHILS NFR BLD AUTO: 0.4 % (ref 0–1.5)
BILIRUB SERPL-MCNC: 0.3 MG/DL (ref 0–1.2)
BUN SERPL-MCNC: 17 MG/DL (ref 8–23)
BUN/CREAT SERPL: 14.5 (ref 7–25)
CALCIUM SPEC-SCNC: 8.4 MG/DL (ref 8.6–10.5)
CHLORIDE SERPL-SCNC: 109 MMOL/L (ref 98–107)
CO2 SERPL-SCNC: 21 MMOL/L (ref 22–29)
CREAT SERPL-MCNC: 1.17 MG/DL (ref 0.76–1.27)
D-LACTATE SERPL-SCNC: 1.6 MMOL/L (ref 0.5–2)
DEPRECATED RDW RBC AUTO: 50.9 FL (ref 37–54)
EGFRCR SERPLBLD CKD-EPI 2021: 65.8 ML/MIN/1.73
EOSINOPHIL # BLD AUTO: 0.33 10*3/MM3 (ref 0–0.4)
EOSINOPHIL NFR BLD AUTO: 2.5 % (ref 0.3–6.2)
ERYTHROCYTE [DISTWIDTH] IN BLOOD BY AUTOMATED COUNT: 16.3 % (ref 12.3–15.4)
GLOBULIN UR ELPH-MCNC: 2.5 GM/DL
GLUCOSE BLDC GLUCOMTR-MCNC: 122 MG/DL (ref 70–105)
GLUCOSE BLDC GLUCOMTR-MCNC: 138 MG/DL (ref 70–105)
GLUCOSE BLDC GLUCOMTR-MCNC: 149 MG/DL (ref 70–105)
GLUCOSE SERPL-MCNC: 106 MG/DL (ref 65–99)
HCT VFR BLD AUTO: 37.5 % (ref 37.5–51)
HGB BLD-MCNC: 11.6 G/DL (ref 13–17.7)
IMM GRANULOCYTES # BLD AUTO: 0.07 10*3/MM3 (ref 0–0.05)
IMM GRANULOCYTES NFR BLD AUTO: 0.5 % (ref 0–0.5)
LYMPHOCYTES # BLD AUTO: 4.22 10*3/MM3 (ref 0.7–3.1)
LYMPHOCYTES NFR BLD AUTO: 31.4 % (ref 19.6–45.3)
MAGNESIUM SERPL-MCNC: 2.2 MG/DL (ref 1.6–2.4)
MCH RBC QN AUTO: 26.8 PG (ref 26.6–33)
MCHC RBC AUTO-ENTMCNC: 30.9 G/DL (ref 31.5–35.7)
MCV RBC AUTO: 86.6 FL (ref 79–97)
MONOCYTES # BLD AUTO: 0.89 10*3/MM3 (ref 0.1–0.9)
MONOCYTES NFR BLD AUTO: 6.6 % (ref 5–12)
NEUTROPHILS NFR BLD AUTO: 58.6 % (ref 42.7–76)
NEUTROPHILS NFR BLD AUTO: 7.89 10*3/MM3 (ref 1.7–7)
NRBC BLD AUTO-RTO: 0 /100 WBC (ref 0–0.2)
PHOSPHATE SERPL-MCNC: 3.1 MG/DL (ref 2.5–4.5)
PLATELET # BLD AUTO: 415 10*3/MM3 (ref 140–450)
PMV BLD AUTO: 12.2 FL (ref 6–12)
POTASSIUM SERPL-SCNC: 3.7 MMOL/L (ref 3.5–5.2)
PROT SERPL-MCNC: 5.9 G/DL (ref 6–8.5)
RBC # BLD AUTO: 4.33 10*6/MM3 (ref 4.14–5.8)
SODIUM SERPL-SCNC: 143 MMOL/L (ref 136–145)
WBC NRBC COR # BLD AUTO: 13.46 10*3/MM3 (ref 3.4–10.8)

## 2024-04-15 PROCEDURE — 84100 ASSAY OF PHOSPHORUS: CPT | Performed by: STUDENT IN AN ORGANIZED HEALTH CARE EDUCATION/TRAINING PROGRAM

## 2024-04-15 PROCEDURE — 25010000002 METOCLOPRAMIDE PER 10 MG: Performed by: INTERNAL MEDICINE

## 2024-04-15 PROCEDURE — 80053 COMPREHEN METABOLIC PANEL: CPT | Performed by: STUDENT IN AN ORGANIZED HEALTH CARE EDUCATION/TRAINING PROGRAM

## 2024-04-15 PROCEDURE — 85025 COMPLETE CBC W/AUTO DIFF WBC: CPT | Performed by: STUDENT IN AN ORGANIZED HEALTH CARE EDUCATION/TRAINING PROGRAM

## 2024-04-15 PROCEDURE — 99222 1ST HOSP IP/OBS MODERATE 55: CPT | Performed by: INTERNAL MEDICINE

## 2024-04-15 PROCEDURE — 82948 REAGENT STRIP/BLOOD GLUCOSE: CPT

## 2024-04-15 PROCEDURE — 25010000002 CEFTRIAXONE PER 250 MG: Performed by: NURSE PRACTITIONER

## 2024-04-15 PROCEDURE — 83605 ASSAY OF LACTIC ACID: CPT | Performed by: INTERNAL MEDICINE

## 2024-04-15 PROCEDURE — 25010000002 ENOXAPARIN PER 10 MG: Performed by: STUDENT IN AN ORGANIZED HEALTH CARE EDUCATION/TRAINING PROGRAM

## 2024-04-15 PROCEDURE — 83735 ASSAY OF MAGNESIUM: CPT | Performed by: STUDENT IN AN ORGANIZED HEALTH CARE EDUCATION/TRAINING PROGRAM

## 2024-04-15 PROCEDURE — 25010000002 METRONIDAZOLE 500 MG/100ML SOLUTION: Performed by: NURSE PRACTITIONER

## 2024-04-15 RX ORDER — METOPROLOL TARTRATE 1 MG/ML
5 INJECTION, SOLUTION INTRAVENOUS EVERY 6 HOURS
Status: DISCONTINUED | OUTPATIENT
Start: 2024-04-15 | End: 2024-04-16

## 2024-04-15 RX ORDER — TAMSULOSIN HYDROCHLORIDE 0.4 MG/1
0.4 CAPSULE ORAL DAILY
Status: DISCONTINUED | OUTPATIENT
Start: 2024-04-15 | End: 2024-04-19 | Stop reason: HOSPADM

## 2024-04-15 RX ADMIN — Medication 10 ML: at 20:37

## 2024-04-15 RX ADMIN — Medication 10 MG/HR: at 03:11

## 2024-04-15 RX ADMIN — ENOXAPARIN SODIUM 100 MG: 100 INJECTION SUBCUTANEOUS at 01:23

## 2024-04-15 RX ADMIN — METRONIDAZOLE 500 MG: 500 INJECTION, SOLUTION INTRAVENOUS at 17:39

## 2024-04-15 RX ADMIN — ACETAMINOPHEN 1000 MG: 500 TABLET, FILM COATED ORAL at 17:39

## 2024-04-15 RX ADMIN — METOCLOPRAMIDE 10 MG: 5 INJECTION, SOLUTION INTRAMUSCULAR; INTRAVENOUS at 03:11

## 2024-04-15 RX ADMIN — FAMOTIDINE 20 MG: 10 INJECTION INTRAVENOUS at 20:36

## 2024-04-15 RX ADMIN — ACETAMINOPHEN 1000 MG: 500 TABLET, FILM COATED ORAL at 05:18

## 2024-04-15 RX ADMIN — DILTIAZEM HYDROCHLORIDE 30 MG: 30 TABLET, FILM COATED ORAL at 22:17

## 2024-04-15 RX ADMIN — SERTRALINE HYDROCHLORIDE 25 MG: 25 TABLET ORAL at 09:02

## 2024-04-15 RX ADMIN — Medication 10 ML: at 22:18

## 2024-04-15 RX ADMIN — ACETAMINOPHEN 1000 MG: 500 TABLET, FILM COATED ORAL at 11:51

## 2024-04-15 RX ADMIN — DILTIAZEM HYDROCHLORIDE 30 MG: 30 TABLET, FILM COATED ORAL at 05:18

## 2024-04-15 RX ADMIN — FAMOTIDINE 20 MG: 10 INJECTION INTRAVENOUS at 09:00

## 2024-04-15 RX ADMIN — ATORVASTATIN CALCIUM 20 MG: 20 TABLET, FILM COATED ORAL at 20:37

## 2024-04-15 RX ADMIN — METOPROLOL TARTRATE 50 MG: 50 TABLET, FILM COATED ORAL at 01:23

## 2024-04-15 RX ADMIN — Medication 10 ML: at 09:01

## 2024-04-15 RX ADMIN — DILTIAZEM HYDROCHLORIDE 30 MG: 30 TABLET, FILM COATED ORAL at 14:47

## 2024-04-15 RX ADMIN — METOPROLOL TARTRATE 5 MG: 1 INJECTION, SOLUTION INTRAVENOUS at 09:06

## 2024-04-15 RX ADMIN — METOCLOPRAMIDE 10 MG: 5 INJECTION, SOLUTION INTRAMUSCULAR; INTRAVENOUS at 14:47

## 2024-04-15 RX ADMIN — ACETAMINOPHEN 1000 MG: 500 TABLET, FILM COATED ORAL at 01:23

## 2024-04-15 RX ADMIN — ENOXAPARIN SODIUM 100 MG: 100 INJECTION SUBCUTANEOUS at 12:05

## 2024-04-15 RX ADMIN — METOCLOPRAMIDE 10 MG: 5 INJECTION, SOLUTION INTRAMUSCULAR; INTRAVENOUS at 20:36

## 2024-04-15 RX ADMIN — Medication 5 MG: at 20:36

## 2024-04-15 RX ADMIN — ASPIRIN 81 MG CHEWABLE TABLET 81 MG: 81 TABLET CHEWABLE at 09:01

## 2024-04-15 RX ADMIN — Medication 15 MG/HR: at 15:45

## 2024-04-15 RX ADMIN — METRONIDAZOLE 500 MG: 500 INJECTION, SOLUTION INTRAVENOUS at 09:01

## 2024-04-15 RX ADMIN — TAMSULOSIN HYDROCHLORIDE 0.4 MG: 0.4 CAPSULE ORAL at 11:51

## 2024-04-15 RX ADMIN — CEFTRIAXONE SODIUM 2000 MG: 2 INJECTION, POWDER, FOR SOLUTION INTRAMUSCULAR; INTRAVENOUS at 16:43

## 2024-04-15 RX ADMIN — METOPROLOL TARTRATE 5 MG: 1 INJECTION, SOLUTION INTRAVENOUS at 20:36

## 2024-04-15 RX ADMIN — METOPROLOL TARTRATE 5 MG: 1 INJECTION, SOLUTION INTRAVENOUS at 14:48

## 2024-04-15 RX ADMIN — METOCLOPRAMIDE 10 MG: 5 INJECTION, SOLUTION INTRAMUSCULAR; INTRAVENOUS at 09:01

## 2024-04-15 RX ADMIN — METRONIDAZOLE 500 MG: 500 INJECTION, SOLUTION INTRAVENOUS at 01:23

## 2024-04-15 NOTE — PROGRESS NOTES
Nutrition Services    Patient Name: Viktor Atkins  YOB: 1950  MRN: [id number]  Admission date: 4/8/2024    PROGRESS NOTE      Encounter Information: Checking in on patient to monitor diet tolerance and advancement. Patient has been NPO or liquid diet since admit (x 7days). Noted per infections disease - NGT removed today and patient to be advanced to a CLD. Patient is scheduled for IBX infusion until 4/23. Patient is S/P laparotomy with end-loop colostomy on 4/9 and colon mass removal the week prior resulting in possible wound infection causing current admission. WOCN following. Patient previously had large amounts of output via NGT prior to removal of NGT today.          PO Diet: NPO Diet NPO Type: Ice Chips, Sips with Meds, Other (see comments)   PO Supplements: NPO (Boost Breeze with CLD)   PO Intake:  Minimal per nature of NPO/CLD x last 7 days,        Current nutrition support: -   Nutrition support review: -       Labs (reviewed below): Reviewed. Management per attending.        GI Function:  Colostomy - 250 mL output x last 24 hours       Nutrition Intervention Updates: Patient has received little to no nutrition x last 7 days. RD will continue to monitor for po diet advancement and tolerance.  If patient is unable to tolerate po diet in the next 24 hours, RD recommends evaluation for nutrition support in order to provide adequate nutrition to meet est needs due to current severe malnutrition status.        Results from last 7 days   Lab Units 04/15/24  0139 04/14/24  0104 04/13/24  0428   SODIUM mmol/L 143 143 143   POTASSIUM mmol/L 3.7 3.9 3.4*   CHLORIDE mmol/L 109* 109* 109*   CO2 mmol/L 21.0* 21.0* 22.0   BUN mg/dL 17 16 13   CREATININE mg/dL 1.17 1.17 0.98   CALCIUM mg/dL 8.4* 8.8 8.3*   BILIRUBIN mg/dL 0.3 0.3 0.3   ALK PHOS U/L 104 118* 108   ALT (SGPT) U/L 42* 41 43*   AST (SGOT) U/L 37 33 37   GLUCOSE mg/dL 106* 103* 146*     Results from last 7 days   Lab Units 04/15/24  0139  "04/14/24  0104 04/13/24  0428 04/10/24  0142 04/09/24  0506   MAGNESIUM mg/dL 2.2 2.3 2.2   < >  --    PHOSPHORUS mg/dL 3.1 3.1 2.8   < >  --    HEMOGLOBIN g/dL 11.6* 11.8* 11.0*   < >  --    HEMATOCRIT % 37.5 37.8 35.0*   < >  --    TRIGLYCERIDES mg/dL  --   --   --   --  170*    < > = values in this interval not displayed.     No results found for: \"COVID19\"  Lab Results   Component Value Date    HGBA1C 5.80 (H) 04/09/2024           RD to follow up per protocol.    Electronically signed by:  Rosana Morgan RD  04/15/24 13:21 EDT    "

## 2024-04-15 NOTE — PLAN OF CARE
Goal Outcome Evaluation:              Outcome Evaluation: patients heart rate still anywhere from 's. no complaints of discomfort or pain. titraited the cardizem drip as needed. patient up with pt family to walk around unit with no complaints. instructed patient on importance of turning or shifting weight every 2 hours and as needed. patient verbalized understanding

## 2024-04-15 NOTE — PROGRESS NOTES
LOS: 8 days   Admitting Physician- Peyman Castorena MD    Reason For Followup:    Persistent atrial fibrillation  Postsurgery ileus  S/p laparotomy  Hypertension    Subjective      Up walking in room  NG just removed  Allowed ice chips    Objective     Patient still in atrial fibrillation  Remains on IV Dilitiazem    Review of Systems:   Review of Systems   Constitutional: Negative for chills and fever.   HENT:  Negative for ear discharge and nosebleeds.    Eyes:  Negative for discharge and redness.   Cardiovascular:  Negative for chest pain, orthopnea, palpitations, paroxysmal nocturnal dyspnea and syncope.   Respiratory:  Negative for cough, shortness of breath and wheezing.    Endocrine: Negative for heat intolerance.   Skin:  Negative for rash.   Musculoskeletal:  Negative for arthritis and myalgias.   Gastrointestinal:  Negative for abdominal pain, melena, nausea and vomiting.   Genitourinary:  Negative for dysuria and hematuria.   Neurological:  Negative for dizziness, light-headedness, numbness and tremors.   Psychiatric/Behavioral:  Negative for depression. The patient is not nervous/anxious.          Vital Signs  Vitals:    04/16/24 0300 04/16/24 0400 04/16/24 0500 04/16/24 0517   BP: 112/73 108/65 95/61 95/61   BP Location:    Left arm   Patient Position:    Lying   Pulse: 94 73 80 89   Resp:    25   Temp:    97.7 °F (36.5 °C)   TempSrc:    Oral   SpO2: 97% 98% 98% 96%   Weight:    99.8 kg (220 lb 0.3 oz)   Height:         Wt Readings from Last 1 Encounters:   04/16/24 99.8 kg (220 lb 0.3 oz)       Intake/Output Summary (Last 24 hours) at 4/16/2024 0750  Last data filed at 4/16/2024 0517  Gross per 24 hour   Intake 480 ml   Output 1320 ml   Net -840 ml     Physical Exam:  Constitutional:       Appearance: Well-developed.   Eyes:      General: No scleral icterus.        Right eye: No discharge.   HENT:      Head: Normocephalic and atraumatic.   Neck:      Thyroid: No thyromegaly.      Lymphadenopathy: No  cervical adenopathy.   Pulmonary:      Effort: Pulmonary effort is normal. No respiratory distress.      Breath sounds: Normal breath sounds. No wheezing. No rales.   Cardiovascular:      Normal rate. Regular rhythm.      No gallop.    Edema:     Peripheral edema absent.   Abdominal:      Tenderness: There is no abdominal tenderness.   Skin:     Findings: No erythema or rash.   Neurological:      Mental Status: Alert and oriented to person, place, and time.         Results Review:   Lab Results (last 24 hours)       Procedure Component Value Units Date/Time    POC Glucose Q6H [[id number]]  (Abnormal) Collected: 04/16/24 0712    Specimen: Blood Updated: 04/16/24 0714     Glucose 109 mg/dL      Comment: Serial Number: [id number]:  [id number]       POC Glucose Once [[id number]]  (Abnormal) Collected: 04/16/24 0521    Specimen: Blood Updated: 04/16/24 0522     Glucose 112 mg/dL      Comment: Serial Number: [id number]:  [id number]       Comprehensive Metabolic Panel [[id number]]  (Abnormal) Collected: 04/16/24 0018    Specimen: Blood from Arm, Right Updated: 04/16/24 0049     Glucose 157 mg/dL      BUN 19 mg/dL      Creatinine 1.27 mg/dL      Sodium 141 mmol/L      Potassium 3.6 mmol/L      Comment: Slight hemolysis detected by analyzer. Result may be falsely elevated.        Chloride 106 mmol/L      CO2 20.0 mmol/L      Calcium 8.7 mg/dL      Total Protein 6.3 g/dL      Albumin 3.6 g/dL      ALT (SGPT) 40 U/L      AST (SGOT) 33 U/L      Comment: Slight hemolysis detected by analyzer. Result may be falsely elevated.        Alkaline Phosphatase 109 U/L      Total Bilirubin 0.2 mg/dL      Globulin 2.7 gm/dL      A/G Ratio 1.3 g/dL      BUN/Creatinine Ratio 15.0     Anion Gap 15.0 mmol/L      eGFR 59.7 mL/min/1.73     Narrative:      GFR Normal >60  Chronic Kidney Disease <60  Kidney Failure <15    The GFR formula is only valid for adults with stable renal function between ages 18 and 70.    Magnesium [[id number]]   (Normal) Collected: 04/16/24 0018    Specimen: Blood from Arm, Right Updated: 04/16/24 0049     Magnesium 2.2 mg/dL     Phosphorus [[id number]]  (Normal) Collected: 04/16/24 0018    Specimen: Blood from Arm, Right Updated: 04/16/24 0049     Phosphorus 3.3 mg/dL     CBC & Differential [[id number]]  (Abnormal) Collected: 04/16/24 0018    Specimen: Blood from Arm, Right Updated: 04/16/24 0024    Narrative:      The following orders were created for panel order CBC & Differential.  Procedure                               Abnormality         Status                     ---------                               -----------         ------                     CBC Auto Differential[[id number]]        Abnormal            Final result                 Please view results for these tests on the individual orders.    CBC Auto Differential [[id number]]  (Abnormal) Collected: 04/16/24 0018    Specimen: Blood from Arm, Right Updated: 04/16/24 0024     WBC 18.03 10*3/mm3      RBC 4.32 10*6/mm3      Hemoglobin 11.8 g/dL      Hematocrit 37.5 %      MCV 86.8 fL      MCH 27.3 pg      MCHC 31.5 g/dL      RDW 16.2 %      RDW-SD 50.9 fl      MPV 12.5 fL      Platelets 398 10*3/mm3      Neutrophil % 70.6 %      Lymphocyte % 22.1 %      Monocyte % 5.5 %      Eosinophil % 1.1 %      Basophil % 0.3 %      Immature Grans % 0.4 %      Neutrophils, Absolute 12.75 10*3/mm3      Lymphocytes, Absolute 3.98 10*3/mm3      Monocytes, Absolute 0.99 10*3/mm3      Eosinophils, Absolute 0.19 10*3/mm3      Basophils, Absolute 0.05 10*3/mm3      Immature Grans, Absolute 0.07 10*3/mm3      nRBC 0.0 /100 WBC     POC Glucose Once [[id number]]  (Abnormal) Collected: 04/15/24 1743    Specimen: Blood Updated: 04/15/24 1744     Glucose 149 mg/dL      Comment: Serial Number: [id number]:  [id number]       Lactic Acid, Plasma [[id number]]  (Normal) Collected: 04/15/24 1159    Specimen: Blood Updated: 04/15/24 1238     Lactate 1.6 mmol/L     POC Glucose Once [[id number]]   (Abnormal) Collected: 04/15/24 1157    Specimen: Blood Updated: 04/15/24 1158     Glucose 138 mg/dL      Comment: Serial Number: [id number]:  [id number]             Imaging Results (Last 72 Hours)       Procedure Component Value Units Date/Time    CT Abdomen Pelvis Without Contrast [[id number]] Collected: 04/13/24 1706     Updated: 04/13/24 1724    Narrative:      CT ABDOMEN PELVIS WO CONTRAST    Date of Exam: 4/13/2024 5:00 PM EDT    Indication: Abdominal pain, post-op.    Comparison: None available.    Technique: Axial CT images were obtained of the abdomen and pelvis without the administration of contrast. Sagittal and coronal reconstructions were performed.  Automated exposure control and iterative reconstruction methods were used.      Findings:  Visualized lung bases are clear. Small amounts of gas are seen throughout the extraperitoneal soft tissues of the abdominal wall compatible with postoperative change.        The liver, pancreas, and spleen are within normal limits. Bilateral adrenal glands appear normal. Kidneys appear normal bilaterally. No evidence of renal or ureteral stone or hydronephrosis. Patient is status post cholecystectomy. Nasogastric tube is in   place in the stomach. Stomach is fluid-filled and mildly distended. There are multiple fluid-filled mildly distended loops of small bowel throughout the abdomen. There is a discrete transition point to normal caliber small bowel within the region of the   distal ileum suggesting partial small bowel obstruction. No abdominal or retroperitoneal adenopathy.    Pelvis: There is a right lower quadrant EDUARDO drain in place. There is contrast material within the colon. There is a left lower quadrant loop colostomy. There are multiple sigmoid diverticula. There are postoperative changes of the sigmoid colon from   partial colonic resection and reanastomosis. No definite contrast extravasation. No free fluid seen within the pelvis. No pelvic or inguinal  adenopathy. Pyle catheter is in place in the urinary bladder. There is a EDUARDO drain within the subcutaneous fat   overlying the midline of the lower pelvis. No fluid collection identified.    No lytic or sclerotic bony lesion identified.    Impression:      Impression:    1. Fluid-filled distended loops of small bowel with a discrete transition point to normal caliber decompressed bowel within the distal ileum. Findings would be compatible with partial small bowel obstruction.  2. Status post sigmoid colon resection and reanastomosis with diverting loop colostomy within the left lower quadrant. No evidence of contrast extravasation. No free fluid in the pelvis.            Electronically Signed: Thaddeus Buchanan MD    4/13/2024 5:22 PM EDT    Workstation ID: [id number]          ECG/EMG Results (most recent)       Procedure Component Value Units Date/Time    ECG 12 Lead Tachycardia [[id number]] Collected: 04/08/24 1649     Updated: 04/09/24 0730     QT Interval 314 ms      QTC Interval 518 ms     Narrative:      HEART RATE= 164  bpm  RR Interval= 367  ms  LA Interval=   ms  P Horizontal Axis=   deg  P Front Axis=   deg  QRSD Interval= 108  ms  QT Interval= 314  ms  QTcB= 518  ms  QRS Axis= 20  deg  T Wave Axis= 205  deg  - ABNORMAL ECG -  Atrial fibrillation with rapid V-rate  Incomplete left bundle branch block  Low voltage, extremity leads  The appearance of BBB may be rate related  When compared with ECG of 27-Mar-2024 11:50:13,  Significant change in rhythm  Electronically Signed By: Thiago Solomon (REDD) 09-Apr-2024 07:30:05  Date and Time of Study: 2024-04-08 16:49:10    Telemetry Scan [[id number]] Resulted: 04/08/24     Updated: 04/10/24 0613    Telemetry Scan [[id number]] Resulted: 04/08/24     Updated: 04/10/24 0640    Telemetry Scan [[id number]] Resulted: 04/08/24     Updated: 04/10/24 0959    Telemetry Scan [[id number]] Resulted: 04/08/24     Updated: 04/10/24 1126    Telemetry Scan [[id number]] Resulted: 04/08/24      Updated: 04/10/24 1126    Telemetry Scan [[id number]] Resulted: 04/08/24     Updated: 04/11/24 0912    Telemetry Scan [[id number]] Resulted: 04/08/24     Updated: 04/11/24 0935    Telemetry Scan [[id number]] Resulted: 04/08/24     Updated: 04/11/24 0955    Telemetry Scan [[id number]] Resulted: 04/08/24     Updated: 04/11/24 1119    Telemetry Scan [[id number]] Resulted: 04/08/24     Updated: 04/11/24 1150    Telemetry Scan [[id number]] Resulted: 04/08/24     Updated: 04/11/24 1333    Telemetry Scan [[id number]] Resulted: 04/08/24     Updated: 04/12/24 0656    Telemetry Scan [[id number]] Resulted: 04/08/24     Updated: 04/12/24 1249    Telemetry Scan [[id number]] Resulted: 04/08/24     Updated: 04/12/24 1255    Telemetry Scan [[id number]] Resulted: 04/08/24     Updated: 04/12/24 1306    Telemetry Scan [[id number]] Resulted: 04/08/24     Updated: 04/12/24 1403    Telemetry Scan [[id number]] Resulted: 04/08/24     Updated: 04/15/24 1320    Telemetry Scan [[id number]] Resulted: 04/08/24     Updated: 04/15/24 1435    Telemetry Scan [[id number]] Resulted: 04/08/24     Updated: 04/16/24 0638    Telemetry Scan [[id number]] Resulted: 04/08/24     Updated: 04/16/24 0638    Telemetry Scan [[id number]] Resulted: 04/08/24     Updated: 04/16/24 0638          CBC    Results from last 7 days   Lab Units 04/16/24  0018 04/15/24  0139 04/14/24  0104 04/13/24  0428 04/12/24  0400 04/11/24  0243 04/10/24  0142   WBC 10*3/mm3 18.03* 13.46* 13.93* 14.68* 17.19* 20.36* 21.35*   HEMOGLOBIN g/dL 11.8* 11.6* 11.8* 11.0* 10.4* 10.3* 10.7*   PLATELETS 10*3/mm3 398 415 436 408 372 393 395     BMP   Results from last 7 days   Lab Units 04/16/24  0018 04/15/24  0139 04/14/24  0104 04/13/24  0428 04/12/24  0400 04/11/24  0243 04/10/24  0142   SODIUM mmol/L 141 143 143 143 150* 146* 141   POTASSIUM mmol/L 3.6 3.7 3.9 3.4* 3.9 3.7 3.6   CHLORIDE mmol/L 106 109* 109* 109* 115* 113* 110*   CO2 mmol/L 20.0* 21.0* 21.0* 22.0 19.0* 19.0* 19.0*   BUN mg/dL 19 17 16 13 15  23 17   CREATININE mg/dL 1.27 1.17 1.17 0.98 0.90 1.23 1.15   GLUCOSE mg/dL 157* 106* 103* 146* 97 127* 139*   MAGNESIUM mg/dL 2.2 2.2 2.3 2.2 2.4 2.7* 2.8*   PHOSPHORUS mg/dL 3.3 3.1 3.1 2.8 2.3* 2.7 3.9     CMP   Results from last 7 days   Lab Units 04/16/24  0018 04/15/24  0139 04/14/24  0104 04/13/24  0428 04/12/24  0400 04/11/24  0243 04/10/24  0142   SODIUM mmol/L 141 143 143 143 150* 146* 141   POTASSIUM mmol/L 3.6 3.7 3.9 3.4* 3.9 3.7 3.6   CHLORIDE mmol/L 106 109* 109* 109* 115* 113* 110*   CO2 mmol/L 20.0* 21.0* 21.0* 22.0 19.0* 19.0* 19.0*   BUN mg/dL 19 17 16 13 15 23 17   CREATININE mg/dL 1.27 1.17 1.17 0.98 0.90 1.23 1.15   GLUCOSE mg/dL 157* 106* 103* 146* 97 127* 139*   ALBUMIN g/dL 3.6 3.4* 3.4* 3.3* 3.4* 3.3* 3.4*   BILIRUBIN mg/dL 0.2 0.3 0.3 0.3 0.2 0.3 0.3   ALK PHOS U/L 109 104 118* 108 114 122* 128*   AST (SGOT) U/L 33 37 33 37 27 23 37   ALT (SGPT) U/L 40 42* 41 43* 33 37 54*     Cardiac Studies:  Echo- Results for orders placed during the hospital encounter of 03/25/24    Adult Transthoracic Echo Complete W/ Cont if Necessary Per Protocol    Interpretation Summary    Left ventricular systolic function is low normal. Calculated left ventricular EF = 46% Left ventricular ejection fraction appears to be 46 - 50%.    The left ventricular cavity is mildly dilated.    Left ventricular diastolic dysfunction is noted.    The left atrial cavity is dilated.    Estimated right ventricular systolic pressure from tricuspid regurgitation is normal (<35 mmHg).    Dilation of the aortic root is present.  4.4 cm    Patient in atrial fibrillation during this study.    Stress Myoview-  Cath-      Medication Review:   Scheduled Meds:acetaminophen, 1,000 mg, Oral, Q6H  aspirin, 81 mg, Oral, Daily  atorvastatin, 20 mg, Oral, Nightly  cefTRIAXone, 2,000 mg, Intravenous, Q24H  dilTIAZem, 30 mg, Oral, Q8H  enoxaparin, 1 mg/kg, Subcutaneous, Q12H  famotidine, 20 mg, Intravenous, Q12H  insulin lispro, 2-7 Units,  Subcutaneous, Q6H  lidocaine, 20 mL, Intradermal, Once  metoclopramide, 10 mg, Intravenous, Q6H  metoprolol tartrate, 12.5 mg, Oral, Q6H  metroNIDAZOLE, 500 mg, Intravenous, Q8H  potassium chloride, 40 mEq, Oral, Q4H  sertraline, 25 mg, Oral, Daily  sodium chloride, 10 mL, Intravenous, Q12H  sodium chloride, 10 mL, Intravenous, Q12H  tamsulosin, 0.4 mg, Oral, Daily      Continuous Infusions:dextrose, 100 mL/hr, Last Rate: Stopped (04/13/24 1925)      PRN Meds:.  senna-docusate sodium **AND** polyethylene glycol **AND** bisacodyl **AND** bisacodyl    Calcium Replacement - Follow Nurse / BPA Driven Protocol    dextrose    dextrose    glucagon (human recombinant)    HYDROmorphone **AND** naloxone    Magnesium Standard Dose Replacement - Follow Nurse / BPA Driven Protocol    melatonin    nitroglycerin    ondansetron    ondansetron ODT    Phosphorus Replacement - Follow Nurse / BPA Driven Protocol    Potassium Replacement - Follow Nurse / BPA Driven Protocol    simethicone    [COMPLETED] Insert Peripheral IV **AND** sodium chloride    sodium chloride    sodium chloride    sodium chloride    sodium chloride      Assessment & Plan     Wound infection    Anxiety disorder    Essential hypertension    Palpitations    Mixed hyperlipidemia    Colon cancer    Atrial fibrillation with rapid ventricular response    Severe malnutrition    MDM:    1.  Persistent atrial fibrillation:    Patient is still in atrial fibrillation and heart rate is elevated continue Iv Diltiazem.     2.  Hypertension:    Blood pressure is stable    3.  S/p laparotomy:    NG tube just removed; allowed ice chips    4.  Carcinoma of colon:    Patient underwent resection.  Patient would need chemotherapy         Anticipate transitioning IV medications to po once we confirm he is tolerating po  Additional recommendations per Dr. Greenberg    Patient is seen and examined and findings are verified.  All data is reviewed by me personally.  Assessment and plan  formulated by APC was done after discussion with attending.  I spent more than 50% of time in taking care of the patient.    Patient is sitting up in the chair.  Patient NG tube has been removed.  He is feeling better.    Normal S1 and S2.  Heart rate is irregular.  Abdomen is soft.  Colostomy noted.  No leg edema.    Patient is still in persistent atrial fibrillation.  I would recommend to start amiodarone 200 mg daily.  I would also start Lopressor 12.5 mg every 6.  I will discontinue intravenous diltiazem.  We shall follow    Electronically signed by Abimael Greenberg MD, 04/16/24, 7:50 AM EDT.      Abimael Greenberg MD  04/16/24  07:50 EDT

## 2024-04-15 NOTE — PROGRESS NOTES
Colorectal Surgery Progress Note    Pt. Name/Age/:  Viktor Atkins   74 y.o.    1950         Med. Record Number:   [id number]  Date of admission:  2024      Service(s): Colorectal Surgery      Subjective    Patient seen and examined   Events from weekend and CT scan reviewed     Doing well  No complaints   Some soreness in the mid abdomen   Denies nausea  Had some stool and gas through stoma     Afebrile and tachycardic     Objective  Vitals:     Patient Vitals for the past 24 hrs:   BP Temp Temp src Pulse Resp SpO2   04/15/24 0900 138/75 98.2 °F (36.8 °C) Oral 105 21 95 %   04/15/24 0518 119/67 -- -- 88 -- --   04/15/24 0343 118/93 97.9 °F (36.6 °C) Axillary 77 19 93 %   04/15/24 0123 125/73 -- -- 111 -- --   24 2344 119/68 98.2 °F (36.8 °C) Oral 101 24 96 %   24 2137 119/88 -- -- 114 -- --   24 2106 132/66 -- -- 113 -- --   24 1939 138/74 98.1 °F (36.7 °C) Oral 87 (!) 30 97 %   24 1808 135/72 -- -- 115 -- --   24 1800 135/79 -- -- 114 -- --   24 1604 132/72 98.3 °F (36.8 °C) Oral 105 23 98 %   24 1454 -- -- -- (!) 165 -- --   24 1431 122/77 -- -- 114 -- --   24 1400 133/74 -- -- 111 -- --   24 1300 113/68 -- -- 110 -- --   24 1252 125/88 98.2 °F (36.8 °C) Oral -- 25 98 %           Wt. Admission: Weight: 105 kg (231 lb 11.3 oz)     Wt. Current: Weight:  (Pt is in chair.)   Body mass index is 32.41 kg/m².      I&O:  Intake/Output Summary (Last 24 hours) at 4/15/2024 1245  Last data filed at 4/15/2024 0900  Gross per 24 hour   Intake --   Output 5265 ml   Net -5265 ml      1901 - 04/15 0700  In: -   Out: 6855 [Urine:950; Drains:205]      Exam  General:  No acute distress, resting comfortably.  Cardiovascular: afib   Respiratory: no increased work of breathing.  Abdomen:  Soft, non distended, ostomy viable and with air and stool output, no tenderness, pfannenstiel incision with penrose intact, no discharge   Extremities: warm,  well-perfused extremities, no pitting edema.      Labs:     [unfilled]          Scheduled Meds:acetaminophen, 1,000 mg, Oral, Q6H  aspirin, 81 mg, Oral, Daily  atorvastatin, 20 mg, Oral, Nightly  cefTRIAXone, 2,000 mg, Intravenous, Q24H  dilTIAZem, 30 mg, Oral, Q8H  enoxaparin, 1 mg/kg, Subcutaneous, Q12H  famotidine, 20 mg, Intravenous, Q12H  insulin lispro, 2-7 Units, Subcutaneous, Q6H  lidocaine, 20 mL, Intradermal, Once  metoclopramide, 10 mg, Intravenous, Q6H  metoprolol tartrate, 5 mg, Intravenous, Q6H  metroNIDAZOLE, 500 mg, Intravenous, Q8H  Scopolamine, 1 patch, Transdermal, Q72H  sertraline, 25 mg, Oral, Daily  sodium chloride, 10 mL, Intravenous, Q12H  sodium chloride, 10 mL, Intravenous, Q12H  tamsulosin, 0.4 mg, Oral, Daily      Continuous Infusions:dextrose, 100 mL/hr, Last Rate: Stopped (04/13/24 1925)  dilTIAZem, 5-15 mg/hr, Last Rate: 10 mg/hr (04/15/24 0311)      PRN Meds:.  senna-docusate sodium **AND** polyethylene glycol **AND** bisacodyl **AND** bisacodyl    Calcium Replacement - Follow Nurse / BPA Driven Protocol    dextrose    dextrose    glucagon (human recombinant)    HYDROmorphone **AND** naloxone    Magnesium Standard Dose Replacement - Follow Nurse / BPA Driven Protocol    melatonin    Morphine    nitroglycerin    ondansetron    ondansetron ODT    Phosphorus Replacement - Follow Nurse / BPA Driven Protocol    Potassium Replacement - Follow Nurse / BPA Driven Protocol    simethicone    [COMPLETED] Insert Peripheral IV **AND** sodium chloride    sodium chloride    sodium chloride    sodium chloride    sodium chloride          Assessment  74 y.o. male s/p robotic LAR with take back to OR for diverting colostomy and wound washout     Plan / Recommendations    - resolving ileus, ostomy with output, no nausea, clear NGT output    - NGT doscontinued  - ok for sips of clears  - encourage out of bed and ambulating   - keep electrolytes replete, K> 4, Mg> 2.5  - discontinue maldonado catheter after  midnight   - discontinue anticholinergics including scopolamine as it might be contributing to tachycardia and ileus.   - ok to transition to direct oral anticoagulant for afib instead of lovenox   - plan for drain removal tomorrow     12:45 EDT; 4/15/2024        Aakash Burden MD  Colon and Rectal Surgery   [facility]

## 2024-04-15 NOTE — PLAN OF CARE
Goal Outcome Evaluation:  Plan of Care Reviewed With: patient        Progress: no change       Pt having large amounts of output from NG tube. Output is a clear dark green in color. Pt has had no complaints of N/V throughout shift. Pt's HR remains in Afib so Cardizem gtt remains infusing. BP tolerating gtt well. EDUARDO drain is excreting serosanguinous output. Wound dressings were changed around 0400 and the pt tolerated them well with minimal complaints. Lower horizontal abd incision and pinrose drains are having purulent drainage. Pt's urine remains tea colored. Cath are complete per protocol. Pt's family remains at bedside.

## 2024-04-15 NOTE — CASE MANAGEMENT/SOCIAL WORK
Continued Stay Note  PRASHANTH Yadav     Patient Name: Viktor Atkins  MRN: [id number]  Today's Date: 4/15/2024    Admit Date: 4/8/2024    Plan: Anticipate routine home with spouse and [facility] (accepted, order per MD) and [facility] for IV abx.   Discharge Plan       Row Name 04/15/24 1251       Plan    Plan Anticipate routine home with spouse and [facility] (accepted, order per MD) and [facility] for IV abx.    Plan Comments DC Barriers: NG tube removed, surgery following, scheduled to have IV abx per ID with last day 4/23.             Lupe Asher RN     Office Phone: [phone]  Office Cell: [phone]

## 2024-04-15 NOTE — PROGRESS NOTES
"[facility] MEDICINE SERVICE  DAILY PROGRESS NOTE      Patient Name: Viktor Atkins  Date of Admission: 4/8/2024  Today's Date: 04/15/24  Length of Stay: 7  Primary Care Physician: Gera Manriquez MD    Subjective   Patient doing well today, does complain of lower abdominal soreness.  He did have significant NG tube output overnight.      Objective    Temp:  [97.9 °F (36.6 °C)-98.3 °F (36.8 °C)] 98.3 °F (36.8 °C)  Heart Rate:  [] 123  Resp:  [19-30] 24  BP: ()/(66-93) 96/73  Physical Exam  General: NAD  HEENT: AT NC, EOMI, NG tube in place  Neck: No JVD  CVS: S1/S2 present, irregular tachycardia, no M/R/G  Lungs: CTA B/L  Abdomen: soft, minimally tender at incision site, ND, BS+, ostomy in place with small amount liquid stool  Ext: no edema  Skin: no rashes, bruises or discolorations  Neuro: no focal deficits  Psych: Not agitated         Results Review:  I have reviewed the labs, radiology results, and diagnostic studies.    Laboratory Data:   Results from last 7 days   Lab Units 04/15/24  0139 04/14/24 0104 04/13/24  0428   WBC 10*3/mm3 13.46* 13.93* 14.68*   HEMOGLOBIN g/dL 11.6* 11.8* 11.0*   HEMATOCRIT % 37.5 37.8 35.0*   PLATELETS 10*3/mm3 415 436 408        Results from last 7 days   Lab Units 04/15/24  0139 04/14/24  0104 04/13/24  0428   SODIUM mmol/L 143 143 143   POTASSIUM mmol/L 3.7 3.9 3.4*   CHLORIDE mmol/L 109* 109* 109*   CO2 mmol/L 21.0* 21.0* 22.0   BUN mg/dL 17 16 13   CREATININE mg/dL 1.17 1.17 0.98   CALCIUM mg/dL 8.4* 8.8 8.3*   BILIRUBIN mg/dL 0.3 0.3 0.3   ALK PHOS U/L 104 118* 108   ALT (SGPT) U/L 42* 41 43*   AST (SGOT) U/L 37 33 37   GLUCOSE mg/dL 106* 103* 146*       Culture Data:   No results found for: \"BLOODCX\"  No results found for: \"URINECX\"  No results found for: \"RESPCX\"  No results found for: \"WOUNDCX\"  No results found for: \"STOOLCX\"  No components found for: \"BODYFLD\"    Radiology Data:   Imaging Results (Last 24 Hours)       ** No results found for the last " 24 hours. **            I have reviewed the patient's current medications.     Assessment/Plan     Postoperative wound abscess with anastomotic leak  -Patient underwent lower anterior resection of a bleeding rectosigmoid mass on 3/30 with findings of invasive moderately symmetric adenocarcinoma  -Found to have abscess on imaging after returning to hospital for worsening pain  -S/p laparoscopy with pelvic drain placement and loop ostomy on 4/9  -Wound cultures growing streptococcal and Bacteroides species  -Started on Rocephin, Flagyl to complete course of treatment on 4/23    A-fib with RVR   -Rate control is better now  -Weaned off of Cardizem drip and switched to oral diltiazem and metoprolol  -Switch metoprolol to IV until patient able to tolerate p.o. consistently  -Continue Lovenox, switch to oral anticoagulation once patient able to tolerate p.o. consistently    Postoperative ileus  -Patient was initially tolerating liquid diet but then had worsening of his symptoms and NG tube was placed over the weekend  -Significant NG tube output over the last 24 hours  -General surgery has removed NG tube and will trial sips of clears    Rectosigmoid adenocarcinoma  -F/u oncology as outpatient  -Patient needs to have port placed for initiation of treatment    Hypertension  -Continue Cardizem, metoprolol  -Hold losartan    DM type II, controlled  - ISS, accuchecks, diet    Dyslipidemia  -Continue Lipitor    mood disorders  - home meds    9) GI/DVT ppx  - protonix/lovenox      Disposition: Hopefully DC in 2 days if he continues to have improvement in his condition.        Electronically signed by Peyman Castorena MD, 04/15/24, 14:38 EDT.

## 2024-04-15 NOTE — SIGNIFICANT NOTE
04/15/24 1509   OTHER   Discipline physical therapy assistant   Rehab Time/Intention   Session Not Performed patient/family declined treatment;other (see comments)  (Pt declines PT tx this date stating he has been up several times to the bathroom and is now up in recliner. Group of visitors present as today is Pt's birthday. Will check back next service date.)   Recommendation   PT - Next Appointment 04/17/24

## 2024-04-15 NOTE — NURSING NOTE
WOCN note:    73 yr old male admitted 4/8/24 from Dr. Burden's office for possible wound infection following surgery last week to remove a mass on his colon. Patient underwent a laparoscopy with end-loop colostomy on 4/9/24. WOCN follow up for ostomy management and education.     Wife at the bedside. Patient continues with NG tube with large output. Colostomy pouch changed today with patient observing. Small amount of liquid brown effluent is noted in the pouch. The serina-stomal skin is intact except for a healing MARSI (medical adhesive related skin injury) at 4-5:00. The skin was cleansed with water, dried and prepped with skin barrier spray. The skin tear was treated with layers of skin prep and stoma powder and covered with a partial slim Adapt ring.   The stoma is slightly budded at the medial edge but retracts centrally and laterally and is irregular in shape. The mucocutaneous junction is intact. An Adapt ring was placed around the stoma and a Milton one piece cut to fit pouch was applied with the opening off-set to avoid the skin tear. The template for the pouch was placed in patient's bag of supplies.   Patient has several other adhesive injuries to his abdomen which are dry and crusted. These were painted with Betadine and left open to air. No questions regarding ostomy care at this time. We will continue to follow.

## 2024-04-15 NOTE — PROGRESS NOTES
Infectious Diseases Progress Note      LOS: 7 days   Patient Care Team:  Gera Manriquez MD as PCP - General (Internal Medicine)  Gera Manriquez MD as PCP - Family Medicine    Chief Complaint: Incisional infection    Subjective       The patient has been afebrile for the last 24 hours.  The patient is on room air, hemodynamically stable, and is tolerating antimicrobial therapy.  NG tube has been discontinued and patient is now on clear liquid diet      Review of Systems:   Review of Systems   Constitutional: Negative.    HENT: Negative.     Eyes: Negative.    Respiratory: Negative.     Cardiovascular: Negative.    Gastrointestinal: Negative.    Endocrine: Negative.    Genitourinary: Negative.    Musculoskeletal: Negative.    Skin:  Positive for wound.   Neurological: Negative.    Psychiatric/Behavioral: Negative.     All other systems reviewed and are negative.       Objective     Vital Signs  Temp:  [97.9 °F (36.6 °C)-98.3 °F (36.8 °C)] 98.2 °F (36.8 °C)  Heart Rate:  [] 105  Resp:  [19-30] 21  BP: (118-138)/(66-93) 138/75    Physical Exam:  Physical Exam  Vitals and nursing note reviewed.   Constitutional:       General: He is not in acute distress.     Appearance: Normal appearance. He is well-developed and normal weight. He is not diaphoretic.   HENT:      Head: Normocephalic and atraumatic.   Eyes:      Conjunctiva/sclera: Conjunctivae normal.      Pupils: Pupils are equal, round, and reactive to light.   Cardiovascular:      Rate and Rhythm: Normal rate and regular rhythm.      Heart sounds: Normal heart sounds, S1 normal and S2 normal.   Pulmonary:      Effort: Pulmonary effort is normal. No respiratory distress.      Breath sounds: Normal breath sounds. No stridor. No wheezing or rales.   Abdominal:      General: Bowel sounds are normal. There is distension.      Palpations: Abdomen is soft. There is no mass.      Tenderness: There is abdominal tenderness. There is no guarding.      Comments: Surgical  dressing in place.    Drain in place    Colostomy    Abdominal distention is improved today   Genitourinary:     Comments: Pyle catheter  Musculoskeletal:         General: No deformity. Normal range of motion.      Cervical back: Neck supple.   Skin:     General: Skin is warm and dry.      Coloration: Skin is not pale.      Findings: No erythema or rash.   Neurological:      Mental Status: He is alert and oriented to person, place, and time.      Cranial Nerves: No cranial nerve deficit.   Psychiatric:         Mood and Affect: Mood normal.          Results Review:    I have reviewed all clinical data, test, lab, and imaging results.     Radiology  No Radiology Exams Resulted Within Past 24 Hours    Cardiology    Laboratory    Results from last 7 days   Lab Units 04/15/24  0139 04/14/24  0104 04/13/24  0428 04/12/24  0400 04/11/24  0243 04/10/24  0142 04/08/24  1529   WBC 10*3/mm3 13.46* 13.93* 14.68* 17.19* 20.36* 21.35* 19.89*   HEMOGLOBIN g/dL 11.6* 11.8* 11.0* 10.4* 10.3* 10.7* 12.6*   HEMATOCRIT % 37.5 37.8 35.0* 33.3* 33.4* 33.0* 39.8   PLATELETS 10*3/mm3 415 436 408 372 393 395 409     Results from last 7 days   Lab Units 04/15/24  0139 04/14/24  0104 04/13/24  0428 04/12/24  0400 04/11/24  0243 04/10/24  0142 04/09/24  1201 04/09/24  0506 04/08/24  1529   SODIUM mmol/L 143 143 143 150* 146* 141  --  140 140   POTASSIUM mmol/L 3.7 3.9 3.4* 3.9 3.7 3.6 3.1* 2.5* 2.5*   CHLORIDE mmol/L 109* 109* 109* 115* 113* 110*  --  107 104   CO2 mmol/L 21.0* 21.0* 22.0 19.0* 19.0* 19.0*  --  18.0* 18.0*   BUN mg/dL 17 16 13 15 23 17  --  18 19   CREATININE mg/dL 1.17 1.17 0.98 0.90 1.23 1.15  --  1.14 1.43*   GLUCOSE mg/dL 106* 103* 146* 97 127* 139*  --  118* 136*   ALBUMIN g/dL 3.4* 3.4* 3.3* 3.4* 3.3* 3.4*  --   --  3.7   BILIRUBIN mg/dL 0.3 0.3 0.3 0.2 0.3 0.3  --   --  0.5   ALK PHOS U/L 104 118* 108 114 122* 128*  --   --  135*   AST (SGOT) U/L 37 33 37 27 23 37  --   --  67*   ALT (SGPT) U/L 42* 41 43* 33 37 54*  --    --  63*   AMYLASE U/L  --   --   --   --   --   --   --  56  --    LIPASE U/L  --   --   --   --   --   --   --   --  38   CALCIUM mg/dL 8.4* 8.8 8.3* 8.3* 8.4* 8.4*  --  8.7 9.7     Results from last 7 days   Lab Units 04/09/24  0506   CK TOTAL U/L 73             Microbiology   Microbiology Results (last 10 days)       Procedure Component Value - Date/Time    Wound Culture - Swab, Abdominal Wall [[id number]]  (Abnormal)  (Susceptibility) Collected: 04/08/24 1934    Lab Status: Final result Specimen: Swab from Abdominal Wall Updated: 04/11/24 0709     Wound Culture Rare Streptococcus sanguinis      Scant growth (1+) Normal Skin Jolynn     Gram Stain Few (2+) WBCs per low power field    Susceptibility        Streptococcus sanguinis      KATIE      Ceftriaxone Susceptible      Penicillin G Intermediate      Vancomycin Susceptible                           Anaerobic Culture - Swab, Abdominal Wall [[id number]]  (Abnormal) Collected: 04/08/24 1934    Lab Status: Final result Specimen: Swab from Abdominal Wall Updated: 04/12/24 0822     Anaerobic Culture Bacteroides fragilis group    Blood Culture - Blood, Arm, Right [[id number]]  (Normal) Collected: 04/08/24 1529    Lab Status: Final result Specimen: Blood from Arm, Right Updated: 04/13/24 1545     Blood Culture No growth at 5 days    Narrative:      Less than seven (7) mL's of blood was collected.  Insufficient quantity may yield false negative results.    Blood Culture - Blood, Arm, Right [[id number]]  (Normal) Collected: 04/08/24 1529    Lab Status: Final result Specimen: Blood from Arm, Right Updated: 04/13/24 1545     Blood Culture No growth at 5 days    Narrative:      Less than seven (7) mL's of blood was collected.  Insufficient quantity may yield false negative results.            Medication Review:       Schedule Meds  acetaminophen, 1,000 mg, Oral, Q6H  aspirin, 81 mg, Oral, Daily  atorvastatin, 20 mg, Oral, Nightly  cefTRIAXone, 2,000 mg, Intravenous,  Q24H  dilTIAZem, 30 mg, Oral, Q8H  enoxaparin, 1 mg/kg, Subcutaneous, Q12H  famotidine, 20 mg, Intravenous, Q12H  insulin lispro, 2-7 Units, Subcutaneous, Q6H  lidocaine, 20 mL, Intradermal, Once  metoclopramide, 10 mg, Intravenous, Q6H  metoprolol tartrate, 5 mg, Intravenous, Q6H  metroNIDAZOLE, 500 mg, Intravenous, Q8H  sertraline, 25 mg, Oral, Daily  sodium chloride, 10 mL, Intravenous, Q12H  sodium chloride, 10 mL, Intravenous, Q12H  tamsulosin, 0.4 mg, Oral, Daily        Infusion Meds  dextrose, 100 mL/hr, Last Rate: Stopped (04/13/24 1925)  dilTIAZem, 5-15 mg/hr, Last Rate: 10 mg/hr (04/15/24 0311)        PRN Meds    senna-docusate sodium **AND** polyethylene glycol **AND** bisacodyl **AND** bisacodyl    Calcium Replacement - Follow Nurse / BPA Driven Protocol    dextrose    dextrose    glucagon (human recombinant)    HYDROmorphone **AND** naloxone    Magnesium Standard Dose Replacement - Follow Nurse / BPA Driven Protocol    melatonin    Morphine    nitroglycerin    ondansetron    ondansetron ODT    Phosphorus Replacement - Follow Nurse / BPA Driven Protocol    Potassium Replacement - Follow Nurse / BPA Driven Protocol    simethicone    [COMPLETED] Insert Peripheral IV **AND** sodium chloride    sodium chloride    sodium chloride    sodium chloride    sodium chloride        Assessment & Plan       Antimicrobial Therapy   1.  IV  ceftriaxone       2.  IV Flagyl        3.          4.        5.          Assessment     Infected abdomen wall incision with anastomosis leak.  Wound cultures are growing Streptococcus sanguinus and Bacteroides fragilis.  Blood cultures are negative so far.  Patient is status post diagnostic laparoscopic diverting colostomy, pelvic washout and drain placement on 4/10/2024-no intra abdomen abscess seen.  CT of the abdomen pelvis on 4/13/2024 which showed possible partial small bowel obstruction.  Patient is back to an NG tube to suction     Reactive leukocytosis secondary to above.   Trending down     S/p resection of colon cancer on March 30, 2024     Diabetes mellitus type 2-A1c is 5.8     Plan     Continue IV Rocephin 2 g every 24 hours for 14 days of treatment from surgery-last day on 4/23/2024  Continue IV Flagyl 500 mg every 8 hours-can be switched to p.o. Flagyl at discharge  Supportive care  A.m. labs  Case discussed with patient and family members at bedside        KAREN Valles  04/15/24  13:02 EDT    Note is dictated utilizing voice recognition software/Dragon

## 2024-04-16 LAB
ALBUMIN SERPL-MCNC: 3.6 G/DL (ref 3.5–5.2)
ALBUMIN/GLOB SERPL: 1.3 G/DL
ALP SERPL-CCNC: 109 U/L (ref 39–117)
ALT SERPL W P-5'-P-CCNC: 40 U/L (ref 1–41)
ANION GAP SERPL CALCULATED.3IONS-SCNC: 15 MMOL/L (ref 5–15)
AST SERPL-CCNC: 33 U/L (ref 1–40)
BASOPHILS # BLD AUTO: 0.05 10*3/MM3 (ref 0–0.2)
BASOPHILS NFR BLD AUTO: 0.3 % (ref 0–1.5)
BILIRUB SERPL-MCNC: 0.2 MG/DL (ref 0–1.2)
BUN SERPL-MCNC: 19 MG/DL (ref 8–23)
BUN/CREAT SERPL: 15 (ref 7–25)
CALCIUM SPEC-SCNC: 8.7 MG/DL (ref 8.6–10.5)
CHLORIDE SERPL-SCNC: 106 MMOL/L (ref 98–107)
CO2 SERPL-SCNC: 20 MMOL/L (ref 22–29)
CREAT SERPL-MCNC: 1.27 MG/DL (ref 0.76–1.27)
DEPRECATED RDW RBC AUTO: 50.9 FL (ref 37–54)
EGFRCR SERPLBLD CKD-EPI 2021: 59.7 ML/MIN/1.73
EOSINOPHIL # BLD AUTO: 0.19 10*3/MM3 (ref 0–0.4)
EOSINOPHIL NFR BLD AUTO: 1.1 % (ref 0.3–6.2)
ERYTHROCYTE [DISTWIDTH] IN BLOOD BY AUTOMATED COUNT: 16.2 % (ref 12.3–15.4)
GLOBULIN UR ELPH-MCNC: 2.7 GM/DL
GLUCOSE BLDC GLUCOMTR-MCNC: 109 MG/DL (ref 70–105)
GLUCOSE BLDC GLUCOMTR-MCNC: 112 MG/DL (ref 70–105)
GLUCOSE BLDC GLUCOMTR-MCNC: 115 MG/DL (ref 70–105)
GLUCOSE BLDC GLUCOMTR-MCNC: 133 MG/DL (ref 70–105)
GLUCOSE BLDC GLUCOMTR-MCNC: 141 MG/DL (ref 70–105)
GLUCOSE SERPL-MCNC: 157 MG/DL (ref 65–99)
HCT VFR BLD AUTO: 37.5 % (ref 37.5–51)
HGB BLD-MCNC: 11.8 G/DL (ref 13–17.7)
IMM GRANULOCYTES # BLD AUTO: 0.07 10*3/MM3 (ref 0–0.05)
IMM GRANULOCYTES NFR BLD AUTO: 0.4 % (ref 0–0.5)
LYMPHOCYTES # BLD AUTO: 3.98 10*3/MM3 (ref 0.7–3.1)
LYMPHOCYTES NFR BLD AUTO: 22.1 % (ref 19.6–45.3)
MAGNESIUM SERPL-MCNC: 2.2 MG/DL (ref 1.6–2.4)
MCH RBC QN AUTO: 27.3 PG (ref 26.6–33)
MCHC RBC AUTO-ENTMCNC: 31.5 G/DL (ref 31.5–35.7)
MCV RBC AUTO: 86.8 FL (ref 79–97)
MONOCYTES # BLD AUTO: 0.99 10*3/MM3 (ref 0.1–0.9)
MONOCYTES NFR BLD AUTO: 5.5 % (ref 5–12)
NEUTROPHILS NFR BLD AUTO: 12.75 10*3/MM3 (ref 1.7–7)
NEUTROPHILS NFR BLD AUTO: 70.6 % (ref 42.7–76)
NRBC BLD AUTO-RTO: 0 /100 WBC (ref 0–0.2)
PHOSPHATE SERPL-MCNC: 3.3 MG/DL (ref 2.5–4.5)
PLATELET # BLD AUTO: 398 10*3/MM3 (ref 140–450)
PMV BLD AUTO: 12.5 FL (ref 6–12)
POTASSIUM SERPL-SCNC: 3.6 MMOL/L (ref 3.5–5.2)
POTASSIUM SERPL-SCNC: 3.9 MMOL/L (ref 3.5–5.2)
PROT SERPL-MCNC: 6.3 G/DL (ref 6–8.5)
RBC # BLD AUTO: 4.32 10*6/MM3 (ref 4.14–5.8)
SODIUM SERPL-SCNC: 141 MMOL/L (ref 136–145)
WBC NRBC COR # BLD AUTO: 18.03 10*3/MM3 (ref 3.4–10.8)

## 2024-04-16 PROCEDURE — 25010000002 METRONIDAZOLE 500 MG/100ML SOLUTION: Performed by: NURSE PRACTITIONER

## 2024-04-16 PROCEDURE — 25010000002 METOCLOPRAMIDE PER 10 MG: Performed by: INTERNAL MEDICINE

## 2024-04-16 PROCEDURE — 84132 ASSAY OF SERUM POTASSIUM: CPT | Performed by: INTERNAL MEDICINE

## 2024-04-16 PROCEDURE — 25010000002 ENOXAPARIN PER 10 MG: Performed by: STUDENT IN AN ORGANIZED HEALTH CARE EDUCATION/TRAINING PROGRAM

## 2024-04-16 PROCEDURE — 84100 ASSAY OF PHOSPHORUS: CPT | Performed by: STUDENT IN AN ORGANIZED HEALTH CARE EDUCATION/TRAINING PROGRAM

## 2024-04-16 PROCEDURE — 83735 ASSAY OF MAGNESIUM: CPT | Performed by: STUDENT IN AN ORGANIZED HEALTH CARE EDUCATION/TRAINING PROGRAM

## 2024-04-16 PROCEDURE — 97110 THERAPEUTIC EXERCISES: CPT

## 2024-04-16 PROCEDURE — 97530 THERAPEUTIC ACTIVITIES: CPT

## 2024-04-16 PROCEDURE — 82948 REAGENT STRIP/BLOOD GLUCOSE: CPT | Performed by: STUDENT IN AN ORGANIZED HEALTH CARE EDUCATION/TRAINING PROGRAM

## 2024-04-16 PROCEDURE — 25010000002 CEFTRIAXONE PER 250 MG: Performed by: NURSE PRACTITIONER

## 2024-04-16 PROCEDURE — 85025 COMPLETE CBC W/AUTO DIFF WBC: CPT | Performed by: STUDENT IN AN ORGANIZED HEALTH CARE EDUCATION/TRAINING PROGRAM

## 2024-04-16 PROCEDURE — 80053 COMPREHEN METABOLIC PANEL: CPT | Performed by: STUDENT IN AN ORGANIZED HEALTH CARE EDUCATION/TRAINING PROGRAM

## 2024-04-16 PROCEDURE — 99232 SBSQ HOSP IP/OBS MODERATE 35: CPT | Performed by: INTERNAL MEDICINE

## 2024-04-16 PROCEDURE — 82948 REAGENT STRIP/BLOOD GLUCOSE: CPT

## 2024-04-16 RX ORDER — AMIODARONE HYDROCHLORIDE 200 MG/1
200 TABLET ORAL EVERY 12 HOURS SCHEDULED
Status: DISCONTINUED | OUTPATIENT
Start: 2024-04-16 | End: 2024-04-18

## 2024-04-16 RX ORDER — DILTIAZEM HCL/D5W 125 MG/125
5-15 PLASTIC BAG, INJECTION (ML) INTRAVENOUS
Status: DISCONTINUED | OUTPATIENT
Start: 2024-04-16 | End: 2024-04-19 | Stop reason: HOSPADM

## 2024-04-16 RX ORDER — POTASSIUM CHLORIDE 1.5 G/1.58G
40 POWDER, FOR SOLUTION ORAL EVERY 4 HOURS
Status: DISCONTINUED | OUTPATIENT
Start: 2024-04-16 | End: 2024-04-16

## 2024-04-16 RX ORDER — POTASSIUM CHLORIDE 20 MEQ/1
40 TABLET, EXTENDED RELEASE ORAL EVERY 4 HOURS
Status: COMPLETED | OUTPATIENT
Start: 2024-04-16 | End: 2024-04-16

## 2024-04-16 RX ADMIN — ENOXAPARIN SODIUM 100 MG: 100 INJECTION SUBCUTANEOUS at 12:11

## 2024-04-16 RX ADMIN — Medication 15 MG/HR: at 00:06

## 2024-04-16 RX ADMIN — METOPROLOL TARTRATE 5 MG: 1 INJECTION, SOLUTION INTRAVENOUS at 03:11

## 2024-04-16 RX ADMIN — METOCLOPRAMIDE 10 MG: 5 INJECTION, SOLUTION INTRAMUSCULAR; INTRAVENOUS at 03:11

## 2024-04-16 RX ADMIN — DILTIAZEM HYDROCHLORIDE 30 MG: 30 TABLET, FILM COATED ORAL at 05:17

## 2024-04-16 RX ADMIN — Medication 10 ML: at 08:13

## 2024-04-16 RX ADMIN — ACETAMINOPHEN 1000 MG: 500 TABLET, FILM COATED ORAL at 19:02

## 2024-04-16 RX ADMIN — POTASSIUM CHLORIDE 40 MEQ: 1500 TABLET, EXTENDED RELEASE ORAL at 08:12

## 2024-04-16 RX ADMIN — ACETAMINOPHEN 1000 MG: 500 TABLET, FILM COATED ORAL at 00:05

## 2024-04-16 RX ADMIN — TAMSULOSIN HYDROCHLORIDE 0.4 MG: 0.4 CAPSULE ORAL at 08:14

## 2024-04-16 RX ADMIN — Medication 12.5 MG: at 22:26

## 2024-04-16 RX ADMIN — AMIODARONE HYDROCHLORIDE 200 MG: 200 TABLET ORAL at 14:03

## 2024-04-16 RX ADMIN — CEFTRIAXONE SODIUM 2000 MG: 2 INJECTION, POWDER, FOR SOLUTION INTRAMUSCULAR; INTRAVENOUS at 16:34

## 2024-04-16 RX ADMIN — ATORVASTATIN CALCIUM 20 MG: 20 TABLET, FILM COATED ORAL at 22:26

## 2024-04-16 RX ADMIN — FAMOTIDINE 20 MG: 10 INJECTION INTRAVENOUS at 22:26

## 2024-04-16 RX ADMIN — METOCLOPRAMIDE 10 MG: 5 INJECTION, SOLUTION INTRAMUSCULAR; INTRAVENOUS at 22:26

## 2024-04-16 RX ADMIN — AMIODARONE HYDROCHLORIDE 200 MG: 200 TABLET ORAL at 22:31

## 2024-04-16 RX ADMIN — DILTIAZEM HYDROCHLORIDE 30 MG: 30 TABLET, FILM COATED ORAL at 13:05

## 2024-04-16 RX ADMIN — FAMOTIDINE 20 MG: 10 INJECTION INTRAVENOUS at 08:12

## 2024-04-16 RX ADMIN — METRONIDAZOLE 500 MG: 500 INJECTION, SOLUTION INTRAVENOUS at 08:42

## 2024-04-16 RX ADMIN — DILTIAZEM HYDROCHLORIDE 30 MG: 30 TABLET, FILM COATED ORAL at 23:35

## 2024-04-16 RX ADMIN — ENOXAPARIN SODIUM 100 MG: 100 INJECTION SUBCUTANEOUS at 00:06

## 2024-04-16 RX ADMIN — METRONIDAZOLE 500 MG: 500 INJECTION, SOLUTION INTRAVENOUS at 00:07

## 2024-04-16 RX ADMIN — METOCLOPRAMIDE 10 MG: 5 INJECTION, SOLUTION INTRAMUSCULAR; INTRAVENOUS at 15:22

## 2024-04-16 RX ADMIN — ASPIRIN 81 MG CHEWABLE TABLET 81 MG: 81 TABLET CHEWABLE at 08:12

## 2024-04-16 RX ADMIN — SERTRALINE HYDROCHLORIDE 25 MG: 25 TABLET ORAL at 08:12

## 2024-04-16 RX ADMIN — Medication 12.5 MG: at 15:22

## 2024-04-16 RX ADMIN — Medication 5 MG: at 22:26

## 2024-04-16 RX ADMIN — ACETAMINOPHEN 1000 MG: 500 TABLET, FILM COATED ORAL at 12:11

## 2024-04-16 RX ADMIN — POTASSIUM CHLORIDE 40 MEQ: 1500 TABLET, EXTENDED RELEASE ORAL at 05:17

## 2024-04-16 RX ADMIN — Medication 10 ML: at 22:26

## 2024-04-16 RX ADMIN — METOCLOPRAMIDE 10 MG: 5 INJECTION, SOLUTION INTRAMUSCULAR; INTRAVENOUS at 08:12

## 2024-04-16 RX ADMIN — Medication 12.5 MG: at 08:12

## 2024-04-16 RX ADMIN — METRONIDAZOLE 500 MG: 500 INJECTION, SOLUTION INTRAVENOUS at 19:02

## 2024-04-16 NOTE — THERAPY TREATMENT NOTE
"Subjective: Pt agreeable to therapeutic plan of care. Pt up in chair upon arrival. RN reports pt is still in afib, but okay to work with.     Objective:     Bed mobility - N/A or Not attempted.    Transfers - STS Supervision    Ambulation - 0 feet N/A or Not attempted.    Therapeutic Exercise - 20 Reps B LE AROM supported sitting / chair    Vitals: Tachycardic. Resting -150 bpm. HR while exercising 135-170 bpm.     Pain: 0 VAS   Location:   Intervention for pain: N/A    Education: Provided education on the importance of mobility in the acute care setting and Stroke prevention and risk factors    Assessment: Viktor Atkins presents with functional mobility impairments which indicate the need for skilled intervention. Tolerating session today without incident. Pt tolerated sitting therex and one standing trial well, with tachycardia and active afib. HR increased up to 170 bpm during session. Further mobility deferred due to tachycardia. Will continue to follow and progress as tolerated.     Plan/Recommendations:   If medically appropriate, Low Intensity Therapy recommended post-acute care - This is recommended as therapy feels this patient would require 2-3 visits per week. OP or HH would be the best option depending on patient's home bound status. Consider, if the patient has other  \"skilled\" needs such as wounds, IV antibiotics, etc. Combined with \"low intensity\" could also equate to a SNF. If patient is medically complex, consider LTAC. Pt requires no DME at discharge.     Pt desires Home with Home Health at discharge. Pt cooperative; agreeable to therapeutic recommendations and plan of care.         Basic Mobility 6-click:  Rollin = Total, A lot = 2, A little = 3; 4 = None  Supine>Sit:   1 = Total, A lot = 2, A little = 3; 4 = None   Sit>Stand with arms:  1 = Total, A lot = 2, A little = 3; 4 = None  Bed>Chair:   1 = Total, A lot = 2, A little = 3; 4 = None  Ambulate in room:  1 = Total, A lot = " 2, A little = 3; 4 = None  3-5 Steps with railin = Total, A lot = 2, A little = 3; 4 = None  Score: 24    Modified [place]: N/A = No pre-op stroke/TIA    Post-Tx Position: Up in Chair, Alarms activated, and Call light and personal items within reach  PPE: gloves

## 2024-04-16 NOTE — NURSING NOTE
73-year-old male who was admitted to the hospital on April 8 with wound infection.  Patient underwent formation of a colostomy on April 9 patient is sitting up in chair he is awake alert and oriented.  He reports no nausea.  He states that he feels comfortable with the ostomy.  He reports that he is able to open and close the appliance independently.  The stoma itself is viable.  Appears slightly flush pink moist.  Small amount of stool in the pouch.  Education is continued with him and his spouse.  All questions were answered.  We will continue to follow

## 2024-04-16 NOTE — CASE MANAGEMENT/SOCIAL WORK
Social Work Assessment  [facility]     Patient Name: Viktor Atkins  MRN: [id number]  Today's Date: 4/16/2024    Admit Date: 4/8/2024       Discharge Plan       Row Name 04/16/24 1534       Plan    Plan Comments Sent follow-up email to Reena on secondary Medicaid screening and if this was attempted. Initial request sent 4/12.             SHANA Good, LSW, [facility]    Phone: [phone]  Cell: [phone]  Fax: [phone]  [email or web address]

## 2024-04-16 NOTE — PROGRESS NOTES
"[facility] MEDICINE SERVICE  DAILY PROGRESS NOTE      Patient Name: Viktor Atkins  Date of Admission: 4/8/2024  Today's Date: 04/16/24  Length of Stay: 8  Primary Care Physician: Gera Manriquez MD    Subjective   Patient feels better today, stating that he does still have some abdominal soreness although his distention is improving.  He has had quite a bit of gas in his ostomy bag with a moderate amount of liquid stool.      Objective    Temp:  [97.5 °F (36.4 °C)-98.7 °F (37.1 °C)] 98.4 °F (36.9 °C)  Heart Rate:  [] 127  Resp:  [20-25] 24  BP: ()/(60-87) 136/87  Physical Exam  General: NAD  HEENT: AT NC, EOMI, NG tube in place  Neck: No JVD  CVS: S1/S2 present, irregular tachycardia, no M/R/G  Lungs: CTA B/L  Abdomen: soft, minimally tender at incision sites, ND, BS+, ostomy in place with small amount liquid stool  Ext: no edema  Skin: no rashes, bruises or discolorations  Neuro: no focal deficits  Psych: Not agitated         Results Review:  I have reviewed the labs, radiology results, and diagnostic studies.    Laboratory Data:   Results from last 7 days   Lab Units 04/16/24  0018 04/15/24  0139 04/14/24  0104   WBC 10*3/mm3 18.03* 13.46* 13.93*   HEMOGLOBIN g/dL 11.8* 11.6* 11.8*   HEMATOCRIT % 37.5 37.5 37.8   PLATELETS 10*3/mm3 398 415 436        Results from last 7 days   Lab Units 04/16/24  1223 04/16/24  0018 04/15/24  0139 04/14/24  0104   SODIUM mmol/L  --  141 143 143   POTASSIUM mmol/L 3.9 3.6 3.7 3.9   CHLORIDE mmol/L  --  106 109* 109*   CO2 mmol/L  --  20.0* 21.0* 21.0*   BUN mg/dL  --  19 17 16   CREATININE mg/dL  --  1.27 1.17 1.17   CALCIUM mg/dL  --  8.7 8.4* 8.8   BILIRUBIN mg/dL  --  0.2 0.3 0.3   ALK PHOS U/L  --  109 104 118*   ALT (SGPT) U/L  --  40 42* 41   AST (SGOT) U/L  --  33 37 33   GLUCOSE mg/dL  --  157* 106* 103*       Culture Data:   No results found for: \"BLOODCX\"  No results found for: \"URINECX\"  No results found for: \"RESPCX\"  No results found for: " "\"WOUNDCX\"  No results found for: \"STOOLCX\"  No components found for: \"BODYFLD\"    Radiology Data:   Imaging Results (Last 24 Hours)       ** No results found for the last 24 hours. **            I have reviewed the patient's current medications.     Assessment/Plan     Postoperative wound abscess with anastomotic leak  -Patient underwent lower anterior resection of a bleeding rectosigmoid mass on 3/30 with findings of invasive moderately symmetric adenocarcinoma  -Found to have abscess on imaging after returning to hospital for worsening pain  -S/p laparoscopy with pelvic drain placement and loop ostomy on 4/9  -Wound cultures growing streptococcal and Bacteroides species  -Started on Rocephin, Flagyl to complete course of treatment on 4/23    A-fib with RVR   -Patient initially had improved rate control but now with tachycardia once again  -Restarted Cardizem drip but will initiate oral amiodarone as this seemed to control his heart rate initially on his previous admission  -Continue oral metoprolol  -Continue Lovenox, switch to oral anticoagulation once patient able to tolerate p.o. consistently    Postoperative ileus  -Patient was initially tolerating liquid diet but then had worsening of his symptoms and NG tube was placed over the weekend  -Patient initially had significant NG tube output but NG tube was removed on 4/15  -Advancing diet per general surgery; tolerating clear liquids today    Rectosigmoid adenocarcinoma  -F/u oncology as outpatient  -Patient needs to have port placed for initiation of treatment    Hypertension  -Continue Cardizem, metoprolol  -Hold losartan    DM type II, controlled  - ISS, accuchecks, diet    Dyslipidemia  -Continue Lipitor    Mood disorders  - home meds    9) GI/DVT ppx  - protonix/lovenox      Disposition: Likely discharge on 4/19        Electronically signed by Peyman Castorena MD, 04/16/24, 14:01 EDT.    "

## 2024-04-16 NOTE — PROGRESS NOTES
Colorectal Surgery Progress Note    Pt. Name/Age/:  Vikotr Atkins   74 y.o.    1950         Med. Record Number:   [id number]  Date of admission:  2024      Service(s): Colorectal Surgery      Subjective    Doing well  No complaints   Some soreness in the mid abdomen   Denies nausea, tolerating liquids   Had stool and gas through stoma     Afebrile and tachycardic   Leukocytosis worsening in am lab     Objective  Vitals:     Patient Vitals for the past 24 hrs:   BP Temp Temp src Pulse Resp SpO2 Weight   24 0906 126/60 97.5 °F (36.4 °C) Oral 111 21 96 % --   24 0517 95/61 97.7 °F (36.5 °C) Oral 89 25 96 % 99.8 kg (220 lb 0.3 oz)   24 0500 95/61 -- -- 80 -- 98 % --   24 0400 108/65 -- -- 73 -- 98 % --   24 0300 112/73 -- -- 94 -- 97 % --   24 0100 103/71 98.1 °F (36.7 °C) Oral 100 20 98 % --   24 0026 -- -- -- 98 -- 93 % --   04/15/24 2300 110/70 -- -- (!) 131 -- -- --   04/15/24 2100 103/70 -- -- 108 -- 96 % --   04/15/24 2050 127/67 97.9 °F (36.6 °C) Oral 99 20 97 % --   04/15/24 2036 127/67 -- -- (!) 130 -- -- --   04/15/24 1605 110/70 98.7 °F (37.1 °C) Axillary -- 25 96 % --   04/15/24 1300 96/73 98.3 °F (36.8 °C) Oral (!) 123 24 94 % --           Wt. Admission: Weight: 105 kg (231 lb 11.3 oz)     Wt. Current: Weight: 99.8 kg (220 lb 0.3 oz)   Body mass index is 30.69 kg/m².      I&O:  Intake/Output Summary (Last 24 hours) at 2024 1255  Last data filed at 2024 0906  Gross per 24 hour   Intake 480 ml   Output 710 ml   Net -230 ml     1901 -  0700  In: 480 [P.O.:480]  Out: 3505 [Urine:500; Drains:230]      Exam  General:  No acute distress, resting comfortably.  Cardiovascular: afib   Respiratory: no increased work of breathing.  Abdomen:  Soft, non distended, ostomy viable and with air and stool output, no tenderness, pfannenstiel incision with penrose intact, no discharge   Extremities: warm, well-perfused extremities, no pitting  edema.      Labs:     [unfilled]          Scheduled Meds:acetaminophen, 1,000 mg, Oral, Q6H  aspirin, 81 mg, Oral, Daily  atorvastatin, 20 mg, Oral, Nightly  cefTRIAXone, 2,000 mg, Intravenous, Q24H  dilTIAZem, 30 mg, Oral, Q8H  enoxaparin, 1 mg/kg, Subcutaneous, Q12H  famotidine, 20 mg, Intravenous, Q12H  insulin lispro, 2-7 Units, Subcutaneous, Q6H  lidocaine, 20 mL, Intradermal, Once  metoclopramide, 10 mg, Intravenous, Q6H  metoprolol tartrate, 12.5 mg, Oral, Q6H  metroNIDAZOLE, 500 mg, Intravenous, Q8H  sertraline, 25 mg, Oral, Daily  sodium chloride, 10 mL, Intravenous, Q12H  sodium chloride, 10 mL, Intravenous, Q12H  tamsulosin, 0.4 mg, Oral, Daily      Continuous Infusions:dilTIAZem, 5-15 mg/hr, Last Rate: 10 mg/hr (04/16/24 1223)      PRN Meds:.  senna-docusate sodium **AND** polyethylene glycol **AND** bisacodyl **AND** bisacodyl    Calcium Replacement - Follow Nurse / BPA Driven Protocol    dextrose    dextrose    glucagon (human recombinant)    HYDROmorphone **AND** naloxone    Magnesium Standard Dose Replacement - Follow Nurse / BPA Driven Protocol    melatonin    nitroglycerin    ondansetron    ondansetron ODT    Phosphorus Replacement - Follow Nurse / BPA Driven Protocol    Potassium Replacement - Follow Nurse / BPA Driven Protocol    simethicone    [COMPLETED] Insert Peripheral IV **AND** sodium chloride    sodium chloride    sodium chloride    sodium chloride    sodium chloride          Assessment  74 y.o. male s/p robotic LAR with take back to OR for diverting colostomy and wound washout     Plan / Recommendations    - resolving ileus, ostomy with output, no nausea    - advance to regular diet   - encourage out of bed and ambulating   - keep electrolytes replete, K> 4, Mg> 2.5  - voiding freely after maldonado discontinued   - discontinue anticholinergics including scopolamine as it might be contributing to tachycardia and ileus.   - ok to transition to direct oral anticoagulant for afib instead of  lovenox   - remove drain   - monitor leukocytosis for now.     12:55 EDT; 4/16/2024        Aakash Burden MD  Colon and Rectal Surgery   [facility]

## 2024-04-16 NOTE — PROGRESS NOTES
Infectious Diseases Progress Note      LOS: 8 days   Patient Care Team:  Gera Manriquez MD as PCP - General (Internal Medicine)  Gera Manriquez MD as PCP - Family Medicine    Chief Complaint: Incisional infection    Subjective       The patient has been afebrile for the last 24 hours.  The patient is on room air, hemodynamically stable, and is tolerating antimicrobial therapy.  Currently in the chair with no new complaints other than some mild nausea      Review of Systems:   Review of Systems   Constitutional: Negative.    HENT: Negative.     Eyes: Negative.    Respiratory: Negative.     Cardiovascular: Negative.    Gastrointestinal:  Positive for nausea.   Endocrine: Negative.    Genitourinary: Negative.    Musculoskeletal: Negative.    Skin:  Positive for wound.   Neurological: Negative.    Psychiatric/Behavioral: Negative.     All other systems reviewed and are negative.       Objective     Vital Signs  Temp:  [97.5 °F (36.4 °C)-98.7 °F (37.1 °C)] 98.4 °F (36.9 °C)  Heart Rate:  [] 132  Resp:  [20-25] 24  BP: ()/(60-87) 104/73    Physical Exam:  Physical Exam  Vitals and nursing note reviewed.   Constitutional:       General: He is not in acute distress.     Appearance: Normal appearance. He is well-developed and normal weight. He is not diaphoretic.   HENT:      Head: Normocephalic and atraumatic.   Eyes:      Conjunctiva/sclera: Conjunctivae normal.      Pupils: Pupils are equal, round, and reactive to light.   Cardiovascular:      Rate and Rhythm: Normal rate and regular rhythm.      Heart sounds: Normal heart sounds, S1 normal and S2 normal.   Pulmonary:      Effort: Pulmonary effort is normal. No respiratory distress.      Breath sounds: Normal breath sounds. No stridor. No wheezing or rales.   Abdominal:      General: Bowel sounds are normal. There is distension.      Palpations: Abdomen is soft. There is no mass.      Tenderness: There is abdominal tenderness. There is no guarding.       Comments: Surgical dressing in place.    Drain in place    Colostomy    Abdominal distention is improved today   Musculoskeletal:         General: No deformity. Normal range of motion.      Cervical back: Neck supple.   Skin:     General: Skin is warm and dry.      Coloration: Skin is not pale.      Findings: No erythema or rash.   Neurological:      Mental Status: He is alert and oriented to person, place, and time.      Cranial Nerves: No cranial nerve deficit.   Psychiatric:         Mood and Affect: Mood normal.          Results Review:    I have reviewed all clinical data, test, lab, and imaging results.     Radiology  No Radiology Exams Resulted Within Past 24 Hours    Cardiology    Laboratory    Results from last 7 days   Lab Units 04/16/24  0018 04/15/24  0139 04/14/24  0104 04/13/24  0428 04/12/24  0400 04/11/24  0243 04/10/24  0142   WBC 10*3/mm3 18.03* 13.46* 13.93* 14.68* 17.19* 20.36* 21.35*   HEMOGLOBIN g/dL 11.8* 11.6* 11.8* 11.0* 10.4* 10.3* 10.7*   HEMATOCRIT % 37.5 37.5 37.8 35.0* 33.3* 33.4* 33.0*   PLATELETS 10*3/mm3 398 415 436 408 372 393 395     Results from last 7 days   Lab Units 04/16/24  1223 04/16/24  0018 04/15/24  0139 04/14/24  0104 04/13/24  0428 04/12/24  0400 04/11/24  0243 04/10/24  0142   SODIUM mmol/L  --  141 143 143 143 150* 146* 141   POTASSIUM mmol/L 3.9 3.6 3.7 3.9 3.4* 3.9 3.7 3.6   CHLORIDE mmol/L  --  106 109* 109* 109* 115* 113* 110*   CO2 mmol/L  --  20.0* 21.0* 21.0* 22.0 19.0* 19.0* 19.0*   BUN mg/dL  --  19 17 16 13 15 23 17   CREATININE mg/dL  --  1.27 1.17 1.17 0.98 0.90 1.23 1.15   GLUCOSE mg/dL  --  157* 106* 103* 146* 97 127* 139*   ALBUMIN g/dL  --  3.6 3.4* 3.4* 3.3* 3.4* 3.3* 3.4*   BILIRUBIN mg/dL  --  0.2 0.3 0.3 0.3 0.2 0.3 0.3   ALK PHOS U/L  --  109 104 118* 108 114 122* 128*   AST (SGOT) U/L  --  33 37 33 37 27 23 37   ALT (SGPT) U/L  --  40 42* 41 43* 33 37 54*   CALCIUM mg/dL  --  8.7 8.4* 8.8 8.3* 8.3* 8.4* 8.4*                    Microbiology   Microbiology Results (last 10 days)       Procedure Component Value - Date/Time    Wound Culture - Swab, Abdominal Wall [[id number]]  (Abnormal)  (Susceptibility) Collected: 04/08/24 1934    Lab Status: Final result Specimen: Swab from Abdominal Wall Updated: 04/11/24 0709     Wound Culture Rare Streptococcus sanguinis      Scant growth (1+) Normal Skin Jolynn     Gram Stain Few (2+) WBCs per low power field    Susceptibility        Streptococcus sanguinis      KATIE      Ceftriaxone Susceptible      Penicillin G Intermediate      Vancomycin Susceptible                           Anaerobic Culture - Swab, Abdominal Wall [[id number]]  (Abnormal) Collected: 04/08/24 1934    Lab Status: Final result Specimen: Swab from Abdominal Wall Updated: 04/12/24 0822     Anaerobic Culture Bacteroides fragilis group    Blood Culture - Blood, Arm, Right [[id number]]  (Normal) Collected: 04/08/24 1529    Lab Status: Final result Specimen: Blood from Arm, Right Updated: 04/13/24 1545     Blood Culture No growth at 5 days    Narrative:      Less than seven (7) mL's of blood was collected.  Insufficient quantity may yield false negative results.    Blood Culture - Blood, Arm, Right [[id number]]  (Normal) Collected: 04/08/24 1529    Lab Status: Final result Specimen: Blood from Arm, Right Updated: 04/13/24 1545     Blood Culture No growth at 5 days    Narrative:      Less than seven (7) mL's of blood was collected.  Insufficient quantity may yield false negative results.            Medication Review:       Schedule Meds  acetaminophen, 1,000 mg, Oral, Q6H  amiodarone, 200 mg, Oral, Q12H  aspirin, 81 mg, Oral, Daily  atorvastatin, 20 mg, Oral, Nightly  cefTRIAXone, 2,000 mg, Intravenous, Q24H  dilTIAZem, 30 mg, Oral, Q8H  enoxaparin, 1 mg/kg, Subcutaneous, Q12H  famotidine, 20 mg, Intravenous, Q12H  insulin lispro, 2-7 Units, Subcutaneous, Q6H  lidocaine, 20 mL, Intradermal, Once  metoclopramide, 10 mg, Intravenous,  Q6H  metoprolol tartrate, 12.5 mg, Oral, Q6H  metroNIDAZOLE, 500 mg, Intravenous, Q8H  sertraline, 25 mg, Oral, Daily  sodium chloride, 10 mL, Intravenous, Q12H  sodium chloride, 10 mL, Intravenous, Q12H  tamsulosin, 0.4 mg, Oral, Daily        Infusion Meds  dilTIAZem, 5-15 mg/hr, Last Rate: 10 mg/hr (04/16/24 1223)        PRN Meds    senna-docusate sodium **AND** polyethylene glycol **AND** bisacodyl **AND** bisacodyl    Calcium Replacement - Follow Nurse / BPA Driven Protocol    dextrose    dextrose    glucagon (human recombinant)    HYDROmorphone **AND** naloxone    Magnesium Standard Dose Replacement - Follow Nurse / BPA Driven Protocol    melatonin    nitroglycerin    ondansetron    ondansetron ODT    Phosphorus Replacement - Follow Nurse / BPA Driven Protocol    Potassium Replacement - Follow Nurse / BPA Driven Protocol    simethicone    [COMPLETED] Insert Peripheral IV **AND** sodium chloride    sodium chloride    sodium chloride    sodium chloride    sodium chloride        Assessment & Plan       Antimicrobial Therapy   1.  IV  ceftriaxone       2.  IV Flagyl        3.          4.        5.          Assessment     Infected abdomen wall incision with anastomosis leak.  Wound cultures are growing Streptococcus sanguinus and Bacteroides fragilis.  Blood cultures are negative so far.  Patient is status post diagnostic laparoscopic diverting colostomy, pelvic washout and drain placement on 4/10/2024-no intra abdomen abscess seen.  CT of the abdomen pelvis on 4/13/2024 which showed possible partial small bowel obstruction.  Patient is back to an NG tube to suction     Reactive leukocytosis secondary to above.  Trending down     S/p resection of colon cancer on March 30, 2024     Diabetes mellitus type 2-A1c is 5.8     Plan     Continue IV Rocephin 2 g every 24 hours for 14 days of treatment from surgery-last day on 4/23/2024  Continue IV Flagyl 500 mg every 8 hours-can be switched to p.o. Flagyl at  discharge  Patient has a midline-please remove once IV antibiotics are completed  Supportive care  A.m. labs  Case discussed with patient and family members at bedside        Alejandra Alonso, KAREN  04/16/24  14:23 EDT    Note is dictated utilizing voice recognition software/Dragon

## 2024-04-16 NOTE — PLAN OF CARE
Goal Outcome Evaluation:              Outcome Evaluation: attmepted to turn off cardizem gtt around 0830- then restarted at 1200, started amio po and increase metoprolol. then will attempt agin to titrate down/off cardizem. patient up sitting ing chair for most of day with assist times one to bathroom, but hr 90's-160's. EDUARDO drain removed pt tolerated well, dressing placed. patient on regular diet , instructed to take it slow and to notify nurse if any nasuea, fullness and or pain. patient and wife verblized understanding. ABD rounded slightly destinded, but not tender. Dressing changed per protocol.

## 2024-04-16 NOTE — PLAN OF CARE
"Assessment: Viktor Atkins presents with functional mobility impairments which indicate the need for skilled intervention. Tolerating session today without incident. Pt tolerated sitting therex and one standing trial well, with tachycardia and active afib. HR increased up to 170 bpm during session. Further mobility deferred due to tachycardia. Will continue to follow and progress as tolerated.     Plan/Recommendations:   If medically appropriate, Low Intensity Therapy recommended post-acute care - This is recommended as therapy feels this patient would require 2-3 visits per week. OP or HH would be the best option depending on patient's home bound status. Consider, if the patient has other  \"skilled\" needs such as wounds, IV antibiotics, etc. Combined with \"low intensity\" could also equate to a SNF. If patient is medically complex, consider LTAC. Pt requires no DME at discharge.     Pt desires Home with Home Health at discharge. Pt cooperative; agreeable to therapeutic recommendations and plan of care.     "

## 2024-04-16 NOTE — PROGRESS NOTES
LOS: 8 days   Admitting Physician- Peyman Castorena MD    Reason For Followup:    Persistent atrial fibrillation  Postsurgery ileus  S/p laparotomy  Hypertension    Subjective      Up walking in room  NG just removed  Allowed ice chips    Objective     Patient still in atrial fibrillation  Remains on IV Dilitiazem    Review of Systems:   Review of Systems   Constitutional: Negative for chills and fever.   HENT:  Negative for ear discharge and nosebleeds.    Eyes:  Negative for discharge and redness.   Cardiovascular:  Negative for chest pain, orthopnea, palpitations, paroxysmal nocturnal dyspnea and syncope.   Respiratory:  Negative for cough, shortness of breath and wheezing.    Endocrine: Negative for heat intolerance.   Skin:  Negative for rash.   Musculoskeletal:  Negative for arthritis and myalgias.   Gastrointestinal:  Positive for bloating and abdominal pain. Negative for melena, nausea and vomiting.   Genitourinary:  Negative for dysuria and hematuria.   Neurological:  Negative for dizziness, light-headedness, numbness and tremors.   Psychiatric/Behavioral:  Negative for depression. The patient is not nervous/anxious.          Vital Signs  Vitals:    04/16/24 0400 04/16/24 0500 04/16/24 0517 04/16/24 0906   BP: 108/65 95/61 95/61 126/60   BP Location:   Left arm Left arm   Patient Position:   Lying Lying   Pulse: 73 80 89 111   Resp:   25 21   Temp:   97.7 °F (36.5 °C) 97.5 °F (36.4 °C)   TempSrc:   Oral Oral   SpO2: 98% 98% 96% 96%   Weight:   99.8 kg (220 lb 0.3 oz)    Height:         Wt Readings from Last 1 Encounters:   04/16/24 99.8 kg (220 lb 0.3 oz)       Intake/Output Summary (Last 24 hours) at 4/16/2024 1306  Last data filed at 4/16/2024 0906  Gross per 24 hour   Intake 480 ml   Output 710 ml   Net -230 ml     Physical Exam:  Constitutional:       Appearance: Well-developed.   Eyes:      General: No scleral icterus.        Right eye: No discharge.   HENT:      Head: Normocephalic and atraumatic.    Neck:      Thyroid: No thyromegaly.      Lymphadenopathy: No cervical adenopathy.   Pulmonary:      Effort: Pulmonary effort is normal. No respiratory distress.      Breath sounds: Normal breath sounds. No wheezing. No rales.   Cardiovascular:      Normal rate. Regular rhythm.      No gallop.    Edema:     Peripheral edema absent.   Abdominal:      Tenderness: There is no abdominal tenderness.   Skin:     Findings: No erythema or rash.   Neurological:      Mental Status: Alert and oriented to person, place, and time.         Results Review:   Lab Results (last 24 hours)       Procedure Component Value Units Date/Time    Potassium [[id number]]  (Normal) Collected: 04/16/24 1223    Specimen: Blood Updated: 04/16/24 1244     Potassium 3.9 mmol/L      Comment: Slight hemolysis detected by analyzer. Result may be falsely elevated.       POC Glucose Once [[id number]]  (Abnormal) Collected: 04/16/24 1151    Specimen: Blood Updated: 04/16/24 1153     Glucose 133 mg/dL      Comment: Serial Number: [id number]:  [id number]       POC Glucose Q6H [[id number]]  (Abnormal) Collected: 04/16/24 0712    Specimen: Blood Updated: 04/16/24 0714     Glucose 109 mg/dL      Comment: Serial Number: [id number]:  [id number]       POC Glucose Once [[id number]]  (Abnormal) Collected: 04/16/24 0521    Specimen: Blood Updated: 04/16/24 0522     Glucose 112 mg/dL      Comment: Serial Number: [id number]:  [id number]       Comprehensive Metabolic Panel [[id number]]  (Abnormal) Collected: 04/16/24 0018    Specimen: Blood from Arm, Right Updated: 04/16/24 0049     Glucose 157 mg/dL      BUN 19 mg/dL      Creatinine 1.27 mg/dL      Sodium 141 mmol/L      Potassium 3.6 mmol/L      Comment: Slight hemolysis detected by analyzer. Result may be falsely elevated.        Chloride 106 mmol/L      CO2 20.0 mmol/L      Calcium 8.7 mg/dL      Total Protein 6.3 g/dL      Albumin 3.6 g/dL      ALT (SGPT) 40 U/L      AST (SGOT) 33 U/L      Comment:  Slight hemolysis detected by analyzer. Result may be falsely elevated.        Alkaline Phosphatase 109 U/L      Total Bilirubin 0.2 mg/dL      Globulin 2.7 gm/dL      A/G Ratio 1.3 g/dL      BUN/Creatinine Ratio 15.0     Anion Gap 15.0 mmol/L      eGFR 59.7 mL/min/1.73     Narrative:      GFR Normal >60  Chronic Kidney Disease <60  Kidney Failure <15    The GFR formula is only valid for adults with stable renal function between ages 18 and 70.    Magnesium [[id number]]  (Normal) Collected: 04/16/24 0018    Specimen: Blood from Arm, Right Updated: 04/16/24 0049     Magnesium 2.2 mg/dL     Phosphorus [[id number]]  (Normal) Collected: 04/16/24 0018    Specimen: Blood from Arm, Right Updated: 04/16/24 0049     Phosphorus 3.3 mg/dL     CBC & Differential [[id number]]  (Abnormal) Collected: 04/16/24 0018    Specimen: Blood from Arm, Right Updated: 04/16/24 0024    Narrative:      The following orders were created for panel order CBC & Differential.  Procedure                               Abnormality         Status                     ---------                               -----------         ------                     CBC Auto Differential[[id number]]        Abnormal            Final result                 Please view results for these tests on the individual orders.    CBC Auto Differential [[id number]]  (Abnormal) Collected: 04/16/24 0018    Specimen: Blood from Arm, Right Updated: 04/16/24 0024     WBC 18.03 10*3/mm3      RBC 4.32 10*6/mm3      Hemoglobin 11.8 g/dL      Hematocrit 37.5 %      MCV 86.8 fL      MCH 27.3 pg      MCHC 31.5 g/dL      RDW 16.2 %      RDW-SD 50.9 fl      MPV 12.5 fL      Platelets 398 10*3/mm3      Neutrophil % 70.6 %      Lymphocyte % 22.1 %      Monocyte % 5.5 %      Eosinophil % 1.1 %      Basophil % 0.3 %      Immature Grans % 0.4 %      Neutrophils, Absolute 12.75 10*3/mm3      Lymphocytes, Absolute 3.98 10*3/mm3      Monocytes, Absolute 0.99 10*3/mm3      Eosinophils, Absolute 0.19  10*3/mm3      Basophils, Absolute 0.05 10*3/mm3      Immature Grans, Absolute 0.07 10*3/mm3      nRBC 0.0 /100 WBC     POC Glucose Once [[id number]]  (Abnormal) Collected: 04/15/24 1743    Specimen: Blood Updated: 04/15/24 1744     Glucose 149 mg/dL      Comment: Serial Number: [id number]:  [id number]             Imaging Results (Last 72 Hours)       Procedure Component Value Units Date/Time    CT Abdomen Pelvis Without Contrast [[id number]] Collected: 04/13/24 1706     Updated: 04/13/24 1724    Narrative:      CT ABDOMEN PELVIS WO CONTRAST    Date of Exam: 4/13/2024 5:00 PM EDT    Indication: Abdominal pain, post-op.    Comparison: None available.    Technique: Axial CT images were obtained of the abdomen and pelvis without the administration of contrast. Sagittal and coronal reconstructions were performed.  Automated exposure control and iterative reconstruction methods were used.      Findings:  Visualized lung bases are clear. Small amounts of gas are seen throughout the extraperitoneal soft tissues of the abdominal wall compatible with postoperative change.        The liver, pancreas, and spleen are within normal limits. Bilateral adrenal glands appear normal. Kidneys appear normal bilaterally. No evidence of renal or ureteral stone or hydronephrosis. Patient is status post cholecystectomy. Nasogastric tube is in   place in the stomach. Stomach is fluid-filled and mildly distended. There are multiple fluid-filled mildly distended loops of small bowel throughout the abdomen. There is a discrete transition point to normal caliber small bowel within the region of the   distal ileum suggesting partial small bowel obstruction. No abdominal or retroperitoneal adenopathy.    Pelvis: There is a right lower quadrant EDUARDO drain in place. There is contrast material within the colon. There is a left lower quadrant loop colostomy. There are multiple sigmoid diverticula. There are postoperative changes of the sigmoid colon  from   partial colonic resection and reanastomosis. No definite contrast extravasation. No free fluid seen within the pelvis. No pelvic or inguinal adenopathy. Pyle catheter is in place in the urinary bladder. There is a EDUARDO drain within the subcutaneous fat   overlying the midline of the lower pelvis. No fluid collection identified.    No lytic or sclerotic bony lesion identified.    Impression:      Impression:    1. Fluid-filled distended loops of small bowel with a discrete transition point to normal caliber decompressed bowel within the distal ileum. Findings would be compatible with partial small bowel obstruction.  2. Status post sigmoid colon resection and reanastomosis with diverting loop colostomy within the left lower quadrant. No evidence of contrast extravasation. No free fluid in the pelvis.            Electronically Signed: Thaddeus Bcuhanan MD    4/13/2024 5:22 PM EDT    Workstation ID: [id number]          ECG/EMG Results (most recent)       Procedure Component Value Units Date/Time    ECG 12 Lead Tachycardia [[id number]] Collected: 04/08/24 1649     Updated: 04/09/24 0730     QT Interval 314 ms      QTC Interval 518 ms     Narrative:      HEART RATE= 164  bpm  RR Interval= 367  ms  MO Interval=   ms  P Horizontal Axis=   deg  P Front Axis=   deg  QRSD Interval= 108  ms  QT Interval= 314  ms  QTcB= 518  ms  QRS Axis= 20  deg  T Wave Axis= 205  deg  - ABNORMAL ECG -  Atrial fibrillation with rapid V-rate  Incomplete left bundle branch block  Low voltage, extremity leads  The appearance of BBB may be rate related  When compared with ECG of 27-Mar-2024 11:50:13,  Significant change in rhythm  Electronically Signed By: Thiago Solomon (REDD) 09-Apr-2024 07:30:05  Date and Time of Study: 2024-04-08 16:49:10    Telemetry Scan [[id number]] Resulted: 04/08/24     Updated: 04/10/24 0613    Telemetry Scan [[id number]] Resulted: 04/08/24     Updated: 04/10/24 0640    Telemetry Scan [[id number]] Resulted: 04/08/24      Updated: 04/10/24 0959    Telemetry Scan [[id number]] Resulted: 04/08/24     Updated: 04/10/24 1126    Telemetry Scan [[id number]] Resulted: 04/08/24     Updated: 04/10/24 1126    Telemetry Scan [[id number]] Resulted: 04/08/24     Updated: 04/11/24 0912    Telemetry Scan [[id number]] Resulted: 04/08/24     Updated: 04/11/24 0935    Telemetry Scan [[id number]] Resulted: 04/08/24     Updated: 04/11/24 0955    Telemetry Scan [[id number]] Resulted: 04/08/24     Updated: 04/11/24 1119    Telemetry Scan [[id number]] Resulted: 04/08/24     Updated: 04/11/24 1150    Telemetry Scan [[id number]] Resulted: 04/08/24     Updated: 04/11/24 1333    Telemetry Scan [[id number]] Resulted: 04/08/24     Updated: 04/12/24 0656    Telemetry Scan [[id number]] Resulted: 04/08/24     Updated: 04/12/24 1249    Telemetry Scan [[id number]] Resulted: 04/08/24     Updated: 04/12/24 1255    Telemetry Scan [[id number]] Resulted: 04/08/24     Updated: 04/12/24 1306    Telemetry Scan [[id number]] Resulted: 04/08/24     Updated: 04/12/24 1403    Telemetry Scan [[id number]] Resulted: 04/08/24     Updated: 04/15/24 1320    Telemetry Scan [[id number]] Resulted: 04/08/24     Updated: 04/15/24 1435    Telemetry Scan [[id number]] Resulted: 04/08/24     Updated: 04/16/24 0638    Telemetry Scan [[id number]] Resulted: 04/08/24     Updated: 04/16/24 0638    Telemetry Scan [[id number]] Resulted: 04/08/24     Updated: 04/16/24 0638    Telemetry Scan [[id number]] Resulted: 04/08/24     Updated: 04/16/24 0914    Telemetry Scan [[id number]] Resulted: 04/08/24     Updated: 04/16/24 1031    Telemetry Scan [[id number]] Resulted: 04/08/24     Updated: 04/16/24 1233    Telemetry Scan [[id number]] Resulted: 04/08/24     Updated: 04/16/24 1239          CBC    Results from last 7 days   Lab Units 04/16/24  0018 04/15/24  0139 04/14/24  0104 04/13/24  0428 04/12/24  0400 04/11/24  0243 04/10/24  0142   WBC 10*3/mm3 18.03* 13.46* 13.93* 14.68* 17.19* 20.36* 21.35*   HEMOGLOBIN g/dL 11.8* 11.6*  11.8* 11.0* 10.4* 10.3* 10.7*   PLATELETS 10*3/mm3 398 415 436 408 372 393 395     BMP   Results from last 7 days   Lab Units 04/16/24  1223 04/16/24  0018 04/15/24  0139 04/14/24  0104 04/13/24 0428 04/12/24  0400 04/11/24  0243 04/10/24  0142   SODIUM mmol/L  --  141 143 143 143 150* 146* 141   POTASSIUM mmol/L 3.9 3.6 3.7 3.9 3.4* 3.9 3.7 3.6   CHLORIDE mmol/L  --  106 109* 109* 109* 115* 113* 110*   CO2 mmol/L  --  20.0* 21.0* 21.0* 22.0 19.0* 19.0* 19.0*   BUN mg/dL  --  19 17 16 13 15 23 17   CREATININE mg/dL  --  1.27 1.17 1.17 0.98 0.90 1.23 1.15   GLUCOSE mg/dL  --  157* 106* 103* 146* 97 127* 139*   MAGNESIUM mg/dL  --  2.2 2.2 2.3 2.2 2.4 2.7* 2.8*   PHOSPHORUS mg/dL  --  3.3 3.1 3.1 2.8 2.3* 2.7 3.9     CMP   Results from last 7 days   Lab Units 04/16/24  1223 04/16/24  0018 04/15/24  0139 04/14/24 0104 04/13/24 0428 04/12/24  0400 04/11/24  0243 04/10/24  0142   SODIUM mmol/L  --  141 143 143 143 150* 146* 141   POTASSIUM mmol/L 3.9 3.6 3.7 3.9 3.4* 3.9 3.7 3.6   CHLORIDE mmol/L  --  106 109* 109* 109* 115* 113* 110*   CO2 mmol/L  --  20.0* 21.0* 21.0* 22.0 19.0* 19.0* 19.0*   BUN mg/dL  --  19 17 16 13 15 23 17   CREATININE mg/dL  --  1.27 1.17 1.17 0.98 0.90 1.23 1.15   GLUCOSE mg/dL  --  157* 106* 103* 146* 97 127* 139*   ALBUMIN g/dL  --  3.6 3.4* 3.4* 3.3* 3.4* 3.3* 3.4*   BILIRUBIN mg/dL  --  0.2 0.3 0.3 0.3 0.2 0.3 0.3   ALK PHOS U/L  --  109 104 118* 108 114 122* 128*   AST (SGOT) U/L  --  33 37 33 37 27 23 37   ALT (SGPT) U/L  --  40 42* 41 43* 33 37 54*     Cardiac Studies:  Echo- Results for orders placed during the hospital encounter of 03/25/24    Adult Transthoracic Echo Complete W/ Cont if Necessary Per Protocol    Interpretation Summary    Left ventricular systolic function is low normal. Calculated left ventricular EF = 46% Left ventricular ejection fraction appears to be 46 - 50%.    The left ventricular cavity is mildly dilated.    Left ventricular diastolic dysfunction is noted.     The left atrial cavity is dilated.    Estimated right ventricular systolic pressure from tricuspid regurgitation is normal (<35 mmHg).    Dilation of the aortic root is present.  4.4 cm    Patient in atrial fibrillation during this study.    Stress Myoview-  Cath-      Medication Review:   Scheduled Meds:acetaminophen, 1,000 mg, Oral, Q6H  amiodarone, 200 mg, Oral, Q12H  aspirin, 81 mg, Oral, Daily  atorvastatin, 20 mg, Oral, Nightly  cefTRIAXone, 2,000 mg, Intravenous, Q24H  dilTIAZem, 30 mg, Oral, Q8H  enoxaparin, 1 mg/kg, Subcutaneous, Q12H  famotidine, 20 mg, Intravenous, Q12H  insulin lispro, 2-7 Units, Subcutaneous, Q6H  lidocaine, 20 mL, Intradermal, Once  metoclopramide, 10 mg, Intravenous, Q6H  metoprolol tartrate, 12.5 mg, Oral, Q6H  metroNIDAZOLE, 500 mg, Intravenous, Q8H  sertraline, 25 mg, Oral, Daily  sodium chloride, 10 mL, Intravenous, Q12H  sodium chloride, 10 mL, Intravenous, Q12H  tamsulosin, 0.4 mg, Oral, Daily      Continuous Infusions:dilTIAZem, 5-15 mg/hr, Last Rate: 10 mg/hr (04/16/24 1223)      PRN Meds:.  senna-docusate sodium **AND** polyethylene glycol **AND** bisacodyl **AND** bisacodyl    Calcium Replacement - Follow Nurse / BPA Driven Protocol    dextrose    dextrose    glucagon (human recombinant)    HYDROmorphone **AND** naloxone    Magnesium Standard Dose Replacement - Follow Nurse / BPA Driven Protocol    melatonin    nitroglycerin    ondansetron    ondansetron ODT    Phosphorus Replacement - Follow Nurse / BPA Driven Protocol    Potassium Replacement - Follow Nurse / BPA Driven Protocol    simethicone    [COMPLETED] Insert Peripheral IV **AND** sodium chloride    sodium chloride    sodium chloride    sodium chloride    sodium chloride      Assessment & Plan     Wound infection    Anxiety disorder    Essential hypertension    Palpitations    Mixed hyperlipidemia    Colon cancer    Atrial fibrillation with rapid ventricular response    Severe malnutrition    MDM:    1.  Persistent  atrial fibrillation:    Patient heart rate is still elevated.  I would start back intravenous Cardizem.  I would add amiodarone 200 mg twice daily.  Patient is on Lovenox 1 mg/kg body weight every 12.    2.  Hypertension:    Blood pressure is still stable slightly on the higher side    3.  S/p laparotomy:    His abdomen is little bit distended today.  However it is still soft no acute tenderness    4.  Carcinoma of colon:    Patient underwent resection.  Patient would need chemotherapy    Electronically signed by Abimael Greenberg MD, 04/16/24, 1:18 PM EDT.      Abimael Greenberg MD  04/16/24  13:06 EDT

## 2024-04-16 NOTE — PLAN OF CARE
Goal Outcome Evaluation:  Plan of Care Reviewed With: patient        Progress: no change       Pt A&OX4 throughout shift and pleasant with care. Pt's family remains at bedside. Pt's HR has remained elevated for the majority of the shift. Cardizem gtt infusing at 15 most of the time. BP tolerating gtt but dies decrease some when IV metoprolol is administered. MAP has maintained at all times. Pt did complain of some pain around his EDUARDO drain insertion site. Site remains intact. Split gauze changed along with lower abd drsg. Pt tolerated it well. K+ was low on am labs, replacement imitated. Pt did have an episode of SOA in the middle of the night. Pt was also complaining of feeling claustrophobic. Pt placed on 2L NC and unnecessary items removed from pt to ease distress.

## 2024-04-17 LAB
ALBUMIN SERPL-MCNC: 3.5 G/DL (ref 3.5–5.2)
ALBUMIN/GLOB SERPL: 1.4 G/DL
ALP SERPL-CCNC: 103 U/L (ref 39–117)
ALT SERPL W P-5'-P-CCNC: 32 U/L (ref 1–41)
ANION GAP SERPL CALCULATED.3IONS-SCNC: 14 MMOL/L (ref 5–15)
ANISOCYTOSIS BLD QL: NORMAL
AST SERPL-CCNC: 24 U/L (ref 1–40)
BASOPHILS # BLD AUTO: 0.06 10*3/MM3 (ref 0–0.2)
BASOPHILS NFR BLD AUTO: 0.4 % (ref 0–1.5)
BILIRUB SERPL-MCNC: 0.3 MG/DL (ref 0–1.2)
BUN SERPL-MCNC: 21 MG/DL (ref 8–23)
BUN/CREAT SERPL: 18.3 (ref 7–25)
CALCIUM SPEC-SCNC: 8.8 MG/DL (ref 8.6–10.5)
CHLORIDE SERPL-SCNC: 107 MMOL/L (ref 98–107)
CO2 SERPL-SCNC: 18 MMOL/L (ref 22–29)
CREAT SERPL-MCNC: 1.15 MG/DL (ref 0.76–1.27)
DEPRECATED RDW RBC AUTO: 51.1 FL (ref 37–54)
EGFRCR SERPLBLD CKD-EPI 2021: 67.2 ML/MIN/1.73
EOSINOPHIL # BLD AUTO: 0.36 10*3/MM3 (ref 0–0.4)
EOSINOPHIL NFR BLD AUTO: 2.5 % (ref 0.3–6.2)
ERYTHROCYTE [DISTWIDTH] IN BLOOD BY AUTOMATED COUNT: 16.1 % (ref 12.3–15.4)
GLOBULIN UR ELPH-MCNC: 2.5 GM/DL
GLUCOSE BLDC GLUCOMTR-MCNC: 115 MG/DL (ref 70–105)
GLUCOSE BLDC GLUCOMTR-MCNC: 123 MG/DL (ref 70–105)
GLUCOSE BLDC GLUCOMTR-MCNC: 130 MG/DL (ref 70–105)
GLUCOSE BLDC GLUCOMTR-MCNC: 163 MG/DL (ref 70–105)
GLUCOSE SERPL-MCNC: 102 MG/DL (ref 65–99)
HCT VFR BLD AUTO: 37.3 % (ref 37.5–51)
HGB BLD-MCNC: 11.6 G/DL (ref 13–17.7)
IMM GRANULOCYTES # BLD AUTO: 0.07 10*3/MM3 (ref 0–0.05)
IMM GRANULOCYTES NFR BLD AUTO: 0.5 % (ref 0–0.5)
LARGE PLATELETS: NORMAL
LYMPHOCYTES # BLD AUTO: 3.88 10*3/MM3 (ref 0.7–3.1)
LYMPHOCYTES NFR BLD AUTO: 27.3 % (ref 19.6–45.3)
MAGNESIUM SERPL-MCNC: 2.2 MG/DL (ref 1.6–2.4)
MCH RBC QN AUTO: 27.2 PG (ref 26.6–33)
MCHC RBC AUTO-ENTMCNC: 31.1 G/DL (ref 31.5–35.7)
MCV RBC AUTO: 87.4 FL (ref 79–97)
MONOCYTES # BLD AUTO: 0.81 10*3/MM3 (ref 0.1–0.9)
MONOCYTES NFR BLD AUTO: 5.7 % (ref 5–12)
NEUTROPHILS NFR BLD AUTO: 63.6 % (ref 42.7–76)
NEUTROPHILS NFR BLD AUTO: 9.04 10*3/MM3 (ref 1.7–7)
NRBC BLD AUTO-RTO: 0 /100 WBC (ref 0–0.2)
PHOSPHATE SERPL-MCNC: 3.1 MG/DL (ref 2.5–4.5)
PLATELET # BLD AUTO: 331 10*3/MM3 (ref 140–450)
PMV BLD AUTO: 13.7 FL (ref 6–12)
POTASSIUM SERPL-SCNC: 4.2 MMOL/L (ref 3.5–5.2)
PROT SERPL-MCNC: 6 G/DL (ref 6–8.5)
RBC # BLD AUTO: 4.27 10*6/MM3 (ref 4.14–5.8)
SMALL PLATELETS BLD QL SMEAR: ADEQUATE
SODIUM SERPL-SCNC: 139 MMOL/L (ref 136–145)
WBC MORPH BLD: NORMAL
WBC NRBC COR # BLD AUTO: 14.22 10*3/MM3 (ref 3.4–10.8)

## 2024-04-17 PROCEDURE — 25010000002 ENOXAPARIN PER 10 MG: Performed by: STUDENT IN AN ORGANIZED HEALTH CARE EDUCATION/TRAINING PROGRAM

## 2024-04-17 PROCEDURE — 25010000002 CEFTRIAXONE PER 250 MG: Performed by: NURSE PRACTITIONER

## 2024-04-17 PROCEDURE — 25010000002 METRONIDAZOLE 500 MG/100ML SOLUTION: Performed by: NURSE PRACTITIONER

## 2024-04-17 PROCEDURE — 84100 ASSAY OF PHOSPHORUS: CPT | Performed by: STUDENT IN AN ORGANIZED HEALTH CARE EDUCATION/TRAINING PROGRAM

## 2024-04-17 PROCEDURE — 82948 REAGENT STRIP/BLOOD GLUCOSE: CPT

## 2024-04-17 PROCEDURE — 83735 ASSAY OF MAGNESIUM: CPT | Performed by: STUDENT IN AN ORGANIZED HEALTH CARE EDUCATION/TRAINING PROGRAM

## 2024-04-17 PROCEDURE — 99232 SBSQ HOSP IP/OBS MODERATE 35: CPT | Performed by: NURSE PRACTITIONER

## 2024-04-17 PROCEDURE — 63710000001 INSULIN LISPRO (HUMAN) PER 5 UNITS: Performed by: STUDENT IN AN ORGANIZED HEALTH CARE EDUCATION/TRAINING PROGRAM

## 2024-04-17 PROCEDURE — 97530 THERAPEUTIC ACTIVITIES: CPT

## 2024-04-17 PROCEDURE — 85025 COMPLETE CBC W/AUTO DIFF WBC: CPT | Performed by: STUDENT IN AN ORGANIZED HEALTH CARE EDUCATION/TRAINING PROGRAM

## 2024-04-17 PROCEDURE — 97116 GAIT TRAINING THERAPY: CPT

## 2024-04-17 PROCEDURE — 85007 BL SMEAR W/DIFF WBC COUNT: CPT | Performed by: STUDENT IN AN ORGANIZED HEALTH CARE EDUCATION/TRAINING PROGRAM

## 2024-04-17 PROCEDURE — 25010000002 METOCLOPRAMIDE PER 10 MG: Performed by: INTERNAL MEDICINE

## 2024-04-17 PROCEDURE — 80053 COMPREHEN METABOLIC PANEL: CPT | Performed by: STUDENT IN AN ORGANIZED HEALTH CARE EDUCATION/TRAINING PROGRAM

## 2024-04-17 RX ORDER — METRONIDAZOLE 500 MG/1
500 TABLET ORAL EVERY 8 HOURS SCHEDULED
Status: DISCONTINUED | OUTPATIENT
Start: 2024-04-17 | End: 2024-04-19 | Stop reason: HOSPADM

## 2024-04-17 RX ADMIN — TAMSULOSIN HYDROCHLORIDE 0.4 MG: 0.4 CAPSULE ORAL at 08:28

## 2024-04-17 RX ADMIN — ATORVASTATIN CALCIUM 20 MG: 20 TABLET, FILM COATED ORAL at 21:40

## 2024-04-17 RX ADMIN — Medication 10 ML: at 21:41

## 2024-04-17 RX ADMIN — FAMOTIDINE 20 MG: 10 INJECTION INTRAVENOUS at 08:28

## 2024-04-17 RX ADMIN — METRONIDAZOLE 500 MG: 500 TABLET ORAL at 21:40

## 2024-04-17 RX ADMIN — Medication 12.5 MG/HR: at 01:41

## 2024-04-17 RX ADMIN — APIXABAN 5 MG: 5 TABLET, FILM COATED ORAL at 21:40

## 2024-04-17 RX ADMIN — Medication 10 ML: at 08:30

## 2024-04-17 RX ADMIN — METRONIDAZOLE 500 MG: 500 INJECTION, SOLUTION INTRAVENOUS at 00:46

## 2024-04-17 RX ADMIN — METOCLOPRAMIDE 10 MG: 5 INJECTION, SOLUTION INTRAMUSCULAR; INTRAVENOUS at 03:44

## 2024-04-17 RX ADMIN — INSULIN LISPRO 2 UNITS: 100 INJECTION, SOLUTION INTRAVENOUS; SUBCUTANEOUS at 17:44

## 2024-04-17 RX ADMIN — SERTRALINE HYDROCHLORIDE 25 MG: 25 TABLET ORAL at 08:29

## 2024-04-17 RX ADMIN — ENOXAPARIN SODIUM 100 MG: 100 INJECTION SUBCUTANEOUS at 00:45

## 2024-04-17 RX ADMIN — Medication 12.5 MG: at 08:28

## 2024-04-17 RX ADMIN — Medication 10 ML: at 08:36

## 2024-04-17 RX ADMIN — DILTIAZEM HYDROCHLORIDE 30 MG: 30 TABLET, FILM COATED ORAL at 13:22

## 2024-04-17 RX ADMIN — AMIODARONE HYDROCHLORIDE 200 MG: 200 TABLET ORAL at 08:28

## 2024-04-17 RX ADMIN — ASPIRIN 81 MG CHEWABLE TABLET 81 MG: 81 TABLET CHEWABLE at 08:28

## 2024-04-17 RX ADMIN — METOCLOPRAMIDE 10 MG: 5 INJECTION, SOLUTION INTRAMUSCULAR; INTRAVENOUS at 08:28

## 2024-04-17 RX ADMIN — Medication 5 MG: at 21:41

## 2024-04-17 RX ADMIN — CEFTRIAXONE SODIUM 2000 MG: 2 INJECTION, POWDER, FOR SOLUTION INTRAMUSCULAR; INTRAVENOUS at 17:44

## 2024-04-17 RX ADMIN — METRONIDAZOLE 500 MG: 500 INJECTION, SOLUTION INTRAVENOUS at 08:30

## 2024-04-17 RX ADMIN — METOPROLOL TARTRATE 25 MG: 25 TABLET, FILM COATED ORAL at 21:40

## 2024-04-17 RX ADMIN — APIXABAN 5 MG: 5 TABLET, FILM COATED ORAL at 10:29

## 2024-04-17 RX ADMIN — DILTIAZEM HYDROCHLORIDE 30 MG: 30 TABLET, FILM COATED ORAL at 21:40

## 2024-04-17 RX ADMIN — METOPROLOL TARTRATE 25 MG: 25 TABLET, FILM COATED ORAL at 14:35

## 2024-04-17 RX ADMIN — FAMOTIDINE 20 MG: 10 INJECTION INTRAVENOUS at 21:41

## 2024-04-17 RX ADMIN — Medication 7.5 MG/HR: at 10:28

## 2024-04-17 RX ADMIN — DILTIAZEM HYDROCHLORIDE 30 MG: 30 TABLET, FILM COATED ORAL at 05:37

## 2024-04-17 RX ADMIN — AMIODARONE HYDROCHLORIDE 200 MG: 200 TABLET ORAL at 21:40

## 2024-04-17 RX ADMIN — METRONIDAZOLE 500 MG: 500 TABLET ORAL at 17:44

## 2024-04-17 RX ADMIN — Medication 12.5 MG: at 03:44

## 2024-04-17 NOTE — CASE MANAGEMENT/SOCIAL WORK
Continued Stay Note  PRASHANTH Yadav     Patient Name: Viktor Atkins  MRN: [id number]  Today's Date: 4/17/2024    Admit Date: 4/8/2024    Plan: Anticipate routine home with spouse and [facility] (accepted, order per MD) and [facility] for IV abx.   Discharge Plan       Row Name 04/17/24 1341       Plan    Plan Comments Per [facility] liaison, Sandy SHORE, Ceftriaxone 2gm will cost $31 per week and Flagyl scheduled to be changed to po at d/c. DC Barriers: Cardizem gtt.                  Lupe Asher RN     Office Phone: [phone]  Office Cell: [phone]

## 2024-04-17 NOTE — PROGRESS NOTES
Colorectal Surgery Progress Note    Pt. Name/Age/:  Viktor Atkins   74 y.o.    1950         Med. Record Number:   [id number]  Date of admission:  2024      Service(s): Colorectal Surgery      Subjective    Doing well  No complaints   Tolerated diet and fruits yesterday   Denies nausea  Had stool and gas through stoma   Drain removed yesterday   Afebrile, afib   Leukocytosis improved     Objective  Vitals:     Patient Vitals for the past 24 hrs:   BP Temp Temp src Pulse Resp SpO2 Weight   24 0537 112/74 -- -- 97 -- 96 % --   24 0530 112/74 97.9 °F (36.6 °C) Oral 93 26 95 % 100 kg (220 lb 7.4 oz)   24 0500 112/65 -- -- 108 -- 94 % --   24 0344 120/65 -- -- 100 -- -- --   24 0300 112/74 -- -- 103 -- 95 % --   24 0100 116/67 97.8 °F (36.6 °C) Oral 94 24 92 % --   24 2335 131/98 -- -- 110 -- 94 % --   24 2300 128/69 -- -- 108 -- 97 % --   24 2231 117/74 -- -- (!) 125 -- -- --   24 2113 130/73 97.9 °F (36.6 °C) Oral 115 20 97 % --   24 2100 130/73 -- -- (!) 135 -- 93 % --   24 1900 128/83 -- -- 103 -- 96 % --   24 1840 -- -- -- 82 -- 95 % --   24 1830 143/72 -- -- 102 -- 94 % --   24 1820 -- -- -- 82 -- 95 % --   24 1810 -- -- -- 93 -- 97 % --   24 1800 126/82 -- -- 99 -- 94 % --   24 1750 -- -- -- 109 -- 98 % --   24 1740 -- -- -- 95 -- 96 % --   24 1730 128/70 -- -- 99 -- 97 % --   24 1720 -- -- -- 104 -- 98 % --   24 1710 -- -- -- 120 -- 98 % --   24 1700 125/93 -- -- 110 -- 98 % --   24 1650 -- -- -- 111 -- 96 % --   24 1640 -- -- -- (!) 136 -- 97 % --   24 1630 124/71 -- -- (!) 127 -- (!) 89 % --   24 1620 -- -- -- (!) 140 -- 92 % --   24 1610 -- -- -- (!) 136 -- 95 % --   24 1600 93/60 -- -- (!) 124 -- 95 % --   24 1550 -- -- -- 104 -- 95 % --   24 1544 120/74 98.4 °F (36.9 °C) Oral (!) 128 23 96 % --   24 1540 --  -- -- (!) 122 -- 95 % --   04/16/24 1530 -- -- -- (!) 124 -- 95 % --   04/16/24 1520 -- -- -- (!) 146 -- 95 % --   04/16/24 1510 -- -- -- (!) 162 -- 96 % --   04/16/24 1500 (!) 85/64 -- -- 115 -- 96 % --   04/16/24 1450 -- -- -- 118 -- 97 % --   04/16/24 1440 -- -- -- (!) 140 -- 93 % --   04/16/24 1430 105/75 -- -- (!) 137 -- 97 % --   04/16/24 1420 -- -- -- (!) 135 -- 97 % --   04/16/24 1410 -- -- -- (!) 136 -- 95 % --   04/16/24 1403 104/73 -- -- (!) 132 -- -- --   04/16/24 1400 104/73 -- -- (!) 141 -- 96 % --   04/16/24 1350 -- -- -- (!) 138 -- 97 % --   04/16/24 1340 -- -- -- (!) 138 -- 97 % --   04/16/24 1330 113/68 -- -- (!) 133 -- 97 % --   04/16/24 1324 136/87 -- -- (!) 127 -- -- --   04/16/24 1320 (!) 83/51 -- -- (!) 131 -- -- --   04/16/24 1310 -- -- -- (!) 148 -- -- --   04/16/24 1300 96/71 98.4 °F (36.9 °C) Oral (!) 132 24 93 % --   04/16/24 1250 -- -- -- (!) 130 -- -- --   04/16/24 1240 -- -- -- (!) 126 -- -- --   04/16/24 1230 104/76 -- -- (!) 126 -- -- --   04/16/24 1220 -- -- -- (!) 126 -- -- --   04/16/24 1210 -- -- -- (!) 145 -- -- --   04/16/24 1200 107/87 -- -- (!) 157 -- -- --   04/16/24 0906 126/60 97.5 °F (36.4 °C) Oral 111 21 96 % --           Wt. Admission: Weight: 105 kg (231 lb 11.3 oz)     Wt. Current: Weight: 100 kg (220 lb 7.4 oz)   Body mass index is 30.75 kg/m².      I&O:  Intake/Output Summary (Last 24 hours) at 4/17/2024 0723  Last data filed at 4/17/2024 0530  Gross per 24 hour   Intake 1200 ml   Output 1365 ml   Net -165 ml     04/15 1901 - 04/17 0700  In: 1680 [P.O.:1680]  Out: 1535 [Urine:750; Drains:210]      Exam  General:  No acute distress, resting comfortably.  Cardiovascular: afib   Respiratory: no increased work of breathing.  Abdomen:  Soft, non distended, ostomy viable and with air and stool output, no tenderness, pfannenstiel incision with penrose intact, no discharge   Extremities: warm, well-perfused extremities, no pitting edema.      Labs:      [unfilled]          Scheduled Meds:acetaminophen, 1,000 mg, Oral, Q6H  amiodarone, 200 mg, Oral, Q12H  aspirin, 81 mg, Oral, Daily  atorvastatin, 20 mg, Oral, Nightly  cefTRIAXone, 2,000 mg, Intravenous, Q24H  dilTIAZem, 30 mg, Oral, Q8H  enoxaparin, 1 mg/kg, Subcutaneous, Q12H  famotidine, 20 mg, Intravenous, Q12H  insulin lispro, 2-7 Units, Subcutaneous, Q6H  lidocaine, 20 mL, Intradermal, Once  metoclopramide, 10 mg, Intravenous, Q6H  metoprolol tartrate, 12.5 mg, Oral, Q6H  metroNIDAZOLE, 500 mg, Intravenous, Q8H  sertraline, 25 mg, Oral, Daily  sodium chloride, 10 mL, Intravenous, Q12H  sodium chloride, 10 mL, Intravenous, Q12H  tamsulosin, 0.4 mg, Oral, Daily      Continuous Infusions:dilTIAZem, 5-15 mg/hr, Last Rate: 12.5 mg/hr (04/17/24 0141)      PRN Meds:.  senna-docusate sodium **AND** polyethylene glycol **AND** bisacodyl **AND** bisacodyl    Calcium Replacement - Follow Nurse / BPA Driven Protocol    dextrose    dextrose    glucagon (human recombinant)    HYDROmorphone **AND** naloxone    Magnesium Standard Dose Replacement - Follow Nurse / BPA Driven Protocol    melatonin    nitroglycerin    ondansetron    ondansetron ODT    Phosphorus Replacement - Follow Nurse / BPA Driven Protocol    Potassium Replacement - Follow Nurse / BPA Driven Protocol    simethicone    [COMPLETED] Insert Peripheral IV **AND** sodium chloride    sodium chloride    sodium chloride    sodium chloride    sodium chloride          Assessment  74 y.o. male s/p robotic LAR with take back to OR for diverting colostomy and wound washout     Plan / Recommendations    - clear for discharge from surgical stand point whenever he is medically and cardiac optimized and cleared     - continue regular diet   - encourage out of bed and ambulating   - keep electrolytes replete, K> 4, Mg> 2.5  - voiding freely after maldonado discontinued   - ok to transition to direct oral anticoagulant for afib instead of lovenox   - monitor leukocytosis.      07:23 EDT; 4/17/2024        Aakash Burden MD  Colon and Rectal Surgery   [facility]

## 2024-04-17 NOTE — PROGRESS NOTES
Infectious Diseases Progress Note      LOS: 9 days   Patient Care Team:  Gera Manriquez MD as PCP - General (Internal Medicine)  Gera Manriquez MD as PCP - Family Medicine    Chief Complaint: Incisional infection    Subjective       The patient has been afebrile for the last 24 hours.  The patient is on room air, hemodynamically stable, and is tolerating antimicrobial therapy.  No new complaints.  Patient is tolerating diet and is able to take oral medication      Review of Systems:   Review of Systems   Constitutional: Negative.    HENT: Negative.     Eyes: Negative.    Respiratory: Negative.     Cardiovascular: Negative.    Gastrointestinal: Negative.    Endocrine: Negative.    Genitourinary: Negative.    Musculoskeletal: Negative.    Skin:  Positive for wound.   Neurological: Negative.    Psychiatric/Behavioral: Negative.     All other systems reviewed and are negative.       Objective     Vital Signs  Temp:  [97.7 °F (36.5 °C)-98.4 °F (36.9 °C)] 97.7 °F (36.5 °C)  Heart Rate:  [] 101  Resp:  [18-26] 20  BP: ()/(60-98) 148/79    Physical Exam:  Physical Exam  Vitals and nursing note reviewed.   Constitutional:       General: He is not in acute distress.     Appearance: Normal appearance. He is well-developed and normal weight. He is not diaphoretic.   HENT:      Head: Normocephalic and atraumatic.   Eyes:      Conjunctiva/sclera: Conjunctivae normal.      Pupils: Pupils are equal, round, and reactive to light.   Cardiovascular:      Rate and Rhythm: Normal rate and regular rhythm.      Heart sounds: Normal heart sounds, S1 normal and S2 normal.   Pulmonary:      Effort: Pulmonary effort is normal. No respiratory distress.      Breath sounds: Normal breath sounds. No stridor. No wheezing or rales.   Abdominal:      General: Bowel sounds are normal. There is distension.      Palpations: Abdomen is soft. There is no mass.      Tenderness: There is abdominal tenderness. There is no guarding.       Comments: Surgical dressing in place.    Drain in place    Colostomy    Abdominal distention is improved today   Musculoskeletal:         General: No deformity. Normal range of motion.      Cervical back: Neck supple.   Skin:     General: Skin is warm and dry.      Coloration: Skin is not pale.      Findings: No erythema or rash.   Neurological:      Mental Status: He is alert and oriented to person, place, and time.      Cranial Nerves: No cranial nerve deficit.   Psychiatric:         Mood and Affect: Mood normal.          Results Review:    I have reviewed all clinical data, test, lab, and imaging results.     Radiology  No Radiology Exams Resulted Within Past 24 Hours    Cardiology    Laboratory    Results from last 7 days   Lab Units 04/17/24  0045 04/16/24  0018 04/15/24  0139 04/14/24  0104 04/13/24  0428 04/12/24  0400 04/11/24  0243   WBC 10*3/mm3 14.22* 18.03* 13.46* 13.93* 14.68* 17.19* 20.36*   HEMOGLOBIN g/dL 11.6* 11.8* 11.6* 11.8* 11.0* 10.4* 10.3*   HEMATOCRIT % 37.3* 37.5 37.5 37.8 35.0* 33.3* 33.4*   PLATELETS 10*3/mm3 331 398 415 436 408 372 393     Results from last 7 days   Lab Units 04/17/24  0045 04/16/24  1223 04/16/24  0018 04/15/24  0139 04/14/24  0104 04/13/24  0428 04/12/24  0400 04/11/24  0243   SODIUM mmol/L 139  --  141 143 143 143 150* 146*   POTASSIUM mmol/L 4.2 3.9 3.6 3.7 3.9 3.4* 3.9 3.7   CHLORIDE mmol/L 107  --  106 109* 109* 109* 115* 113*   CO2 mmol/L 18.0*  --  20.0* 21.0* 21.0* 22.0 19.0* 19.0*   BUN mg/dL 21  --  19 17 16 13 15 23   CREATININE mg/dL 1.15  --  1.27 1.17 1.17 0.98 0.90 1.23   GLUCOSE mg/dL 102*  --  157* 106* 103* 146* 97 127*   ALBUMIN g/dL 3.5  --  3.6 3.4* 3.4* 3.3* 3.4* 3.3*   BILIRUBIN mg/dL 0.3  --  0.2 0.3 0.3 0.3 0.2 0.3   ALK PHOS U/L 103  --  109 104 118* 108 114 122*   AST (SGOT) U/L 24  --  33 37 33 37 27 23   ALT (SGPT) U/L 32  --  40 42* 41 43* 33 37   CALCIUM mg/dL 8.8  --  8.7 8.4* 8.8 8.3* 8.3* 8.4*                    Microbiology   Microbiology Results (last 10 days)       Procedure Component Value - Date/Time    Wound Culture - Swab, Abdominal Wall [[id number]]  (Abnormal)  (Susceptibility) Collected: 04/08/24 1934    Lab Status: Final result Specimen: Swab from Abdominal Wall Updated: 04/11/24 0709     Wound Culture Rare Streptococcus sanguinis      Scant growth (1+) Normal Skin Jolynn     Gram Stain Few (2+) WBCs per low power field    Susceptibility        Streptococcus sanguinis      KATIE      Ceftriaxone Susceptible      Penicillin G Intermediate      Vancomycin Susceptible                           Anaerobic Culture - Swab, Abdominal Wall [[id number]]  (Abnormal) Collected: 04/08/24 1934    Lab Status: Final result Specimen: Swab from Abdominal Wall Updated: 04/12/24 0822     Anaerobic Culture Bacteroides fragilis group    Blood Culture - Blood, Arm, Right [[id number]]  (Normal) Collected: 04/08/24 1529    Lab Status: Final result Specimen: Blood from Arm, Right Updated: 04/13/24 1545     Blood Culture No growth at 5 days    Narrative:      Less than seven (7) mL's of blood was collected.  Insufficient quantity may yield false negative results.    Blood Culture - Blood, Arm, Right [[id number]]  (Normal) Collected: 04/08/24 1529    Lab Status: Final result Specimen: Blood from Arm, Right Updated: 04/13/24 1545     Blood Culture No growth at 5 days    Narrative:      Less than seven (7) mL's of blood was collected.  Insufficient quantity may yield false negative results.            Medication Review:       Schedule Meds  acetaminophen, 1,000 mg, Oral, Q6H  amiodarone, 200 mg, Oral, Q12H  apixaban, 5 mg, Oral, Q12H  aspirin, 81 mg, Oral, Daily  atorvastatin, 20 mg, Oral, Nightly  cefTRIAXone, 2,000 mg, Intravenous, Q24H  dilTIAZem, 30 mg, Oral, Q8H  famotidine, 20 mg, Intravenous, Q12H  insulin lispro, 2-7 Units, Subcutaneous, Q6H  lidocaine, 20 mL, Intradermal, Once  metoprolol tartrate, 25 mg, Oral, Q6H  metroNIDAZOLE, 500  mg, Intravenous, Q8H  sertraline, 25 mg, Oral, Daily  sodium chloride, 10 mL, Intravenous, Q12H  sodium chloride, 10 mL, Intravenous, Q12H  tamsulosin, 0.4 mg, Oral, Daily        Infusion Meds  dilTIAZem, 5-15 mg/hr, Last Rate: 7.5 mg/hr (04/17/24 1028)        PRN Meds    senna-docusate sodium **AND** polyethylene glycol **AND** bisacodyl **AND** bisacodyl    Calcium Replacement - Follow Nurse / BPA Driven Protocol    dextrose    dextrose    glucagon (human recombinant)    HYDROmorphone **AND** naloxone    Magnesium Standard Dose Replacement - Follow Nurse / BPA Driven Protocol    melatonin    nitroglycerin    ondansetron    ondansetron ODT    Phosphorus Replacement - Follow Nurse / BPA Driven Protocol    Potassium Replacement - Follow Nurse / BPA Driven Protocol    simethicone    [COMPLETED] Insert Peripheral IV **AND** sodium chloride    sodium chloride    sodium chloride    sodium chloride    sodium chloride        Assessment & Plan       Antimicrobial Therapy   1.  IV  ceftriaxone       2.  IV/p.o. Flagyl        3.          4.        5.          Assessment     Infected abdomen wall incision with anastomosis leak.  Wound cultures are growing Streptococcus sanguinus and Bacteroides fragilis.  Blood cultures are negative so far.  Patient is status post diagnostic laparoscopic diverting colostomy, pelvic washout and drain placement on 4/10/2024-no intra abdomen abscess seen.  CT of the abdomen pelvis on 4/13/2024 which showed possible partial small bowel obstruction.  Patient is back to an NG tube to suction     Reactive leukocytosis secondary to above.  Trending down     S/p resection of colon cancer on March 30, 2024     Diabetes mellitus type 2-A1c is 5.8     Plan     Continue IV Rocephin 2 g every 24 hours for 14 days of treatment from surgery-last day on 4/23/2024  Continue Flagyl but switch to p.o. 3 times daily through 4/23/2024  Patient has a midline-please remove once IV antibiotics are completed  CBC and  creatinine in 4 to 5 days  Supportive care  Case discussed with patient and family members at bedside  Okay to discharge from Infectious Disease standpoint when IV antibiotics arranged  Not much more to add from infectious disease standpoint-we will sign off at this time-please call with any questions.    Please fax all post discharge lab results, imaging studies and correspondence to this fax number [phone]  For any question or concern please contact our service number [phone]      KAREN Valles  04/17/24  15:12 EDT    Note is dictated utilizing voice recognition software/Dragon

## 2024-04-17 NOTE — PROGRESS NOTES
LOS: 9 days   Admitting Physician- Peyman Castorena MD    Reason For Followup:    Persistent atrial fibrillation  Postsurgery ileus  S/p laparotomy  Hypertension    Subjective         Objective     Patient still in atrial fibrillation  Remains on IV Dilitiazem    Review of Systems:   Review of Systems   Constitutional: Negative for chills and fever.   HENT:  Negative for ear discharge and nosebleeds.    Eyes:  Negative for discharge and redness.   Cardiovascular:  Negative for chest pain, orthopnea, palpitations, paroxysmal nocturnal dyspnea and syncope.   Respiratory:  Negative for cough, shortness of breath and wheezing.    Endocrine: Negative for heat intolerance.   Skin:  Negative for rash.   Musculoskeletal:  Negative for arthritis and myalgias.   Gastrointestinal:  Positive for bloating and abdominal pain. Negative for melena, nausea and vomiting.   Genitourinary:  Negative for dysuria and hematuria.   Neurological:  Negative for dizziness, light-headedness, numbness and tremors.   Psychiatric/Behavioral:  Negative for depression. The patient is not nervous/anxious.          Vital Signs  Vitals:    04/17/24 0500 04/17/24 0530 04/17/24 0537 04/17/24 0854   BP: 112/65 112/74 112/74 110/75   BP Location:  Left arm  Left arm   Patient Position:  Sitting  Sitting   Pulse: 108 93 97 (!) 134   Resp:  26  18   Temp:  97.9 °F (36.6 °C)     TempSrc:  Oral     SpO2: 94% 95% 96% 96%   Weight:  100 kg (220 lb 7.4 oz)     Height:         Wt Readings from Last 1 Encounters:   04/17/24 100 kg (220 lb 7.4 oz)       Intake/Output Summary (Last 24 hours) at 4/17/2024 1029  Last data filed at 4/17/2024 0530  Gross per 24 hour   Intake 960 ml   Output 825 ml   Net 135 ml     Physical Exam:  Constitutional:       Appearance: Well-developed.   Eyes:      General: No scleral icterus.        Right eye: No discharge.   HENT:      Head: Normocephalic and atraumatic.   Neck:      Thyroid: No thyromegaly.      Lymphadenopathy: No  cervical adenopathy.   Pulmonary:      Effort: Pulmonary effort is normal. No respiratory distress.      Breath sounds: Normal breath sounds. No wheezing. No rales.   Cardiovascular:      Normal rate. Regular rhythm.      No gallop.    Edema:     Peripheral edema absent.   Abdominal:      Tenderness: There is no abdominal tenderness.   Skin:     Findings: No erythema or rash.   Neurological:      Mental Status: Alert and oriented to person, place, and time.         Results Review:   Lab Results (last 24 hours)       Procedure Component Value Units Date/Time    POC Glucose Once [[id number]]  (Abnormal) Collected: 04/17/24 0533    Specimen: Blood Updated: 04/17/24 0535     Glucose 123 mg/dL      Comment: Serial Number: [id number]:  [id number]       Comprehensive Metabolic Panel [[id number]]  (Abnormal) Collected: 04/17/24 0045    Specimen: Blood from Arm, Right Updated: 04/17/24 0211     Glucose 102 mg/dL      BUN 21 mg/dL      Creatinine 1.15 mg/dL      Sodium 139 mmol/L      Potassium 4.2 mmol/L      Comment: Slight hemolysis detected by analyzer. Result may be falsely elevated.        Chloride 107 mmol/L      CO2 18.0 mmol/L      Calcium 8.8 mg/dL      Total Protein 6.0 g/dL      Albumin 3.5 g/dL      ALT (SGPT) 32 U/L      AST (SGOT) 24 U/L      Alkaline Phosphatase 103 U/L      Total Bilirubin 0.3 mg/dL      Globulin 2.5 gm/dL      A/G Ratio 1.4 g/dL      BUN/Creatinine Ratio 18.3     Anion Gap 14.0 mmol/L      eGFR 67.2 mL/min/1.73     Narrative:      GFR Normal >60  Chronic Kidney Disease <60  Kidney Failure <15    The GFR formula is only valid for adults with stable renal function between ages 18 and 70.    Magnesium [[id number]]  (Normal) Collected: 04/17/24 0045    Specimen: Blood from Arm, Right Updated: 04/17/24 0211     Magnesium 2.2 mg/dL     Phosphorus [[id number]]  (Normal) Collected: 04/17/24 0045    Specimen: Blood from Arm, Right Updated: 04/17/24 0211     Phosphorus 3.1 mg/dL     CBC &  Differential [[id number]]  (Abnormal) Collected: 04/17/24 0045    Specimen: Blood from Arm, Right Updated: 04/17/24 0146    Narrative:      The following orders were created for panel order CBC & Differential.  Procedure                               Abnormality         Status                     ---------                               -----------         ------                     CBC Auto Differential[[id number]]        Abnormal            Final result               Scan Slide[[id number]]                                       Final result                 Please view results for these tests on the individual orders.    CBC Auto Differential [[id number]]  (Abnormal) Collected: 04/17/24 0045    Specimen: Blood from Arm, Right Updated: 04/17/24 0146     WBC 14.22 10*3/mm3      RBC 4.27 10*6/mm3      Hemoglobin 11.6 g/dL      Hematocrit 37.3 %      MCV 87.4 fL      MCH 27.2 pg      MCHC 31.1 g/dL      RDW 16.1 %      RDW-SD 51.1 fl      MPV 13.7 fL      Platelets 331 10*3/mm3      Neutrophil % 63.6 %      Lymphocyte % 27.3 %      Monocyte % 5.7 %      Eosinophil % 2.5 %      Basophil % 0.4 %      Immature Grans % 0.5 %      Neutrophils, Absolute 9.04 10*3/mm3      Lymphocytes, Absolute 3.88 10*3/mm3      Monocytes, Absolute 0.81 10*3/mm3      Eosinophils, Absolute 0.36 10*3/mm3      Basophils, Absolute 0.06 10*3/mm3      Immature Grans, Absolute 0.07 10*3/mm3      nRBC 0.0 /100 WBC     Scan Slide [[id number]] Collected: 04/17/24 0045    Specimen: Blood from Arm, Right Updated: 04/17/24 0146     Anisocytosis Slight/1+     WBC Morphology Normal     Platelet Estimate Adequate     Large Platelets Slight/1+    POC Glucose Once [[id number]]  (Abnormal) Collected: 04/16/24 2340    Specimen: Blood Updated: 04/16/24 2342     Glucose 115 mg/dL      Comment: Serial Number: [id number]:  [id number]       POC Glucose Once [[id number]]  (Abnormal) Collected: 04/16/24 1646    Specimen: Blood Updated: 04/16/24 1647     Glucose 141 mg/dL       Comment: Serial Number: [id number]:  [id number]       Potassium [[id number]]  (Normal) Collected: 04/16/24 1223    Specimen: Blood Updated: 04/16/24 1244     Potassium 3.9 mmol/L      Comment: Slight hemolysis detected by analyzer. Result may be falsely elevated.       POC Glucose Once [[id number]]  (Abnormal) Collected: 04/16/24 1151    Specimen: Blood Updated: 04/16/24 1153     Glucose 133 mg/dL      Comment: Serial Number: [id number]:  [id number]             Imaging Results (Last 72 Hours)       ** No results found for the last 72 hours. **          ECG/EMG Results (most recent)       Procedure Component Value Units Date/Time    ECG 12 Lead Tachycardia [[id number]] Collected: 04/08/24 1649     Updated: 04/09/24 0730     QT Interval 314 ms      QTC Interval 518 ms     Narrative:      HEART RATE= 164  bpm  RR Interval= 367  ms  AL Interval=   ms  P Horizontal Axis=   deg  P Front Axis=   deg  QRSD Interval= 108  ms  QT Interval= 314  ms  QTcB= 518  ms  QRS Axis= 20  deg  T Wave Axis= 205  deg  - ABNORMAL ECG -  Atrial fibrillation with rapid V-rate  Incomplete left bundle branch block  Low voltage, extremity leads  The appearance of BBB may be rate related  When compared with ECG of 27-Mar-2024 11:50:13,  Significant change in rhythm  Electronically Signed By: Thiago Solomon (REDD) 09-Apr-2024 07:30:05  Date and Time of Study: 2024-04-08 16:49:10    Telemetry Scan [[id number]] Resulted: 04/08/24     Updated: 04/10/24 0613    Telemetry Scan [[id number]] Resulted: 04/08/24     Updated: 04/10/24 0640    Telemetry Scan [[id number]] Resulted: 04/08/24     Updated: 04/10/24 0959    Telemetry Scan [[id number]] Resulted: 04/08/24     Updated: 04/10/24 1126    Telemetry Scan [[id number]] Resulted: 04/08/24     Updated: 04/10/24 1126    Telemetry Scan [[id number]] Resulted: 04/08/24     Updated: 04/11/24 0912    Telemetry Scan [[id number]] Resulted: 04/08/24     Updated: 04/11/24 0935    Telemetry Scan [[id number]]  Resulted: 04/08/24     Updated: 04/11/24 0955    Telemetry Scan [[id number]] Resulted: 04/08/24     Updated: 04/11/24 1119    Telemetry Scan [[id number]] Resulted: 04/08/24     Updated: 04/11/24 1150    Telemetry Scan [[id number]] Resulted: 04/08/24     Updated: 04/11/24 1333    Telemetry Scan [[id number]] Resulted: 04/08/24     Updated: 04/12/24 0656    Telemetry Scan [[id number]] Resulted: 04/08/24     Updated: 04/12/24 1249    Telemetry Scan [[id number]] Resulted: 04/08/24     Updated: 04/12/24 1255    Telemetry Scan [[id number]] Resulted: 04/08/24     Updated: 04/12/24 1306    Telemetry Scan [[id number]] Resulted: 04/08/24     Updated: 04/12/24 1403    Telemetry Scan [[id number]] Resulted: 04/08/24     Updated: 04/15/24 1320    Telemetry Scan [[id number]] Resulted: 04/08/24     Updated: 04/15/24 1435    Telemetry Scan [[id number]] Resulted: 04/08/24     Updated: 04/16/24 0638    Telemetry Scan [[id number]] Resulted: 04/08/24     Updated: 04/16/24 0638    Telemetry Scan [[id number]] Resulted: 04/08/24     Updated: 04/16/24 0638    Telemetry Scan [[id number]] Resulted: 04/08/24     Updated: 04/16/24 0914    Telemetry Scan [[id number]] Resulted: 04/08/24     Updated: 04/16/24 1031    Telemetry Scan [[id number]] Resulted: 04/08/24     Updated: 04/16/24 1233    Telemetry Scan [[id number]] Resulted: 04/08/24     Updated: 04/16/24 1239    Telemetry Scan [[id number]] Resulted: 04/08/24     Updated: 04/16/24 1550    Telemetry Scan [[id number]] Resulted: 04/08/24     Updated: 04/17/24 0626    Telemetry Scan [[id number]] Resulted: 04/08/24     Updated: 04/17/24 0630    Telemetry Scan [[id number]] Resulted: 04/08/24     Updated: 04/17/24 0753    Telemetry Scan [[id number]] Resulted: 04/08/24     Updated: 04/17/24 1027          CBC    Results from last 7 days   Lab Units 04/17/24  0045 04/16/24  0018 04/15/24  0139 04/14/24  0104 04/13/24  0428 04/12/24  0400 04/11/24  0243   WBC 10*3/mm3 14.22* 18.03* 13.46* 13.93* 14.68* 17.19* 20.36*    HEMOGLOBIN g/dL 11.6* 11.8* 11.6* 11.8* 11.0* 10.4* 10.3*   PLATELETS 10*3/mm3 331 398 415 436 408 372 393     BMP   Results from last 7 days   Lab Units 04/17/24  0045 04/16/24  1223 04/16/24  0018 04/15/24  0139 04/14/24  0104 04/13/24  0428 04/12/24  0400 04/11/24  0243   SODIUM mmol/L 139  --  141 143 143 143 150* 146*   POTASSIUM mmol/L 4.2 3.9 3.6 3.7 3.9 3.4* 3.9 3.7   CHLORIDE mmol/L 107  --  106 109* 109* 109* 115* 113*   CO2 mmol/L 18.0*  --  20.0* 21.0* 21.0* 22.0 19.0* 19.0*   BUN mg/dL 21  --  19 17 16 13 15 23   CREATININE mg/dL 1.15  --  1.27 1.17 1.17 0.98 0.90 1.23   GLUCOSE mg/dL 102*  --  157* 106* 103* 146* 97 127*   MAGNESIUM mg/dL 2.2  --  2.2 2.2 2.3 2.2 2.4 2.7*   PHOSPHORUS mg/dL 3.1  --  3.3 3.1 3.1 2.8 2.3* 2.7     CMP   Results from last 7 days   Lab Units 04/17/24  0045 04/16/24  1223 04/16/24  0018 04/15/24  0139 04/14/24  0104 04/13/24  0428 04/12/24  0400 04/11/24  0243   SODIUM mmol/L 139  --  141 143 143 143 150* 146*   POTASSIUM mmol/L 4.2 3.9 3.6 3.7 3.9 3.4* 3.9 3.7   CHLORIDE mmol/L 107  --  106 109* 109* 109* 115* 113*   CO2 mmol/L 18.0*  --  20.0* 21.0* 21.0* 22.0 19.0* 19.0*   BUN mg/dL 21  --  19 17 16 13 15 23   CREATININE mg/dL 1.15  --  1.27 1.17 1.17 0.98 0.90 1.23   GLUCOSE mg/dL 102*  --  157* 106* 103* 146* 97 127*   ALBUMIN g/dL 3.5  --  3.6 3.4* 3.4* 3.3* 3.4* 3.3*   BILIRUBIN mg/dL 0.3  --  0.2 0.3 0.3 0.3 0.2 0.3   ALK PHOS U/L 103  --  109 104 118* 108 114 122*   AST (SGOT) U/L 24  --  33 37 33 37 27 23   ALT (SGPT) U/L 32  --  40 42* 41 43* 33 37     Cardiac Studies:  Echo- Results for orders placed during the hospital encounter of 03/25/24    Adult Transthoracic Echo Complete W/ Cont if Necessary Per Protocol    Interpretation Summary    Left ventricular systolic function is low normal. Calculated left ventricular EF = 46% Left ventricular ejection fraction appears to be 46 - 50%.    The left ventricular cavity is mildly dilated.    Left ventricular diastolic  dysfunction is noted.    The left atrial cavity is dilated.    Estimated right ventricular systolic pressure from tricuspid regurgitation is normal (<35 mmHg).    Dilation of the aortic root is present.  4.4 cm    Patient in atrial fibrillation during this study.    Stress Myoview-  Cath-      Medication Review:   Scheduled Meds:acetaminophen, 1,000 mg, Oral, Q6H  amiodarone, 200 mg, Oral, Q12H  apixaban, 5 mg, Oral, Q12H  aspirin, 81 mg, Oral, Daily  atorvastatin, 20 mg, Oral, Nightly  cefTRIAXone, 2,000 mg, Intravenous, Q24H  dilTIAZem, 30 mg, Oral, Q8H  famotidine, 20 mg, Intravenous, Q12H  insulin lispro, 2-7 Units, Subcutaneous, Q6H  lidocaine, 20 mL, Intradermal, Once  metoprolol tartrate, 12.5 mg, Oral, Q6H  metroNIDAZOLE, 500 mg, Intravenous, Q8H  sertraline, 25 mg, Oral, Daily  sodium chloride, 10 mL, Intravenous, Q12H  sodium chloride, 10 mL, Intravenous, Q12H  tamsulosin, 0.4 mg, Oral, Daily      Continuous Infusions:dilTIAZem, 5-15 mg/hr, Last Rate: 10 mg/hr (04/17/24 1028)      PRN Meds:.  senna-docusate sodium **AND** polyethylene glycol **AND** bisacodyl **AND** bisacodyl    Calcium Replacement - Follow Nurse / BPA Driven Protocol    dextrose    dextrose    glucagon (human recombinant)    HYDROmorphone **AND** naloxone    Magnesium Standard Dose Replacement - Follow Nurse / BPA Driven Protocol    melatonin    nitroglycerin    ondansetron    ondansetron ODT    Phosphorus Replacement - Follow Nurse / BPA Driven Protocol    Potassium Replacement - Follow Nurse / BPA Driven Protocol    simethicone    [COMPLETED] Insert Peripheral IV **AND** sodium chloride    sodium chloride    sodium chloride    sodium chloride    sodium chloride      Assessment & Plan     Wound infection    Anxiety disorder    Essential hypertension    Palpitations    Mixed hyperlipidemia    Colon cancer    Atrial fibrillation with rapid ventricular response    Severe malnutrition    MDM:    1.  Persistent atrial  fibrillation:    Patient heart rate is still elevated.  I would start back intravenous Cardizem.  I would add amiodarone 200 mg twice daily.  Patient is on Lovenox 1 mg/kg body weight every 12.    2.  Hypertension:    Blood pressure is still stable slightly on the higher side    3.  S/p laparotomy:    His abdomen is little bit distended today.  However it is still soft no acute tenderness    4.  Carcinoma of colon:    Patient underwent resection.  Patient would need chemotherapy            OK per surgery to resume eliquis  V rates remain elevated  Will increase metoprolol  Additional recommendations per Dr. Ceasar De Anda, APRN  04/17/24  10:29 EDT

## 2024-04-17 NOTE — PROGRESS NOTES
"[facility] MEDICINE SERVICE  DAILY PROGRESS NOTE      Patient Name: Viktor Atkins  Date of Admission: 4/8/2024  Today's Date: 04/17/24  Length of Stay: 9  Primary Care Physician: Gera Manriquez MD    Subjective   Patient continues to feel better.  He has been tolerating p.o. intake with regular diet today.  He states that his ostomy output has been significant.      Objective    Temp:  [97.8 °F (36.6 °C)-98.4 °F (36.9 °C)] 97.9 °F (36.6 °C)  Heart Rate:  [] 134  Resp:  [18-26] 18  BP: ()/(51-98) 110/75  Physical Exam  General: NAD  HEENT: AT NC, EOMI, NG tube in place  Neck: No JVD  CVS: S1/S2 present, irregular rhythm with tachycardia, no M/R/G  Lungs: CTA B/L  Abdomen: soft, minimally tender at incision sites, ND, BS+, ostomy in place with small amount liquid stool  Ext: no edema  Skin: no rashes, bruises or discolorations  Neuro: no focal deficits  Psych: Not agitated         Results Review:  I have reviewed the labs, radiology results, and diagnostic studies.    Laboratory Data:   Results from last 7 days   Lab Units 04/17/24  0045 04/16/24  0018 04/15/24  0139   WBC 10*3/mm3 14.22* 18.03* 13.46*   HEMOGLOBIN g/dL 11.6* 11.8* 11.6*   HEMATOCRIT % 37.3* 37.5 37.5   PLATELETS 10*3/mm3 331 398 415        Results from last 7 days   Lab Units 04/17/24  0045 04/16/24  1223 04/16/24  0018 04/15/24  0139   SODIUM mmol/L 139  --  141 143   POTASSIUM mmol/L 4.2 3.9 3.6 3.7   CHLORIDE mmol/L 107  --  106 109*   CO2 mmol/L 18.0*  --  20.0* 21.0*   BUN mg/dL 21  --  19 17   CREATININE mg/dL 1.15  --  1.27 1.17   CALCIUM mg/dL 8.8  --  8.7 8.4*   BILIRUBIN mg/dL 0.3  --  0.2 0.3   ALK PHOS U/L 103  --  109 104   ALT (SGPT) U/L 32  --  40 42*   AST (SGOT) U/L 24  --  33 37   GLUCOSE mg/dL 102*  --  157* 106*       Culture Data:   No results found for: \"BLOODCX\"  No results found for: \"URINECX\"  No results found for: \"RESPCX\"  No results found for: \"WOUNDCX\"  No results found for: \"STOOLCX\"  No components " "found for: \"BODYFLD\"    Radiology Data:   Imaging Results (Last 24 Hours)       ** No results found for the last 24 hours. **            I have reviewed the patient's current medications.     Assessment/Plan     Postoperative wound abscess with anastomotic leak  -Patient underwent lower anterior resection of a bleeding rectosigmoid mass on 3/30 with findings of invasive moderately symmetric adenocarcinoma  -Found to have abscess on imaging after returning to hospital for worsening pain  -S/p laparoscopy with pelvic drain placement and loop ostomy on 4/9  -Wound cultures growing streptococcal and Bacteroides species  -Started on Rocephin, Flagyl to complete course of treatment on 4/23    A-fib with RVR   -Patient initially had improved rate control initially but now with tachycardia once again  -Restarted Cardizem drip as well as oral diltiazem, amiodarone and metoprolol; increase metoprolol dose and attempted to wean off of Cardizem  -Switch Lovenox to oral Eliquis  -I anticipate patient may require cardioversion given his refractory A-fib    Postoperative ileus  -Patient was initially tolerating liquid diet but then had worsening of his symptoms and NG tube was placed due to likely postoperative ileus  -NG tube removed on 4/15 and patient has been tolerating diet and is now on regular diet    Rectosigmoid adenocarcinoma  -F/u oncology as outpatient  -Patient needs to have port placed for initiation of treatment    Hypertension  -Continue Cardizem, metoprolol  -Hold losartan    DM type II, controlled  - ISS, accuchecks, diet    Dyslipidemia  -Continue Lipitor    Mood disorders  - home meds    9) GI/DVT ppx  - protonix/lovenox      Disposition: Patient is stable from a surgical standpoint for discharge, however still requiring aggressive treatment for A-fib with RVR.  Anticipate patient will be discharged on 4/20.        Electronically signed by Peyman Castorena MD, 04/17/24, 12:31 EDT.    "

## 2024-04-17 NOTE — PROGRESS NOTES
"Nutrition Services    Patient Name: Viktor Atkins  YOB: 1950  MRN: [id number]  Admission date: 4/8/2024    PROGRESS NOTE      Encounter Information: Checking in on patient to monitor nutrition POC and diet tolerance.  Patient's diet was advanced to GI Fiber Restricted Soft to Chew on 4/16 after NGT removal 4/15. Patient is currently tolerating po diet without noted issues at this time. 1+ edema documented to bilat legs. Will continue to monitor per protocol.        PO Diet: Diet: Gastrointestinal; Fiber-Restricted, Low Irritant; Texture: Soft to Chew (NDD 3); Soft to Chew: Whole Meat; Fluid Consistency: Thin (IDDSI 0)   PO Supplements: Boost Breeze BID (provides 500 kcals, 18 g protein if consumed)      PO Intake:  75% intake x 1 documented meal yesterday        Current nutrition support: -   Nutrition support review: -       Labs (reviewed below): Reviewed. Management per attending         GI Function:  Colostomy output 575 mL x last 24 hours       Nutrition Intervention Updates: Continue to encourage good po intake.     Continue to encourage ONS intake.        Results from last 7 days   Lab Units 04/17/24  0045 04/16/24  1223 04/16/24  0018 04/15/24  0139   SODIUM mmol/L 139  --  141 143   POTASSIUM mmol/L 4.2 3.9 3.6 3.7   CHLORIDE mmol/L 107  --  106 109*   CO2 mmol/L 18.0*  --  20.0* 21.0*   BUN mg/dL 21  --  19 17   CREATININE mg/dL 1.15  --  1.27 1.17   CALCIUM mg/dL 8.8  --  8.7 8.4*   BILIRUBIN mg/dL 0.3  --  0.2 0.3   ALK PHOS U/L 103  --  109 104   ALT (SGPT) U/L 32  --  40 42*   AST (SGOT) U/L 24  --  33 37   GLUCOSE mg/dL 102*  --  157* 106*     Results from last 7 days   Lab Units 04/17/24  0045 04/16/24  0018 04/15/24  0139   MAGNESIUM mg/dL 2.2 2.2 2.2   PHOSPHORUS mg/dL 3.1 3.3 3.1   HEMOGLOBIN g/dL 11.6* 11.8* 11.6*   HEMATOCRIT % 37.3* 37.5 37.5     No results found for: \"COVID19\"  Lab Results   Component Value Date    HGBA1C 5.80 (H) 04/09/2024         RD to follow up per " protocol.    Electronically signed by:  Rosana Morgan RD  04/17/24 13:04 EDT

## 2024-04-17 NOTE — NURSING NOTE
73 yr old male admitted 4/8/24 from Dr. Burden's office for possible wound infection following surgery last week to remove a mass on his colon. Patient underwent a laparoscopy with end-loop colostomy on 4/9/24. [facility] follow up for ostomy management and education.     Patient up in chair with family at the bedside. There is a small amount of brown liquid effluent in the ostomy pouch and patient reports the pouch has been emptied several times. Patient's wife had questions regarding obtaining supplies after discharge. Instructed on supply ordering process and C will be following. Will plan to change pouch again tomorrow.

## 2024-04-17 NOTE — THERAPY TREATMENT NOTE
"Subjective: Pt agreeable to therapeutic plan of care.    Objective:     Bed mobility - N/A or Not attempted.  Transfers - SBA  Ambulation - 2 x 100 feet CGA; HR     Therapeutic Exercise - 10 Reps B LE AROM supported sitting / chair    Vitals: WNL    Pain: 0 VAS   Location: N/A  Intervention for pain: N/A    Education: Provided education on the importance of mobility in the acute care setting, Verbal/Tactile Cues, Gait Training, and Energy conservation strategies    Assessment: Viktor Atkins was admitted to [facility] for post-op wound abscess with anastomotic leak and subsequent Afib with RVR. Pt presents with functional mobility impairments which indicate the need for skilled intervention. Pt's HR remained WNL this session with 2 bouts of ambulation x 100 feet with seated rest break. Pt reporting goal to return to golf and active lifestyle, changing recommendation to OPPT for improving endurance in safe range. Tolerating session today without incident. Will continue to follow and progress as tolerated.     Plan/Recommendations:   If medically appropriate, Low Intensity Therapy recommended post-acute care - This is recommended as therapy feels this patient would require 2-3 visits per week. OP or HH would be the best option depending on patient's home bound status. Consider, if the patient has other  \"skilled\" needs such as wounds, IV antibiotics, etc. Combined with \"low intensity\" could also equate to a SNF. If patient is medically complex, consider LTAC. Pt requires no DME at discharge.     Pt desires Home with family assist and Outpatient therapy at discharge. Pt cooperative; agreeable to therapeutic recommendations and plan of care.         Basic Mobility 6-click:  Rollin = Total, A lot = 2, A little = 3; 4 = None  Supine>Sit:   1 = Total, A lot = 2, A little = 3; 4 = None   Sit>Stand with arms:  1 = Total, A lot = 2, A little = 3; 4 = None  Bed>Chair:   1 = Total, A lot = 2, A little = 3; 4 = " None  Ambulate in room:  1 = Total, A lot = 2, A little = 3; 4 = None  3-5 Steps with railin = Total, A lot = 2, A little = 3; 4 = None  Score: 21    Modified [place]: N/A = No pre-op stroke/TIA    Post-Tx Position: Up in Chair, Alarms activated, and Call light and personal items within reach  PPE: gloves

## 2024-04-17 NOTE — PLAN OF CARE
Goal Outcome Evaluation:  Assessment: Viktor Atkins was admitted to [facility] for post-op wound abscess with anastomotic leak and subsequent Afib with RVR. Pt presents with functional mobility impairments which indicate the need for skilled intervention. Pt's HR remained WNL this session with 2 bouts of ambulation x 100 feet with seated rest break. Pt reporting goal to return to golf and active lifestyle, changing recommendation to OPPT for improving endurance in safe range. Tolerating session today without incident. Will continue to follow and progress as tolerated.                    Anticipated Discharge Disposition (PT): home with outpatient therapy services

## 2024-04-17 NOTE — PLAN OF CARE
Goal Outcome Evaluation:                   attempt to titrate down/off cardizem. patient up sitting ing chair for most of day with assist times one to bathroom. patient on regular diet , instructed to take it slow and to notify nurse if any nasuea, fullness and or pain. patient and wife verblized understanding. ABD rounded slightly destinded, but not tender. Dressing changed per protocol.colostomy producing gas and liquid stool.

## 2024-04-17 NOTE — PLAN OF CARE
Goal Outcome Evaluation:  Plan of Care Reviewed With: patient        Progress: improving     Pt doing well over shift. Pt did complain of some nausea at the beginning of the shift but did not want any medication to help, only ice chips. Cardizem gtt remains infusing, gt is currently running ar a rate of 12.5. PO Amio, Cardizem, and Metoprolol administered over night but HR is still remaining high at times. Pt ambulating well from the bed to the chair. Pt spending some of the night in the recliner. Pt's family remains at their side.

## 2024-04-18 LAB
ALBUMIN SERPL-MCNC: 3.4 G/DL (ref 3.5–5.2)
ALBUMIN/GLOB SERPL: 1.3 G/DL
ALP SERPL-CCNC: 94 U/L (ref 39–117)
ALT SERPL W P-5'-P-CCNC: 25 U/L (ref 1–41)
ANION GAP SERPL CALCULATED.3IONS-SCNC: 10 MMOL/L (ref 5–15)
ANISOCYTOSIS BLD QL: NORMAL
AST SERPL-CCNC: 19 U/L (ref 1–40)
BASOPHILS # BLD AUTO: 0.04 10*3/MM3 (ref 0–0.2)
BASOPHILS NFR BLD AUTO: 0.3 % (ref 0–1.5)
BILIRUB SERPL-MCNC: 0.2 MG/DL (ref 0–1.2)
BUN SERPL-MCNC: 23 MG/DL (ref 8–23)
BUN/CREAT SERPL: 20.4 (ref 7–25)
BURR CELLS BLD QL SMEAR: NORMAL
CALCIUM SPEC-SCNC: 8.6 MG/DL (ref 8.6–10.5)
CHLORIDE SERPL-SCNC: 106 MMOL/L (ref 98–107)
CO2 SERPL-SCNC: 21 MMOL/L (ref 22–29)
CREAT SERPL-MCNC: 1.13 MG/DL (ref 0.76–1.27)
DEPRECATED RDW RBC AUTO: 51.1 FL (ref 37–54)
EGFRCR SERPLBLD CKD-EPI 2021: 68.6 ML/MIN/1.73
ELLIPTOCYTES BLD QL SMEAR: NORMAL
EOSINOPHIL # BLD AUTO: 0.57 10*3/MM3 (ref 0–0.4)
EOSINOPHIL NFR BLD AUTO: 4.7 % (ref 0.3–6.2)
ERYTHROCYTE [DISTWIDTH] IN BLOOD BY AUTOMATED COUNT: 16.1 % (ref 12.3–15.4)
GIANT PLATELETS: NORMAL
GLOBULIN UR ELPH-MCNC: 2.7 GM/DL
GLUCOSE BLDC GLUCOMTR-MCNC: 108 MG/DL (ref 70–105)
GLUCOSE BLDC GLUCOMTR-MCNC: 108 MG/DL (ref 70–105)
GLUCOSE BLDC GLUCOMTR-MCNC: 122 MG/DL (ref 70–105)
GLUCOSE BLDC GLUCOMTR-MCNC: 98 MG/DL (ref 70–105)
GLUCOSE SERPL-MCNC: 103 MG/DL (ref 65–99)
HCT VFR BLD AUTO: 35.1 % (ref 37.5–51)
HGB BLD-MCNC: 10.9 G/DL (ref 13–17.7)
IMM GRANULOCYTES # BLD AUTO: 0.04 10*3/MM3 (ref 0–0.05)
IMM GRANULOCYTES NFR BLD AUTO: 0.3 % (ref 0–0.5)
LYMPHOCYTES # BLD AUTO: 3.43 10*3/MM3 (ref 0.7–3.1)
LYMPHOCYTES NFR BLD AUTO: 28.2 % (ref 19.6–45.3)
MAGNESIUM SERPL-MCNC: 2 MG/DL (ref 1.6–2.4)
MCH RBC QN AUTO: 26.9 PG (ref 26.6–33)
MCHC RBC AUTO-ENTMCNC: 31.1 G/DL (ref 31.5–35.7)
MCV RBC AUTO: 86.7 FL (ref 79–97)
MONOCYTES # BLD AUTO: 0.79 10*3/MM3 (ref 0.1–0.9)
MONOCYTES NFR BLD AUTO: 6.5 % (ref 5–12)
NEUTROPHILS NFR BLD AUTO: 60 % (ref 42.7–76)
NEUTROPHILS NFR BLD AUTO: 7.3 10*3/MM3 (ref 1.7–7)
NRBC BLD AUTO-RTO: 0 /100 WBC (ref 0–0.2)
PHOSPHATE SERPL-MCNC: 3 MG/DL (ref 2.5–4.5)
PLATELET # BLD AUTO: 284 10*3/MM3 (ref 140–450)
PMV BLD AUTO: 13.6 FL (ref 6–12)
POIKILOCYTOSIS BLD QL SMEAR: NORMAL
POTASSIUM SERPL-SCNC: 4 MMOL/L (ref 3.5–5.2)
PROT SERPL-MCNC: 6.1 G/DL (ref 6–8.5)
QT INTERVAL: 378 MS
QTC INTERVAL: 463 MS
RBC # BLD AUTO: 4.05 10*6/MM3 (ref 4.14–5.8)
SMALL PLATELETS BLD QL SMEAR: ADEQUATE
SODIUM SERPL-SCNC: 137 MMOL/L (ref 136–145)
WBC MORPH BLD: NORMAL
WBC NRBC COR # BLD AUTO: 12.17 10*3/MM3 (ref 3.4–10.8)

## 2024-04-18 PROCEDURE — 80053 COMPREHEN METABOLIC PANEL: CPT | Performed by: STUDENT IN AN ORGANIZED HEALTH CARE EDUCATION/TRAINING PROGRAM

## 2024-04-18 PROCEDURE — 85007 BL SMEAR W/DIFF WBC COUNT: CPT | Performed by: STUDENT IN AN ORGANIZED HEALTH CARE EDUCATION/TRAINING PROGRAM

## 2024-04-18 PROCEDURE — 84100 ASSAY OF PHOSPHORUS: CPT | Performed by: STUDENT IN AN ORGANIZED HEALTH CARE EDUCATION/TRAINING PROGRAM

## 2024-04-18 PROCEDURE — 82948 REAGENT STRIP/BLOOD GLUCOSE: CPT | Performed by: STUDENT IN AN ORGANIZED HEALTH CARE EDUCATION/TRAINING PROGRAM

## 2024-04-18 PROCEDURE — 83735 ASSAY OF MAGNESIUM: CPT | Performed by: STUDENT IN AN ORGANIZED HEALTH CARE EDUCATION/TRAINING PROGRAM

## 2024-04-18 PROCEDURE — 82948 REAGENT STRIP/BLOOD GLUCOSE: CPT

## 2024-04-18 PROCEDURE — 85025 COMPLETE CBC W/AUTO DIFF WBC: CPT | Performed by: STUDENT IN AN ORGANIZED HEALTH CARE EDUCATION/TRAINING PROGRAM

## 2024-04-18 PROCEDURE — 99024 POSTOP FOLLOW-UP VISIT: CPT | Performed by: STUDENT IN AN ORGANIZED HEALTH CARE EDUCATION/TRAINING PROGRAM

## 2024-04-18 PROCEDURE — 93005 ELECTROCARDIOGRAM TRACING: CPT | Performed by: INTERNAL MEDICINE

## 2024-04-18 PROCEDURE — 25010000002 CEFTRIAXONE PER 250 MG: Performed by: NURSE PRACTITIONER

## 2024-04-18 PROCEDURE — 97116 GAIT TRAINING THERAPY: CPT

## 2024-04-18 PROCEDURE — 99232 SBSQ HOSP IP/OBS MODERATE 35: CPT | Performed by: INTERNAL MEDICINE

## 2024-04-18 PROCEDURE — 97110 THERAPEUTIC EXERCISES: CPT

## 2024-04-18 PROCEDURE — 93010 ELECTROCARDIOGRAM REPORT: CPT | Performed by: INTERNAL MEDICINE

## 2024-04-18 RX ORDER — AMIODARONE HYDROCHLORIDE 200 MG/1
400 TABLET ORAL EVERY 12 HOURS SCHEDULED
Status: DISCONTINUED | OUTPATIENT
Start: 2024-04-18 | End: 2024-04-19 | Stop reason: HOSPADM

## 2024-04-18 RX ORDER — ACETAMINOPHEN 500 MG
1000 TABLET ORAL EVERY 6 HOURS PRN
Status: DISCONTINUED | OUTPATIENT
Start: 2024-04-18 | End: 2024-04-19 | Stop reason: HOSPADM

## 2024-04-18 RX ADMIN — Medication 10 ML: at 21:00

## 2024-04-18 RX ADMIN — AMIODARONE HYDROCHLORIDE 400 MG: 200 TABLET ORAL at 20:59

## 2024-04-18 RX ADMIN — FAMOTIDINE 20 MG: 10 INJECTION INTRAVENOUS at 20:59

## 2024-04-18 RX ADMIN — Medication 10 ML: at 08:17

## 2024-04-18 RX ADMIN — CEFTRIAXONE SODIUM 2000 MG: 2 INJECTION, POWDER, FOR SOLUTION INTRAMUSCULAR; INTRAVENOUS at 17:16

## 2024-04-18 RX ADMIN — METRONIDAZOLE 500 MG: 500 TABLET ORAL at 21:00

## 2024-04-18 RX ADMIN — APIXABAN 5 MG: 5 TABLET, FILM COATED ORAL at 20:59

## 2024-04-18 RX ADMIN — METRONIDAZOLE 500 MG: 500 TABLET ORAL at 13:07

## 2024-04-18 RX ADMIN — METOPROLOL TARTRATE 75 MG: 50 TABLET, FILM COATED ORAL at 08:17

## 2024-04-18 RX ADMIN — ASPIRIN 81 MG CHEWABLE TABLET 81 MG: 81 TABLET CHEWABLE at 08:17

## 2024-04-18 RX ADMIN — DILTIAZEM HYDROCHLORIDE 30 MG: 30 TABLET, FILM COATED ORAL at 13:07

## 2024-04-18 RX ADMIN — METOPROLOL TARTRATE 25 MG: 25 TABLET, FILM COATED ORAL at 02:50

## 2024-04-18 RX ADMIN — METOPROLOL TARTRATE 75 MG: 50 TABLET, FILM COATED ORAL at 20:59

## 2024-04-18 RX ADMIN — AMIODARONE HYDROCHLORIDE 400 MG: 200 TABLET ORAL at 13:04

## 2024-04-18 RX ADMIN — TAMSULOSIN HYDROCHLORIDE 0.4 MG: 0.4 CAPSULE ORAL at 08:17

## 2024-04-18 RX ADMIN — SERTRALINE HYDROCHLORIDE 25 MG: 25 TABLET ORAL at 08:17

## 2024-04-18 RX ADMIN — ATORVASTATIN CALCIUM 20 MG: 20 TABLET, FILM COATED ORAL at 20:59

## 2024-04-18 RX ADMIN — DILTIAZEM HYDROCHLORIDE 30 MG: 30 TABLET, FILM COATED ORAL at 05:08

## 2024-04-18 RX ADMIN — DILTIAZEM HYDROCHLORIDE 30 MG: 30 TABLET, FILM COATED ORAL at 21:00

## 2024-04-18 RX ADMIN — APIXABAN 5 MG: 5 TABLET, FILM COATED ORAL at 08:17

## 2024-04-18 RX ADMIN — METRONIDAZOLE 500 MG: 500 TABLET ORAL at 05:08

## 2024-04-18 RX ADMIN — FAMOTIDINE 20 MG: 10 INJECTION INTRAVENOUS at 08:16

## 2024-04-18 RX ADMIN — Medication 5 MG: at 20:59

## 2024-04-18 NOTE — PROGRESS NOTES
"[facility] MEDICINE SERVICE  DAILY PROGRESS NOTE      Patient Name: Viktor Atkins  Date of Admission: 4/8/2024  Today's Date: 04/18/24  Length of Stay: 10  Primary Care Physician: Gera Manriquez MD    Subjective   Patient continues tolerate p.o. intake well.  He continues to have significant output in his ostomy.      Objective    Temp:  [97.2 °F (36.2 °C)-98.2 °F (36.8 °C)] 97.4 °F (36.3 °C)  Heart Rate:  [] 104  Resp:  [16-25] 19  BP: (107-148)/(59-81) 123/59  Physical Exam  General: NAD  HEENT: AT NC, EOMI, NG tube in place  Neck: No JVD  CVS: S1/S2 present, irregular rhythm with tachycardia, no M/R/G  Lungs: CTA B/L  Abdomen: soft, minimally tender at incision sites, ND, BS+, ostomy in place with small amount liquid stool  Ext: no edema  Skin: no rashes, bruises or discolorations  Neuro: no focal deficits  Psych: Not agitated         Results Review:  I have reviewed the labs, radiology results, and diagnostic studies.    Laboratory Data:   Results from last 7 days   Lab Units 04/18/24  0130 04/17/24  0045 04/16/24  0018   WBC 10*3/mm3 12.17* 14.22* 18.03*   HEMOGLOBIN g/dL 10.9* 11.6* 11.8*   HEMATOCRIT % 35.1* 37.3* 37.5   PLATELETS 10*3/mm3 284 331 398        Results from last 7 days   Lab Units 04/18/24  0130 04/17/24  0045 04/16/24  1223 04/16/24  0018   SODIUM mmol/L 137 139  --  141   POTASSIUM mmol/L 4.0 4.2 3.9 3.6   CHLORIDE mmol/L 106 107  --  106   CO2 mmol/L 21.0* 18.0*  --  20.0*   BUN mg/dL 23 21  --  19   CREATININE mg/dL 1.13 1.15  --  1.27   CALCIUM mg/dL 8.6 8.8  --  8.7   BILIRUBIN mg/dL 0.2 0.3  --  0.2   ALK PHOS U/L 94 103  --  109   ALT (SGPT) U/L 25 32  --  40   AST (SGOT) U/L 19 24  --  33   GLUCOSE mg/dL 103* 102*  --  157*       Culture Data:   No results found for: \"BLOODCX\"  No results found for: \"URINECX\"  No results found for: \"RESPCX\"  No results found for: \"WOUNDCX\"  No results found for: \"STOOLCX\"  No components found for: \"BODYFLD\"    Radiology Data:   Imaging " Results (Last 24 Hours)       ** No results found for the last 24 hours. **            I have reviewed the patient's current medications.     Assessment/Plan     Postoperative wound abscess with anastomotic leak  -Patient underwent lower anterior resection of a bleeding rectosigmoid mass on 3/30 with findings of invasive moderately symmetric adenocarcinoma  -Found to have abscess on imaging after returning to hospital for worsening pain  -S/p laparoscopy with pelvic drain placement and loop ostomy on 4/9  -Wound cultures growing streptococcal and Bacteroides species  -Started on Rocephin, Flagyl to complete course of treatment on 4/23    A-fib with RVR   -Patient initially had improved rate control initially but now with tachycardia once again  -Restarted Cardizem drip as well as oral diltiazem, amiodarone and metoprolol; now once again off of Cardizem drip and increased metoprolol and amiodarone, continue oral diltiazem for better rate control  -Switched Lovenox to oral Eliquis  -I anticipate patient may require cardioversion given his refractory A-fib, although he is recently postoperative and this may be complicated; he will need SHILPI prior to cardioversion if cardioversion is necessary    Postoperative ileus  -Patient was initially tolerating liquid diet but then had worsening of his symptoms and NG tube was placed due to likely postoperative ileus  -NG tube removed on 4/15 and patient has been tolerating diet and is now on regular diet    Rectosigmoid adenocarcinoma  -F/u oncology as outpatient  -Patient needs to have port placed for initiation of treatment    Hypertension  -Continue Cardizem, metoprolol  -Hold losartan    DM type II, controlled  - ISS, accuchecks, diet    Dyslipidemia  -Continue Lipitor    Mood disorders  - home meds    9) GI/DVT ppx  - protonix/lovenox      Disposition: Patient is stable from a surgical standpoint for discharge, however still requiring aggressive treatment for A-fib.   Anticipate patient will be discharged on 4/20.        Electronically signed by Peyman Castorena MD, 04/18/24, 12:22 EDT.

## 2024-04-18 NOTE — PROGRESS NOTES
LOS: 10 days   Admitting Physician- Peyman Castorena MD    Reason For Followup:    Persistent atrial fibrillation  Post surgery ileus  Hypertension  Carcinoma of colon  S/p laparotomy and colostomy    Subjective     Patient is sitting up in chair.  Patient still have distention of abdomen colostomy is noted    Objective     Heart rate is still slightly on the higher side blood pressure is stable    Review of Systems:   Review of Systems   Constitutional: Negative for chills and fever.   HENT:  Negative for ear discharge and nosebleeds.    Eyes:  Negative for discharge and redness.   Cardiovascular:  Negative for chest pain, orthopnea, palpitations, paroxysmal nocturnal dyspnea and syncope.   Respiratory:  Positive for shortness of breath. Negative for cough and wheezing.    Endocrine: Negative for heat intolerance.   Skin:  Negative for rash.   Musculoskeletal:  Negative for arthritis and myalgias.   Gastrointestinal:  Negative for abdominal pain, melena, nausea and vomiting.   Genitourinary:  Negative for dysuria and hematuria.   Neurological:  Negative for dizziness, light-headedness, numbness and tremors.   Psychiatric/Behavioral:  Negative for depression. The patient is not nervous/anxious.          Vital Signs  Vitals:    04/18/24 0134 04/18/24 0250 04/18/24 0502 04/18/24 0911   BP: 110/70 124/71 125/73 123/59   BP Location: Left arm  Left arm Left arm   Patient Position: Lying  Lying Lying   Pulse: 95 102 108 104   Resp: 16  25 19   Temp: 98.1 °F (36.7 °C)  97.2 °F (36.2 °C) 97.4 °F (36.3 °C)   TempSrc: Oral  Oral Oral   SpO2: 95%  96%    Weight:   102 kg (225 lb 1.4 oz)    Height:         Wt Readings from Last 1 Encounters:   04/18/24 102 kg (225 lb 1.4 oz)       Intake/Output Summary (Last 24 hours) at 4/18/2024 1012  Last data filed at 4/18/2024 0800  Gross per 24 hour   Intake 960 ml   Output 1890 ml   Net -930 ml     Physical Exam:  Constitutional:       Appearance: Well-developed.   Eyes:      General:  No scleral icterus.        Right eye: No discharge.   HENT:      Head: Normocephalic and atraumatic.   Neck:      Thyroid: No thyromegaly.      Lymphadenopathy: No cervical adenopathy.   Pulmonary:      Effort: Pulmonary effort is normal. No respiratory distress.      Breath sounds: Normal breath sounds. No wheezing. No rales.   Cardiovascular:      Normal rate. Irregularly irregular rhythm.      No gallop.    Edema:     Peripheral edema absent.   Abdominal:      Tenderness: There is no abdominal tenderness.   Skin:     Findings: No erythema or rash.   Neurological:      Mental Status: Alert and oriented to person, place, and time.         Results Review:   Lab Results (last 24 hours)       Procedure Component Value Units Date/Time    CBC & Differential [[id number]]  (Abnormal) Collected: 04/18/24 0130    Specimen: Blood from Arm, Right Updated: 04/18/24 0246    Narrative:      The following orders were created for panel order CBC & Differential.  Procedure                               Abnormality         Status                     ---------                               -----------         ------                     CBC Auto Differential[[id number]]        Abnormal            Final result               Scan Slide[[id number]]                                       Final result                 Please view results for these tests on the individual orders.    CBC Auto Differential [[id number]]  (Abnormal) Collected: 04/18/24 0130    Specimen: Blood from Arm, Right Updated: 04/18/24 0246     WBC 12.17 10*3/mm3      RBC 4.05 10*6/mm3      Hemoglobin 10.9 g/dL      Hematocrit 35.1 %      MCV 86.7 fL      MCH 26.9 pg      MCHC 31.1 g/dL      RDW 16.1 %      RDW-SD 51.1 fl      MPV 13.6 fL      Platelets 284 10*3/mm3      Comment: Result checked          Neutrophil % 60.0 %      Lymphocyte % 28.2 %      Monocyte % 6.5 %      Eosinophil % 4.7 %      Basophil % 0.3 %      Immature Grans % 0.3 %      Neutrophils, Absolute 7.30  10*3/mm3      Lymphocytes, Absolute 3.43 10*3/mm3      Monocytes, Absolute 0.79 10*3/mm3      Eosinophils, Absolute 0.57 10*3/mm3      Basophils, Absolute 0.04 10*3/mm3      Immature Grans, Absolute 0.04 10*3/mm3      nRBC 0.0 /100 WBC     Scan Slide [[id number]] Collected: 04/18/24 0130    Specimen: Blood from Arm, Right Updated: 04/18/24 0246     Anisocytosis Slight/1+     S[place] Cells Slight/1+     Elliptocytes Slight/1+     Poikilocytes Slight/1+     WBC Morphology Normal     Platelet Estimate Adequate     Giant Platelets Slight/1+    Comprehensive Metabolic Panel [[id number]]  (Abnormal) Collected: 04/18/24 0130    Specimen: Blood from Arm, Right Updated: 04/18/24 0235     Glucose 103 mg/dL      BUN 23 mg/dL      Creatinine 1.13 mg/dL      Sodium 137 mmol/L      Potassium 4.0 mmol/L      Chloride 106 mmol/L      CO2 21.0 mmol/L      Calcium 8.6 mg/dL      Total Protein 6.1 g/dL      Albumin 3.4 g/dL      ALT (SGPT) 25 U/L      AST (SGOT) 19 U/L      Alkaline Phosphatase 94 U/L      Total Bilirubin 0.2 mg/dL      Globulin 2.7 gm/dL      A/G Ratio 1.3 g/dL      BUN/Creatinine Ratio 20.4     Anion Gap 10.0 mmol/L      eGFR 68.6 mL/min/1.73     Narrative:      GFR Normal >60  Chronic Kidney Disease <60  Kidney Failure <15    The GFR formula is only valid for adults with stable renal function between ages 18 and 70.    Magnesium [[id number]]  (Normal) Collected: 04/18/24 0130    Specimen: Blood from Arm, Right Updated: 04/18/24 0235     Magnesium 2.0 mg/dL     Phosphorus [[id number]]  (Normal) Collected: 04/18/24 0130    Specimen: Blood from Arm, Right Updated: 04/18/24 0235     Phosphorus 3.0 mg/dL     POC Glucose Once [[id number]]  (Abnormal) Collected: 04/17/24 1609    Specimen: Blood Updated: 04/17/24 1611     Glucose 163 mg/dL      Comment: Serial Number: [id number]:  [id number]       POC Glucose Once [[id number]]  (Abnormal) Collected: 04/17/24 1143    Specimen: Blood Updated: 04/17/24 1145     Glucose 130 mg/dL       Comment: Serial Number: [id number]:  [id number]             Imaging Results (Last 72 Hours)       ** No results found for the last 72 hours. **          ECG/EMG Results (most recent)       Procedure Component Value Units Date/Time    ECG 12 Lead Tachycardia [[id number]] Collected: 04/08/24 1649     Updated: 04/09/24 0730     QT Interval 314 ms      QTC Interval 518 ms     Narrative:      HEART RATE= 164  bpm  RR Interval= 367  ms  AK Interval=   ms  P Horizontal Axis=   deg  P Front Axis=   deg  QRSD Interval= 108  ms  QT Interval= 314  ms  QTcB= 518  ms  QRS Axis= 20  deg  T Wave Axis= 205  deg  - ABNORMAL ECG -  Atrial fibrillation with rapid V-rate  Incomplete left bundle branch block  Low voltage, extremity leads  The appearance of BBB may be rate related  When compared with ECG of 27-Mar-2024 11:50:13,  Significant change in rhythm  Electronically Signed By: Thiago Solomon) 09-Apr-2024 07:30:05  Date and Time of Study: 2024-04-08 16:49:10    Telemetry Scan [[id number]] Resulted: 04/08/24     Updated: 04/10/24 0613    Telemetry Scan [[id number]] Resulted: 04/08/24     Updated: 04/10/24 0640    Telemetry Scan [[id number]] Resulted: 04/08/24     Updated: 04/10/24 0959    Telemetry Scan [[id number]] Resulted: 04/08/24     Updated: 04/10/24 1126    Telemetry Scan [[id number]] Resulted: 04/08/24     Updated: 04/10/24 1126    Telemetry Scan [[id number]] Resulted: 04/08/24     Updated: 04/11/24 0912    Telemetry Scan [[id number]] Resulted: 04/08/24     Updated: 04/11/24 0935    Telemetry Scan [[id number]] Resulted: 04/08/24     Updated: 04/11/24 0955    Telemetry Scan [[id number]] Resulted: 04/08/24     Updated: 04/11/24 1119    Telemetry Scan [[id number]] Resulted: 04/08/24     Updated: 04/11/24 1150    Telemetry Scan [[id number]] Resulted: 04/08/24     Updated: 04/11/24 1333    Telemetry Scan [[id number]] Resulted: 04/08/24     Updated: 04/12/24 0656    Telemetry Scan [[id number]] Resulted: 04/08/24     Updated:  04/12/24 1249    Telemetry Scan [[id number]] Resulted: 04/08/24     Updated: 04/12/24 1255    Telemetry Scan [[id number]] Resulted: 04/08/24     Updated: 04/12/24 1306    Telemetry Scan [[id number]] Resulted: 04/08/24     Updated: 04/12/24 1403    Telemetry Scan [[id number]] Resulted: 04/08/24     Updated: 04/15/24 1320    Telemetry Scan [[id number]] Resulted: 04/08/24     Updated: 04/15/24 1435    Telemetry Scan [[id number]] Resulted: 04/08/24     Updated: 04/16/24 0638    Telemetry Scan [[id number]] Resulted: 04/08/24     Updated: 04/16/24 0638    Telemetry Scan [[id number]] Resulted: 04/08/24     Updated: 04/16/24 0638    Telemetry Scan [[id number]] Resulted: 04/08/24     Updated: 04/16/24 0914    Telemetry Scan [[id number]] Resulted: 04/08/24     Updated: 04/16/24 1031    Telemetry Scan [[id number]] Resulted: 04/08/24     Updated: 04/16/24 1233    Telemetry Scan [[id number]] Resulted: 04/08/24     Updated: 04/16/24 1239    Telemetry Scan [[id number]] Resulted: 04/08/24     Updated: 04/16/24 1550    Telemetry Scan [[id number]] Resulted: 04/08/24     Updated: 04/17/24 0626    Telemetry Scan [[id number]] Resulted: 04/08/24     Updated: 04/17/24 0630    Telemetry Scan [[id number]] Resulted: 04/08/24     Updated: 04/17/24 0753    Telemetry Scan [[id number]] Resulted: 04/08/24     Updated: 04/17/24 1027    Telemetry Scan [[id number]] Resulted: 04/08/24     Updated: 04/17/24 1244    Telemetry Scan [[id number]] Resulted: 04/08/24     Updated: 04/18/24 0715    Telemetry Scan [[id number]] Resulted: 04/08/24     Updated: 04/18/24 0730    Telemetry Scan [[id number]] Resulted: 04/08/24     Updated: 04/18/24 0814    Telemetry Scan [[id number]] Resulted: 04/08/24     Updated: 04/18/24 0818          CBC    Results from last 7 days   Lab Units 04/18/24  0130 04/17/24  0045 04/16/24  0018 04/15/24  0139 04/14/24  0104 04/13/24  0428 04/12/24  0400   WBC 10*3/mm3 12.17* 14.22* 18.03* 13.46* 13.93* 14.68* 17.19*   HEMOGLOBIN g/dL 10.9* 11.6* 11.8* 11.6*  11.8* 11.0* 10.4*   PLATELETS 10*3/mm3 284 331 398 415 436 408 372     BMP   Results from last 7 days   Lab Units 04/18/24  0130 04/17/24  0045 04/16/24  1223 04/16/24  0018 04/15/24  0139 04/14/24  0104 04/13/24  0428 04/12/24  0400   SODIUM mmol/L 137 139  --  141 143 143 143 150*   POTASSIUM mmol/L 4.0 4.2 3.9 3.6 3.7 3.9 3.4* 3.9   CHLORIDE mmol/L 106 107  --  106 109* 109* 109* 115*   CO2 mmol/L 21.0* 18.0*  --  20.0* 21.0* 21.0* 22.0 19.0*   BUN mg/dL 23 21  --  19 17 16 13 15   CREATININE mg/dL 1.13 1.15  --  1.27 1.17 1.17 0.98 0.90   GLUCOSE mg/dL 103* 102*  --  157* 106* 103* 146* 97   MAGNESIUM mg/dL 2.0 2.2  --  2.2 2.2 2.3 2.2 2.4   PHOSPHORUS mg/dL 3.0 3.1  --  3.3 3.1 3.1 2.8 2.3*     CMP   Results from last 7 days   Lab Units 04/18/24  0130 04/17/24  0045 04/16/24  1223 04/16/24  0018 04/15/24  0139 04/14/24  0104 04/13/24  0428 04/12/24  0400   SODIUM mmol/L 137 139  --  141 143 143 143 150*   POTASSIUM mmol/L 4.0 4.2 3.9 3.6 3.7 3.9 3.4* 3.9   CHLORIDE mmol/L 106 107  --  106 109* 109* 109* 115*   CO2 mmol/L 21.0* 18.0*  --  20.0* 21.0* 21.0* 22.0 19.0*   BUN mg/dL 23 21  --  19 17 16 13 15   CREATININE mg/dL 1.13 1.15  --  1.27 1.17 1.17 0.98 0.90   GLUCOSE mg/dL 103* 102*  --  157* 106* 103* 146* 97   ALBUMIN g/dL 3.4* 3.5  --  3.6 3.4* 3.4* 3.3* 3.4*   BILIRUBIN mg/dL 0.2 0.3  --  0.2 0.3 0.3 0.3 0.2   ALK PHOS U/L 94 103  --  109 104 118* 108 114   AST (SGOT) U/L 19 24  --  33 37 33 37 27   ALT (SGPT) U/L 25 32  --  40 42* 41 43* 33     Cardiac Studies:  Echo- Results for orders placed during the hospital encounter of 03/25/24    Adult Transthoracic Echo Complete W/ Cont if Necessary Per Protocol    Interpretation Summary    Left ventricular systolic function is low normal. Calculated left ventricular EF = 46% Left ventricular ejection fraction appears to be 46 - 50%.    The left ventricular cavity is mildly dilated.    Left ventricular diastolic dysfunction is noted.    The left atrial cavity  is dilated.    Estimated right ventricular systolic pressure from tricuspid regurgitation is normal (<35 mmHg).    Dilation of the aortic root is present.  4.4 cm    Patient in atrial fibrillation during this study.    Stress Myoview-  Cath-      Medication Review:   Scheduled Meds:acetaminophen, 1,000 mg, Oral, Q6H  amiodarone, 400 mg, Oral, Q12H  apixaban, 5 mg, Oral, Q12H  aspirin, 81 mg, Oral, Daily  atorvastatin, 20 mg, Oral, Nightly  cefTRIAXone, 2,000 mg, Intravenous, Q24H  dilTIAZem, 30 mg, Oral, Q8H  famotidine, 20 mg, Intravenous, Q12H  insulin lispro, 2-7 Units, Subcutaneous, Q6H  lidocaine, 20 mL, Intradermal, Once  metoprolol tartrate, 75 mg, Oral, Q12H  metroNIDAZOLE, 500 mg, Oral, Q8H  sertraline, 25 mg, Oral, Daily  sodium chloride, 10 mL, Intravenous, Q12H  sodium chloride, 10 mL, Intravenous, Q12H  tamsulosin, 0.4 mg, Oral, Daily      Continuous Infusions:dilTIAZem, 5-15 mg/hr, Last Rate: Stopped (04/17/24 1632)      PRN Meds:.  senna-docusate sodium **AND** polyethylene glycol **AND** bisacodyl **AND** bisacodyl    Calcium Replacement - Follow Nurse / BPA Driven Protocol    dextrose    dextrose    glucagon (human recombinant)    HYDROmorphone **AND** naloxone    Magnesium Standard Dose Replacement - Follow Nurse / BPA Driven Protocol    melatonin    nitroglycerin    ondansetron    ondansetron ODT    Phosphorus Replacement - Follow Nurse / BPA Driven Protocol    Potassium Replacement - Follow Nurse / BPA Driven Protocol    simethicone    [COMPLETED] Insert Peripheral IV **AND** sodium chloride    sodium chloride    sodium chloride    sodium chloride    sodium chloride      Assessment & Plan     Wound infection    Anxiety disorder    Essential hypertension    Palpitations    Mixed hyperlipidemia    Colon cancer    Atrial fibrillation with rapid ventricular response    Severe malnutrition    MDM:    1.  Persistent atrial fibrillation:    Patient asked about possible cardioversion.  Patient has not  been on anticoagulation for long interval of time.  If patient needs cardioversion SHILPI has to be done.  However patient is still has distended abdomen.  In the meanwhile patient is still acutely ill if we do SHILPI guided cardioversion I am afraid patient may go back into atrial fibrillation.  I would recommend to proceed with rate control and anticoagulation strategy at present.    2.  Tachycardia:    I would increase amiodarone to 400 mg twice daily and also increase metoprolol to 75 mg twice daily    3.  Hypertension:    Blood pressure is controlled    4.  Carcinoma of colon s/p colectomy and colostomy.    Patient abdomen is stable slightly distended.  However patient seems to be comfortable with no active chest pain    Electronically signed by Abimael Greenberg MD, 04/18/24, 10:15 AM EDT.      Abimael Greenberg MD  04/18/24  10:12 EDT

## 2024-04-18 NOTE — CASE MANAGEMENT/SOCIAL WORK
Continued Stay Note  [facility]     Patient Name: Viktor Atkins  MRN: [id number]  Today's Date: 4/18/2024    Admit Date: 4/8/2024    Plan: Anticipate routine home with spouse and [facility] (accepted, order per MD) and [facility] for IV abx. Eliquis copay $47/month after deductible, free 30 days thru [facility].   Discharge Plan       Row Name 04/18/24 1523       Plan    Plan Comments CM notified by MD that anticipate patient will be able to d/c tomorrow morning prior to 11am. CM discussed with RN, IV rocephin re-timed for 10am tomorrow. CM updated [facility].                      Expected Discharge Date and Time       Expected Discharge Date Expected Discharge Time    Apr 19, 2024           Phone communication or documentation only - no physical contact with patient or family.     SANA MahoneyN, RN    [address] [address]    Office: [phone]  Fax: [phone]

## 2024-04-18 NOTE — THERAPY TREATMENT NOTE
"Subjective: Pt agreeable to therapeutic plan of care.    Objective:     Bed mobility - N/A or Not attempted.  Transfers - Supervision  Ambulation - 125 feet x 2 feet SBA    Therapeutic Exercise - 10 Reps B LE AROM supported sitting / chair    Vitals: Tachycardic HR     Pain: 0 VAS   Location: N/A  Intervention for pain: N/A    Education: Provided education on the importance of mobility in the acute care setting and Verbal/Tactile Cues    Assessment: Viktor Atkins presents with functional mobility impairments which indicate the need for skilled intervention. Pt has excellent motivation. Pt ambulates with decreased arm swing and decreased heel strike BLE. HR jumped up to 134 at highest with gait.  Tolerating session today without incident. Will continue to follow and progress as tolerated.     Plan/Recommendations:   If medically appropriate, Low Intensity Therapy recommended post-acute care - This is recommended as therapy feels this patient would require 2-3 visits per week. OP or HH would be the best option depending on patient's home bound status. Consider, if the patient has other  \"skilled\" needs such as wounds, IV antibiotics, etc. Combined with \"low intensity\" could also equate to a SNF. If patient is medically complex, consider LTAC. Pt requires no DME at discharge.     Pt desires Home with Home Health at discharge. Pt cooperative; agreeable to therapeutic recommendations and plan of care.         Basic Mobility 6-click:  Rollin = Total, A lot = 2, A little = 3; 4 = None  Supine>Sit:   1 = Total, A lot = 2, A little = 3; 4 = None   Sit>Stand with arms:  1 = Total, A lot = 2, A little = 3; 4 = None  Bed>Chair:   1 = Total, A lot = 2, A little = 3; 4 = None  Ambulate in room:  1 = Total, A lot = 2, A little = 3; 4 = None  3-5 Steps with railin = Total, A lot = 2, A little = 3; 4 = None  Score: 22    Modified Kevin: N/A = No pre-op stroke/TIA    Post-Tx Position: Up in Chair and Call " light and personal items within reach  PPE: gloves

## 2024-04-18 NOTE — PLAN OF CARE
"Goal Outcome Evaluation:     Assessment: Viktor Atkins presents with functional mobility impairments which indicate the need for skilled intervention. Pt has excellent motivation. Pt ambulates with decreased arm swing and decreased heel strike BLE. HR jumped up to 134 at highest with gait.  Tolerating session today without incident. Will continue to follow and progress as tolerated.     Plan/Recommendations:   If medically appropriate, Low Intensity Therapy recommended post-acute care - This is recommended as therapy feels this patient would require 2-3 visits per week. OP or HH would be the best option depending on patient's home bound status. Consider, if the patient has other  \"skilled\" needs such as wounds, IV antibiotics, etc. Combined with \"low intensity\" could also equate to a SNF. If patient is medically complex, consider LTAC. Pt requires no DME at discharge.     Pt desires Home with Home Health at discharge. Pt cooperative; agreeable to therapeutic recommendations and plan of care.                                                "

## 2024-04-18 NOTE — CASE MANAGEMENT/SOCIAL WORK
Discharge Planning Assessment  [facility]     Patient Name: Viktor Atkins  MRN: [id number]  Today's Date: 4/18/2024    Admit Date: 4/8/2024    Plan: Anticipate routine home with spouse and [facility] (accepted, order per MD) and [facility] for IV abx. Eliquis copay $47/month after deductible, free 30 days thru [facility].       Discharge Plan       Row Name 04/18/24 1219       Plan    Plan Anticipate routine home with spouse and [facility] (accepted, order per MD) and [facility] for IV abx. Eliquis copay $47/month after deductible, free 30 days thru [facility].    Patient/Family in Agreement with Plan yes    Plan Comments CM spoke to patient at bedside. [facility] letter reviewed with patient, consent obtained and copy left at bedside. Patient reports concern about Eliquis cost. CM contacted [facility], Eliquis copay $47/month after he meets deductible, with deductible patient's first month cost will be $557, patient can get free 30 days at d/c through [facility]. CM updated patient at bedside about Eliquis cost, agreeable to [facility] and verbalized understanding about deductible costs. CM updated RN. Barrier to D/C: IV abx, adjusting cardiac meds.                      Expected Discharge Date and Time       Expected Discharge Date Expected Discharge Time    Apr 19, 2024                Patient Forms       Row Name 04/18/24 1222       Patient Forms    Important Message from Medicare (IMM) Delivered  4/18/24    Delivered to Patient    Method of delivery In person                  Met with patient in room.  Maintained distance greater than six feet and spent less than 15 minutes in the room.     SANA MahoneyN, RN    [address] [address]    Office: [phone]  Fax: [phone]

## 2024-04-18 NOTE — NURSING NOTE
WOCN note:    74 yr old male admitted 4/8/24 from Dr. Burden's office for possible wound infection following surgery to remove a mass on his colon. Patient underwent a laparoscopy with end-loop colostomy on 4/9/24. WOCN follow up for ostomy management and education.     Patient reports good ostomy output of brown liquid stool and tolerating diet. Pouch changed today. Abdomen is round and firm. LUQ stoma is retracted on the lateral side and slightly raised medially. The serina-stomal skin is intact and was cleansed with water and prepped with barrier spray. Patient able to verbalize steps of the pouch change and feels confident he will be able to perform at home. Encouraged patient to empty the pouch when in the bathroom today.   An [employer] barrier ring was placed around the stoma and a [employer] one piece cut to fit pouch was applied. All questions were answered and patient is anticipating discharge tomorrow. Ponce sample products were ordered last week. Supplies and written materials are at the bedside.

## 2024-04-19 ENCOUNTER — READMISSION MANAGEMENT (OUTPATIENT)
Dept: CALL CENTER | Facility: HOSPITAL | Age: 74
End: 2024-04-19
Payer: MEDICARE

## 2024-04-19 VITALS
DIASTOLIC BLOOD PRESSURE: 75 MMHG | HEART RATE: 114 BPM | RESPIRATION RATE: 20 BRPM | HEIGHT: 71 IN | OXYGEN SATURATION: 96 % | TEMPERATURE: 98 F | BODY MASS INDEX: 30.15 KG/M2 | WEIGHT: 215.39 LBS | SYSTOLIC BLOOD PRESSURE: 121 MMHG

## 2024-04-19 LAB
ALBUMIN SERPL-MCNC: 3.3 G/DL (ref 3.5–5.2)
ALBUMIN/GLOB SERPL: 1.3 G/DL
ALP SERPL-CCNC: 109 U/L (ref 39–117)
ALT SERPL W P-5'-P-CCNC: 25 U/L (ref 1–41)
ANION GAP SERPL CALCULATED.3IONS-SCNC: 12 MMOL/L (ref 5–15)
ANISOCYTOSIS BLD QL: NORMAL
AST SERPL-CCNC: 23 U/L (ref 1–40)
BASOPHILS # BLD AUTO: 0.05 10*3/MM3 (ref 0–0.2)
BASOPHILS NFR BLD AUTO: 0.4 % (ref 0–1.5)
BILIRUB SERPL-MCNC: 0.2 MG/DL (ref 0–1.2)
BUN SERPL-MCNC: 18 MG/DL (ref 8–23)
BUN/CREAT SERPL: 18 (ref 7–25)
CALCIUM SPEC-SCNC: 8.6 MG/DL (ref 8.6–10.5)
CHLORIDE SERPL-SCNC: 112 MMOL/L (ref 98–107)
CO2 SERPL-SCNC: 19 MMOL/L (ref 22–29)
CREAT SERPL-MCNC: 1 MG/DL (ref 0.76–1.27)
DEPRECATED RDW RBC AUTO: 51.9 FL (ref 37–54)
EGFRCR SERPLBLD CKD-EPI 2021: 79.5 ML/MIN/1.73
EOSINOPHIL # BLD AUTO: 0.75 10*3/MM3 (ref 0–0.4)
EOSINOPHIL NFR BLD AUTO: 5.8 % (ref 0.3–6.2)
ERYTHROCYTE [DISTWIDTH] IN BLOOD BY AUTOMATED COUNT: 16.3 % (ref 12.3–15.4)
GLOBULIN UR ELPH-MCNC: 2.5 GM/DL
GLUCOSE BLDC GLUCOMTR-MCNC: 104 MG/DL (ref 70–105)
GLUCOSE SERPL-MCNC: 109 MG/DL (ref 65–99)
HCT VFR BLD AUTO: 35.1 % (ref 37.5–51)
HGB BLD-MCNC: 10.9 G/DL (ref 13–17.7)
IMM GRANULOCYTES # BLD AUTO: 0.04 10*3/MM3 (ref 0–0.05)
IMM GRANULOCYTES NFR BLD AUTO: 0.3 % (ref 0–0.5)
LARGE PLATELETS: NORMAL
LYMPHOCYTES # BLD AUTO: 3.61 10*3/MM3 (ref 0.7–3.1)
LYMPHOCYTES NFR BLD AUTO: 28.1 % (ref 19.6–45.3)
MAGNESIUM SERPL-MCNC: 2.2 MG/DL (ref 1.6–2.4)
MCH RBC QN AUTO: 27.4 PG (ref 26.6–33)
MCHC RBC AUTO-ENTMCNC: 31.1 G/DL (ref 31.5–35.7)
MCV RBC AUTO: 88.2 FL (ref 79–97)
MONOCYTES # BLD AUTO: 0.74 10*3/MM3 (ref 0.1–0.9)
MONOCYTES NFR BLD AUTO: 5.8 % (ref 5–12)
NEUTROPHILS NFR BLD AUTO: 59.6 % (ref 42.7–76)
NEUTROPHILS NFR BLD AUTO: 7.65 10*3/MM3 (ref 1.7–7)
NRBC BLD AUTO-RTO: 0 /100 WBC (ref 0–0.2)
PHOSPHATE SERPL-MCNC: 3 MG/DL (ref 2.5–4.5)
PLATELET # BLD AUTO: 272 10*3/MM3 (ref 140–450)
PMV BLD AUTO: 13.3 FL (ref 6–12)
POTASSIUM SERPL-SCNC: 4.3 MMOL/L (ref 3.5–5.2)
PROT SERPL-MCNC: 5.8 G/DL (ref 6–8.5)
RBC # BLD AUTO: 3.98 10*6/MM3 (ref 4.14–5.8)
SMALL PLATELETS BLD QL SMEAR: ADEQUATE
SODIUM SERPL-SCNC: 143 MMOL/L (ref 136–145)
WBC MORPH BLD: NORMAL
WBC NRBC COR # BLD AUTO: 12.84 10*3/MM3 (ref 3.4–10.8)

## 2024-04-19 PROCEDURE — 99232 SBSQ HOSP IP/OBS MODERATE 35: CPT | Performed by: NURSE PRACTITIONER

## 2024-04-19 PROCEDURE — 80053 COMPREHEN METABOLIC PANEL: CPT | Performed by: STUDENT IN AN ORGANIZED HEALTH CARE EDUCATION/TRAINING PROGRAM

## 2024-04-19 PROCEDURE — 84100 ASSAY OF PHOSPHORUS: CPT | Performed by: STUDENT IN AN ORGANIZED HEALTH CARE EDUCATION/TRAINING PROGRAM

## 2024-04-19 PROCEDURE — 82948 REAGENT STRIP/BLOOD GLUCOSE: CPT

## 2024-04-19 PROCEDURE — 25010000002 DIGOXIN PER 500 MCG: Performed by: INTERNAL MEDICINE

## 2024-04-19 PROCEDURE — 85025 COMPLETE CBC W/AUTO DIFF WBC: CPT | Performed by: STUDENT IN AN ORGANIZED HEALTH CARE EDUCATION/TRAINING PROGRAM

## 2024-04-19 PROCEDURE — 85007 BL SMEAR W/DIFF WBC COUNT: CPT | Performed by: STUDENT IN AN ORGANIZED HEALTH CARE EDUCATION/TRAINING PROGRAM

## 2024-04-19 PROCEDURE — 25010000002 CEFTRIAXONE PER 250 MG: Performed by: NURSE PRACTITIONER

## 2024-04-19 PROCEDURE — 83735 ASSAY OF MAGNESIUM: CPT | Performed by: STUDENT IN AN ORGANIZED HEALTH CARE EDUCATION/TRAINING PROGRAM

## 2024-04-19 RX ORDER — METRONIDAZOLE 500 MG/1
500 TABLET ORAL EVERY 8 HOURS SCHEDULED
Qty: 18 TABLET | Refills: 0 | Status: SHIPPED | OUTPATIENT
Start: 2024-04-19 | End: 2024-04-25

## 2024-04-19 RX ORDER — METOPROLOL SUCCINATE 50 MG/1
50 TABLET, EXTENDED RELEASE ORAL 2 TIMES DAILY
Qty: 60 TABLET | Refills: 3 | Status: SHIPPED | OUTPATIENT
Start: 2024-04-19

## 2024-04-19 RX ORDER — AMIODARONE HYDROCHLORIDE 200 MG/1
400 TABLET ORAL EVERY 12 HOURS SCHEDULED
Qty: 120 TABLET | Refills: 3 | Status: SHIPPED | OUTPATIENT
Start: 2024-04-19

## 2024-04-19 RX ORDER — DIGOXIN 0.25 MG/ML
250 INJECTION INTRAMUSCULAR; INTRAVENOUS EVERY 6 HOURS
Status: DISCONTINUED | OUTPATIENT
Start: 2024-04-19 | End: 2024-04-19 | Stop reason: HOSPADM

## 2024-04-19 RX ADMIN — Medication 10 ML: at 09:37

## 2024-04-19 RX ADMIN — ASPIRIN 81 MG CHEWABLE TABLET 81 MG: 81 TABLET CHEWABLE at 09:36

## 2024-04-19 RX ADMIN — APIXABAN 5 MG: 5 TABLET, FILM COATED ORAL at 09:37

## 2024-04-19 RX ADMIN — CEFTRIAXONE SODIUM 2000 MG: 2 INJECTION, POWDER, FOR SOLUTION INTRAMUSCULAR; INTRAVENOUS at 09:36

## 2024-04-19 RX ADMIN — METOPROLOL TARTRATE 75 MG: 50 TABLET, FILM COATED ORAL at 09:36

## 2024-04-19 RX ADMIN — TAMSULOSIN HYDROCHLORIDE 0.4 MG: 0.4 CAPSULE ORAL at 09:36

## 2024-04-19 RX ADMIN — SERTRALINE HYDROCHLORIDE 25 MG: 25 TABLET ORAL at 09:37

## 2024-04-19 RX ADMIN — AMIODARONE HYDROCHLORIDE 400 MG: 200 TABLET ORAL at 09:37

## 2024-04-19 RX ADMIN — DILTIAZEM HYDROCHLORIDE 30 MG: 30 TABLET, FILM COATED ORAL at 05:55

## 2024-04-19 RX ADMIN — DIGOXIN 250 MCG: 0.25 INJECTION INTRAMUSCULAR; INTRAVENOUS at 10:36

## 2024-04-19 RX ADMIN — METRONIDAZOLE 500 MG: 500 TABLET ORAL at 05:55

## 2024-04-19 RX ADMIN — FAMOTIDINE 20 MG: 10 INJECTION INTRAVENOUS at 09:37

## 2024-04-19 NOTE — DISCHARGE SUMMARY
[facility] Medicine Services  Discharge Summary    Date of Service: 2024  Patient Name: Viktor Atkins  : 1950  MRN: [id number]    Date of Admission: 2024  Discharge Diagnosis:   Postoperative wound abscess with anastomotic leak  A-fib with RVR  Postoperative ileus  Rectosigmoid adenocarcinoma  Hypertension  DM type II, controlled  Dyslipidemia  Mood disorder  Obesity  Date of Discharge: 2024  Primary Care Physician: Gera Manriquez MD      Presenting Problem:   Hypokalemia [E87.6]  A-fib [I48.91]  Wound infection [T14.8XXA, L08.9]  Atrial fibrillation with rapid ventricular response [I48.91]  Leak of anastomosis between gastrointestinal structures [K91.89]    Active and Resolved Hospital Problems:  Active Hospital Problems    Diagnosis POA    **Wound infection [T14.8XXA, L08.9] Yes    Severe malnutrition [E43] Yes    Atrial fibrillation with rapid ventricular response [I48.91] Unknown    Colon cancer [C18.9] Yes    Mixed hyperlipidemia [E78.2] Yes    Anxiety disorder [F41.9] Yes    Essential hypertension [I10] Yes    Palpitations [R00.2] Yes      Resolved Hospital Problems   No resolved problems to display.         Hospital Course     HPI:  Patient is a 74-year-old male who presented to the hospital with complaints of abdominal pain.  Please see H&P for details.    Hospital Course:  Patient recently been admitted with findings of a rectosigmoid mass and underwent anterior resection of this part of the colon on 3/30.  He returned to the hospital however on  with worsening abdominal pain and was found to have intra-abdominal abscess at the surgical site.  He was seen by surgery and returned to the OR where he underwent laparoscopic pelvic drain placement and loop ostomy on  due to his surgical site abscess.  Wound cultures grew streptococcal and Bacteroides species.  He was started on IV antibiotics and transition to oral Flagyl and IV Rocephin per infectious disease.  He  will continue treatment with these antibiotics until 4/23.  Midline was placed prior to discharge.  He did develop postoperative ileus and was requiring NG tube placement to low wall suction.  However NG tube was removed on 4/15 as patient had improvement in his symptoms.  He began tolerating a clear liquid diet followed by regular diet.  He had adequate ostomy output after initiation of p.o. intake.  He also developed A-fib with RVR but had a previous recent diagnosis of A-fib.  He was requiring a Cardizem drip for several days but was transitioned to oral medications with amiodarone, metoprolol and oral Cardizem.  The patient was seen by cardiology and will follow-up with them in 1 month.  I anticipate he will likely need cardioversion in the near future.  Continue the patient on these medications as well as Eliquis for stroke risk reduction.  He will follow-up with cardiology in 1 month as stated above, and he will also follow-up with general surgery in 2 weeks.  He is stable for discharge.        DISCHARGE Follow Up Recommendations for labs and diagnostics: Follow-up with general surgery in 2 weeks, follow-up with cardiology in 1 month.      Reasons For Change In Medications and Indications for New Medications:      Day of Discharge     Vital Signs:  Temp:  [97.5 °F (36.4 °C)-98.2 °F (36.8 °C)] 98 °F (36.7 °C)  Heart Rate:  [] 114  Resp:  [16-25] 20  BP: ()/(58-81) 121/75    Physical Exam:  Physical Exam   General Appearance:  Alert, cooperative, no distress, appears stated age  Head:  Normocephalic, without obvious abnormality, atraumatic  Eyes:  PERRL, conjunctiva/corneas clear, EOM's intact, fundi benign, both eyes  Ears:  Normal TM's and external ear canals, both ears  Nose: Nares normal, septum midline, mucosa normal, no drainage or sinus tenderness  Throat: Lips, mucosa, and tongue normal; teeth and gums normal  Neck: Supple, symmetrical, trachea midline, no adenopathy, thyroid: not enlarged,  symmetric, no tenderness/mass/nodules, no carotid bruit or JVD  Lungs:   Clear to auscultation bilaterally, respirations unlabored  Heart:  Regular rate and rhythm, S1, S2 normal, no murmur, rub or gallop  Abdomen:  Soft, non-tender, bowel sounds active all four quadrants,  no masses, no organomegaly, abdominal surgical incisions intact, colostomy intact  Extremities: Extremities normal, atraumatic, no cyanosis or edema  Pulses: 2+ and symmetric  Skin: Skin color, texture, turgor normal, no rashes or lesions  Neurologic: Normal        Pertinent  and/or Most Recent Results     LAB RESULTS:      Lab 04/19/24  0138 04/18/24  0130 04/17/24  0045 04/16/24  0018 04/15/24  1159 04/15/24  0139   WBC 12.84* 12.17* 14.22* 18.03*  --  13.46*   HEMOGLOBIN 10.9* 10.9* 11.6* 11.8*  --  11.6*   HEMATOCRIT 35.1* 35.1* 37.3* 37.5  --  37.5   PLATELETS 272 284 331 398  --  415   NEUTROS ABS 7.65* 7.30* 9.04* 12.75*  --  7.89*   IMMATURE GRANS (ABS) 0.04 0.04 0.07* 0.07*  --  0.07*   LYMPHS ABS 3.61* 3.43* 3.88* 3.98*  --  4.22*   MONOS ABS 0.74 0.79 0.81 0.99*  --  0.89   EOS ABS 0.75* 0.57* 0.36 0.19  --  0.33   MCV 88.2 86.7 87.4 86.8  --  86.6   LACTATE  --   --   --   --  1.6  --          Lab 04/19/24  0138 04/18/24  0130 04/17/24  0045 04/16/24  1223 04/16/24  0018 04/15/24  0139   SODIUM 143 137 139  --  141 143   POTASSIUM 4.3 4.0 4.2 3.9 3.6 3.7   CHLORIDE 112* 106 107  --  106 109*   CO2 19.0* 21.0* 18.0*  --  20.0* 21.0*   ANION GAP 12.0 10.0 14.0  --  15.0 13.0   BUN 18 23 21  --  19 17   CREATININE 1.00 1.13 1.15  --  1.27 1.17   EGFR 79.5 68.6 67.2  --  59.7* 65.8   GLUCOSE 109* 103* 102*  --  157* 106*   CALCIUM 8.6 8.6 8.8  --  8.7 8.4*   MAGNESIUM 2.2 2.0 2.2  --  2.2 2.2   PHOSPHORUS 3.0 3.0 3.1  --  3.3 3.1         Lab 04/19/24  0138 04/18/24  0130 04/17/24  0045 04/16/24  0018 04/15/24  0139   TOTAL PROTEIN 5.8* 6.1 6.0 6.3 5.9*   ALBUMIN 3.3* 3.4* 3.5 3.6 3.4*   GLOBULIN 2.5 2.7 2.5 2.7 2.5   ALT (SGPT) 25 25 32  40 42*   AST (SGOT) 23 19 24 33 37   BILIRUBIN 0.2 0.2 0.3 0.2 0.3   ALK PHOS 109 94 103 109 104                     Brief Urine Lab Results  (Last result in the past 365 days)        Color   Clarity   Blood   Leuk Est   Nitrite   Protein   CREAT   Urine HCG        04/08/24 1549 Yellow   Clear   Negative   Trace   Negative   30 mg/dL (1+)                 Microbiology Results (last 10 days)       ** No results found for the last 240 hours. **            CT Abdomen Pelvis Without Contrast    Result Date: 4/13/2024  Impression: Impression: 1. Fluid-filled distended loops of small bowel with a discrete transition point to normal caliber decompressed bowel within the distal ileum. Findings would be compatible with partial small bowel obstruction. 2. Status post sigmoid colon resection and reanastomosis with diverting loop colostomy within the left lower quadrant. No evidence of contrast extravasation. No free fluid in the pelvis. Electronically Signed: Thaddeus Buchanan MD  4/13/2024 5:22 PM EDT  Workstation ID: [id number]    XR Abdomen KUB    Result Date: 4/11/2024  Impression: Impression: Increasing small bowel dilatation. However, contrast in the colon is against the diagnosis of bowel obstruction. Findings may represent postoperative ileus. Electronically Signed: Melissa Zeng MD  4/11/2024 7:45 AM EDT  Workstation ID: [id number]    XR Abdomen KUB    Result Date: 4/10/2024  Impression: Impression: NG tube projects just below the diaphragm in the expected position of the body of the stomach. Advancement no additional 5 cm could be considered when clinically feasible Electronically Signed: Loco Mensah MD  4/10/2024 10:47 AM EDT  Workstation ID: [id number]    CT Abdomen Pelvis With Contrast    Result Date: 4/8/2024  Impression: Impression: Postsurgical changes of rectosigmoid mass resection. The previously described presacral fluid collection contains oral contrast consistent with leak. This collection measures 2 x 3.2 x  4 cm. Collection is minimally enlarged in size when compared to prior  study. Findings compatible with small bowel and colonic ileus. Electronically Signed: Julio Perdomo MD  4/8/2024 4:55 PM EDT  Workstation ID: [id number]    CT Abdomen Pelvis With Contrast    Result Date: 4/4/2024  Impression: Impression: 1. Interval surgical changes status post resection of a rectosigmoid mass. There is a small focal fluid collection in the presacral region posterior to the anastomotic site with an air-fluid level measuring 2.8 x 2 x 3.5 cm. Further evaluation with a CT abdomen pelvis with oral contrast may provide more detailed delineation of the postsurgical anatomy and suspected fluid collection. 2. Colon ileus 3. Trace free fluid in the abdomen and pelvis and minimal residual free air 4. Extensive subcutaneous air along the abdominal wall and extending into the scrotal fat partially outside of the field-of-view Electronically Signed: James Saldana MD  4/4/2024 4:05 PM EDT  Workstation ID: [id number]    CT Chest Without Contrast Diagnostic    Result Date: 3/27/2024  Impression: Impression: No acute chest process evident. No CT evidence of metastatic disease to the chest. Electronically Signed: Julio Perdomo MD  3/27/2024 4:57 PM EDT  Workstation ID: [id number]    CT Abdomen Pelvis With Contrast    Result Date: 3/25/2024  Impression: Impression: Findings compatible with constipation. Sigmoid diverticulosis without diverticulitis. Electronically Signed: Julio Perdomo MD  3/25/2024 10:43 PM EDT  Workstation ID: [id number]             Results for orders placed during the hospital encounter of 03/25/24    Adult Transthoracic Echo Complete W/ Cont if Necessary Per Protocol    Interpretation Summary    Left ventricular systolic function is low normal. Calculated left ventricular EF = 46% Left ventricular ejection fraction appears to be 46 - 50%.    The left ventricular cavity is mildly dilated.    Left ventricular diastolic dysfunction  is noted.    The left atrial cavity is dilated.    Estimated right ventricular systolic pressure from tricuspid regurgitation is normal (<35 mmHg).    Dilation of the aortic root is present.  4.4 cm    Patient in atrial fibrillation during this study.      Labs Pending at Discharge:      Procedures Performed  Procedure(s):  DIAGNOSTIC LAPAROSCOPY, DIVERTING OSTOMY, POSSIBLE REVISION OF ANASTAMOSIS         Consults:   Consults       Date and Time Order Name Status Description    4/9/2024 12:00 PM Hematology & Oncology Inpatient Consult Completed     4/8/2024  7:51 PM Inpatient Infectious Diseases Consult Completed     4/8/2024  7:51 PM Inpatient Cardiology Consult Completed     4/8/2024  5:35 PM Surgery (on-call MD unless specified)      4/8/2024  5:35 PM Hospitalist (on-call MD unless specified)      3/27/2024  1:58 PM Inpatient Colorectal Surgery Consult Completed     3/27/2024 11:57 AM Inpatient Cardiology Consult Completed     3/26/2024 12:51 AM Inpatient Gastroenterology Consult Completed               Discharge Details        Discharge Medications        New Medications        Instructions Start Date   amiodarone 200 MG tablet  Commonly known as: PACERONE   400 mg, Oral, Every 12 Hours Scheduled      cefTRIAXone 2,000 mg in sodium chloride 0.9 % 100 mL IVPB   2,000 mg, Intravenous, Every 24 Hours   Start Date: April 20, 2024     metroNIDAZOLE 500 MG tablet  Commonly known as: FLAGYL   500 mg, Oral, Every 8 Hours Scheduled             Changes to Medications        Instructions Start Date   metoprolol succinate XL 50 MG 24 hr tablet  Commonly known as: TOPROL-XL  What changed:   medication strength  how much to take  when to take this   50 mg, Oral, 2 Times Daily             Continue These Medications        Instructions Start Date   aspirin 81 MG chewable tablet   81 mg, Oral, Daily      atorvastatin 20 MG tablet  Commonly known as: LIPITOR   20 mg, Oral, Nightly      dilTIAZem  MG 24 hr capsule  Commonly  known as: DILACOR XR   180 mg, Oral, Daily      EQL Omeprazole 20 MG tablet delayed-release  Generic drug: Omeprazole   1 tablet, Daily      metFORMIN 500 MG tablet  Commonly known as: GLUCOPHAGE   500 mg, Oral, Daily With Breakfast      methocarbamol 750 MG tablet  Commonly known as: ROBAXIN   750 mg, Oral, 4 Times Daily PRN      multivitamin with minerals tablet tablet   1 tablet, Oral, Daily      ondansetron 4 MG tablet  Commonly known as: Zofran   4 mg, Oral, Daily PRN      Scopolamine 1 MG/3DAYS patch   1 patch, Transdermal, Every 72 Hours      simethicone 80 MG chewable tablet  Commonly known as: Gas-X   80 mg, Oral, Every 6 Hours PRN      Zoloft 25 MG tablet  Generic drug: sertraline   1 tablet, Daily             Stop These Medications      ALPRAZolam 0.25 MG tablet  Commonly known as: Xanax     losartan 25 MG tablet  Commonly known as: COZAAR     metoclopramide 10 MG tablet  Commonly known as: REGLAN              No Known Allergies      Discharge Disposition:     Home or Self Care    Diet:  Hospital:  Diet Order   Procedures    Diet: Gastrointestinal; Fiber-Restricted, Low Irritant; Texture: Soft to Chew (NDD 3); Soft to Chew: Whole Meat; Fluid Consistency: Thin (IDDSI 0)         Discharge Activity:         CODE STATUS:  Code Status and Medical Interventions:   Ordered at: 04/09/24 1912     Level Of Support Discussed With:    Patient     Code Status (Patient has no pulse and is not breathing):    CPR (Attempt to Resuscitate)     Medical Interventions (Patient has pulse or is breathing):    Full         Future Appointments   Date Time Provider Department Center   5/8/2024  1:00 PM Abimael Greenberg MD [facility]       Additional Instructions for the Follow-ups that You Need to Schedule       Discharge Follow-up with Specified Provider: Follow up with cardiology Dr. Greenberg in 3-4 weeks; 1 Month   As directed      To: Follow up with cardiology Dr. Greenberg in 3-4 weeks   Follow Up: 1 Month                Time  spent on Discharge including face to face service:  >30 minutes    Signature: Electronically signed by Peyman Castorena MD, 04/19/24, 13:32 EDT.  [facility] Hospitalist Team

## 2024-04-19 NOTE — CASE MANAGEMENT/SOCIAL WORK
Continued Stay Note   Leif     Patient Name: Viktor Atkins  MRN: [id number]  Today's Date: 4/19/2024    Admit Date: 4/8/2024    Plan: Anticipate routine home with spouse and [facility] (accepted, order per MD) and [facility] for IV abx. Eliquis copay $47/month after deductible, free 30 days thru [facility].   Discharge Plan       Row Name 04/19/24 1324       Plan    Plan Comments CM confirmed with [facility] liaison that they have spoke to wife, IV abx will be delivered to home and home health RN will see patient at home tomorrow. D/C orders noted.    Final Discharge Disposition Code 06 - home with home health care    Final Note Home with [facility] and [facility] for IV abx                      Expected Discharge Date and Time       Expected Discharge Date Expected Discharge Time    Apr 19, 2024             Case Management Discharge Note      Final Note: Home with [facility] and [facility] for IV abx    Provided Post Acute Provider List?: N/A  N/A Provider List Comment: reviewed home health list verbally  Provided Post Acute Provider Quality & Resource List?: N/A    Selected Continued Care - Admitted Since 4/8/2024           Dialysis/Infusion Coordination complete.      Service Provider Selected Services Address Phone Fax Patient Preferred    [facility] ANGELIC HERMAN [address] [place], [place] [id number] [phone] --              Home Medical Care Coordination complete. Patient indicates having no preference.      Service Provider Selected Services Address Phone Fax Patient Preferred    [facility] ,  Home Nursing [address] [place] [address], [address] [address] [phone] --                 Transportation Services  Private: Car    Final Discharge Disposition Code: 06 - home with home health care  Phone communication or documentation only - no physical contact with patient or family.     SANA MahoneyN,  RN    [place] [id number]    Office: [phone]  Fax: [phone]

## 2024-04-19 NOTE — PROGRESS NOTES
LOS: 11 days   Admitting Physician- Peyman Castorena MD    Reason For Followup:    Persistent atrial fibrillation  Post surgery ileus  Hypertension  Carcinoma of colon  S/p laparotomy and colostomy    Subjective     Patient is sitting up in chair.    Denies chest pain or dyspnea    Objective     Heart rate is still slightly on the higher side blood pressure is stable    Review of Systems:   Review of Systems   Constitutional: Negative for chills and fever.   HENT:  Negative for ear discharge and nosebleeds.    Eyes:  Negative for discharge and redness.   Cardiovascular:  Negative for chest pain, orthopnea, palpitations, paroxysmal nocturnal dyspnea and syncope.   Respiratory:  Positive for shortness of breath. Negative for cough and wheezing.    Endocrine: Negative for heat intolerance.   Skin:  Negative for rash.   Musculoskeletal:  Negative for arthritis and myalgias.   Gastrointestinal:  Negative for abdominal pain, melena, nausea and vomiting.   Genitourinary:  Negative for dysuria and hematuria.   Neurological:  Negative for dizziness, light-headedness, numbness and tremors.   Psychiatric/Behavioral:  Negative for depression. The patient is not nervous/anxious.          Vital Signs  Vitals:    04/19/24 0918 04/19/24 0936 04/19/24 1012 04/19/24 1036   BP: 95/71 106/65  121/75   BP Location: Left arm      Patient Position: Sitting      Pulse: 114 (!) 125 110 114   Resp: 20      Temp: 98 °F (36.7 °C)      TempSrc: Oral      SpO2:       Weight:       Height:         Wt Readings from Last 1 Encounters:   04/19/24 97.7 kg (215 lb 6.2 oz)       Intake/Output Summary (Last 24 hours) at 4/19/2024 1057  Last data filed at 4/19/2024 1012  Gross per 24 hour   Intake 960 ml   Output 400 ml   Net 560 ml     Physical Exam:  Constitutional:       Appearance: Well-developed.   Eyes:      General: No scleral icterus.        Right eye: No discharge.   HENT:      Head: Normocephalic and atraumatic.   Neck:      Thyroid: No  thyromegaly.      Lymphadenopathy: No cervical adenopathy.   Pulmonary:      Effort: Pulmonary effort is normal. No respiratory distress.      Breath sounds: Normal breath sounds. No wheezing. No rales.   Cardiovascular:      Normal rate. Irregularly irregular rhythm.      No gallop.    Edema:     Peripheral edema absent.   Abdominal:      Tenderness: There is no abdominal tenderness.   Skin:     Findings: No erythema or rash.   Neurological:      Mental Status: Alert and oriented to person, place, and time.         Results Review:   Lab Results (last 24 hours)       Procedure Component Value Units Date/Time    POC Glucose Once [[id number]]  (Normal) Collected: 04/19/24 0531    Specimen: Blood Updated: 04/19/24 0533     Glucose 104 mg/dL      Comment: Serial Number: [id number]:  [id number]       Comprehensive Metabolic Panel [[id number]]  (Abnormal) Collected: 04/19/24 0138    Specimen: Blood Updated: 04/19/24 0211     Glucose 109 mg/dL      BUN 18 mg/dL      Creatinine 1.00 mg/dL      Sodium 143 mmol/L      Potassium 4.3 mmol/L      Chloride 112 mmol/L      CO2 19.0 mmol/L      Calcium 8.6 mg/dL      Total Protein 5.8 g/dL      Albumin 3.3 g/dL      ALT (SGPT) 25 U/L      AST (SGOT) 23 U/L      Alkaline Phosphatase 109 U/L      Total Bilirubin 0.2 mg/dL      Globulin 2.5 gm/dL      A/G Ratio 1.3 g/dL      BUN/Creatinine Ratio 18.0     Anion Gap 12.0 mmol/L      eGFR 79.5 mL/min/1.73     Narrative:      GFR Normal >60  Chronic Kidney Disease <60  Kidney Failure <15    The GFR formula is only valid for adults with stable renal function between ages 18 and 70.    Magnesium [[id number]]  (Normal) Collected: 04/19/24 0138    Specimen: Blood Updated: 04/19/24 0211     Magnesium 2.2 mg/dL     Phosphorus [[id number]]  (Normal) Collected: 04/19/24 0138    Specimen: Blood Updated: 04/19/24 0211     Phosphorus 3.0 mg/dL     CBC & Differential [[id number]]  (Abnormal) Collected: 04/19/24 0138    Specimen: Blood Updated:  04/19/24 0204    Narrative:      The following orders were created for panel order CBC & Differential.  Procedure                               Abnormality         Status                     ---------                               -----------         ------                     CBC Auto Differential[[id number]]        Abnormal            Final result               Scan Slide[[id number]]                                       Final result                 Please view results for these tests on the individual orders.    CBC Auto Differential [[id number]]  (Abnormal) Collected: 04/19/24 0138    Specimen: Blood Updated: 04/19/24 0204     WBC 12.84 10*3/mm3      RBC 3.98 10*6/mm3      Hemoglobin 10.9 g/dL      Hematocrit 35.1 %      MCV 88.2 fL      MCH 27.4 pg      MCHC 31.1 g/dL      RDW 16.3 %      RDW-SD 51.9 fl      MPV 13.3 fL      Platelets 272 10*3/mm3      Neutrophil % 59.6 %      Lymphocyte % 28.1 %      Monocyte % 5.8 %      Eosinophil % 5.8 %      Basophil % 0.4 %      Immature Grans % 0.3 %      Neutrophils, Absolute 7.65 10*3/mm3      Lymphocytes, Absolute 3.61 10*3/mm3      Monocytes, Absolute 0.74 10*3/mm3      Eosinophils, Absolute 0.75 10*3/mm3      Basophils, Absolute 0.05 10*3/mm3      Immature Grans, Absolute 0.04 10*3/mm3      nRBC 0.0 /100 WBC     Scan Slide [[id number]] Collected: 04/19/24 0138    Specimen: Blood Updated: 04/19/24 0204     Anisocytosis Slight/1+     WBC Morphology Normal     Platelet Estimate Adequate     Large Platelets Slight/1+    POC Glucose Once [[id number]]  (Abnormal) Collected: 04/18/24 2358    Specimen: Blood Updated: 04/18/24 2359     Glucose 122 mg/dL      Comment: Serial Number: [id number]:  [id number]       POC Glucose Once [[id number]]  (Abnormal) Collected: 04/18/24 1717    Specimen: Blood Updated: 04/18/24 1719     Glucose 108 mg/dL      Comment: Serial Number: [id number]:  [id number]       POC Glucose Q6H [[id number]]  (Abnormal) Collected: 04/18/24 1255     Specimen: Blood Updated: 04/18/24 1259     Glucose 108 mg/dL      Comment: Serial Number: [id number]:  [id number]             Imaging Results (Last 72 Hours)       ** No results found for the last 72 hours. **          ECG/EMG Results (most recent)       Procedure Component Value Units Date/Time    ECG 12 Lead Tachycardia [[id number]] Collected: 04/08/24 1649     Updated: 04/09/24 0730     QT Interval 314 ms      QTC Interval 518 ms     Narrative:      HEART RATE= 164  bpm  RR Interval= 367  ms  MT Interval=   ms  P Horizontal Axis=   deg  P Front Axis=   deg  QRSD Interval= 108  ms  QT Interval= 314  ms  QTcB= 518  ms  QRS Axis= 20  deg  T Wave Axis= 205  deg  - ABNORMAL ECG -  Atrial fibrillation with rapid V-rate  Incomplete left bundle branch block  Low voltage, extremity leads  The appearance of BBB may be rate related  When compared with ECG of 27-Mar-2024 11:50:13,  Significant change in rhythm  Electronically Signed By: Thiago Solomon) 09-Apr-2024 07:30:05  Date and Time of Study: 2024-04-08 16:49:10    Telemetry Scan [[id number]] Resulted: 04/08/24     Updated: 04/10/24 0613    Telemetry Scan [[id number]] Resulted: 04/08/24     Updated: 04/10/24 0640    Telemetry Scan [[id number]] Resulted: 04/08/24     Updated: 04/10/24 0959    Telemetry Scan [[id number]] Resulted: 04/08/24     Updated: 04/10/24 1126    Telemetry Scan [[id number]] Resulted: 04/08/24     Updated: 04/10/24 1126    Telemetry Scan [[id number]] Resulted: 04/08/24     Updated: 04/11/24 0912    Telemetry Scan [[id number]] Resulted: 04/08/24     Updated: 04/11/24 0935    Telemetry Scan [[id number]] Resulted: 04/08/24     Updated: 04/11/24 0955    Telemetry Scan [[id number]] Resulted: 04/08/24     Updated: 04/11/24 1119    Telemetry Scan [[id number]] Resulted: 04/08/24     Updated: 04/11/24 1150    Telemetry Scan [[id number]] Resulted: 04/08/24     Updated: 04/11/24 1333    Telemetry Scan [[id number]] Resulted: 04/08/24     Updated: 04/12/24 0656     Telemetry Scan [[id number]] Resulted: 04/08/24     Updated: 04/12/24 1249    Telemetry Scan [[id number]] Resulted: 04/08/24     Updated: 04/12/24 1255    Telemetry Scan [[id number]] Resulted: 04/08/24     Updated: 04/12/24 1306    Telemetry Scan [[id number]] Resulted: 04/08/24     Updated: 04/12/24 1403    Telemetry Scan [[id number]] Resulted: 04/08/24     Updated: 04/15/24 1320    Telemetry Scan [[id number]] Resulted: 04/08/24     Updated: 04/15/24 1435    Telemetry Scan [[id number]] Resulted: 04/08/24     Updated: 04/16/24 0638    Telemetry Scan [[id number]] Resulted: 04/08/24     Updated: 04/16/24 0638    Telemetry Scan [[id number]] Resulted: 04/08/24     Updated: 04/16/24 0638    Telemetry Scan [[id number]] Resulted: 04/08/24     Updated: 04/16/24 0914    Telemetry Scan [[id number]] Resulted: 04/08/24     Updated: 04/16/24 1031    Telemetry Scan [[id number]] Resulted: 04/08/24     Updated: 04/16/24 1233    Telemetry Scan [[id number]] Resulted: 04/08/24     Updated: 04/16/24 1239    Telemetry Scan [[id number]] Resulted: 04/08/24     Updated: 04/16/24 1550    Telemetry Scan [[id number]] Resulted: 04/08/24     Updated: 04/17/24 0626    Telemetry Scan [[id number]] Resulted: 04/08/24     Updated: 04/17/24 0630    Telemetry Scan [[id number]] Resulted: 04/08/24     Updated: 04/17/24 0753    Telemetry Scan [[id number]] Resulted: 04/08/24     Updated: 04/17/24 1027    Telemetry Scan [[id number]] Resulted: 04/08/24     Updated: 04/17/24 1244    Telemetry Scan [[id number]] Resulted: 04/08/24     Updated: 04/18/24 0715    Telemetry Scan [[id number]] Resulted: 04/08/24     Updated: 04/18/24 0730    Telemetry Scan [[id number]] Resulted: 04/08/24     Updated: 04/18/24 0814    Telemetry Scan [[id number]] Resulted: 04/08/24     Updated: 04/18/24 0818    ECG 12 Lead QT Measurement [[id number]] Collected: 04/18/24 1036     Updated: 04/18/24 1038     QT Interval 378 ms      QTC Interval 463 ms     Narrative:      HEART RATE= 90  bpm  RR Interval= 666   ms  SD Interval=   ms  P Horizontal Axis=   deg  P Front Axis=   deg  QRSD Interval= 108  ms  QT Interval= 378  ms  QTcB= 463  ms  QRS Axis= -3  deg  T Wave Axis= 92  deg  - ABNORMAL ECG -  Atrial fibrillation  Incomplete left bundle branch block  Low voltage, extremity leads  Nonspecific T abnormalities, lateral leads  The disappearance of BBB may be rate related  When compared with ECG of 08-Apr-2024 16:49:10,  Significant change in rhythm  Significant repolarization change  Electronically Signed By:   Date and Time of Study: 2024-04-18 10:36:56    Telemetry Scan [[id number]] Resulted: 04/08/24     Updated: 04/18/24 1047    Telemetry Scan [[id number]] Resulted: 04/08/24     Updated: 04/18/24 1217    Telemetry Scan [[id number]] Resulted: 04/08/24     Updated: 04/18/24 1230    Telemetry Scan [[id number]] Resulted: 04/08/24     Updated: 04/18/24 1250    Telemetry Scan [[id number]] Resulted: 04/08/24     Updated: 04/18/24 1325    Telemetry Scan [[id number]] Resulted: 04/08/24     Updated: 04/18/24 1514    Telemetry Scan [[id number]] Resulted: 04/08/24     Updated: 04/19/24 0941    Telemetry Scan [[id number]] Resulted: 04/08/24     Updated: 04/19/24 1042          CBC    Results from last 7 days   Lab Units 04/19/24  0138 04/18/24  0130 04/17/24  0045 04/16/24  0018 04/15/24  0139 04/14/24  0104 04/13/24  0428   WBC 10*3/mm3 12.84* 12.17* 14.22* 18.03* 13.46* 13.93* 14.68*   HEMOGLOBIN g/dL 10.9* 10.9* 11.6* 11.8* 11.6* 11.8* 11.0*   PLATELETS 10*3/mm3 272 284 331 398 415 436 408     BMP   Results from last 7 days   Lab Units 04/19/24  0138 04/18/24  0130 04/17/24  0045 04/16/24  1223 04/16/24  0018 04/15/24  0139 04/14/24  0104 04/13/24  0428   SODIUM mmol/L 143 137 139  --  141 143 143 143   POTASSIUM mmol/L 4.3 4.0 4.2 3.9 3.6 3.7 3.9 3.4*   CHLORIDE mmol/L 112* 106 107  --  106 109* 109* 109*   CO2 mmol/L 19.0* 21.0* 18.0*  --  20.0* 21.0* 21.0* 22.0   BUN mg/dL 18 23 21  --  19 17 16 13   CREATININE mg/dL 1.00 1.13 1.15   --  1.27 1.17 1.17 0.98   GLUCOSE mg/dL 109* 103* 102*  --  157* 106* 103* 146*   MAGNESIUM mg/dL 2.2 2.0 2.2  --  2.2 2.2 2.3 2.2   PHOSPHORUS mg/dL 3.0 3.0 3.1  --  3.3 3.1 3.1 2.8     CMP   Results from last 7 days   Lab Units 04/19/24  0138 04/18/24  0130 04/17/24  0045 04/16/24  1223 04/16/24  0018 04/15/24  0139 04/14/24  0104 04/13/24  0428   SODIUM mmol/L 143 137 139  --  141 143 143 143   POTASSIUM mmol/L 4.3 4.0 4.2 3.9 3.6 3.7 3.9 3.4*   CHLORIDE mmol/L 112* 106 107  --  106 109* 109* 109*   CO2 mmol/L 19.0* 21.0* 18.0*  --  20.0* 21.0* 21.0* 22.0   BUN mg/dL 18 23 21  --  19 17 16 13   CREATININE mg/dL 1.00 1.13 1.15  --  1.27 1.17 1.17 0.98   GLUCOSE mg/dL 109* 103* 102*  --  157* 106* 103* 146*   ALBUMIN g/dL 3.3* 3.4* 3.5  --  3.6 3.4* 3.4* 3.3*   BILIRUBIN mg/dL 0.2 0.2 0.3  --  0.2 0.3 0.3 0.3   ALK PHOS U/L 109 94 103  --  109 104 118* 108   AST (SGOT) U/L 23 19 24  --  33 37 33 37   ALT (SGPT) U/L 25 25 32  --  40 42* 41 43*     Cardiac Studies:  Echo- Results for orders placed during the hospital encounter of 03/25/24    Adult Transthoracic Echo Complete W/ Cont if Necessary Per Protocol    Interpretation Summary    Left ventricular systolic function is low normal. Calculated left ventricular EF = 46% Left ventricular ejection fraction appears to be 46 - 50%.    The left ventricular cavity is mildly dilated.    Left ventricular diastolic dysfunction is noted.    The left atrial cavity is dilated.    Estimated right ventricular systolic pressure from tricuspid regurgitation is normal (<35 mmHg).    Dilation of the aortic root is present.  4.4 cm    Patient in atrial fibrillation during this study.    Stress Myoview-  Cath-      Medication Review:   Scheduled Meds:amiodarone, 400 mg, Oral, Q12H  apixaban, 5 mg, Oral, Q12H  aspirin, 81 mg, Oral, Daily  atorvastatin, 20 mg, Oral, Nightly  cefTRIAXone, 2,000 mg, Intravenous, Q24H  digoxin, 250 mcg, Intravenous, Q6H  dilTIAZem, 30 mg, Oral,  Q8H  famotidine, 20 mg, Intravenous, Q12H  insulin lispro, 2-7 Units, Subcutaneous, Q6H  lidocaine, 20 mL, Intradermal, Once  metoprolol tartrate, 75 mg, Oral, Q12H  metroNIDAZOLE, 500 mg, Oral, Q8H  sertraline, 25 mg, Oral, Daily  sodium chloride, 10 mL, Intravenous, Q12H  sodium chloride, 10 mL, Intravenous, Q12H  tamsulosin, 0.4 mg, Oral, Daily      Continuous Infusions:dilTIAZem, 5-15 mg/hr, Last Rate: Stopped (04/17/24 1632)      PRN Meds:.  acetaminophen    senna-docusate sodium **AND** polyethylene glycol **AND** bisacodyl **AND** bisacodyl    Calcium Replacement - Follow Nurse / BPA Driven Protocol    dextrose    dextrose    glucagon (human recombinant)    HYDROmorphone **AND** naloxone    Magnesium Standard Dose Replacement - Follow Nurse / BPA Driven Protocol    melatonin    nitroglycerin    ondansetron    ondansetron ODT    Phosphorus Replacement - Follow Nurse / BPA Driven Protocol    Potassium Replacement - Follow Nurse / BPA Driven Protocol    simethicone    [COMPLETED] Insert Peripheral IV **AND** sodium chloride    sodium chloride    sodium chloride    sodium chloride    sodium chloride      Assessment & Plan     Wound infection    Anxiety disorder    Essential hypertension    Palpitations    Mixed hyperlipidemia    Colon cancer    Atrial fibrillation with rapid ventricular response    Severe malnutrition    MDM:    1.  Persistent atrial fibrillation:    Patient asked about possible cardioversion.  Patient has not been on anticoagulation for long interval of time.  If patient needs cardioversion SHILPI has to be done.  However patient is still has distended abdomen.  In the meanwhile patient is still acutely ill if we do SHILPI guided cardioversion I am afraid patient may go back into atrial fibrillation.  I would recommend to proceed with rate control and anticoagulation strategy at present.    2.  Tachycardia:    I would increase amiodarone to 400 mg twice daily and also increase metoprolol to 75 mg  twice daily    3.  Hypertension:    Blood pressure is controlled    4.  Carcinoma of colon s/p colectomy and colostomy.    Patient abdomen is stable slightly distended.  However patient seems to be comfortable with no active chest pain    Have asked  to check cost of alternative noac due to eliquis being cost prohbitive  Iv Digoxin ordered by hospitalist  Pt anxious for d/c  HR stable at rest, v rates 100-120's with activity    Additional recommendations per Dr. Dionicio De Anda, APRN  04/19/24  10:57 EDT

## 2024-04-19 NOTE — CONSULTS
Nutrition Services    Patient Name: Viktor Atkins  YOB: 1950  MRN: [id number]  Admission date: 4/8/2024    Comment:    Update ONS order to Boost Glucose Control BID (Provides 380 kcals, 32 g protein if consumed).    Will continue to monitor.    CLINICAL NUTRITION ASSESSMENT      Reason for Assessment 4/19 - follow up protocol  4/12 - NPO/clear liquids x 4      H&P      Past Medical History:   Diagnosis Date    Arthritis     Benign essential HTN     Cancer     Diabetes mellitus     GERD (gastroesophageal reflux disease)     Hyperlipidemia, mixed     Palpitations        Past Surgical History:   Procedure Laterality Date    CARDIAC CATHETERIZATION      CHOLECYSTECTOMY      COLON RESECTION WITH ILEOSTOMY N/A 3/30/2024    Procedure: COLON RESECTION LOW ANTERIOR LAPAROSCOPIC, COLONAL ANASTOMOSIS, DIVERTING LOOP ILEOSTOMY WITH DAVINCI ROBOT;  Surgeon: Aakash Burden MD;  Location: [facility] MAIN OR;  Service: Robotics - DaVinci;  Laterality: N/A;    COLONOSCOPY N/A 3/27/2024    Procedure: COLONOSCOPY with polypectomy x 18 and sigmoid colon mass biopsies with endscopic spot tattooing;  Surgeon: Fili Quintero MD;  Location: [facility] ENDOSCOPY;  Service: Gastroenterology;  Laterality: N/A;  sigmoid mass at 25 cm    COLOSTOMY N/A 4/9/2024    Procedure: DIAGNOSTIC LAPAROSCOPY, DIVERTING OSTOMY, POSSIBLE REVISION OF ANASTAMOSIS;  Surgeon: Aakash Burden MD;  Location: [facility] MAIN OR;  Service: General;  Laterality: N/A;    KNEE SURGERY      SIGMOIDOSCOPY N/A 3/30/2024    Procedure: SIGMOIDOSCOPY;  Surgeon: Aakash Burden MD;  Location: [facility] MAIN OR;  Service: Gastroenterology;  Laterality: N/A;        Current Problems   Postop abdominal wound infection  - s/p diagnostic laparoscopy 4/9  - may have some postoperative ileus  - pt diet advanced to soft to chew diet/GI fiber-restricted    Rectosigmoid adenocarcinoma  - will FU with oncology outpatient    A-fib with RVR    HTN    Dyslipidemia    T2DM  - continue  "SSI         Encounter Information        Trending Narrative     4/19 - Checking on for PO intake. Pt dx with severe acute illness-related malnutrition 4/12. Dietetic intern visited patient at bedside, pt reports most of his meal intake has been poor because he does not enjoy a lot of the food items available to him. States he is a \"burger and pizza kind of rosalee\". Discussed which options on the menu might work best for both his taste preferences and diet - pt agreeable to choices suggested. Pt agreeable to trying boost - RD to order Boost Glucose Control BID (Provides 380 kcals, 32 g protein if consumed). NFPE completed, consistent with nutrition diagnosis of malnutrition using AND/ASPEN criteria. Agree with previous dx of severe acute malnutrition. Will continue to monitor.    4/12 - Pt admitted to [facility] 4/8 following visit with Dr. Burden - pt had surgery last week to remove a mass on his colon. Pt sent to [facility] for CT imaging and blood work, and suspected infection. Dietetic intern visited pt at bedside. Pt has been sipping on liquids through the morning - would like a \"real meal\". Provided pt with Boost Breeze for trial, pt accepted offer of Boost. RD to order Boost Breeze BID (provides 500 kcals, 18 g protein if consumed). Pt reports swallowing is difficult with NG in place at this time in addition to diet. Reports a UBW of 250 lbs - weight loss within the past 5 weeks per patient. Pt has been NPO/clear liquids x 4 days. NFPE completed, consistent with nutrition diagnosis of malnutrition using AND/ASPEN criteria. See MSA below. Will continue to monitor.     Anthropometrics        Current Height, Weight Height: 180.3 cm (71\")  Weight: 97.7 kg (215 lb 6.2 oz) (SCD pump removed from bed when pt weighed.) (04/19/24 7845)       Usual Body Weight (UBW) 250 lbs       Trending Weight Hx     This admission: 4/19 - 215 lbs   6.5% weight loss since last RD assessment 4/12 4/12 - 230 lbs              PTA: 6.5% weight loss x 1 " week (using wt from 4/12 - 230 lbs)  10.8% weight loss x 2 weeks (using wt from 4/4 - 241 lbs)    Wt Readings from Last 30 Encounters:   04/19/24 0529 97.7 kg (215 lb 6.2 oz)   04/18/24 0502 102 kg (225 lb 1.4 oz)   04/17/24 0530 100 kg (220 lb 7.4 oz)   04/16/24 0517 99.8 kg (220 lb 0.3 oz)   04/13/24 2054 105 kg (232 lb 5.8 oz)   04/12/24 0538 104 kg (230 lb 6.1 oz)   04/11/24 0514 104 kg (230 lb 2.6 oz)   04/10/24 0100 109 kg (239 lb 3.2 oz)   04/09/24 1900 108 kg (238 lb 1.6 oz)   04/09/24 0526 105 kg (231 lb 7.7 oz)   04/08/24 1426 105 kg (231 lb 11.3 oz)   04/08/24 1319 106 kg (233 lb)   04/04/24 1304 109 kg (241 lb)   03/28/24 0129 113 kg (250 lb)   03/26/24 0112 114 kg (250 lb 10.6 oz)   03/25/24 2015 115 kg (253 lb 1.4 oz)   03/03/21 1408 114 kg (251 lb)   09/02/20 1345 110 kg (243 lb)   08/13/20 1312 109 kg (241 lb)      BMI kg/m2 Body mass index is 30.04 kg/m².       Labs        Pertinent Labs Reviewed. Management per attending   Results from last 7 days   Lab Units 04/19/24  0138 04/18/24  0130 04/17/24  0045   SODIUM mmol/L 143 137 139   POTASSIUM mmol/L 4.3 4.0 4.2   CHLORIDE mmol/L 112* 106 107   CO2 mmol/L 19.0* 21.0* 18.0*   BUN mg/dL 18 23 21   CREATININE mg/dL 1.00 1.13 1.15   CALCIUM mg/dL 8.6 8.6 8.8   BILIRUBIN mg/dL 0.2 0.2 0.3   ALK PHOS U/L 109 94 103   ALT (SGPT) U/L 25 25 32   AST (SGOT) U/L 23 19 24   GLUCOSE mg/dL 109* 103* 102*     Results from last 7 days   Lab Units 04/19/24  0138 04/18/24  0130 04/17/24  0045   MAGNESIUM mg/dL 2.2 2.0 2.2   PHOSPHORUS mg/dL 3.0 3.0 3.1   HEMOGLOBIN g/dL 10.9* 10.9* 11.6*   HEMATOCRIT % 35.1* 35.1* 37.3*     Lab Results   Component Value Date    HGBA1C 5.80 (H) 04/09/2024        Medications    Scheduled Medications amiodarone, 400 mg, Oral, Q12H  apixaban, 5 mg, Oral, Q12H  aspirin, 81 mg, Oral, Daily  atorvastatin, 20 mg, Oral, Nightly  cefTRIAXone, 2,000 mg, Intravenous, Q24H  dilTIAZem, 30 mg, Oral, Q8H  famotidine, 20 mg, Intravenous,  Q12H  insulin lispro, 2-7 Units, Subcutaneous, Q6H  lidocaine, 20 mL, Intradermal, Once  metoprolol tartrate, 75 mg, Oral, Q12H  metroNIDAZOLE, 500 mg, Oral, Q8H  sertraline, 25 mg, Oral, Daily  sodium chloride, 10 mL, Intravenous, Q12H  sodium chloride, 10 mL, Intravenous, Q12H  tamsulosin, 0.4 mg, Oral, Daily        Infusions dilTIAZem, 5-15 mg/hr, Last Rate: Stopped (04/17/24 1632)        PRN Medications   acetaminophen    senna-docusate sodium **AND** polyethylene glycol **AND** bisacodyl **AND** bisacodyl    Calcium Replacement - Follow Nurse / BPA Driven Protocol    dextrose    dextrose    glucagon (human recombinant)    HYDROmorphone **AND** naloxone    Magnesium Standard Dose Replacement - Follow Nurse / BPA Driven Protocol    melatonin    nitroglycerin    ondansetron    ondansetron ODT    Phosphorus Replacement - Follow Nurse / BPA Driven Protocol    Potassium Replacement - Follow Nurse / BPA Driven Protocol    simethicone    [COMPLETED] Insert Peripheral IV **AND** sodium chloride    sodium chloride    sodium chloride    sodium chloride    sodium chloride     Physical Findings        Trending Physical   Appearance, NFPE 4/19 - NFPE completed, consistent with nutrition diagnosis of malnutrition using AND/ASPEN criteria. Agree with previous dx of severe acute malnutrition.    4/12 - NFPE completed, consistent with nutrition diagnosis of malnutrition using AND/ASPEN criteria. See MSA below.      --  Edema  1+ mild edema to bilat ankles, feet, and legs     Bowel Function 550 mL colostomy output past 24 hours     Tubes No feeding tube in place      Chewing/Swallowing Pt on soft-to-chew diet     Skin No PIs   WOCN following for ostomy   --  Current Nutrition Orders & Evaluation of Intake       Oral Nutrition     Food Allergies NKFA   Current PO Diet Diet: Gastrointestinal; Fiber-Restricted, Low Irritant; Texture: Soft to Chew (NDD 3); Soft to Chew: Whole Meat; Fluid Consistency: Thin (IDDSI 0)   Supplement No  supplement ordered   PO Evaluation     Trending % PO Intake 4/19 - 75% average PO intake x last 5 meals  4/12 - No PO intake recorded on clear liquid diet (x1 day since advanced from NPO)   --  Nutritional Risk Screening        NRS-2002 Score          Nutrition Diagnosis         Nutrition Dx Problem 1 Severe acute illness-related malnutrition related to acute GI dysfunction as evidenced by PO intake meeting < 50% of estimated energy requirement for > 5 days, >2% unintentional weight loss within 1 week, and moderate muscle/fat wasting per NFPE.      Nutrition Dx Problem 2        Intervention Goal         Intervention Goal(s) Encourage continued good PO intake  Acceptance of ONS     Nutrition Intervention        RD Action NFPE completed   Continue current diet as tolerated  Change ONS order to Boost GC BID  Remove Boost Breeze order  Will continue to montior     Nutrition Prescription          Diet Prescription GI fiber-restricted, low irritant: soft to chew whole meat   Supplement Prescription Boost Glucose Control BID (Provides 380 kcals, 32 g protein if consumed)    --  Monitor/Evaluation        Monitor Per protocol, PO intake, Supplement intake, Weight, GI status, POC/GOC, Swallow function     Electronically signed by:  Fela Richard  04/19/24 09:54 EDT

## 2024-04-19 NOTE — PROGRESS NOTES
General Surgery Progress Note    Name: Viktor Atkins ADMIT: 2024   : 1950  PCP: Gera Manriquez MD    MRN: [id number] LOS: 10 days   AGE/SEX: 74 y.o. male  ROOM:    [facility]    Chief Complaint   Patient presents with    Wound Infection     Pt has drainage from his abd incision.  Pt was sent in by surgeon for eval and possible admit for further surgery. Pt has mass and colon surgery on Saturday.        Subjective     Denies abdominal pain, having ostomy function, tolerating a diet    Objective     Scheduled Medications:   amiodarone, 400 mg, Oral, Q12H  apixaban, 5 mg, Oral, Q12H  aspirin, 81 mg, Oral, Daily  atorvastatin, 20 mg, Oral, Nightly  cefTRIAXone, 2,000 mg, Intravenous, Q24H  dilTIAZem, 30 mg, Oral, Q8H  famotidine, 20 mg, Intravenous, Q12H  insulin lispro, 2-7 Units, Subcutaneous, Q6H  lidocaine, 20 mL, Intradermal, Once  metoprolol tartrate, 75 mg, Oral, Q12H  metroNIDAZOLE, 500 mg, Oral, Q8H  sertraline, 25 mg, Oral, Daily  sodium chloride, 10 mL, Intravenous, Q12H  sodium chloride, 10 mL, Intravenous, Q12H  tamsulosin, 0.4 mg, Oral, Daily        Active Infusions:  dilTIAZem, 5-15 mg/hr, Last Rate: Stopped (24 1632)        As Needed Medications:    acetaminophen    senna-docusate sodium **AND** polyethylene glycol **AND** bisacodyl **AND** bisacodyl    Calcium Replacement - Follow Nurse / BPA Driven Protocol    dextrose    dextrose    glucagon (human recombinant)    HYDROmorphone **AND** naloxone    Magnesium Standard Dose Replacement - Follow Nurse / BPA Driven Protocol    melatonin    nitroglycerin    ondansetron    ondansetron ODT    Phosphorus Replacement - Follow Nurse / BPA Driven Protocol    Potassium Replacement - Follow Nurse / BPA Driven Protocol    simethicone    [COMPLETED] Insert Peripheral IV **AND** sodium chloride    sodium chloride    sodium chloride    sodium chloride    sodium chloride    Vital Signs  Vital Signs Patient Vitals for the past 24 hrs:   BP Temp  Temp src Pulse Resp SpO2 Weight   04/18/24 1703 112/81 98.1 °F (36.7 °C) Oral 112 17 98 % --   04/18/24 1412 101/58 98.2 °F (36.8 °C) Oral 97 18 97 % --   04/18/24 0911 123/59 97.4 °F (36.3 °C) Oral 104 19 -- --   04/18/24 0502 125/73 97.2 °F (36.2 °C) Oral 108 25 96 % 102 kg (225 lb 1.4 oz)   04/18/24 0250 124/71 -- -- 102 -- -- --   04/18/24 0134 110/70 98.1 °F (36.7 °C) Oral 95 16 95 % --   04/17/24 2042 107/72 98.2 °F (36.8 °C) Oral 115 18 96 % --     I/O:  I/O last 3 completed shifts:  In: 1680 [P.O.:1680]  Out: 1890 [Urine:325; Stool:1565]    Physical Exam:  Physical Exam  Constitutional:       General: He is not in acute distress.     Appearance: Normal appearance. He is not ill-appearing.   HENT:      Head: Normocephalic and atraumatic.      Right Ear: External ear normal.      Left Ear: External ear normal.   Eyes:      Extraocular Movements: Extraocular movements intact.      Conjunctiva/sclera: Conjunctivae normal.   Cardiovascular:      Rate and Rhythm: Normal rate and regular rhythm.   Pulmonary:      Effort: Pulmonary effort is normal. No respiratory distress.   Abdominal:      General: There is no distension.      Palpations: Abdomen is soft.      Tenderness: There is no abdominal tenderness.   Musculoskeletal:         General: No swelling or deformity.   Skin:     General: Skin is warm and dry.   Neurological:      Mental Status: He is alert and oriented to person, place, and time. Mental status is at baseline.         Results Review:     CBC    Results from last 7 days   Lab Units 04/18/24  0130 04/17/24  0045 04/16/24  0018 04/15/24  0139 04/14/24  0104 04/13/24  0428 04/12/24  0400   WBC 10*3/mm3 12.17* 14.22* 18.03* 13.46* 13.93* 14.68* 17.19*   HEMOGLOBIN g/dL 10.9* 11.6* 11.8* 11.6* 11.8* 11.0* 10.4*   PLATELETS 10*3/mm3 284 331 398 415 436 408 372     BMP   Results from last 7 days   Lab Units 04/18/24  0130 04/17/24  0045 04/16/24  1223 04/16/24  0018 04/15/24  0139 04/14/24  0104  04/13/24  0428 04/12/24  0400   SODIUM mmol/L 137 139  --  141 143 143 143 150*   POTASSIUM mmol/L 4.0 4.2 3.9 3.6 3.7 3.9 3.4* 3.9   CHLORIDE mmol/L 106 107  --  106 109* 109* 109* 115*   CO2 mmol/L 21.0* 18.0*  --  20.0* 21.0* 21.0* 22.0 19.0*   BUN mg/dL 23 21  --  19 17 16 13 15   CREATININE mg/dL 1.13 1.15  --  1.27 1.17 1.17 0.98 0.90   GLUCOSE mg/dL 103* 102*  --  157* 106* 103* 146* 97   MAGNESIUM mg/dL 2.0 2.2  --  2.2 2.2 2.3 2.2 2.4   PHOSPHORUS mg/dL 3.0 3.1  --  3.3 3.1 3.1 2.8 2.3*     Radiology(recent) No radiology results for the last day    I reviewed the patient's new clinical results.    Assessment & Plan       Wound infection    Anxiety disorder    Essential hypertension    Palpitations    Mixed hyperlipidemia    Colon cancer    Atrial fibrillation with rapid ventricular response    Severe malnutrition      Assessment  74 y.o. male s/p robotic LAR with take back to OR for diverting colostomy and wound washout      Plan / Recommendations     - clear for discharge from surgical stand point whenever he is medically and cardiac optimized and cleared      - continue regular diet   - encourage out of bed and ambulating   - keep electrolytes replete, K> 4, Mg> 2.5  - voiding freely after maldonado discontinued   - ok to transition to direct oral anticoagulant for afib instead of lovenox   - monitor leukocytosis, this is improving        This note was created using Dragon Voice Recognition software.    Beto Gorman MD  04/18/24  20:34 EDT

## 2024-04-20 NOTE — OUTREACH NOTE
Prep Survey      Flowsheet Row Responses   Rel[facility] facility patient discharged from? [facility]   Is LACE score < 7 ? No   Eligibility Readm Mgmt   Discharge diagnosis DIAGNOSTIC LAPAROSCOPY, DIVERTING OSTOMY, POSSIBLE REVISION OF ANASTAMOSIS   Does the patient have one of the following disease processes/diagnoses(primary or secondary)? General Surgery   Does the patient have Home health ordered? Yes   What is the Home health agency?  [facility]   Is there a DME ordered? No   Prep survey completed? Yes            RADHA STEIN - Registered Nurse

## 2024-04-23 LAB
QT INTERVAL: 378 MS
QTC INTERVAL: 463 MS

## 2024-04-24 ENCOUNTER — TELEPHONE (OUTPATIENT)
Dept: ONCOLOGY | Facility: CLINIC | Age: 74
End: 2024-04-24
Payer: MEDICARE

## 2024-04-24 ENCOUNTER — READMISSION MANAGEMENT (OUTPATIENT)
Dept: CALL CENTER | Facility: HOSPITAL | Age: 74
End: 2024-04-24
Payer: MEDICARE

## 2024-04-24 ENCOUNTER — OFFICE VISIT (OUTPATIENT)
Age: 74
End: 2024-04-24
Payer: MEDICARE

## 2024-04-24 ENCOUNTER — HOSPITAL ENCOUNTER (OUTPATIENT)
Facility: HOSPITAL | Age: 74
Setting detail: HOSPITAL OUTPATIENT SURGERY
Discharge: HOME | End: 2024-04-24
Attending: STUDENT IN AN ORGANIZED HEALTH CARE EDUCATION/TRAINING PROGRAM | Admitting: STUDENT IN AN ORGANIZED HEALTH CARE EDUCATION/TRAINING PROGRAM
Payer: MEDICARE

## 2024-04-24 VITALS
SYSTOLIC BLOOD PRESSURE: 107 MMHG | HEIGHT: 71 IN | HEART RATE: 69 BPM | WEIGHT: 222 LBS | DIASTOLIC BLOOD PRESSURE: 64 MMHG | OXYGEN SATURATION: 98 % | BODY MASS INDEX: 31.08 KG/M2 | TEMPERATURE: 98.2 F

## 2024-04-24 DIAGNOSIS — C18.7 MALIGNANT NEOPLASM OF SIGMOID COLON: Primary | ICD-10-CM

## 2024-04-24 DIAGNOSIS — T81.40XD POSTOPERATIVE INFECTION, UNSPECIFIED TYPE, SUBSEQUENT ENCOUNTER: ICD-10-CM

## 2024-04-24 PROBLEM — C19 RECTOSIGMOID CANCER: Status: ACTIVE | Noted: 2024-04-24

## 2024-04-24 PROCEDURE — 99024 POSTOP FOLLOW-UP VISIT: CPT | Performed by: STUDENT IN AN ORGANIZED HEALTH CARE EDUCATION/TRAINING PROGRAM

## 2024-04-24 RX ORDER — SODIUM CHLORIDE 0.9 % (FLUSH) 0.9 %
3-10 SYRINGE (ML) INJECTION AS NEEDED
OUTPATIENT
Start: 2024-04-24

## 2024-04-24 RX ORDER — SODIUM CHLORIDE 9 MG/ML
40 INJECTION, SOLUTION INTRAVENOUS AS NEEDED
OUTPATIENT
Start: 2024-04-24

## 2024-04-24 RX ORDER — SODIUM CHLORIDE 0.9 % (FLUSH) 0.9 %
3 SYRINGE (ML) INJECTION EVERY 12 HOURS SCHEDULED
OUTPATIENT
Start: 2024-04-24

## 2024-04-24 NOTE — OUTREACH NOTE
General Surgery Week 1 Survey      Flowsheet Row Responses   [facility] patient discharged from? [facility]   Does the patient have one of the following disease processes/diagnoses(primary or secondary)? General Surgery   Week 1 attempt successful? Yes   Call start time 1941   Call end time 1945   Discharge diagnosis DIAGNOSTIC LAPAROSCOPY, DIVERTING OSTOMY, POSSIBLE REVISION OF ANASTAMOSIS   Meds reviewed with patient/caregiver? Yes   Is the patient having any side effects they believe may be caused by any medication additions or changes? No   Does the patient have all medications related to this admission filled (includes all antibiotics, pain medications, etc.) Yes   Is the patient taking all medications as directed (includes completed medication regime)? Yes   Medication comments Finished antibiotics yesterday   Does the patient have a follow up appointment scheduled with their surgeon? Yes   Has the patient kept scheduled appointments due by today? Yes   Comments Saw surgeon today   What is the Home health agency?  [facility]   Has home health visited the patient within 72 hours of discharge? Yes   Psychosocial issues? No   Did the patient receive a copy of their discharge instructions? Yes   Nursing interventions Reviewed instructions with patient   What is the patient's perception of their health status since discharge? Improving   Nursing interventions Nurse provided patient education   Is the patient /caregiver able to teach back basic post-op care? Practice 'cough and deep breath', Continue use of incentive spirometry at least 1 week post discharge, Keep incision areas clean,dry and protected, Lifting as instructed by MD in discharge instructions, No tub bath, swimming, or hot tub until instructed by MD   Is the patient/caregiver able to teach back signs and symptoms of incisional infection? Fever, Pus or odor from incision, Increased redness, swelling or pain at the incisonal site, Increased  drainage or bleeding, Incisional warmth   Is the patient/caregiver able to teach back steps to recovery at home? Set small, achievable goals for return to baseline health, Rest and rebuild strength, gradually increase activity, Eat a well-balance diet   Is the patient/caregiver able to teach back the hierarchy of who to call/visit for symptoms/problems? PCP, Specialist, Home health nurse, Urgent Care, ED, 911 Yes   Additional teach back comments States he is doing well and saw surgeon today.  They were discuss having picc line in Tues so he can start chemo   Week 1 call completed? Yes   Graduated Yes   Graduated/Revoked comments No questions or needs at this time.   Call end time 1945            Lili MCMULLEN - Licensed Nurse

## 2024-04-25 ENCOUNTER — TELEPHONE (OUTPATIENT)
Dept: CARDIOLOGY | Facility: CLINIC | Age: 74
End: 2024-04-25
Payer: MEDICARE

## 2024-04-25 NOTE — PROGRESS NOTES
Colorectal Surgery Followup Note    ID:  Viktor Atkins;   : 1950  DATE OF VISIT: 2024    Chief Complaint  Post-op (PO  DIAGNOSTIC LAPAROSCOPY, DIVERTING OSTOMY, POSSIBLE REVISION OF ANASTAMOSIS)       Subjective    Mr. Atkins has been discharged from the hospital. Since discharge he has been doing well. He has been tolerating diet. His colostomy has been functioning. Denies abdominal pain. Denies fever or chills. Reports low energy but has been getting out of bed and ambulating. He is tolerating his eliquis. He denies any chest pain.     Exam  General:  No acute distress  Head: Normocephalic, atraumatic  Neuro: Alert and oriented    Abdomen:  Soft, non-tender, non-distended, no hernias, colostomy functioning. Pfannenstiel incision well healed. I have removed one of the penrose drain.     Assessment  - 75 yo M who presented initially with GI bleed and was found to have rectosigmoid mass. He underwent a robotic low anterior resection with primary anastomosis. His path was [id number] . His recovery was complicated with surgical site infection at the pfannenstiel site and a radiographic findings of possible sinus anastomotic leak. He was taken back for dx lap and found no anastomotic leak, however given the radiographic findings and the superficial infection and need for chemotherapy, I diverted him with loop transverse colostomy.     Plan / Recommendations  -Recovered well. His wound at the pfannensteil has healed well. I have removed one of the penrose and left the other in place  - plan for chemotherapy port placement   - discussed case with Dr Robin. And referral sent to oncology   - continue with local wound care.   - hold eliquis for procedure       Aakash Burden MD  Colon and Rectal Surgery   Jackie Yadav

## 2024-04-25 NOTE — H&P (VIEW-ONLY)
Colorectal Surgery Followup Note    ID:  Viktor Atkins;   : 1950  DATE OF VISIT: 2024    Chief Complaint  Post-op (PO  DIAGNOSTIC LAPAROSCOPY, DIVERTING OSTOMY, POSSIBLE REVISION OF ANASTAMOSIS)       Subjective    Mr. Atkins has been discharged from the hospital. Since discharge he has been doing well. He has been tolerating diet. His colostomy has been functioning. Denies abdominal pain. Denies fever or chills. Reports low energy but has been getting out of bed and ambulating. He is tolerating his eliquis. He denies any chest pain.     Exam  General:  No acute distress  Head: Normocephalic, atraumatic  Neuro: Alert and oriented    Abdomen:  Soft, non-tender, non-distended, no hernias, colostomy functioning. Pfannenstiel incision well healed. I have removed one of the penrose drain.     Assessment  - 75 yo M who presented initially with GI bleed and was found to have rectosigmoid mass. He underwent a robotic low anterior resection with primary anastomosis. His path was [id number] . His recovery was complicated with surgical site infection at the pfannenstiel site and a radiographic findings of possible sinus anastomotic leak. He was taken back for dx lap and found no anastomotic leak, however given the radiographic findings and the superficial infection and need for chemotherapy, I diverted him with loop transverse colostomy.     Plan / Recommendations  -Recovered well. His wound at the pfannensteil has healed well. I have removed one of the penrose and left the other in place  - plan for chemotherapy port placement   - discussed case with Dr Robin. And referral sent to oncology   - continue with local wound care.   - hold eliquis for procedure       Aakash Burden MD  Colon and Rectal Surgery   Jackie Yadav

## 2024-04-25 NOTE — TELEPHONE ENCOUNTER
Caller: [facility]    Relationship:     Best call back number: [phone]      PATIENT WAS TOLD THAT THE ELIQUIS WOULD COST HIM OVER $500     THEY ARE REQUESTING SAMPLES FOR .    [facility] THAT WE COULD CALL THE BELOW NUMBER TO REQUEST THAT THE COPAY NOT BE SO HIGH  [phone]

## 2024-04-26 ENCOUNTER — CONSULT (OUTPATIENT)
Dept: ONCOLOGY | Facility: CLINIC | Age: 74
End: 2024-04-26
Payer: MEDICARE

## 2024-04-26 VITALS
TEMPERATURE: 98 F | HEART RATE: 128 BPM | DIASTOLIC BLOOD PRESSURE: 87 MMHG | HEIGHT: 71 IN | SYSTOLIC BLOOD PRESSURE: 128 MMHG | BODY MASS INDEX: 31.36 KG/M2 | RESPIRATION RATE: 16 BRPM | OXYGEN SATURATION: 95 % | WEIGHT: 224 LBS

## 2024-04-26 DIAGNOSIS — C18.9 MALIGNANT NEOPLASM OF COLON, UNSPECIFIED PART OF COLON: Primary | ICD-10-CM

## 2024-04-26 NOTE — PAT
Spoke with Liss at Dr. Greenberg's office, she is checking in to getting the cardiac clearance signed

## 2024-04-26 NOTE — TELEPHONE ENCOUNTER
[facility] IS RQUESTING THAT DR. TYLER CALL [facility] [phone] TO FILE AN APPEAL FOR THE COVERAGE ON THE ELIQUIS.

## 2024-04-26 NOTE — PROGRESS NOTES
HEMATOLOGY ONCOLOGY OUTPATIENT CONSULTATION       Patient name: Viktor Atkins  : 1950  MRN: [id number]  Primary Care Physician: Gera Manriquez MD  Referring Physician: Aakash Burden MD  Reason For Consult: Colon cancer      History of Present Illness:  Patient is a 74 y.o. male who presented to the hospital with acute GI bleed he had bright red blood per rectum and presented to the emergency room    3/25/2024 CT chest abdomen pelvis with findings compatible with constipation, sigmoid diverticulosis without diverticulitis  3/27/2024 colonoscopy with distal sigmoid mid colon mass biopsy obtained this was positive for invasive moderately differentiated adenocarcinoma MSI testing was done was negative intact nuclear expression of MMR proteins  3/30/2024 low anterior resection, diverting loop ileostomy by Dr. Burden tumor found in rectosigmoid upper rectum area.  Final pathology with all margins negative, grade 2 tumor moderately differentiated, tumor invades through muscularis propria into the pericolonic or perirectal tissue.  4 out of 22 lymph nodes positive    Final stage T4 N2    Subjective:  Patient presents for initial consultation.  Healing well from the surgery      Past Medical History:   Diagnosis Date    Arthritis     Benign essential HTN     Cancer     Diabetes mellitus     GERD (gastroesophageal reflux disease)     Hyperlipidemia, mixed     Palpitations        Past Surgical History:   Procedure Laterality Date    CARDIAC CATHETERIZATION      CHOLECYSTECTOMY      COLON RESECTION WITH ILEOSTOMY N/A 3/30/2024    Procedure: COLON RESECTION LOW ANTERIOR LAPAROSCOPIC, COLONAL ANASTOMOSIS, DIVERTING LOOP ILEOSTOMY WITH DAVINCI ROBOT;  Surgeon: Aakash Burden MD;  Location: [facility] [place];  Service: Robotics - DaVinci;  Laterality: N/A;    COLONOSCOPY N/A 3/27/2024    Procedure: COLONOSCOPY with polypectomy x 18 and sigmoid colon mass biopsies with endscopic spot tattooing;   Surgeon: Fili Quintero MD;  Location: [facility] ENDOSCOPY;  Service: Gastroenterology;  Laterality: N/A;  sigmoid mass at 25 cm    COLOSTOMY N/A 4/9/2024    Procedure: DIAGNOSTIC LAPAROSCOPY, DIVERTING OSTOMY, POSSIBLE REVISION OF ANASTAMOSIS;  Surgeon: Aakash Burden MD;  Location: [facility] MAIN OR;  Service: General;  Laterality: N/A;    KNEE SURGERY      SIGMOIDOSCOPY N/A 3/30/2024    Procedure: SIGMOIDOSCOPY;  Surgeon: Aakash Burden MD;  Location: [facility] MAIN OR;  Service: Gastroenterology;  Laterality: N/A;         Current Outpatient Medications:     amiodarone (PACERONE) 200 MG tablet, Take 2 tablets by mouth Every 12 (Twelve) Hours., Disp: 120 tablet, Rfl: 3    apixaban (ELIQUIS) 5 MG tablet tablet, Take 1 tablet by mouth Every 12 (Twelve) Hours. Indications: Atrial Fibrillation, Disp: 60 tablet, Rfl: 5    aspirin 81 MG chewable tablet, Chew 1 tablet Daily., Disp: , Rfl:     atorvastatin (LIPITOR) 20 MG tablet, Take 1 tablet by mouth Every Night., Disp: , Rfl:     dilTIAZem XR (DILACOR XR) 180 MG 24 hr capsule, Take 1 capsule by mouth Daily., Disp: 90 capsule, Rfl: 1    metFORMIN (GLUCOPHAGE) 500 MG tablet, Take 1 tablet by mouth Daily With Breakfast., Disp: , Rfl:     methocarbamol (ROBAXIN) 750 MG tablet, Take 1 tablet by mouth 4 (Four) Times a Day As Needed for Muscle Spasms for up to 40 days., Disp: 40 tablet, Rfl: 0    metoprolol succinate XL (TOPROL-XL) 50 MG 24 hr tablet, Take 1 tablet by mouth 2 (Two) Times a Day., Disp: 60 tablet, Rfl: 3    Multiple Vitamins-Minerals (MULTIVITAMIN ADULT PO), Take 1 tablet by mouth Daily., Disp: , Rfl:     Omeprazole (EQL Omeprazole) 20 MG tablet delayed-release, 20 mg Daily., Disp: , Rfl:     ondansetron (Zofran) 4 MG tablet, Take 1 tablet by mouth Daily As Needed for Nausea or Vomiting., Disp: 30 tablet, Rfl: 1    Scopolamine 1 MG/3DAYS patch, Place 1 patch on the skin as directed by provider Every 72 (Seventy-Two) Hours., Disp: 4 patch, Rfl: 1    sertraline  "(Zoloft) 25 MG tablet, 1 tablet Daily., Disp: , Rfl:     simethicone (Gas-X) 80 MG chewable tablet, Chew 1 tablet Every 6 (Six) Hours As Needed for Flatulence for up to 30 days., Disp: 30 tablet, Rfl: 2    No Known Allergies    No family history on file.    Cancer-related family history is not on file.      Social History     Tobacco Use    Smoking status: Former    Smokeless tobacco: Never   Vaping Use    Vaping status: Never Used   Substance Use Topics    Alcohol use: Never    Drug use: Never     Social History     Social History Narrative    Not on file       ROS:   Review of Systems   Constitutional:  Negative for fatigue and fever.   HENT:  Negative for congestion and nosebleeds.    Eyes:  Negative for pain.   Respiratory:  Negative for cough and shortness of breath.    Cardiovascular:  Negative for chest pain.   Gastrointestinal:  Negative for abdominal pain, blood in stool, diarrhea, nausea and vomiting.   Endocrine: Negative for cold intolerance and heat intolerance.   Genitourinary:  Negative for difficulty urinating.   Musculoskeletal:  Negative for arthralgias.   Skin:  Negative for rash.   Neurological:  Negative for dizziness and headaches.   Hematological:  Does not bruise/bleed easily.   Psychiatric/Behavioral:  Negative for behavioral problems.          Objective:    Vital Signs:  Vitals:    04/26/24 1159   BP: 128/87   Pulse: (!) 128   Resp: 16   Temp: 98 °F (36.7 °C)   SpO2: 95%   Weight: 102 kg (224 lb)   Height: 180.3 cm (71\")   PainSc: 0-No pain     Body mass index is 31.24 kg/m².    ECOG  (0) Fully active, able to carry on all predisease performance without restriction    Physical Exam:   Physical Exam  Constitutional:       Appearance: Normal appearance.   HENT:      Head: Normocephalic and atraumatic.   Eyes:      Pupils: Pupils are equal, round, and reactive to light.   Cardiovascular:      Rate and Rhythm: Normal rate and regular rhythm.      Pulses: Normal pulses.      Heart sounds: No " "murmur heard.  Pulmonary:      Effort: Pulmonary effort is normal.      Breath sounds: Normal breath sounds.   Abdominal:      General: There is no distension.      Palpations: Abdomen is soft. There is no mass.      Tenderness: There is no abdominal tenderness.   Musculoskeletal:         General: Normal range of motion.      Cervical back: Normal range of motion and neck supple.   Skin:     General: Skin is warm.   Neurological:      General: No focal deficit present.      Mental Status: He is alert.   Psychiatric:         Mood and Affect: Mood normal.         Lab Results - Last 18 Months   Lab Units 04/19/24 0138 04/18/24  0130 04/17/24  0045   WBC 10*3/mm3 12.84* 12.17* 14.22*   HEMOGLOBIN g/dL 10.9* 10.9* 11.6*   HEMATOCRIT % 35.1* 35.1* 37.3*   PLATELETS 10*3/mm3 272 284 331   MCV fL 88.2 86.7 87.4     Lab Results - Last 18 Months   Lab Units 04/19/24 0138 04/18/24 0130 04/17/24  0045   SODIUM mmol/L 143 137 139   POTASSIUM mmol/L 4.3 4.0 4.2   CHLORIDE mmol/L 112* 106 107   CO2 mmol/L 19.0* 21.0* 18.0*   BUN mg/dL 18 23 21   CREATININE mg/dL 1.00 1.13 1.15   CALCIUM mg/dL 8.6 8.6 8.8   BILIRUBIN mg/dL 0.2 0.2 0.3   ALK PHOS U/L 109 94 103   ALT (SGPT) U/L 25 25 32   AST (SGOT) U/L 23 19 24   GLUCOSE mg/dL 109* 103* 102*       Lab Results   Component Value Date    GLUCOSE 109 (H) 04/19/2024    BUN 18 04/19/2024    CREATININE 1.00 04/19/2024    BCR 18.0 04/19/2024    K 4.3 04/19/2024    CO2 19.0 (L) 04/19/2024    CALCIUM 8.6 04/19/2024    ALBUMIN 3.3 (L) 04/19/2024    LABIL2 1.5 10/29/2018    AST 23 04/19/2024    ALT 25 04/19/2024       No results for input(s): \"APTT\", \"INR\", \"PTT\" in the last 65791 hours.    Lab Results   Component Value Date    IRON 22 (L) 04/09/2024    TIBC 235 (L) 04/09/2024    FERRITIN 281.40 04/09/2024       No results found for: \"FOLATE\"    No results found for: \"OCCULTBLD\"    No results found for: \"RETICCTPCT\"  No results found for: \"[id number]\"  No results found for: \"SPEP\", " "\"UPEP\"  No results found for: \"LDH\", \"URICACID\"  No results found for: \"ARON\", \"RF\", \"SEDRATE\"  No results found for: \"FIBRINOGEN\", \"HAPTOGLOBIN\"  Lab Results   Component Value Date    PTT 27.6 04/30/2018     No results found for: \"\"  Lab Results   Component Value Date    CEA 5.21 03/27/2024     No components found for: \"CA-19-9\"  No results found for: \"PSA\"  No results found for: \"SEDRATE\"       Assessment & Plan     Patient is a 74-year-old male with sigmoid-rectal cancer status post sigmoid colectomy, low anterior resection with lymph node positive disease he is here to discuss adjuvant treatment options    Colon cancer  T4 N2 disease stage IIIc  Discussed risk of recurrence, prognosis discussed adjuvant treatment with FOLFOX versus XELOX for 6 months of treatment with idea trial patients who had N2 disease or T4 disease had inferior outcomes with 3 months of treatment as compared to 6 months of treatment after discussion we decided to pursue 6 months of FOLFOX adjuvant treatment  Discussed side effects in detail, port  placement next week    Anemia  Hemoglobin was 10.9 last  This was likely malignancy related monitor with treatment consider iron if needed      Thank you very much for providing the opportunity to participate in this patient’s care. Please do not hesitate to call if there are any other questions.  Time spent on encounter including record review, history taking, exam, discussion, counseling and documentation at: 60 minutes      "

## 2024-04-30 ENCOUNTER — APPOINTMENT (OUTPATIENT)
Dept: GENERAL RADIOLOGY | Facility: HOSPITAL | Age: 74
End: 2024-04-30
Payer: MEDICARE

## 2024-04-30 ENCOUNTER — ANESTHESIA EVENT (OUTPATIENT)
Dept: PERIOP | Facility: HOSPITAL | Age: 74
End: 2024-04-30
Payer: MEDICARE

## 2024-04-30 ENCOUNTER — ANESTHESIA (OUTPATIENT)
Dept: PERIOP | Facility: HOSPITAL | Age: 74
End: 2024-04-30
Payer: MEDICARE

## 2024-04-30 VITALS
RESPIRATION RATE: 13 BRPM | HEART RATE: 89 BPM | DIASTOLIC BLOOD PRESSURE: 98 MMHG | WEIGHT: 223.4 LBS | BODY MASS INDEX: 31.27 KG/M2 | HEIGHT: 71 IN | TEMPERATURE: 98.1 F | SYSTOLIC BLOOD PRESSURE: 167 MMHG | OXYGEN SATURATION: 97 %

## 2024-04-30 LAB — GLUCOSE BLDC GLUCOMTR-MCNC: 96 MG/DL (ref 70–105)

## 2024-04-30 PROCEDURE — 25010000002 PROPOFOL 200 MG/20ML EMULSION: Performed by: ANESTHESIOLOGY

## 2024-04-30 PROCEDURE — 82948 REAGENT STRIP/BLOOD GLUCOSE: CPT

## 2024-04-30 PROCEDURE — 25010000002 CEFAZOLIN PER 500 MG: Performed by: ANESTHESIOLOGY

## 2024-04-30 PROCEDURE — 76000 FLUOROSCOPY <1 HR PHYS/QHP: CPT

## 2024-04-30 PROCEDURE — 77001 FLUOROGUIDE FOR VEIN DEVICE: CPT | Performed by: STUDENT IN AN ORGANIZED HEALTH CARE EDUCATION/TRAINING PROGRAM

## 2024-04-30 PROCEDURE — 25810000003 LACTATED RINGERS PER 1000 ML: Performed by: ANESTHESIOLOGY

## 2024-04-30 PROCEDURE — 25010000002 GLYCOPYRROLATE 1 MG/5ML SOLUTION: Performed by: ANESTHESIOLOGY

## 2024-04-30 PROCEDURE — 25010000002 LABETALOL 5 MG/ML SOLUTION: Performed by: NURSE ANESTHETIST, CERTIFIED REGISTERED

## 2024-04-30 PROCEDURE — 36561 INSERT TUNNELED CV CATH: CPT | Performed by: STUDENT IN AN ORGANIZED HEALTH CARE EDUCATION/TRAINING PROGRAM

## 2024-04-30 PROCEDURE — 25010000002 BUPIVACAINE (PF) 0.25 % SOLUTION: Performed by: STUDENT IN AN ORGANIZED HEALTH CARE EDUCATION/TRAINING PROGRAM

## 2024-04-30 PROCEDURE — 25010000002 PROPOFOL 1000 MG/100ML EMULSION: Performed by: ANESTHESIOLOGY

## 2024-04-30 PROCEDURE — 25010000002 HEPARIN (PORCINE) PER 1000 UNITS: Performed by: STUDENT IN AN ORGANIZED HEALTH CARE EDUCATION/TRAINING PROGRAM

## 2024-04-30 PROCEDURE — C1788 PORT, INDWELLING, IMP: HCPCS | Performed by: STUDENT IN AN ORGANIZED HEALTH CARE EDUCATION/TRAINING PROGRAM

## 2024-04-30 DEVICE — PRT INTRO VASC/INTERV VORTEX FILL/HL DETACH/POLYURET/CATH 8F: Type: IMPLANTABLE DEVICE | Site: CHEST | Status: FUNCTIONAL

## 2024-04-30 RX ORDER — SODIUM CHLORIDE 0.9 % (FLUSH) 0.9 %
3-10 SYRINGE (ML) INJECTION AS NEEDED
Status: DISCONTINUED | OUTPATIENT
Start: 2024-04-30 | End: 2024-04-30 | Stop reason: HOSPADM

## 2024-04-30 RX ORDER — PROPOFOL 10 MG/ML
INJECTION, EMULSION INTRAVENOUS CONTINUOUS PRN
Status: DISCONTINUED | OUTPATIENT
Start: 2024-04-30 | End: 2024-04-30 | Stop reason: SURG

## 2024-04-30 RX ORDER — DIPHENHYDRAMINE HYDROCHLORIDE 50 MG/ML
12.5 INJECTION INTRAMUSCULAR; INTRAVENOUS
Status: DISCONTINUED | OUTPATIENT
Start: 2024-04-30 | End: 2024-04-30 | Stop reason: HOSPADM

## 2024-04-30 RX ORDER — PROPOFOL 10 MG/ML
INJECTION, EMULSION INTRAVENOUS AS NEEDED
Status: DISCONTINUED | OUTPATIENT
Start: 2024-04-30 | End: 2024-04-30 | Stop reason: SURG

## 2024-04-30 RX ORDER — SODIUM CHLORIDE, SODIUM LACTATE, POTASSIUM CHLORIDE, CALCIUM CHLORIDE 600; 310; 30; 20 MG/100ML; MG/100ML; MG/100ML; MG/100ML
INJECTION, SOLUTION INTRAVENOUS CONTINUOUS PRN
Status: DISCONTINUED | OUTPATIENT
Start: 2024-04-30 | End: 2024-04-30 | Stop reason: SURG

## 2024-04-30 RX ORDER — HYDRALAZINE HYDROCHLORIDE 20 MG/ML
5 INJECTION INTRAMUSCULAR; INTRAVENOUS
Status: DISCONTINUED | OUTPATIENT
Start: 2024-04-30 | End: 2024-04-30 | Stop reason: HOSPADM

## 2024-04-30 RX ORDER — DIPHENHYDRAMINE HYDROCHLORIDE 50 MG/ML
12.5 INJECTION INTRAMUSCULAR; INTRAVENOUS ONCE AS NEEDED
Status: DISCONTINUED | OUTPATIENT
Start: 2024-04-30 | End: 2024-04-30 | Stop reason: HOSPADM

## 2024-04-30 RX ORDER — GLYCOPYRROLATE 0.2 MG/ML
INJECTION INTRAMUSCULAR; INTRAVENOUS AS NEEDED
Status: DISCONTINUED | OUTPATIENT
Start: 2024-04-30 | End: 2024-04-30 | Stop reason: SURG

## 2024-04-30 RX ORDER — OXYCODONE HYDROCHLORIDE 5 MG/1
5 TABLET ORAL ONCE AS NEEDED
Status: DISCONTINUED | OUTPATIENT
Start: 2024-04-30 | End: 2024-04-30 | Stop reason: HOSPADM

## 2024-04-30 RX ORDER — OXYCODONE HYDROCHLORIDE 5 MG/1
10 TABLET ORAL EVERY 4 HOURS PRN
Status: DISCONTINUED | OUTPATIENT
Start: 2024-04-30 | End: 2024-04-30 | Stop reason: HOSPADM

## 2024-04-30 RX ORDER — BUPIVACAINE HYDROCHLORIDE 2.5 MG/ML
INJECTION, SOLUTION EPIDURAL; INFILTRATION; INTRACAUDAL AS NEEDED
Status: DISCONTINUED | OUTPATIENT
Start: 2024-04-30 | End: 2024-04-30 | Stop reason: HOSPADM

## 2024-04-30 RX ORDER — SODIUM CHLORIDE 0.9 % (FLUSH) 0.9 %
10 SYRINGE (ML) INJECTION AS NEEDED
Status: DISCONTINUED | OUTPATIENT
Start: 2024-04-30 | End: 2024-04-30 | Stop reason: HOSPADM

## 2024-04-30 RX ORDER — AMOXICILLIN AND CLAVULANATE POTASSIUM 875; 125 MG/1; MG/1
1 TABLET, FILM COATED ORAL 2 TIMES DAILY
Qty: 10 TABLET | Refills: 0 | Status: SHIPPED | OUTPATIENT
Start: 2024-04-30

## 2024-04-30 RX ORDER — SODIUM CHLORIDE, SODIUM LACTATE, POTASSIUM CHLORIDE, CALCIUM CHLORIDE 600; 310; 30; 20 MG/100ML; MG/100ML; MG/100ML; MG/100ML
1000 INJECTION, SOLUTION INTRAVENOUS CONTINUOUS
Status: DISCONTINUED | OUTPATIENT
Start: 2024-04-30 | End: 2024-04-30 | Stop reason: HOSPADM

## 2024-04-30 RX ORDER — SODIUM CHLORIDE 9 MG/ML
40 INJECTION, SOLUTION INTRAVENOUS AS NEEDED
Status: DISCONTINUED | OUTPATIENT
Start: 2024-04-30 | End: 2024-04-30 | Stop reason: HOSPADM

## 2024-04-30 RX ORDER — IPRATROPIUM BROMIDE AND ALBUTEROL SULFATE 2.5; .5 MG/3ML; MG/3ML
3 SOLUTION RESPIRATORY (INHALATION) ONCE AS NEEDED
Status: DISCONTINUED | OUTPATIENT
Start: 2024-04-30 | End: 2024-04-30 | Stop reason: HOSPADM

## 2024-04-30 RX ORDER — FLUMAZENIL 0.1 MG/ML
0.2 INJECTION INTRAVENOUS AS NEEDED
Status: DISCONTINUED | OUTPATIENT
Start: 2024-04-30 | End: 2024-04-30 | Stop reason: HOSPADM

## 2024-04-30 RX ORDER — LIDOCAINE HYDROCHLORIDE 10 MG/ML
0.5 INJECTION, SOLUTION INFILTRATION; PERINEURAL ONCE AS NEEDED
Status: DISCONTINUED | OUTPATIENT
Start: 2024-04-30 | End: 2024-04-30 | Stop reason: HOSPADM

## 2024-04-30 RX ORDER — SODIUM CHLORIDE 0.9 % (FLUSH) 0.9 %
3 SYRINGE (ML) INJECTION EVERY 12 HOURS SCHEDULED
Status: DISCONTINUED | OUTPATIENT
Start: 2024-04-30 | End: 2024-04-30 | Stop reason: HOSPADM

## 2024-04-30 RX ORDER — LABETALOL HYDROCHLORIDE 5 MG/ML
5 INJECTION, SOLUTION INTRAVENOUS
Status: DISCONTINUED | OUTPATIENT
Start: 2024-04-30 | End: 2024-04-30 | Stop reason: HOSPADM

## 2024-04-30 RX ORDER — NALOXONE HCL 0.4 MG/ML
0.4 VIAL (ML) INJECTION AS NEEDED
Status: DISCONTINUED | OUTPATIENT
Start: 2024-04-30 | End: 2024-04-30 | Stop reason: HOSPADM

## 2024-04-30 RX ORDER — EPHEDRINE SULFATE 5 MG/ML
5 INJECTION INTRAVENOUS ONCE AS NEEDED
Status: DISCONTINUED | OUTPATIENT
Start: 2024-04-30 | End: 2024-04-30 | Stop reason: HOSPADM

## 2024-04-30 RX ORDER — ONDANSETRON 2 MG/ML
4 INJECTION INTRAMUSCULAR; INTRAVENOUS ONCE AS NEEDED
Status: DISCONTINUED | OUTPATIENT
Start: 2024-04-30 | End: 2024-04-30 | Stop reason: HOSPADM

## 2024-04-30 RX ADMIN — PROPOFOL 50 MG: 10 INJECTION, EMULSION INTRAVENOUS at 18:48

## 2024-04-30 RX ADMIN — PROPOFOL INJECTABLE EMULSION 100 MCG/KG/MIN: 10 INJECTION, EMULSION INTRAVENOUS at 18:50

## 2024-04-30 RX ADMIN — GLYCOPYRROLATE 0.1 MCG: 0.2 INJECTION INTRAMUSCULAR; INTRAVENOUS at 18:47

## 2024-04-30 RX ADMIN — CEFAZOLIN 2 G: 2 INJECTION, POWDER, FOR SOLUTION INTRAMUSCULAR; INTRAVENOUS at 18:52

## 2024-04-30 RX ADMIN — SODIUM CHLORIDE, SODIUM LACTATE, POTASSIUM CHLORIDE, AND CALCIUM CHLORIDE: .6; .31; .03; .02 INJECTION, SOLUTION INTRAVENOUS at 18:46

## 2024-04-30 RX ADMIN — Medication 5 MG: at 19:58

## 2024-04-30 RX ADMIN — SODIUM CHLORIDE, POTASSIUM CHLORIDE, SODIUM LACTATE AND CALCIUM CHLORIDE 1000 ML: 600; 310; 30; 20 INJECTION, SOLUTION INTRAVENOUS at 13:32

## 2024-04-30 NOTE — OP NOTE
CRS Operative Note   Name: Viktor Atkins  : 1950  Date of Surgery: 2024  Pre-op Diagnosis:  Need for chemotherapy port  Post-op Diagnosis: same  Procedure: US Guided Right IJ tunneled chemo-port insertion under fluoroscopy  Surgeon: Aakash Burden MD   Assistants: CHERELLE  Anesthesia: MAC with Local   IV Fluids: refer to anesthesia record  Estimated Blood Loss: NONE  Drains: none  Implants: Chemo-Port  Specimen: None  Findings   1.  Catheter placement in the SVC-RA junction confirmed with fluoroscopy  2.  Good blood return and flush     Complications   No complications     Procedure:  After appropriate consent including risks, benefits and alternatives was obtained, the patient was brought to the OR, and anesthesia was then administered. Patient was placed in supine position with arms tucked.  All pao prominences were padded, antibiotics were given, and SCDs placed.   The patient was prepped and drapped in the usual sterile fashion.  Ultrasound was used to locate the right IJ.  Local anesthetic was given over the skin.  Finder needle was used to cannulate the inferior jugular vein.  A guidewire was then placed.  A 1 cm incision was made transversely 2 cm below the clavicle.  The port was then secured in 2 places.  The catheter was securely attached to the port.  The catheter was then cut to length.  The tip of the catheter was then attached to a tunneling device.  The catheter was then tunneled to the neck where the guidewire is in place.  A dilator with sheath was then placed over the guidewire.  The wire and sheath was removed.  The catheter was not placed in the dilator.  The dilator was then split with catheter placed without any kinking.      Fluoroscopy confirmed placement of the catheter without any kinks.  Heparinized flush confirmed good blood return with good flush.      The port site incision was closed with 3-0 Vicryl and 4-0 Monocryl.  The neck incision was closed with 4-0 Monocryl.   Dermabond was applied  Dressings were applied and the case concluded.  Counts: Instrument, sponge, and needle counts were correct prior to closure and at the conclusion of the case.  Disposition: The patient was taken to PACU in good condition.

## 2024-04-30 NOTE — DISCHARGE INSTRUCTIONS
[facility] [place] Col[place]  Discharge Instructions after Chemo-Port Placement     Incision Care: Keep the incision site clean and dry. You may shower 24 hours after the procedure, but avoid soaking in baths or swimming pools for at least one week. Gently pat the incision dry with a clean towel after showering.    Pain Management: It is normal to experience some discomfort or soreness at the incision site. Over-the-counter pain relievers such as acetaminophen (Tylenol) or ibuprofen (Advil, Motrin) can help alleviate any pain. Follow the recommended dosage instructions on the medication label.    Activity Restrictions: Avoid strenuous activities and heavy lifting for at least 48 hours following the procedure. Gradually resume normal activities as tolerated, but refrain from engaging in vigorous exercise or lifting heavy objects for at least one week.    Dietary Guidelines: There are no specific dietary restrictions following chemoport placement. However, it is important to stay hydrated and maintain a balanced diet to support your overall health and recovery.    Monitoring for Signs of Infection: Keep a close eye on the incision site for any signs of infection, such as redness, swelling, warmth, increased pain, or drainage of pus. If you notice any of these symptoms, contact your healthcare provider immediately.    Follow-up Appointment: Make a follow up appointment if instructed by the surgeon. Usually, we will coordinate with oncologist to check on surgical site.     Contact Information: If you experience any unexpected or concerning symptoms, or if you have any questions or need further assistance, do not hesitate to contact our office at [phone]      Aakash Burden MD  Colon and Rectal Surgery   [facility] [place], [address]  T: [phone]

## 2024-04-30 NOTE — ANESTHESIA PREPROCEDURE EVALUATION
Anesthesia Evaluation     Patient summary reviewed and Nursing notes reviewed   NPO Solid Status: > 8 hours  NPO Liquid Status: > 2 hours           Airway   Mallampati: I  TM distance: >3 FB  Neck ROM: full  Large neck circumference  Dental    (+) upper dentures    Pulmonary - normal exam   (+) ,shortness of breath  Cardiovascular   Exercise tolerance: poor (<4 METS)    ECG reviewed  PT is on anticoagulation therapy  Rhythm: irregular  Rate: normal    (+) hypertension well controlled, dysrhythmias Atrial Fib, CHF Systolic <55%, hyperlipidemia      Neuro/Psych  (+) psychiatric history Anxiety and Depression  GI/Hepatic/Renal/Endo    (+) obesity, GERD, GI bleeding resolved, diabetes mellitus type 2 well controlled    Musculoskeletal     Abdominal   (+) obese   Substance History      OB/GYN          Other   arthritis,   history of cancer active    ROS/Med Hx Other: 3/24  ·  Left ventricular systolic function is low normal. Calculated left ventricular EF = 46% Left ventricular ejection fraction appears to be 46 - 50%.  ·  The left ventricular cavity is mildly dilated.  ·  Left ventricular diastolic dysfunction is noted.  ·  The left atrial cavity is dilated.  ·  Estimated right ventricular systolic pressure from tricuspid regurgitation is normal (<35 mmHg).  ·  Dilation of the aortic root is present.  4.4 cm  ·  Patient in atrial fibrillation during this study.                  Anesthesia Plan    ASA 3     general     intravenous induction     Anesthetic plan, risks, benefits, and alternatives have been provided, discussed and informed consent has been obtained with: patient.    Use of blood products discussed with patient .    Plan discussed with CRNA.    CODE STATUS:

## 2024-04-30 NOTE — DISCHARGE SUMMARY
Date of Admission: 4/30/2024  Date of Discharge:  4/30/2024  Primary Care Physician: Gera Manriquez MD     Discharge Diagnosis:  Active Hospital Problems    Diagnosis  POA    **Colon cancer [C18.9]  Unknown    Rectosigmoid cancer [C19]  Yes      Resolved Hospital Problems   No resolved problems to display.       Presenting Problem/History of Present Illness:  Malignant neoplasm of sigmoid colon [C18.7]  Rectosigmoid cancer [C19]     Hospital Course:  The patient is a 74 y.o. male who presented with colon cancer in need for chemotherapy access     Exam Today:    Procedures Performed:  Procedure(s):  INSERTION VENOUS ACCESS DEVICE       Consults:   Consults       Date and Time Order Name Status Description    4/9/2024 12:00 PM Hematology & Oncology Inpatient Consult Completed     4/8/2024  7:51 PM Inpatient Infectious Diseases Consult Completed     4/8/2024  7:51 PM Inpatient Cardiology Consult Completed     3/27/2024  1:58 PM Inpatient Colorectal Surgery Consult Completed     3/27/2024 11:57 AM Inpatient Cardiology Consult Completed     3/26/2024 12:51 AM Inpatient Gastroenterology Consult Completed              Discharge Disposition:  Home or Self Care    Discharge Medications:     Discharge Medications        New Medications        Instructions Start Date   amoxicillin-clavulanate 875-125 MG per tablet  Commonly known as: AUGMENTIN   1 tablet, Oral, 2 Times Daily             Continue These Medications        Instructions Start Date   amiodarone 200 MG tablet  Commonly known as: PACERONE   400 mg, Oral, Every 12 Hours Scheduled      aspirin 81 MG chewable tablet   81 mg, Oral, Daily      atorvastatin 20 MG tablet  Commonly known as: LIPITOR   20 mg, Oral, Nightly      dilTIAZem  MG 24 hr capsule  Commonly known as: DILACOR XR   180 mg, Oral, Daily      Eliquis 5 MG tablet tablet  Generic drug: apixaban   5 mg, Oral, Every 12 Hours Scheduled      EQL Omeprazole 20 MG tablet delayed-release  Generic drug:  Omeprazole   1 tablet, Daily      metFORMIN 500 MG tablet  Commonly known as: GLUCOPHAGE   500 mg, Oral, Daily With Breakfast      methocarbamol 750 MG tablet  Commonly known as: ROBAXIN   750 mg, Oral, 4 Times Daily PRN      metoprolol succinate XL 50 MG 24 hr tablet  Commonly known as: TOPROL-XL   50 mg, Oral, 2 Times Daily      multivitamin with minerals tablet tablet   1 tablet, Oral, Daily      ondansetron 4 MG tablet  Commonly known as: Zofran   4 mg, Oral, Daily PRN      Scopolamine 1 MG/3DAYS patch   1 patch, Transdermal, Every 72 Hours      simethicone 80 MG chewable tablet  Commonly known as: Gas-X   80 mg, Oral, Every 6 Hours PRN      Zoloft 25 MG tablet  Generic drug: sertraline   1 tablet, Daily               Discharge Diet:     Activity at Discharge:     Follow-up Appointments:  Future Appointments   Date Time Provider Department Center   5/2/2024  8:20 AM LAB MD [facility] ONC LAB NA [facility]   5/2/2024  8:30 AM Jamia Baig APRN [facility] ONC NA REDD   5/2/2024  9:45 AM  ONC [facility] ONC NA [facility]   5/8/2024  7:45 AM INF ROOM 29 - CHAIR/BED [facility]  LAG CC NA LAG   5/10/2024  1:45 PM [facility] INJECTION CHAIR [facility]  LAG CC NA LAG   5/20/2024  1:30 PM Aakash Burden MD [facility] CRS NA REDD   5/21/2024 10:45 AM [facility] PORT CHAIR [facility]  LAG CC NA LAG   5/21/2024 11:00 AM Aakash Robin MD [facility] ONC NA REDD   5/22/2024  7:45 AM INF ROOM 29 - CHAIR/BED REDD  LAG CC NA LAG   5/24/2024  1:15 PM [facility] INJECTION CHAIR REDD  LAG CC NA LAG   6/3/2024  2:30 PM Laura De Anda APRN [facility] CAR NA P [facility] NA         Test Results Pending at Discharge: none        Aakash Burden MD  04/30/24  19:37 EDT    Time Spent on Discharge Activities: 10 min

## 2024-05-01 NOTE — ANESTHESIA POSTPROCEDURE EVALUATION
Patient: Viktor Atkins    Procedure Summary       Date: 04/30/24 Room / Location: [facility] OR 09 / [facility] MAIN OR    Anesthesia Start: 1842 Anesthesia Stop: 1923    Procedure: INSERTION VENOUS ACCESS DEVICE Diagnosis:       Malignant neoplasm of sigmoid colon      (Malignant neoplasm of sigmoid colon [C18.7])    Surgeons: Aakash Burden MD Provider: Kurt Harris MD    Anesthesia Type: general ASA Status: 3            Anesthesia Type: general    Vitals  Vitals Value Taken Time   /98 04/30/24 2011   Temp 98.1 °F (36.7 °C) 04/30/24 1943   Pulse 90 04/30/24 2012   Resp 13 04/30/24 1943   SpO2 89 % 04/30/24 2012   Vitals shown include unfiled device data.        Post Anesthesia Care and Evaluation    Patient location during evaluation: PACU  Patient participation: complete - patient participated  Level of consciousness: awake  Pain scale: See nurse's notes for pain score.  Pain management: adequate    Airway patency: patent  Anesthetic complications: No anesthetic complications  PONV Status: none  Cardiovascular status: acceptable  Respiratory status: acceptable and spontaneous ventilation  Hydration status: acceptable    Comments: Patient seen and examined postoperatively; vital signs stable; SpO2 greater than or equal to 90%; cardiopulmonary status stable; nausea/vomiting adequately controlled; pain adequately controlled; no apparent anesthesia complications; patient discharged from anesthesia care when discharge criteria were met

## 2024-05-01 NOTE — PROGRESS NOTES
TREATMENT  PREPARATION    Viktor Atkins  [id number]  1950    Chief Complaint: Treatment preparation and needs assessment    History of present illness:  Viktor Atkins is a 74 y.o. year old male who is here today for treatment preparation and needs assessment.  The patient has been diagnosed with   Encounter Diagnoses   Name Primary?    Malignant neoplasm of colon, unspecified part of colon Yes    Encounter for education     and is scheduled to begin treatment with:     Oncology History:    Oncology/Hematology History   Colon cancer   4/5/2024 Initial Diagnosis    Colon cancer     5/7/2024 -  Chemotherapy    OP COLON mFOLFOX6 OXALIplatin / Leucovorin / Fluorouracil     Rectosigmoid cancer   4/24/2024 Initial Diagnosis    Rectosigmoid cancer     4/26/2024 Cancer Staged    Staging form: Colon And Rectum, AJCC 8th Edition  - Pathologic stage from 4/26/2024: Stage IIIC (pT4a, pN2a, cM0) - Signed by Aakash Robin MD on 4/26/2024         The current medication list and allergy list were reviewed and reconciled.     Past Medical History, Past Surgical History, Social History, Family History have been reviewed and are without significant changes except as mentioned.    Physical Exam:    Vitals:    05/02/24 0815   BP: 150/84   Pulse: 77   Resp: 18   Temp: 98 °F (36.7 °C)   SpO2: 97%     Vitals:    05/02/24 0815   PainSc: 0-No pain        ECOG score: 1             Physical Exam  Vitals reviewed.   Constitutional:       General: He is not in acute distress.     Appearance: Normal appearance.   HENT:      Head: Normocephalic and atraumatic.   Eyes:      Extraocular Movements: Extraocular movements intact.   Pulmonary:      Effort: Pulmonary effort is normal. No respiratory distress.   Chest:      Comments: Right sided chest port  Musculoskeletal:         General: Normal range of motion.      Cervical back: Normal range of motion.   Skin:     General: Skin is warm.   Neurological:      Mental Status: He is alert and  oriented to person, place, and time.   Psychiatric:         Mood and Affect: Mood normal.         Behavior: Behavior normal.           NEEDS ASSESSMENTS    Genetics  The patient's new diagnosis and family history have been reviewed for genetic counseling needs. The patient will not be referred..     Psychosocial and Barriers to care  The patient has completed a PHQ-9 Depression Screening and the Distress Thermometer (DT) today.  PHQ-9 results show PHQ-2 Total Score: 0 PHQ-9 Total Score: PHQ-9 Total Score: 0    Results were reviewed along with psychosocial resources offered by our cancer center.  Our Supportive Oncology team will be flagged for a score of 4 or above, and a same day call will be made for a score of 9 or 10.  A mental health referral is offered at that time. Patients who score less than 4 have been educated on our support services and can be referred to our  upon request.  The patient will meet with the LCSW today as a visit per routine.      Nutrition   Patients beginning at risk treatment regimens or who have dietary concerns will also be referred to our oncology dietitian. The patient will be referred.    Functional Assessment  Persons who are age 70 or greater will be screened for qualification of a comprehensive geriatric assessment by our survivorship nurse practitioner.  Older adults with cancer face unique challenges. These may include an increased risk of drug reactions, financial burdens, and caregiver stress. The patient will not be referred.    Intravenous Access Assessment  The patient and I discussed planned intravenous chemo/biotherapy as well as other IV treatments that are often needed throughout the course of treatment. These may include, but are not limited to blood transfusions, antibiotics, and IV hydration. Discussed that depending on selected treatment and vein assessment, patient may require venous access device (VAD) which could include but not limited to a  "Mediport or PICC line. Risks and benefits of VADs reviewed. The patient will be treated via Port.    Reproductive/Sexual Activity   People should avoid becoming pregnant and should not get a partner pregnant while undergoing chemo/biotherapy.  People of childbearing age should use effective contraception during active therapy. The best recommendation for all people is to use a barrier method for a minimum of 1 week after the last infusion of chemo/biotherapy to prevent your partner being exposed to byproducts from treatment medications in bodily fluids. Effective contraception should be discussed with your oncology team to make sure it is safe to take based on your diagnosis. Possible options include oral contraceptives, barrier methods. Chemo/biotherapy can change your ability to reproduce children in the future.  There are options for fertility preservation. NOT APPLICABLE    Advanced Care Planning  Advance Care Planning   The patient and I discussed advanced care planning, \"Conversations that Matter\".   This service is offered for development of advance directives with a certified ACP facilitator.  The patient does not have an up-to-date advanced directive. This document is not on file with our office. The patient is not interested in an appointment with one of our facilitators to create or update their advanced directives.    Have you reviewed your Advance Directive and is it valid for this stay?: Not applicable  Patient Requests Assistance on Advance Directives: Patient Declined          Smoking cessation  Tobacco Use: Medium Risk (5/2/2024)    Patient History     Smoking Tobacco Use: Former     Smokeless Tobacco Use: Never     Passive Exposure: Not on file       Patient and I discussed their tobacco use history. Referral will not be made for smoking cessation.      Palliative Care  When appropriate, the patient and I discussed the availability palliative care services and when appropriate Hospice care. " Palliative care is not the same as Hospice care which was explained to the patient.NOT APPLICABLE.    Survivorship   When appropriate, we discussed that we will refer the patient to survivorship clinic to discuss next steps following completion of planned treatment.  Reviewed this visit will include assessment of your physical, psychological, functional, and spiritual needs as a survivor and the need at attend this visit when scheduled.    TREATMENT EDUCATION    Today I met with the patient to discuss the chemo/biotherapy regimen recommended for treatment of Malignant neoplasm of colon, unspecified part of colon  - ondansetron (ZOFRAN) 8 MG tablet  - lidocaine-prilocaine (EMLA) 2.5-2.5 % cream  - Ambulatory Referral to OP ONC Nutrition Services    Encounter for education  .  The patient was given explanation of treatment premed side effects including office policy that prohibits patients to drive if sedating medications are administered, MD explanation given regarding benefits, side effects, toxicities and goals of treatment.  The patient received a Chemotherapy/Biotherapy Plan Summary including diagnosis and explanation of specific treatment plan.    SIDE EFFECTS:  Common side effects were discussed with the patient and/or significant other.  Discussion included where applicable hair loss/discoloration, anemia/fatigue, infection/chills/fever, appetite, bleeding risk/precautions, constipation, diarrhea, mouth sores, taste alteration, loss of appetite, nausea/vomiting, peripheral neuropathy, skin/nail changes, rash, muscle aches/weakness, photosensitivity, weight gain/loss, hearing loss, dizziness, menopausal symptoms, menstrual irregularity, sterility, high blood pressure, heart damage, liver damage, lung damage, kidney damage, DVT/PE risk, fluid retention, pleural/pericardial effusion, somnolence, electrolyte/LFT imbalance, vein exercises and/or the possible need for vascular access/port placement.  The patient was  advised that although uncommon, leakage of an infused medication from the vein or venous access device may lead to skin breakdown and/or other tissue damage.  The patient was advised that he/she may have pain, bleeding, and/or bruising from the insertion of a needle in their vein or venous access device (port).  The patient was further advised that, in spite of proper technique, infection with redness and irritation may rarely occur at the site where the needle was inserted.  The patient was advised that if complications occur, additional medical treatment is available.  Finally, where applicable we have reviewed rare but potential immune mediated side effects including shortness of breath, cough, chest pain (pneumonitis), abdominal pain, diarrhea (colitis), thyroiditis (hypothyroid or hyperthyroid), hepatitis and liver dysfunction, nephritis and renal dysfunction.    Discussion also included side effects specific to drugs in the treatment plan, specifically:    Treatment Plans       Name Type Hold Status Plan Dates Plan Provider       Active    OP COLON mFOLFOX6 OXALIplatin / Leucovorin / Fluorouracil ONCOLOGY TREATMENT On Automatic Hold  5/6/2024 - Present Aakash Robin MD                     Questions answered and additional information discussed on topics including:  Anemia, Thrombocytopenia, Neutropenia, Nutrition and appetite changes, Constipation, Diarrhea, Nausea & vomiting, Mouth sores, Alopecia, Intimate activity, Nervous system changes, Pain, Skin & nail changes, Organ toxicities, Survivorship, Home care, and cold sensitivity        Assessment and Plan:    Diagnoses and all orders for this visit:    1. Malignant neoplasm of colon, unspecified part of colon (Primary)  -     ondansetron (ZOFRAN) 8 MG tablet; Take 1 tablet by mouth 3 (Three) Times a Day As Needed for Nausea or Vomiting.  Dispense: 30 tablet; Refill: 5  -     lidocaine-prilocaine (EMLA) 2.5-2.5 % cream; Apply 1 Application topically to the  appropriate area as directed As Needed (apply 1 hour prior to port access).  Dispense: 30 g; Refill: 2  -     Ambulatory Referral to OP ONC Nutrition Services    2. Encounter for education      Orders Placed This Encounter   Procedures    Ambulatory Referral to OP ON[facility] Nutrition Services     Referral Priority:   Routine     Referral Type:   Clinical Pathway     Referral Reason:   Specialty Services Required     Number of Visits Requested:   1         The patient and I have reviewed their diagnosis and scheduled treatment plan. Needs assessment was completed where applicable including genetics, psychosocial needs, barriers to care, VAD evaluation, advanced care planning, survivorship, and palliative care services where indicated. Referrals have been ordered as appropriate based upon evaluation today and patient desires.   Chemo/biotherapy teaching was completed today and consent obtained. See separate documentation for further details.  Adequate time was given to answer questions.  Patient made aware of their care team members and contact information if they have questions or problems throughout the treatment course.  Discussion held and written information provided describing frequency of office visits and ongoing monitoring throughout the treatment plan.     Reviewed with patient any prescribed medication sent to pharmacy.  Education provided regarding proper storage, safe handling, and proper disposal of unused medication.  Proper handling of body fluids and waste discussed and written information provided.  If appropriate, patient had pretreatment labs drawn today.  Home chemotherapy spill kit given at the visit    Learning assessment completed at initial patient encounter. See separate flowsheet. Chemo/biotherapy education comprehension assessed at today's visit.    I spent 60 minutes caring for Viktor on this date of service. This time includes time spent by me in the following activities: preparing for the  visit, reviewing tests, obtaining and/or reviewing a separately obtained history, performing a medically appropriate examination and/or evaluation, counseling and educating the patient/family/caregiver, ordering medications, tests, or procedures, referring and communicating with other health care professionals, and documenting information in the medical record.     Jamia Baig, KAREN   05/02/24

## 2024-05-02 ENCOUNTER — OFFICE VISIT (OUTPATIENT)
Dept: ONCOLOGY | Facility: CLINIC | Age: 74
End: 2024-05-02
Payer: MEDICARE

## 2024-05-02 ENCOUNTER — CLINICAL SUPPORT (OUTPATIENT)
Dept: ONCOLOGY | Facility: CLINIC | Age: 74
End: 2024-05-02
Payer: MEDICARE

## 2024-05-02 VITALS
WEIGHT: 229 LBS | RESPIRATION RATE: 18 BRPM | TEMPERATURE: 98 F | BODY MASS INDEX: 32.06 KG/M2 | OXYGEN SATURATION: 97 % | HEART RATE: 77 BPM | DIASTOLIC BLOOD PRESSURE: 84 MMHG | SYSTOLIC BLOOD PRESSURE: 150 MMHG | HEIGHT: 71 IN

## 2024-05-02 DIAGNOSIS — Z71.9 ENCOUNTER FOR EDUCATION: ICD-10-CM

## 2024-05-02 DIAGNOSIS — C18.9 MALIGNANT NEOPLASM OF COLON, UNSPECIFIED PART OF COLON: Primary | ICD-10-CM

## 2024-05-02 PROBLEM — G47.10 HYPERSOMNIA: Status: ACTIVE | Noted: 2024-05-02

## 2024-05-02 RX ORDER — LIDOCAINE AND PRILOCAINE 25; 25 MG/G; MG/G
1 CREAM TOPICAL AS NEEDED
Qty: 30 G | Refills: 2 | Status: SHIPPED | OUTPATIENT
Start: 2024-05-02

## 2024-05-02 RX ORDER — ONDANSETRON HYDROCHLORIDE 8 MG/1
8 TABLET, FILM COATED ORAL 3 TIMES DAILY PRN
Qty: 30 TABLET | Refills: 5 | Status: SHIPPED | OUTPATIENT
Start: 2024-05-02

## 2024-05-02 NOTE — PROGRESS NOTES
OSW met with patient and his wife and made introductions.  OSW provided welcome letter and contact information. OSW reviewed services available at [facility] to ensure patient is aware of all types of support available.    OSW completed a psychosocial assessment through review of information available in EPIC and additional inquiries.    Client Demographic portion of chart was reviewed with patient to ensure accuracy.  The following sections were reviewed and changes made as indicated:  Basics:  No changes made  Employer and Identification:  No changes made.  Patient Contacts:  No changes made.  Advance Directives:  OSW discussed types of advance directives and offered to assist patient with completion, if desired.  Patient was given an opportunity to ask questions and review documents.  Pt has no advance directives in place at this time and was not interested in completing any.  No changes made.  Code status:   OSW reviewed current code status on file and ensured this reflects patient's current wishes.  No changes needed.    The History section of the patient chart was reviewed and information entered as shared by patient.  This included general medical, surgical and family history.  The social determinants sections (substance abuse and sexuality, e-cig/vaping, socioeconomic, and other social determinants information).  Any significant social documentation was entered.    Following review of these sections of the patient information in EPIC, OSW engaged patient in further conversation to assess any current needs.    There are no current barriers with cultural or spiritual factors.  Pt is a member of [facility] [employer] [facility] [employer] in [place].  There are no unmet mental health needs.  There is no presentation of appearance, behavior, speech or language, thoughts, mood, affect, perception, or insight which would indicate mental status deficits or need for further evaluation.  Patient is not voicing any thoughts of self-harm  or aggression toward others.    Patient had port placed and states he is still sore.  Treatment to begin 5/8.   Pt knows to use Lidocaine cream as directed prior to treatment.  Pt and wife indicate they have a large support system.  They have one daughter and two sons, all close by and available to assist as needed.  They also have many friends and [employer] support available.   The home situation is described by patient as adequate.  All needs met and no concerns or anticipated needs.  Patient is semi-retired.  He was a .  He now works part time at [employer].   Patient is concerned about the cost of his prescription (Eloquis) and being able to afford the medical bills.  OSW requested the financial counselor reach out to pt to offer whatever assistance might be available.    OSW offered emotional support and encouragement throughout the visit.  OSW provided active listening and restatement of patient's concerns.    OSW plan at this time will be to remain available as patient's needs change.  OSW will follow up with patient regarding any identified problems or issues as noted.  Patient is encouraged to reach out to OSW as needs arise or support is needed.

## 2024-05-08 ENCOUNTER — NUTRITION (OUTPATIENT)
Dept: ONCOLOGY | Facility: CLINIC | Age: 74
End: 2024-05-08
Payer: MEDICARE

## 2024-05-08 ENCOUNTER — HOSPITAL ENCOUNTER (OUTPATIENT)
Dept: ONCOLOGY | Facility: HOSPITAL | Age: 74
Discharge: HOME | End: 2024-05-08
Admitting: INTERNAL MEDICINE
Payer: MEDICARE

## 2024-05-08 VITALS
BODY MASS INDEX: 31.85 KG/M2 | DIASTOLIC BLOOD PRESSURE: 79 MMHG | HEART RATE: 80 BPM | SYSTOLIC BLOOD PRESSURE: 147 MMHG | WEIGHT: 227.5 LBS | HEIGHT: 71 IN | RESPIRATION RATE: 14 BRPM | TEMPERATURE: 97.5 F | OXYGEN SATURATION: 95 %

## 2024-05-08 DIAGNOSIS — C18.9 MALIGNANT NEOPLASM OF COLON, UNSPECIFIED PART OF COLON: Primary | ICD-10-CM

## 2024-05-08 PROBLEM — Z95.828 PORT-A-CATH IN PLACE: Status: ACTIVE | Noted: 2024-05-08

## 2024-05-08 LAB
ALP BLD-CCNC: 104 U/L (ref 53–128)
BASOPHILS # BLD AUTO: 0.02 10*3/MM3 (ref 0–0.2)
BASOPHILS NFR BLD AUTO: 0.2 % (ref 0–1.5)
BUN BLDA-MCNC: 12 MG/DL (ref 7–22)
CALCIUM BLD QL: 8.6 MG/DL (ref 8–10.3)
CHLORIDE BLDA-SCNC: 109 MMOL/L (ref 98–108)
CO2 BLDA-SCNC: 24 MMOL/L (ref 18–33)
CREAT BLDA-MCNC: 1 MG/DL (ref 0.6–1.2)
DEPRECATED RDW RBC AUTO: 49.1 FL (ref 37–54)
EGFRCR SERPLBLD CKD-EPI 2021: 79.5 ML/MIN/1.73
EOSINOPHIL # BLD AUTO: 0.68 10*3/MM3 (ref 0–0.4)
EOSINOPHIL NFR BLD AUTO: 7.6 % (ref 0.3–6.2)
ERYTHROCYTE [DISTWIDTH] IN BLOOD BY AUTOMATED COUNT: 15.3 % (ref 12.3–15.4)
GLUCOSE BLDC GLUCOMTR-MCNC: 115 MG/DL (ref 73–118)
HCT VFR BLD AUTO: 36 % (ref 37.5–51)
HGB BLD-MCNC: 10.7 G/DL (ref 13–17.7)
LYMPHOCYTES # BLD AUTO: 2.7 10*3/MM3 (ref 0.7–3.1)
LYMPHOCYTES NFR BLD AUTO: 30.1 % (ref 19.6–45.3)
MCH RBC QN AUTO: 26.6 PG (ref 26.6–33)
MCHC RBC AUTO-ENTMCNC: 29.7 G/DL (ref 31.5–35.7)
MCV RBC AUTO: 89.6 FL (ref 79–97)
MONOCYTES # BLD AUTO: 0.72 10*3/MM3 (ref 0.1–0.9)
MONOCYTES NFR BLD AUTO: 8 % (ref 5–12)
NEUTROPHILS NFR BLD AUTO: 4.84 10*3/MM3 (ref 1.7–7)
NEUTROPHILS NFR BLD AUTO: 54.1 % (ref 42.7–76)
PLATELET # BLD AUTO: 176 10*3/MM3 (ref 140–450)
PMV BLD AUTO: 12.5 FL (ref 6–12)
POC ALBUMIN: 3.3 G/L (ref 3.3–5.5)
POC ALT (SGPT): 27 U/L (ref 10–47)
POC AST (SGOT): 27 U/L (ref 11–38)
POC TOTAL BILIRUBIN: 0.5 MG/DL (ref 0.2–1.6)
POC TOTAL PROTEIN: 6.5 G/DL (ref 6.4–8.1)
POTASSIUM BLDA-SCNC: 3.9 MMOL/L (ref 3.6–5.1)
RBC # BLD AUTO: 4.02 10*6/MM3 (ref 4.14–5.8)
SODIUM BLD-SCNC: 145 MMOL/L (ref 128–145)
WBC NRBC COR # BLD AUTO: 8.96 10*3/MM3 (ref 3.4–10.8)

## 2024-05-08 PROCEDURE — 96413 CHEMO IV INFUSION 1 HR: CPT

## 2024-05-08 PROCEDURE — 0 DEXTROSE 5 % SOLUTION: Performed by: INTERNAL MEDICINE

## 2024-05-08 PROCEDURE — 96366 THER/PROPH/DIAG IV INF ADDON: CPT

## 2024-05-08 PROCEDURE — 96411 CHEMO IV PUSH ADDL DRUG: CPT

## 2024-05-08 PROCEDURE — 25810000003 SODIUM CHLORIDE 0.9 % SOLUTION 500 ML FLEX CONT: Performed by: INTERNAL MEDICINE

## 2024-05-08 PROCEDURE — 96367 TX/PROPH/DG ADDL SEQ IV INF: CPT

## 2024-05-08 PROCEDURE — 80053 COMPREHEN METABOLIC PANEL: CPT

## 2024-05-08 PROCEDURE — 0 DEXTROSE 5 % SOLUTION 250 ML FLEX CONT: Performed by: INTERNAL MEDICINE

## 2024-05-08 PROCEDURE — 96415 CHEMO IV INFUSION ADDL HR: CPT

## 2024-05-08 PROCEDURE — 96375 TX/PRO/DX INJ NEW DRUG ADDON: CPT

## 2024-05-08 PROCEDURE — 25010000002 OXALIPLATIN PER 0.5 MG: Performed by: INTERNAL MEDICINE

## 2024-05-08 PROCEDURE — 25010000002 PALONOSETRON 0.25 MG/5ML SOLUTION PREFILLED SYRINGE: Performed by: INTERNAL MEDICINE

## 2024-05-08 PROCEDURE — 25010000002 DEXAMETHASONE SODIUM PHOSPHATE 120 MG/30ML SOLUTION: Performed by: INTERNAL MEDICINE

## 2024-05-08 PROCEDURE — 25010000002 LEUCOVORIN 200 MG RECONSTITUTED SOLUTION 200 MG VIAL: Performed by: INTERNAL MEDICINE

## 2024-05-08 PROCEDURE — 25010000002 FLUOROURACIL PER 500 MG: Performed by: INTERNAL MEDICINE

## 2024-05-08 PROCEDURE — 25010000002 LEUCOVORIN 500 MG RECONSTITUTED SOLUTION 1 EACH VIAL: Performed by: INTERNAL MEDICINE

## 2024-05-08 PROCEDURE — G0498 CHEMO EXTEND IV INFUS W/PUMP: HCPCS

## 2024-05-08 PROCEDURE — 85025 COMPLETE CBC W/AUTO DIFF WBC: CPT | Performed by: INTERNAL MEDICINE

## 2024-05-08 PROCEDURE — 96368 THER/DIAG CONCURRENT INF: CPT

## 2024-05-08 RX ORDER — SODIUM CHLORIDE 0.9 % (FLUSH) 0.9 %
10 SYRINGE (ML) INJECTION AS NEEDED
Status: CANCELLED | OUTPATIENT
Start: 2024-05-08

## 2024-05-08 RX ORDER — HEPARIN SODIUM (PORCINE) LOCK FLUSH IV SOLN 100 UNIT/ML 100 UNIT/ML
500 SOLUTION INTRAVENOUS AS NEEDED
Status: CANCELLED | OUTPATIENT
Start: 2024-05-08

## 2024-05-08 RX ORDER — FAMOTIDINE 10 MG/ML
20 INJECTION, SOLUTION INTRAVENOUS AS NEEDED
Status: CANCELLED | OUTPATIENT
Start: 2024-05-08

## 2024-05-08 RX ORDER — DIPHENHYDRAMINE HYDROCHLORIDE 50 MG/ML
50 INJECTION INTRAMUSCULAR; INTRAVENOUS AS NEEDED
Status: CANCELLED | OUTPATIENT
Start: 2024-05-08

## 2024-05-08 RX ORDER — PALONOSETRON 0.05 MG/ML
0.25 INJECTION, SOLUTION INTRAVENOUS ONCE
Status: COMPLETED | OUTPATIENT
Start: 2024-05-08 | End: 2024-05-08

## 2024-05-08 RX ORDER — DEXTROSE MONOHYDRATE 50 MG/ML
20 INJECTION, SOLUTION INTRAVENOUS ONCE
Status: COMPLETED | OUTPATIENT
Start: 2024-05-08 | End: 2024-05-08

## 2024-05-08 RX ORDER — FLUOROURACIL 50 MG/ML
400 INJECTION, SOLUTION INTRAVENOUS ONCE
Status: COMPLETED | OUTPATIENT
Start: 2024-05-08 | End: 2024-05-08

## 2024-05-08 RX ADMIN — DEXTROSE MONOHYDRATE 20 ML/HR: 50 INJECTION, SOLUTION INTRAVENOUS at 08:44

## 2024-05-08 RX ADMIN — FLUOROURACIL 890 MG: 50 INJECTION, SOLUTION INTRAVENOUS at 11:42

## 2024-05-08 RX ADMIN — FLUOROURACIL 5330 MG: 50 INJECTION, SOLUTION INTRAVENOUS at 11:47

## 2024-05-08 RX ADMIN — DEXAMETHASONE SODIUM PHOSPHATE 12 MG: 4 INJECTION, SOLUTION INTRA-ARTICULAR; INTRALESIONAL; INTRAMUSCULAR; INTRAVENOUS; SOFT TISSUE at 08:47

## 2024-05-08 RX ADMIN — PALONOSETRON 0.25 MG: 0.25 INJECTION, SOLUTION INTRAVENOUS at 08:46

## 2024-05-08 RX ADMIN — OXALIPLATIN 190 MG: 100 INJECTION, SOLUTION, CONCENTRATE INTRAVENOUS at 09:33

## 2024-05-08 RX ADMIN — LEUCOVORIN CALCIUM 890 MG: 500 INJECTION, POWDER, LYOPHILIZED, FOR SOLUTION INTRAMUSCULAR; INTRAVENOUS at 09:30

## 2024-05-10 ENCOUNTER — HOSPITAL ENCOUNTER (OUTPATIENT)
Dept: ONCOLOGY | Facility: HOSPITAL | Age: 74
Discharge: HOME | End: 2024-05-10
Payer: MEDICARE

## 2024-05-10 DIAGNOSIS — C18.9 MALIGNANT NEOPLASM OF COLON, UNSPECIFIED PART OF COLON: Primary | ICD-10-CM

## 2024-05-10 DIAGNOSIS — Z95.828 PORT-A-CATH IN PLACE: ICD-10-CM

## 2024-05-10 PROCEDURE — 25010000002 HEPARIN LOCK FLUSH PER 10 UNITS: Performed by: INTERNAL MEDICINE

## 2024-05-10 RX ORDER — SODIUM CHLORIDE 0.9 % (FLUSH) 0.9 %
10 SYRINGE (ML) INJECTION AS NEEDED
Status: DISCONTINUED | OUTPATIENT
Start: 2024-05-10 | End: 2024-05-11 | Stop reason: HOSPADM

## 2024-05-10 RX ORDER — SODIUM CHLORIDE 0.9 % (FLUSH) 0.9 %
10 SYRINGE (ML) INJECTION AS NEEDED
OUTPATIENT
Start: 2024-05-10

## 2024-05-10 RX ORDER — HEPARIN SODIUM (PORCINE) LOCK FLUSH IV SOLN 100 UNIT/ML 100 UNIT/ML
500 SOLUTION INTRAVENOUS AS NEEDED
Status: DISCONTINUED | OUTPATIENT
Start: 2024-05-10 | End: 2024-05-11 | Stop reason: HOSPADM

## 2024-05-10 RX ORDER — HEPARIN SODIUM (PORCINE) LOCK FLUSH IV SOLN 100 UNIT/ML 100 UNIT/ML
500 SOLUTION INTRAVENOUS AS NEEDED
OUTPATIENT
Start: 2024-05-10

## 2024-05-10 RX ADMIN — HEPARIN 500 UNITS: 100 SYRINGE at 13:54

## 2024-05-10 RX ADMIN — Medication 10 ML: at 13:54

## 2024-05-17 NOTE — PROGRESS NOTES
HEMATOLOGY ONCOLOGY OUTPATIENT CONSULTATION       Patient name: Viktor Atkins  : 1950  MRN: [id number]  Primary Care Physician: Gera Manriquez MD  Referring Physician: No ref. provider found  Reason For Consult: Colon cancer      History of Present Illness:  Patient is a 74 y.o. male who presented to the hospital with acute GI bleed he had bright red blood per rectum and presented to the emergency room    3/25/2024 CT chest abdomen pelvis with findings compatible with constipation, sigmoid diverticulosis without diverticulitis  3/27/2024 colonoscopy with distal sigmoid mid colon mass biopsy obtained this was positive for invasive moderately differentiated adenocarcinoma MSI testing was done was negative intact nuclear expression of MMR proteins  3/30/2024 low anterior resection, diverting loop ileostomy by Dr. Burden tumor found in rectosigmoid upper rectum area.  Final pathology with all margins negative, grade 2 tumor moderately differentiated, tumor invades through muscularis propria into the pericolonic or perirectal tissue.  4 out of 22 lymph nodes positive  Final stage T4 N2    24 - FOLFOX        Subjective:  Had nausea, fatigue which has improved. No neuropathy, no diarrhea. Zofran helped with nausea.      Past Medical History:   Diagnosis Date    Arthritis     Benign essential HTN     Cancer     Diabetes mellitus     GERD (gastroesophageal reflux disease)     Hyperlipidemia, mixed     Palpitations        Past Surgical History:   Procedure Laterality Date    CARDIAC CATHETERIZATION      CHOLECYSTECTOMY      COLON RESECTION WITH ILEOSTOMY N/A 3/30/2024    Procedure: COLON RESECTION LOW ANTERIOR LAPAROSCOPIC, COLONAL ANASTOMOSIS, DIVERTING LOOP ILEOSTOMY WITH DAVINCI ROBOT;  Surgeon: Aakash Burden MD;  Location: [facility];  Service: Robotics - DaVinci;  Laterality: N/A;    COLONOSCOPY N/A 3/27/2024    Procedure: COLONOSCOPY with polypectomy x 18 and sigmoid colon mass  biopsies with endscopic spot tattooing;  Surgeon: Fili Quintero MD;  Location: [facility] ENDOSCOPY;  Service: Gastroenterology;  Laterality: N/A;  sigmoid mass at 25 cm    COLOSTOMY N/A 4/9/2024    Procedure: DIAGNOSTIC LAPAROSCOPY, DIVERTING OSTOMY, POSSIBLE REVISION OF ANASTAMOSIS;  Surgeon: Aakash Burden MD;  Location: [facility] MAIN OR;  Service: General;  Laterality: N/A;    KNEE SURGERY      SIGMOIDOSCOPY N/A 3/30/2024    Procedure: SIGMOIDOSCOPY;  Surgeon: Aakash Burden MD;  Location: [facility] MAIN OR;  Service: Gastroenterology;  Laterality: N/A;    VENOUS ACCESS DEVICE (PORT) INSERTION N/A 4/30/2024    Procedure: INSERTION VENOUS ACCESS DEVICE;  Surgeon: Aakash Burden MD;  Location: [facility] MAIN OR;  Service: General;  Laterality: N/A;         Current Outpatient Medications:     amiodarone (PACERONE) 200 MG tablet, Take 1 tablet by mouth Daily., Disp: , Rfl:     apixaban (ELIQUIS) 5 MG tablet tablet, Take 1 tablet by mouth Every 12 (Twelve) Hours. Indications: Atrial Fibrillation, Disp: 60 tablet, Rfl: 5    aspirin 81 MG chewable tablet, Chew 1 tablet Daily., Disp: , Rfl:     atorvastatin (LIPITOR) 20 MG tablet, Take 1 tablet by mouth Every Night., Disp: , Rfl:     dilTIAZem XR (DILACOR XR) 180 MG 24 hr capsule, Take 1 capsule by mouth Daily., Disp: 90 capsule, Rfl: 1    lidocaine-prilocaine (EMLA) 2.5-2.5 % cream, Apply 1 Application topically to the appropriate area as directed As Needed (apply 1 hour prior to port access)., Disp: 30 g, Rfl: 2    metFORMIN (GLUCOPHAGE) 500 MG tablet, Take 1 tablet by mouth Daily With Breakfast., Disp: , Rfl:     metoprolol succinate XL (TOPROL-XL) 50 MG 24 hr tablet, Take 1 tablet by mouth Daily., Disp: , Rfl:     Multiple Vitamins-Minerals (MULTIVITAMIN ADULT PO), Take 1 tablet by mouth Daily., Disp: , Rfl:     Omeprazole (EQL Omeprazole) 20 MG tablet delayed-release, 20 mg Daily., Disp: , Rfl:     ondansetron (ZOFRAN) 8 MG tablet, Take 1 tablet by mouth 3 (Three) Times a  Day As Needed for Nausea or Vomiting., Disp: 30 tablet, Rfl: 5    Scopolamine 1 MG/3DAYS patch, Place 1 patch on the skin as directed by provider Every 72 (Seventy-Two) Hours., Disp: 4 patch, Rfl: 1    sertraline (Zoloft) 25 MG tablet, 1 tablet Daily., Disp: , Rfl:     amoxicillin-clavulanate (AUGMENTIN) 875-125 MG per tablet, Take 1 tablet by mouth 2 (Two) Times a Day. (Patient not taking: Reported on 5/20/2024), Disp: 10 tablet, Rfl: 0  No current facility-administered medications for this visit.    Facility-Administered Medications Ordered in Other Visits:     heparin injection 500 Units, 500 Units, Intravenous, PRN, Aakash Robin MD, 500 Units at 05/21/24 1056    sodium chloride 0.9 % flush 10 mL, 10 mL, Intravenous, PRN, Aakash Robin MD, 10 mL at 05/21/24 1054    No Known Allergies    No family history on file.    Cancer-related family history is not on file.      Social History     Tobacco Use    Smoking status: Former    Smokeless tobacco: Never   Vaping Use    Vaping status: Never Used   Substance Use Topics    Alcohol use: Never    Drug use: Never     Social History     Social History Narrative    Not on file       ROS:   Review of Systems   Constitutional:  Negative for fatigue and fever.   HENT:  Negative for congestion and nosebleeds.    Eyes:  Negative for pain and itching.   Respiratory:  Negative for cough and shortness of breath.    Cardiovascular:  Negative for chest pain.   Gastrointestinal:  Negative for abdominal pain, blood in stool, diarrhea, nausea and vomiting.   Endocrine: Negative for cold intolerance and heat intolerance.   Genitourinary:  Negative for difficulty urinating.   Musculoskeletal:  Negative for arthralgias.   Skin:  Negative for rash.   Neurological:  Negative for dizziness and headaches.   Hematological:  Does not bruise/bleed easily.   Psychiatric/Behavioral:  Negative for behavioral problems.          Objective:    Vital Signs:  Vitals:    05/21/24 1121   BP: 134/85  "  Pulse: 88   Resp: 16   Temp: 97.8 °F (36.6 °C)   SpO2: 97%   Weight: 101 kg (223 lb 3.2 oz)   Height: 180.3 cm (71\")   PainSc: 0-No pain       Body mass index is 31.13 kg/m².    ECOG  (0) Fully active, able to carry on all predisease performance without restriction    Physical Exam:   Physical Exam  Constitutional:       Appearance: Normal appearance.   HENT:      Head: Normocephalic and atraumatic.   Eyes:      Pupils: Pupils are equal, round, and reactive to light.   Cardiovascular:      Rate and Rhythm: Normal rate and regular rhythm.      Pulses: Normal pulses.      Heart sounds: No murmur heard.  Pulmonary:      Effort: Pulmonary effort is normal.      Breath sounds: Normal breath sounds.   Abdominal:      General: There is no distension.      Palpations: Abdomen is soft. There is no mass.      Tenderness: There is no abdominal tenderness.   Musculoskeletal:         General: Normal range of motion.      Cervical back: Normal range of motion and neck supple.   Skin:     General: Skin is warm.   Neurological:      General: No focal deficit present.      Mental Status: He is alert.   Psychiatric:         Mood and Affect: Mood normal.         Lab Results - Last 18 Months   Lab Units 05/21/24  1056 05/08/24  0803 04/19/24  0138   WBC 10*3/mm3 6.60 8.96 12.84*   HEMOGLOBIN g/dL 12.1* 10.7* 10.9*   HEMATOCRIT % 39.9 36.0* 35.1*   PLATELETS 10*3/mm3 227 176 272   MCV fL 86.6 89.6 88.2     Lab Results - Last 18 Months   Lab Units 05/08/24  0808 04/19/24  0138 04/18/24  0130 04/17/24  0045   SODIUM mmol/L  --  143 137 139   POTASSIUM mmol/L  --  4.3 4.0 4.2   CHLORIDE mmol/L  --  112* 106 107   CO2 mmol/L  --  19.0* 21.0* 18.0*   BUN mg/dL  --  18 23 21   CREATININE mg/dL 1.00 1.00 1.13 1.15   CALCIUM mg/dL  --  8.6 8.6 8.8   BILIRUBIN mg/dL  --  0.2 0.2 0.3   ALK PHOS U/L  --  109 94 103   ALT (SGPT) U/L  --  25 25 32   AST (SGOT) U/L  --  23 19 24   GLUCOSE mg/dL  --  109* 103* 102*       Lab Results   Component " "Value Date    GLUCOSE 109 (H) 04/19/2024    BUN 18 04/19/2024    CREATININE 1.00 05/08/2024    BCR 18.0 04/19/2024    K 4.3 04/19/2024    CO2 19.0 (L) 04/19/2024    CALCIUM 8.6 04/19/2024    ALBUMIN 3.3 (L) 04/19/2024    LABIL2 1.5 10/29/2018    AST 23 04/19/2024    ALT 25 04/19/2024       No results for input(s): \"APTT\", \"INR\", \"PTT\" in the last 43054 hours.    Lab Results   Component Value Date    IRON 22 (L) 04/09/2024    TIBC 235 (L) 04/09/2024    FERRITIN 281.40 04/09/2024       No results found for: \"FOLATE\"    No results found for: \"OCCULTBLD\"    No results found for: \"RETICCTPCT\"  No results found for: \"RYBMQVIX77\"  No results found for: \"SPEP\", \"UPEP\"  No results found for: \"LDH\", \"URICACID\"  No results found for: \"ARON\", \"RF\", \"SEDRATE\"  No results found for: \"FIBRINOGEN\", \"HAPTOGLOBIN\"  Lab Results   Component Value Date    PTT 27.6 04/30/2018     No results found for: \"\"  Lab Results   Component Value Date    CEA 5.21 03/27/2024     No components found for: \"CA-19-9\"  No results found for: \"PSA\"  No results found for: \"SEDRATE\"       Assessment & Plan     Patient is a 74-year-old male with sigmoid-rectal cancer status post sigmoid colectomy, low anterior resection with lymph node positive disease he is here to discuss adjuvant treatment options    Colon cancer  T4 N2 disease stage IIIc  Discussed risk of recurrence, prognosis discussed adjuvant treatment with FOLFOX versus XELOX for 6 months of treatment with idea trial patients who had N2 disease or T4 disease had inferior outcomes with 3 months of treatment as compared to 6 months of treatment after discussion we decided to pursue 6 months of FOLFOX adjuvant treatment  Now started treatment biggest side effect was fatigue. Discussed dose reduction vs continuing same for now we decided will continue same dose. Will address again in 2 weeks.   Same treatment for now.    Anemia  Hemoglobin was 10.9 last  This was likely malignancy related monitor with " treatment consider iron if needed    CINV  Zofran helps continue the same  Will add emend if worsening.    Thank you very much for providing the opportunity to participate in this patient’s care. Please do not hesitate to call if there are any other questions.  Time spent on encounter including record review, history taking, exam, discussion, counseling and documentation at: 40 minutes

## 2024-05-20 ENCOUNTER — OFFICE VISIT (OUTPATIENT)
Age: 74
End: 2024-05-20
Payer: MEDICARE

## 2024-05-20 ENCOUNTER — TELEPHONE (OUTPATIENT)
Dept: CARDIOLOGY | Facility: CLINIC | Age: 74
End: 2024-05-20
Payer: MEDICARE

## 2024-05-20 VITALS
SYSTOLIC BLOOD PRESSURE: 152 MMHG | OXYGEN SATURATION: 98 % | TEMPERATURE: 98.4 F | HEART RATE: 54 BPM | BODY MASS INDEX: 31.36 KG/M2 | WEIGHT: 224 LBS | DIASTOLIC BLOOD PRESSURE: 70 MMHG | HEIGHT: 71 IN

## 2024-05-20 DIAGNOSIS — C18.7 MALIGNANT NEOPLASM OF SIGMOID COLON: Primary | ICD-10-CM

## 2024-05-20 PROCEDURE — 1160F RVW MEDS BY RX/DR IN RCRD: CPT | Performed by: STUDENT IN AN ORGANIZED HEALTH CARE EDUCATION/TRAINING PROGRAM

## 2024-05-20 PROCEDURE — 99024 POSTOP FOLLOW-UP VISIT: CPT | Performed by: STUDENT IN AN ORGANIZED HEALTH CARE EDUCATION/TRAINING PROGRAM

## 2024-05-20 PROCEDURE — 3077F SYST BP >= 140 MM HG: CPT | Performed by: STUDENT IN AN ORGANIZED HEALTH CARE EDUCATION/TRAINING PROGRAM

## 2024-05-20 PROCEDURE — 1159F MED LIST DOCD IN RCRD: CPT | Performed by: STUDENT IN AN ORGANIZED HEALTH CARE EDUCATION/TRAINING PROGRAM

## 2024-05-20 PROCEDURE — 3078F DIAST BP <80 MM HG: CPT | Performed by: STUDENT IN AN ORGANIZED HEALTH CARE EDUCATION/TRAINING PROGRAM

## 2024-05-20 RX ORDER — AMIODARONE HYDROCHLORIDE 200 MG/1
200 TABLET ORAL DAILY
COMMUNITY

## 2024-05-20 RX ORDER — METOPROLOL SUCCINATE 50 MG/1
50 TABLET, EXTENDED RELEASE ORAL DAILY
COMMUNITY

## 2024-05-20 NOTE — TELEPHONE ENCOUNTER
Caller: Viktor Atkins    Relationship: Self    Best call back number:  [phone]      PATIENT CALLED IN STATING FOR THE LAST COUPLE OF DAYS HIS HEART RATE HAS BEEN IN THE MID TO LOW 40S.  PLEASE REACH OUT TO DISCUSS.

## 2024-05-20 NOTE — PROGRESS NOTES
Colorectal Surgery Followup Note    ID:  Viktor Atkins;   : 1950  DATE OF VISIT: 2024    Chief Complaint  Follow-up (Malignant neoplasm of sigmoid colon)       Subjective     Mr. Atkins has started his chemotherapy.  He has had 2 infusions.  He is currently doing well.  He denies any fevers or chills.  Reports good appetite and improving energy level.  He has been tolerating diet well.  His ostomy is functioning.      Exam  General:  No acute distress  Head: Normocephalic, atraumatic  Neuro: Alert and oriented     Abdomen:  Soft, non-tender, non-distended, no hernias, colostomy functioning. Pfannenstiel incision well healed and completely closed.    Assessment  - 73 yo M who presented initially with GI bleed and was found to have rectosigmoid mass. He underwent a robotic low anterior resection with primary anastomosis. His path was [id number] .      Plan / Recommendations  -Continue with chemotherapy  -After completion of chemotherapy will discuss closure of colostomy  -He will need repeat colonoscopy in a year after his surgery.  -Follow-up in office in 5 months        Aakash Burden MD  Colon and Rectal Surgery   Jackie Yadav

## 2024-05-21 ENCOUNTER — OFFICE VISIT (OUTPATIENT)
Dept: ONCOLOGY | Facility: CLINIC | Age: 74
End: 2024-05-21
Payer: MEDICARE

## 2024-05-21 ENCOUNTER — HOSPITAL ENCOUNTER (OUTPATIENT)
Dept: ONCOLOGY | Facility: HOSPITAL | Age: 74
Discharge: HOME | End: 2024-05-21
Admitting: INTERNAL MEDICINE
Payer: MEDICARE

## 2024-05-21 VITALS
OXYGEN SATURATION: 97 % | SYSTOLIC BLOOD PRESSURE: 134 MMHG | HEART RATE: 88 BPM | HEIGHT: 71 IN | TEMPERATURE: 97.8 F | WEIGHT: 223.2 LBS | DIASTOLIC BLOOD PRESSURE: 85 MMHG | RESPIRATION RATE: 16 BRPM | BODY MASS INDEX: 31.25 KG/M2

## 2024-05-21 DIAGNOSIS — C18.9 MALIGNANT NEOPLASM OF COLON, UNSPECIFIED PART OF COLON: Primary | ICD-10-CM

## 2024-05-21 DIAGNOSIS — C19 RECTOSIGMOID CANCER: Primary | ICD-10-CM

## 2024-05-21 DIAGNOSIS — Z95.828 PORT-A-CATH IN PLACE: ICD-10-CM

## 2024-05-21 DIAGNOSIS — C18.9 MALIGNANT NEOPLASM OF COLON, UNSPECIFIED PART OF COLON: ICD-10-CM

## 2024-05-21 LAB
ALBUMIN SERPL-MCNC: 4 G/DL (ref 3.5–5.2)
ALBUMIN/GLOB SERPL: 1.4 G/DL
ALP SERPL-CCNC: 126 U/L (ref 39–117)
ALT SERPL W P-5'-P-CCNC: 39 U/L (ref 1–41)
ANION GAP SERPL CALCULATED.3IONS-SCNC: 11.7 MMOL/L (ref 5–15)
AST SERPL-CCNC: 37 U/L (ref 1–40)
BASOPHILS # BLD AUTO: 0.03 10*3/MM3 (ref 0–0.2)
BASOPHILS NFR BLD AUTO: 0.5 % (ref 0–1.5)
BILIRUB SERPL-MCNC: 0.4 MG/DL (ref 0–1.2)
BUN SERPL-MCNC: 14 MG/DL (ref 8–23)
BUN/CREAT SERPL: 13.5 (ref 7–25)
CALCIUM SPEC-SCNC: 9.3 MG/DL (ref 8.6–10.5)
CEA SERPL-MCNC: 1.7 NG/ML
CHLORIDE SERPL-SCNC: 103 MMOL/L (ref 98–107)
CO2 SERPL-SCNC: 23.3 MMOL/L (ref 22–29)
CREAT SERPL-MCNC: 1.04 MG/DL (ref 0.76–1.27)
DEPRECATED RDW RBC AUTO: 48.6 FL (ref 37–54)
EGFRCR SERPLBLD CKD-EPI 2021: 75.8 ML/MIN/1.73
EOSINOPHIL # BLD AUTO: 0.09 10*3/MM3 (ref 0–0.4)
EOSINOPHIL NFR BLD AUTO: 1.4 % (ref 0.3–6.2)
ERYTHROCYTE [DISTWIDTH] IN BLOOD BY AUTOMATED COUNT: 15.9 % (ref 12.3–15.4)
GLOBULIN UR ELPH-MCNC: 2.9 GM/DL
GLUCOSE SERPL-MCNC: 125 MG/DL (ref 65–99)
HCT VFR BLD AUTO: 39.9 % (ref 37.5–51)
HGB BLD-MCNC: 12.1 G/DL (ref 13–17.7)
LYMPHOCYTES # BLD AUTO: 2.44 10*3/MM3 (ref 0.7–3.1)
LYMPHOCYTES NFR BLD AUTO: 37 % (ref 19.6–45.3)
MCH RBC QN AUTO: 26.2 PG (ref 26.6–33)
MCHC RBC AUTO-ENTMCNC: 30.3 G/DL (ref 31.5–35.7)
MCV RBC AUTO: 86.6 FL (ref 79–97)
MONOCYTES # BLD AUTO: 0.79 10*3/MM3 (ref 0.1–0.9)
MONOCYTES NFR BLD AUTO: 12 % (ref 5–12)
NEUTROPHILS NFR BLD AUTO: 3.25 10*3/MM3 (ref 1.7–7)
NEUTROPHILS NFR BLD AUTO: 49.1 % (ref 42.7–76)
PLATELET # BLD AUTO: 227 10*3/MM3 (ref 140–450)
PMV BLD AUTO: 11.3 FL (ref 6–12)
POTASSIUM SERPL-SCNC: 3.8 MMOL/L (ref 3.5–5.2)
PROT SERPL-MCNC: 6.9 G/DL (ref 6–8.5)
RBC # BLD AUTO: 4.61 10*6/MM3 (ref 4.14–5.8)
SODIUM SERPL-SCNC: 138 MMOL/L (ref 136–145)
WBC NRBC COR # BLD AUTO: 6.6 10*3/MM3 (ref 3.4–10.8)

## 2024-05-21 PROCEDURE — 1126F AMNT PAIN NOTED NONE PRSNT: CPT | Performed by: INTERNAL MEDICINE

## 2024-05-21 PROCEDURE — 1159F MED LIST DOCD IN RCRD: CPT | Performed by: INTERNAL MEDICINE

## 2024-05-21 PROCEDURE — 25010000002 HEPARIN LOCK FLUSH PER 10 UNITS: Performed by: INTERNAL MEDICINE

## 2024-05-21 PROCEDURE — 3075F SYST BP GE 130 - 139MM HG: CPT | Performed by: INTERNAL MEDICINE

## 2024-05-21 PROCEDURE — 85025 COMPLETE CBC W/AUTO DIFF WBC: CPT | Performed by: INTERNAL MEDICINE

## 2024-05-21 PROCEDURE — 3079F DIAST BP 80-89 MM HG: CPT | Performed by: INTERNAL MEDICINE

## 2024-05-21 PROCEDURE — 1160F RVW MEDS BY RX/DR IN RCRD: CPT | Performed by: INTERNAL MEDICINE

## 2024-05-21 PROCEDURE — 82378 CARCINOEMBRYONIC ANTIGEN: CPT | Performed by: INTERNAL MEDICINE

## 2024-05-21 PROCEDURE — 36591 DRAW BLOOD OFF VENOUS DEVICE: CPT

## 2024-05-21 PROCEDURE — 99215 OFFICE O/P EST HI 40 MIN: CPT | Performed by: INTERNAL MEDICINE

## 2024-05-21 PROCEDURE — 80053 COMPREHEN METABOLIC PANEL: CPT | Performed by: INTERNAL MEDICINE

## 2024-05-21 RX ORDER — HEPARIN SODIUM (PORCINE) LOCK FLUSH IV SOLN 100 UNIT/ML 100 UNIT/ML
500 SOLUTION INTRAVENOUS AS NEEDED
Status: CANCELLED | OUTPATIENT
Start: 2024-05-21

## 2024-05-21 RX ORDER — PALONOSETRON 0.05 MG/ML
0.25 INJECTION, SOLUTION INTRAVENOUS ONCE
Status: CANCELLED | OUTPATIENT
Start: 2024-05-21

## 2024-05-21 RX ORDER — SODIUM CHLORIDE 0.9 % (FLUSH) 0.9 %
10 SYRINGE (ML) INJECTION AS NEEDED
Status: CANCELLED | OUTPATIENT
Start: 2024-05-21

## 2024-05-21 RX ORDER — DIPHENHYDRAMINE HYDROCHLORIDE 50 MG/ML
50 INJECTION INTRAMUSCULAR; INTRAVENOUS AS NEEDED
Status: CANCELLED | OUTPATIENT
Start: 2024-05-21

## 2024-05-21 RX ORDER — FLUOROURACIL 50 MG/ML
400 INJECTION, SOLUTION INTRAVENOUS ONCE
Status: CANCELLED | OUTPATIENT
Start: 2024-05-21

## 2024-05-21 RX ORDER — SODIUM CHLORIDE 0.9 % (FLUSH) 0.9 %
10 SYRINGE (ML) INJECTION AS NEEDED
Status: DISCONTINUED | OUTPATIENT
Start: 2024-05-21 | End: 2024-05-22 | Stop reason: HOSPADM

## 2024-05-21 RX ORDER — DEXTROSE MONOHYDRATE 50 MG/ML
20 INJECTION, SOLUTION INTRAVENOUS ONCE
Status: CANCELLED | OUTPATIENT
Start: 2024-05-21

## 2024-05-21 RX ORDER — HEPARIN SODIUM (PORCINE) LOCK FLUSH IV SOLN 100 UNIT/ML 100 UNIT/ML
500 SOLUTION INTRAVENOUS AS NEEDED
Status: DISCONTINUED | OUTPATIENT
Start: 2024-05-21 | End: 2024-05-22 | Stop reason: HOSPADM

## 2024-05-21 RX ORDER — FAMOTIDINE 10 MG/ML
20 INJECTION, SOLUTION INTRAVENOUS AS NEEDED
Status: CANCELLED | OUTPATIENT
Start: 2024-05-21

## 2024-05-21 RX ADMIN — HEPARIN 500 UNITS: 100 SYRINGE at 10:56

## 2024-05-21 RX ADMIN — Medication 10 ML: at 10:54

## 2024-05-21 NOTE — PROGRESS NOTES
Port accessed and flushed with good blood return noted.Labs collected from port as ordered. Port flushed with saline and heparin prior to needle removal..

## 2024-05-22 ENCOUNTER — HOSPITAL ENCOUNTER (OUTPATIENT)
Dept: ONCOLOGY | Facility: HOSPITAL | Age: 74
Discharge: HOME | End: 2024-05-22
Admitting: INTERNAL MEDICINE
Payer: MEDICARE

## 2024-05-22 VITALS
HEART RATE: 91 BPM | HEIGHT: 71 IN | TEMPERATURE: 97.8 F | RESPIRATION RATE: 16 BRPM | OXYGEN SATURATION: 97 % | DIASTOLIC BLOOD PRESSURE: 80 MMHG | BODY MASS INDEX: 31.61 KG/M2 | WEIGHT: 225.8 LBS | SYSTOLIC BLOOD PRESSURE: 126 MMHG

## 2024-05-22 DIAGNOSIS — C18.9 MALIGNANT NEOPLASM OF COLON, UNSPECIFIED PART OF COLON: Primary | ICD-10-CM

## 2024-05-22 PROCEDURE — 96367 TX/PROPH/DG ADDL SEQ IV INF: CPT

## 2024-05-22 PROCEDURE — 25010000002 LEUCOVORIN 500 MG RECONSTITUTED SOLUTION 1 EACH VIAL: Performed by: INTERNAL MEDICINE

## 2024-05-22 PROCEDURE — 25010000002 FLUOROURACIL PER 500 MG: Performed by: INTERNAL MEDICINE

## 2024-05-22 PROCEDURE — 96411 CHEMO IV PUSH ADDL DRUG: CPT

## 2024-05-22 PROCEDURE — 96416 CHEMO PROLONG INFUSE W/PUMP: CPT

## 2024-05-22 PROCEDURE — 0 DEXTROSE 5 % SOLUTION: Performed by: INTERNAL MEDICINE

## 2024-05-22 PROCEDURE — 25810000003 SODIUM CHLORIDE 0.9 % SOLUTION 500 ML FLEX CONT: Performed by: INTERNAL MEDICINE

## 2024-05-22 PROCEDURE — 25010000002 PALONOSETRON 0.25 MG/5ML SOLUTION PREFILLED SYRINGE: Performed by: INTERNAL MEDICINE

## 2024-05-22 PROCEDURE — 0 DEXTROSE 5 % SOLUTION 250 ML FLEX CONT: Performed by: INTERNAL MEDICINE

## 2024-05-22 PROCEDURE — 96375 TX/PRO/DX INJ NEW DRUG ADDON: CPT

## 2024-05-22 PROCEDURE — 96413 CHEMO IV INFUSION 1 HR: CPT

## 2024-05-22 PROCEDURE — 96415 CHEMO IV INFUSION ADDL HR: CPT

## 2024-05-22 PROCEDURE — 96368 THER/DIAG CONCURRENT INF: CPT

## 2024-05-22 PROCEDURE — 96366 THER/PROPH/DIAG IV INF ADDON: CPT

## 2024-05-22 PROCEDURE — 25010000002 DEXAMETHASONE SODIUM PHOSPHATE 120 MG/30ML SOLUTION: Performed by: INTERNAL MEDICINE

## 2024-05-22 PROCEDURE — 25010000002 LEUCOVORIN 200 MG RECONSTITUTED SOLUTION 200 MG VIAL: Performed by: INTERNAL MEDICINE

## 2024-05-22 PROCEDURE — 25010000002 OXALIPLATIN PER 0.5 MG: Performed by: INTERNAL MEDICINE

## 2024-05-22 RX ORDER — DEXTROSE MONOHYDRATE 50 MG/ML
20 INJECTION, SOLUTION INTRAVENOUS ONCE
Status: COMPLETED | OUTPATIENT
Start: 2024-05-22 | End: 2024-05-22

## 2024-05-22 RX ORDER — PALONOSETRON 0.05 MG/ML
0.25 INJECTION, SOLUTION INTRAVENOUS ONCE
Status: COMPLETED | OUTPATIENT
Start: 2024-05-22 | End: 2024-05-22

## 2024-05-22 RX ORDER — FLUOROURACIL 50 MG/ML
400 INJECTION, SOLUTION INTRAVENOUS ONCE
Status: COMPLETED | OUTPATIENT
Start: 2024-05-22 | End: 2024-05-22

## 2024-05-22 RX ADMIN — FLUOROURACIL 890 MG: 50 INJECTION, SOLUTION INTRAVENOUS at 10:52

## 2024-05-22 RX ADMIN — LEUCOVORIN CALCIUM 890 MG: 500 INJECTION, POWDER, LYOPHILIZED, FOR SOLUTION INTRAMUSCULAR; INTRAVENOUS at 08:39

## 2024-05-22 RX ADMIN — DEXTROSE MONOHYDRATE 20 ML/HR: 50 INJECTION, SOLUTION INTRAVENOUS at 08:10

## 2024-05-22 RX ADMIN — FLUOROURACIL 5330 MG: 50 INJECTION, SOLUTION INTRAVENOUS at 10:57

## 2024-05-22 RX ADMIN — DEXAMETHASONE SODIUM PHOSPHATE 12 MG: 4 INJECTION, SOLUTION INTRA-ARTICULAR; INTRALESIONAL; INTRAMUSCULAR; INTRAVENOUS; SOFT TISSUE at 08:14

## 2024-05-22 RX ADMIN — PALONOSETRON 0.25 MG: 0.25 INJECTION, SOLUTION INTRAVENOUS at 08:12

## 2024-05-22 RX ADMIN — OXALIPLATIN 190 MG: 5 INJECTION, SOLUTION INTRAVENOUS at 08:40

## 2024-05-22 NOTE — PROGRESS NOTES
Pt here for C2 D1 folfox. Using sterile technique, pt's port accessed for treatment. Pt seen by Dr Robin yesterday and had labs collected. Labs within normal parameters for treatment. Treatment given without pt having any issues, he tolerated it well. He will return for chemo pump unhook on Fri, 5/24.

## 2024-05-24 ENCOUNTER — HOSPITAL ENCOUNTER (OUTPATIENT)
Dept: ONCOLOGY | Facility: HOSPITAL | Age: 74
Discharge: HOME | End: 2024-05-24
Payer: MEDICARE

## 2024-05-24 DIAGNOSIS — C18.9 MALIGNANT NEOPLASM OF COLON, UNSPECIFIED PART OF COLON: Primary | ICD-10-CM

## 2024-05-24 DIAGNOSIS — Z95.828 PORT-A-CATH IN PLACE: ICD-10-CM

## 2024-05-24 PROCEDURE — 25010000002 HEPARIN LOCK FLUSH PER 10 UNITS: Performed by: INTERNAL MEDICINE

## 2024-05-24 RX ORDER — HEPARIN SODIUM (PORCINE) LOCK FLUSH IV SOLN 100 UNIT/ML 100 UNIT/ML
500 SOLUTION INTRAVENOUS AS NEEDED
Status: DISCONTINUED | OUTPATIENT
Start: 2024-05-24 | End: 2024-05-25 | Stop reason: HOSPADM

## 2024-05-24 RX ORDER — HEPARIN SODIUM (PORCINE) LOCK FLUSH IV SOLN 100 UNIT/ML 100 UNIT/ML
500 SOLUTION INTRAVENOUS AS NEEDED
OUTPATIENT
Start: 2024-05-24

## 2024-05-24 RX ORDER — SODIUM CHLORIDE 0.9 % (FLUSH) 0.9 %
10 SYRINGE (ML) INJECTION AS NEEDED
OUTPATIENT
Start: 2024-05-24

## 2024-05-24 RX ORDER — SODIUM CHLORIDE 0.9 % (FLUSH) 0.9 %
10 SYRINGE (ML) INJECTION AS NEEDED
Status: DISCONTINUED | OUTPATIENT
Start: 2024-05-24 | End: 2024-05-25 | Stop reason: HOSPADM

## 2024-05-24 RX ADMIN — HEPARIN 500 UNITS: 100 SYRINGE at 13:11

## 2024-05-24 RX ADMIN — Medication 10 ML: at 13:11

## 2024-05-24 NOTE — PROGRESS NOTES
Pt here for Adrucil pump DC. Pump empty. Site clean, dry, and intact.  Pt denies having any issues. Port flushed with good blood return noted.  Port flushed with saline and heparin prior to needle removal.  Pt given AVS and d/c home.

## 2024-05-29 ENCOUNTER — TELEPHONE (OUTPATIENT)
Dept: CARDIOLOGY | Facility: CLINIC | Age: 74
End: 2024-05-29
Payer: MEDICARE

## 2024-05-29 RX ORDER — AMIODARONE HYDROCHLORIDE 200 MG/1
200 TABLET ORAL DAILY
Qty: 90 TABLET | Refills: 1 | Status: SHIPPED | OUTPATIENT
Start: 2024-05-29

## 2024-05-29 NOTE — TELEPHONE ENCOUNTER
Caller: [facility] - [place], [place] - [address] - [phone] [facility] [phone] FX MUNIRA WITH [facility]     Relationship: Pharmacy    Best call back number: [phone]    Requested Prescriptions:   Requested Prescriptions     Pending Prescriptions Disp Refills    amiodarone (PACERONE) 200 MG tablet       Sig: Take 1 tablet by mouth Daily.        Pharmacy where request should be sent: [facility] - [place], [place] - [address] - [phone] [facility] [phone] FX     Last office visit with prescribing clinician: Visit date not found   Last telemedicine visit with prescribing clinician: Visit date not found   Next office visit with prescribing clinician: Visit date not found     Additional details provided by patient: MUNIRA WITH [facility] FOLLOWING UP ON REFILL REQUEST - NO REQUEST SHOWING AND SHE STATES SHE IS UNSURE HOW MUCH MEDICATION PATIENT HAS - THEY ARE REQUESTING 90 DAY REFILLS    Regulo Herrera Rep   05/29/24 12:00 EDT

## 2024-05-31 NOTE — PROGRESS NOTES
HEMATOLOGY ONCOLOGY OUTPATIENT FOLLOW UP       Patient name: Viktor Atkins  : 1950  MRN: [id number]  Primary Care Physician: Gera Manriquez MD  Referring Physician: Gera Manriquez MD  Reason For Consult: Colon cancer      History of Present Illness:  Patient is a 74 y.o. male who presented to the hospital with acute GI bleed he had bright red blood per rectum and presented to the emergency room    3/25/2024 CT chest abdomen pelvis with findings compatible with constipation, sigmoid diverticulosis without diverticulitis  3/27/2024 colonoscopy with distal sigmoid mid colon mass biopsy obtained this was positive for invasive moderately differentiated adenocarcinoma MSI testing was done was negative intact nuclear expression of MMR proteins  3/30/2024 low anterior resection, diverting loop ileostomy by Dr. Burden tumor found in rectosigmoid upper rectum area.  Final pathology with all margins negative, grade 2 tumor moderately differentiated, tumor invades through muscularis propria into the pericolonic or perirectal tissue.  4 out of 22 lymph nodes positive  Final stage T4 N2    24 - FOLFOX  24 - FOLFOX       Subjective:  Nausea was mild, neuropathy has resolved. No other new symptoms.       Past Medical History:   Diagnosis Date    Arthritis     Benign essential HTN     Cancer     Diabetes mellitus     GERD (gastroesophageal reflux disease)     Hyperlipidemia, mixed     Palpitations        Past Surgical History:   Procedure Laterality Date    CARDIAC CATHETERIZATION      CHOLECYSTECTOMY      COLON RESECTION WITH ILEOSTOMY N/A 3/30/2024    Procedure: COLON RESECTION LOW ANTERIOR LAPAROSCOPIC, COLONAL ANASTOMOSIS, DIVERTING LOOP ILEOSTOMY WITH DAVINCI ROBOT;  Surgeon: Aakash Burden MD;  Location: [facility];  Service: Robotics - DaVinci;  Laterality: N/A;    COLONOSCOPY N/A 3/27/2024    Procedure: COLONOSCOPY with polypectomy x 18 and sigmoid colon mass biopsies with  endscopic spot tattooing;  Surgeon: Fili Quintero MD;  Location: [facility] ENDOSCOPY;  Service: Gastroenterology;  Laterality: N/A;  sigmoid mass at 25 cm    COLOSTOMY N/A 4/9/2024    Procedure: DIAGNOSTIC LAPAROSCOPY, DIVERTING OSTOMY, POSSIBLE REVISION OF ANASTAMOSIS;  Surgeon: Aakash Burden MD;  Location: [facility] MAIN OR;  Service: General;  Laterality: N/A;    KNEE SURGERY      SIGMOIDOSCOPY N/A 3/30/2024    Procedure: SIGMOIDOSCOPY;  Surgeon: Aakash Budren MD;  Location: [facility] MAIN OR;  Service: Gastroenterology;  Laterality: N/A;    VENOUS ACCESS DEVICE (PORT) INSERTION N/A 4/30/2024    Procedure: INSERTION VENOUS ACCESS DEVICE;  Surgeon: Aakash Burden MD;  Location: [facility] MAIN OR;  Service: General;  Laterality: N/A;         Current Outpatient Medications:     amiodarone (PACERONE) 200 MG tablet, Take 1 tablet by mouth Daily., Disp: 90 tablet, Rfl: 1    amLODIPine (NORVASC) 5 MG tablet, Take 1 tablet by mouth Daily., Disp: 30 tablet, Rfl: 2    apixaban (ELIQUIS) 5 MG tablet tablet, Take 1 tablet by mouth Every 12 (Twelve) Hours. Taking currently but switching to xarelto in about a month, Disp: , Rfl:     atorvastatin (LIPITOR) 20 MG tablet, Take 1 tablet by mouth Every Night., Disp: , Rfl:     dilTIAZem XR (DILACOR XR) 180 MG 24 hr capsule, Take 1 capsule by mouth Daily. (Patient not taking: Reported on 6/5/2024), Disp: 90 capsule, Rfl: 1    metFORMIN (GLUCOPHAGE) 500 MG tablet, Take 1 tablet by mouth Daily With Breakfast., Disp: , Rfl:     metoprolol succinate XL (TOPROL-XL) 50 MG 24 hr tablet, Take 1 tablet by mouth Daily., Disp: , Rfl:     Multiple Vitamins-Minerals (MULTIVITAMIN ADULT PO), Take 1 tablet by mouth Daily., Disp: , Rfl:     Omeprazole (EQL Omeprazole) 20 MG tablet delayed-release, 20 mg Daily., Disp: , Rfl:     ondansetron (ZOFRAN) 8 MG tablet, Take 1 tablet by mouth 3 (Three) Times a Day As Needed for Nausea or Vomiting., Disp: 30 tablet, Rfl: 5    rivaroxaban (XARELTO) 20 MG tablet,  Take 1 tablet by mouth Daily. (Patient taking differently: Take 1 tablet by mouth Daily. Taking eliquis right now but was told he was going to be switched to this medication), Disp: 90 tablet, Rfl: 1    sertraline (Zoloft) 25 MG tablet, 1 tablet Daily., Disp: , Rfl:   No current facility-administered medications for this visit.    Facility-Administered Medications Ordered in Other Visits:     dexAMETHasone (DECADRON) IVPB 12 mg, 12 mg, Intravenous, Once, Cristin Hook PA-C    dextrose (D5W) 5 % infusion, 20 mL/hr, Intravenous, Once, Cristin Hook PA-CELESTINE    fluorouracil (ADRUCIL) 5,330 mg in sodium chloride 0.9 % 240 mL chemo infusion - FOR HOME USE, 2,400 mg/m2 (Treatment Plan Recorded), Intravenous, Once, Cristin Hook PA-C    fluorouracil (ADRUCIL) chemo injection 890 mg, 400 mg/m2 (Treatment Plan Recorded), Intravenous, Once, Cristin Hook PA-C    leucovorin 890 mg in dextrose (D5W) 5 % 294.5 mL IVPB, 400 mg/m2 (Treatment Plan Recorded), Intravenous, Once, Cristin Hook PA-C    OXALIplatin (ELOXATIN) 190 mg in dextrose (D5W) 5 % 288 mL chemo IVPB, 85 mg/m2 (Treatment Plan Recorded), Intravenous, Once, Cristin Hook PA-C    palonosetron (ALOXI) injection 0.25 mg, 0.25 mg, Intravenous, Once, LiamlCristin redding PA-C    No Known Allergies    No family history on file.    Cancer-related family history is not on file.      Social History     Tobacco Use    Smoking status: Former     Passive exposure: Past    Smokeless tobacco: Never   Vaping Use    Vaping status: Never Used   Substance Use Topics    Alcohol use: Never    Drug use: Never     Social History     Social History Narrative    Not on file       ROS:   Review of Systems   Constitutional:  Negative for fatigue and fever.   HENT:  Negative for congestion and nosebleeds.    Eyes:  Negative for pain and itching.   Respiratory:  Negative for cough and shortness of breath.    Cardiovascular:  Negative for chest pain.  "  Gastrointestinal:  Negative for abdominal pain, blood in stool, diarrhea, nausea and vomiting.   Endocrine: Negative for cold intolerance and heat intolerance.   Genitourinary:  Negative for difficulty urinating.   Musculoskeletal:  Negative for arthralgias.   Skin:  Negative for rash.   Neurological:  Negative for dizziness and headaches.   Hematological:  Does not bruise/bleed easily.   Psychiatric/Behavioral:  Negative for behavioral problems.          Objective:    Vital Signs:  Vitals:    06/05/24 0830   BP: (!) 198/98   Pulse: 57   Resp: 18   Temp: 97.9 °F (36.6 °C)   SpO2: 97%   Weight: 104 kg (230 lb)   Height: 180.3 cm (71\")   PainSc: 0-No pain         Body mass index is 32.08 kg/m².    ECOG  (0) Fully active, able to carry on all predisease performance without restriction    Physical Exam:   Physical Exam  Constitutional:       Appearance: Normal appearance.   HENT:      Head: Normocephalic and atraumatic.   Eyes:      Pupils: Pupils are equal, round, and reactive to light.   Cardiovascular:      Rate and Rhythm: Normal rate and regular rhythm.      Pulses: Normal pulses.      Heart sounds: No murmur heard.  Pulmonary:      Effort: Pulmonary effort is normal.      Breath sounds: Normal breath sounds.   Abdominal:      General: There is no distension.      Palpations: Abdomen is soft. There is no mass.      Tenderness: There is no abdominal tenderness.   Musculoskeletal:         General: Normal range of motion.      Cervical back: Normal range of motion and neck supple.   Skin:     General: Skin is warm.   Neurological:      General: No focal deficit present.      Mental Status: He is alert.   Psychiatric:         Mood and Affect: Mood normal.         Lab Results - Last 18 Months   Lab Units 06/05/24  0748 05/21/24  1056 05/08/24  0803   WBC 10*3/mm3 5.23 6.60 8.96   HEMOGLOBIN g/dL 10.7* 12.1* 10.7*   HEMATOCRIT % 36.1* 39.9 36.0*   PLATELETS 10*3/mm3 143 227 176   MCV fL 86.4 86.6 89.6     Lab Results - " "Last 18 Months   Lab Units 06/05/24  0850 06/05/24  0817 05/21/24  1056 05/08/24  0808 04/19/24  0138   SODIUM mmol/L 141  --  138  --  143   POTASSIUM mmol/L 4.4  --  3.8  --  4.3   CHLORIDE mmol/L 108*  --  103  --  112*   CO2 mmol/L 24.2  --  23.3  --  19.0*   BUN mg/dL 9  --  14  --  18   CREATININE mg/dL 0.97 0.90 1.04   < > 1.00   CALCIUM mg/dL 9.1  --  9.3  --  8.6   BILIRUBIN mg/dL 0.2  --  0.4  --  0.2   ALK PHOS U/L 126*  --  126*  --  109   ALT (SGPT) U/L 53*  --  39  --  25   AST (SGOT) U/L 45*  --  37  --  23   GLUCOSE mg/dL 115*  --  125*  --  109*    < > = values in this interval not displayed.       Lab Results   Component Value Date    GLUCOSE 115 (H) 06/05/2024    BUN 9 06/05/2024    CREATININE 0.97 06/05/2024    BCR 9.3 06/05/2024    K 4.4 06/05/2024    CO2 24.2 06/05/2024    CALCIUM 9.1 06/05/2024    ALBUMIN 3.9 06/05/2024    LABIL2 1.5 10/29/2018    AST 45 (H) 06/05/2024    ALT 53 (H) 06/05/2024       No results for input(s): \"APTT\", \"INR\", \"PTT\" in the last 50264 hours.    Lab Results   Component Value Date    IRON 22 (L) 04/09/2024    TIBC 235 (L) 04/09/2024    FERRITIN 281.40 04/09/2024       No results found for: \"FOLATE\"    No results found for: \"OCCULTBLD\"    No results found for: \"RETICCTPCT\"  No results found for: \"HIIUEZPH00\"  No results found for: \"SPEP\", \"UPEP\"  No results found for: \"LDH\", \"URICACID\"  No results found for: \"ARON\", \"RF\", \"SEDRATE\"  No results found for: \"FIBRINOGEN\", \"HAPTOGLOBIN\"  Lab Results   Component Value Date    PTT 27.6 04/30/2018     No results found for: \"\"  Lab Results   Component Value Date    CEA 1.70 05/21/2024     No components found for: \"CA-19-9\"  No results found for: \"PSA\"  No results found for: \"SEDRATE\"       Assessment & Plan     Patient is a 74-year-old male with sigmoid-rectal cancer status post sigmoid colectomy, low anterior resection with lymph node positive disease he is here to discuss adjuvant treatment options    Colon cancer  T4 N2 " disease stage IIIc  Discussed risk of recurrence, prognosis discussed adjuvant treatment with FOLFOX versus XELOX for 6 months of treatment with idea trial patients who had N2 disease or T4 disease had inferior outcomes with 3 months of treatment as compared to 6 months of treatment after discussion we decided to pursue 6 months of FOLFOX adjuvant treatment  Now started treatment biggest side effect was fatigue. Discussed dose reduction vs continuing same for now we decided will continue same dose.   Fatigue has worsened to some extent. Nausea has lingered on as well.     Anemia  Hemoglobin was 10.9 last  Stable will monitor.     CINV  Zofran helps continue the same  Add emend with worsening nausea    Hypertension  Better at home  Will add amlodipine 5 mg  D/w cardiology    Thank you very much for providing the opportunity to participate in this patient’s care. Please do not hesitate to call if there are any other questions.  Time spent on encounter including record review, history taking, exam, discussion, counseling and documentation at: 40 minutes

## 2024-06-03 ENCOUNTER — OFFICE VISIT (OUTPATIENT)
Dept: CARDIOLOGY | Facility: CLINIC | Age: 74
End: 2024-06-03
Payer: MEDICARE

## 2024-06-03 VITALS
RESPIRATION RATE: 16 BRPM | HEIGHT: 71 IN | BODY MASS INDEX: 31.11 KG/M2 | OXYGEN SATURATION: 96 % | WEIGHT: 222.2 LBS | HEART RATE: 59 BPM | SYSTOLIC BLOOD PRESSURE: 138 MMHG | DIASTOLIC BLOOD PRESSURE: 81 MMHG

## 2024-06-03 DIAGNOSIS — I10 ESSENTIAL HYPERTENSION: ICD-10-CM

## 2024-06-03 DIAGNOSIS — I48.91 ATRIAL FIBRILLATION WITH RAPID VENTRICULAR RESPONSE: Primary | ICD-10-CM

## 2024-06-03 DIAGNOSIS — E78.2 MIXED HYPERLIPIDEMIA: ICD-10-CM

## 2024-06-03 PROCEDURE — 1159F MED LIST DOCD IN RCRD: CPT | Performed by: NURSE PRACTITIONER

## 2024-06-03 PROCEDURE — 1160F RVW MEDS BY RX/DR IN RCRD: CPT | Performed by: NURSE PRACTITIONER

## 2024-06-03 PROCEDURE — 3075F SYST BP GE 130 - 139MM HG: CPT | Performed by: NURSE PRACTITIONER

## 2024-06-03 PROCEDURE — 3079F DIAST BP 80-89 MM HG: CPT | Performed by: NURSE PRACTITIONER

## 2024-06-03 PROCEDURE — 99214 OFFICE O/P EST MOD 30 MIN: CPT | Performed by: NURSE PRACTITIONER

## 2024-06-04 PROCEDURE — 93000 ELECTROCARDIOGRAM COMPLETE: CPT | Performed by: NURSE PRACTITIONER

## 2024-06-04 NOTE — PROGRESS NOTES
Cardiology Office Follow Up Visit      Primary Care Provider:  Gera Manriquez MD    Reason for f/u:     Hospital follow-up  Paroxysmal atrial fibrillation  Hypertension  Dyslipidemia      Subjective     CC:    Denies chest pain or worsening dyspnea    History of Present Illness       Viktor Atkins is a 74 y.o. male.  Patient is a very pleasant 74-year-old male who presents today for hospital follow-up.  The patient has had several recent hospitalizations at [facility] where he initiallyPresented to have colonoscopy and was found to have atrial fibrillation with RVR.    Patient was incidentally diagnosed with rectal cancer and underwent resection of a rectosigmoid mass.  Postoperative course was concerned and complicated with abscess and ileus.  He had atrial fibrillation that was difficult to manage.    Cardiac testing included stress Myoview in March 2024 that showed no evidence of reversible ischemia.  Echocardiogram was also completed in March 2024 which demonstrated his ejection fraction to be 45 to 50%.    The patient was started on amiodarone, diltiazem, Eliquis and metoprolol XL.    Since his hospital discharge he has converted to sinus rhythm.  We did reduce medical therapy because he developed bradycardia.  His diltiazem has since been decreased and discontinued.  Metoprolol dose was decreased and amiodarone dose was decreased.  He is currently on amiodarone 200 mg once daily and Toprol XL 50 mg once daily.    Patient reports overall he is feeling well.  No feelings of his heart racing.  He has had no recurrent episodes of bradycardia or heart rates in the 40s.    He is currently undergoing chemotherapy for his cancer and anticipates 12 total sessions.          ASSESSMENT/PLAN:      Diagnoses and all orders for this visit:    1. Atrial fibrillation with rapid ventricular response (Primary)    2. Essential hypertension    3. Mixed hyperlipidemia            MEDICAL DECISION MAKING:    Patient has  had return of sinus rhythm.  We will continue his current medications as prescribed.  His EKG today shows sinus bradycardia with a heart rate of 58.  He is not having any chest pain or dyspnea.  His QTc is 477.    He is having some cost issues with Eliquis.  I was able to provide him some samples.  We will contact his pharmacy to see if there is any reduction in cost for Xarelto.    I made him an appointment back here in approximately 3 months with Dr. Greenberg.  If he has any worsening symptoms of asked him to contact the office sooner.        Past Medical History:   Diagnosis Date    Arthritis     Benign essential HTN     Cancer     Diabetes mellitus     GERD (gastroesophageal reflux disease)     Hyperlipidemia, mixed     Palpitations        Past Surgical History:   Procedure Laterality Date    CARDIAC CATHETERIZATION      CHOLECYSTECTOMY      COLON RESECTION WITH ILEOSTOMY N/A 3/30/2024    Procedure: COLON RESECTION LOW ANTERIOR LAPAROSCOPIC, COLONAL ANASTOMOSIS, DIVERTING LOOP ILEOSTOMY WITH DAVINCI ROBOT;  Surgeon: Aakash Burden MD;  Location: [facility] OR;  Service: Robotics - DaVinci;  Laterality: N/A;    COLONOSCOPY N/A 3/27/2024    Procedure: COLONOSCOPY with polypectomy x 18 and sigmoid colon mass biopsies with endscopic spot tattooing;  Surgeon: Fili Quintero MD;  Location: [facility] ENDOSCOPY;  Service: Gastroenterology;  Laterality: N/A;  sigmoid mass at 25 cm    COLOSTOMY N/A 4/9/2024    Procedure: DIAGNOSTIC LAPAROSCOPY, DIVERTING OSTOMY, POSSIBLE REVISION OF ANASTAMOSIS;  Surgeon: Aakash Burden MD;  Location: [facility] OR;  Service: General;  Laterality: N/A;    KNEE SURGERY      SIGMOIDOSCOPY N/A 3/30/2024    Procedure: SIGMOIDOSCOPY;  Surgeon: Aakash Burden MD;  Location: [facility] MAIN OR;  Service: Gastroenterology;  Laterality: N/A;    VENOUS ACCESS DEVICE (PORT) INSERTION N/A 4/30/2024    Procedure: INSERTION VENOUS ACCESS DEVICE;  Surgeon: Aakash Burden MD;  Location: [facility] OR;   Service: General;  Laterality: N/A;         Current Outpatient Medications:     amiodarone (PACERONE) 200 MG tablet, Take 1 tablet by mouth Daily., Disp: 90 tablet, Rfl: 1    apixaban (ELIQUIS) 5 MG tablet tablet, Take 1 tablet by mouth Every 12 (Twelve) Hours. Indications: Atrial Fibrillation, Disp: 60 tablet, Rfl: 5    atorvastatin (LIPITOR) 20 MG tablet, Take 1 tablet by mouth Every Night., Disp: , Rfl:     dilTIAZem XR (DILACOR XR) 180 MG 24 hr capsule, Take 1 capsule by mouth Daily., Disp: 90 capsule, Rfl: 1    metFORMIN (GLUCOPHAGE) 500 MG tablet, Take 1 tablet by mouth Daily With Breakfast., Disp: , Rfl:     metoprolol succinate XL (TOPROL-XL) 50 MG 24 hr tablet, Take 1 tablet by mouth Daily., Disp: , Rfl:     Multiple Vitamins-Minerals (MULTIVITAMIN ADULT PO), Take 1 tablet by mouth Daily., Disp: , Rfl:     Omeprazole (EQL Omeprazole) 20 MG tablet delayed-release, 20 mg Daily., Disp: , Rfl:     ondansetron (ZOFRAN) 8 MG tablet, Take 1 tablet by mouth 3 (Three) Times a Day As Needed for Nausea or Vomiting., Disp: 30 tablet, Rfl: 5    sertraline (Zoloft) 25 MG tablet, 1 tablet Daily., Disp: , Rfl:     Social History     Socioeconomic History    Marital status:    Tobacco Use    Smoking status: Former     Passive exposure: Past    Smokeless tobacco: Never   Vaping Use    Vaping status: Never Used   Substance and Sexual Activity    Alcohol use: Never    Drug use: Never    Sexual activity: Defer       No family history on file.    The following portions of the patient's history were reviewed and updated as appropriate: allergies, current medications, past family history, past medical history, past social history, past surgical history and problem list.    Review of Systems   Constitutional: Positive for malaise/fatigue.   Neurological:  Positive for weakness.   All other systems reviewed and are negative.      Pertinent items are noted in HPI, all other systems reviewed and negative    /81 (BP  "Location: Left arm, Patient Position: Sitting, Cuff Size: Adult)   Pulse 59   Resp 16   Ht 180.3 cm (71\")   Wt 101 kg (222 lb 3.2 oz)   SpO2 96%   BMI 30.99 kg/m² .  Objective     Vitals reviewed.   Constitutional:       General: Not in acute distress.     Appearance: Normal appearance. Well-developed.   Eyes:      Pupils: Pupils are equal, round, and reactive to light.   HENT:      Head: Normocephalic and atraumatic.   Neck:      Vascular: No JVD.   Pulmonary:      Effort: Pulmonary effort is normal.      Breath sounds: Normal breath sounds.   Cardiovascular:      Normal rate. Regular rhythm.   Pulses:     Intact distal pulses.   Edema:     Peripheral edema absent.   Abdominal:      General: There is no distension.      Palpations: Abdomen is soft.      Tenderness: There is no abdominal tenderness.   Musculoskeletal: Normal range of motion.      Cervical back: Normal range of motion and neck supple. Skin:     General: Skin is warm and dry.   Neurological:      Mental Status: Alert and oriented to person, place, and time.             ECG 12 Lead    Date/Time: 6/4/2024 8:12 AM  Performed by: Laura De Anda APRN    Authorized by: Laura De Anda APRN  Comparison: compared with previous ECG   Comparison to previous ECG: No longer in atrial fibrillation  Rhythm: sinus bradycardia  Rate: normal  BPM: 58  Conduction: conduction normal  ST Segments: ST segments normal  T Waves: T waves normal  QRS axis: normal  Other findings: non-specific ST-T wave changes    Clinical impression: non-specific ECG          EKG ordered by and reviewed by me in office                      "

## 2024-06-05 ENCOUNTER — HOSPITAL ENCOUNTER (OUTPATIENT)
Dept: ONCOLOGY | Facility: HOSPITAL | Age: 74
Discharge: HOME | End: 2024-06-05
Payer: MEDICARE

## 2024-06-05 ENCOUNTER — OFFICE VISIT (OUTPATIENT)
Dept: ONCOLOGY | Facility: CLINIC | Age: 74
End: 2024-06-05
Payer: MEDICARE

## 2024-06-05 VITALS
SYSTOLIC BLOOD PRESSURE: 198 MMHG | BODY MASS INDEX: 32.2 KG/M2 | HEART RATE: 57 BPM | RESPIRATION RATE: 18 BRPM | DIASTOLIC BLOOD PRESSURE: 98 MMHG | HEIGHT: 71 IN | TEMPERATURE: 97.9 F | WEIGHT: 230 LBS | OXYGEN SATURATION: 97 %

## 2024-06-05 VITALS
WEIGHT: 230.1 LBS | HEART RATE: 57 BPM | OXYGEN SATURATION: 97 % | TEMPERATURE: 97.9 F | SYSTOLIC BLOOD PRESSURE: 198 MMHG | HEIGHT: 71 IN | BODY MASS INDEX: 32.21 KG/M2 | DIASTOLIC BLOOD PRESSURE: 98 MMHG

## 2024-06-05 DIAGNOSIS — C18.9 MALIGNANT NEOPLASM OF COLON, UNSPECIFIED PART OF COLON: Primary | ICD-10-CM

## 2024-06-05 DIAGNOSIS — C18.9 MALIGNANT NEOPLASM OF COLON, UNSPECIFIED PART OF COLON: ICD-10-CM

## 2024-06-05 DIAGNOSIS — Z95.828 PORT-A-CATH IN PLACE: Primary | ICD-10-CM

## 2024-06-05 LAB
ALBUMIN SERPL-MCNC: 3.9 G/DL (ref 3.5–5.2)
ALBUMIN/GLOB SERPL: 1.6 G/DL
ALP BLD-CCNC: 116 U/L (ref 53–128)
ALP SERPL-CCNC: 126 U/L (ref 39–117)
ALT SERPL W P-5'-P-CCNC: 53 U/L (ref 1–41)
ANION GAP SERPL CALCULATED.3IONS-SCNC: 8.8 MMOL/L (ref 5–15)
AST SERPL-CCNC: 45 U/L (ref 1–40)
BASOPHILS # BLD AUTO: 0.02 10*3/MM3 (ref 0–0.2)
BASOPHILS NFR BLD AUTO: 0.4 % (ref 0–1.5)
BILIRUB SERPL-MCNC: 0.2 MG/DL (ref 0–1.2)
BUN BLDA-MCNC: 10 MG/DL (ref 7–22)
BUN SERPL-MCNC: 9 MG/DL (ref 8–23)
BUN/CREAT SERPL: 9.3 (ref 7–25)
CALCIUM BLD QL: 9.1 MG/DL (ref 8–10.3)
CALCIUM SPEC-SCNC: 9.1 MG/DL (ref 8.6–10.5)
CHLORIDE BLDA-SCNC: 108 MMOL/L (ref 98–108)
CHLORIDE SERPL-SCNC: 108 MMOL/L (ref 98–107)
CO2 BLDA-SCNC: 26 MMOL/L (ref 18–33)
CO2 SERPL-SCNC: 24.2 MMOL/L (ref 22–29)
CREAT BLDA-MCNC: 0.9 MG/DL (ref 0.6–1.2)
CREAT SERPL-MCNC: 0.97 MG/DL (ref 0.76–1.27)
DEPRECATED RDW RBC AUTO: 48.4 FL (ref 37–54)
EGFRCR SERPLBLD CKD-EPI 2021: 82.4 ML/MIN/1.73
EGFRCR SERPLBLD CKD-EPI 2021: 90.2 ML/MIN/1.73
EOSINOPHIL # BLD AUTO: 0.12 10*3/MM3 (ref 0–0.4)
EOSINOPHIL NFR BLD AUTO: 2.3 % (ref 0.3–6.2)
ERYTHROCYTE [DISTWIDTH] IN BLOOD BY AUTOMATED COUNT: 16 % (ref 12.3–15.4)
GLOBULIN UR ELPH-MCNC: 2.5 GM/DL
GLUCOSE BLDC GLUCOMTR-MCNC: 111 MG/DL (ref 73–118)
GLUCOSE SERPL-MCNC: 115 MG/DL (ref 65–99)
HCT VFR BLD AUTO: 36.1 % (ref 37.5–51)
HGB BLD-MCNC: 10.7 G/DL (ref 13–17.7)
LYMPHOCYTES # BLD AUTO: 2.26 10*3/MM3 (ref 0.7–3.1)
LYMPHOCYTES NFR BLD AUTO: 43.2 % (ref 19.6–45.3)
MCH RBC QN AUTO: 25.6 PG (ref 26.6–33)
MCHC RBC AUTO-ENTMCNC: 29.6 G/DL (ref 31.5–35.7)
MCV RBC AUTO: 86.4 FL (ref 79–97)
MONOCYTES # BLD AUTO: 0.55 10*3/MM3 (ref 0.1–0.9)
MONOCYTES NFR BLD AUTO: 10.5 % (ref 5–12)
NEUTROPHILS NFR BLD AUTO: 2.28 10*3/MM3 (ref 1.7–7)
NEUTROPHILS NFR BLD AUTO: 43.6 % (ref 42.7–76)
PLATELET # BLD AUTO: 143 10*3/MM3 (ref 140–450)
PMV BLD AUTO: 11.8 FL (ref 6–12)
POC ALBUMIN: 3.3 G/L (ref 3.3–5.5)
POC ALT (SGPT): 51 U/L (ref 10–47)
POC AST (SGOT): 52 U/L (ref 11–38)
POC TOTAL BILIRUBIN: 0.5 MG/DL (ref 0.2–1.6)
POC TOTAL PROTEIN: 6.3 G/DL (ref 6.4–8.1)
POTASSIUM BLDA-SCNC: 4 MMOL/L (ref 3.6–5.1)
POTASSIUM SERPL-SCNC: 4.4 MMOL/L (ref 3.5–5.2)
PROT SERPL-MCNC: 6.4 G/DL (ref 6–8.5)
RBC # BLD AUTO: 4.18 10*6/MM3 (ref 4.14–5.8)
SODIUM BLD-SCNC: 151 MMOL/L (ref 128–145)
SODIUM SERPL-SCNC: 141 MMOL/L (ref 136–145)
WBC NRBC COR # BLD AUTO: 5.23 10*3/MM3 (ref 3.4–10.8)

## 2024-06-05 PROCEDURE — G0498 CHEMO EXTEND IV INFUS W/PUMP: HCPCS

## 2024-06-05 PROCEDURE — 80053 COMPREHEN METABOLIC PANEL: CPT

## 2024-06-05 PROCEDURE — 25010000002 FOSAPREPITANT PER 1 MG: Performed by: INTERNAL MEDICINE

## 2024-06-05 PROCEDURE — 96375 TX/PRO/DX INJ NEW DRUG ADDON: CPT

## 2024-06-05 PROCEDURE — 25010000002 LEUCOVORIN CALCIUM PER 50 MG: Performed by: PHYSICIAN ASSISTANT

## 2024-06-05 PROCEDURE — 85025 COMPLETE CBC W/AUTO DIFF WBC: CPT | Performed by: INTERNAL MEDICINE

## 2024-06-05 PROCEDURE — 96415 CHEMO IV INFUSION ADDL HR: CPT

## 2024-06-05 PROCEDURE — 96367 TX/PROPH/DG ADDL SEQ IV INF: CPT

## 2024-06-05 PROCEDURE — 25810000003 SODIUM CHLORIDE 0.9 % SOLUTION 250 ML FLEX CONT: Performed by: PHYSICIAN ASSISTANT

## 2024-06-05 PROCEDURE — 0 DEXTROSE 5 % SOLUTION: Performed by: PHYSICIAN ASSISTANT

## 2024-06-05 PROCEDURE — 25010000002 OXALIPLATIN PER 0.5 MG: Performed by: PHYSICIAN ASSISTANT

## 2024-06-05 PROCEDURE — 96411 CHEMO IV PUSH ADDL DRUG: CPT

## 2024-06-05 PROCEDURE — 25010000002 PALONOSETRON 0.25 MG/5ML SOLUTION PREFILLED SYRINGE: Performed by: PHYSICIAN ASSISTANT

## 2024-06-05 PROCEDURE — 25010000002 LEUCOVORIN 200 MG RECONSTITUTED SOLUTION 200 MG VIAL: Performed by: PHYSICIAN ASSISTANT

## 2024-06-05 PROCEDURE — 80053 COMPREHEN METABOLIC PANEL: CPT | Performed by: INTERNAL MEDICINE

## 2024-06-05 PROCEDURE — 25010000002 DEXAMETHASONE SODIUM PHOSPHATE 120 MG/30ML SOLUTION: Performed by: PHYSICIAN ASSISTANT

## 2024-06-05 PROCEDURE — 0 DEXTROSE 5 % SOLUTION 250 ML FLEX CONT: Performed by: PHYSICIAN ASSISTANT

## 2024-06-05 PROCEDURE — 25010000002 FLUOROURACIL PER 500 MG: Performed by: PHYSICIAN ASSISTANT

## 2024-06-05 PROCEDURE — 96413 CHEMO IV INFUSION 1 HR: CPT

## 2024-06-05 PROCEDURE — 96368 THER/DIAG CONCURRENT INF: CPT

## 2024-06-05 PROCEDURE — 96366 THER/PROPH/DIAG IV INF ADDON: CPT

## 2024-06-05 RX ORDER — PALONOSETRON 0.05 MG/ML
0.25 INJECTION, SOLUTION INTRAVENOUS ONCE
Status: COMPLETED | OUTPATIENT
Start: 2024-06-05 | End: 2024-06-05

## 2024-06-05 RX ORDER — AMLODIPINE BESYLATE 5 MG/1
5 TABLET ORAL DAILY
Qty: 30 TABLET | Refills: 2 | Status: SHIPPED | OUTPATIENT
Start: 2024-06-05

## 2024-06-05 RX ORDER — DEXTROSE MONOHYDRATE 50 MG/ML
20 INJECTION, SOLUTION INTRAVENOUS ONCE
Status: COMPLETED | OUTPATIENT
Start: 2024-06-05 | End: 2024-06-05

## 2024-06-05 RX ORDER — FLUOROURACIL 50 MG/ML
400 INJECTION, SOLUTION INTRAVENOUS ONCE
Status: COMPLETED | OUTPATIENT
Start: 2024-06-05 | End: 2024-06-05

## 2024-06-05 RX ORDER — DIPHENHYDRAMINE HYDROCHLORIDE 50 MG/ML
50 INJECTION INTRAMUSCULAR; INTRAVENOUS AS NEEDED
Status: CANCELLED | OUTPATIENT
Start: 2024-06-05

## 2024-06-05 RX ORDER — FAMOTIDINE 10 MG/ML
20 INJECTION, SOLUTION INTRAVENOUS AS NEEDED
Status: CANCELLED | OUTPATIENT
Start: 2024-06-05

## 2024-06-05 RX ADMIN — FLUOROURACIL 890 MG: 50 INJECTION, SOLUTION INTRAVENOUS at 13:17

## 2024-06-05 RX ADMIN — OXALIPLATIN 190 MG: 5 INJECTION, SOLUTION INTRAVENOUS at 11:05

## 2024-06-05 RX ADMIN — FOSAPREPITANT 100 ML: 150 INJECTION, POWDER, LYOPHILIZED, FOR SOLUTION INTRAVENOUS at 10:26

## 2024-06-05 RX ADMIN — DEXTROSE MONOHYDRATE 20 ML/HR: 50 INJECTION, SOLUTION INTRAVENOUS at 10:02

## 2024-06-05 RX ADMIN — FLUOROURACIL 5330 MG: 50 INJECTION, SOLUTION INTRAVENOUS at 13:22

## 2024-06-05 RX ADMIN — DEXTROSE MONOHYDRATE 890 MG: 50 INJECTION, SOLUTION INTRAVENOUS at 11:05

## 2024-06-05 RX ADMIN — DEXAMETHASONE SODIUM PHOSPHATE 12 MG: 4 INJECTION, SOLUTION INTRA-ARTICULAR; INTRALESIONAL; INTRAMUSCULAR; INTRAVENOUS; SOFT TISSUE at 10:04

## 2024-06-05 RX ADMIN — PALONOSETRON 0.25 MG: 0.25 INJECTION, SOLUTION INTRAVENOUS at 10:02

## 2024-06-05 NOTE — PROGRESS NOTES
Patient is her for C3D1 Folfox. Port accessed by Corazon SULLIVAN RN and flushed with good blood return noted. 10cc of blood wasted prior to specimen collection. Blood specimen obtained and sent to lab for processing per protocol. Dr. Robin sent: Patient is here for C3D1 Folfox. Patient did have diarrhea x 3 days 3-4 times each day after the last treatment but only took the immodium a total of 3 times over the 3 days per his wife-I will give them a diarrhea handout protocol. His tingling in his fingers was more pronounced after the last treatment but is gone today but he still has tingling on his lips with cold beverages today-he uses a straw at home to help with that. Patient's BP was also very high this morning it was 193/95 on the machine and I did recheck it manually about 15-20 min after arrival and it was 198/98. on the synopsis his BP normally does not run that high-there are a couple days a day it was 172/118 but usually a lot lower than today. Patient stated he took his metoprolol at around 0700. Patient's CBC is in parameters but his NARINDER is still running. You are seeing him today and he is in room 26. If the NARINDER is in parameters is he ok for treatment today?  Let's start him on 5 mg amlodipine. Ok for treatment if cmp is good. Dr. Robin sent: he just stated his cardiologist took him of his diltiazem for his a-fib due to it making his HR go to low and at the same time they changed his metoprolol to once a day from twice a day and he has been taking it like that for about a week. they decreased his amiodorone dose as well. his labs are in parameters-his ast/alt went up a bit and his sodium is saying its 151. Lab results sent via secure chat. Dr. Robin wanted to wait to start treatment and to recheck the CMP at the hospital STAT. STAT CMP from hospital sent via secure chat and Dr. Robin came to see patient and stated ok to treat and wanted to add emend on to his treatments. Kelsy in insurance called and stated  it was ok to give the emend today. Patient verbalized understanding for the plan and to start the amlodipine. Patient tolerated treatment and was discharged with AVS.

## 2024-06-07 ENCOUNTER — HOSPITAL ENCOUNTER (OUTPATIENT)
Dept: ONCOLOGY | Facility: HOSPITAL | Age: 74
Discharge: HOME | End: 2024-06-07
Payer: MEDICARE

## 2024-06-07 DIAGNOSIS — C18.9 MALIGNANT NEOPLASM OF COLON, UNSPECIFIED PART OF COLON: Primary | ICD-10-CM

## 2024-06-07 DIAGNOSIS — Z95.828 PORT-A-CATH IN PLACE: ICD-10-CM

## 2024-06-07 PROCEDURE — 25010000002 HEPARIN LOCK FLUSH PER 10 UNITS: Performed by: INTERNAL MEDICINE

## 2024-06-07 RX ORDER — HEPARIN SODIUM (PORCINE) LOCK FLUSH IV SOLN 100 UNIT/ML 100 UNIT/ML
500 SOLUTION INTRAVENOUS AS NEEDED
Status: DISCONTINUED | OUTPATIENT
Start: 2024-06-07 | End: 2024-06-08 | Stop reason: HOSPADM

## 2024-06-07 RX ORDER — SODIUM CHLORIDE 0.9 % (FLUSH) 0.9 %
10 SYRINGE (ML) INJECTION AS NEEDED
OUTPATIENT
Start: 2024-06-07

## 2024-06-07 RX ORDER — HEPARIN SODIUM (PORCINE) LOCK FLUSH IV SOLN 100 UNIT/ML 100 UNIT/ML
500 SOLUTION INTRAVENOUS AS NEEDED
OUTPATIENT
Start: 2024-06-07

## 2024-06-07 RX ORDER — SODIUM CHLORIDE 0.9 % (FLUSH) 0.9 %
10 SYRINGE (ML) INJECTION AS NEEDED
Status: DISCONTINUED | OUTPATIENT
Start: 2024-06-07 | End: 2024-06-08 | Stop reason: HOSPADM

## 2024-06-07 RX ADMIN — HEPARIN 500 UNITS: 100 SYRINGE at 13:39

## 2024-06-07 RX ADMIN — Medication 10 ML: at 13:39

## 2024-06-10 NOTE — PROGRESS NOTES
HEMATOLOGY ONCOLOGY OUTPATIENT FOLLOW UP       Patient name: Viktor Atkins  : 1950  MRN: [id number]  Primary Care Physician: Gera Manriquez MD  Referring Physician: Gera Manriquez MD  Reason For Consult: Colon cancer      History of Present Illness:  Patient is a 74 y.o. male who presented to the hospital with acute GI bleed he had bright red blood per rectum and presented to the emergency room    3/25/2024 CT chest abdomen pelvis with findings compatible with constipation, sigmoid diverticulosis without diverticulitis  3/27/2024 colonoscopy with distal sigmoid mid colon mass biopsy obtained this was positive for invasive moderately differentiated adenocarcinoma MSI testing was done was negative intact nuclear expression of MMR proteins  3/30/2024 low anterior resection, diverting loop ileostomy by Dr. Burden tumor found in rectosigmoid upper rectum area.  Final pathology with all margins negative, grade 2 tumor moderately differentiated, tumor invades through muscularis propria into the pericolonic or perirectal tissue.  4 out of 22 lymph nodes positive  Final stage T4 N2    24 - FOLFOX  24 - FOLFOX   2024 FOLFOX cycle 3    Subjective:  Patient has ongoing mild nausea but no significant neuropathy continuing on, has ongoing fatigue as well.      Past Medical History:   Diagnosis Date    Arthritis     Benign essential HTN     Cancer     Diabetes mellitus     GERD (gastroesophageal reflux disease)     Hyperlipidemia, mixed     Palpitations        Past Surgical History:   Procedure Laterality Date    CARDIAC CATHETERIZATION      CHOLECYSTECTOMY      COLON RESECTION WITH ILEOSTOMY N/A 3/30/2024    Procedure: COLON RESECTION LOW ANTERIOR LAPAROSCOPIC, COLONAL ANASTOMOSIS, DIVERTING LOOP ILEOSTOMY WITH DAVINCI ROBOT;  Surgeon: Aakash Burden MD;  Location: [facility];  Service: Robotics - DaVinci;  Laterality: N/A;    COLONOSCOPY N/A 3/27/2024    Procedure: COLONOSCOPY  with polypectomy x 18 and sigmoid colon mass biopsies with endscopic spot tattooing;  Surgeon: Fili Quintero MD;  Location: [facility] ENDOSCOPY;  Service: Gastroenterology;  Laterality: N/A;  sigmoid mass at 25 cm    COLOSTOMY N/A 4/9/2024    Procedure: DIAGNOSTIC LAPAROSCOPY, DIVERTING OSTOMY, POSSIBLE REVISION OF ANASTAMOSIS;  Surgeon: Aakash Burden MD;  Location: [facility] MAIN OR;  Service: General;  Laterality: N/A;    KNEE SURGERY      SIGMOIDOSCOPY N/A 3/30/2024    Procedure: SIGMOIDOSCOPY;  Surgeon: Aakash Burden MD;  Location: [facility] MAIN OR;  Service: Gastroenterology;  Laterality: N/A;    VENOUS ACCESS DEVICE (PORT) INSERTION N/A 4/30/2024    Procedure: INSERTION VENOUS ACCESS DEVICE;  Surgeon: Aakash Burden MD;  Location: [facility] MAIN OR;  Service: General;  Laterality: N/A;         Current Outpatient Medications:     amiodarone (PACERONE) 200 MG tablet, Take 1 tablet by mouth Daily., Disp: 90 tablet, Rfl: 1    amLODIPine (NORVASC) 10 MG tablet, Take 1 tablet by mouth Daily., Disp: 30 tablet, Rfl: 4    apixaban (ELIQUIS) 5 MG tablet tablet, Take 1 tablet by mouth Every 12 (Twelve) Hours. Taking currently but switching to xarelto in about a month, Disp: , Rfl:     atorvastatin (LIPITOR) 20 MG tablet, Take 1 tablet by mouth Every Night., Disp: , Rfl:     dilTIAZem XR (DILACOR XR) 180 MG 24 hr capsule, Take 1 capsule by mouth Daily. (Patient not taking: Reported on 6/5/2024), Disp: 90 capsule, Rfl: 1    metFORMIN (GLUCOPHAGE) 500 MG tablet, Take 1 tablet by mouth Daily With Breakfast., Disp: , Rfl:     metoprolol succinate XL (TOPROL-XL) 50 MG 24 hr tablet, Take 1 tablet by mouth Daily., Disp: , Rfl:     Multiple Vitamins-Minerals (MULTIVITAMIN ADULT PO), Take 1 tablet by mouth Daily., Disp: , Rfl:     Omeprazole (EQL Omeprazole) 20 MG tablet delayed-release, 20 mg Daily., Disp: , Rfl:     ondansetron (ZOFRAN) 8 MG tablet, Take 1 tablet by mouth 3 (Three) Times a Day As Needed for Nausea or Vomiting.,  Disp: 30 tablet, Rfl: 5    rivaroxaban (XARELTO) 20 MG tablet, Take 1 tablet by mouth Daily. (Patient taking differently: Take 1 tablet by mouth Daily. Taking eliquis right now but was told he was going to be switched to this medication), Disp: 90 tablet, Rfl: 1    sertraline (Zoloft) 25 MG tablet, 1 tablet Daily., Disp: , Rfl:   No current facility-administered medications for this visit.    Facility-Administered Medications Ordered in Other Visits:     fluorouracil (ADRUCIL) 5,330 mg in sodium chloride 0.9 % 240 mL chemo infusion - FOR HOME USE, 2,400 mg/m2 (Treatment Plan Recorded), Intravenous, Once, Aakash Robin MD, 5,330 mg at 06/19/24 1155    No Known Allergies    No family history on file.    Cancer-related family history is not on file.      Social History     Tobacco Use    Smoking status: Former     Passive exposure: Past    Smokeless tobacco: Never   Vaping Use    Vaping status: Never Used   Substance Use Topics    Alcohol use: Never    Drug use: Never     Social History     Social History Narrative    Not on file       ROS:   Review of Systems   Constitutional:  Negative for fatigue and fever.   HENT:  Negative for congestion and nosebleeds.    Eyes:  Negative for pain and itching.   Respiratory:  Negative for cough and shortness of breath.    Cardiovascular:  Negative for chest pain.   Gastrointestinal:  Negative for abdominal pain, blood in stool, diarrhea, nausea and vomiting.   Endocrine: Negative for cold intolerance and heat intolerance.   Genitourinary:  Negative for difficulty urinating.   Musculoskeletal:  Negative for arthralgias.   Skin:  Negative for rash.   Neurological:  Negative for dizziness and headaches.   Hematological:  Does not bruise/bleed easily.   Psychiatric/Behavioral:  Negative for behavioral problems.          Objective:    Vital Signs:  Vitals:    06/19/24 0807   BP: (!) 185/91   Pulse: 79   Resp: 16   Temp: 97.9 °F (36.6 °C)   SpO2: 98%   Weight: 102 kg (225 lb)   Height:  "180.3 cm (71\")   PainSc: 0-No pain       Body mass index is 31.38 kg/m².    ECOG  (0) Fully active, able to carry on all predisease performance without restriction    Physical Exam:   Physical Exam  Constitutional:       Appearance: Normal appearance.   HENT:      Head: Normocephalic and atraumatic.   Eyes:      Pupils: Pupils are equal, round, and reactive to light.   Cardiovascular:      Rate and Rhythm: Normal rate and regular rhythm.      Pulses: Normal pulses.      Heart sounds: No murmur heard.  Pulmonary:      Effort: Pulmonary effort is normal.      Breath sounds: Normal breath sounds.   Abdominal:      General: There is no distension.      Palpations: Abdomen is soft. There is no mass.      Tenderness: There is no abdominal tenderness.   Musculoskeletal:         General: Normal range of motion.      Cervical back: Normal range of motion and neck supple.   Skin:     General: Skin is warm.   Neurological:      General: No focal deficit present.      Mental Status: He is alert.   Psychiatric:         Mood and Affect: Mood normal.         Lab Results - Last 18 Months   Lab Units 06/19/24  0745 06/05/24  0748 05/21/24  1056   WBC 10*3/mm3 5.08 5.23 6.60   HEMOGLOBIN g/dL 11.3* 10.7* 12.1*   HEMATOCRIT % 38.0 36.1* 39.9   PLATELETS 10*3/mm3 140 143 227   MCV fL 84.3 86.4 86.6     Lab Results - Last 18 Months   Lab Units 06/19/24  0759 06/05/24  0850 06/05/24  0817 05/21/24  1056 05/08/24  0808 04/19/24  0138   SODIUM mmol/L  --  141  --  138  --  143   POTASSIUM mmol/L  --  4.4  --  3.8  --  4.3   CHLORIDE mmol/L  --  108*  --  103  --  112*   CO2 mmol/L  --  24.2  --  23.3  --  19.0*   BUN mg/dL  --  9  --  14  --  18   CREATININE mg/dL 1.20 0.97 0.90 1.04   < > 1.00   CALCIUM mg/dL  --  9.1  --  9.3  --  8.6   BILIRUBIN mg/dL  --  0.2  --  0.4  --  0.2   ALK PHOS U/L  --  126*  --  126*  --  109   ALT (SGPT) U/L  --  53*  --  39  --  25   AST (SGOT) U/L  --  45*  --  37  --  23   GLUCOSE mg/dL  --  115*  --  " "125*  --  109*    < > = values in this interval not displayed.       Lab Results   Component Value Date    GLUCOSE 115 (H) 06/05/2024    BUN 9 06/05/2024    CREATININE 1.20 06/19/2024    BCR 9.3 06/05/2024    K 4.4 06/05/2024    CO2 24.2 06/05/2024    CALCIUM 9.1 06/05/2024    ALBUMIN 3.9 06/05/2024    LABIL2 1.5 10/29/2018    AST 45 (H) 06/05/2024    ALT 53 (H) 06/05/2024       No results for input(s): \"APTT\", \"INR\", \"PTT\" in the last 81600 hours.    Lab Results   Component Value Date    IRON 22 (L) 04/09/2024    TIBC 235 (L) 04/09/2024    FERRITIN 281.40 04/09/2024       No results found for: \"FOLATE\"    No results found for: \"OCCULTBLD\"    No results found for: \"RETICCTPCT\"  No results found for: \"LGIRWJFI14\"  No results found for: \"SPEP\", \"UPEP\"  No results found for: \"LDH\", \"URICACID\"  No results found for: \"ARON\", \"RF\", \"SEDRATE\"  No results found for: \"FIBRINOGEN\", \"HAPTOGLOBIN\"  Lab Results   Component Value Date    PTT 27.6 04/30/2018     No results found for: \"\"  Lab Results   Component Value Date    CEA 1.70 05/21/2024     No components found for: \"CA-19-9\"  No results found for: \"PSA\"  No results found for: \"SEDRATE\"       Assessment & Plan     Patient is a 74-year-old male with sigmoid-rectal cancer status post sigmoid colectomy, low anterior resection with lymph node positive disease he is here to discuss adjuvant treatment options    Colon cancer  T4 N2 disease stage IIIc  Discussed risk of recurrence, prognosis discussed adjuvant treatment with FOLFOX versus XELOX for 6 months of treatment with idea trial patients who had N2 disease or T4 disease had inferior outcomes with 3 months of treatment as compared to 6 months of treatment after discussion we decided to pursue 6 months of FOLFOX adjuvant treatment  Now started treatment biggest side effect was fatigue. Discussed dose reduction vs continuing same for now we decided will continue same dose.   Fatigue has worsened to some extent. Nausea has " lingered on as well.  Still tolerable.  No other new symptoms we will continue the same dose of chemotherapy for now    Transaminitis  Likely chemotherapy related, grade 2 currently  Continue treatment repeat CMP prior to next treatment    Anemia  Hemoglobin improved to 11.3    CINV  Zofran helps continue the same  Continue Emend, Aloxi    Hypertension  Blood pressure continues to be elevated this was rechecked and systolic was still above 180  Increase amlodipine to 10 mg    Thank you very much for providing the opportunity to participate in this patient’s care. Please do not hesitate to call if there are any other questions.    MDM  Severe side effects of treatment including fatigue, nausea, vomiting, hypertension  Chemotherapy treatment requiring intensive monitoring with CBC, CMP and exam

## 2024-06-18 DIAGNOSIS — C18.9 MALIGNANT NEOPLASM OF COLON, UNSPECIFIED PART OF COLON: Primary | ICD-10-CM

## 2024-06-19 ENCOUNTER — HOSPITAL ENCOUNTER (OUTPATIENT)
Dept: ONCOLOGY | Facility: HOSPITAL | Age: 74
Discharge: HOME | End: 2024-06-19
Admitting: INTERNAL MEDICINE
Payer: MEDICARE

## 2024-06-19 ENCOUNTER — OFFICE VISIT (OUTPATIENT)
Dept: ONCOLOGY | Facility: CLINIC | Age: 74
End: 2024-06-19
Payer: MEDICARE

## 2024-06-19 VITALS
HEART RATE: 79 BPM | DIASTOLIC BLOOD PRESSURE: 91 MMHG | RESPIRATION RATE: 16 BRPM | SYSTOLIC BLOOD PRESSURE: 185 MMHG | BODY MASS INDEX: 31.5 KG/M2 | TEMPERATURE: 97.9 F | OXYGEN SATURATION: 98 % | WEIGHT: 225 LBS | HEIGHT: 71 IN

## 2024-06-19 VITALS
HEART RATE: 75 BPM | DIASTOLIC BLOOD PRESSURE: 93 MMHG | BODY MASS INDEX: 31.5 KG/M2 | SYSTOLIC BLOOD PRESSURE: 187 MMHG | OXYGEN SATURATION: 98 % | TEMPERATURE: 97.9 F | HEIGHT: 71 IN | RESPIRATION RATE: 16 BRPM | WEIGHT: 225 LBS

## 2024-06-19 DIAGNOSIS — C18.9 MALIGNANT NEOPLASM OF COLON, UNSPECIFIED PART OF COLON: ICD-10-CM

## 2024-06-19 DIAGNOSIS — C19 RECTOSIGMOID CANCER: Primary | ICD-10-CM

## 2024-06-19 DIAGNOSIS — C18.9 MALIGNANT NEOPLASM OF COLON, UNSPECIFIED PART OF COLON: Primary | ICD-10-CM

## 2024-06-19 LAB
ALP BLD-CCNC: 114 U/L (ref 53–128)
BASOPHILS # BLD AUTO: 0.01 10*3/MM3 (ref 0–0.2)
BASOPHILS NFR BLD AUTO: 0.2 % (ref 0–1.5)
BUN BLDA-MCNC: 11 MG/DL (ref 7–22)
CALCIUM BLD QL: 9.1 MG/DL (ref 8–10.3)
CEA SERPL-MCNC: 1.88 NG/ML
CHLORIDE BLDA-SCNC: 106 MMOL/L (ref 98–108)
CO2 BLDA-SCNC: 25 MMOL/L (ref 18–33)
CREAT BLDA-MCNC: 1.2 MG/DL (ref 0.6–1.2)
DEPRECATED RDW RBC AUTO: 50.7 FL (ref 37–54)
EGFRCR SERPLBLD CKD-EPI 2021: 63.9 ML/MIN/1.73
EOSINOPHIL # BLD AUTO: 0.1 10*3/MM3 (ref 0–0.4)
EOSINOPHIL NFR BLD AUTO: 2 % (ref 0.3–6.2)
ERYTHROCYTE [DISTWIDTH] IN BLOOD BY AUTOMATED COUNT: 17.7 % (ref 12.3–15.4)
GLUCOSE BLDC GLUCOMTR-MCNC: 109 MG/DL (ref 73–118)
HCT VFR BLD AUTO: 38 % (ref 37.5–51)
HGB BLD-MCNC: 11.3 G/DL (ref 13–17.7)
LYMPHOCYTES # BLD AUTO: 2.22 10*3/MM3 (ref 0.7–3.1)
LYMPHOCYTES NFR BLD AUTO: 43.7 % (ref 19.6–45.3)
MCH RBC QN AUTO: 25.1 PG (ref 26.6–33)
MCHC RBC AUTO-ENTMCNC: 29.7 G/DL (ref 31.5–35.7)
MCV RBC AUTO: 84.3 FL (ref 79–97)
MONOCYTES # BLD AUTO: 0.54 10*3/MM3 (ref 0.1–0.9)
MONOCYTES NFR BLD AUTO: 10.6 % (ref 5–12)
NEUTROPHILS NFR BLD AUTO: 2.21 10*3/MM3 (ref 1.7–7)
NEUTROPHILS NFR BLD AUTO: 43.5 % (ref 42.7–76)
PLATELET # BLD AUTO: 140 10*3/MM3 (ref 140–450)
PMV BLD AUTO: 11.2 FL (ref 6–12)
POC ALBUMIN: 3.6 G/L (ref 3.3–5.5)
POC ALT (SGPT): 75 U/L (ref 10–47)
POC AST (SGOT): 76 U/L (ref 11–38)
POC TOTAL BILIRUBIN: 0.6 MG/DL (ref 0.2–1.6)
POC TOTAL PROTEIN: 6.6 G/DL (ref 6.4–8.1)
POTASSIUM BLDA-SCNC: 4 MMOL/L (ref 3.6–5.1)
RBC # BLD AUTO: 4.51 10*6/MM3 (ref 4.14–5.8)
SODIUM BLD-SCNC: 144 MMOL/L (ref 128–145)
WBC NRBC COR # BLD AUTO: 5.08 10*3/MM3 (ref 3.4–10.8)

## 2024-06-19 PROCEDURE — 82378 CARCINOEMBRYONIC ANTIGEN: CPT | Performed by: INTERNAL MEDICINE

## 2024-06-19 PROCEDURE — 25010000002 DEXAMETHASONE SODIUM PHOSPHATE 120 MG/30ML SOLUTION: Performed by: INTERNAL MEDICINE

## 2024-06-19 PROCEDURE — 96367 TX/PROPH/DG ADDL SEQ IV INF: CPT

## 2024-06-19 PROCEDURE — 25810000003 SODIUM CHLORIDE 0.9 % SOLUTION 500 ML FLEX CONT: Performed by: INTERNAL MEDICINE

## 2024-06-19 PROCEDURE — G0498 CHEMO EXTEND IV INFUS W/PUMP: HCPCS

## 2024-06-19 PROCEDURE — 96411 CHEMO IV PUSH ADDL DRUG: CPT

## 2024-06-19 PROCEDURE — 80053 COMPREHEN METABOLIC PANEL: CPT

## 2024-06-19 PROCEDURE — 96366 THER/PROPH/DIAG IV INF ADDON: CPT

## 2024-06-19 PROCEDURE — 96375 TX/PRO/DX INJ NEW DRUG ADDON: CPT

## 2024-06-19 PROCEDURE — 85025 COMPLETE CBC W/AUTO DIFF WBC: CPT | Performed by: NURSE PRACTITIONER

## 2024-06-19 PROCEDURE — 25010000002 PALONOSETRON 0.25 MG/5ML SOLUTION PREFILLED SYRINGE: Performed by: INTERNAL MEDICINE

## 2024-06-19 PROCEDURE — 25010000002 OXALIPLATIN PER 0.5 MG: Performed by: INTERNAL MEDICINE

## 2024-06-19 PROCEDURE — 96415 CHEMO IV INFUSION ADDL HR: CPT

## 2024-06-19 PROCEDURE — 25010000002 FOSAPREPITANT PER 1 MG: Performed by: INTERNAL MEDICINE

## 2024-06-19 PROCEDURE — 0 DEXTROSE 5 % SOLUTION: Performed by: INTERNAL MEDICINE

## 2024-06-19 PROCEDURE — 25010000002 LEUCOVORIN CALCIUM PER 50 MG: Performed by: INTERNAL MEDICINE

## 2024-06-19 PROCEDURE — 25010000002 FLUOROURACIL PER 500 MG: Performed by: INTERNAL MEDICINE

## 2024-06-19 PROCEDURE — 96368 THER/DIAG CONCURRENT INF: CPT

## 2024-06-19 PROCEDURE — 0 DEXTROSE 5 % SOLUTION 250 ML FLEX CONT: Performed by: INTERNAL MEDICINE

## 2024-06-19 PROCEDURE — 25010000002 LEUCOVORIN 200 MG RECONSTITUTED SOLUTION 200 MG VIAL: Performed by: INTERNAL MEDICINE

## 2024-06-19 PROCEDURE — 96413 CHEMO IV INFUSION 1 HR: CPT

## 2024-06-19 RX ORDER — FLUOROURACIL 50 MG/ML
400 INJECTION, SOLUTION INTRAVENOUS ONCE
Status: COMPLETED | OUTPATIENT
Start: 2024-06-19 | End: 2024-06-19

## 2024-06-19 RX ORDER — AMLODIPINE BESYLATE 10 MG/1
10 TABLET ORAL DAILY
Qty: 30 TABLET | Refills: 4 | Status: SHIPPED | OUTPATIENT
Start: 2024-06-19

## 2024-06-19 RX ORDER — FLUOROURACIL 50 MG/ML
400 INJECTION, SOLUTION INTRAVENOUS ONCE
Status: CANCELLED | OUTPATIENT
Start: 2024-06-19

## 2024-06-19 RX ORDER — DEXTROSE MONOHYDRATE 50 MG/ML
20 INJECTION, SOLUTION INTRAVENOUS ONCE
Status: CANCELLED | OUTPATIENT
Start: 2024-06-19

## 2024-06-19 RX ORDER — DIPHENHYDRAMINE HYDROCHLORIDE 50 MG/ML
50 INJECTION INTRAMUSCULAR; INTRAVENOUS AS NEEDED
Status: CANCELLED | OUTPATIENT
Start: 2024-06-19

## 2024-06-19 RX ORDER — PALONOSETRON 0.05 MG/ML
0.25 INJECTION, SOLUTION INTRAVENOUS ONCE
Status: CANCELLED | OUTPATIENT
Start: 2024-06-19

## 2024-06-19 RX ORDER — FAMOTIDINE 10 MG/ML
20 INJECTION, SOLUTION INTRAVENOUS AS NEEDED
Status: CANCELLED | OUTPATIENT
Start: 2024-06-19

## 2024-06-19 RX ORDER — DEXTROSE MONOHYDRATE 50 MG/ML
20 INJECTION, SOLUTION INTRAVENOUS ONCE
Status: COMPLETED | OUTPATIENT
Start: 2024-06-19 | End: 2024-06-19

## 2024-06-19 RX ORDER — PALONOSETRON 0.05 MG/ML
0.25 INJECTION, SOLUTION INTRAVENOUS ONCE
Status: COMPLETED | OUTPATIENT
Start: 2024-06-19 | End: 2024-06-19

## 2024-06-19 RX ADMIN — FLUOROURACIL 890 MG: 50 INJECTION, SOLUTION INTRAVENOUS at 11:49

## 2024-06-19 RX ADMIN — DEXTROSE MONOHYDRATE 20 ML/HR: 50 INJECTION, SOLUTION INTRAVENOUS at 08:35

## 2024-06-19 RX ADMIN — DEXAMETHASONE SODIUM PHOSPHATE 12 MG: 4 INJECTION, SOLUTION INTRA-ARTICULAR; INTRALESIONAL; INTRAMUSCULAR; INTRAVENOUS; SOFT TISSUE at 09:15

## 2024-06-19 RX ADMIN — PALONOSETRON 0.25 MG: 0.25 INJECTION, SOLUTION INTRAVENOUS at 08:37

## 2024-06-19 RX ADMIN — DEXTROSE MONOHYDRATE 890 MG: 50 INJECTION, SOLUTION INTRAVENOUS at 09:38

## 2024-06-19 RX ADMIN — FOSAPREPITANT 100 ML: 150 INJECTION, POWDER, LYOPHILIZED, FOR SOLUTION INTRAVENOUS at 08:39

## 2024-06-19 RX ADMIN — FLUOROURACIL 5330 MG: 50 INJECTION, SOLUTION INTRAVENOUS at 11:55

## 2024-06-19 RX ADMIN — OXALIPLATIN 190 MG: 5 INJECTION, SOLUTION INTRAVENOUS at 09:38

## 2024-06-19 NOTE — PROGRESS NOTES
Pt here for C4D1 Folfox and scheduled visit with you. He is in room 28. He reports doing ok with exception of cold sensitivity, he reports has had increased fatigue/nausea approx D4 following each treatment (emend has been added to regimen with C3) . He has continued with elevated blood pressure of 185/91 and has been on amlodopine 5 mg for 2 weeks as prescribed. Reviewed above and lab results with Dr Robin and pt to proceed with treatment as planned.   At 1138 recheck bp with results of 187/93.  Reviewed with Dr Robin and pt to increase amlodopine to 10 mg daily,  reviewed with pt and spouse and v/u. Celina SPRAGUE RN sent in new rx

## 2024-06-21 ENCOUNTER — HOSPITAL ENCOUNTER (OUTPATIENT)
Dept: ONCOLOGY | Facility: HOSPITAL | Age: 74
Discharge: HOME | End: 2024-06-21
Payer: MEDICARE

## 2024-06-21 DIAGNOSIS — C18.9 MALIGNANT NEOPLASM OF COLON, UNSPECIFIED PART OF COLON: Primary | ICD-10-CM

## 2024-06-21 DIAGNOSIS — Z95.828 PORT-A-CATH IN PLACE: ICD-10-CM

## 2024-06-21 PROCEDURE — 25010000002 HEPARIN LOCK FLUSH PER 10 UNITS: Performed by: INTERNAL MEDICINE

## 2024-06-21 RX ORDER — HEPARIN SODIUM (PORCINE) LOCK FLUSH IV SOLN 100 UNIT/ML 100 UNIT/ML
500 SOLUTION INTRAVENOUS AS NEEDED
OUTPATIENT
Start: 2024-06-21

## 2024-06-21 RX ORDER — SODIUM CHLORIDE 0.9 % (FLUSH) 0.9 %
10 SYRINGE (ML) INJECTION AS NEEDED
Status: DISCONTINUED | OUTPATIENT
Start: 2024-06-21 | End: 2024-06-22 | Stop reason: HOSPADM

## 2024-06-21 RX ORDER — SODIUM CHLORIDE 0.9 % (FLUSH) 0.9 %
10 SYRINGE (ML) INJECTION AS NEEDED
OUTPATIENT
Start: 2024-06-21

## 2024-06-21 RX ORDER — HEPARIN SODIUM (PORCINE) LOCK FLUSH IV SOLN 100 UNIT/ML 100 UNIT/ML
500 SOLUTION INTRAVENOUS AS NEEDED
Status: DISCONTINUED | OUTPATIENT
Start: 2024-06-21 | End: 2024-06-22 | Stop reason: HOSPADM

## 2024-06-21 RX ADMIN — Medication 10 ML: at 12:36

## 2024-06-21 RX ADMIN — HEPARIN 500 UNITS: 100 SYRINGE at 12:36

## 2024-07-01 NOTE — PROGRESS NOTES
HEMATOLOGY ONCOLOGY OUTPATIENT FOLLOW UP       Patient name: Viktor Atkins  : 1950  MRN: [id number]  Primary Care Physician: Gera Manriquez MD  Referring Physician: No ref. provider found  Reason For Consult: Colon cancer      History of Present Illness:  Patient is a 74 y.o. male who presented to the hospital with acute GI bleed he had bright red blood per rectum and presented to the emergency room    3/25/2024 CT chest abdomen pelvis with findings compatible with constipation, sigmoid diverticulosis without diverticulitis  3/27/2024 colonoscopy with distal sigmoid mid colon mass biopsy obtained this was positive for invasive moderately differentiated adenocarcinoma MSI testing was done was negative intact nuclear expression of MMR proteins  3/30/2024 low anterior resection, diverting loop ileostomy by Dr. Burden tumor found in rectosigmoid upper rectum area.  Final pathology with all margins negative, grade 2 tumor moderately differentiated, tumor invades through muscularis propria into the pericolonic or perirectal tissue.  4 out of 22 lymph nodes positive  Final stage T4 N2    24 - FOLFOX  24 - FOLFOX   2024 FOLFOX cycle 3  24 - FOLFOX C4  7/3/24 - FOLFOX C5    Subjective:  Continues to be on treatment, tolerating well.      Past Medical History:   Diagnosis Date    Arthritis     Benign essential HTN     Cancer     Diabetes mellitus     GERD (gastroesophageal reflux disease)     Hyperlipidemia, mixed     Palpitations        Past Surgical History:   Procedure Laterality Date    CARDIAC CATHETERIZATION      CHOLECYSTECTOMY      COLON RESECTION WITH ILEOSTOMY N/A 3/30/2024    Procedure: COLON RESECTION LOW ANTERIOR LAPAROSCOPIC, COLONAL ANASTOMOSIS, DIVERTING LOOP ILEOSTOMY WITH DAVINCI ROBOT;  Surgeon: Aakash Burden MD;  Location: [facility];  Service: Robotics - DaVinci;  Laterality: N/A;    COLONOSCOPY N/A 3/27/2024    Procedure: COLONOSCOPY with  polypectomy x 18 and sigmoid colon mass biopsies with endscopic spot tattooing;  Surgeon: Fili Quintero MD;  Location: [facility] ENDOSCOPY;  Service: Gastroenterology;  Laterality: N/A;  sigmoid mass at 25 cm    COLOSTOMY N/A 4/9/2024    Procedure: DIAGNOSTIC LAPAROSCOPY, DIVERTING OSTOMY, POSSIBLE REVISION OF ANASTAMOSIS;  Surgeon: Aakash Burden MD;  Location: [facility] MAIN OR;  Service: General;  Laterality: N/A;    KNEE SURGERY      SIGMOIDOSCOPY N/A 3/30/2024    Procedure: SIGMOIDOSCOPY;  Surgeon: Aakash Burden MD;  Location: [facility] MAIN OR;  Service: Gastroenterology;  Laterality: N/A;    VENOUS ACCESS DEVICE (PORT) INSERTION N/A 4/30/2024    Procedure: INSERTION VENOUS ACCESS DEVICE;  Surgeon: Aakash Burden MD;  Location: [facility] MAIN OR;  Service: General;  Laterality: N/A;         Current Outpatient Medications:     amiodarone (PACERONE) 200 MG tablet, Take 1 tablet by mouth Daily., Disp: 90 tablet, Rfl: 1    amLODIPine (NORVASC) 10 MG tablet, Take 1 tablet by mouth Daily., Disp: 30 tablet, Rfl: 4    apixaban (ELIQUIS) 5 MG tablet tablet, Take 1 tablet by mouth Every 12 (Twelve) Hours. Taking currently but switching to xarelto in about a month, Disp: , Rfl:     atorvastatin (LIPITOR) 20 MG tablet, Take 1 tablet by mouth Every Night., Disp: , Rfl:     dilTIAZem XR (DILACOR XR) 180 MG 24 hr capsule, Take 1 capsule by mouth Daily. (Patient not taking: Reported on 6/5/2024), Disp: 90 capsule, Rfl: 1    metFORMIN (GLUCOPHAGE) 500 MG tablet, Take 1 tablet by mouth Daily With Breakfast., Disp: , Rfl:     metoprolol succinate XL (TOPROL-XL) 50 MG 24 hr tablet, Take 1 tablet by mouth Daily., Disp: , Rfl:     mirtazapine (REMERON) 15 MG tablet, Take 1 tablet by mouth Every Night., Disp: 30 tablet, Rfl: 0    Multiple Vitamins-Minerals (MULTIVITAMIN ADULT PO), Take 1 tablet by mouth Daily., Disp: , Rfl:     Omeprazole (EQL Omeprazole) 20 MG tablet delayed-release, 20 mg Daily., Disp: , Rfl:     ondansetron (ZOFRAN) 8  "MG tablet, Take 1 tablet by mouth 3 (Three) Times a Day As Needed for Nausea or Vomiting., Disp: 30 tablet, Rfl: 5    rivaroxaban (XARELTO) 20 MG tablet, Take 1 tablet by mouth Daily. (Patient not taking: Reported on 7/8/2024), Disp: 90 tablet, Rfl: 1    sertraline (Zoloft) 25 MG tablet, 1 tablet Daily., Disp: , Rfl:     No Known Allergies    No family history on file.    Cancer-related family history is not on file.      Social History     Tobacco Use    Smoking status: Former     Passive exposure: Past    Smokeless tobacco: Never   Vaping Use    Vaping status: Never Used   Substance Use Topics    Alcohol use: Never    Drug use: Never     Social History     Social History Narrative    Not on file       ROS:   Review of Systems   Constitutional:  Negative for chills, fatigue and fever.   HENT:  Negative for congestion and nosebleeds.    Eyes:  Negative for pain and itching.   Respiratory:  Negative for cough and shortness of breath.    Cardiovascular:  Negative for chest pain.   Gastrointestinal:  Negative for abdominal pain, blood in stool, diarrhea, nausea and vomiting.   Endocrine: Negative for cold intolerance and heat intolerance.   Genitourinary:  Negative for difficulty urinating.   Musculoskeletal:  Negative for arthralgias.   Skin:  Negative for rash.   Neurological:  Negative for dizziness and headaches.   Hematological:  Does not bruise/bleed easily.   Psychiatric/Behavioral:  Negative for behavioral problems.          Objective:    Vital Signs:  Vitals:    07/03/24 0848   BP: 168/88   Pulse: 69   Resp: 16   Temp: 97.9 °F (36.6 °C)   SpO2: 98%   Weight: 103 kg (228 lb)   Height: 180.3 cm (71\")   PainSc: 0-No pain         Body mass index is 31.8 kg/m².    ECOG  (0) Fully active, able to carry on all predisease performance without restriction    Physical Exam:   Physical Exam  Constitutional:       Appearance: Normal appearance.   HENT:      Head: Normocephalic and atraumatic.   Eyes:      Pupils: Pupils " are equal, round, and reactive to light.   Cardiovascular:      Rate and Rhythm: Normal rate and regular rhythm.      Pulses: Normal pulses.      Heart sounds: No murmur heard.  Pulmonary:      Effort: Pulmonary effort is normal.      Breath sounds: Normal breath sounds.   Abdominal:      General: There is no distension.      Palpations: Abdomen is soft. There is no mass.      Tenderness: There is no abdominal tenderness.   Musculoskeletal:         General: Normal range of motion.      Cervical back: Normal range of motion and neck supple.   Skin:     General: Skin is warm.   Neurological:      General: No focal deficit present.      Mental Status: He is alert.   Psychiatric:         Mood and Affect: Mood normal.         Lab Results - Last 18 Months   Lab Units 07/03/24  0803 06/19/24  0745 06/05/24  0748   WBC 10*3/mm3 5.02 5.08 5.23   HEMOGLOBIN g/dL 11.0* 11.3* 10.7*   HEMATOCRIT % 35.9* 38.0 36.1*   PLATELETS 10*3/mm3 129* 140 143   MCV fL 83.1 84.3 86.4     Lab Results - Last 18 Months   Lab Units 07/03/24  0803 06/19/24  0759 06/05/24  0850 06/05/24  0817 05/21/24  1056   SODIUM mmol/L 140  --  141  --  138   POTASSIUM mmol/L 4.2  --  4.4  --  3.8   CHLORIDE mmol/L 107  --  108*  --  103   CO2 mmol/L 22.8  --  24.2  --  23.3   BUN mg/dL 10  --  9  --  14   CREATININE mg/dL 0.94 1.20 0.97   < > 1.04   CALCIUM mg/dL 8.9  --  9.1  --  9.3   BILIRUBIN mg/dL 0.3  --  0.2  --  0.4   ALK PHOS U/L 124*  --  126*  --  126*   ALT (SGPT) U/L 82*  --  53*  --  39   AST (SGOT) U/L 67*  --  45*  --  37   GLUCOSE mg/dL 109*  --  115*  --  125*    < > = values in this interval not displayed.       Lab Results   Component Value Date    GLUCOSE 109 (H) 07/03/2024    BUN 10 07/03/2024    CREATININE 0.94 07/03/2024    BCR 10.6 07/03/2024    K 4.2 07/03/2024    CO2 22.8 07/03/2024    CALCIUM 8.9 07/03/2024    ALBUMIN 4.0 07/03/2024    LABIL2 1.5 10/29/2018    AST 67 (H) 07/03/2024    ALT 82 (H) 07/03/2024       No results for  "input(s): \"APTT\", \"INR\", \"PTT\" in the last 17908 hours.    Lab Results   Component Value Date    IRON 22 (L) 04/09/2024    TIBC 235 (L) 04/09/2024    FERRITIN 281.40 04/09/2024       No results found for: \"FOLATE\"    No results found for: \"OCCULTBLD\"    No results found for: \"RETICCTPCT\"  No results found for: \"[id number]\"  No results found for: \"SPEP\", \"UPEP\"  No results found for: \"LDH\", \"URICACID\"  No results found for: \"ARON\", \"RF\", \"SEDRATE\"  No results found for: \"FIBRINOGEN\", \"HAPTOGLOBIN\"  Lab Results   Component Value Date    PTT 27.6 04/30/2018     No results found for: \"\"  Lab Results   Component Value Date    CEA 1.88 06/19/2024     No components found for: \"CA-19-9\"  No results found for: \"PSA\"  No results found for: \"SEDRATE\"       Assessment & Plan     Patient is a 74-year-old male with sigmoid-rectal cancer status post sigmoid colectomy, low anterior resection with lymph node positive disease he is here to discuss adjuvant treatment options    Colon cancer  T4 N2 disease stage IIIc  Discussed risk of recurrence, prognosis discussed adjuvant treatment with FOLFOX versus XELOX for 6 months of treatment with idea trial patients who had N2 disease or T4 disease had inferior outcomes with 3 months of treatment as compared to 6 months of treatment after discussion we decided to pursue 6 months of FOLFOX adjuvant treatment  Now started treatment biggest side effect was fatigue. Discussed dose reduction vs continuing same for now we decided will continue same dose.   Fatigue has worsened to some extent. Nausea has lingered on as well.  Still tolerable.  Nausea improved.   Continue same treatment . CT imaging will be repeated.   Monitor CEA,    Transaminitis  Likely chemotherapy related, grade 2 currently  Continue treatment repeat CMP prior to next treatment    Anemia  Hemoglobin stable at 11.     CINV  Zofran helps continue the same  Continue Emend. Aloxi.     Hypertension  Blood pressure continues to " be elevated this was rechecked and systolic was still above 180  Increase amlodipine to 10 and improved.     Thank you very much for providing the opportunity to participate in this patient’s care. Please do not hesitate to call if there are any other questions.    Repeat imaging and follow up continue chemotherapy

## 2024-07-03 ENCOUNTER — HOSPITAL ENCOUNTER (OUTPATIENT)
Dept: ONCOLOGY | Facility: HOSPITAL | Age: 74
Discharge: HOME | End: 2024-07-03
Admitting: INTERNAL MEDICINE
Payer: MEDICARE

## 2024-07-03 ENCOUNTER — TELEPHONE (OUTPATIENT)
Dept: ONCOLOGY | Facility: HOSPITAL | Age: 74
End: 2024-07-03
Payer: MEDICARE

## 2024-07-03 ENCOUNTER — OFFICE VISIT (OUTPATIENT)
Dept: ONCOLOGY | Facility: CLINIC | Age: 74
End: 2024-07-03
Payer: MEDICARE

## 2024-07-03 VITALS
BODY MASS INDEX: 31.92 KG/M2 | OXYGEN SATURATION: 98 % | SYSTOLIC BLOOD PRESSURE: 168 MMHG | DIASTOLIC BLOOD PRESSURE: 88 MMHG | HEART RATE: 69 BPM | WEIGHT: 228 LBS | TEMPERATURE: 97.9 F | RESPIRATION RATE: 16 BRPM | HEIGHT: 71 IN

## 2024-07-03 VITALS
TEMPERATURE: 97.9 F | DIASTOLIC BLOOD PRESSURE: 88 MMHG | RESPIRATION RATE: 16 BRPM | HEIGHT: 71 IN | BODY MASS INDEX: 31.92 KG/M2 | SYSTOLIC BLOOD PRESSURE: 168 MMHG | HEART RATE: 69 BPM | WEIGHT: 228 LBS | OXYGEN SATURATION: 98 %

## 2024-07-03 DIAGNOSIS — E43 SEVERE MALNUTRITION: ICD-10-CM

## 2024-07-03 DIAGNOSIS — C19 RECTOSIGMOID CANCER: ICD-10-CM

## 2024-07-03 DIAGNOSIS — C18.9 MALIGNANT NEOPLASM OF COLON, UNSPECIFIED PART OF COLON: Primary | ICD-10-CM

## 2024-07-03 LAB
ALBUMIN SERPL-MCNC: 4 G/DL (ref 3.5–5.2)
ALBUMIN/GLOB SERPL: 1.6 G/DL
ALP SERPL-CCNC: 124 U/L (ref 39–117)
ALT SERPL W P-5'-P-CCNC: 82 U/L (ref 1–41)
ANION GAP SERPL CALCULATED.3IONS-SCNC: 10.2 MMOL/L (ref 5–15)
AST SERPL-CCNC: 67 U/L (ref 1–40)
BASOPHILS # BLD AUTO: 0.04 10*3/MM3 (ref 0–0.2)
BASOPHILS NFR BLD AUTO: 0.8 % (ref 0–1.5)
BILIRUB SERPL-MCNC: 0.3 MG/DL (ref 0–1.2)
BUN SERPL-MCNC: 10 MG/DL (ref 8–23)
BUN/CREAT SERPL: 10.6 (ref 7–25)
CALCIUM SPEC-SCNC: 8.9 MG/DL (ref 8.6–10.5)
CHLORIDE SERPL-SCNC: 107 MMOL/L (ref 98–107)
CO2 SERPL-SCNC: 22.8 MMOL/L (ref 22–29)
CREAT SERPL-MCNC: 0.94 MG/DL (ref 0.76–1.27)
DEPRECATED RDW RBC AUTO: 52.4 FL (ref 37–54)
EGFRCR SERPLBLD CKD-EPI 2021: 85.6 ML/MIN/1.73
EOSINOPHIL # BLD AUTO: 0.09 10*3/MM3 (ref 0–0.4)
EOSINOPHIL NFR BLD AUTO: 1.8 % (ref 0.3–6.2)
ERYTHROCYTE [DISTWIDTH] IN BLOOD BY AUTOMATED COUNT: 18.6 % (ref 12.3–15.4)
GLOBULIN UR ELPH-MCNC: 2.5 GM/DL
GLUCOSE SERPL-MCNC: 109 MG/DL (ref 65–99)
HCT VFR BLD AUTO: 35.9 % (ref 37.5–51)
HGB BLD-MCNC: 11 G/DL (ref 13–17.7)
LYMPHOCYTES # BLD AUTO: 2.23 10*3/MM3 (ref 0.7–3.1)
LYMPHOCYTES NFR BLD AUTO: 44.4 % (ref 19.6–45.3)
MCH RBC QN AUTO: 25.5 PG (ref 26.6–33)
MCHC RBC AUTO-ENTMCNC: 30.6 G/DL (ref 31.5–35.7)
MCV RBC AUTO: 83.1 FL (ref 79–97)
MONOCYTES # BLD AUTO: 0.6 10*3/MM3 (ref 0.1–0.9)
MONOCYTES NFR BLD AUTO: 12 % (ref 5–12)
NEUTROPHILS NFR BLD AUTO: 2.06 10*3/MM3 (ref 1.7–7)
NEUTROPHILS NFR BLD AUTO: 41 % (ref 42.7–76)
PLATELET # BLD AUTO: 129 10*3/MM3 (ref 140–450)
PMV BLD AUTO: 11.1 FL (ref 6–12)
POTASSIUM SERPL-SCNC: 4.2 MMOL/L (ref 3.5–5.2)
PROT SERPL-MCNC: 6.5 G/DL (ref 6–8.5)
RBC # BLD AUTO: 4.32 10*6/MM3 (ref 4.14–5.8)
SODIUM SERPL-SCNC: 140 MMOL/L (ref 136–145)
WBC NRBC COR # BLD AUTO: 5.02 10*3/MM3 (ref 3.4–10.8)

## 2024-07-03 PROCEDURE — 96366 THER/PROPH/DIAG IV INF ADDON: CPT

## 2024-07-03 PROCEDURE — 25010000002 FOSAPREPITANT PER 1 MG: Performed by: NURSE PRACTITIONER

## 2024-07-03 PROCEDURE — 96367 TX/PROPH/DG ADDL SEQ IV INF: CPT

## 2024-07-03 PROCEDURE — 25010000002 PALONOSETRON 0.25 MG/5ML SOLUTION PREFILLED SYRINGE: Performed by: NURSE PRACTITIONER

## 2024-07-03 PROCEDURE — 0 DEXTROSE 5 % SOLUTION 250 ML FLEX CONT: Performed by: NURSE PRACTITIONER

## 2024-07-03 PROCEDURE — 25010000002 LEUCOVORIN 200 MG RECONSTITUTED SOLUTION 1 EACH VIAL: Performed by: NURSE PRACTITIONER

## 2024-07-03 PROCEDURE — 85025 COMPLETE CBC W/AUTO DIFF WBC: CPT | Performed by: INTERNAL MEDICINE

## 2024-07-03 PROCEDURE — 96416 CHEMO PROLONG INFUSE W/PUMP: CPT

## 2024-07-03 PROCEDURE — 25010000002 LEUCOVORIN CALCIUM PER 50 MG: Performed by: NURSE PRACTITIONER

## 2024-07-03 PROCEDURE — 25010000002 DEXAMETHASONE SODIUM PHOSPHATE 120 MG/30ML SOLUTION: Performed by: NURSE PRACTITIONER

## 2024-07-03 PROCEDURE — 96368 THER/DIAG CONCURRENT INF: CPT

## 2024-07-03 PROCEDURE — 96411 CHEMO IV PUSH ADDL DRUG: CPT

## 2024-07-03 PROCEDURE — 25010000002 OXALIPLATIN PER 0.5 MG: Performed by: NURSE PRACTITIONER

## 2024-07-03 PROCEDURE — 80053 COMPREHEN METABOLIC PANEL: CPT | Performed by: INTERNAL MEDICINE

## 2024-07-03 PROCEDURE — 25010000002 FLUOROURACIL PER 500 MG: Performed by: NURSE PRACTITIONER

## 2024-07-03 PROCEDURE — 25810000003 SODIUM CHLORIDE 0.9 % SOLUTION 250 ML FLEX CONT: Performed by: NURSE PRACTITIONER

## 2024-07-03 PROCEDURE — 0 DEXTROSE 5 % SOLUTION: Performed by: NURSE PRACTITIONER

## 2024-07-03 PROCEDURE — 96375 TX/PRO/DX INJ NEW DRUG ADDON: CPT

## 2024-07-03 PROCEDURE — 96415 CHEMO IV INFUSION ADDL HR: CPT

## 2024-07-03 PROCEDURE — 96413 CHEMO IV INFUSION 1 HR: CPT

## 2024-07-03 RX ORDER — PALONOSETRON 0.05 MG/ML
0.25 INJECTION, SOLUTION INTRAVENOUS ONCE
Status: CANCELLED | OUTPATIENT
Start: 2024-07-03

## 2024-07-03 RX ORDER — FLUOROURACIL 50 MG/ML
400 INJECTION, SOLUTION INTRAVENOUS ONCE
Status: COMPLETED | OUTPATIENT
Start: 2024-07-03 | End: 2024-07-03

## 2024-07-03 RX ORDER — FAMOTIDINE 10 MG/ML
20 INJECTION, SOLUTION INTRAVENOUS AS NEEDED
Status: CANCELLED | OUTPATIENT
Start: 2024-07-03

## 2024-07-03 RX ORDER — DEXTROSE MONOHYDRATE 50 MG/ML
20 INJECTION, SOLUTION INTRAVENOUS ONCE
Status: CANCELLED | OUTPATIENT
Start: 2024-07-03

## 2024-07-03 RX ORDER — DEXTROSE MONOHYDRATE 50 MG/ML
20 INJECTION, SOLUTION INTRAVENOUS ONCE
Status: COMPLETED | OUTPATIENT
Start: 2024-07-03 | End: 2024-07-03

## 2024-07-03 RX ORDER — DIPHENHYDRAMINE HYDROCHLORIDE 50 MG/ML
50 INJECTION INTRAMUSCULAR; INTRAVENOUS AS NEEDED
Status: CANCELLED | OUTPATIENT
Start: 2024-07-03

## 2024-07-03 RX ORDER — MIRTAZAPINE 15 MG/1
15 TABLET, FILM COATED ORAL NIGHTLY
Qty: 30 TABLET | Refills: 0 | Status: SHIPPED | OUTPATIENT
Start: 2024-07-03

## 2024-07-03 RX ORDER — FLUOROURACIL 50 MG/ML
400 INJECTION, SOLUTION INTRAVENOUS ONCE
Status: CANCELLED | OUTPATIENT
Start: 2024-07-03

## 2024-07-03 RX ORDER — PALONOSETRON 0.05 MG/ML
0.25 INJECTION, SOLUTION INTRAVENOUS ONCE
Status: COMPLETED | OUTPATIENT
Start: 2024-07-03 | End: 2024-07-03

## 2024-07-03 RX ADMIN — DEXTROSE MONOHYDRATE 20 ML/HR: 50 INJECTION, SOLUTION INTRAVENOUS at 09:46

## 2024-07-03 RX ADMIN — FLUOROURACIL 890 MG: 50 INJECTION, SOLUTION INTRAVENOUS at 12:50

## 2024-07-03 RX ADMIN — DEXAMETHASONE SODIUM PHOSPHATE 12 MG: 4 INJECTION, SOLUTION INTRA-ARTICULAR; INTRALESIONAL; INTRAMUSCULAR; INTRAVENOUS; SOFT TISSUE at 10:21

## 2024-07-03 RX ADMIN — LEUCOVORIN CALCIUM 890 MG: 200 INJECTION, POWDER, LYOPHILIZED, FOR SOLUTION INTRAMUSCULAR; INTRAVENOUS at 10:45

## 2024-07-03 RX ADMIN — FLUOROURACIL 5330 MG: 50 INJECTION, SOLUTION INTRAVENOUS at 12:56

## 2024-07-03 RX ADMIN — PALONOSETRON 0.25 MG: 0.25 INJECTION, SOLUTION INTRAVENOUS at 09:47

## 2024-07-03 RX ADMIN — OXALIPLATIN 190 MG: 5 INJECTION, SOLUTION INTRAVENOUS at 10:46

## 2024-07-03 RX ADMIN — FOSAPREPITANT 100 ML: 150 INJECTION, POWDER, LYOPHILIZED, FOR SOLUTION INTRAVENOUS at 09:46

## 2024-07-03 NOTE — TELEPHONE ENCOUNTER
Called and spoke to patient in regards to unhook this week. Patient confirmed new apt time for Friday at 1345.

## 2024-07-05 ENCOUNTER — HOSPITAL ENCOUNTER (OUTPATIENT)
Dept: ONCOLOGY | Facility: HOSPITAL | Age: 74
Discharge: HOME | End: 2024-07-05
Admitting: INTERNAL MEDICINE
Payer: MEDICARE

## 2024-07-05 DIAGNOSIS — C18.9 MALIGNANT NEOPLASM OF COLON, UNSPECIFIED PART OF COLON: Primary | ICD-10-CM

## 2024-07-05 DIAGNOSIS — Z95.828 PORT-A-CATH IN PLACE: ICD-10-CM

## 2024-07-05 PROCEDURE — 25010000002 HEPARIN LOCK FLUSH PER 10 UNITS: Performed by: INTERNAL MEDICINE

## 2024-07-05 RX ORDER — SODIUM CHLORIDE 0.9 % (FLUSH) 0.9 %
10 SYRINGE (ML) INJECTION AS NEEDED
Status: DISCONTINUED | OUTPATIENT
Start: 2024-07-05 | End: 2024-07-06 | Stop reason: HOSPADM

## 2024-07-05 RX ORDER — HEPARIN SODIUM (PORCINE) LOCK FLUSH IV SOLN 100 UNIT/ML 100 UNIT/ML
500 SOLUTION INTRAVENOUS AS NEEDED
Status: DISCONTINUED | OUTPATIENT
Start: 2024-07-05 | End: 2024-07-06 | Stop reason: HOSPADM

## 2024-07-05 RX ORDER — HEPARIN SODIUM (PORCINE) LOCK FLUSH IV SOLN 100 UNIT/ML 100 UNIT/ML
500 SOLUTION INTRAVENOUS AS NEEDED
OUTPATIENT
Start: 2024-07-05

## 2024-07-05 RX ORDER — SODIUM CHLORIDE 0.9 % (FLUSH) 0.9 %
10 SYRINGE (ML) INJECTION AS NEEDED
OUTPATIENT
Start: 2024-07-05

## 2024-07-05 RX ADMIN — HEPARIN 500 UNITS: 100 SYRINGE at 13:48

## 2024-07-05 RX ADMIN — Medication 10 ML: at 13:47

## 2024-07-05 NOTE — PROGRESS NOTES
Pt here for Adrucil pump DC. Pump empty. Site clean, dry, and intact.  Pt denies having any issues. Port flushed with good blood return noted.  Port flushed with saline and heparin prior to needle removal.  Pt given AVS and d/c home.   Message to  pool to schedule follow up. Patient given number to central scheduling as he states he is still needing to set up CT scan for next week.

## 2024-07-08 ENCOUNTER — OFFICE VISIT (OUTPATIENT)
Age: 74
End: 2024-07-08
Payer: MEDICARE

## 2024-07-08 VITALS
HEART RATE: 68 BPM | HEIGHT: 71 IN | WEIGHT: 225 LBS | OXYGEN SATURATION: 96 % | SYSTOLIC BLOOD PRESSURE: 163 MMHG | BODY MASS INDEX: 31.5 KG/M2 | DIASTOLIC BLOOD PRESSURE: 95 MMHG | TEMPERATURE: 98.5 F

## 2024-07-08 DIAGNOSIS — K94.09 COLOSTOMY PROLAPSE: Primary | ICD-10-CM

## 2024-07-08 PROCEDURE — 1160F RVW MEDS BY RX/DR IN RCRD: CPT | Performed by: STUDENT IN AN ORGANIZED HEALTH CARE EDUCATION/TRAINING PROGRAM

## 2024-07-08 PROCEDURE — 3077F SYST BP >= 140 MM HG: CPT | Performed by: STUDENT IN AN ORGANIZED HEALTH CARE EDUCATION/TRAINING PROGRAM

## 2024-07-08 PROCEDURE — 1159F MED LIST DOCD IN RCRD: CPT | Performed by: STUDENT IN AN ORGANIZED HEALTH CARE EDUCATION/TRAINING PROGRAM

## 2024-07-08 PROCEDURE — 99213 OFFICE O/P EST LOW 20 MIN: CPT | Performed by: STUDENT IN AN ORGANIZED HEALTH CARE EDUCATION/TRAINING PROGRAM

## 2024-07-08 PROCEDURE — 3080F DIAST BP >= 90 MM HG: CPT | Performed by: STUDENT IN AN ORGANIZED HEALTH CARE EDUCATION/TRAINING PROGRAM

## 2024-07-09 NOTE — PROGRESS NOTES
Colorectal Surgery Followup Note    ID:  Viktor Atkins;   : 1950  DATE OF VISIT: 2024    Chief Complaint  Follow-up (Malignant neoplasm of sigmoid colon, stoma )       Subjective    Mr Atkins is presenting with mild stomal prolapse. Denies any sign of obstruction. Stoma is productive and no challenges with pouching,   Exam  General:  No acute distress  Head: Normocephalic, atraumatic  Neuro: Alert and oriented    Abdomen:  Soft, non-tender, non-distended, parastomal hernia, mild stomal prolapse       Assessment  - rectosigmoid cancer   - stomal prolapse     Plan / Recommendations  -Discussed no intervention warranted. Discussed stomal belt and stoma armors.   - continue with chemotherapy  - follow up at the completion of chemotherapy       Aakash Burden MD  Colon and Rectal Surgery   Jackie Yadav

## 2024-07-11 ENCOUNTER — HOSPITAL ENCOUNTER (OUTPATIENT)
Dept: PET IMAGING | Facility: HOSPITAL | Age: 74
Discharge: HOME | End: 2024-07-11
Admitting: INTERNAL MEDICINE
Payer: MEDICARE

## 2024-07-11 DIAGNOSIS — C19 RECTOSIGMOID CANCER: ICD-10-CM

## 2024-07-11 DIAGNOSIS — C18.9 MALIGNANT NEOPLASM OF COLON, UNSPECIFIED PART OF COLON: ICD-10-CM

## 2024-07-11 PROCEDURE — 71260 CT THORAX DX C+: CPT

## 2024-07-11 PROCEDURE — 74177 CT ABD & PELVIS W/CONTRAST: CPT

## 2024-07-11 PROCEDURE — 25510000001 IOPAMIDOL PER 1 ML: Performed by: INTERNAL MEDICINE

## 2024-07-11 RX ADMIN — IOPAMIDOL 100 ML: 755 INJECTION, SOLUTION INTRAVENOUS at 13:42

## 2024-07-12 NOTE — PROGRESS NOTES
HEMATOLOGY ONCOLOGY OUTPATIENT FOLLOW UP       Patient name: Viktor Atkins  : 1950  MRN: [id number]  Primary Care Physician: Gera Manriquez MD  Referring Physician: No ref. provider found  Reason For Consult: Colon cancer      History of Present Illness:  Patient is a 74 y.o. male who presented to the hospital with acute GI bleed he had bright red blood per rectum and presented to the emergency room    3/25/2024 CT chest abdomen pelvis with findings compatible with constipation, sigmoid diverticulosis without diverticulitis  3/27/2024 colonoscopy with distal sigmoid mid colon mass biopsy obtained this was positive for invasive moderately differentiated adenocarcinoma MSI testing was done was negative intact nuclear expression of MMR proteins  3/30/2024 low anterior resection, diverting loop ileostomy by Dr. Burden tumor found in rectosigmoid upper rectum area.  Final pathology with all margins negative, grade 2 tumor moderately differentiated, tumor invades through muscularis propria into the pericolonic or perirectal tissue.  4 out of 22 lymph nodes positive  Final stage T4 N2    24 - FOLFOX  24 - FOLFOX   2024 FOLFOX cycle 3  2024 FOLFOX c4  7/3/24 - FOLFOX c5  24 - CT imaging with no recurrence    Subjective:  Currently side effect is mainly bad taste in his mouth. Still eating well, neuropathy is lasting longer      Past Medical History:   Diagnosis Date    Arthritis     Benign essential HTN     Cancer     Diabetes mellitus     GERD (gastroesophageal reflux disease)     Hyperlipidemia, mixed     Palpitations        Past Surgical History:   Procedure Laterality Date    CARDIAC CATHETERIZATION      CHOLECYSTECTOMY      COLON RESECTION WITH ILEOSTOMY N/A 3/30/2024    Procedure: COLON RESECTION LOW ANTERIOR LAPAROSCOPIC, COLONAL ANASTOMOSIS, DIVERTING LOOP ILEOSTOMY WITH DAVINCI ROBOT;  Surgeon: Aakash Burden MD;  Location: [facility] MAIN OR;  Service: Robotics -  Akira;  Laterality: N/A;    COLONOSCOPY N/A 3/27/2024    Procedure: COLONOSCOPY with polypectomy x 18 and sigmoid colon mass biopsies with endscopic spot tattooing;  Surgeon: Fili Quintero MD;  Location: [facility] ENDOSCOPY;  Service: Gastroenterology;  Laterality: N/A;  sigmoid mass at 25 cm    COLOSTOMY N/A 4/9/2024    Procedure: DIAGNOSTIC LAPAROSCOPY, DIVERTING OSTOMY, POSSIBLE REVISION OF ANASTAMOSIS;  Surgeon: Aakash Burden MD;  Location: [facility] MAIN OR;  Service: General;  Laterality: N/A;    KNEE SURGERY      SIGMOIDOSCOPY N/A 3/30/2024    Procedure: SIGMOIDOSCOPY;  Surgeon: Aakash Burden MD;  Location: [facility] MAIN OR;  Service: Gastroenterology;  Laterality: N/A;    VENOUS ACCESS DEVICE (PORT) INSERTION N/A 4/30/2024    Procedure: INSERTION VENOUS ACCESS DEVICE;  Surgeon: Aakash Burden MD;  Location: [facility] MAIN OR;  Service: General;  Laterality: N/A;         Current Outpatient Medications:     amiodarone (PACERONE) 200 MG tablet, Take 1 tablet by mouth Daily., Disp: 90 tablet, Rfl: 1    amLODIPine (NORVASC) 10 MG tablet, Take 1 tablet by mouth Daily., Disp: 30 tablet, Rfl: 4    apixaban (ELIQUIS) 5 MG tablet tablet, Take 1 tablet by mouth Every 12 (Twelve) Hours. Taking currently but switching to xarelto in about a month, Disp: , Rfl:     atorvastatin (LIPITOR) 20 MG tablet, Take 1 tablet by mouth Every Night., Disp: , Rfl:     dilTIAZem XR (DILACOR XR) 180 MG 24 hr capsule, Take 1 capsule by mouth Daily. (Patient not taking: Reported on 6/5/2024), Disp: 90 capsule, Rfl: 1    metFORMIN (GLUCOPHAGE) 500 MG tablet, Take 1 tablet by mouth Daily With Breakfast., Disp: , Rfl:     metoprolol succinate XL (TOPROL-XL) 50 MG 24 hr tablet, Take 1 tablet by mouth Daily., Disp: , Rfl:     mirtazapine (REMERON) 15 MG tablet, Take 1 tablet by mouth Every Night., Disp: 30 tablet, Rfl: 0    Multiple Vitamins-Minerals (MULTIVITAMIN ADULT PO), Take 1 tablet by mouth Daily., Disp: , Rfl:     Omeprazole (EQL  Omeprazole) 20 MG tablet delayed-release, 20 mg Daily., Disp: , Rfl:     ondansetron (ZOFRAN) 8 MG tablet, Take 1 tablet by mouth 3 (Three) Times a Day As Needed for Nausea or Vomiting., Disp: 30 tablet, Rfl: 5    rivaroxaban (XARELTO) 20 MG tablet, Take 1 tablet by mouth Daily. (Patient not taking: Reported on 7/8/2024), Disp: 90 tablet, Rfl: 1    sertraline (Zoloft) 25 MG tablet, 1 tablet Daily., Disp: , Rfl:   No current facility-administered medications for this visit.    Facility-Administered Medications Ordered in Other Visits:     dexAMETHasone (DECADRON) IVPB 12 mg, 12 mg, Intravenous, Once, Aakash Robin MD    dextrose (D5W) 5 % infusion, 20 mL/hr, Intravenous, Once, Aakash Robin MD    fluorouracil (ADRUCIL) 5,330 mg in sodium chloride 0.9 % 106.6 mL chemo infusion - FOR HOME USE, 2,400 mg/m2 (Treatment Plan Recorded), Intravenous, Once, Aakash Robin MD    fluorouracil (ADRUCIL) chemo injection 890 mg, 400 mg/m2 (Treatment Plan Recorded), Intravenous, Once, Aakash Robin MD    FOSAPREPITANT 150 MG/100ML NORMAL SALINE (CBC) IVPB 100 mL 100 mL, 150 mg, Intravenous, Once, Aakash Robin MD    leucovorin 890 mg in dextrose (D5W) 5 % 339 mL IVPB, 400 mg/m2 (Treatment Plan Recorded), Intravenous, Once, Aakash Robin MD    OXALIplatin (ELOXATIN) 190 mg in dextrose (D5W) 5 % 288 mL chemo IVPB, 85 mg/m2 (Treatment Plan Recorded), Intravenous, Once, Aakash Robin MD    palonosetron (ALOXI) injection 0.25 mg, 0.25 mg, Intravenous, Once, Aakash Robin MD    No Known Allergies    No family history on file.    Cancer-related family history is not on file.      Social History     Tobacco Use    Smoking status: Former     Passive exposure: Past    Smokeless tobacco: Never   Vaping Use    Vaping status: Never Used   Substance Use Topics    Alcohol use: Never    Drug use: Never     Social History     Social History Narrative    Not on file       ROS:   Review of Systems   Constitutional:  Negative for fatigue and fever.  "  HENT:  Negative for congestion and nosebleeds.    Eyes:  Negative for pain and itching.   Respiratory:  Negative for cough and shortness of breath.    Cardiovascular:  Negative for chest pain.   Gastrointestinal:  Negative for abdominal pain, blood in stool, diarrhea, nausea and vomiting.   Endocrine: Negative for cold intolerance and heat intolerance.   Genitourinary:  Negative for difficulty urinating.   Musculoskeletal:  Negative for arthralgias.   Skin:  Negative for rash.   Neurological:  Negative for dizziness and headaches.   Hematological:  Does not bruise/bleed easily.   Psychiatric/Behavioral:  Negative for behavioral problems.        Objective:    Vital Signs:  Vitals:    07/17/24 0804   BP: 154/94   Pulse: 59   Temp: 98.6 °F (37 °C)   TempSrc: Oral   SpO2: 97%   Weight: 105 kg (231 lb)   Height: 180.3 cm (71\")   PainSc: 0-No pain       Body mass index is 32.22 kg/m².    ECOG  (0) Fully active, able to carry on all predisease performance without restriction    Physical Exam:   Physical Exam  Constitutional:       Appearance: Normal appearance.   HENT:      Head: Normocephalic and atraumatic.   Eyes:      Pupils: Pupils are equal, round, and reactive to light.   Cardiovascular:      Rate and Rhythm: Normal rate and regular rhythm.      Pulses: Normal pulses.      Heart sounds: No murmur heard.  Pulmonary:      Effort: Pulmonary effort is normal.      Breath sounds: Normal breath sounds.   Abdominal:      General: There is no distension.      Palpations: Abdomen is soft. There is no mass.      Tenderness: There is no abdominal tenderness.   Musculoskeletal:         General: Normal range of motion.      Cervical back: Normal range of motion and neck supple.   Skin:     General: Skin is warm.   Neurological:      General: No focal deficit present.      Mental Status: He is alert.   Psychiatric:         Mood and Affect: Mood normal.         Lab Results - Last 18 Months   Lab Units 07/17/24  0741 " "07/03/24  0803 06/19/24  0745   WBC 10*3/mm3 5.09 5.02 5.08   HEMOGLOBIN g/dL 11.0* 11.0* 11.3*   HEMATOCRIT % 36.5* 35.9* 38.0   PLATELETS 10*3/mm3 128* 129* 140   MCV fL 83.5 83.1 84.3     Lab Results - Last 18 Months   Lab Units 07/17/24  0752 07/03/24  0803 06/19/24  0759 06/05/24  0850 06/05/24  0817 05/21/24  1056   SODIUM mmol/L  --  140  --  141  --  138   POTASSIUM mmol/L  --  4.2  --  4.4  --  3.8   CHLORIDE mmol/L  --  107  --  108*  --  103   CO2 mmol/L  --  22.8  --  24.2  --  23.3   BUN mg/dL  --  10  --  9  --  14   CREATININE mg/dL 1.00 0.94 1.20 0.97   < > 1.04   CALCIUM mg/dL  --  8.9  --  9.1  --  9.3   BILIRUBIN mg/dL  --  0.3  --  0.2  --  0.4   ALK PHOS U/L  --  124*  --  126*  --  126*   ALT (SGPT) U/L  --  82*  --  53*  --  39   AST (SGOT) U/L  --  67*  --  45*  --  37   GLUCOSE mg/dL  --  109*  --  115*  --  125*    < > = values in this interval not displayed.       Lab Results   Component Value Date    GLUCOSE 109 (H) 07/03/2024    BUN 10 07/03/2024    CREATININE 1.00 07/17/2024    BCR 10.6 07/03/2024    K 4.2 07/03/2024    CO2 22.8 07/03/2024    CALCIUM 8.9 07/03/2024    ALBUMIN 4.0 07/03/2024    LABIL2 1.5 10/29/2018    AST 67 (H) 07/03/2024    ALT 82 (H) 07/03/2024       No results for input(s): \"APTT\", \"INR\", \"PTT\" in the last 40246 hours.    Lab Results   Component Value Date    IRON 22 (L) 04/09/2024    TIBC 235 (L) 04/09/2024    FERRITIN 281.40 04/09/2024       No results found for: \"FOLATE\"    No results found for: \"OCCULTBLD\"    No results found for: \"RETICCTPCT\"  No results found for: \"PNBGRIEC82\"  No results found for: \"SPEP\", \"UPEP\"  No results found for: \"LDH\", \"URICACID\"  No results found for: \"ARON\", \"RF\", \"SEDRATE\"  No results found for: \"FIBRINOGEN\", \"HAPTOGLOBIN\"  Lab Results   Component Value Date    PTT 27.6 04/30/2018     No results found for: \"\"  Lab Results   Component Value Date    CEA 1.88 06/19/2024     No components found for: \"CA-19-9\"  No results found for: " "\"PSA\"  No results found for: \"SEDRATE\"       Assessment & Plan     Patient is a 74-year-old male with sigmoid-rectal cancer status post sigmoid colectomy, low anterior resection with lymph node positive disease he is here to discuss adjuvant treatment options    Colon cancer  T4 N2 disease stage IIIc  Discussed risk of recurrence, prognosis discussed adjuvant treatment with FOLFOX versus XELOX for 6 months of treatment with idea trial patients who had N2 disease or T4 disease had inferior outcomes with 3 months of treatment as compared to 6 months of treatment after discussion we decided to pursue 6 months of FOLFOX adjuvant treatment  Now started treatment biggest side effect was fatigue. Discussed dose reduction vs continuing same for now we decided will continue same dose.   Nausea is improved, taste changes predominantly, weight is stable, fatigue is present. Neuropathy is becoming more permanent  CT images reviewed independently  Dose reduction of oxaliplatin to 65 mg/m2    Transaminitis  Likely chemotherapy related, grade 2 currently  Continue treatment monitor CP    Anemia  Hemoglobin improved to 11    CINV  Zofran helps continue the same  Continue emend/aloxi    Hypertension  Blood pressure improved  Continue amlodipine      Thank you very much for providing the opportunity to participate in this patient’s care. Please do not hesitate to call if there are any other questions.  Labs reviewed, imaging reviewed, labs chemo ordered  Chemotherapy with intensive monitoring, dosage modification              "

## 2024-07-16 DIAGNOSIS — C18.9 MALIGNANT NEOPLASM OF COLON, UNSPECIFIED PART OF COLON: Primary | ICD-10-CM

## 2024-07-17 ENCOUNTER — HOSPITAL ENCOUNTER (OUTPATIENT)
Dept: ONCOLOGY | Facility: HOSPITAL | Age: 74
Discharge: HOME | End: 2024-07-17
Payer: MEDICARE

## 2024-07-17 ENCOUNTER — OFFICE VISIT (OUTPATIENT)
Dept: ONCOLOGY | Facility: CLINIC | Age: 74
End: 2024-07-17
Payer: MEDICARE

## 2024-07-17 VITALS
HEIGHT: 71 IN | OXYGEN SATURATION: 97 % | BODY MASS INDEX: 32.34 KG/M2 | DIASTOLIC BLOOD PRESSURE: 94 MMHG | WEIGHT: 231 LBS | SYSTOLIC BLOOD PRESSURE: 154 MMHG | HEART RATE: 59 BPM | TEMPERATURE: 98.6 F

## 2024-07-17 VITALS
RESPIRATION RATE: 16 BRPM | WEIGHT: 231 LBS | BODY MASS INDEX: 32.22 KG/M2 | DIASTOLIC BLOOD PRESSURE: 94 MMHG | HEART RATE: 59 BPM | OXYGEN SATURATION: 97 % | TEMPERATURE: 98.6 F | SYSTOLIC BLOOD PRESSURE: 154 MMHG

## 2024-07-17 DIAGNOSIS — C18.9 MALIGNANT NEOPLASM OF COLON, UNSPECIFIED PART OF COLON: Primary | ICD-10-CM

## 2024-07-17 LAB
ALP BLD-CCNC: 112 U/L (ref 53–128)
BASOPHILS # BLD AUTO: 0.03 10*3/MM3 (ref 0–0.2)
BASOPHILS NFR BLD AUTO: 0.6 % (ref 0–1.5)
BUN BLDA-MCNC: 11 MG/DL (ref 7–22)
CALCIUM BLD QL: 9.2 MG/DL (ref 8–10.3)
CHLORIDE BLDA-SCNC: 109 MMOL/L (ref 98–108)
CO2 BLDA-SCNC: 25 MMOL/L (ref 18–33)
CREAT BLDA-MCNC: 1 MG/DL (ref 0.6–1.2)
DEPRECATED RDW RBC AUTO: 59.4 FL (ref 37–54)
EGFRCR SERPLBLD CKD-EPI 2021: 79.5 ML/MIN/1.73
EOSINOPHIL # BLD AUTO: 0.18 10*3/MM3 (ref 0–0.4)
EOSINOPHIL NFR BLD AUTO: 3.5 % (ref 0.3–6.2)
ERYTHROCYTE [DISTWIDTH] IN BLOOD BY AUTOMATED COUNT: 20.6 % (ref 12.3–15.4)
GLUCOSE BLDC GLUCOMTR-MCNC: 112 MG/DL (ref 73–118)
HCT VFR BLD AUTO: 36.5 % (ref 37.5–51)
HGB BLD-MCNC: 11 G/DL (ref 13–17.7)
LYMPHOCYTES # BLD AUTO: 2.05 10*3/MM3 (ref 0.7–3.1)
LYMPHOCYTES NFR BLD AUTO: 40.3 % (ref 19.6–45.3)
MCH RBC QN AUTO: 25.2 PG (ref 26.6–33)
MCHC RBC AUTO-ENTMCNC: 30.1 G/DL (ref 31.5–35.7)
MCV RBC AUTO: 83.5 FL (ref 79–97)
MONOCYTES # BLD AUTO: 0.65 10*3/MM3 (ref 0.1–0.9)
MONOCYTES NFR BLD AUTO: 12.8 % (ref 5–12)
NEUTROPHILS NFR BLD AUTO: 2.18 10*3/MM3 (ref 1.7–7)
NEUTROPHILS NFR BLD AUTO: 42.8 % (ref 42.7–76)
PLATELET # BLD AUTO: 128 10*3/MM3 (ref 140–450)
PMV BLD AUTO: 11.9 FL (ref 6–12)
POC ALBUMIN: 3.4 G/L (ref 3.3–5.5)
POC ALT (SGPT): 73 U/L (ref 10–47)
POC AST (SGOT): 72 U/L (ref 11–38)
POC TOTAL BILIRUBIN: 0.6 MG/DL (ref 0.2–1.6)
POC TOTAL PROTEIN: 6.4 G/DL (ref 6.4–8.1)
POTASSIUM BLDA-SCNC: 3.9 MMOL/L (ref 3.6–5.1)
RBC # BLD AUTO: 4.37 10*6/MM3 (ref 4.14–5.8)
SODIUM BLD-SCNC: 145 MMOL/L (ref 128–145)
WBC NRBC COR # BLD AUTO: 5.09 10*3/MM3 (ref 3.4–10.8)

## 2024-07-17 PROCEDURE — 25810000003 SODIUM CHLORIDE 0.9 % SOLUTION 250 ML FLEX CONT: Performed by: INTERNAL MEDICINE

## 2024-07-17 PROCEDURE — G0498 CHEMO EXTEND IV INFUS W/PUMP: HCPCS

## 2024-07-17 PROCEDURE — 25010000002 LEUCOVORIN CALCIUM PER 50 MG: Performed by: INTERNAL MEDICINE

## 2024-07-17 PROCEDURE — 25010000002 FOSAPREPITANT PER 1 MG: Performed by: INTERNAL MEDICINE

## 2024-07-17 PROCEDURE — 25010000002 OXALIPLATIN PER 0.5 MG: Performed by: INTERNAL MEDICINE

## 2024-07-17 PROCEDURE — 96375 TX/PRO/DX INJ NEW DRUG ADDON: CPT

## 2024-07-17 PROCEDURE — 96368 THER/DIAG CONCURRENT INF: CPT

## 2024-07-17 PROCEDURE — 96415 CHEMO IV INFUSION ADDL HR: CPT

## 2024-07-17 PROCEDURE — 25010000002 DEXAMETHASONE SODIUM PHOSPHATE 120 MG/30ML SOLUTION: Performed by: INTERNAL MEDICINE

## 2024-07-17 PROCEDURE — 25010000002 PALONOSETRON 0.25 MG/5ML SOLUTION PREFILLED SYRINGE: Performed by: INTERNAL MEDICINE

## 2024-07-17 PROCEDURE — 85025 COMPLETE CBC W/AUTO DIFF WBC: CPT | Performed by: INTERNAL MEDICINE

## 2024-07-17 PROCEDURE — 96367 TX/PROPH/DG ADDL SEQ IV INF: CPT

## 2024-07-17 PROCEDURE — 80053 COMPREHEN METABOLIC PANEL: CPT

## 2024-07-17 PROCEDURE — 25010000002 FLUOROURACIL PER 500 MG: Performed by: INTERNAL MEDICINE

## 2024-07-17 PROCEDURE — 0 DEXTROSE 5 % SOLUTION: Performed by: INTERNAL MEDICINE

## 2024-07-17 PROCEDURE — 0 DEXTROSE 5 % SOLUTION 250 ML FLEX CONT: Performed by: INTERNAL MEDICINE

## 2024-07-17 PROCEDURE — 96413 CHEMO IV INFUSION 1 HR: CPT

## 2024-07-17 PROCEDURE — 96411 CHEMO IV PUSH ADDL DRUG: CPT

## 2024-07-17 RX ORDER — FLUOROURACIL 50 MG/ML
400 INJECTION, SOLUTION INTRAVENOUS ONCE
Status: CANCELLED | OUTPATIENT
Start: 2024-07-17

## 2024-07-17 RX ORDER — PALONOSETRON 0.05 MG/ML
0.25 INJECTION, SOLUTION INTRAVENOUS ONCE
Status: COMPLETED | OUTPATIENT
Start: 2024-07-17 | End: 2024-07-17

## 2024-07-17 RX ORDER — DEXTROSE MONOHYDRATE 50 MG/ML
20 INJECTION, SOLUTION INTRAVENOUS ONCE
Status: COMPLETED | OUTPATIENT
Start: 2024-07-17 | End: 2024-07-17

## 2024-07-17 RX ORDER — FAMOTIDINE 10 MG/ML
20 INJECTION, SOLUTION INTRAVENOUS AS NEEDED
Status: CANCELLED | OUTPATIENT
Start: 2024-07-17

## 2024-07-17 RX ORDER — DEXTROSE MONOHYDRATE 50 MG/ML
20 INJECTION, SOLUTION INTRAVENOUS ONCE
Status: CANCELLED | OUTPATIENT
Start: 2024-07-17

## 2024-07-17 RX ORDER — DIPHENHYDRAMINE HYDROCHLORIDE 50 MG/ML
50 INJECTION INTRAMUSCULAR; INTRAVENOUS AS NEEDED
Status: CANCELLED | OUTPATIENT
Start: 2024-07-17

## 2024-07-17 RX ORDER — PALONOSETRON 0.05 MG/ML
0.25 INJECTION, SOLUTION INTRAVENOUS ONCE
Status: CANCELLED | OUTPATIENT
Start: 2024-07-17

## 2024-07-17 RX ORDER — FLUOROURACIL 50 MG/ML
400 INJECTION, SOLUTION INTRAVENOUS ONCE
Status: COMPLETED | OUTPATIENT
Start: 2024-07-17 | End: 2024-07-17

## 2024-07-17 RX ADMIN — FLUOROURACIL 5330 MG: 50 INJECTION, SOLUTION INTRAVENOUS at 11:36

## 2024-07-17 RX ADMIN — FLUOROURACIL 890 MG: 50 INJECTION, SOLUTION INTRAVENOUS at 11:31

## 2024-07-17 RX ADMIN — PALONOSETRON 0.25 MG: 0.25 INJECTION, SOLUTION INTRAVENOUS at 08:32

## 2024-07-17 RX ADMIN — DEXTROSE MONOHYDRATE 20 ML/HR: 50 INJECTION, SOLUTION INTRAVENOUS at 08:32

## 2024-07-17 RX ADMIN — OXALIPLATIN 145 MG: 5 INJECTION, SOLUTION INTRAVENOUS at 09:28

## 2024-07-17 RX ADMIN — DEXAMETHASONE SODIUM PHOSPHATE 12 MG: 4 INJECTION, SOLUTION INTRA-ARTICULAR; INTRALESIONAL; INTRAMUSCULAR; INTRAVENOUS; SOFT TISSUE at 09:11

## 2024-07-17 RX ADMIN — FOSAPREPITANT 100 ML: 150 INJECTION, POWDER, LYOPHILIZED, FOR SOLUTION INTRAVENOUS at 08:32

## 2024-07-17 RX ADMIN — LEUCOVORIN CALCIUM 890 MG: 350 INJECTION, POWDER, LYOPHILIZED, FOR SUSPENSION INTRAMUSCULAR; INTRAVENOUS at 09:28

## 2024-07-19 ENCOUNTER — HOSPITAL ENCOUNTER (OUTPATIENT)
Dept: ONCOLOGY | Facility: HOSPITAL | Age: 74
Discharge: HOME | End: 2024-07-19
Payer: MEDICARE

## 2024-07-19 DIAGNOSIS — C18.9 MALIGNANT NEOPLASM OF COLON, UNSPECIFIED PART OF COLON: Primary | ICD-10-CM

## 2024-07-19 DIAGNOSIS — Z95.828 PORT-A-CATH IN PLACE: ICD-10-CM

## 2024-07-19 PROCEDURE — 25010000002 HEPARIN LOCK FLUSH PER 10 UNITS: Performed by: INTERNAL MEDICINE

## 2024-07-19 RX ORDER — SODIUM CHLORIDE 0.9 % (FLUSH) 0.9 %
10 SYRINGE (ML) INJECTION AS NEEDED
Status: DISCONTINUED | OUTPATIENT
Start: 2024-07-19 | End: 2024-07-20 | Stop reason: HOSPADM

## 2024-07-19 RX ORDER — SODIUM CHLORIDE 0.9 % (FLUSH) 0.9 %
10 SYRINGE (ML) INJECTION AS NEEDED
OUTPATIENT
Start: 2024-07-19

## 2024-07-19 RX ORDER — HEPARIN SODIUM (PORCINE) LOCK FLUSH IV SOLN 100 UNIT/ML 100 UNIT/ML
500 SOLUTION INTRAVENOUS AS NEEDED
Status: DISCONTINUED | OUTPATIENT
Start: 2024-07-19 | End: 2024-07-20 | Stop reason: HOSPADM

## 2024-07-19 RX ORDER — HEPARIN SODIUM (PORCINE) LOCK FLUSH IV SOLN 100 UNIT/ML 100 UNIT/ML
500 SOLUTION INTRAVENOUS AS NEEDED
OUTPATIENT
Start: 2024-07-19

## 2024-07-19 RX ADMIN — HEPARIN 500 UNITS: 100 SYRINGE at 13:12

## 2024-07-19 RX ADMIN — Medication 10 ML: at 13:12

## 2024-07-29 NOTE — PROGRESS NOTES
HEMATOLOGY ONCOLOGY OUTPATIENT FOLLOW UP       Patient name: Viktor Atkins  : 1950  MRN: [id number]  Primary Care Physician: Gera Manriquez MD  Referring Physician: Gera Manriquez MD  Reason For Consult: Colon cancer      History of Present Illness:  Patient is a 74 y.o. male who presented to the hospital with acute GI bleed he had bright red blood per rectum and presented to the emergency room    3/25/2024 CT chest abdomen pelvis with findings compatible with constipation, sigmoid diverticulosis without diverticulitis  3/27/2024 colonoscopy with distal sigmoid mid colon mass biopsy obtained this was positive for invasive moderately differentiated adenocarcinoma MSI testing was done was negative intact nuclear expression of MMR proteins  3/30/2024 low anterior resection, diverting loop ileostomy by Dr. Burden tumor found in rectosigmoid upper rectum area.  Final pathology with all margins negative, grade 2 tumor moderately differentiated, tumor invades through muscularis propria into the pericolonic or perirectal tissue.  4 out of 22 lymph nodes positive  Final stage T4 N2    24 - FOLFOX  24 - FOLFOX   2024 FOLFOX cycle 3  2024 FOLFOX c4  7/3/24 - FOLFOX c5  24 - CT imaging with no recurrence    Subjective:  Patient is here for follow up prior to scheduled treatment. Tolerating mostly well, however has poor taste and appetite. Peripheral neuropthy is stable.      Past Medical History:   Diagnosis Date    Arthritis     Benign essential HTN     Cancer     Diabetes mellitus     GERD (gastroesophageal reflux disease)     Hyperlipidemia, mixed     Palpitations        Past Surgical History:   Procedure Laterality Date    CARDIAC CATHETERIZATION      CHOLECYSTECTOMY      COLON RESECTION WITH ILEOSTOMY N/A 3/30/2024    Procedure: COLON RESECTION LOW ANTERIOR LAPAROSCOPIC, COLONAL ANASTOMOSIS, DIVERTING LOOP ILEOSTOMY WITH DAVINCI ROBOT;  Surgeon: Aakash Burden MD;   Location: [facility] MAIN OR;  Service: Robotics - DaVinci;  Laterality: N/A;    COLONOSCOPY N/A 3/27/2024    Procedure: COLONOSCOPY with polypectomy x 18 and sigmoid colon mass biopsies with endscopic spot tattooing;  Surgeon: Fili Quintero MD;  Location: [facility] ENDOSCOPY;  Service: Gastroenterology;  Laterality: N/A;  sigmoid mass at 25 cm    COLOSTOMY N/A 4/9/2024    Procedure: DIAGNOSTIC LAPAROSCOPY, DIVERTING OSTOMY, POSSIBLE REVISION OF ANASTAMOSIS;  Surgeon: Aakash Burden MD;  Location: [facility] MAIN OR;  Service: General;  Laterality: N/A;    KNEE SURGERY      SIGMOIDOSCOPY N/A 3/30/2024    Procedure: SIGMOIDOSCOPY;  Surgeon: Aakash Burden MD;  Location: [facility] MAIN OR;  Service: Gastroenterology;  Laterality: N/A;    VENOUS ACCESS DEVICE (PORT) INSERTION N/A 4/30/2024    Procedure: INSERTION VENOUS ACCESS DEVICE;  Surgeon: Aakash Burden MD;  Location: [facility] MAIN OR;  Service: General;  Laterality: N/A;         Current Outpatient Medications:     amiodarone (PACERONE) 200 MG tablet, Take 1 tablet by mouth Daily., Disp: 90 tablet, Rfl: 1    amLODIPine (NORVASC) 10 MG tablet, Take 1 tablet by mouth Daily., Disp: 30 tablet, Rfl: 4    apixaban (ELIQUIS) 5 MG tablet tablet, Take 1 tablet by mouth Every 12 (Twelve) Hours. Taking currently but switching to xarelto in about a month, Disp: , Rfl:     atorvastatin (LIPITOR) 20 MG tablet, Take 1 tablet by mouth Every Night., Disp: , Rfl:     dilTIAZem XR (DILACOR XR) 180 MG 24 hr capsule, Take 1 capsule by mouth Daily. (Patient not taking: Reported on 6/5/2024), Disp: 90 capsule, Rfl: 1    metFORMIN (GLUCOPHAGE) 500 MG tablet, Take 1 tablet by mouth Daily With Breakfast., Disp: , Rfl:     metoprolol succinate XL (TOPROL-XL) 50 MG 24 hr tablet, Take 1 tablet by mouth Daily., Disp: , Rfl:     mirtazapine (REMERON) 15 MG tablet, Take 1 tablet by mouth Every Night., Disp: 30 tablet, Rfl: 0    Multiple Vitamins-Minerals (MULTIVITAMIN ADULT PO), Take 1 tablet by mouth  "Daily., Disp: , Rfl:     Omeprazole (EQL Omeprazole) 20 MG tablet delayed-release, 20 mg Daily., Disp: , Rfl:     ondansetron (ZOFRAN) 8 MG tablet, Take 1 tablet by mouth 3 (Three) Times a Day As Needed for Nausea or Vomiting., Disp: 30 tablet, Rfl: 5    rivaroxaban (XARELTO) 20 MG tablet, Take 1 tablet by mouth Daily. (Patient not taking: Reported on 7/8/2024), Disp: 90 tablet, Rfl: 1    sertraline (Zoloft) 25 MG tablet, 1 tablet Daily., Disp: , Rfl:     No Known Allergies    No family history on file.    Cancer-related family history is not on file.      Social History     Tobacco Use    Smoking status: Former     Passive exposure: Past    Smokeless tobacco: Never   Vaping Use    Vaping status: Never Used   Substance Use Topics    Alcohol use: Never    Drug use: Never     Social History     Social History Narrative    Not on file       ROS:   Review of Systems   Constitutional:  Negative for fatigue and fever.   HENT:  Negative for congestion and nosebleeds.    Eyes:  Negative for pain and itching.   Respiratory:  Negative for cough and shortness of breath.    Cardiovascular:  Negative for chest pain.   Gastrointestinal:  Negative for abdominal pain, blood in stool, diarrhea, nausea and vomiting.   Endocrine: Negative for cold intolerance and heat intolerance.   Genitourinary:  Negative for difficulty urinating.   Musculoskeletal:  Negative for arthralgias.   Skin:  Negative for rash.   Neurological:  Negative for dizziness and headaches.   Hematological:  Does not bruise/bleed easily.   Psychiatric/Behavioral:  Negative for behavioral problems.        Objective:    Vital Signs:  Vitals:    07/31/24 0747   BP: 175/89   Pulse: 77   Resp: 16   Temp: 98.7 °F (37.1 °C)   TempSrc: Temporal   SpO2: 96%   Weight: 106 kg (233 lb)   Height: 180.3 cm (71\")   PainSc: 0-No pain         Body mass index is 32.5 kg/m².    ECOG  (0) Fully active, able to carry on all predisease performance without restriction    Physical Exam: "   Physical Exam  Constitutional:       Appearance: Normal appearance.   HENT:      Head: Normocephalic and atraumatic.   Eyes:      Pupils: Pupils are equal, round, and reactive to light.   Cardiovascular:      Rate and Rhythm: Normal rate and regular rhythm.      Pulses: Normal pulses.      Heart sounds: No murmur heard.  Pulmonary:      Effort: Pulmonary effort is normal.      Breath sounds: Normal breath sounds.   Abdominal:      General: There is no distension.      Palpations: Abdomen is soft. There is no mass.      Tenderness: There is no abdominal tenderness.   Musculoskeletal:         General: Normal range of motion.      Cervical back: Normal range of motion and neck supple.   Skin:     General: Skin is warm.   Neurological:      General: No focal deficit present.      Mental Status: He is alert.   Psychiatric:         Mood and Affect: Mood normal.         Lab Results - Last 18 Months   Lab Units 07/31/24  0742 07/17/24  0741 07/03/24  0803   WBC 10*3/mm3 4.61 5.09 5.02   HEMOGLOBIN g/dL 11.3* 11.0* 11.0*   HEMATOCRIT % 36.4* 36.5* 35.9*   PLATELETS 10*3/mm3 120* 128* 129*   MCV fL 82.9 83.5 83.1     Lab Results - Last 18 Months   Lab Units 07/31/24  0742 07/17/24  0752 07/03/24  0803 06/19/24  0759 06/05/24  0850   SODIUM mmol/L 143  --  140  --  141   POTASSIUM mmol/L 3.7  --  4.2  --  4.4   CHLORIDE mmol/L 109*  --  107  --  108*   CO2 mmol/L 23.2  --  22.8  --  24.2   BUN mg/dL 14  --  10  --  9   CREATININE mg/dL 0.96 1.00 0.94   < > 0.97   CALCIUM mg/dL 9.0  --  8.9  --  9.1   BILIRUBIN mg/dL 0.2  --  0.3  --  0.2   ALK PHOS U/L 151*  --  124*  --  126*   ALT (SGPT) U/L 73*  --  82*  --  53*   AST (SGOT) U/L 61*  --  67*  --  45*   GLUCOSE mg/dL 120*  --  109*  --  115*    < > = values in this interval not displayed.       Lab Results   Component Value Date    GLUCOSE 109 (H) 07/03/2024    BUN 10 07/03/2024    CREATININE 1.00 07/17/2024    BCR 10.6 07/03/2024    K 4.2 07/03/2024    CO2 22.8 07/03/2024  "   CALCIUM 8.9 07/03/2024    ALBUMIN 4.0 07/03/2024    LABIL2 1.5 10/29/2018    AST 67 (H) 07/03/2024    ALT 82 (H) 07/03/2024       No results for input(s): \"APTT\", \"INR\", \"PTT\" in the last 20458 hours.    Lab Results   Component Value Date    IRON 22 (L) 04/09/2024    TIBC 235 (L) 04/09/2024    FERRITIN 281.40 04/09/2024       No results found for: \"FOLATE\"    No results found for: \"OCCULTBLD\"    No results found for: \"RETICCTPCT\"  No results found for: \"MUUCZTOK81\"  No results found for: \"SPEP\", \"UPEP\"  No results found for: \"LDH\", \"URICACID\"  No results found for: \"ARON\", \"RF\", \"SEDRATE\"  No results found for: \"FIBRINOGEN\", \"HAPTOGLOBIN\"  Lab Results   Component Value Date    PTT 27.6 04/30/2018     No results found for: \"\"  Lab Results   Component Value Date    CEA 1.88 06/19/2024     No components found for: \"CA-19-9\"  No results found for: \"PSA\"  No results found for: \"SEDRATE\"       Assessment & Plan     Patient is a 74-year-old male with sigmoid-rectal cancer status post sigmoid colectomy, low anterior resection with lymph node positive disease he is here to discuss adjuvant treatment options    Colon cancer  T4 N2 disease stage IIIc  Discussed risk of recurrence, prognosis discussed adjuvant treatment with FOLFOX versus XELOX for 6 months of treatment with idea trial patients who had N2 disease or T4 disease had inferior outcomes with 3 months of treatment as compared to 6 months of treatment after discussion we decided to pursue 6 months of FOLFOX adjuvant treatment  Now started treatment biggest side effect was fatigue. Discussed dose reduction vs continuing same for now we decided will continue same dose.   Nausea is improved, taste changes predominantly, weight is stable, fatigue is present. Neuropathy is becoming more permanent  CT images reviewed independently, no concern for disease recurrence/progression.  Dose reduction of oxaliplatin to 65 mg/m2. Patient continues to have some treatment " adverse effects.  -Labs reviewed, okay to proceed with C7 today.     Transaminitis  Likely chemotherapy related, grade 2 currently  Continue treatment monitor CP    Anemia  Hemoglobin improved to 11.3 today. Conitnue to monitor.    CINV  Zofran helps continue the same  Continue emend/aloxi    Hypertension  Blood pressure improved  Continue amlodipine    Follow up in 2 weeks with APRN/PA and MD follow up in 4 weeks.

## 2024-07-31 ENCOUNTER — OFFICE VISIT (OUTPATIENT)
Dept: ONCOLOGY | Facility: CLINIC | Age: 74
End: 2024-07-31
Payer: MEDICARE

## 2024-07-31 ENCOUNTER — HOSPITAL ENCOUNTER (OUTPATIENT)
Dept: ONCOLOGY | Facility: HOSPITAL | Age: 74
Discharge: HOME | End: 2024-07-31
Admitting: INTERNAL MEDICINE
Payer: MEDICARE

## 2024-07-31 VITALS
RESPIRATION RATE: 16 BRPM | WEIGHT: 233 LBS | HEIGHT: 71 IN | HEART RATE: 77 BPM | DIASTOLIC BLOOD PRESSURE: 89 MMHG | TEMPERATURE: 98.7 F | OXYGEN SATURATION: 96 % | SYSTOLIC BLOOD PRESSURE: 175 MMHG | BODY MASS INDEX: 32.62 KG/M2

## 2024-07-31 VITALS
SYSTOLIC BLOOD PRESSURE: 175 MMHG | OXYGEN SATURATION: 96 % | RESPIRATION RATE: 16 BRPM | DIASTOLIC BLOOD PRESSURE: 89 MMHG | HEIGHT: 71 IN | TEMPERATURE: 98.7 F | BODY MASS INDEX: 32.66 KG/M2 | HEART RATE: 77 BPM | WEIGHT: 233.3 LBS

## 2024-07-31 DIAGNOSIS — C18.9 MALIGNANT NEOPLASM OF COLON, UNSPECIFIED PART OF COLON: Primary | ICD-10-CM

## 2024-07-31 DIAGNOSIS — C18.9 MALIGNANT NEOPLASM OF COLON, UNSPECIFIED PART OF COLON: ICD-10-CM

## 2024-07-31 DIAGNOSIS — E43 SEVERE MALNUTRITION: ICD-10-CM

## 2024-07-31 LAB
ALBUMIN SERPL-MCNC: 4 G/DL (ref 3.5–5.2)
ALBUMIN/GLOB SERPL: 1.6 G/DL
ALP SERPL-CCNC: 151 U/L (ref 39–117)
ALT SERPL W P-5'-P-CCNC: 73 U/L (ref 1–41)
ANION GAP SERPL CALCULATED.3IONS-SCNC: 10.8 MMOL/L (ref 5–15)
AST SERPL-CCNC: 61 U/L (ref 1–40)
BASOPHILS # BLD AUTO: 0.03 10*3/MM3 (ref 0–0.2)
BASOPHILS NFR BLD AUTO: 0.7 % (ref 0–1.5)
BILIRUB SERPL-MCNC: 0.2 MG/DL (ref 0–1.2)
BUN SERPL-MCNC: 14 MG/DL (ref 8–23)
BUN/CREAT SERPL: 14.6 (ref 7–25)
CALCIUM SPEC-SCNC: 9 MG/DL (ref 8.6–10.5)
CHLORIDE SERPL-SCNC: 109 MMOL/L (ref 98–107)
CO2 SERPL-SCNC: 23.2 MMOL/L (ref 22–29)
CREAT SERPL-MCNC: 0.96 MG/DL (ref 0.76–1.27)
DEPRECATED RDW RBC AUTO: 63.3 FL (ref 37–54)
EGFRCR SERPLBLD CKD-EPI 2021: 83.5 ML/MIN/1.73
EOSINOPHIL # BLD AUTO: 0.18 10*3/MM3 (ref 0–0.4)
EOSINOPHIL NFR BLD AUTO: 3.9 % (ref 0.3–6.2)
ERYTHROCYTE [DISTWIDTH] IN BLOOD BY AUTOMATED COUNT: 22 % (ref 12.3–15.4)
GLOBULIN UR ELPH-MCNC: 2.5 GM/DL
GLUCOSE SERPL-MCNC: 120 MG/DL (ref 65–99)
HCT VFR BLD AUTO: 36.4 % (ref 37.5–51)
HGB BLD-MCNC: 11.3 G/DL (ref 13–17.7)
LYMPHOCYTES # BLD AUTO: 1.91 10*3/MM3 (ref 0.7–3.1)
LYMPHOCYTES NFR BLD AUTO: 41.4 % (ref 19.6–45.3)
MCH RBC QN AUTO: 25.7 PG (ref 26.6–33)
MCHC RBC AUTO-ENTMCNC: 31 G/DL (ref 31.5–35.7)
MCV RBC AUTO: 82.9 FL (ref 79–97)
MONOCYTES # BLD AUTO: 0.51 10*3/MM3 (ref 0.1–0.9)
MONOCYTES NFR BLD AUTO: 11.1 % (ref 5–12)
NEUTROPHILS NFR BLD AUTO: 1.98 10*3/MM3 (ref 1.7–7)
NEUTROPHILS NFR BLD AUTO: 42.9 % (ref 42.7–76)
PLATELET # BLD AUTO: 120 10*3/MM3 (ref 140–450)
PMV BLD AUTO: 11.1 FL (ref 6–12)
POTASSIUM SERPL-SCNC: 3.7 MMOL/L (ref 3.5–5.2)
PROT SERPL-MCNC: 6.5 G/DL (ref 6–8.5)
RBC # BLD AUTO: 4.39 10*6/MM3 (ref 4.14–5.8)
SODIUM SERPL-SCNC: 143 MMOL/L (ref 136–145)
WBC NRBC COR # BLD AUTO: 4.61 10*3/MM3 (ref 3.4–10.8)

## 2024-07-31 PROCEDURE — 80053 COMPREHEN METABOLIC PANEL: CPT | Performed by: INTERNAL MEDICINE

## 2024-07-31 PROCEDURE — 25010000002 FLUOROURACIL PER 500 MG: Performed by: INTERNAL MEDICINE

## 2024-07-31 PROCEDURE — 96367 TX/PROPH/DG ADDL SEQ IV INF: CPT

## 2024-07-31 PROCEDURE — 25010000002 PALONOSETRON 0.25 MG/5ML SOLUTION PREFILLED SYRINGE: Performed by: INTERNAL MEDICINE

## 2024-07-31 PROCEDURE — 25810000003 SODIUM CHLORIDE 0.9 % SOLUTION 250 ML FLEX CONT: Performed by: INTERNAL MEDICINE

## 2024-07-31 PROCEDURE — 25010000002 DEXAMETHASONE SODIUM PHOSPHATE 120 MG/30ML SOLUTION: Performed by: INTERNAL MEDICINE

## 2024-07-31 PROCEDURE — 25010000002 FOSAPREPITANT PER 1 MG: Performed by: INTERNAL MEDICINE

## 2024-07-31 PROCEDURE — 0 DEXTROSE 5 % SOLUTION 250 ML FLEX CONT: Performed by: INTERNAL MEDICINE

## 2024-07-31 PROCEDURE — 96413 CHEMO IV INFUSION 1 HR: CPT

## 2024-07-31 PROCEDURE — 96411 CHEMO IV PUSH ADDL DRUG: CPT

## 2024-07-31 PROCEDURE — 0 DEXTROSE 5 % SOLUTION: Performed by: INTERNAL MEDICINE

## 2024-07-31 PROCEDURE — 96375 TX/PRO/DX INJ NEW DRUG ADDON: CPT

## 2024-07-31 PROCEDURE — G0498 CHEMO EXTEND IV INFUS W/PUMP: HCPCS

## 2024-07-31 PROCEDURE — 85025 COMPLETE CBC W/AUTO DIFF WBC: CPT | Performed by: INTERNAL MEDICINE

## 2024-07-31 PROCEDURE — 25010000002 OXALIPLATIN PER 0.5 MG: Performed by: INTERNAL MEDICINE

## 2024-07-31 PROCEDURE — 25010000002 LEUCOVORIN 500 MG RECONSTITUTED SOLUTION 1 EACH VIAL: Performed by: INTERNAL MEDICINE

## 2024-07-31 PROCEDURE — 96368 THER/DIAG CONCURRENT INF: CPT

## 2024-07-31 PROCEDURE — 25010000002 LEUCOVORIN 200 MG RECONSTITUTED SOLUTION 200 MG VIAL: Performed by: INTERNAL MEDICINE

## 2024-07-31 PROCEDURE — 96415 CHEMO IV INFUSION ADDL HR: CPT

## 2024-07-31 RX ORDER — PALONOSETRON 0.05 MG/ML
0.25 INJECTION, SOLUTION INTRAVENOUS ONCE
OUTPATIENT
Start: 2024-08-28

## 2024-07-31 RX ORDER — MIRTAZAPINE 15 MG/1
15 TABLET, FILM COATED ORAL
Qty: 30 TABLET | Refills: 1 | Status: SHIPPED | OUTPATIENT
Start: 2024-07-31

## 2024-07-31 RX ORDER — FLUOROURACIL 50 MG/ML
400 INJECTION, SOLUTION INTRAVENOUS ONCE
Status: CANCELLED | OUTPATIENT
Start: 2024-07-31

## 2024-07-31 RX ORDER — FAMOTIDINE 10 MG/ML
20 INJECTION, SOLUTION INTRAVENOUS AS NEEDED
OUTPATIENT
Start: 2024-08-14

## 2024-07-31 RX ORDER — FLUOROURACIL 50 MG/ML
400 INJECTION, SOLUTION INTRAVENOUS ONCE
Status: COMPLETED | OUTPATIENT
Start: 2024-07-31 | End: 2024-07-31

## 2024-07-31 RX ORDER — FLUOROURACIL 50 MG/ML
400 INJECTION, SOLUTION INTRAVENOUS ONCE
OUTPATIENT
Start: 2024-08-28

## 2024-07-31 RX ORDER — FLUOROURACIL 50 MG/ML
400 INJECTION, SOLUTION INTRAVENOUS ONCE
OUTPATIENT
Start: 2024-08-14

## 2024-07-31 RX ORDER — FAMOTIDINE 10 MG/ML
20 INJECTION, SOLUTION INTRAVENOUS AS NEEDED
OUTPATIENT
Start: 2024-08-28

## 2024-07-31 RX ORDER — DEXTROSE MONOHYDRATE 50 MG/ML
20 INJECTION, SOLUTION INTRAVENOUS ONCE
Status: CANCELLED | OUTPATIENT
Start: 2024-07-31

## 2024-07-31 RX ORDER — DIPHENHYDRAMINE HYDROCHLORIDE 50 MG/ML
50 INJECTION INTRAMUSCULAR; INTRAVENOUS AS NEEDED
Status: CANCELLED | OUTPATIENT
Start: 2024-07-31

## 2024-07-31 RX ORDER — DIPHENHYDRAMINE HYDROCHLORIDE 50 MG/ML
50 INJECTION INTRAMUSCULAR; INTRAVENOUS AS NEEDED
OUTPATIENT
Start: 2024-08-14

## 2024-07-31 RX ORDER — FAMOTIDINE 10 MG/ML
20 INJECTION, SOLUTION INTRAVENOUS AS NEEDED
Status: CANCELLED | OUTPATIENT
Start: 2024-07-31

## 2024-07-31 RX ORDER — PALONOSETRON 0.05 MG/ML
0.25 INJECTION, SOLUTION INTRAVENOUS ONCE
Status: COMPLETED | OUTPATIENT
Start: 2024-07-31 | End: 2024-07-31

## 2024-07-31 RX ORDER — PALONOSETRON 0.05 MG/ML
0.25 INJECTION, SOLUTION INTRAVENOUS ONCE
Status: CANCELLED | OUTPATIENT
Start: 2024-07-31

## 2024-07-31 RX ORDER — DEXTROSE MONOHYDRATE 50 MG/ML
20 INJECTION, SOLUTION INTRAVENOUS ONCE
Status: COMPLETED | OUTPATIENT
Start: 2024-07-31 | End: 2024-07-31

## 2024-07-31 RX ORDER — PALONOSETRON 0.05 MG/ML
0.25 INJECTION, SOLUTION INTRAVENOUS ONCE
OUTPATIENT
Start: 2024-08-14

## 2024-07-31 RX ORDER — DEXTROSE MONOHYDRATE 50 MG/ML
20 INJECTION, SOLUTION INTRAVENOUS ONCE
OUTPATIENT
Start: 2024-08-14

## 2024-07-31 RX ORDER — DEXTROSE MONOHYDRATE 50 MG/ML
20 INJECTION, SOLUTION INTRAVENOUS ONCE
OUTPATIENT
Start: 2024-08-28

## 2024-07-31 RX ORDER — DIPHENHYDRAMINE HYDROCHLORIDE 50 MG/ML
50 INJECTION INTRAMUSCULAR; INTRAVENOUS AS NEEDED
OUTPATIENT
Start: 2024-08-28

## 2024-07-31 RX ADMIN — OXALIPLATIN 145 MG: 5 INJECTION, SOLUTION INTRAVENOUS at 09:10

## 2024-07-31 RX ADMIN — FLUOROURACIL 890 MG: 50 INJECTION, SOLUTION INTRAVENOUS at 11:18

## 2024-07-31 RX ADMIN — FOSAPREPITANT 100 ML: 150 INJECTION, POWDER, LYOPHILIZED, FOR SOLUTION INTRAVENOUS at 08:13

## 2024-07-31 RX ADMIN — DEXTROSE MONOHYDRATE 20 ML/HR: 50 INJECTION, SOLUTION INTRAVENOUS at 08:13

## 2024-07-31 RX ADMIN — DEXAMETHASONE SODIUM PHOSPHATE 12 MG: 4 INJECTION, SOLUTION INTRA-ARTICULAR; INTRALESIONAL; INTRAMUSCULAR; INTRAVENOUS; SOFT TISSUE at 08:49

## 2024-07-31 RX ADMIN — FLUOROURACIL 5330 MG: 50 INJECTION, SOLUTION INTRAVENOUS at 11:25

## 2024-07-31 RX ADMIN — LEUCOVORIN CALCIUM 890 MG: 500 INJECTION, POWDER, LYOPHILIZED, FOR SOLUTION INTRAMUSCULAR; INTRAVENOUS at 09:10

## 2024-07-31 RX ADMIN — PALONOSETRON 0.25 MG: 0.25 INJECTION, SOLUTION INTRAVENOUS at 08:13

## 2024-08-02 ENCOUNTER — HOSPITAL ENCOUNTER (OUTPATIENT)
Dept: ONCOLOGY | Facility: HOSPITAL | Age: 74
Discharge: HOME | End: 2024-08-02
Payer: MEDICARE

## 2024-08-02 DIAGNOSIS — C18.9 MALIGNANT NEOPLASM OF COLON, UNSPECIFIED PART OF COLON: Primary | ICD-10-CM

## 2024-08-02 DIAGNOSIS — Z95.828 PORT-A-CATH IN PLACE: ICD-10-CM

## 2024-08-02 PROCEDURE — 25010000002 HEPARIN LOCK FLUSH PER 10 UNITS: Performed by: INTERNAL MEDICINE

## 2024-08-02 RX ORDER — HEPARIN SODIUM (PORCINE) LOCK FLUSH IV SOLN 100 UNIT/ML 100 UNIT/ML
500 SOLUTION INTRAVENOUS AS NEEDED
OUTPATIENT
Start: 2024-08-02

## 2024-08-02 RX ORDER — SODIUM CHLORIDE 0.9 % (FLUSH) 0.9 %
10 SYRINGE (ML) INJECTION AS NEEDED
OUTPATIENT
Start: 2024-08-02

## 2024-08-02 RX ORDER — HEPARIN SODIUM (PORCINE) LOCK FLUSH IV SOLN 100 UNIT/ML 100 UNIT/ML
500 SOLUTION INTRAVENOUS AS NEEDED
Status: DISCONTINUED | OUTPATIENT
Start: 2024-08-02 | End: 2024-08-03 | Stop reason: HOSPADM

## 2024-08-02 RX ORDER — SODIUM CHLORIDE 0.9 % (FLUSH) 0.9 %
10 SYRINGE (ML) INJECTION AS NEEDED
Status: DISCONTINUED | OUTPATIENT
Start: 2024-08-02 | End: 2024-08-03 | Stop reason: HOSPADM

## 2024-08-02 RX ADMIN — Medication 10 ML: at 14:02

## 2024-08-02 RX ADMIN — HEPARIN 500 UNITS: 100 SYRINGE at 14:02

## 2024-08-05 ENCOUNTER — TELEPHONE (OUTPATIENT)
Dept: CARDIOLOGY | Facility: CLINIC | Age: 74
End: 2024-08-05
Payer: MEDICARE

## 2024-08-05 RX ORDER — AMIODARONE HYDROCHLORIDE 200 MG/1
200 TABLET ORAL DAILY
Qty: 10 TABLET | Refills: 0 | Status: SHIPPED | OUTPATIENT
Start: 2024-08-05

## 2024-08-05 RX ORDER — AMIODARONE HYDROCHLORIDE 200 MG/1
200 TABLET ORAL DAILY
Qty: 90 TABLET | Refills: 1 | Status: SHIPPED | OUTPATIENT
Start: 2024-08-05 | End: 2024-08-05 | Stop reason: SDUPTHER

## 2024-08-05 NOTE — TELEPHONE ENCOUNTER
Spoke with patients wife. Sent short supply to [facility] and then 90 days to [facility].

## 2024-08-05 NOTE — TELEPHONE ENCOUNTER
Caller: Onelia Atkins    Relationship to patient: Emergency Contact    Best call back number: [phone]    Patient is needing: PATIENT'S WIFE ONELIA STATED THAT PATIENT NEEDS PRESCRIPTION FOR AMIODARONE 200 MG MEDICATION WITH INSTRUCTIONS TAKE 2 TABLET BY MOUTH EVERY 12 HOURS FOR 90 DAY SUPPLY SENT TO [facility]. PATIENT IS COMPLETELY OUT OF MEDICATION. PLEASE CONTACT ONELIA TO ADVISE. THANK YOU.

## 2024-08-13 NOTE — PROGRESS NOTES
HEMATOLOGY ONCOLOGY OUTPATIENT FOLLOW UP       Patient name: Viktor Atkins  : 1950  MRN: [id number]  Primary Care Physician: Gera Manriquez MD  Referring Physician: No ref. provider found  Reason For Consult: Colon cancer      History of Present Illness:  Patient is a 74 y.o. male who presented to the hospital with acute GI bleed he had bright red blood per rectum and presented to the emergency room    3/25/2024 CT chest abdomen pelvis with findings compatible with constipation, sigmoid diverticulosis without diverticulitis  3/27/2024 colonoscopy with distal sigmoid mid colon mass biopsy obtained this was positive for invasive moderately differentiated adenocarcinoma MSI testing was done was negative intact nuclear expression of MMR proteins  3/30/2024 low anterior resection, diverting loop ileostomy by Dr. Burden tumor found in rectosigmoid upper rectum area.  Final pathology with all margins negative, grade 2 tumor moderately differentiated, tumor invades through muscularis propria into the pericolonic or perirectal tissue.  4 out of 22 lymph nodes positive  Final stage T4 N2    24 - FOLFOX  24 - FOLFOX   2024 FOLFOX cycle 3  2024 FOLFOX c4  7/3/24 - FOLFOX c5  24 - CT imaging with no recurrence  2024: FOLFOX c6 - oxaliplatin dose reduced to 65 mg/m2   2024 FOLFOX c7    Subjective:  2024: Viktor is seen today for routine follow up. He continues to tolerate FOLFOX. He does report nausea around D4-5 of cycle, Zofran helps. He states tingling is stable, not worsening and denies pain. He has no new complaints today.       Past Medical History:   Diagnosis Date    Arthritis     Benign essential HTN     Cancer     Diabetes mellitus     GERD (gastroesophageal reflux disease)     Hyperlipidemia, mixed     Palpitations        Past Surgical History:   Procedure Laterality Date    CARDIAC CATHETERIZATION      CHOLECYSTECTOMY      COLON RESECTION WITH  ILEOSTOMY N/A 3/30/2024    Procedure: COLON RESECTION LOW ANTERIOR LAPAROSCOPIC, COLONAL ANASTOMOSIS, DIVERTING LOOP ILEOSTOMY WITH DAVINCI ROBOT;  Surgeon: Aakash Burden MD;  Location: [facility] MAIN OR;  Service: Robotics - DaVinci;  Laterality: N/A;    COLONOSCOPY N/A 3/27/2024    Procedure: COLONOSCOPY with polypectomy x 18 and sigmoid colon mass biopsies with endscopic spot tattooing;  Surgeon: Fili Quintero MD;  Location: [facility] ENDOSCOPY;  Service: Gastroenterology;  Laterality: N/A;  sigmoid mass at 25 cm    COLOSTOMY N/A 4/9/2024    Procedure: DIAGNOSTIC LAPAROSCOPY, DIVERTING OSTOMY, POSSIBLE REVISION OF ANASTAMOSIS;  Surgeon: Aakash Burden MD;  Location: [facility] MAIN OR;  Service: General;  Laterality: N/A;    KNEE SURGERY      SIGMOIDOSCOPY N/A 3/30/2024    Procedure: SIGMOIDOSCOPY;  Surgeon: Aakash Burden MD;  Location: [facility] MAIN OR;  Service: Gastroenterology;  Laterality: N/A;    VENOUS ACCESS DEVICE (PORT) INSERTION N/A 4/30/2024    Procedure: INSERTION VENOUS ACCESS DEVICE;  Surgeon: Aakash Burden MD;  Location: [facility] MAIN OR;  Service: General;  Laterality: N/A;         Current Outpatient Medications:     mirtazapine (REMERON) 15 MG tablet, TAKE 1 TABLET BY MOUTH EVERY DAY AT NIGHT, Disp: 30 tablet, Rfl: 1    amiodarone (PACERONE) 200 MG tablet, Take 1 tablet by mouth Daily., Disp: 10 tablet, Rfl: 0    amLODIPine (NORVASC) 10 MG tablet, Take 1 tablet by mouth Daily., Disp: 30 tablet, Rfl: 4    apixaban (ELIQUIS) 5 MG tablet tablet, Take 1 tablet by mouth Every 12 (Twelve) Hours. Taking currently but switching to xarelto in about a month, Disp: , Rfl:     atorvastatin (LIPITOR) 20 MG tablet, Take 1 tablet by mouth Every Night., Disp: , Rfl:     dilTIAZem XR (DILACOR XR) 180 MG 24 hr capsule, Take 1 capsule by mouth Daily. (Patient not taking: Reported on 6/5/2024), Disp: 90 capsule, Rfl: 1    metFORMIN (GLUCOPHAGE) 500 MG tablet, Take 1 tablet by mouth Daily With Breakfast., Disp: , Rfl:      metoprolol succinate XL (TOPROL-XL) 50 MG 24 hr tablet, Take 1 tablet by mouth Daily., Disp: , Rfl:     Multiple Vitamins-Minerals (MULTIVITAMIN ADULT PO), Take 1 tablet by mouth Daily., Disp: , Rfl:     Omeprazole (EQL Omeprazole) 20 MG tablet delayed-release, 20 mg Daily., Disp: , Rfl:     ondansetron (ZOFRAN) 8 MG tablet, Take 1 tablet by mouth 3 (Three) Times a Day As Needed for Nausea or Vomiting., Disp: 30 tablet, Rfl: 5    rivaroxaban (XARELTO) 20 MG tablet, Take 1 tablet by mouth Daily. (Patient not taking: Reported on 7/8/2024), Disp: 90 tablet, Rfl: 1    sertraline (Zoloft) 25 MG tablet, 1 tablet Daily., Disp: , Rfl:     No Known Allergies    No family history on file.    Cancer-related family history is not on file.      Social History     Tobacco Use    Smoking status: Former     Passive exposure: Past    Smokeless tobacco: Never   Vaping Use    Vaping status: Never Used   Substance Use Topics    Alcohol use: Never    Drug use: Never     Social History     Social History Narrative    Not on file       ROS:   Review of Systems   Constitutional:  Negative for chills, fatigue and fever.   HENT:  Negative for ear pain, mouth sores, nosebleeds and sore throat.    Eyes:  Negative for photophobia and visual disturbance.   Respiratory:  Negative for shortness of breath, wheezing and stridor.    Cardiovascular:  Negative for chest pain and palpitations.   Gastrointestinal:  Negative for abdominal pain, diarrhea, nausea (Patient reports nausea around day 4-5 of cycle, Zofran helps) and vomiting.   Endocrine: Negative for cold intolerance and heat intolerance.   Genitourinary:  Negative for dysuria and hematuria.   Musculoskeletal:  Negative for joint swelling and neck stiffness.   Skin:  Negative for color change and rash.   Neurological:  Negative for seizures and syncope.   Hematological:  Negative for adenopathy.        No obvious bleeding   Psychiatric/Behavioral:  Negative for agitation, confusion and  "hallucinations.        Objective:    Vital Signs:  Vitals:    08/14/24 0751   BP: 162/89   Pulse: 55   Resp: 16   Temp: 97.8 °F (36.6 °C)   TempSrc: Temporal   SpO2: 98%   Weight: 106 kg (233 lb)   Height: 180.3 cm (71\")   PainSc: 0-No pain           Body mass index is 32.5 kg/m².    ECOG  1 - Restricted in physically strenuous activity but ambulatory and able to carry out work of a light or sedentary nature, e.g., light house work, office work     Physical Exam:   Physical Exam  Vitals reviewed.   Constitutional:       General: He is not in acute distress.     Appearance: Normal appearance. He is not toxic-appearing.   HENT:      Head: Normocephalic and atraumatic.   Eyes:      Pupils: Pupils are equal, round, and reactive to light.   Cardiovascular:      Rate and Rhythm: Normal rate and regular rhythm.      Pulses: Normal pulses.      Heart sounds: No murmur heard.  Pulmonary:      Effort: Pulmonary effort is normal.      Breath sounds: Normal breath sounds.   Abdominal:      General: There is no distension.      Palpations: Abdomen is soft. There is no mass.      Tenderness: There is no abdominal tenderness.   Musculoskeletal:         General: Normal range of motion.      Cervical back: Normal range of motion and neck supple.   Skin:     General: Skin is warm.   Neurological:      General: No focal deficit present.      Mental Status: He is alert and oriented to person, place, and time.   Psychiatric:         Mood and Affect: Mood normal.         Behavior: Behavior normal.         Thought Content: Thought content normal.         Judgment: Judgment normal.         Lab Results - Last 18 Months   Lab Units 07/31/24  0742 07/17/24  0741 07/03/24  0803   WBC 10*3/mm3 4.61 5.09 5.02   HEMOGLOBIN g/dL 11.3* 11.0* 11.0*   HEMATOCRIT % 36.4* 36.5* 35.9*   PLATELETS 10*3/mm3 120* 128* 129*   MCV fL 82.9 83.5 83.1     Lab Results - Last 18 Months   Lab Units 07/31/24  0742 07/17/24  0752 07/03/24  0803 06/19/24  0759 " "06/05/24  0850   SODIUM mmol/L 143  --  140  --  141   POTASSIUM mmol/L 3.7  --  4.2  --  4.4   CHLORIDE mmol/L 109*  --  107  --  108*   CO2 mmol/L 23.2  --  22.8  --  24.2   BUN mg/dL 14  --  10  --  9   CREATININE mg/dL 0.96 1.00 0.94   < > 0.97   CALCIUM mg/dL 9.0  --  8.9  --  9.1   BILIRUBIN mg/dL 0.2  --  0.3  --  0.2   ALK PHOS U/L 151*  --  124*  --  126*   ALT (SGPT) U/L 73*  --  82*  --  53*   AST (SGOT) U/L 61*  --  67*  --  45*   GLUCOSE mg/dL 120*  --  109*  --  115*    < > = values in this interval not displayed.       Lab Results   Component Value Date    GLUCOSE 120 (H) 07/31/2024    BUN 14 07/31/2024    CREATININE 0.96 07/31/2024    BCR 14.6 07/31/2024    K 3.7 07/31/2024    CO2 23.2 07/31/2024    CALCIUM 9.0 07/31/2024    ALBUMIN 4.0 07/31/2024    LABIL2 1.5 10/29/2018    AST 61 (H) 07/31/2024    ALT 73 (H) 07/31/2024       No results for input(s): \"APTT\", \"INR\", \"PTT\" in the last 72245 hours.    Lab Results   Component Value Date    IRON 22 (L) 04/09/2024    TIBC 235 (L) 04/09/2024    FERRITIN 281.40 04/09/2024       No results found for: \"FOLATE\"    No results found for: \"OCCULTBLD\"    No results found for: \"RETICCTPCT\"  No results found for: \"DATQTOHV44\"  No results found for: \"SPEP\", \"UPEP\"  No results found for: \"LDH\", \"URICACID\"  No results found for: \"ARON\", \"RF\", \"SEDRATE\"  No results found for: \"FIBRINOGEN\", \"HAPTOGLOBIN\"  Lab Results   Component Value Date    PTT 27.6 04/30/2018     No results found for: \"\"  Lab Results   Component Value Date    CEA 1.88 06/19/2024     No components found for: \"CA-19-9\"  No results found for: \"PSA\"  No results found for: \"SEDRATE\"       Assessment & Plan     Viktor Atkins is a 74 y.o.  male with sigmoid-rectal cancer status post sigmoid colectomy, low anterior resection with lymph node positive disease he is here to discuss adjuvant treatment options    Colon cancer  T4 N2 disease stage IIIc  Discussed risk of recurrence, prognosis discussed " adjuvant treatment with FOLFOX versus XELOX for 6 months of treatment with idea trial patients who had N2 disease or T4 disease had inferior outcomes with 3 months of treatment as compared to 6 months of treatment after discussion we decided to pursue 6 months of FOLFOX adjuvant treatment  Now started treatment biggest side effect was fatigue. Discussed dose reduction vs continuing same for now we decided will continue same dose.   Nausea is improved, taste changes predominantly, weight is stable, fatigue is present. Neuropathy is becoming more permanent  CT images reviewed independently, no concern for disease recurrence/progression.  Dose reduction of oxaliplatin to 65 mg/m2. Patient continues to have some treatment adverse effects.      Transaminitis  Likely chemotherapy related- stable  Continue treatment monitor CMP    Anemia  Hemoglobin improved- stable at 11.3 g/dL. Conitnue to monitor.    CINV  Zofran helps continue the same - stable  Continue emend/aloxi    Hypertension  Blood pressure improved  Continue amlodipine      Patient has self discontinued Xarelto due to cost. He also reports financial concerns in regards to Eliquis. He has atrial fibrillation and this is managed by cardiology. Patient to reach out to cardiologist.    Proceed with treatment today, cycle 8    Follow up in 2 weeks, sooner if condition indicates    Electronically signed by Cristin Hook PA-C      Time spent on encounter including record review, history taking, exam, discussion, counseling and documentation at: 30 minutes

## 2024-08-14 ENCOUNTER — OFFICE VISIT (OUTPATIENT)
Dept: ONCOLOGY | Facility: CLINIC | Age: 74
End: 2024-08-14
Payer: MEDICARE

## 2024-08-14 ENCOUNTER — TELEPHONE (OUTPATIENT)
Dept: CARDIOLOGY | Facility: CLINIC | Age: 74
End: 2024-08-14
Payer: MEDICARE

## 2024-08-14 ENCOUNTER — HOSPITAL ENCOUNTER (OUTPATIENT)
Dept: ONCOLOGY | Facility: HOSPITAL | Age: 74
Discharge: HOME | End: 2024-08-14
Admitting: STUDENT IN AN ORGANIZED HEALTH CARE EDUCATION/TRAINING PROGRAM
Payer: MEDICARE

## 2024-08-14 VITALS
HEART RATE: 55 BPM | OXYGEN SATURATION: 98 % | SYSTOLIC BLOOD PRESSURE: 162 MMHG | WEIGHT: 233.1 LBS | DIASTOLIC BLOOD PRESSURE: 89 MMHG | RESPIRATION RATE: 16 BRPM | BODY MASS INDEX: 32.63 KG/M2 | TEMPERATURE: 97.8 F | HEIGHT: 71 IN

## 2024-08-14 VITALS
RESPIRATION RATE: 16 BRPM | TEMPERATURE: 97.8 F | WEIGHT: 233 LBS | OXYGEN SATURATION: 98 % | HEIGHT: 71 IN | BODY MASS INDEX: 32.62 KG/M2 | DIASTOLIC BLOOD PRESSURE: 89 MMHG | HEART RATE: 55 BPM | SYSTOLIC BLOOD PRESSURE: 162 MMHG

## 2024-08-14 DIAGNOSIS — D64.9 ANEMIA, UNSPECIFIED TYPE: ICD-10-CM

## 2024-08-14 DIAGNOSIS — C19 RECTOSIGMOID CANCER: Primary | ICD-10-CM

## 2024-08-14 DIAGNOSIS — T45.1X5A CINV (CHEMOTHERAPY-INDUCED NAUSEA AND VOMITING): ICD-10-CM

## 2024-08-14 DIAGNOSIS — R11.2 CINV (CHEMOTHERAPY-INDUCED NAUSEA AND VOMITING): ICD-10-CM

## 2024-08-14 DIAGNOSIS — C18.9 MALIGNANT NEOPLASM OF COLON, UNSPECIFIED PART OF COLON: Primary | ICD-10-CM

## 2024-08-14 LAB
ALP BLD-CCNC: 126 U/L (ref 53–128)
BASOPHILS # BLD AUTO: 0.04 10*3/MM3 (ref 0–0.2)
BASOPHILS NFR BLD AUTO: 0.8 % (ref 0–1.5)
BUN BLDA-MCNC: 15 MG/DL (ref 7–22)
CALCIUM BLD QL: 9.3 MG/DL (ref 8–10.3)
CHLORIDE BLDA-SCNC: 111 MMOL/L (ref 98–108)
CO2 BLDA-SCNC: 23 MMOL/L (ref 18–33)
CREAT BLDA-MCNC: 1.1 MG/DL (ref 0.6–1.2)
DEPRECATED RDW RBC AUTO: 65.3 FL (ref 37–54)
EGFRCR SERPLBLD CKD-EPI 2021: 70.9 ML/MIN/1.73
EOSINOPHIL # BLD AUTO: 0.19 10*3/MM3 (ref 0–0.4)
EOSINOPHIL NFR BLD AUTO: 3.9 % (ref 0.3–6.2)
ERYTHROCYTE [DISTWIDTH] IN BLOOD BY AUTOMATED COUNT: 22.6 % (ref 12.3–15.4)
GLUCOSE BLDC GLUCOMTR-MCNC: 128 MG/DL (ref 73–118)
HCT VFR BLD AUTO: 36 % (ref 37.5–51)
HGB BLD-MCNC: 11.3 G/DL (ref 13–17.7)
LYMPHOCYTES # BLD AUTO: 1.85 10*3/MM3 (ref 0.7–3.1)
LYMPHOCYTES NFR BLD AUTO: 37.9 % (ref 19.6–45.3)
MCH RBC QN AUTO: 26 PG (ref 26.6–33)
MCHC RBC AUTO-ENTMCNC: 31.4 G/DL (ref 31.5–35.7)
MCV RBC AUTO: 82.8 FL (ref 79–97)
MONOCYTES # BLD AUTO: 0.69 10*3/MM3 (ref 0.1–0.9)
MONOCYTES NFR BLD AUTO: 14.1 % (ref 5–12)
NEUTROPHILS NFR BLD AUTO: 2.11 10*3/MM3 (ref 1.7–7)
NEUTROPHILS NFR BLD AUTO: 43.3 % (ref 42.7–76)
PLATELET # BLD AUTO: 127 10*3/MM3 (ref 140–450)
PMV BLD AUTO: 10.8 FL (ref 6–12)
POC ALBUMIN: 3.5 G/L (ref 3.3–5.5)
POC ALT (SGPT): 70 U/L (ref 10–47)
POC AST (SGOT): 65 U/L (ref 11–38)
POC TOTAL BILIRUBIN: 0.7 MG/DL (ref 0.2–1.6)
POC TOTAL PROTEIN: 6.7 G/DL (ref 6.4–8.1)
POTASSIUM BLDA-SCNC: 4 MMOL/L (ref 3.6–5.1)
RBC # BLD AUTO: 4.35 10*6/MM3 (ref 4.14–5.8)
SODIUM BLD-SCNC: 147 MMOL/L (ref 128–145)
WBC NRBC COR # BLD AUTO: 4.88 10*3/MM3 (ref 3.4–10.8)

## 2024-08-14 PROCEDURE — 25010000002 OXALIPLATIN PER 0.5 MG: Performed by: STUDENT IN AN ORGANIZED HEALTH CARE EDUCATION/TRAINING PROGRAM

## 2024-08-14 PROCEDURE — 96368 THER/DIAG CONCURRENT INF: CPT

## 2024-08-14 PROCEDURE — 96415 CHEMO IV INFUSION ADDL HR: CPT

## 2024-08-14 PROCEDURE — 0 DEXTROSE 5 % SOLUTION 250 ML FLEX CONT: Performed by: STUDENT IN AN ORGANIZED HEALTH CARE EDUCATION/TRAINING PROGRAM

## 2024-08-14 PROCEDURE — 96375 TX/PRO/DX INJ NEW DRUG ADDON: CPT

## 2024-08-14 PROCEDURE — 25010000002 LEUCOVORIN 500 MG RECONSTITUTED SOLUTION 1 EACH VIAL: Performed by: STUDENT IN AN ORGANIZED HEALTH CARE EDUCATION/TRAINING PROGRAM

## 2024-08-14 PROCEDURE — 96367 TX/PROPH/DG ADDL SEQ IV INF: CPT

## 2024-08-14 PROCEDURE — 96366 THER/PROPH/DIAG IV INF ADDON: CPT

## 2024-08-14 PROCEDURE — 25010000002 PALONOSETRON 0.25 MG/5ML SOLUTION PREFILLED SYRINGE: Performed by: STUDENT IN AN ORGANIZED HEALTH CARE EDUCATION/TRAINING PROGRAM

## 2024-08-14 PROCEDURE — 25010000002 FOSAPREPITANT PER 1 MG: Performed by: STUDENT IN AN ORGANIZED HEALTH CARE EDUCATION/TRAINING PROGRAM

## 2024-08-14 PROCEDURE — 85025 COMPLETE CBC W/AUTO DIFF WBC: CPT | Performed by: STUDENT IN AN ORGANIZED HEALTH CARE EDUCATION/TRAINING PROGRAM

## 2024-08-14 PROCEDURE — 96417 CHEMO IV INFUS EACH ADDL SEQ: CPT

## 2024-08-14 PROCEDURE — 80053 COMPREHEN METABOLIC PANEL: CPT

## 2024-08-14 PROCEDURE — 0 DEXTROSE 5 % SOLUTION: Performed by: STUDENT IN AN ORGANIZED HEALTH CARE EDUCATION/TRAINING PROGRAM

## 2024-08-14 PROCEDURE — 25010000002 LEUCOVORIN 200 MG RECONSTITUTED SOLUTION 1 EACH VIAL: Performed by: STUDENT IN AN ORGANIZED HEALTH CARE EDUCATION/TRAINING PROGRAM

## 2024-08-14 PROCEDURE — G0498 CHEMO EXTEND IV INFUS W/PUMP: HCPCS

## 2024-08-14 PROCEDURE — 25010000002 DEXAMETHASONE SODIUM PHOSPHATE 120 MG/30ML SOLUTION: Performed by: STUDENT IN AN ORGANIZED HEALTH CARE EDUCATION/TRAINING PROGRAM

## 2024-08-14 PROCEDURE — 25010000002 FLUOROURACIL PER 500 MG: Performed by: STUDENT IN AN ORGANIZED HEALTH CARE EDUCATION/TRAINING PROGRAM

## 2024-08-14 PROCEDURE — 25810000003 SODIUM CHLORIDE 0.9 % SOLUTION 250 ML FLEX CONT: Performed by: STUDENT IN AN ORGANIZED HEALTH CARE EDUCATION/TRAINING PROGRAM

## 2024-08-14 PROCEDURE — 96413 CHEMO IV INFUSION 1 HR: CPT

## 2024-08-14 RX ORDER — DEXTROSE MONOHYDRATE 50 MG/ML
20 INJECTION, SOLUTION INTRAVENOUS ONCE
Status: COMPLETED | OUTPATIENT
Start: 2024-08-14 | End: 2024-08-14

## 2024-08-14 RX ORDER — FLUOROURACIL 50 MG/ML
400 INJECTION, SOLUTION INTRAVENOUS ONCE
Status: COMPLETED | OUTPATIENT
Start: 2024-08-14 | End: 2024-08-14

## 2024-08-14 RX ORDER — PALONOSETRON 0.05 MG/ML
0.25 INJECTION, SOLUTION INTRAVENOUS ONCE
Status: COMPLETED | OUTPATIENT
Start: 2024-08-14 | End: 2024-08-14

## 2024-08-14 RX ADMIN — OXALIPLATIN 145 MG: 5 INJECTION, SOLUTION INTRAVENOUS at 09:46

## 2024-08-14 RX ADMIN — DEXTROSE MONOHYDRATE 20 ML/HR: 50 INJECTION, SOLUTION INTRAVENOUS at 08:39

## 2024-08-14 RX ADMIN — LEUCOVORIN CALCIUM 900 MG: 500 INJECTION, POWDER, LYOPHILIZED, FOR SOLUTION INTRAMUSCULAR; INTRAVENOUS at 09:45

## 2024-08-14 RX ADMIN — DEXAMETHASONE SODIUM PHOSPHATE 12 MG: 4 INJECTION, SOLUTION INTRA-ARTICULAR; INTRALESIONAL; INTRAMUSCULAR; INTRAVENOUS; SOFT TISSUE at 09:21

## 2024-08-14 RX ADMIN — FLUOROURACIL 890 MG: 50 INJECTION, SOLUTION INTRAVENOUS at 11:53

## 2024-08-14 RX ADMIN — PALONOSETRON 0.25 MG: 0.25 INJECTION, SOLUTION INTRAVENOUS at 08:41

## 2024-08-14 RX ADMIN — FLUOROURACIL 5330 MG: 50 INJECTION, SOLUTION INTRAVENOUS at 12:00

## 2024-08-14 RX ADMIN — FOSAPREPITANT 100 ML: 150 INJECTION, POWDER, LYOPHILIZED, FOR SOLUTION INTRAVENOUS at 08:42

## 2024-08-14 NOTE — TELEPHONE ENCOUNTER
Caller: Viktor Atkins     Relationship:SELF    Callback number: [phone]    Is it ok to leave a message: [x] Yes [] No    Requested medication for samples: ELIQUIS 5MG    How much medication does the patient currently have left: PT REPORTS RUNNING LOW ON MEDICATION AND CANNOT AFFORD TO PURCHASE MORE. REQUESTING SAMPLES. NOT SURE HOW LOW HE IS RUNNING. I DID INQUIRE IF HE HAD AT LEAST 3 DAYS AND HE WAS UNSURE BUT DID REPORT RUNNING LOW.    Who will be picking up the samples: SELF    Do you need information about patient financial assistance for this medication: [] Yes [x] No

## 2024-08-16 ENCOUNTER — HOSPITAL ENCOUNTER (OUTPATIENT)
Dept: ONCOLOGY | Facility: HOSPITAL | Age: 74
Discharge: HOME | End: 2024-08-16
Payer: MEDICARE

## 2024-08-16 DIAGNOSIS — Z95.828 PORT-A-CATH IN PLACE: ICD-10-CM

## 2024-08-16 DIAGNOSIS — C18.9 MALIGNANT NEOPLASM OF COLON, UNSPECIFIED PART OF COLON: Primary | ICD-10-CM

## 2024-08-16 PROCEDURE — 25010000002 HEPARIN LOCK FLUSH PER 10 UNITS: Performed by: INTERNAL MEDICINE

## 2024-08-16 RX ORDER — SODIUM CHLORIDE 0.9 % (FLUSH) 0.9 %
10 SYRINGE (ML) INJECTION AS NEEDED
OUTPATIENT
Start: 2024-08-16

## 2024-08-16 RX ORDER — HEPARIN SODIUM (PORCINE) LOCK FLUSH IV SOLN 100 UNIT/ML 100 UNIT/ML
500 SOLUTION INTRAVENOUS AS NEEDED
Status: DISCONTINUED | OUTPATIENT
Start: 2024-08-16 | End: 2024-08-17 | Stop reason: HOSPADM

## 2024-08-16 RX ORDER — SODIUM CHLORIDE 0.9 % (FLUSH) 0.9 %
10 SYRINGE (ML) INJECTION AS NEEDED
Status: DISCONTINUED | OUTPATIENT
Start: 2024-08-16 | End: 2024-08-17 | Stop reason: HOSPADM

## 2024-08-16 RX ORDER — HEPARIN SODIUM (PORCINE) LOCK FLUSH IV SOLN 100 UNIT/ML 100 UNIT/ML
500 SOLUTION INTRAVENOUS AS NEEDED
OUTPATIENT
Start: 2024-08-16

## 2024-08-16 RX ADMIN — HEPARIN 500 UNITS: 100 SYRINGE at 14:10

## 2024-08-16 RX ADMIN — Medication 10 ML: at 14:10

## 2024-08-21 DIAGNOSIS — C18.9 MALIGNANT NEOPLASM OF COLON, UNSPECIFIED PART OF COLON: ICD-10-CM

## 2024-08-21 DIAGNOSIS — E43 SEVERE MALNUTRITION: ICD-10-CM

## 2024-08-21 RX ORDER — AMLODIPINE BESYLATE 10 MG/1
10 TABLET ORAL DAILY
Qty: 30 TABLET | Refills: 0 | Status: SHIPPED | OUTPATIENT
Start: 2024-08-21

## 2024-08-21 RX ORDER — METOPROLOL SUCCINATE 50 MG/1
50 TABLET, EXTENDED RELEASE ORAL DAILY
Qty: 30 TABLET | Refills: 0 | Status: SHIPPED | OUTPATIENT
Start: 2024-08-21

## 2024-08-21 RX ORDER — MIRTAZAPINE 15 MG/1
15 TABLET, FILM COATED ORAL
Qty: 30 TABLET | Refills: 1 | Status: SHIPPED | OUTPATIENT
Start: 2024-08-21

## 2024-08-21 RX ORDER — AMLODIPINE BESYLATE 10 MG/1
10 TABLET ORAL DAILY
Qty: 30 TABLET | Refills: 4 | Status: SHIPPED | OUTPATIENT
Start: 2024-08-21 | End: 2024-08-21

## 2024-08-21 NOTE — TELEPHONE ENCOUNTER
Provider: leslye dsouza    Caller: cristiane gutierres    Relationship to Patient: pt's spouse    Pharmacy: [facility]    Phone Number: [phone]    Reason for Call: pt is needing refills sent to [facility] for the following medications:amlodipine 10mg / metoprolol xl 50mg / mirtazapine 15mg     When was the patient last seen: 08/14/2024

## 2024-08-26 NOTE — PROGRESS NOTES
HEMATOLOGY ONCOLOGY OUTPATIENT FOLLOW UP       Patient name: Viktor Atkins  : 1950  MRN: [id number]  Primary Care Physician: Gera Manriquez MD  Referring Physician: Gera Manriquez MD  Reason For Consult: Colon cancer      History of Present Illness:  Patient is a 74 y.o. male who presented to the hospital with acute GI bleed he had bright red blood per rectum and presented to the emergency room    3/25/2024 CT chest abdomen pelvis with findings compatible with constipation, sigmoid diverticulosis without diverticulitis  3/27/2024 colonoscopy with distal sigmoid mid colon mass biopsy obtained this was positive for invasive moderately differentiated adenocarcinoma MSI testing was done was negative intact nuclear expression of MMR proteins  3/30/2024 low anterior resection, diverting loop ileostomy by Dr. Burden tumor found in rectosigmoid upper rectum area.  Final pathology with all margins negative, grade 2 tumor moderately differentiated, tumor invades through muscularis propria into the pericolonic or perirectal tissue.  4 out of 22 lymph nodes positive  Final stage T4 N2    24 - FOLFOX  24 - FOLFOX   2024 FOLFOX cycle 3  2024 FOLFOX c4  7/3/24 - FOLFOX c5  24 - CT imaging with no recurrence  2024: FOLFOX c6 - oxaliplatin dose reduced to 65 mg/m2   2024 FOLFOX c7    2024: Viktor is seen today for routine follow up. He continues to tolerate FOLFOX. He does report nausea around D4-5 of cycle, Zofran helps. He states tingling is stable, not worsening and denies pain. He has no new complaints today.     Subjective:  Pt seen today for initial eval by me. He was previously being followed by Dr. Robin in our practice. He is on adjuvant chemotherapy with mFOLFOX for stage Iii colon cancer. Planned for C9 today. He has symptoms of poor taste, mild peripheral neuropathy, fatigue and diarrhea for 2-3 days after chemo. He also has a colostomy bag following  surgery.       Past Medical History:   Diagnosis Date    Arthritis     Benign essential HTN     Cancer     Diabetes mellitus     GERD (gastroesophageal reflux disease)     Hyperlipidemia, mixed     Palpitations        Past Surgical History:   Procedure Laterality Date    CARDIAC CATHETERIZATION      CHOLECYSTECTOMY      COLON RESECTION WITH ILEOSTOMY N/A 3/30/2024    Procedure: COLON RESECTION LOW ANTERIOR LAPAROSCOPIC, COLONAL ANASTOMOSIS, DIVERTING LOOP ILEOSTOMY WITH DAVINCI ROBOT;  Surgeon: Aakash Burden MD;  Location: [facility] MAIN OR;  Service: Robotics - DaVinci;  Laterality: N/A;    COLONOSCOPY N/A 3/27/2024    Procedure: COLONOSCOPY with polypectomy x 18 and sigmoid colon mass biopsies with endscopic spot tattooing;  Surgeon: Fili Quintero MD;  Location: [facility] ENDOSCOPY;  Service: Gastroenterology;  Laterality: N/A;  sigmoid mass at 25 cm    COLOSTOMY N/A 4/9/2024    Procedure: DIAGNOSTIC LAPAROSCOPY, DIVERTING OSTOMY, POSSIBLE REVISION OF ANASTAMOSIS;  Surgeon: Aakash Burden MD;  Location: [facility] MAIN OR;  Service: General;  Laterality: N/A;    KNEE SURGERY      SIGMOIDOSCOPY N/A 3/30/2024    Procedure: SIGMOIDOSCOPY;  Surgeon: Aakash Burden MD;  Location: [facility] MAIN OR;  Service: Gastroenterology;  Laterality: N/A;    VENOUS ACCESS DEVICE (PORT) INSERTION N/A 4/30/2024    Procedure: INSERTION VENOUS ACCESS DEVICE;  Surgeon: Aakash Burden MD;  Location: [facility] MAIN OR;  Service: General;  Laterality: N/A;         Current Outpatient Medications:     amiodarone (PACERONE) 200 MG tablet, Take 1 tablet by mouth Daily., Disp: 10 tablet, Rfl: 0    amLODIPine (NORVASC) 10 MG tablet, Take 1 tablet by mouth Daily., Disp: 30 tablet, Rfl: 0    apixaban (ELIQUIS) 5 MG tablet tablet, Take 1 tablet by mouth Every 12 (Twelve) Hours. Taking currently but switching to xarelto in about a month (Patient not taking: Reported on 8/14/2024), Disp: , Rfl:     atorvastatin (LIPITOR) 20 MG tablet, Take 1 tablet by mouth  Every Night., Disp: , Rfl:     metFORMIN (GLUCOPHAGE) 500 MG tablet, Take 1 tablet by mouth Daily With Breakfast., Disp: , Rfl:     metoprolol succinate XL (TOPROL-XL) 50 MG 24 hr tablet, Take 1 tablet by mouth Daily., Disp: 30 tablet, Rfl: 0    mirtazapine (REMERON) 15 MG tablet, Take 1 tablet by mouth every night at bedtime., Disp: 30 tablet, Rfl: 1    Multiple Vitamins-Minerals (MULTIVITAMIN ADULT PO), Take 1 tablet by mouth Daily., Disp: , Rfl:     Omeprazole (EQL Omeprazole) 20 MG tablet delayed-release, 20 mg Daily., Disp: , Rfl:     ondansetron (ZOFRAN) 8 MG tablet, Take 1 tablet by mouth 3 (Three) Times a Day As Needed for Nausea or Vomiting., Disp: 30 tablet, Rfl: 5    rivaroxaban (XARELTO) 20 MG tablet, Take 1 tablet by mouth Daily. (Patient not taking: Reported on 7/8/2024), Disp: 90 tablet, Rfl: 1    sertraline (Zoloft) 25 MG tablet, 1 tablet Daily., Disp: , Rfl:     No Known Allergies    No family history on file.    Cancer-related family history is not on file.      Social History     Tobacco Use    Smoking status: Former     Passive exposure: Past    Smokeless tobacco: Never   Vaping Use    Vaping status: Never Used   Substance Use Topics    Alcohol use: Never    Drug use: Never     Social History     Social History Narrative    Not on file       ROS:   Review of Systems   Constitutional:  Negative for chills, fatigue and fever.   HENT:  Negative for ear pain, mouth sores, nosebleeds and sore throat.    Eyes:  Negative for photophobia and visual disturbance.   Respiratory:  Negative for shortness of breath, wheezing and stridor.    Cardiovascular:  Negative for chest pain and palpitations.   Gastrointestinal:  Negative for abdominal pain, diarrhea, nausea (Patient reports nausea around day 4-5 of cycle, Zofran helps) and vomiting.   Endocrine: Negative for cold intolerance and heat intolerance.   Genitourinary:  Negative for dysuria and hematuria.   Musculoskeletal:  Negative for joint swelling and  "neck stiffness.   Skin:  Negative for color change and rash.   Neurological:  Negative for seizures and syncope.   Hematological:  Negative for adenopathy.        No obvious bleeding   Psychiatric/Behavioral:  Negative for agitation, confusion and hallucinations.        Objective:    Vital Signs:  Vitals:    08/28/24 0948   BP: 141/92   Pulse: 74   Resp: 18   Temp: 97.2 °F (36.2 °C)   SpO2: 97%   Weight: 107 kg (235 lb)   Height: 180.3 cm (71\")   PainSc: 0-No pain             Body mass index is 32.78 kg/m².    ECOG  1 - Restricted in physically strenuous activity but ambulatory and able to carry out work of a light or sedentary nature, e.g., light house work, office work     Physical Exam:   Physical Exam  Vitals reviewed.   Constitutional:       General: He is not in acute distress.     Appearance: Normal appearance. He is not toxic-appearing.   HENT:      Head: Normocephalic and atraumatic.   Eyes:      Pupils: Pupils are equal, round, and reactive to light.   Cardiovascular:      Rate and Rhythm: Normal rate and regular rhythm.      Pulses: Normal pulses.      Heart sounds: No murmur heard.  Pulmonary:      Effort: Pulmonary effort is normal.      Breath sounds: Normal breath sounds.   Abdominal:      General: There is no distension.      Palpations: Abdomen is soft. There is no mass.      Tenderness: There is no abdominal tenderness.   Musculoskeletal:         General: Normal range of motion.      Cervical back: Normal range of motion and neck supple.   Skin:     General: Skin is warm.   Neurological:      General: No focal deficit present.      Mental Status: He is alert and oriented to person, place, and time.   Psychiatric:         Mood and Affect: Mood normal.         Behavior: Behavior normal.         Thought Content: Thought content normal.         Judgment: Judgment normal.         Lab Results - Last 18 Months   Lab Units 08/28/24  0936 08/14/24  0756 07/31/24  0742   WBC 10*3/mm3 4.32 4.88 4.61 " "  HEMOGLOBIN g/dL 11.1* 11.3* 11.3*   HEMATOCRIT % 36.7* 36.0* 36.4*   PLATELETS 10*3/mm3 136* 127* 120*   MCV fL 84.2 82.8 82.9     Lab Results - Last 18 Months   Lab Units 08/28/24  1020 08/14/24  0801 07/31/24  0742 07/17/24  0752 07/03/24  0803 06/19/24  0759 06/05/24  0850   SODIUM mmol/L  --   --  143  --  140  --  141   POTASSIUM mmol/L  --   --  3.7  --  4.2  --  4.4   CHLORIDE mmol/L  --   --  109*  --  107  --  108*   CO2 mmol/L  --   --  23.2  --  22.8  --  24.2   BUN mg/dL  --   --  14  --  10  --  9   CREATININE mg/dL 1.00 1.10 0.96   < > 0.94   < > 0.97   CALCIUM mg/dL  --   --  9.0  --  8.9  --  9.1   BILIRUBIN mg/dL  --   --  0.2  --  0.3  --  0.2   ALK PHOS U/L  --   --  151*  --  124*  --  126*   ALT (SGPT) U/L  --   --  73*  --  82*  --  53*   AST (SGOT) U/L  --   --  61*  --  67*  --  45*   GLUCOSE mg/dL  --   --  120*  --  109*  --  115*    < > = values in this interval not displayed.       Lab Results   Component Value Date    GLUCOSE 120 (H) 07/31/2024    BUN 14 07/31/2024    CREATININE 1.10 08/14/2024    BCR 14.6 07/31/2024    K 3.7 07/31/2024    CO2 23.2 07/31/2024    CALCIUM 9.0 07/31/2024    ALBUMIN 4.0 07/31/2024    LABIL2 1.5 10/29/2018    AST 61 (H) 07/31/2024    ALT 73 (H) 07/31/2024       No results for input(s): \"APTT\", \"INR\", \"PTT\" in the last 28143 hours.    Lab Results   Component Value Date    IRON 22 (L) 04/09/2024    TIBC 235 (L) 04/09/2024    FERRITIN 281.40 04/09/2024       No results found for: \"FOLATE\"    No results found for: \"OCCULTBLD\"    No results found for: \"RETICCTPCT\"  No results found for: \"VRQCWEJX56\"  No results found for: \"SPEP\", \"UPEP\"  No results found for: \"LDH\", \"URICACID\"  No results found for: \"ARON\", \"RF\", \"SEDRATE\"  No results found for: \"FIBRINOGEN\", \"HAPTOGLOBIN\"  Lab Results   Component Value Date    PTT 27.6 04/30/2018     No results found for: \"\"  Lab Results   Component Value Date    CEA 1.88 06/19/2024     No components found for: " "\"CA-19-9\"  No results found for: \"PSA\"  No results found for: \"SEDRATE\"       Assessment & Plan     Viktor Atkins is a 74 y.o.  male with sigmoid-rectal cancer status post sigmoid colectomy, low anterior resection with lymph node positive disease he is here to discuss adjuvant treatment options    Colon cancer  T4 N2 disease stage IIIc  Discussed risk of recurrence, prognosis discussed adjuvant treatment with FOLFOX versus XELOX for 6 months of treatment with idea trial patients who had N2 disease or T4 disease had inferior outcomes with 3 months of treatment as compared to 6 months of treatment after discussion we decided to pursue 6 months of FOLFOX adjuvant treatment  Now started treatment biggest side effect was fatigue. Discussed dose reduction vs continuing same for now we decided will continue same dose.   Nausea is improved, taste changes predominantly, weight is stable, fatigue is present. Neuropathy is becoming more permanent  CT images reviewed independently, no concern for disease recurrence/progression.  Dose reduction of oxaliplatin to 65 mg/m2. Patient continues to have some treatment adverse effects.  -labs reviewed, okay to proceed with C9 today. To complete total 12 cycles.    Transaminitis  Likely chemotherapy related- stable  Continue treatment monitor CMP    Anemia  Hemoglobin improved- stable at 11.1 g/dL.   Likely from chemotherapy. Conitnue to monitor.    CINV  Zofran helps continue the same - stable  Continue emend/aloxi    Hypertension  Blood pressure improved  Continue amlodipine      Patient has self discontinued Xarelto due to cost. He also reports financial concerns in regards to Eliquis. He has atrial fibrillation and this is managed by cardiology. Patient to reach out to cardiologist.      Follow up in 4 weeks with treatment. Sooner as needed.    "

## 2024-08-28 ENCOUNTER — HOSPITAL ENCOUNTER (OUTPATIENT)
Dept: ONCOLOGY | Facility: HOSPITAL | Age: 74
Discharge: HOME | End: 2024-08-28
Payer: MEDICARE

## 2024-08-28 ENCOUNTER — OFFICE VISIT (OUTPATIENT)
Dept: ONCOLOGY | Facility: CLINIC | Age: 74
End: 2024-08-28
Payer: MEDICARE

## 2024-08-28 VITALS
HEART RATE: 74 BPM | OXYGEN SATURATION: 97 % | RESPIRATION RATE: 18 BRPM | WEIGHT: 235 LBS | DIASTOLIC BLOOD PRESSURE: 92 MMHG | SYSTOLIC BLOOD PRESSURE: 141 MMHG | TEMPERATURE: 97.2 F | BODY MASS INDEX: 32.9 KG/M2 | HEIGHT: 71 IN

## 2024-08-28 DIAGNOSIS — Z95.828 PORT-A-CATH IN PLACE: Primary | ICD-10-CM

## 2024-08-28 DIAGNOSIS — C18.9 MALIGNANT NEOPLASM OF COLON, UNSPECIFIED PART OF COLON: Primary | ICD-10-CM

## 2024-08-28 DIAGNOSIS — C18.9 MALIGNANT NEOPLASM OF COLON, UNSPECIFIED PART OF COLON: ICD-10-CM

## 2024-08-28 DIAGNOSIS — C19 RECTOSIGMOID CANCER: Primary | ICD-10-CM

## 2024-08-28 DIAGNOSIS — R11.2 CINV (CHEMOTHERAPY-INDUCED NAUSEA AND VOMITING): ICD-10-CM

## 2024-08-28 DIAGNOSIS — E43 SEVERE MALNUTRITION: ICD-10-CM

## 2024-08-28 DIAGNOSIS — T45.1X5A CINV (CHEMOTHERAPY-INDUCED NAUSEA AND VOMITING): ICD-10-CM

## 2024-08-28 DIAGNOSIS — D64.9 ANEMIA, UNSPECIFIED TYPE: ICD-10-CM

## 2024-08-28 LAB
ALP BLD-CCNC: 131 U/L (ref 53–128)
BASOPHILS # BLD AUTO: 0.03 10*3/MM3 (ref 0–0.2)
BASOPHILS NFR BLD AUTO: 0.7 % (ref 0–1.5)
BUN BLDA-MCNC: 9 MG/DL (ref 7–22)
CALCIUM BLD QL: 9.4 MG/DL (ref 8–10.3)
CEA SERPL-MCNC: 2.04 NG/ML
CHLORIDE BLDA-SCNC: 107 MMOL/L (ref 98–108)
CO2 BLDA-SCNC: 25 MMOL/L (ref 18–33)
CREAT BLDA-MCNC: 1 MG/DL (ref 0.6–1.2)
DEPRECATED RDW RBC AUTO: 69 FL (ref 37–54)
EGFRCR SERPLBLD CKD-EPI 2021: 79.5 ML/MIN/1.73
EOSINOPHIL # BLD AUTO: 0.16 10*3/MM3 (ref 0–0.4)
EOSINOPHIL NFR BLD AUTO: 3.7 % (ref 0.3–6.2)
ERYTHROCYTE [DISTWIDTH] IN BLOOD BY AUTOMATED COUNT: 23.4 % (ref 12.3–15.4)
GLUCOSE BLDC GLUCOMTR-MCNC: 111 MG/DL (ref 73–118)
HCT VFR BLD AUTO: 36.7 % (ref 37.5–51)
HGB BLD-MCNC: 11.1 G/DL (ref 13–17.7)
LYMPHOCYTES # BLD AUTO: 1.71 10*3/MM3 (ref 0.7–3.1)
LYMPHOCYTES NFR BLD AUTO: 39.6 % (ref 19.6–45.3)
MCH RBC QN AUTO: 25.5 PG (ref 26.6–33)
MCHC RBC AUTO-ENTMCNC: 30.2 G/DL (ref 31.5–35.7)
MCV RBC AUTO: 84.2 FL (ref 79–97)
MONOCYTES # BLD AUTO: 0.55 10*3/MM3 (ref 0.1–0.9)
MONOCYTES NFR BLD AUTO: 12.7 % (ref 5–12)
NEUTROPHILS NFR BLD AUTO: 1.87 10*3/MM3 (ref 1.7–7)
NEUTROPHILS NFR BLD AUTO: 43.3 % (ref 42.7–76)
PLATELET # BLD AUTO: 136 10*3/MM3 (ref 140–450)
PMV BLD AUTO: 11.6 FL (ref 6–12)
POC ALBUMIN: 3.4 G/L (ref 3.3–5.5)
POC ALT (SGPT): 83 U/L (ref 10–47)
POC AST (SGOT): 93 U/L (ref 11–38)
POC TOTAL BILIRUBIN: 0.6 MG/DL (ref 0.2–1.6)
POC TOTAL PROTEIN: 6.7 G/DL (ref 6.4–8.1)
POTASSIUM BLDA-SCNC: 3.9 MMOL/L (ref 3.6–5.1)
RBC # BLD AUTO: 4.36 10*6/MM3 (ref 4.14–5.8)
SODIUM BLD-SCNC: 144 MMOL/L (ref 128–145)
WBC NRBC COR # BLD AUTO: 4.32 10*3/MM3 (ref 3.4–10.8)

## 2024-08-28 PROCEDURE — 82378 CARCINOEMBRYONIC ANTIGEN: CPT | Performed by: STUDENT IN AN ORGANIZED HEALTH CARE EDUCATION/TRAINING PROGRAM

## 2024-08-28 PROCEDURE — 25010000002 DEXAMETHASONE SODIUM PHOSPHATE 120 MG/30ML SOLUTION: Performed by: STUDENT IN AN ORGANIZED HEALTH CARE EDUCATION/TRAINING PROGRAM

## 2024-08-28 PROCEDURE — 25010000002 PALONOSETRON 0.25 MG/5ML SOLUTION PREFILLED SYRINGE: Performed by: STUDENT IN AN ORGANIZED HEALTH CARE EDUCATION/TRAINING PROGRAM

## 2024-08-28 PROCEDURE — 3077F SYST BP >= 140 MM HG: CPT | Performed by: STUDENT IN AN ORGANIZED HEALTH CARE EDUCATION/TRAINING PROGRAM

## 2024-08-28 PROCEDURE — 25810000003 SODIUM CHLORIDE 0.9 % SOLUTION 250 ML FLEX CONT: Performed by: STUDENT IN AN ORGANIZED HEALTH CARE EDUCATION/TRAINING PROGRAM

## 2024-08-28 PROCEDURE — G0498 CHEMO EXTEND IV INFUS W/PUMP: HCPCS

## 2024-08-28 PROCEDURE — 96415 CHEMO IV INFUSION ADDL HR: CPT

## 2024-08-28 PROCEDURE — 36591 DRAW BLOOD OFF VENOUS DEVICE: CPT

## 2024-08-28 PROCEDURE — 0 DEXTROSE 5 % SOLUTION: Performed by: STUDENT IN AN ORGANIZED HEALTH CARE EDUCATION/TRAINING PROGRAM

## 2024-08-28 PROCEDURE — 0 DEXTROSE 5 % SOLUTION 250 ML FLEX CONT: Performed by: STUDENT IN AN ORGANIZED HEALTH CARE EDUCATION/TRAINING PROGRAM

## 2024-08-28 PROCEDURE — 96375 TX/PRO/DX INJ NEW DRUG ADDON: CPT

## 2024-08-28 PROCEDURE — 99214 OFFICE O/P EST MOD 30 MIN: CPT | Performed by: STUDENT IN AN ORGANIZED HEALTH CARE EDUCATION/TRAINING PROGRAM

## 2024-08-28 PROCEDURE — 96413 CHEMO IV INFUSION 1 HR: CPT

## 2024-08-28 PROCEDURE — 25010000002 FLUOROURACIL PER 500 MG: Performed by: STUDENT IN AN ORGANIZED HEALTH CARE EDUCATION/TRAINING PROGRAM

## 2024-08-28 PROCEDURE — 1126F AMNT PAIN NOTED NONE PRSNT: CPT | Performed by: STUDENT IN AN ORGANIZED HEALTH CARE EDUCATION/TRAINING PROGRAM

## 2024-08-28 PROCEDURE — 96368 THER/DIAG CONCURRENT INF: CPT

## 2024-08-28 PROCEDURE — 25010000002 OXALIPLATIN PER 0.5 MG: Performed by: STUDENT IN AN ORGANIZED HEALTH CARE EDUCATION/TRAINING PROGRAM

## 2024-08-28 PROCEDURE — 96411 CHEMO IV PUSH ADDL DRUG: CPT

## 2024-08-28 PROCEDURE — 96367 TX/PROPH/DG ADDL SEQ IV INF: CPT

## 2024-08-28 PROCEDURE — 80053 COMPREHEN METABOLIC PANEL: CPT

## 2024-08-28 PROCEDURE — 25010000002 LEUCOVORIN CALCIUM PER 50 MG: Performed by: STUDENT IN AN ORGANIZED HEALTH CARE EDUCATION/TRAINING PROGRAM

## 2024-08-28 PROCEDURE — 3080F DIAST BP >= 90 MM HG: CPT | Performed by: STUDENT IN AN ORGANIZED HEALTH CARE EDUCATION/TRAINING PROGRAM

## 2024-08-28 PROCEDURE — 25010000002 FOSAPREPITANT PER 1 MG: Performed by: STUDENT IN AN ORGANIZED HEALTH CARE EDUCATION/TRAINING PROGRAM

## 2024-08-28 RX ORDER — HEPARIN SODIUM (PORCINE) LOCK FLUSH IV SOLN 100 UNIT/ML 100 UNIT/ML
500 SOLUTION INTRAVENOUS AS NEEDED
Status: CANCELLED | OUTPATIENT
Start: 2024-08-28

## 2024-08-28 RX ORDER — DEXTROSE MONOHYDRATE 50 MG/ML
20 INJECTION, SOLUTION INTRAVENOUS ONCE
OUTPATIENT
Start: 2024-09-25

## 2024-08-28 RX ORDER — SODIUM CHLORIDE 0.9 % (FLUSH) 0.9 %
10 SYRINGE (ML) INJECTION AS NEEDED
Status: CANCELLED | OUTPATIENT
Start: 2024-08-28

## 2024-08-28 RX ORDER — DEXTROSE MONOHYDRATE 50 MG/ML
20 INJECTION, SOLUTION INTRAVENOUS ONCE
OUTPATIENT
Start: 2024-09-11

## 2024-08-28 RX ORDER — FAMOTIDINE 10 MG/ML
20 INJECTION, SOLUTION INTRAVENOUS AS NEEDED
OUTPATIENT
Start: 2024-09-11

## 2024-08-28 RX ORDER — HEPARIN SODIUM (PORCINE) LOCK FLUSH IV SOLN 100 UNIT/ML 100 UNIT/ML
500 SOLUTION INTRAVENOUS AS NEEDED
Status: DISCONTINUED | OUTPATIENT
Start: 2024-08-28 | End: 2024-08-29 | Stop reason: HOSPADM

## 2024-08-28 RX ORDER — PALONOSETRON 0.05 MG/ML
0.25 INJECTION, SOLUTION INTRAVENOUS ONCE
OUTPATIENT
Start: 2024-09-11

## 2024-08-28 RX ORDER — FLUOROURACIL 50 MG/ML
400 INJECTION, SOLUTION INTRAVENOUS ONCE
Status: CANCELLED | OUTPATIENT
Start: 2024-08-28

## 2024-08-28 RX ORDER — PALONOSETRON 0.05 MG/ML
0.25 INJECTION, SOLUTION INTRAVENOUS ONCE
OUTPATIENT
Start: 2024-09-25

## 2024-08-28 RX ORDER — FLUOROURACIL 50 MG/ML
400 INJECTION, SOLUTION INTRAVENOUS ONCE
Status: COMPLETED | OUTPATIENT
Start: 2024-08-28 | End: 2024-08-28

## 2024-08-28 RX ORDER — FLUOROURACIL 50 MG/ML
400 INJECTION, SOLUTION INTRAVENOUS ONCE
OUTPATIENT
Start: 2024-09-11

## 2024-08-28 RX ORDER — FAMOTIDINE 10 MG/ML
20 INJECTION, SOLUTION INTRAVENOUS AS NEEDED
OUTPATIENT
Start: 2024-09-25

## 2024-08-28 RX ORDER — DEXTROSE MONOHYDRATE 50 MG/ML
20 INJECTION, SOLUTION INTRAVENOUS ONCE
Status: COMPLETED | OUTPATIENT
Start: 2024-08-28 | End: 2024-08-28

## 2024-08-28 RX ORDER — PALONOSETRON 0.05 MG/ML
0.25 INJECTION, SOLUTION INTRAVENOUS ONCE
Status: COMPLETED | OUTPATIENT
Start: 2024-08-28 | End: 2024-08-28

## 2024-08-28 RX ORDER — FLUOROURACIL 50 MG/ML
400 INJECTION, SOLUTION INTRAVENOUS ONCE
OUTPATIENT
Start: 2024-09-25

## 2024-08-28 RX ORDER — SODIUM CHLORIDE 0.9 % (FLUSH) 0.9 %
10 SYRINGE (ML) INJECTION AS NEEDED
Status: DISCONTINUED | OUTPATIENT
Start: 2024-08-28 | End: 2024-08-29 | Stop reason: HOSPADM

## 2024-08-28 RX ORDER — DIPHENHYDRAMINE HYDROCHLORIDE 50 MG/ML
50 INJECTION INTRAMUSCULAR; INTRAVENOUS AS NEEDED
OUTPATIENT
Start: 2024-09-11

## 2024-08-28 RX ORDER — DIPHENHYDRAMINE HYDROCHLORIDE 50 MG/ML
50 INJECTION INTRAMUSCULAR; INTRAVENOUS AS NEEDED
OUTPATIENT
Start: 2024-09-25

## 2024-08-28 RX ADMIN — LEUCOVORIN CALCIUM 900 MG: 350 INJECTION, POWDER, LYOPHILIZED, FOR SOLUTION INTRAMUSCULAR; INTRAVENOUS at 11:42

## 2024-08-28 RX ADMIN — FLUOROURACIL 5400 MG: 50 INJECTION, SOLUTION INTRAVENOUS at 14:01

## 2024-08-28 RX ADMIN — DEXTROSE MONOHYDRATE 20 ML/HR: 50 INJECTION, SOLUTION INTRAVENOUS at 10:45

## 2024-08-28 RX ADMIN — DEXAMETHASONE SODIUM PHOSPHATE 12 MG: 4 INJECTION, SOLUTION INTRA-ARTICULAR; INTRALESIONAL; INTRAMUSCULAR; INTRAVENOUS; SOFT TISSUE at 11:20

## 2024-08-28 RX ADMIN — Medication 10 ML: at 09:36

## 2024-08-28 RX ADMIN — FOSAPREPITANT 100 ML: 150 INJECTION, POWDER, LYOPHILIZED, FOR SOLUTION INTRAVENOUS at 10:49

## 2024-08-28 RX ADMIN — OXALIPLATIN 145 MG: 5 INJECTION, SOLUTION INTRAVENOUS at 11:45

## 2024-08-28 RX ADMIN — FLUOROURACIL 900 MG: 50 INJECTION, SOLUTION INTRAVENOUS at 13:54

## 2024-08-28 RX ADMIN — PALONOSETRON 0.25 MG: 0.25 INJECTION, SOLUTION INTRAVENOUS at 10:45

## 2024-08-28 NOTE — PROGRESS NOTES
Pt to infusion clinic after Dr. Dickey f/u appointment.  Port already accessed and positive blood return noted. Labs reviewed.  Tx given per MAR.  Pt tolerated well.  Pt d/c home with 5Fu pump infusing and AVS given.

## 2024-08-30 ENCOUNTER — HOSPITAL ENCOUNTER (OUTPATIENT)
Dept: ONCOLOGY | Facility: HOSPITAL | Age: 74
Discharge: HOME | End: 2024-08-30
Payer: MEDICARE

## 2024-08-30 DIAGNOSIS — C18.9 MALIGNANT NEOPLASM OF COLON, UNSPECIFIED PART OF COLON: Primary | ICD-10-CM

## 2024-08-30 DIAGNOSIS — Z95.828 PORT-A-CATH IN PLACE: ICD-10-CM

## 2024-08-30 PROCEDURE — 25010000002 HEPARIN LOCK FLUSH PER 10 UNITS: Performed by: INTERNAL MEDICINE

## 2024-08-30 RX ORDER — SODIUM CHLORIDE 0.9 % (FLUSH) 0.9 %
10 SYRINGE (ML) INJECTION AS NEEDED
Status: DISCONTINUED | OUTPATIENT
Start: 2024-08-30 | End: 2024-08-31 | Stop reason: HOSPADM

## 2024-08-30 RX ORDER — SODIUM CHLORIDE 0.9 % (FLUSH) 0.9 %
10 SYRINGE (ML) INJECTION AS NEEDED
OUTPATIENT
Start: 2024-08-30

## 2024-08-30 RX ORDER — HEPARIN SODIUM (PORCINE) LOCK FLUSH IV SOLN 100 UNIT/ML 100 UNIT/ML
500 SOLUTION INTRAVENOUS AS NEEDED
Status: DISCONTINUED | OUTPATIENT
Start: 2024-08-30 | End: 2024-08-31 | Stop reason: HOSPADM

## 2024-08-30 RX ORDER — HEPARIN SODIUM (PORCINE) LOCK FLUSH IV SOLN 100 UNIT/ML 100 UNIT/ML
500 SOLUTION INTRAVENOUS AS NEEDED
OUTPATIENT
Start: 2024-08-30

## 2024-08-30 RX ADMIN — HEPARIN 500 UNITS: 100 SYRINGE at 15:31

## 2024-08-30 RX ADMIN — Medication 10 ML: at 15:31

## 2024-08-30 NOTE — PROGRESS NOTES
Pt to injection room for CIV d/c. Pump empty upon arrival. Port flushed with good blood return noted. Port flushed with saline and heparin prior to needle removal. Pt discharged home.

## 2024-09-11 ENCOUNTER — HOSPITAL ENCOUNTER (OUTPATIENT)
Dept: ONCOLOGY | Facility: HOSPITAL | Age: 74
Discharge: HOME | End: 2024-09-11
Admitting: STUDENT IN AN ORGANIZED HEALTH CARE EDUCATION/TRAINING PROGRAM
Payer: MEDICARE

## 2024-09-11 VITALS
HEIGHT: 71 IN | HEART RATE: 71 BPM | DIASTOLIC BLOOD PRESSURE: 92 MMHG | BODY MASS INDEX: 32.9 KG/M2 | OXYGEN SATURATION: 96 % | TEMPERATURE: 97.9 F | RESPIRATION RATE: 20 BRPM | WEIGHT: 235 LBS | SYSTOLIC BLOOD PRESSURE: 162 MMHG

## 2024-09-11 DIAGNOSIS — C18.9 MALIGNANT NEOPLASM OF COLON, UNSPECIFIED PART OF COLON: Primary | ICD-10-CM

## 2024-09-11 LAB
ALP BLD-CCNC: 141 U/L (ref 53–128)
BASOPHILS # BLD AUTO: 0.01 10*3/MM3 (ref 0–0.2)
BASOPHILS NFR BLD AUTO: 0.2 % (ref 0–1.5)
BUN BLDA-MCNC: 12 MG/DL (ref 7–22)
CALCIUM BLD QL: 9.2 MG/DL (ref 8–10.3)
CHLORIDE BLDA-SCNC: 107 MMOL/L (ref 98–108)
CO2 BLDA-SCNC: 24 MMOL/L (ref 18–33)
CREAT BLDA-MCNC: 0.9 MG/DL (ref 0.6–1.2)
DEPRECATED RDW RBC AUTO: 69.6 FL (ref 37–54)
EGFRCR SERPLBLD CKD-EPI 2021: 90.2 ML/MIN/1.73
EOSINOPHIL # BLD AUTO: 0.09 10*3/MM3 (ref 0–0.4)
EOSINOPHIL NFR BLD AUTO: 2.2 % (ref 0.3–6.2)
ERYTHROCYTE [DISTWIDTH] IN BLOOD BY AUTOMATED COUNT: 22.8 % (ref 12.3–15.4)
GLUCOSE BLDC GLUCOMTR-MCNC: 101 MG/DL (ref 73–118)
HCT VFR BLD AUTO: 35 % (ref 37.5–51)
HGB BLD-MCNC: 10.6 G/DL (ref 13–17.7)
LYMPHOCYTES # BLD AUTO: 1.74 10*3/MM3 (ref 0.7–3.1)
LYMPHOCYTES NFR BLD AUTO: 42 % (ref 19.6–45.3)
MCH RBC QN AUTO: 25.9 PG (ref 26.6–33)
MCHC RBC AUTO-ENTMCNC: 30.3 G/DL (ref 31.5–35.7)
MCV RBC AUTO: 85.4 FL (ref 79–97)
MONOCYTES # BLD AUTO: 0.5 10*3/MM3 (ref 0.1–0.9)
MONOCYTES NFR BLD AUTO: 12.1 % (ref 5–12)
NEUTROPHILS NFR BLD AUTO: 1.8 10*3/MM3 (ref 1.7–7)
NEUTROPHILS NFR BLD AUTO: 43.5 % (ref 42.7–76)
PLATELET # BLD AUTO: 120 10*3/MM3 (ref 140–450)
PMV BLD AUTO: 10.4 FL (ref 6–12)
POC ALBUMIN: 3.5 G/L (ref 3.3–5.5)
POC ALT (SGPT): 69 U/L (ref 10–47)
POC AST (SGOT): 87 U/L (ref 11–38)
POC TOTAL BILIRUBIN: 0.6 MG/DL (ref 0.2–1.6)
POC TOTAL PROTEIN: 6.5 G/DL (ref 6.4–8.1)
POTASSIUM BLDA-SCNC: 3.7 MMOL/L (ref 3.6–5.1)
RBC # BLD AUTO: 4.1 10*6/MM3 (ref 4.14–5.8)
SODIUM BLD-SCNC: 143 MMOL/L (ref 128–145)
WBC NRBC COR # BLD AUTO: 4.14 10*3/MM3 (ref 3.4–10.8)

## 2024-09-11 PROCEDURE — 25010000002 OXALIPLATIN PER 0.5 MG: Performed by: STUDENT IN AN ORGANIZED HEALTH CARE EDUCATION/TRAINING PROGRAM

## 2024-09-11 PROCEDURE — 80053 COMPREHEN METABOLIC PANEL: CPT

## 2024-09-11 PROCEDURE — 25010000002 PALONOSETRON 0.25 MG/5ML SOLUTION PREFILLED SYRINGE: Performed by: STUDENT IN AN ORGANIZED HEALTH CARE EDUCATION/TRAINING PROGRAM

## 2024-09-11 PROCEDURE — 85025 COMPLETE CBC W/AUTO DIFF WBC: CPT | Performed by: STUDENT IN AN ORGANIZED HEALTH CARE EDUCATION/TRAINING PROGRAM

## 2024-09-11 PROCEDURE — 96375 TX/PRO/DX INJ NEW DRUG ADDON: CPT

## 2024-09-11 PROCEDURE — 96366 THER/PROPH/DIAG IV INF ADDON: CPT

## 2024-09-11 PROCEDURE — 25010000002 LEUCOVORIN CALCIUM PER 50 MG: Performed by: STUDENT IN AN ORGANIZED HEALTH CARE EDUCATION/TRAINING PROGRAM

## 2024-09-11 PROCEDURE — 0 DEXTROSE 5 % SOLUTION 250 ML FLEX CONT: Performed by: STUDENT IN AN ORGANIZED HEALTH CARE EDUCATION/TRAINING PROGRAM

## 2024-09-11 PROCEDURE — 96411 CHEMO IV PUSH ADDL DRUG: CPT

## 2024-09-11 PROCEDURE — 25810000003 SODIUM CHLORIDE 0.9 % SOLUTION 500 ML FLEX CONT: Performed by: STUDENT IN AN ORGANIZED HEALTH CARE EDUCATION/TRAINING PROGRAM

## 2024-09-11 PROCEDURE — 25010000002 FOSAPREPITANT PER 1 MG: Performed by: STUDENT IN AN ORGANIZED HEALTH CARE EDUCATION/TRAINING PROGRAM

## 2024-09-11 PROCEDURE — 25010000002 DEXAMETHASONE SODIUM PHOSPHATE 120 MG/30ML SOLUTION: Performed by: STUDENT IN AN ORGANIZED HEALTH CARE EDUCATION/TRAINING PROGRAM

## 2024-09-11 PROCEDURE — 96413 CHEMO IV INFUSION 1 HR: CPT

## 2024-09-11 PROCEDURE — 96415 CHEMO IV INFUSION ADDL HR: CPT

## 2024-09-11 PROCEDURE — 96368 THER/DIAG CONCURRENT INF: CPT

## 2024-09-11 PROCEDURE — G0498 CHEMO EXTEND IV INFUS W/PUMP: HCPCS

## 2024-09-11 PROCEDURE — 96367 TX/PROPH/DG ADDL SEQ IV INF: CPT

## 2024-09-11 PROCEDURE — 0 DEXTROSE 5 % SOLUTION: Performed by: STUDENT IN AN ORGANIZED HEALTH CARE EDUCATION/TRAINING PROGRAM

## 2024-09-11 PROCEDURE — 25010000002 FLUOROURACIL PER 500 MG: Performed by: STUDENT IN AN ORGANIZED HEALTH CARE EDUCATION/TRAINING PROGRAM

## 2024-09-11 RX ORDER — PALONOSETRON 0.05 MG/ML
0.25 INJECTION, SOLUTION INTRAVENOUS ONCE
Status: COMPLETED | OUTPATIENT
Start: 2024-09-11 | End: 2024-09-11

## 2024-09-11 RX ORDER — DEXTROSE MONOHYDRATE 50 MG/ML
20 INJECTION, SOLUTION INTRAVENOUS ONCE
Status: COMPLETED | OUTPATIENT
Start: 2024-09-11 | End: 2024-09-11

## 2024-09-11 RX ORDER — FLUOROURACIL 50 MG/ML
400 INJECTION, SOLUTION INTRAVENOUS ONCE
Status: COMPLETED | OUTPATIENT
Start: 2024-09-11 | End: 2024-09-11

## 2024-09-11 RX ADMIN — FLUOROURACIL 5400 MG: 50 INJECTION, SOLUTION INTRAVENOUS at 13:53

## 2024-09-11 RX ADMIN — DEXAMETHASONE SODIUM PHOSPHATE 12 MG: 4 INJECTION, SOLUTION INTRA-ARTICULAR; INTRALESIONAL; INTRAMUSCULAR; INTRAVENOUS; SOFT TISSUE at 10:26

## 2024-09-11 RX ADMIN — PALONOSETRON 0.25 MG: 0.25 INJECTION, SOLUTION INTRAVENOUS at 10:24

## 2024-09-11 RX ADMIN — DEXTROSE MONOHYDRATE 20 ML/HR: 50 INJECTION, SOLUTION INTRAVENOUS at 10:24

## 2024-09-11 RX ADMIN — FOSAPREPITANT 100 ML: 150 INJECTION, POWDER, LYOPHILIZED, FOR SOLUTION INTRAVENOUS at 10:46

## 2024-09-11 RX ADMIN — LEUCOVORIN CALCIUM 900 MG: 350 INJECTION, POWDER, LYOPHILIZED, FOR SOLUTION INTRAMUSCULAR; INTRAVENOUS at 11:28

## 2024-09-11 RX ADMIN — FLUOROURACIL 900 MG: 50 INJECTION, SOLUTION INTRAVENOUS at 13:47

## 2024-09-11 RX ADMIN — OXALIPLATIN 145 MG: 5 INJECTION, SOLUTION INTRAVENOUS at 11:31

## 2024-09-11 NOTE — PROGRESS NOTES
Pt here for C10 of folfox. [facility] infusaport accessed. 10cc wasted. Cbc/cmp drawn and resulted. Pt states the only issues he has is altered taste and occassional nausea. Pt states that zofran helps the nausea.  Treatment infused as ordered. Pt tolerated. AVS printed.

## 2024-09-13 ENCOUNTER — HOSPITAL ENCOUNTER (OUTPATIENT)
Dept: ONCOLOGY | Facility: HOSPITAL | Age: 74
Discharge: HOME | End: 2024-09-13
Payer: MEDICARE

## 2024-09-13 DIAGNOSIS — Z95.828 PORT-A-CATH IN PLACE: ICD-10-CM

## 2024-09-13 DIAGNOSIS — C18.9 MALIGNANT NEOPLASM OF COLON, UNSPECIFIED PART OF COLON: Primary | ICD-10-CM

## 2024-09-13 PROCEDURE — 25010000002 HEPARIN LOCK FLUSH PER 10 UNITS: Performed by: INTERNAL MEDICINE

## 2024-09-13 RX ORDER — SODIUM CHLORIDE 0.9 % (FLUSH) 0.9 %
10 SYRINGE (ML) INJECTION AS NEEDED
Status: DISCONTINUED | OUTPATIENT
Start: 2024-09-13 | End: 2024-09-14 | Stop reason: HOSPADM

## 2024-09-13 RX ORDER — SODIUM CHLORIDE 0.9 % (FLUSH) 0.9 %
10 SYRINGE (ML) INJECTION AS NEEDED
OUTPATIENT
Start: 2024-09-13

## 2024-09-13 RX ORDER — HEPARIN SODIUM (PORCINE) LOCK FLUSH IV SOLN 100 UNIT/ML 100 UNIT/ML
500 SOLUTION INTRAVENOUS AS NEEDED
Status: DISCONTINUED | OUTPATIENT
Start: 2024-09-13 | End: 2024-09-14 | Stop reason: HOSPADM

## 2024-09-13 RX ORDER — HEPARIN SODIUM (PORCINE) LOCK FLUSH IV SOLN 100 UNIT/ML 100 UNIT/ML
500 SOLUTION INTRAVENOUS AS NEEDED
OUTPATIENT
Start: 2024-09-13

## 2024-09-13 RX ADMIN — Medication 10 ML: at 14:43

## 2024-09-13 RX ADMIN — HEPARIN 500 UNITS: 100 SYRINGE at 14:44

## 2024-09-13 NOTE — PROGRESS NOTES
1443 Pt to clinic for CIV d/c.  5FU pump empty. Port aspirated, positive blood return noted. Port flushed with 10mL NS and Heparin 500units, then de-accessed.  Pt discharged without issue.

## 2024-09-24 DIAGNOSIS — C18.9 MALIGNANT NEOPLASM OF COLON, UNSPECIFIED PART OF COLON: Primary | ICD-10-CM

## 2024-09-25 ENCOUNTER — HOSPITAL ENCOUNTER (OUTPATIENT)
Dept: ONCOLOGY | Facility: HOSPITAL | Age: 74
Discharge: HOME | End: 2024-09-25
Payer: MEDICARE

## 2024-09-25 ENCOUNTER — OFFICE VISIT (OUTPATIENT)
Dept: ONCOLOGY | Facility: CLINIC | Age: 74
End: 2024-09-25
Payer: MEDICARE

## 2024-09-25 VITALS
DIASTOLIC BLOOD PRESSURE: 89 MMHG | HEART RATE: 64 BPM | WEIGHT: 231.2 LBS | OXYGEN SATURATION: 96 % | BODY MASS INDEX: 32.37 KG/M2 | SYSTOLIC BLOOD PRESSURE: 158 MMHG | HEIGHT: 71 IN

## 2024-09-25 DIAGNOSIS — Z95.828 PORT-A-CATH IN PLACE: Primary | ICD-10-CM

## 2024-09-25 DIAGNOSIS — D64.9 ANEMIA, UNSPECIFIED TYPE: ICD-10-CM

## 2024-09-25 DIAGNOSIS — C18.9 MALIGNANT NEOPLASM OF COLON, UNSPECIFIED PART OF COLON: Primary | ICD-10-CM

## 2024-09-25 DIAGNOSIS — C18.9 MALIGNANT NEOPLASM OF COLON, UNSPECIFIED PART OF COLON: ICD-10-CM

## 2024-09-25 DIAGNOSIS — R74.01 TRANSAMINITIS: ICD-10-CM

## 2024-09-25 DIAGNOSIS — R11.2 CINV (CHEMOTHERAPY-INDUCED NAUSEA AND VOMITING): ICD-10-CM

## 2024-09-25 DIAGNOSIS — T45.1X5A CINV (CHEMOTHERAPY-INDUCED NAUSEA AND VOMITING): ICD-10-CM

## 2024-09-25 DIAGNOSIS — Z95.828 PORT-A-CATH IN PLACE: ICD-10-CM

## 2024-09-25 LAB
ALP BLD-CCNC: 128 U/L (ref 53–128)
BASOPHILS # BLD AUTO: 0.02 10*3/MM3 (ref 0–0.2)
BASOPHILS NFR BLD AUTO: 0.5 % (ref 0–1.5)
BUN BLDA-MCNC: 15 MG/DL (ref 7–22)
CALCIUM BLD QL: 9.4 MG/DL (ref 8–10.3)
CEA SERPL-MCNC: 2.24 NG/ML
CHLORIDE BLDA-SCNC: 109 MMOL/L (ref 98–108)
CO2 BLDA-SCNC: 24 MMOL/L (ref 18–33)
CREAT BLDA-MCNC: 1.1 MG/DL (ref 0.6–1.2)
DEPRECATED RDW RBC AUTO: 67.1 FL (ref 37–54)
EGFRCR SERPLBLD CKD-EPI 2021: 70.9 ML/MIN/1.73
EOSINOPHIL # BLD AUTO: 0.08 10*3/MM3 (ref 0–0.4)
EOSINOPHIL NFR BLD AUTO: 2 % (ref 0.3–6.2)
ERYTHROCYTE [DISTWIDTH] IN BLOOD BY AUTOMATED COUNT: 22 % (ref 12.3–15.4)
GLUCOSE BLDC GLUCOMTR-MCNC: 127 MG/DL (ref 73–118)
HCT VFR BLD AUTO: 35.4 % (ref 37.5–51)
HGB BLD-MCNC: 10.8 G/DL (ref 13–17.7)
LYMPHOCYTES # BLD AUTO: 1.67 10*3/MM3 (ref 0.7–3.1)
LYMPHOCYTES NFR BLD AUTO: 41.4 % (ref 19.6–45.3)
MCH RBC QN AUTO: 26.3 PG (ref 26.6–33)
MCHC RBC AUTO-ENTMCNC: 30.5 G/DL (ref 31.5–35.7)
MCV RBC AUTO: 86.3 FL (ref 79–97)
MONOCYTES # BLD AUTO: 0.55 10*3/MM3 (ref 0.1–0.9)
MONOCYTES NFR BLD AUTO: 13.6 % (ref 5–12)
NEUTROPHILS NFR BLD AUTO: 1.71 10*3/MM3 (ref 1.7–7)
NEUTROPHILS NFR BLD AUTO: 42.5 % (ref 42.7–76)
PLATELET # BLD AUTO: 132 10*3/MM3 (ref 140–450)
PMV BLD AUTO: 11.7 FL (ref 6–12)
POC ALBUMIN: 3.7 G/L (ref 3.3–5.5)
POC ALT (SGPT): 82 U/L (ref 10–47)
POC AST (SGOT): 109 U/L (ref 11–38)
POC TOTAL BILIRUBIN: 0.5 MG/DL (ref 0.2–1.6)
POC TOTAL PROTEIN: 7 G/DL (ref 6.4–8.1)
POTASSIUM BLDA-SCNC: 4.2 MMOL/L (ref 3.6–5.1)
RBC # BLD AUTO: 4.1 10*6/MM3 (ref 4.14–5.8)
SODIUM BLD-SCNC: 144 MMOL/L (ref 128–145)
WBC NRBC COR # BLD AUTO: 4.03 10*3/MM3 (ref 3.4–10.8)

## 2024-09-25 PROCEDURE — 96411 CHEMO IV PUSH ADDL DRUG: CPT

## 2024-09-25 PROCEDURE — 85025 COMPLETE CBC W/AUTO DIFF WBC: CPT | Performed by: STUDENT IN AN ORGANIZED HEALTH CARE EDUCATION/TRAINING PROGRAM

## 2024-09-25 PROCEDURE — 96375 TX/PRO/DX INJ NEW DRUG ADDON: CPT

## 2024-09-25 PROCEDURE — 25810000003 SODIUM CHLORIDE 0.9 % SOLUTION 500 ML FLEX CONT: Performed by: STUDENT IN AN ORGANIZED HEALTH CARE EDUCATION/TRAINING PROGRAM

## 2024-09-25 PROCEDURE — 0 DEXTROSE 5 % SOLUTION 250 ML FLEX CONT: Performed by: STUDENT IN AN ORGANIZED HEALTH CARE EDUCATION/TRAINING PROGRAM

## 2024-09-25 PROCEDURE — 25010000002 FOSAPREPITANT PER 1 MG: Performed by: STUDENT IN AN ORGANIZED HEALTH CARE EDUCATION/TRAINING PROGRAM

## 2024-09-25 PROCEDURE — 25010000002 DEXAMETHASONE SODIUM PHOSPHATE 120 MG/30ML SOLUTION: Performed by: STUDENT IN AN ORGANIZED HEALTH CARE EDUCATION/TRAINING PROGRAM

## 2024-09-25 PROCEDURE — 80053 COMPREHEN METABOLIC PANEL: CPT

## 2024-09-25 PROCEDURE — 25010000002 HEPARIN LOCK FLUSH PER 10 UNITS: Performed by: INTERNAL MEDICINE

## 2024-09-25 PROCEDURE — 96368 THER/DIAG CONCURRENT INF: CPT

## 2024-09-25 PROCEDURE — 96413 CHEMO IV INFUSION 1 HR: CPT

## 2024-09-25 PROCEDURE — 25010000002 FLUOROURACIL PER 500 MG: Performed by: STUDENT IN AN ORGANIZED HEALTH CARE EDUCATION/TRAINING PROGRAM

## 2024-09-25 PROCEDURE — 96415 CHEMO IV INFUSION ADDL HR: CPT

## 2024-09-25 PROCEDURE — G0498 CHEMO EXTEND IV INFUS W/PUMP: HCPCS

## 2024-09-25 PROCEDURE — 25010000002 OXALIPLATIN PER 0.5 MG: Performed by: STUDENT IN AN ORGANIZED HEALTH CARE EDUCATION/TRAINING PROGRAM

## 2024-09-25 PROCEDURE — 96367 TX/PROPH/DG ADDL SEQ IV INF: CPT

## 2024-09-25 PROCEDURE — 0 DEXTROSE 5 % SOLUTION: Performed by: STUDENT IN AN ORGANIZED HEALTH CARE EDUCATION/TRAINING PROGRAM

## 2024-09-25 PROCEDURE — 96416 CHEMO PROLONG INFUSE W/PUMP: CPT

## 2024-09-25 PROCEDURE — 82378 CARCINOEMBRYONIC ANTIGEN: CPT | Performed by: STUDENT IN AN ORGANIZED HEALTH CARE EDUCATION/TRAINING PROGRAM

## 2024-09-25 PROCEDURE — 25010000002 LEUCOVORIN CALCIUM PER 50 MG: Performed by: STUDENT IN AN ORGANIZED HEALTH CARE EDUCATION/TRAINING PROGRAM

## 2024-09-25 PROCEDURE — 25010000002 PALONOSETRON 0.25 MG/5ML SOLUTION PREFILLED SYRINGE: Performed by: STUDENT IN AN ORGANIZED HEALTH CARE EDUCATION/TRAINING PROGRAM

## 2024-09-25 RX ORDER — HEPARIN SODIUM (PORCINE) LOCK FLUSH IV SOLN 100 UNIT/ML 100 UNIT/ML
500 SOLUTION INTRAVENOUS AS NEEDED
Status: DISCONTINUED | OUTPATIENT
Start: 2024-09-25 | End: 2024-09-26 | Stop reason: HOSPADM

## 2024-09-25 RX ORDER — DIPHENHYDRAMINE HYDROCHLORIDE 50 MG/ML
50 INJECTION INTRAMUSCULAR; INTRAVENOUS AS NEEDED
OUTPATIENT
Start: 2024-10-09

## 2024-09-25 RX ORDER — PALONOSETRON 0.05 MG/ML
0.25 INJECTION, SOLUTION INTRAVENOUS ONCE
OUTPATIENT
Start: 2024-10-09

## 2024-09-25 RX ORDER — DEXTROSE MONOHYDRATE 50 MG/ML
20 INJECTION, SOLUTION INTRAVENOUS ONCE
Status: COMPLETED | OUTPATIENT
Start: 2024-09-25 | End: 2024-09-25

## 2024-09-25 RX ORDER — PALONOSETRON 0.05 MG/ML
0.25 INJECTION, SOLUTION INTRAVENOUS ONCE
Status: COMPLETED | OUTPATIENT
Start: 2024-09-25 | End: 2024-09-25

## 2024-09-25 RX ORDER — FAMOTIDINE 10 MG/ML
20 INJECTION, SOLUTION INTRAVENOUS AS NEEDED
OUTPATIENT
Start: 2024-10-09

## 2024-09-25 RX ORDER — DEXTROSE MONOHYDRATE 50 MG/ML
20 INJECTION, SOLUTION INTRAVENOUS ONCE
OUTPATIENT
Start: 2024-10-09

## 2024-09-25 RX ORDER — HEPARIN SODIUM (PORCINE) LOCK FLUSH IV SOLN 100 UNIT/ML 100 UNIT/ML
500 SOLUTION INTRAVENOUS AS NEEDED
Status: CANCELLED | OUTPATIENT
Start: 2024-09-25

## 2024-09-25 RX ORDER — SODIUM CHLORIDE 0.9 % (FLUSH) 0.9 %
10 SYRINGE (ML) INJECTION AS NEEDED
Status: CANCELLED | OUTPATIENT
Start: 2024-09-25

## 2024-09-25 RX ORDER — FLUOROURACIL 50 MG/ML
400 INJECTION, SOLUTION INTRAVENOUS ONCE
OUTPATIENT
Start: 2024-10-09

## 2024-09-25 RX ORDER — SODIUM CHLORIDE 0.9 % (FLUSH) 0.9 %
10 SYRINGE (ML) INJECTION AS NEEDED
Status: DISCONTINUED | OUTPATIENT
Start: 2024-09-25 | End: 2024-09-26 | Stop reason: HOSPADM

## 2024-09-25 RX ORDER — FLUOROURACIL 50 MG/ML
400 INJECTION, SOLUTION INTRAVENOUS ONCE
Status: COMPLETED | OUTPATIENT
Start: 2024-09-25 | End: 2024-09-25

## 2024-09-25 RX ADMIN — OXALIPLATIN 145 MG: 5 INJECTION, SOLUTION INTRAVENOUS at 11:49

## 2024-09-25 RX ADMIN — HEPARIN 500 UNITS: 100 SYRINGE at 09:30

## 2024-09-25 RX ADMIN — LEUCOVORIN CALCIUM 900 MG: 350 INJECTION, POWDER, LYOPHILIZED, FOR SOLUTION INTRAMUSCULAR; INTRAVENOUS at 11:48

## 2024-09-25 RX ADMIN — FLUOROURACIL 900 MG: 50 INJECTION, SOLUTION INTRAVENOUS at 14:07

## 2024-09-25 RX ADMIN — DEXTROSE MONOHYDRATE 20 ML/HR: 50 INJECTION, SOLUTION INTRAVENOUS at 10:43

## 2024-09-25 RX ADMIN — FLUOROURACIL 5400 MG: 50 INJECTION, SOLUTION INTRAVENOUS at 14:12

## 2024-09-25 RX ADMIN — Medication 10 ML: at 09:28

## 2024-09-25 RX ADMIN — FOSAPREPITANT 100 ML: 150 INJECTION, POWDER, LYOPHILIZED, FOR SOLUTION INTRAVENOUS at 10:47

## 2024-09-25 RX ADMIN — DEXAMETHASONE SODIUM PHOSPHATE 12 MG: 4 INJECTION, SOLUTION INTRA-ARTICULAR; INTRALESIONAL; INTRAMUSCULAR; INTRAVENOUS; SOFT TISSUE at 11:23

## 2024-09-25 RX ADMIN — PALONOSETRON 0.25 MG: 0.25 INJECTION, SOLUTION INTRAVENOUS at 10:44

## 2024-09-26 ENCOUNTER — TELEPHONE (OUTPATIENT)
Dept: CARDIOLOGY | Facility: CLINIC | Age: 74
End: 2024-09-26

## 2024-09-26 ENCOUNTER — OFFICE VISIT (OUTPATIENT)
Dept: CARDIOLOGY | Facility: CLINIC | Age: 74
End: 2024-09-26
Payer: MEDICARE

## 2024-09-26 VITALS
RESPIRATION RATE: 16 BRPM | HEART RATE: 64 BPM | SYSTOLIC BLOOD PRESSURE: 144 MMHG | DIASTOLIC BLOOD PRESSURE: 78 MMHG | BODY MASS INDEX: 31.92 KG/M2 | OXYGEN SATURATION: 96 % | WEIGHT: 228 LBS | HEIGHT: 71 IN

## 2024-09-26 DIAGNOSIS — I48.0 PAF (PAROXYSMAL ATRIAL FIBRILLATION): ICD-10-CM

## 2024-09-26 DIAGNOSIS — E78.2 MIXED HYPERLIPIDEMIA: ICD-10-CM

## 2024-09-26 DIAGNOSIS — I10 ESSENTIAL HYPERTENSION: Primary | ICD-10-CM

## 2024-09-26 PROCEDURE — 3077F SYST BP >= 140 MM HG: CPT | Performed by: NURSE PRACTITIONER

## 2024-09-26 PROCEDURE — 99213 OFFICE O/P EST LOW 20 MIN: CPT | Performed by: NURSE PRACTITIONER

## 2024-09-26 PROCEDURE — 3078F DIAST BP <80 MM HG: CPT | Performed by: NURSE PRACTITIONER

## 2024-09-26 PROCEDURE — 93000 ELECTROCARDIOGRAM COMPLETE: CPT | Performed by: NURSE PRACTITIONER

## 2024-09-27 ENCOUNTER — TELEPHONE (OUTPATIENT)
Dept: ONCOLOGY | Facility: HOSPITAL | Age: 74
End: 2024-09-27
Payer: MEDICARE

## 2024-09-27 ENCOUNTER — HOSPITAL ENCOUNTER (OUTPATIENT)
Dept: ONCOLOGY | Facility: HOSPITAL | Age: 74
Discharge: HOME | End: 2024-09-27
Admitting: STUDENT IN AN ORGANIZED HEALTH CARE EDUCATION/TRAINING PROGRAM
Payer: MEDICARE

## 2024-09-27 DIAGNOSIS — Z95.828 PORT-A-CATH IN PLACE: ICD-10-CM

## 2024-09-27 DIAGNOSIS — C18.9 MALIGNANT NEOPLASM OF COLON, UNSPECIFIED PART OF COLON: Primary | ICD-10-CM

## 2024-09-27 PROBLEM — I48.0 PAF (PAROXYSMAL ATRIAL FIBRILLATION): Status: ACTIVE | Noted: 2024-04-08

## 2024-09-27 PROCEDURE — 25010000002 HEPARIN LOCK FLUSH PER 10 UNITS: Performed by: STUDENT IN AN ORGANIZED HEALTH CARE EDUCATION/TRAINING PROGRAM

## 2024-09-27 RX ORDER — SODIUM CHLORIDE 0.9 % (FLUSH) 0.9 %
10 SYRINGE (ML) INJECTION AS NEEDED
Status: DISCONTINUED | OUTPATIENT
Start: 2024-09-27 | End: 2024-09-28 | Stop reason: HOSPADM

## 2024-09-27 RX ORDER — HEPARIN SODIUM (PORCINE) LOCK FLUSH IV SOLN 100 UNIT/ML 100 UNIT/ML
500 SOLUTION INTRAVENOUS AS NEEDED
OUTPATIENT
Start: 2024-09-27

## 2024-09-27 RX ORDER — SODIUM CHLORIDE 0.9 % (FLUSH) 0.9 %
10 SYRINGE (ML) INJECTION AS NEEDED
OUTPATIENT
Start: 2024-09-27

## 2024-09-27 RX ORDER — HEPARIN SODIUM (PORCINE) LOCK FLUSH IV SOLN 100 UNIT/ML 100 UNIT/ML
500 SOLUTION INTRAVENOUS AS NEEDED
Status: CANCELLED | OUTPATIENT
Start: 2024-09-27

## 2024-09-27 RX ORDER — HEPARIN SODIUM (PORCINE) LOCK FLUSH IV SOLN 100 UNIT/ML 100 UNIT/ML
500 SOLUTION INTRAVENOUS AS NEEDED
Status: DISCONTINUED | OUTPATIENT
Start: 2024-09-27 | End: 2024-09-28 | Stop reason: HOSPADM

## 2024-09-27 RX ORDER — SODIUM CHLORIDE 0.9 % (FLUSH) 0.9 %
10 SYRINGE (ML) INJECTION AS NEEDED
Status: CANCELLED | OUTPATIENT
Start: 2024-09-27

## 2024-09-27 RX ADMIN — Medication 10 ML: at 11:42

## 2024-09-27 RX ADMIN — HEPARIN 500 UNITS: 100 SYRINGE at 11:42

## 2024-09-30 ENCOUNTER — TELEPHONE (OUTPATIENT)
Dept: ONCOLOGY | Facility: CLINIC | Age: 74
End: 2024-09-30

## 2024-09-30 NOTE — TELEPHONE ENCOUNTER
Provider: meet    Caller: cristiane [email or web address]    Relationship to Patient: pt's spouse    Phone Number: [phone]    Reason for Call: pt's spouse advises that pt has a temp of 101 & has taken tylenol approx 30 minutes ago (3:37pm)     When was the patient last seen: 09/25/2024

## 2024-10-01 NOTE — TELEPHONE ENCOUNTER
Spoke w/ pt's spouse and she states that pt no longer has a fever, but still feels bad.  She states that he feels worse than he has with his past treatments.  She states that he is very fatigued and just doesn't feel good.  I told her to keep an eye on him and if anything changes to let us know, but for now just rest and stay hydrated.  I let her know if he develops any other symptoms or if his fever comes back to call us.  She v/u.

## 2024-10-08 NOTE — PROGRESS NOTES
HEMATOLOGY ONCOLOGY OUTPATIENT FOLLOW UP       Patient name: Viktor Atkins  : 1950  MRN: [id number]  Primary Care Physician: Gera Manriquez MD  Referring Physician: No ref. provider found  Reason For Consult: Colon cancer      History of Present Illness:  Patient is a 74 y.o. male who presented to the hospital with acute GI bleed he had bright red blood per rectum and presented to the emergency room    3/25/2024 CT chest abdomen pelvis with findings compatible with constipation, sigmoid diverticulosis without diverticulitis  3/27/2024 colonoscopy with distal sigmoid mid colon mass biopsy obtained this was positive for invasive moderately differentiated adenocarcinoma MSI testing was done was negative intact nuclear expression of MMR proteins  3/30/2024 low anterior resection, diverting loop ileostomy by Dr. Burden tumor found in rectosigmoid upper rectum area.  Final pathology with all margins negative, grade 2 tumor moderately differentiated, tumor invades through muscularis propria into the pericolonic or perirectal tissue.  4 out of 22 lymph nodes positive  Final stage T4 N2    24 - FOLFOX  24 - FOLFOX   2024 FOLFOX cycle 3  2024 FOLFOX c4  7/3/24 - FOLFOX c5  24 - CT imaging with no recurrence  2024: FOLFOX c6 - oxaliplatin dose reduced to 65 mg/m2   2024 FOLFOX c7    2024: Viktor is seen today for routine follow up. He continues to tolerate FOLFOX. He does report nausea around D4-5 of cycle, Zofran helps. He states tingling is stable, not worsening and denies pain. He has no new complaints today.       2024: Pt seen today for initial eval by me. He was previously being followed by Dr. Robin in our practice. He is on adjuvant chemotherapy with mFOLFOX for stage Iii colon cancer. Planned for C9 today. He has symptoms of poor taste, mild peripheral neuropathy, fatigue and diarrhea for 2-3 days after chemo. He also has a colostomy bag  following surgery.     Subjective:   10/9/2024: Viktor is seen today prior to treatment with cycle 12 FOLFOX.  Reports intermittent diarrhea and intermittent nausea both of which are managed with Imodium and Zofran.  He continues to have poor taste in his mouth that is affecting his appetite.  His weight is stable.  His neuropathy symptoms are stable.  Patient states last week he had increased fatigue and a low-grade fever.  He took Tylenol and reports no further fever noted.  He states he began to feel better after a few days and is now back at his baseline with no new concerns.     Past Medical History:   Diagnosis Date    Arthritis     Atrial fibrillation     Benign essential HTN     Cancer     Diabetes mellitus     GERD (gastroesophageal reflux disease)     Hyperlipidemia, mixed     Palpitations        Past Surgical History:   Procedure Laterality Date    CARDIAC CATHETERIZATION      CHOLECYSTECTOMY      COLON RESECTION WITH ILEOSTOMY N/A 3/30/2024    Procedure: COLON RESECTION LOW ANTERIOR LAPAROSCOPIC, COLONAL ANASTOMOSIS, DIVERTING LOOP ILEOSTOMY WITH DAVINCI ROBOT;  Surgeon: Aakash Burden MD;  Location: [facility] OR;  Service: Robotics - DaVinci;  Laterality: N/A;    COLONOSCOPY N/A 3/27/2024    Procedure: COLONOSCOPY with polypectomy x 18 and sigmoid colon mass biopsies with endscopic spot tattooing;  Surgeon: Fili Quintero MD;  Location: [facility] ENDOSCOPY;  Service: Gastroenterology;  Laterality: N/A;  sigmoid mass at 25 cm    COLOSTOMY N/A 4/9/2024    Procedure: DIAGNOSTIC LAPAROSCOPY, DIVERTING OSTOMY, POSSIBLE REVISION OF ANASTAMOSIS;  Surgeon: Aakash Burden MD;  Location: [facility] OR;  Service: General;  Laterality: N/A;    KNEE SURGERY      SIGMOIDOSCOPY N/A 3/30/2024    Procedure: SIGMOIDOSCOPY;  Surgeon: Aakash Burden MD;  Location: [facility] MAIN OR;  Service: Gastroenterology;  Laterality: N/A;    VENOUS ACCESS DEVICE (PORT) INSERTION N/A 4/30/2024    Procedure: INSERTION VENOUS ACCESS  DEVICE;  Surgeon: Aakash Burden MD;  Location: [facility] MAIN OR;  Service: General;  Laterality: N/A;         Current Outpatient Medications:     amiodarone (PACERONE) 200 MG tablet, Take 1 tablet by mouth Daily., Disp: 10 tablet, Rfl: 0    amLODIPine (NORVASC) 10 MG tablet, Take 1 tablet by mouth Daily., Disp: 30 tablet, Rfl: 0    apixaban (ELIQUIS) 5 MG tablet tablet, Take 0.5 tablets by mouth Every 12 (Twelve) Hours. Taking currently but switching to xarelto in about a month, Disp: , Rfl:     atorvastatin (LIPITOR) 20 MG tablet, Take 1 tablet by mouth Every Night., Disp: , Rfl:     metFORMIN (GLUCOPHAGE) 500 MG tablet, Take 1 tablet by mouth Daily With Breakfast., Disp: , Rfl:     metoprolol succinate XL (TOPROL-XL) 50 MG 24 hr tablet, Take 1 tablet by mouth Daily., Disp: 30 tablet, Rfl: 0    mirtazapine (REMERON) 15 MG tablet, Take 1 tablet by mouth every night at bedtime., Disp: 30 tablet, Rfl: 1    Multiple Vitamins-Minerals (MULTIVITAMIN ADULT PO), Take 1 tablet by mouth Daily., Disp: , Rfl:     Omeprazole (EQL Omeprazole) 20 MG tablet delayed-release, 20 mg Daily., Disp: , Rfl:     ondansetron (ZOFRAN) 8 MG tablet, Take 1 tablet by mouth 3 (Three) Times a Day As Needed for Nausea or Vomiting., Disp: 30 tablet, Rfl: 5    sertraline (Zoloft) 25 MG tablet, 1 tablet Daily., Disp: , Rfl:     No Known Allergies    No family history on file.    Cancer-related family history is not on file.      Social History     Tobacco Use    Smoking status: Former     Passive exposure: Past    Smokeless tobacco: Never   Vaping Use    Vaping status: Never Used   Substance Use Topics    Alcohol use: Never    Drug use: Never     Social History     Social History Narrative    Not on file       ROS:   Review of Systems   Constitutional:  Positive for fatigue. Negative for chills and fever. Appetite change: Patient reports taste changes. Weight is stable..  HENT:  Negative for ear pain, mouth sores, nosebleeds and sore throat.    Eyes:   "Negative for photophobia and visual disturbance.   Respiratory:  Negative for shortness of breath, wheezing and stridor.    Cardiovascular:  Negative for chest pain and palpitations.   Gastrointestinal:  Negative for abdominal pain, diarrhea, nausea (Patient reports nausea around day 4-5 of cycle, Zofran helps) and vomiting.   Endocrine: Negative for cold intolerance and heat intolerance.   Genitourinary:  Negative for dysuria and hematuria.   Musculoskeletal:  Negative for joint swelling and neck stiffness.   Skin:  Negative for color change and rash.   Neurological:  Positive for numbness. Negative for seizures and syncope.   Hematological:  Negative for adenopathy.        No obvious bleeding   Psychiatric/Behavioral:  Negative for agitation, confusion and hallucinations.        Objective:    Vital Signs:  Vitals:    10/09/24 0946   BP: 142/88   Pulse: 75   Temp: 98.2 °F (36.8 °C)   SpO2: 98%   Weight: 103 kg (228 lb)   Height: 180 cm (70.87\")   PainSc: 0-No pain         Body mass index is 31.92 kg/m².    ECOG  1 - Restricted in physically strenuous activity but ambulatory and able to carry out work of a light or sedentary nature, e.g., light house work, office work     Physical Exam:   Physical Exam  Vitals reviewed.   Constitutional:       Appearance: Normal appearance. He is not diaphoretic.   HENT:      Head: Normocephalic and atraumatic.   Eyes:      Pupils: Pupils are equal, round, and reactive to light.   Cardiovascular:      Rate and Rhythm: Normal rate and regular rhythm.      Pulses: Normal pulses.      Heart sounds: No murmur heard.  Pulmonary:      Effort: Pulmonary effort is normal.      Breath sounds: Normal breath sounds.   Abdominal:      General: There is no distension.      Palpations: Abdomen is soft. There is no mass.      Tenderness: There is no abdominal tenderness.   Musculoskeletal:         General: Normal range of motion.      Cervical back: Normal range of motion and neck supple. " "  Skin:     General: Skin is warm.   Neurological:      General: No focal deficit present.      Mental Status: He is alert and oriented to person, place, and time.   Psychiatric:         Mood and Affect: Mood normal.         Lab Results - Last 18 Months   Lab Units 10/09/24  0942 09/25/24  0932 09/11/24  0920   WBC 10*3/mm3 4.19 4.03 4.14   HEMOGLOBIN g/dL 11.0* 10.8* 10.6*   HEMATOCRIT % 36.2* 35.4* 35.0*   PLATELETS 10*3/mm3 138* 132* 120*   MCV fL 86.0 86.3 85.4     Lab Results - Last 18 Months   Lab Units 09/25/24  1012 09/11/24  0953 08/28/24  1020 08/14/24  0801 07/31/24  0742 07/17/24  0752 07/03/24  0803 06/19/24  0759 06/05/24  0850   SODIUM mmol/L  --   --   --   --  143  --  140  --  141   POTASSIUM mmol/L  --   --   --   --  3.7  --  4.2  --  4.4   CHLORIDE mmol/L  --   --   --   --  109*  --  107  --  108*   CO2 mmol/L  --   --   --   --  23.2  --  22.8  --  24.2   BUN mg/dL  --   --   --   --  14  --  10  --  9   CREATININE mg/dL 1.10 0.90 1.00   < > 0.96   < > 0.94   < > 0.97   CALCIUM mg/dL  --   --   --   --  9.0  --  8.9  --  9.1   BILIRUBIN mg/dL  --   --   --   --  0.2  --  0.3  --  0.2   ALK PHOS U/L  --   --   --   --  151*  --  124*  --  126*   ALT (SGPT) U/L  --   --   --   --  73*  --  82*  --  53*   AST (SGOT) U/L  --   --   --   --  61*  --  67*  --  45*   GLUCOSE mg/dL  --   --   --   --  120*  --  109*  --  115*    < > = values in this interval not displayed.       Lab Results   Component Value Date    GLUCOSE 120 (H) 07/31/2024    BUN 14 07/31/2024    CREATININE 1.10 09/25/2024    BCR 14.6 07/31/2024    K 3.7 07/31/2024    CO2 23.2 07/31/2024    CALCIUM 9.0 07/31/2024    ALBUMIN 4.0 07/31/2024    LABIL2 1.5 10/29/2018    AST 61 (H) 07/31/2024    ALT 73 (H) 07/31/2024       No results for input(s): \"APTT\", \"INR\", \"PTT\" in the last 25496 hours.    Lab Results   Component Value Date    IRON 22 (L) 04/09/2024    TIBC 235 (L) 04/09/2024    FERRITIN 281.40 04/09/2024       No results found " "for: \"FOLATE\"    No results found for: \"OCCULTBLD\"    No results found for: \"RETICCTPCT\"  No results found for: \"WGRSBIUA06\"  No results found for: \"SPEP\", \"UPEP\"  No results found for: \"LDH\", \"URICACID\"  No results found for: \"ARON\", \"RF\", \"SEDRATE\"  No results found for: \"FIBRINOGEN\", \"HAPTOGLOBIN\"  Lab Results   Component Value Date    PTT 27.6 04/30/2018     No results found for: \"\"  Lab Results   Component Value Date    CEA 2.24 09/25/2024     No components found for: \"CA-19-9\"  No results found for: \"PSA\"  No results found for: \"SEDRATE\"       Assessment & Plan     Viktor Atkins is a 74 y.o.  male with sigmoid-rectal cancer status post sigmoid colectomy, low anterior resection with lymph node positive disease he is here to discuss adjuvant treatment options    Colon cancer  T4 N2 disease stage IIIc  Discussed risk of recurrence, prognosis discussed adjuvant treatment with FOLFOX versus XELOX for 6 months of treatment with idea trial patients who had N2 disease or T4 disease had inferior outcomes with 3 months of treatment as compared to 6 months of treatment after discussion we decided to pursue 6 months of FOLFOX adjuvant treatment  Now started treatment biggest side effect was fatigue. Discussed dose reduction vs continuing same for now we decided will continue same dose.   Nausea is improved, taste changes predominantly, weight is stable, fatigue is present. Neuropathy is becoming more permanent  CT images reviewed independently, no concern for disease recurrence/progression.  Dose reduction of oxaliplatin to 65 mg/m2. Patient continues to have some treatment adverse effects.  -Patient continues to experience some mild treatment related side effects, no dose-limiting toxicities.  Labs reviewed, okay to proceed with C12 today. To complete total 12 cycles.  -CT scan is scheduled 10/30/2024    Transaminitis  Likely chemotherapy related-noted mild uptrending  Continue treatment.   Continue to monitor " CMP    Anemia  Hemoglobin is stable 11.0 g/dL today  This is likely from chemotherapy. Conitnue to monitor.    CINV  Zofran helps continue the same - stable  Continue emend/aloxi    Hypertension  Blood pressure improved  Continue amlodipine    Diarrhea:  Secondary to chemotherapy, managed by as needed Imodium.  Continue.    Proceed with chemotherapy today  Repeat CT scan scheduled for the end of this month  Follow-up with Dr. Dickey in 3 weeks, sooner if condition indicates      Electronically signed by Cristin Hook PA-C      Time spent on encounter including record review, history taking, exam, discussion, counseling and documentation at: 30 minutes

## 2024-10-09 ENCOUNTER — HOSPITAL ENCOUNTER (OUTPATIENT)
Dept: ONCOLOGY | Facility: HOSPITAL | Age: 74
Discharge: HOME | End: 2024-10-09
Payer: MEDICARE

## 2024-10-09 ENCOUNTER — OFFICE VISIT (OUTPATIENT)
Dept: ONCOLOGY | Facility: CLINIC | Age: 74
End: 2024-10-09
Payer: MEDICARE

## 2024-10-09 VITALS
WEIGHT: 227.5 LBS | HEIGHT: 71 IN | TEMPERATURE: 98.3 F | BODY MASS INDEX: 31.85 KG/M2 | RESPIRATION RATE: 14 BRPM | HEART RATE: 80 BPM | OXYGEN SATURATION: 97 % | SYSTOLIC BLOOD PRESSURE: 168 MMHG | DIASTOLIC BLOOD PRESSURE: 88 MMHG

## 2024-10-09 VITALS
WEIGHT: 228 LBS | OXYGEN SATURATION: 98 % | HEART RATE: 75 BPM | TEMPERATURE: 98.2 F | BODY MASS INDEX: 31.92 KG/M2 | DIASTOLIC BLOOD PRESSURE: 88 MMHG | HEIGHT: 71 IN | SYSTOLIC BLOOD PRESSURE: 142 MMHG

## 2024-10-09 DIAGNOSIS — C18.9 MALIGNANT NEOPLASM OF COLON, UNSPECIFIED PART OF COLON: ICD-10-CM

## 2024-10-09 DIAGNOSIS — C18.9 MALIGNANT NEOPLASM OF COLON, UNSPECIFIED PART OF COLON: Primary | ICD-10-CM

## 2024-10-09 DIAGNOSIS — D64.9 ANEMIA, UNSPECIFIED TYPE: ICD-10-CM

## 2024-10-09 DIAGNOSIS — C19 RECTOSIGMOID CANCER: Primary | ICD-10-CM

## 2024-10-09 LAB
ALP BLD-CCNC: 134 U/L (ref 53–128)
BASOPHILS # BLD AUTO: 0.03 10*3/MM3 (ref 0–0.2)
BASOPHILS NFR BLD AUTO: 0.7 % (ref 0–1.5)
BUN BLDA-MCNC: 10 MG/DL (ref 7–22)
CALCIUM BLD QL: 9 MG/DL (ref 8–10.3)
CEA SERPL-MCNC: 2.37 NG/ML
CHLORIDE BLDA-SCNC: 108 MMOL/L (ref 98–108)
CO2 BLDA-SCNC: 24 MMOL/L (ref 18–33)
CREAT BLDA-MCNC: 0.9 MG/DL (ref 0.6–1.2)
DEPRECATED RDW RBC AUTO: 62.4 FL (ref 37–54)
EGFRCR SERPLBLD CKD-EPI 2021: 90.2 ML/MIN/1.73
EOSINOPHIL # BLD AUTO: 0.08 10*3/MM3 (ref 0–0.4)
EOSINOPHIL NFR BLD AUTO: 1.9 % (ref 0.3–6.2)
ERYTHROCYTE [DISTWIDTH] IN BLOOD BY AUTOMATED COUNT: 20.5 % (ref 12.3–15.4)
GLUCOSE BLDC GLUCOMTR-MCNC: 105 MG/DL (ref 73–118)
HCT VFR BLD AUTO: 36.2 % (ref 37.5–51)
HGB BLD-MCNC: 11 G/DL (ref 13–17.7)
LYMPHOCYTES # BLD AUTO: 1.76 10*3/MM3 (ref 0.7–3.1)
LYMPHOCYTES NFR BLD AUTO: 42 % (ref 19.6–45.3)
MCH RBC QN AUTO: 26.1 PG (ref 26.6–33)
MCHC RBC AUTO-ENTMCNC: 30.4 G/DL (ref 31.5–35.7)
MCV RBC AUTO: 86 FL (ref 79–97)
MONOCYTES # BLD AUTO: 0.62 10*3/MM3 (ref 0.1–0.9)
MONOCYTES NFR BLD AUTO: 14.8 % (ref 5–12)
NEUTROPHILS NFR BLD AUTO: 1.7 10*3/MM3 (ref 1.7–7)
NEUTROPHILS NFR BLD AUTO: 40.6 % (ref 42.7–76)
PLATELET # BLD AUTO: 138 10*3/MM3 (ref 140–450)
PMV BLD AUTO: 10.9 FL (ref 6–12)
POC ALBUMIN: 3.6 G/L (ref 3.3–5.5)
POC ALT (SGPT): 131 U/L (ref 10–47)
POC AST (SGOT): 175 U/L (ref 11–38)
POC TOTAL BILIRUBIN: 0.7 MG/DL (ref 0.2–1.6)
POC TOTAL PROTEIN: 7.4 G/DL (ref 6.4–8.1)
POTASSIUM BLDA-SCNC: 4 MMOL/L (ref 3.6–5.1)
RBC # BLD AUTO: 4.21 10*6/MM3 (ref 4.14–5.8)
SODIUM BLD-SCNC: 143 MMOL/L (ref 128–145)
WBC NRBC COR # BLD AUTO: 4.19 10*3/MM3 (ref 3.4–10.8)

## 2024-10-09 PROCEDURE — 85025 COMPLETE CBC W/AUTO DIFF WBC: CPT | Performed by: STUDENT IN AN ORGANIZED HEALTH CARE EDUCATION/TRAINING PROGRAM

## 2024-10-09 PROCEDURE — 96367 TX/PROPH/DG ADDL SEQ IV INF: CPT

## 2024-10-09 PROCEDURE — 96375 TX/PRO/DX INJ NEW DRUG ADDON: CPT

## 2024-10-09 PROCEDURE — G0498 CHEMO EXTEND IV INFUS W/PUMP: HCPCS

## 2024-10-09 PROCEDURE — 96368 THER/DIAG CONCURRENT INF: CPT

## 2024-10-09 PROCEDURE — 0 DEXTROSE 5 % SOLUTION: Performed by: STUDENT IN AN ORGANIZED HEALTH CARE EDUCATION/TRAINING PROGRAM

## 2024-10-09 PROCEDURE — 25010000002 LEUCOVORIN CALCIUM PER 50 MG: Performed by: STUDENT IN AN ORGANIZED HEALTH CARE EDUCATION/TRAINING PROGRAM

## 2024-10-09 PROCEDURE — 96413 CHEMO IV INFUSION 1 HR: CPT

## 2024-10-09 PROCEDURE — 0 DEXTROSE 5 % SOLUTION 250 ML FLEX CONT: Performed by: STUDENT IN AN ORGANIZED HEALTH CARE EDUCATION/TRAINING PROGRAM

## 2024-10-09 PROCEDURE — 96411 CHEMO IV PUSH ADDL DRUG: CPT

## 2024-10-09 PROCEDURE — 25010000002 FLUOROURACIL PER 500 MG: Performed by: STUDENT IN AN ORGANIZED HEALTH CARE EDUCATION/TRAINING PROGRAM

## 2024-10-09 PROCEDURE — 96366 THER/PROPH/DIAG IV INF ADDON: CPT

## 2024-10-09 PROCEDURE — 25810000003 SODIUM CHLORIDE 0.9 % SOLUTION 500 ML FLEX CONT: Performed by: STUDENT IN AN ORGANIZED HEALTH CARE EDUCATION/TRAINING PROGRAM

## 2024-10-09 PROCEDURE — 96415 CHEMO IV INFUSION ADDL HR: CPT

## 2024-10-09 PROCEDURE — 80053 COMPREHEN METABOLIC PANEL: CPT

## 2024-10-09 PROCEDURE — 25010000002 OXALIPLATIN PER 0.5 MG: Performed by: STUDENT IN AN ORGANIZED HEALTH CARE EDUCATION/TRAINING PROGRAM

## 2024-10-09 PROCEDURE — 25010000002 PALONOSETRON 0.25 MG/5ML SOLUTION PREFILLED SYRINGE: Performed by: STUDENT IN AN ORGANIZED HEALTH CARE EDUCATION/TRAINING PROGRAM

## 2024-10-09 PROCEDURE — 82378 CARCINOEMBRYONIC ANTIGEN: CPT | Performed by: STUDENT IN AN ORGANIZED HEALTH CARE EDUCATION/TRAINING PROGRAM

## 2024-10-09 PROCEDURE — 25010000002 FOSAPREPITANT PER 1 MG: Performed by: STUDENT IN AN ORGANIZED HEALTH CARE EDUCATION/TRAINING PROGRAM

## 2024-10-09 PROCEDURE — 25010000002 DEXAMETHASONE SODIUM PHOSPHATE 120 MG/30ML SOLUTION: Performed by: STUDENT IN AN ORGANIZED HEALTH CARE EDUCATION/TRAINING PROGRAM

## 2024-10-09 RX ORDER — PALONOSETRON 0.05 MG/ML
0.25 INJECTION, SOLUTION INTRAVENOUS ONCE
Status: COMPLETED | OUTPATIENT
Start: 2024-10-09 | End: 2024-10-09

## 2024-10-09 RX ORDER — DEXTROSE MONOHYDRATE 50 MG/ML
20 INJECTION, SOLUTION INTRAVENOUS ONCE
Status: COMPLETED | OUTPATIENT
Start: 2024-10-09 | End: 2024-10-09

## 2024-10-09 RX ORDER — FLUOROURACIL 50 MG/ML
400 INJECTION, SOLUTION INTRAVENOUS ONCE
Status: COMPLETED | OUTPATIENT
Start: 2024-10-09 | End: 2024-10-09

## 2024-10-09 RX ADMIN — PALONOSETRON 0.25 MG: 0.25 INJECTION, SOLUTION INTRAVENOUS at 11:17

## 2024-10-09 RX ADMIN — FOSAPREPITANT 100 ML: 150 INJECTION, POWDER, LYOPHILIZED, FOR SOLUTION INTRAVENOUS at 11:21

## 2024-10-09 RX ADMIN — FLUOROURACIL 900 MG: 50 INJECTION, SOLUTION INTRAVENOUS at 14:39

## 2024-10-09 RX ADMIN — FLUOROURACIL 5400 MG: 50 INJECTION, SOLUTION INTRAVENOUS at 14:45

## 2024-10-09 RX ADMIN — LEUCOVORIN CALCIUM 900 MG: 350 INJECTION, POWDER, LYOPHILIZED, FOR SOLUTION INTRAMUSCULAR; INTRAVENOUS at 12:27

## 2024-10-09 RX ADMIN — DEXAMETHASONE SODIUM PHOSPHATE 12 MG: 4 INJECTION, SOLUTION INTRA-ARTICULAR; INTRALESIONAL; INTRAMUSCULAR; INTRAVENOUS; SOFT TISSUE at 11:59

## 2024-10-09 RX ADMIN — OXALIPLATIN 145 MG: 5 INJECTION, SOLUTION INTRAVENOUS at 12:27

## 2024-10-09 RX ADMIN — DEXTROSE MONOHYDRATE 20 ML/HR: 50 INJECTION, SOLUTION INTRAVENOUS at 11:15

## 2024-10-09 NOTE — PROGRESS NOTES
Patient came from seeing sherry COLLAZO with port already accessed and labs drawn. Port had positive blood return noted prior to treatment starting. Sherry sent: he just came from seeing you and his NARINDER came back and his AST went from 109 on 9/25 to 175 and his ALT went from 82 on 9/25 to 131-it is not technically a parameter but wanted to make sure he was still ok for treatment with the increase. Sherry COLLAZO replied: Yes it is okay. Patient tolerated treatment and was discharged with AVS and a copy of NARINDER labs per request.

## 2024-10-11 ENCOUNTER — HOSPITAL ENCOUNTER (OUTPATIENT)
Dept: ONCOLOGY | Facility: HOSPITAL | Age: 74
Discharge: HOME | End: 2024-10-11
Payer: MEDICARE

## 2024-10-11 DIAGNOSIS — C18.9 MALIGNANT NEOPLASM OF COLON, UNSPECIFIED PART OF COLON: Primary | ICD-10-CM

## 2024-10-11 DIAGNOSIS — Z95.828 PORT-A-CATH IN PLACE: ICD-10-CM

## 2024-10-11 PROCEDURE — 25010000002 HEPARIN LOCK FLUSH PER 10 UNITS: Performed by: STUDENT IN AN ORGANIZED HEALTH CARE EDUCATION/TRAINING PROGRAM

## 2024-10-11 RX ORDER — SODIUM CHLORIDE 0.9 % (FLUSH) 0.9 %
10 SYRINGE (ML) INJECTION AS NEEDED
Status: DISCONTINUED | OUTPATIENT
Start: 2024-10-11 | End: 2024-10-12 | Stop reason: HOSPADM

## 2024-10-11 RX ORDER — HEPARIN SODIUM (PORCINE) LOCK FLUSH IV SOLN 100 UNIT/ML 100 UNIT/ML
500 SOLUTION INTRAVENOUS AS NEEDED
OUTPATIENT
Start: 2024-10-11

## 2024-10-11 RX ORDER — SODIUM CHLORIDE 0.9 % (FLUSH) 0.9 %
10 SYRINGE (ML) INJECTION AS NEEDED
OUTPATIENT
Start: 2024-10-11

## 2024-10-11 RX ORDER — HEPARIN SODIUM (PORCINE) LOCK FLUSH IV SOLN 100 UNIT/ML 100 UNIT/ML
500 SOLUTION INTRAVENOUS AS NEEDED
Status: DISCONTINUED | OUTPATIENT
Start: 2024-10-11 | End: 2024-10-12 | Stop reason: HOSPADM

## 2024-10-11 RX ADMIN — Medication 10 ML: at 15:23

## 2024-10-11 RX ADMIN — HEPARIN 500 UNITS: 100 SYRINGE at 15:24

## 2024-10-11 NOTE — PROGRESS NOTES
Pt to injection chair for CIV d/c. Pump empty upon arrival. Port flushed with good blood return noted.  Port flushed with saline and heparin prior to needle removal. Pt aware of next appt and discharged home.

## 2024-10-21 ENCOUNTER — OFFICE VISIT (OUTPATIENT)
Age: 74
End: 2024-10-21
Payer: MEDICARE

## 2024-10-21 VITALS
SYSTOLIC BLOOD PRESSURE: 150 MMHG | HEART RATE: 71 BPM | TEMPERATURE: 98.2 F | WEIGHT: 228 LBS | OXYGEN SATURATION: 96 % | HEIGHT: 71 IN | BODY MASS INDEX: 31.92 KG/M2 | DIASTOLIC BLOOD PRESSURE: 88 MMHG

## 2024-10-21 DIAGNOSIS — C18.7 MALIGNANT NEOPLASM OF SIGMOID COLON: ICD-10-CM

## 2024-10-21 DIAGNOSIS — K94.09 COLOSTOMY PROLAPSE: Primary | ICD-10-CM

## 2024-10-21 PROCEDURE — 3079F DIAST BP 80-89 MM HG: CPT | Performed by: STUDENT IN AN ORGANIZED HEALTH CARE EDUCATION/TRAINING PROGRAM

## 2024-10-21 PROCEDURE — 3077F SYST BP >= 140 MM HG: CPT | Performed by: STUDENT IN AN ORGANIZED HEALTH CARE EDUCATION/TRAINING PROGRAM

## 2024-10-21 PROCEDURE — 99213 OFFICE O/P EST LOW 20 MIN: CPT | Performed by: STUDENT IN AN ORGANIZED HEALTH CARE EDUCATION/TRAINING PROGRAM

## 2024-10-21 PROCEDURE — 1160F RVW MEDS BY RX/DR IN RCRD: CPT | Performed by: STUDENT IN AN ORGANIZED HEALTH CARE EDUCATION/TRAINING PROGRAM

## 2024-10-21 PROCEDURE — 1159F MED LIST DOCD IN RCRD: CPT | Performed by: STUDENT IN AN ORGANIZED HEALTH CARE EDUCATION/TRAINING PROGRAM

## 2024-10-21 RX ORDER — CHLORHEXIDINE GLUCONATE 40 MG/ML
1 SOLUTION TOPICAL 2 TIMES DAILY
Qty: 236 ML | Refills: 0 | Status: SHIPPED | OUTPATIENT
Start: 2024-10-21

## 2024-10-21 RX ORDER — SODIUM, POTASSIUM,MAG SULFATES 17.5-3.13G
SOLUTION, RECONSTITUTED, ORAL ORAL
Qty: 177 ML | Refills: 0 | Status: SHIPPED | OUTPATIENT
Start: 2024-10-21

## 2024-10-21 RX ORDER — ERYTHROMYCIN 500 MG/1
1000 TABLET, COATED ORAL 3 TIMES DAILY
Qty: 6 TABLET | Refills: 0 | Status: SHIPPED | OUTPATIENT
Start: 2024-10-21 | End: 2024-10-22

## 2024-10-21 RX ORDER — HEPARIN SODIUM 5000 [USP'U]/ML
5000 INJECTION, SOLUTION INTRAVENOUS; SUBCUTANEOUS ONCE
OUTPATIENT
Start: 2024-10-21 | End: 2024-10-21

## 2024-10-21 RX ORDER — ACETAMINOPHEN 500 MG
1000 TABLET ORAL EVERY 6 HOURS
OUTPATIENT
Start: 2024-10-21

## 2024-10-21 RX ORDER — NEOMYCIN SULFATE 500 MG/1
1000 TABLET ORAL 3 TIMES DAILY
Qty: 6 TABLET | Refills: 0 | Status: SHIPPED | OUTPATIENT
Start: 2024-10-21 | End: 2024-10-22

## 2024-10-21 RX ORDER — ALVIMOPAN 12 MG/1
12 CAPSULE ORAL ONCE
OUTPATIENT
Start: 2024-10-21 | End: 2024-10-21

## 2024-10-21 RX ORDER — PREGABALIN 75 MG/1
75 CAPSULE ORAL ONCE
OUTPATIENT
Start: 2024-10-21 | End: 2024-10-21

## 2024-10-22 NOTE — PROGRESS NOTES
Colorectal Surgery Followup Note    ID:  Viktor Atkins;   : 1950  DATE OF VISIT: 10/22/2024    Chief Complaint  Follow-up (6 month follow up )       Subjective    Mr. Atkins is status post low anterior resection with diverting colostomy for pT3 pN2a rectosigmoid cancer.  He has completed adjuvant chemotherapy 2 weeks ago.  He reports mild discharge of mucus through the anus.  His colostomy has been functioning.  He has been gaining weight.  He would like to discuss potential colostomy closure.      Exam  General:  No acute distress  Head: Normocephalic, atraumatic  Neuro: Alert and oriented    Abdomen:  Soft, non-tender, non-distended, no hernias, no hepatomegaly, no splenomegaly. No abnormal, audible bowel sounds.  Prolapsed colostomy in the left upper quadrant    Assessment  -History of rectosigmoid cancer status post low anterior resection with final pathology showing pT3 pN2a cM0.  He has completed adjuvant chemotherapy 2 weeks ago    Plan / Recommendations  -We discussed potential colostomy closure.  We will wait another 4 weeks before scheduling for surgery to recover from chemotherapy.  We will obtain a contrast enema to make sure previous anastomosis has well-healed.  Will also proceed with colonoscopy to make sure no other lesion.    -We have discussed the potential risk of the procedure including anastomotic leak, surgical site infection, abdominal wall hernia, UTI, VTE, stroke, MI.     -We have discussed nutritional optimization before surgery. We will adhere to ERAS protocols.    - Follow up pending Surveillance CT scan and tumor markers.     - Follow up at the time of colonoscopy     Aakash Burden MD  Colon and Rectal Surgery   Jackie Yadav

## 2024-10-30 ENCOUNTER — HOSPITAL ENCOUNTER (OUTPATIENT)
Dept: PET IMAGING | Facility: HOSPITAL | Age: 74
Discharge: HOME | End: 2024-10-30
Admitting: STUDENT IN AN ORGANIZED HEALTH CARE EDUCATION/TRAINING PROGRAM
Payer: MEDICARE

## 2024-10-30 DIAGNOSIS — C18.9 MALIGNANT NEOPLASM OF COLON, UNSPECIFIED PART OF COLON: ICD-10-CM

## 2024-10-30 PROCEDURE — 25510000001 IOPAMIDOL PER 1 ML: Performed by: STUDENT IN AN ORGANIZED HEALTH CARE EDUCATION/TRAINING PROGRAM

## 2024-10-30 PROCEDURE — 71260 CT THORAX DX C+: CPT

## 2024-10-30 PROCEDURE — 74177 CT ABD & PELVIS W/CONTRAST: CPT

## 2024-10-30 RX ORDER — IOPAMIDOL 755 MG/ML
100 INJECTION, SOLUTION INTRAVASCULAR
Status: COMPLETED | OUTPATIENT
Start: 2024-10-30 | End: 2024-10-30

## 2024-10-30 RX ADMIN — IOPAMIDOL 100 ML: 755 INJECTION, SOLUTION INTRAVENOUS at 09:46

## 2024-11-04 NOTE — PROGRESS NOTES
HEMATOLOGY ONCOLOGY OUTPATIENT FOLLOW UP       Patient name: Viktor Atkins  : 1950  MRN: [id number]  Primary Care Physician: Gera Manriquez MD  Referring Physician: Gera Manriquez MD  Reason For Consult: Colon cancer      History of Present Illness:  Patient is a 74 y.o. male who presented to the hospital with acute GI bleed he had bright red blood per rectum and presented to the emergency room    3/25/2024 CT chest abdomen pelvis with findings compatible with constipation, sigmoid diverticulosis without diverticulitis  3/27/2024 colonoscopy with distal sigmoid mid colon mass biopsy obtained this was positive for invasive moderately differentiated adenocarcinoma MSI testing was done was negative intact nuclear expression of MMR proteins  3/30/2024 low anterior resection, diverting loop ileostomy by Dr. Burden tumor found in rectosigmoid upper rectum area.  Final pathology with all margins negative, grade 2 tumor moderately differentiated, tumor invades through muscularis propria into the pericolonic or perirectal tissue.  4 out of 22 lymph nodes positive  Final stage T4 N2    24 - FOLFOX  24 - FOLFOX   2024 FOLFOX cycle 3  2024 FOLFOX c4  7/3/24 - FOLFOX c5  24 - CT imaging with no recurrence  2024: FOLFOX c6 - oxaliplatin dose reduced to 65 mg/m2   2024 FOLFOX c7    2024: Viktor is seen today for routine follow up. He continues to tolerate FOLFOX. He does report nausea around D4-5 of cycle, Zofran helps. He states tingling is stable, not worsening and denies pain. He has no new complaints today.       2024: Pt seen today for initial eval by me. He was previously being followed by Dr. Robin in our practice. He is on adjuvant chemotherapy with mFOLFOX for stage Iii colon cancer. Planned for C9 today. He has symptoms of poor taste, mild peripheral neuropathy, fatigue and diarrhea for 2-3 days after chemo. He also has a colostomy bag following  surgery.       10/9/2024: Viktor is seen today prior to treatment with cycle 12 FOLFOX.  Reports intermittent diarrhea and intermittent nausea both of which are managed with Imodium and Zofran.  He continues to have poor taste in his mouth that is affecting his appetite.  His weight is stable.  His neuropathy symptoms are stable.  Patient states last week he had increased fatigue and a low-grade fever.  He took Tylenol and reports no further fever noted.  He states he began to feel better after a few days and is now back at his baseline with no new concerns.     10/30/24: CT Chest/Abd/Pelvis W contrast:  Impression:   1. No acute CT abnormalities or evidence of metastatic disease in the chest   2. Stable enlarged periportal lymph node   3. Mid transverse colostomy and stable surgical changes with a sigmoid anastomosis     Subjective:   11/6/24: pt seen today for follow up. No new complaints reported. Has recovered well from treatment related adverse effects. Weight/appetite stable.      Past Medical History:   Diagnosis Date    Arthritis     Atrial fibrillation     Benign essential HTN     Cancer     Diabetes mellitus     GERD (gastroesophageal reflux disease)     Hyperlipidemia, mixed     Palpitations        Past Surgical History:   Procedure Laterality Date    CARDIAC CATHETERIZATION      CHOLECYSTECTOMY      COLON RESECTION WITH ILEOSTOMY N/A 3/30/2024    Procedure: COLON RESECTION LOW ANTERIOR LAPAROSCOPIC, COLONAL ANASTOMOSIS, DIVERTING LOOP ILEOSTOMY WITH DAVINCI ROBOT;  Surgeon: Aakash Burden MD;  Location: [facility] MAIN OR;  Service: Robotics - DaVinci;  Laterality: N/A;    COLONOSCOPY N/A 3/27/2024    Procedure: COLONOSCOPY with polypectomy x 18 and sigmoid colon mass biopsies with endscopic spot tattooing;  Surgeon: Fili Quintero MD;  Location: [facility] ENDOSCOPY;  Service: Gastroenterology;  Laterality: N/A;  sigmoid mass at 25 cm    COLOSTOMY N/A 4/9/2024    Procedure: DIAGNOSTIC LAPAROSCOPY,  DIVERTING OSTOMY, POSSIBLE REVISION OF ANASTAMOSIS;  Surgeon: Aakash Burden MD;  Location: [facility] MAIN OR;  Service: General;  Laterality: N/A;    KNEE SURGERY      SIGMOIDOSCOPY N/A 3/30/2024    Procedure: SIGMOIDOSCOPY;  Surgeon: Aakash Burden MD;  Location: [facility] MAIN OR;  Service: Gastroenterology;  Laterality: N/A;    VENOUS ACCESS DEVICE (PORT) INSERTION N/A 4/30/2024    Procedure: INSERTION VENOUS ACCESS DEVICE;  Surgeon: Aakash Burden MD;  Location: [facility] MAIN OR;  Service: General;  Laterality: N/A;         Current Outpatient Medications:     amiodarone (PACERONE) 200 MG tablet, Take 1 tablet by mouth Daily., Disp: 10 tablet, Rfl: 0    amLODIPine (NORVASC) 10 MG tablet, Take 1 tablet by mouth Daily., Disp: 30 tablet, Rfl: 0    apixaban (ELIQUIS) 5 MG tablet tablet, Take 0.5 tablets by mouth Every 12 (Twelve) Hours. Taking currently but switching to xarelto in about a month, Disp: , Rfl:     atorvastatin (LIPITOR) 20 MG tablet, Take 1 tablet by mouth Every Night., Disp: , Rfl:     Chlorhexidine Gluconate 4 % solution, Apply 1 Application topically to the appropriate area as directed 2 (Two) Times a Day. Shower With Hibiclens Solution Twice The Day Before Surgery, Disp: 236 mL, Rfl: 0    metFORMIN (GLUCOPHAGE) 500 MG tablet, Take 1 tablet by mouth Daily With Breakfast., Disp: , Rfl:     metoprolol succinate XL (TOPROL-XL) 50 MG 24 hr tablet, Take 1 tablet by mouth Daily., Disp: 30 tablet, Rfl: 0    mirtazapine (REMERON) 15 MG tablet, Take 1 tablet by mouth every night at bedtime., Disp: 30 tablet, Rfl: 1    Multiple Vitamins-Minerals (MULTIVITAMIN ADULT PO), Take 1 tablet by mouth Daily., Disp: , Rfl:     Omeprazole (EQL Omeprazole) 20 MG tablet delayed-release, 20 mg Daily., Disp: , Rfl:     ondansetron (ZOFRAN) 8 MG tablet, Take 1 tablet by mouth 3 (Three) Times a Day As Needed for Nausea or Vomiting., Disp: 30 tablet, Rfl: 5    sertraline (Zoloft) 25 MG tablet, 1 tablet Daily., Disp: , Rfl:      sodium-potassium-magnesium sulfates (SUPREP) 17.5-3.13-1.6 GM/177ML solution oral solution, Step 1: 5 PM the day before your scheduled colonoscopy - Take your 1st dose of bowel prep Step 2: 5 AM - Take your 2nd dose of bowel prep FIRST DOSE: 5 PM the day before your scheduled colonoscopy. Pour one 6-ounce bottle of SUPREP liquid into the mixing container. Add cool drinking water to the 16-ounce line on the container and mix; this will dilute the concentrated solution. Drink all the liquid in the container at one time - using a straw will help. Drink two more 16-ounce glasses of water over the next hour. Watery bowel movements should begin in approximately one hour. SECOND DOSE: 5 AM On the day of your colonoscopy. Repeat steps 1-4. You may still have watery bowel movements after you finish drinking the solution., Disp: 177 mL, Rfl: 0    No Known Allergies    No family history on file.    Cancer-related family history is not on file.      Social History     Tobacco Use    Smoking status: Former     Passive exposure: Past    Smokeless tobacco: Never   Vaping Use    Vaping status: Never Used   Substance Use Topics    Alcohol use: Never    Drug use: Never     Social History     Social History Narrative    Not on file       ROS:   Review of Systems   Constitutional:  Positive for fatigue. Negative for chills and fever. Appetite change: Patient reports taste changes. Weight is stable..  HENT:  Negative for ear pain, mouth sores, nosebleeds and sore throat.    Eyes:  Negative for photophobia and visual disturbance.   Respiratory:  Negative for shortness of breath, wheezing and stridor.    Cardiovascular:  Negative for chest pain and palpitations.   Gastrointestinal:  Negative for abdominal pain, diarrhea, nausea (Patient reports nausea around day 4-5 of cycle, Zofran helps) and vomiting.   Endocrine: Negative for cold intolerance and heat intolerance.   Genitourinary:  Negative for dysuria and hematuria.   Musculoskeletal:  " Negative for joint swelling and neck stiffness.   Skin:  Negative for color change and rash.   Neurological:  Positive for numbness. Negative for seizures and syncope.   Hematological:  Negative for adenopathy.        No obvious bleeding   Psychiatric/Behavioral:  Negative for agitation, confusion and hallucinations.        Objective:    Vital Signs:  Vitals:    11/06/24 1004   BP: (!) 187/99   Pulse: 64   SpO2: 96%   Weight: 103 kg (227 lb 12.8 oz)   Height: 180.3 cm (71\")   PainSc: 0-No pain           Body mass index is 31.77 kg/m².    ECOG  1 - Restricted in physically strenuous activity but ambulatory and able to carry out work of a light or sedentary nature, e.g., light house work, office work     Physical Exam:   Physical Exam  Vitals reviewed.   Constitutional:       Appearance: Normal appearance. He is not diaphoretic.   HENT:      Head: Normocephalic and atraumatic.   Eyes:      Pupils: Pupils are equal, round, and reactive to light.   Cardiovascular:      Rate and Rhythm: Normal rate and regular rhythm.      Pulses: Normal pulses.      Heart sounds: No murmur heard.  Pulmonary:      Effort: Pulmonary effort is normal.      Breath sounds: Normal breath sounds.   Abdominal:      General: There is no distension.      Palpations: Abdomen is soft. There is no mass.      Tenderness: There is no abdominal tenderness.   Musculoskeletal:         General: Normal range of motion.      Cervical back: Normal range of motion and neck supple.   Skin:     General: Skin is warm.   Neurological:      General: No focal deficit present.      Mental Status: He is alert and oriented to person, place, and time.   Psychiatric:         Mood and Affect: Mood normal.         Lab Results - Last 18 Months   Lab Units 10/09/24  0942 09/25/24  0932 09/11/24  0920   WBC 10*3/mm3 4.19 4.03 4.14   HEMOGLOBIN g/dL 11.0* 10.8* 10.6*   HEMATOCRIT % 36.2* 35.4* 35.0*   PLATELETS 10*3/mm3 138* 132* 120*   MCV fL 86.0 86.3 85.4     Lab Results " "- Last 18 Months   Lab Units 10/09/24  1028 09/25/24  1012 09/11/24  0953 08/14/24  0801 07/31/24  0742 07/17/24  0752 07/03/24  0803 06/19/24  0759 06/05/24  0850   SODIUM mmol/L  --   --   --   --  143  --  140  --  141   POTASSIUM mmol/L  --   --   --   --  3.7  --  4.2  --  4.4   CHLORIDE mmol/L  --   --   --   --  109*  --  107  --  108*   CO2 mmol/L  --   --   --   --  23.2  --  22.8  --  24.2   BUN mg/dL  --   --   --   --  14  --  10  --  9   CREATININE mg/dL 0.90 1.10 0.90   < > 0.96   < > 0.94   < > 0.97   CALCIUM mg/dL  --   --   --   --  9.0  --  8.9  --  9.1   BILIRUBIN mg/dL  --   --   --   --  0.2  --  0.3  --  0.2   ALK PHOS U/L  --   --   --   --  151*  --  124*  --  126*   ALT (SGPT) U/L  --   --   --   --  73*  --  82*  --  53*   AST (SGOT) U/L  --   --   --   --  61*  --  67*  --  45*   GLUCOSE mg/dL  --   --   --   --  120*  --  109*  --  115*    < > = values in this interval not displayed.       Lab Results   Component Value Date    GLUCOSE 120 (H) 07/31/2024    BUN 14 07/31/2024    CREATININE 0.90 10/09/2024    BCR 14.6 07/31/2024    K 3.7 07/31/2024    CO2 23.2 07/31/2024    CALCIUM 9.0 07/31/2024    ALBUMIN 4.0 07/31/2024    LABIL2 1.5 10/29/2018    AST 61 (H) 07/31/2024    ALT 73 (H) 07/31/2024       No results for input(s): \"APTT\", \"INR\", \"PTT\" in the last 11263 hours.    Lab Results   Component Value Date    IRON 22 (L) 04/09/2024    TIBC 235 (L) 04/09/2024    FERRITIN 281.40 04/09/2024       No results found for: \"FOLATE\"    No results found for: \"OCCULTBLD\"    No results found for: \"RETICCTPCT\"  No results found for: \"EFPEGZEH41\"  No results found for: \"SPEP\", \"UPEP\"  No results found for: \"LDH\", \"URICACID\"  No results found for: \"ARON\", \"RF\", \"SEDRATE\"  No results found for: \"FIBRINOGEN\", \"HAPTOGLOBIN\"  Lab Results   Component Value Date    PTT 27.6 04/30/2018     No results found for: \"\"  Lab Results   Component Value Date    CEA 2.37 10/09/2024     No components found for: " "\"CA-19-9\"  No results found for: \"PSA\"  No results found for: \"SEDRATE\"       Assessment & Plan     Viktor Atkins is a 74 y.o.  male with sigmoid-rectal cancer status post sigmoid colectomy, low anterior resection with lymph node positive disease he is here to discuss adjuvant treatment options    Colon cancer  T4 N2 disease stage IIIc  Discussed risk of recurrence, prognosis discussed adjuvant treatment with FOLFOX versus XELOX for 6 months of treatment with idea trial patients who had N2 disease or T4 disease had inferior outcomes with 3 months of treatment as compared to 6 months of treatment after discussion we decided to pursue 6 months of FOLFOX adjuvant treatment  Now started treatment biggest side effect was fatigue. Discussed dose reduction vs continuing same for now we decided will continue same dose.   Nausea is improved, taste changes predominantly, weight is stable, fatigue is present. Neuropathy is becoming more permanent  CT images reviewed independently, no concern for disease recurrence/progression.  Dose reduction of oxaliplatin to 65 mg/m2. Patient continues to have some treatment adverse effects.  -Patient continues to experience some mild treatment related side effects, no dose-limiting toxicities.  Labs reviewed, okay to proceed with C12 today. To complete total 12 cycles.  -Restaging scans reviewed, noted to have enlarged periportal adenopathy, I discussed this case with Radiology, some concern about progressive/recurrent disease. A PET/CT may be helpful in further characterizing these findings. Will order a PET/CT.  -patient is also planned for colostomy reversal with CRS. Discussed case with Dr. Burden.    Transaminitis  Likely chemotherapy related-noted mild uptrending  Continue treatment.   Continue to monitor CMP    Anemia  Hemoglobin is stable 11.0 g/dL today  This is likely from chemotherapy. Conitnue to monitor.    CINV  Zofran helps continue the same - stable  Continue " emend/aloxi    Hypertension  Blood pressure improved  Continue amlodipine    Diarrhea:  Secondary to chemotherapy, managed by as needed Imodium.  Continue.    Follow up with PET/CT. Sooner as needed.      Time spent on encounter including record review, history taking, exam, discussion, counseling and documentation at: 30 minutes

## 2024-11-06 ENCOUNTER — HOSPITAL ENCOUNTER (OUTPATIENT)
Dept: ONCOLOGY | Facility: HOSPITAL | Age: 74
Discharge: HOME | End: 2024-11-06
Admitting: STUDENT IN AN ORGANIZED HEALTH CARE EDUCATION/TRAINING PROGRAM
Payer: MEDICARE

## 2024-11-06 ENCOUNTER — OFFICE VISIT (OUTPATIENT)
Dept: ONCOLOGY | Facility: CLINIC | Age: 74
End: 2024-11-06
Payer: MEDICARE

## 2024-11-06 VITALS
DIASTOLIC BLOOD PRESSURE: 99 MMHG | BODY MASS INDEX: 31.89 KG/M2 | HEART RATE: 64 BPM | SYSTOLIC BLOOD PRESSURE: 187 MMHG | HEIGHT: 71 IN | OXYGEN SATURATION: 96 % | WEIGHT: 227.8 LBS

## 2024-11-06 DIAGNOSIS — R11.2 CINV (CHEMOTHERAPY-INDUCED NAUSEA AND VOMITING): ICD-10-CM

## 2024-11-06 DIAGNOSIS — E43 SEVERE MALNUTRITION: ICD-10-CM

## 2024-11-06 DIAGNOSIS — C19 RECTOSIGMOID CANCER: ICD-10-CM

## 2024-11-06 DIAGNOSIS — T45.1X5A CINV (CHEMOTHERAPY-INDUCED NAUSEA AND VOMITING): ICD-10-CM

## 2024-11-06 DIAGNOSIS — C18.9 MALIGNANT NEOPLASM OF COLON, UNSPECIFIED PART OF COLON: Primary | ICD-10-CM

## 2024-11-06 DIAGNOSIS — C18.9 MALIGNANT NEOPLASM OF COLON, UNSPECIFIED PART OF COLON: ICD-10-CM

## 2024-11-06 DIAGNOSIS — Z95.828 PORT-A-CATH IN PLACE: Primary | ICD-10-CM

## 2024-11-06 DIAGNOSIS — R74.01 TRANSAMINITIS: ICD-10-CM

## 2024-11-06 LAB
ALBUMIN SERPL-MCNC: 4.1 G/DL (ref 3.5–5.2)
ALBUMIN/GLOB SERPL: 1.3 G/DL
ALP SERPL-CCNC: 159 U/L (ref 39–117)
ALT SERPL W P-5'-P-CCNC: 126 U/L (ref 1–41)
ANION GAP SERPL CALCULATED.3IONS-SCNC: 12.7 MMOL/L (ref 5–15)
AST SERPL-CCNC: 213 U/L (ref 1–40)
BASOPHILS # BLD AUTO: 0.03 10*3/MM3 (ref 0–0.2)
BASOPHILS NFR BLD AUTO: 0.4 % (ref 0–1.5)
BILIRUB SERPL-MCNC: 0.3 MG/DL (ref 0–1.2)
BUN SERPL-MCNC: 11 MG/DL (ref 8–23)
BUN/CREAT SERPL: 11.6 (ref 7–25)
CALCIUM SPEC-SCNC: 9 MG/DL (ref 8.6–10.5)
CEA SERPL-MCNC: 2.08 NG/ML
CHLORIDE SERPL-SCNC: 109 MMOL/L (ref 98–107)
CO2 SERPL-SCNC: 21.3 MMOL/L (ref 22–29)
CREAT SERPL-MCNC: 0.95 MG/DL (ref 0.76–1.27)
DEPRECATED RDW RBC AUTO: 56 FL (ref 37–54)
EGFRCR SERPLBLD CKD-EPI 2021: 84.5 ML/MIN/1.73
EOSINOPHIL # BLD AUTO: 0.11 10*3/MM3 (ref 0–0.4)
EOSINOPHIL NFR BLD AUTO: 1.6 % (ref 0.3–6.2)
ERYTHROCYTE [DISTWIDTH] IN BLOOD BY AUTOMATED COUNT: 18.4 % (ref 12.3–15.4)
GLOBULIN UR ELPH-MCNC: 3.2 GM/DL
GLUCOSE SERPL-MCNC: 90 MG/DL (ref 65–99)
HCT VFR BLD AUTO: 36.6 % (ref 37.5–51)
HGB BLD-MCNC: 11 G/DL (ref 13–17.7)
LYMPHOCYTES # BLD AUTO: 1.67 10*3/MM3 (ref 0.7–3.1)
LYMPHOCYTES NFR BLD AUTO: 24 % (ref 19.6–45.3)
MCH RBC QN AUTO: 25.7 PG (ref 26.6–33)
MCHC RBC AUTO-ENTMCNC: 30.1 G/DL (ref 31.5–35.7)
MCV RBC AUTO: 85.5 FL (ref 79–97)
MONOCYTES # BLD AUTO: 0.77 10*3/MM3 (ref 0.1–0.9)
MONOCYTES NFR BLD AUTO: 11 % (ref 5–12)
NEUTROPHILS NFR BLD AUTO: 4.39 10*3/MM3 (ref 1.7–7)
NEUTROPHILS NFR BLD AUTO: 63 % (ref 42.7–76)
PLATELET # BLD AUTO: 150 10*3/MM3 (ref 140–450)
PMV BLD AUTO: 12.1 FL (ref 6–12)
POTASSIUM SERPL-SCNC: 4.2 MMOL/L (ref 3.5–5.2)
PROT SERPL-MCNC: 7.3 G/DL (ref 6–8.5)
RBC # BLD AUTO: 4.28 10*6/MM3 (ref 4.14–5.8)
SODIUM SERPL-SCNC: 143 MMOL/L (ref 136–145)
WBC NRBC COR # BLD AUTO: 6.97 10*3/MM3 (ref 3.4–10.8)

## 2024-11-06 PROCEDURE — 80053 COMPREHEN METABOLIC PANEL: CPT | Performed by: STUDENT IN AN ORGANIZED HEALTH CARE EDUCATION/TRAINING PROGRAM

## 2024-11-06 PROCEDURE — 82378 CARCINOEMBRYONIC ANTIGEN: CPT | Performed by: STUDENT IN AN ORGANIZED HEALTH CARE EDUCATION/TRAINING PROGRAM

## 2024-11-06 PROCEDURE — 36591 DRAW BLOOD OFF VENOUS DEVICE: CPT

## 2024-11-06 PROCEDURE — 25010000002 HEPARIN LOCK FLUSH PER 10 UNITS: Performed by: STUDENT IN AN ORGANIZED HEALTH CARE EDUCATION/TRAINING PROGRAM

## 2024-11-06 PROCEDURE — 85025 COMPLETE CBC W/AUTO DIFF WBC: CPT | Performed by: STUDENT IN AN ORGANIZED HEALTH CARE EDUCATION/TRAINING PROGRAM

## 2024-11-06 RX ORDER — SODIUM CHLORIDE 0.9 % (FLUSH) 0.9 %
10 SYRINGE (ML) INJECTION AS NEEDED
Status: DISCONTINUED | OUTPATIENT
Start: 2024-11-06 | End: 2024-11-07 | Stop reason: HOSPADM

## 2024-11-06 RX ORDER — SODIUM CHLORIDE 0.9 % (FLUSH) 0.9 %
10 SYRINGE (ML) INJECTION AS NEEDED
OUTPATIENT
Start: 2024-11-06

## 2024-11-06 RX ORDER — HEPARIN SODIUM (PORCINE) LOCK FLUSH IV SOLN 100 UNIT/ML 100 UNIT/ML
500 SOLUTION INTRAVENOUS AS NEEDED
OUTPATIENT
Start: 2024-11-06

## 2024-11-06 RX ORDER — HEPARIN SODIUM (PORCINE) LOCK FLUSH IV SOLN 100 UNIT/ML 100 UNIT/ML
500 SOLUTION INTRAVENOUS AS NEEDED
Status: DISCONTINUED | OUTPATIENT
Start: 2024-11-06 | End: 2024-11-07 | Stop reason: HOSPADM

## 2024-11-06 RX ADMIN — HEPARIN 500 UNITS: 100 SYRINGE at 10:19

## 2024-11-06 RX ADMIN — Medication 10 ML: at 10:18

## 2024-11-06 NOTE — PROGRESS NOTES
1018 Port accessed and flushed with good blood return noted. 10cc of blood wasted prior to specimen collection. Blood specimen, cbc, cmp, cea obtained and sent to lab for processing per protocol.  Port flushed with saline and heparin prior to needle removal.

## 2024-11-12 ENCOUNTER — HOSPITAL ENCOUNTER (OUTPATIENT)
Dept: GENERAL RADIOLOGY | Facility: HOSPITAL | Age: 74
Discharge: HOME | End: 2024-11-12
Admitting: STUDENT IN AN ORGANIZED HEALTH CARE EDUCATION/TRAINING PROGRAM
Payer: MEDICARE

## 2024-11-12 DIAGNOSIS — K94.09 COLOSTOMY PROLAPSE: ICD-10-CM

## 2024-11-12 DIAGNOSIS — C18.7 MALIGNANT NEOPLASM OF SIGMOID COLON: ICD-10-CM

## 2024-11-12 PROCEDURE — 74270 X-RAY XM COLON 1CNTRST STD: CPT

## 2024-11-12 PROCEDURE — 25510000002 DIATRIZOATE MEGLUMINE PER 1 ML: Performed by: STUDENT IN AN ORGANIZED HEALTH CARE EDUCATION/TRAINING PROGRAM

## 2024-11-12 RX ADMIN — DIATRIZOATE MEGLUMINE 1200 ML: 300 INJECTION, SOLUTION INTRAVENOUS at 11:20

## 2024-11-25 ENCOUNTER — TELEPHONE (OUTPATIENT)
Dept: ONCOLOGY | Facility: CLINIC | Age: 74
End: 2024-11-25

## 2024-11-25 NOTE — TELEPHONE ENCOUNTER
Caller: Viktor Atkins    Relationship: Self    Best call back number: [phone]      What was the call regarding: PATIENT RETURNING CALL TO DR MCLEOD FROM FRIDAY, HUB TRIED TO WARM TRANSFER

## 2024-11-26 ENCOUNTER — LAB (OUTPATIENT)
Dept: LAB | Facility: HOSPITAL | Age: 74
End: 2024-11-26
Payer: MEDICARE

## 2024-11-26 ENCOUNTER — HOSPITAL ENCOUNTER (OUTPATIENT)
Dept: CARDIOLOGY | Facility: HOSPITAL | Age: 74
Discharge: HOME | End: 2024-11-26
Payer: MEDICARE

## 2024-11-26 DIAGNOSIS — C18.9 MALIGNANT NEOPLASM OF COLON, UNSPECIFIED PART OF COLON: Primary | ICD-10-CM

## 2024-11-26 DIAGNOSIS — K94.09 COLOSTOMY PROLAPSE: ICD-10-CM

## 2024-11-26 DIAGNOSIS — C18.7 MALIGNANT NEOPLASM OF SIGMOID COLON: ICD-10-CM

## 2024-11-26 DIAGNOSIS — R93.5 ABNORMAL CT OF THE ABDOMEN: ICD-10-CM

## 2024-11-26 LAB
ANION GAP SERPL CALCULATED.3IONS-SCNC: 10.9 MMOL/L (ref 5–15)
BUN SERPL-MCNC: 10 MG/DL (ref 8–23)
BUN/CREAT SERPL: 11.6 (ref 7–25)
CALCIUM SPEC-SCNC: 9.3 MG/DL (ref 8.6–10.5)
CEA SERPL-MCNC: 1.65 NG/ML
CHLORIDE SERPL-SCNC: 108 MMOL/L (ref 98–107)
CO2 SERPL-SCNC: 23.1 MMOL/L (ref 22–29)
CREAT SERPL-MCNC: 0.86 MG/DL (ref 0.76–1.27)
EGFRCR SERPLBLD CKD-EPI 2021: 91.4 ML/MIN/1.73
GLUCOSE SERPL-MCNC: 92 MG/DL (ref 65–99)
HBA1C MFR BLD: 5.7 % (ref 4.8–5.6)
POTASSIUM SERPL-SCNC: 4.3 MMOL/L (ref 3.5–5.2)
QT INTERVAL: 493 MS
QTC INTERVAL: 488 MS
SODIUM SERPL-SCNC: 142 MMOL/L (ref 136–145)

## 2024-11-26 PROCEDURE — 83036 HEMOGLOBIN GLYCOSYLATED A1C: CPT

## 2024-11-26 PROCEDURE — 93005 ELECTROCARDIOGRAM TRACING: CPT | Performed by: STUDENT IN AN ORGANIZED HEALTH CARE EDUCATION/TRAINING PROGRAM

## 2024-11-26 PROCEDURE — 80048 BASIC METABOLIC PNL TOTAL CA: CPT

## 2024-11-26 PROCEDURE — 36415 COLL VENOUS BLD VENIPUNCTURE: CPT

## 2024-11-26 PROCEDURE — 82378 CARCINOEMBRYONIC ANTIGEN: CPT

## 2024-12-05 NOTE — PAT
Pt called requesting review of medication stops, Reviewed all medications, pt verbalized understanding

## 2024-12-06 ENCOUNTER — HOSPITAL ENCOUNTER (OUTPATIENT)
Dept: PET IMAGING | Facility: HOSPITAL | Age: 74
Discharge: HOME | End: 2024-12-06
Admitting: STUDENT IN AN ORGANIZED HEALTH CARE EDUCATION/TRAINING PROGRAM
Payer: MEDICARE

## 2024-12-06 DIAGNOSIS — C18.9 MALIGNANT NEOPLASM OF COLON, UNSPECIFIED PART OF COLON: ICD-10-CM

## 2024-12-06 DIAGNOSIS — R93.5 ABNORMAL CT OF THE ABDOMEN: ICD-10-CM

## 2024-12-06 LAB — GLUCOSE BLDC GLUCOMTR-MCNC: 91 MG/DL (ref 70–105)

## 2024-12-06 PROCEDURE — 34310000005 FLUDEOXYGLUCOSE F18 SOLUTION: Performed by: STUDENT IN AN ORGANIZED HEALTH CARE EDUCATION/TRAINING PROGRAM

## 2024-12-06 PROCEDURE — 82948 REAGENT STRIP/BLOOD GLUCOSE: CPT

## 2024-12-06 PROCEDURE — 78815 PET IMAGE W/CT SKULL-THIGH: CPT

## 2024-12-06 PROCEDURE — A9552 F18 FDG: HCPCS | Performed by: STUDENT IN AN ORGANIZED HEALTH CARE EDUCATION/TRAINING PROGRAM

## 2024-12-06 RX ADMIN — FLUDEOXYGLUCOSE F 18 1 DOSE: 200 INJECTION, SOLUTION INTRAVENOUS at 12:07

## 2024-12-09 ENCOUNTER — ANESTHESIA EVENT (OUTPATIENT)
Dept: GASTROENTEROLOGY | Facility: HOSPITAL | Age: 74
End: 2024-12-09
Payer: MEDICARE

## 2024-12-09 ENCOUNTER — HOSPITAL ENCOUNTER (OUTPATIENT)
Facility: HOSPITAL | Age: 74
Setting detail: HOSPITAL OUTPATIENT SURGERY
Discharge: HOME | DRG: 331 | End: 2024-12-09
Attending: STUDENT IN AN ORGANIZED HEALTH CARE EDUCATION/TRAINING PROGRAM | Admitting: STUDENT IN AN ORGANIZED HEALTH CARE EDUCATION/TRAINING PROGRAM
Payer: MEDICARE

## 2024-12-09 ENCOUNTER — ANESTHESIA EVENT (OUTPATIENT)
Dept: PERIOP | Facility: HOSPITAL | Age: 74
End: 2024-12-09
Payer: MEDICARE

## 2024-12-09 ENCOUNTER — ANESTHESIA (OUTPATIENT)
Dept: GASTROENTEROLOGY | Facility: HOSPITAL | Age: 74
End: 2024-12-09
Payer: MEDICARE

## 2024-12-09 VITALS
BODY MASS INDEX: 32.28 KG/M2 | SYSTOLIC BLOOD PRESSURE: 163 MMHG | HEART RATE: 62 BPM | TEMPERATURE: 98.1 F | HEIGHT: 71 IN | RESPIRATION RATE: 16 BRPM | OXYGEN SATURATION: 96 % | DIASTOLIC BLOOD PRESSURE: 71 MMHG | WEIGHT: 230.6 LBS

## 2024-12-09 DIAGNOSIS — K94.09 COLOSTOMY PROLAPSE: ICD-10-CM

## 2024-12-09 DIAGNOSIS — C18.7 MALIGNANT NEOPLASM OF SIGMOID COLON: ICD-10-CM

## 2024-12-09 PROCEDURE — 88305 TISSUE EXAM BY PATHOLOGIST: CPT | Performed by: STUDENT IN AN ORGANIZED HEALTH CARE EDUCATION/TRAINING PROGRAM

## 2024-12-09 PROCEDURE — 25010000002 PROPOFOL 10 MG/ML EMULSION: Performed by: NURSE ANESTHETIST, CERTIFIED REGISTERED

## 2024-12-09 PROCEDURE — 44389 COLONOSCOPY WITH BIOPSY: CPT | Performed by: STUDENT IN AN ORGANIZED HEALTH CARE EDUCATION/TRAINING PROGRAM

## 2024-12-09 PROCEDURE — 45380 COLONOSCOPY AND BIOPSY: CPT | Performed by: STUDENT IN AN ORGANIZED HEALTH CARE EDUCATION/TRAINING PROGRAM

## 2024-12-09 PROCEDURE — 25810000003 SODIUM CHLORIDE 0.9 % SOLUTION: Performed by: STUDENT IN AN ORGANIZED HEALTH CARE EDUCATION/TRAINING PROGRAM

## 2024-12-09 PROCEDURE — 25010000002 LIDOCAINE 2% SOLUTION: Performed by: NURSE ANESTHETIST, CERTIFIED REGISTERED

## 2024-12-09 PROCEDURE — 45385 COLONOSCOPY W/LESION REMOVAL: CPT | Performed by: STUDENT IN AN ORGANIZED HEALTH CARE EDUCATION/TRAINING PROGRAM

## 2024-12-09 DEVICE — DEV CLIP ENDO RESOLUTION360 CONTRL ROT 235CM: Type: IMPLANTABLE DEVICE | Site: CECUM | Status: FUNCTIONAL

## 2024-12-09 RX ORDER — HYDRALAZINE HYDROCHLORIDE 20 MG/ML
5 INJECTION INTRAMUSCULAR; INTRAVENOUS
Status: DISCONTINUED | OUTPATIENT
Start: 2024-12-09 | End: 2024-12-09 | Stop reason: HOSPADM

## 2024-12-09 RX ORDER — LABETALOL HYDROCHLORIDE 5 MG/ML
5 INJECTION, SOLUTION INTRAVENOUS
Status: DISCONTINUED | OUTPATIENT
Start: 2024-12-09 | End: 2024-12-09 | Stop reason: HOSPADM

## 2024-12-09 RX ORDER — DIPHENHYDRAMINE HYDROCHLORIDE 50 MG/ML
12.5 INJECTION INTRAMUSCULAR; INTRAVENOUS
Status: DISCONTINUED | OUTPATIENT
Start: 2024-12-09 | End: 2024-12-09 | Stop reason: HOSPADM

## 2024-12-09 RX ORDER — EPHEDRINE SULFATE 5 MG/ML
5 INJECTION INTRAVENOUS ONCE AS NEEDED
Status: DISCONTINUED | OUTPATIENT
Start: 2024-12-09 | End: 2024-12-09 | Stop reason: HOSPADM

## 2024-12-09 RX ORDER — LIDOCAINE HYDROCHLORIDE 20 MG/ML
INJECTION, SOLUTION INFILTRATION; PERINEURAL AS NEEDED
Status: DISCONTINUED | OUTPATIENT
Start: 2024-12-09 | End: 2024-12-09 | Stop reason: SURG

## 2024-12-09 RX ORDER — IPRATROPIUM BROMIDE AND ALBUTEROL SULFATE 2.5; .5 MG/3ML; MG/3ML
3 SOLUTION RESPIRATORY (INHALATION) ONCE AS NEEDED
Status: DISCONTINUED | OUTPATIENT
Start: 2024-12-09 | End: 2024-12-09 | Stop reason: HOSPADM

## 2024-12-09 RX ORDER — PROPOFOL 10 MG/ML
VIAL (ML) INTRAVENOUS AS NEEDED
Status: DISCONTINUED | OUTPATIENT
Start: 2024-12-09 | End: 2024-12-09 | Stop reason: SURG

## 2024-12-09 RX ORDER — ONDANSETRON 2 MG/ML
4 INJECTION INTRAMUSCULAR; INTRAVENOUS ONCE AS NEEDED
Status: DISCONTINUED | OUTPATIENT
Start: 2024-12-09 | End: 2024-12-09 | Stop reason: HOSPADM

## 2024-12-09 RX ORDER — SODIUM CHLORIDE 9 MG/ML
9 INJECTION, SOLUTION INTRAVENOUS CONTINUOUS
Status: DISCONTINUED | OUTPATIENT
Start: 2024-12-09 | End: 2024-12-09 | Stop reason: HOSPADM

## 2024-12-09 RX ORDER — MEPERIDINE HYDROCHLORIDE 25 MG/ML
12.5 INJECTION INTRAMUSCULAR; INTRAVENOUS; SUBCUTANEOUS
Status: DISCONTINUED | OUTPATIENT
Start: 2024-12-09 | End: 2024-12-09 | Stop reason: HOSPADM

## 2024-12-09 RX ADMIN — PROPOFOL INJECTABLE EMULSION 30 MG: 10 INJECTION, EMULSION INTRAVENOUS at 10:17

## 2024-12-09 RX ADMIN — PROPOFOL INJECTABLE EMULSION 30 MG: 10 INJECTION, EMULSION INTRAVENOUS at 10:12

## 2024-12-09 RX ADMIN — PROPOFOL INJECTABLE EMULSION 30 MG: 10 INJECTION, EMULSION INTRAVENOUS at 10:31

## 2024-12-09 RX ADMIN — PROPOFOL INJECTABLE EMULSION 30 MG: 10 INJECTION, EMULSION INTRAVENOUS at 10:43

## 2024-12-09 RX ADMIN — PROPOFOL INJECTABLE EMULSION 30 MG: 10 INJECTION, EMULSION INTRAVENOUS at 10:36

## 2024-12-09 RX ADMIN — LIDOCAINE HYDROCHLORIDE 100 MG: 20 INJECTION, SOLUTION INFILTRATION; PERINEURAL at 10:01

## 2024-12-09 RX ADMIN — PROPOFOL INJECTABLE EMULSION 30 MG: 10 INJECTION, EMULSION INTRAVENOUS at 10:21

## 2024-12-09 RX ADMIN — PROPOFOL INJECTABLE EMULSION 30 MG: 10 INJECTION, EMULSION INTRAVENOUS at 10:04

## 2024-12-09 RX ADMIN — SODIUM CHLORIDE 9 ML/HR: 9 INJECTION, SOLUTION INTRAVENOUS at 09:15

## 2024-12-09 RX ADMIN — PROPOFOL INJECTABLE EMULSION 30 MG: 10 INJECTION, EMULSION INTRAVENOUS at 10:08

## 2024-12-09 RX ADMIN — PROPOFOL INJECTABLE EMULSION 100 MG: 10 INJECTION, EMULSION INTRAVENOUS at 10:01

## 2024-12-09 RX ADMIN — PROPOFOL INJECTABLE EMULSION 30 MG: 10 INJECTION, EMULSION INTRAVENOUS at 10:25

## 2024-12-09 NOTE — ANESTHESIA PREPROCEDURE EVALUATION
Anesthesia Evaluation     NPO Solid Status: > 8 hours  NPO Liquid Status: > 2 hours           Airway   Mallampati: II  TM distance: >3 FB  Neck ROM: full  No difficulty expected  Dental - normal exam     Pulmonary - normal exam   Cardiovascular - normal exam    (+) hypertension well controlled, dysrhythmias Atrial Fib, hyperlipidemia      Neuro/Psych  (+) psychiatric history Anxiety  GI/Hepatic/Renal/Endo    (+) GERD, diabetes mellitus type 2    Musculoskeletal     Abdominal  - normal exam    Bowel sounds: normal.   Substance History      OB/GYN          Other   arthritis,   history of cancer active                Anesthesia Plan    ASA 3     MAC   total IV anesthesia  intravenous induction     Anesthetic plan, risks, benefits, and alternatives have been provided, discussed and informed consent has been obtained with: patient.  Pre-procedure education provided  Plan discussed with CRNA.    CODE STATUS:

## 2024-12-09 NOTE — ANESTHESIA POSTPROCEDURE EVALUATION
Patient: Viktor Atkins    Procedure Summary       Date: 12/09/24 Room / Location: [facility] ENDOSCOPY 4 / [facility] ENDOSCOPY    Anesthesia Start: 0958 Anesthesia Stop: 1100    Procedure: COLONOSCOPY VIA COLOSTOMY WITH POLYPECTOMY X6 AND CLIPPING X1 (Rectum) Diagnosis:       Colostomy prolapse      Malignant neoplasm of sigmoid colon      (Colostomy prolapse [K94.09])      (Malignant neoplasm of sigmoid colon [C18.7])    Surgeons: Aakash Burden MD Provider: Tomás Hunt MD    Anesthesia Type: MAC ASA Status: 3            Anesthesia Type: MAC    Vitals  Vitals Value Taken Time   /71 12/09/24 1125   Temp     Pulse 62 12/09/24 1125   Resp 16 12/09/24 1125   SpO2 96 % 12/09/24 1125           Post Anesthesia Care and Evaluation    Patient location during evaluation: PACU  Patient participation: complete - patient participated  Level of consciousness: awake  Pain scale: See nurse's notes for pain score.  Pain management: adequate    Airway patency: patent  Anesthetic complications: No anesthetic complications  PONV Status: none  Cardiovascular status: acceptable  Respiratory status: acceptable and spontaneous ventilation  Hydration status: acceptable    Comments: Patient seen and examined postoperatively; vital signs stable; SpO2 greater than or equal to 90%; cardiopulmonary status stable; nausea/vomiting adequately controlled; pain adequately controlled; no apparent anesthesia complications; patient discharged from anesthesia care when discharge criteria were met

## 2024-12-09 NOTE — OP NOTE
[facility]  [place]oscopy Examination     Name: Viktor Atkins  : 1950  Date of Colonoscopy: 2024  Procedure: Surveillance Colonoscopy  Surgeon: Aakash Burden MD   Anesthesia: Sedation   IV Fluids: refer to anesthesia record    Procedure Details:    Prior to the procedure, history and physical exam was performed. Patient's medication and allergies were reviewed. The risk and benefits of the procedure and sedation options and risks were discussed with the patient. All questions were answered and informed consent was obtained.    The patient was brought to the endoscopy suite and placed in the left lateral decubitus position. Conscious sedation was administered by the anesthesia team. Vital signs including blood pressure, heart rate, and oxygen saturation were monitored continuously throughout the procedure.    The colonoscope was introduced through the rectum and advanced through the anastomosis under direct visualization. The scope was then introduced in both limbs of colostomy and the entire colon was examined. The scope was maneuvered carefully, and the mucosa of the rectum, sigmoid colon, descending colon, transverse colon, ascending colon, and cecum were thoroughly inspected. Adequate insufflation was maintained throughout the procedure for optimal visualization.    Findings:    The rectum appears normal.  Colorectal anastomosis was sent to and configuration was noted at 7 cm from the anal verge.  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  There were scattered diverticulosis in the distal descending colon.  The transverse colon had 5 separate polyps that were removed.  Cecum had two large sessile polyps.     Procedure Images:          Interventions:    Cecum: Two separate sessile polyps measuring 20 mm were removed with hot snare.     Transverse colon: Four polyps semipedunculated measuring between 10-20 mm were removed with cold snare. One polyp  measuring 10 mm was removed mable biopsy forceps.     Specimens:  The removed polyp was retrieved and sent for histopathological examination to the pathology department for further analysis.    Recommendation:    Follow up pathology   Follow up PET CT findings   Discuss regarding case for tomorrow.     Conclusion:  The surveillance colonoscopy was completed successfully, and the entire colon was visualized without any complications. The patient tolerated the procedure well and was transferred to the recovery area in stable condition. Post-procedure instructions and follow-up were discussed with the patient and will be provided in written form.    Aakash Burden MD  Colon and Rectal Surgery   [address], [address]  T: [phone]

## 2024-12-09 NOTE — DISCHARGE INSTRUCTIONS
A responsible adult should stay with you and you should rest quietly for the rest of the day.    Do not drink alcohol, drive, operate any heavy machinery or power tools or make any legal/important decisions for the next 24 hours.     Progress your diet as tolerated.  If you begin to experience severe pain, increased shortness of breath, racing heartbeat or a fever above 101 F, seek immediate medical attention.     Follow up with MD as instructed. Call office for results in 3 to 5 days if needed.     Dr Burden @ [phone]      Findings:     The rectum appears normal.  Colorectal anastomosis was sent to and configuration was noted at 7 cm from the anal verge.  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  There were scattered diverticulosis in the distal descending colon.  The transverse colon had 5 separate polyps that were removed.  Cecum had two large sessile polyps.      Procedure Images:            Interventions:     Cecum: Two separate sessile polyps measuring 20 mm were removed with hot snare.      Transverse colon: Four polyps semipedunculated measuring between 10-20 mm were removed with cold snare. One polyp measuring 10 mm was removed mable biopsy forceps.      Specimens:  The removed polyp was retrieved and sent for histopathological examination to the pathology department for further analysis.     Recommendation:     Follow up pathology   Follow up PET CT findings   Discuss regarding case for tomorrow.      Conclusion:  The surveillance colonoscopy was completed successfully, and the entire colon was visualized without any complications. The patient tolerated the procedure well and was transferred to the recovery area in stable condition. Post-procedure instructions and follow-up were discussed with the patient and will be provided in written form.     Aakash Burden MD  Colon and Rectal Surgery   [facility], [address]  T:  [phone]

## 2024-12-10 ENCOUNTER — ANESTHESIA (OUTPATIENT)
Dept: PERIOP | Facility: HOSPITAL | Age: 74
End: 2024-12-10
Payer: MEDICARE

## 2024-12-10 ENCOUNTER — HOSPITAL ENCOUNTER (INPATIENT)
Facility: HOSPITAL | Age: 74
LOS: 7 days | Discharge: HOME | DRG: 331 | End: 2024-12-17
Attending: STUDENT IN AN ORGANIZED HEALTH CARE EDUCATION/TRAINING PROGRAM | Admitting: STUDENT IN AN ORGANIZED HEALTH CARE EDUCATION/TRAINING PROGRAM
Payer: MEDICARE

## 2024-12-10 DIAGNOSIS — C18.9 MALIGNANT NEOPLASM OF COLON, UNSPECIFIED PART OF COLON: ICD-10-CM

## 2024-12-10 DIAGNOSIS — C18.7 MALIGNANT NEOPLASM OF SIGMOID COLON: ICD-10-CM

## 2024-12-10 DIAGNOSIS — K94.09 COLOSTOMY PROLAPSE: ICD-10-CM

## 2024-12-10 DIAGNOSIS — E43 SEVERE MALNUTRITION: ICD-10-CM

## 2024-12-10 LAB
GLUCOSE BLDC GLUCOMTR-MCNC: 151 MG/DL (ref 70–105)
GLUCOSE BLDC GLUCOMTR-MCNC: 173 MG/DL (ref 70–105)
GLUCOSE BLDC GLUCOMTR-MCNC: 182 MG/DL (ref 70–105)

## 2024-12-10 PROCEDURE — 25810000003 LACTATED RINGERS PER 1000 ML: Performed by: STUDENT IN AN ORGANIZED HEALTH CARE EDUCATION/TRAINING PROGRAM

## 2024-12-10 PROCEDURE — 25010000002 GLYCOPYRROLATE 0.2 MG/ML SOLUTION: Performed by: NURSE ANESTHETIST, CERTIFIED REGISTERED

## 2024-12-10 PROCEDURE — 25010000003 BUPIVACAINE LIPOSOME 1.3 % SUSPENSION: Performed by: ANESTHESIOLOGY

## 2024-12-10 PROCEDURE — 25010000002 DEXAMETHASONE PER 1 MG: Performed by: NURSE ANESTHETIST, CERTIFIED REGISTERED

## 2024-12-10 PROCEDURE — 82948 REAGENT STRIP/BLOOD GLUCOSE: CPT

## 2024-12-10 PROCEDURE — 82948 REAGENT STRIP/BLOOD GLUCOSE: CPT | Performed by: STUDENT IN AN ORGANIZED HEALTH CARE EDUCATION/TRAINING PROGRAM

## 2024-12-10 PROCEDURE — 0DBE4ZZ EXCISION OF LARGE INTESTINE, PERCUTANEOUS ENDOSCOPIC APPROACH: ICD-10-PCS | Performed by: STUDENT IN AN ORGANIZED HEALTH CARE EDUCATION/TRAINING PROGRAM

## 2024-12-10 PROCEDURE — 25010000002 HYDROMORPHONE 1 MG/ML SOLUTION: Performed by: NURSE ANESTHETIST, CERTIFIED REGISTERED

## 2024-12-10 PROCEDURE — 44227 LAP CLOSE ENTEROSTOMY: CPT | Performed by: BEHAVIOR TECHNICIAN

## 2024-12-10 PROCEDURE — 25010000002 PROPOFOL 1000 MG/100ML EMULSION: Performed by: NURSE ANESTHETIST, CERTIFIED REGISTERED

## 2024-12-10 PROCEDURE — 25010000002 HEPARIN (PORCINE) PER 1000 UNITS: Performed by: STUDENT IN AN ORGANIZED HEALTH CARE EDUCATION/TRAINING PROGRAM

## 2024-12-10 PROCEDURE — 25010000002 BUPIVACAINE (PF) 0.5 % SOLUTION: Performed by: ANESTHESIOLOGY

## 2024-12-10 PROCEDURE — 25810000003 LACTATED RINGERS PER 1000 ML: Performed by: NURSE ANESTHETIST, CERTIFIED REGISTERED

## 2024-12-10 PROCEDURE — 25010000002 ONDANSETRON PER 1 MG: Performed by: NURSE ANESTHETIST, CERTIFIED REGISTERED

## 2024-12-10 PROCEDURE — C9290 INJ, BUPIVACAINE LIPOSOME: HCPCS | Performed by: ANESTHESIOLOGY

## 2024-12-10 PROCEDURE — 25010000002 CEFTRIAXONE PER 250 MG: Performed by: STUDENT IN AN ORGANIZED HEALTH CARE EDUCATION/TRAINING PROGRAM

## 2024-12-10 PROCEDURE — 25010000002 LIDOCAINE PF 2% 2 % SOLUTION: Performed by: NURSE ANESTHETIST, CERTIFIED REGISTERED

## 2024-12-10 PROCEDURE — 25010000002 BUPIVACAINE (PF) 0.25 % SOLUTION: Performed by: STUDENT IN AN ORGANIZED HEALTH CARE EDUCATION/TRAINING PROGRAM

## 2024-12-10 PROCEDURE — 88304 TISSUE EXAM BY PATHOLOGIST: CPT | Performed by: STUDENT IN AN ORGANIZED HEALTH CARE EDUCATION/TRAINING PROGRAM

## 2024-12-10 PROCEDURE — 25010000002 FENTANYL CITRATE (PF) 100 MCG/2ML SOLUTION: Performed by: NURSE ANESTHETIST, CERTIFIED REGISTERED

## 2024-12-10 PROCEDURE — 25010000002 MAGNESIUM SULFATE PER 500 MG OF MAGNESIUM: Performed by: NURSE ANESTHETIST, CERTIFIED REGISTERED

## 2024-12-10 PROCEDURE — 44227 LAP CLOSE ENTEROSTOMY: CPT | Performed by: STUDENT IN AN ORGANIZED HEALTH CARE EDUCATION/TRAINING PROGRAM

## 2024-12-10 DEVICE — PROXIMATE LINEAR CUTTER RELOAD, BLUE, 75MM
Type: IMPLANTABLE DEVICE | Site: ABDOMEN | Status: FUNCTIONAL
Brand: PROXIMATE

## 2024-12-10 DEVICE — ABSORBABLE WOUND CLOSURE DEVICE
Type: IMPLANTABLE DEVICE | Site: ABDOMEN | Status: FUNCTIONAL
Brand: V-LOC 90

## 2024-12-10 DEVICE — PROXIMATE RELOADABLE LINEAR CUTTER WITH SAFETY LOCK-OUT, 75MM
Type: IMPLANTABLE DEVICE | Site: ABDOMEN | Status: FUNCTIONAL
Brand: PROXIMATE

## 2024-12-10 RX ORDER — MAGNESIUM SULFATE HEPTAHYDRATE 500 MG/ML
INJECTION, SOLUTION INTRAMUSCULAR; INTRAVENOUS AS NEEDED
Status: DISCONTINUED | OUTPATIENT
Start: 2024-12-10 | End: 2024-12-10 | Stop reason: SURG

## 2024-12-10 RX ORDER — BUPIVACAINE HYDROCHLORIDE 2.5 MG/ML
INJECTION, SOLUTION EPIDURAL; INFILTRATION; INTRACAUDAL AS NEEDED
Status: DISCONTINUED | OUTPATIENT
Start: 2024-12-10 | End: 2024-12-10 | Stop reason: HOSPADM

## 2024-12-10 RX ORDER — LABETALOL HYDROCHLORIDE 5 MG/ML
5 INJECTION, SOLUTION INTRAVENOUS
Status: DISCONTINUED | OUTPATIENT
Start: 2024-12-10 | End: 2024-12-10 | Stop reason: HOSPADM

## 2024-12-10 RX ORDER — METOPROLOL SUCCINATE 50 MG/1
50 TABLET, EXTENDED RELEASE ORAL DAILY
Status: DISCONTINUED | OUTPATIENT
Start: 2024-12-11 | End: 2024-12-14

## 2024-12-10 RX ORDER — LIDOCAINE HYDROCHLORIDE 20 MG/ML
INJECTION, SOLUTION EPIDURAL; INFILTRATION; INTRACAUDAL; PERINEURAL AS NEEDED
Status: DISCONTINUED | OUTPATIENT
Start: 2024-12-10 | End: 2024-12-10 | Stop reason: SURG

## 2024-12-10 RX ORDER — ALVIMOPAN 12 MG/1
12 CAPSULE ORAL ONCE
Status: COMPLETED | OUTPATIENT
Start: 2024-12-10 | End: 2024-12-10

## 2024-12-10 RX ORDER — MIRTAZAPINE 15 MG/1
15 TABLET, FILM COATED ORAL
Qty: 30 TABLET | Refills: 1 | Status: SHIPPED | OUTPATIENT
Start: 2024-12-10

## 2024-12-10 RX ORDER — NALOXONE HCL 0.4 MG/ML
0.4 VIAL (ML) INJECTION AS NEEDED
Status: DISCONTINUED | OUTPATIENT
Start: 2024-12-10 | End: 2024-12-10 | Stop reason: HOSPADM

## 2024-12-10 RX ORDER — SODIUM CHLORIDE 0.9 % (FLUSH) 0.9 %
10 SYRINGE (ML) INJECTION EVERY 12 HOURS SCHEDULED
Status: DISCONTINUED | OUTPATIENT
Start: 2024-12-10 | End: 2024-12-17 | Stop reason: HOSPADM

## 2024-12-10 RX ORDER — PREGABALIN 25 MG/1
25 CAPSULE ORAL EVERY 8 HOURS SCHEDULED
Status: DISCONTINUED | OUTPATIENT
Start: 2024-12-10 | End: 2024-12-17 | Stop reason: HOSPADM

## 2024-12-10 RX ORDER — ACETAMINOPHEN 500 MG
1000 TABLET ORAL EVERY 6 HOURS
Status: DISCONTINUED | OUTPATIENT
Start: 2024-12-10 | End: 2024-12-10 | Stop reason: HOSPADM

## 2024-12-10 RX ORDER — AMIODARONE HYDROCHLORIDE 200 MG/1
200 TABLET ORAL DAILY
Status: DISCONTINUED | OUTPATIENT
Start: 2024-12-11 | End: 2024-12-17 | Stop reason: HOSPADM

## 2024-12-10 RX ORDER — SERTRALINE HYDROCHLORIDE 25 MG/1
25 TABLET, FILM COATED ORAL DAILY
Status: DISCONTINUED | OUTPATIENT
Start: 2024-12-11 | End: 2024-12-17 | Stop reason: HOSPADM

## 2024-12-10 RX ORDER — GLYCOPYRROLATE 0.2 MG/ML
INJECTION INTRAMUSCULAR; INTRAVENOUS AS NEEDED
Status: DISCONTINUED | OUTPATIENT
Start: 2024-12-10 | End: 2024-12-10 | Stop reason: SURG

## 2024-12-10 RX ORDER — FENTANYL CITRATE 50 UG/ML
INJECTION, SOLUTION INTRAMUSCULAR; INTRAVENOUS AS NEEDED
Status: DISCONTINUED | OUTPATIENT
Start: 2024-12-10 | End: 2024-12-10 | Stop reason: SURG

## 2024-12-10 RX ORDER — SODIUM CHLORIDE 9 MG/ML
40 INJECTION, SOLUTION INTRAVENOUS AS NEEDED
Status: DISCONTINUED | OUTPATIENT
Start: 2024-12-10 | End: 2024-12-17 | Stop reason: HOSPADM

## 2024-12-10 RX ORDER — HYDRALAZINE HYDROCHLORIDE 20 MG/ML
5 INJECTION INTRAMUSCULAR; INTRAVENOUS
Status: DISCONTINUED | OUTPATIENT
Start: 2024-12-10 | End: 2024-12-10 | Stop reason: HOSPADM

## 2024-12-10 RX ORDER — DEXAMETHASONE SODIUM PHOSPHATE 4 MG/ML
INJECTION, SOLUTION INTRA-ARTICULAR; INTRALESIONAL; INTRAMUSCULAR; INTRAVENOUS; SOFT TISSUE AS NEEDED
Status: DISCONTINUED | OUTPATIENT
Start: 2024-12-10 | End: 2024-12-10 | Stop reason: SURG

## 2024-12-10 RX ORDER — HEPARIN SODIUM 5000 [USP'U]/ML
5000 INJECTION, SOLUTION INTRAVENOUS; SUBCUTANEOUS EVERY 8 HOURS SCHEDULED
Status: DISCONTINUED | OUTPATIENT
Start: 2024-12-11 | End: 2024-12-17

## 2024-12-10 RX ORDER — SODIUM CHLORIDE, SODIUM LACTATE, POTASSIUM CHLORIDE, CALCIUM CHLORIDE 600; 310; 30; 20 MG/100ML; MG/100ML; MG/100ML; MG/100ML
INJECTION, SOLUTION INTRAVENOUS CONTINUOUS PRN
Status: DISCONTINUED | OUTPATIENT
Start: 2024-12-10 | End: 2024-12-10 | Stop reason: SURG

## 2024-12-10 RX ORDER — LIDOCAINE HYDROCHLORIDE 10 MG/ML
0.5 INJECTION, SOLUTION EPIDURAL; INFILTRATION; INTRACAUDAL; PERINEURAL ONCE AS NEEDED
Status: DISCONTINUED | OUTPATIENT
Start: 2024-12-10 | End: 2024-12-10 | Stop reason: HOSPADM

## 2024-12-10 RX ORDER — ALVIMOPAN 12 MG/1
12 CAPSULE ORAL 2 TIMES DAILY
Status: DISCONTINUED | OUTPATIENT
Start: 2024-12-10 | End: 2024-12-13

## 2024-12-10 RX ORDER — PREGABALIN 75 MG/1
75 CAPSULE ORAL ONCE
Status: COMPLETED | OUTPATIENT
Start: 2024-12-10 | End: 2024-12-10

## 2024-12-10 RX ORDER — NALOXONE HCL 0.4 MG/ML
0.4 VIAL (ML) INJECTION
Status: DISCONTINUED | OUTPATIENT
Start: 2024-12-10 | End: 2024-12-13

## 2024-12-10 RX ORDER — NICOTINE POLACRILEX 4 MG
15 LOZENGE BUCCAL
Status: DISCONTINUED | OUTPATIENT
Start: 2024-12-10 | End: 2024-12-17 | Stop reason: HOSPADM

## 2024-12-10 RX ORDER — DEXTROSE MONOHYDRATE 25 G/50ML
10-50 INJECTION, SOLUTION INTRAVENOUS
Status: DISCONTINUED | OUTPATIENT
Start: 2024-12-10 | End: 2024-12-17 | Stop reason: HOSPADM

## 2024-12-10 RX ORDER — ROCURONIUM BROMIDE 10 MG/ML
INJECTION, SOLUTION INTRAVENOUS AS NEEDED
Status: DISCONTINUED | OUTPATIENT
Start: 2024-12-10 | End: 2024-12-10 | Stop reason: SURG

## 2024-12-10 RX ORDER — METHOCARBAMOL 750 MG/1
750 TABLET, FILM COATED ORAL 4 TIMES DAILY
Status: DISCONTINUED | OUTPATIENT
Start: 2024-12-10 | End: 2024-12-17 | Stop reason: HOSPADM

## 2024-12-10 RX ORDER — METRONIDAZOLE 500 MG/100ML
500 INJECTION, SOLUTION INTRAVENOUS ONCE
Status: COMPLETED | OUTPATIENT
Start: 2024-12-10 | End: 2024-12-10

## 2024-12-10 RX ORDER — PROPOFOL 10 MG/ML
INJECTION, EMULSION INTRAVENOUS AS NEEDED
Status: DISCONTINUED | OUTPATIENT
Start: 2024-12-10 | End: 2024-12-10 | Stop reason: SURG

## 2024-12-10 RX ORDER — PANTOPRAZOLE SODIUM 40 MG/1
40 TABLET, DELAYED RELEASE ORAL
Status: DISCONTINUED | OUTPATIENT
Start: 2024-12-11 | End: 2024-12-17 | Stop reason: HOSPADM

## 2024-12-10 RX ORDER — SODIUM CHLORIDE, SODIUM LACTATE, POTASSIUM CHLORIDE, CALCIUM CHLORIDE 600; 310; 30; 20 MG/100ML; MG/100ML; MG/100ML; MG/100ML
1000 INJECTION, SOLUTION INTRAVENOUS ONCE
Status: COMPLETED | OUTPATIENT
Start: 2024-12-10 | End: 2024-12-10

## 2024-12-10 RX ORDER — EPHEDRINE SULFATE 5 MG/ML
INJECTION INTRAVENOUS AS NEEDED
Status: DISCONTINUED | OUTPATIENT
Start: 2024-12-10 | End: 2024-12-10 | Stop reason: SURG

## 2024-12-10 RX ORDER — ONDANSETRON 2 MG/ML
4 INJECTION INTRAMUSCULAR; INTRAVENOUS ONCE AS NEEDED
Status: DISCONTINUED | OUTPATIENT
Start: 2024-12-10 | End: 2024-12-10 | Stop reason: HOSPADM

## 2024-12-10 RX ORDER — OXYCODONE HYDROCHLORIDE 5 MG/1
10 TABLET ORAL EVERY 4 HOURS PRN
Status: DISCONTINUED | OUTPATIENT
Start: 2024-12-10 | End: 2024-12-10 | Stop reason: HOSPADM

## 2024-12-10 RX ORDER — SODIUM CHLORIDE 0.9 % (FLUSH) 0.9 %
10 SYRINGE (ML) INJECTION AS NEEDED
Status: DISCONTINUED | OUTPATIENT
Start: 2024-12-10 | End: 2024-12-17 | Stop reason: HOSPADM

## 2024-12-10 RX ORDER — SODIUM CHLORIDE 0.9 % (FLUSH) 0.9 %
10 SYRINGE (ML) INJECTION AS NEEDED
Status: DISCONTINUED | OUTPATIENT
Start: 2024-12-10 | End: 2024-12-10 | Stop reason: HOSPADM

## 2024-12-10 RX ORDER — IBUPROFEN 600 MG/1
1 TABLET ORAL
Status: DISCONTINUED | OUTPATIENT
Start: 2024-12-10 | End: 2024-12-17 | Stop reason: HOSPADM

## 2024-12-10 RX ORDER — ACETAMINOPHEN 500 MG
1000 TABLET ORAL ONCE
Status: COMPLETED | OUTPATIENT
Start: 2024-12-10 | End: 2024-12-10

## 2024-12-10 RX ORDER — DIPHENHYDRAMINE HYDROCHLORIDE 50 MG/ML
12.5 INJECTION INTRAMUSCULAR; INTRAVENOUS ONCE AS NEEDED
Status: DISCONTINUED | OUTPATIENT
Start: 2024-12-10 | End: 2024-12-10 | Stop reason: HOSPADM

## 2024-12-10 RX ORDER — HEPARIN SODIUM 5000 [USP'U]/ML
5000 INJECTION, SOLUTION INTRAVENOUS; SUBCUTANEOUS ONCE
Status: COMPLETED | OUTPATIENT
Start: 2024-12-10 | End: 2024-12-10

## 2024-12-10 RX ORDER — FENTANYL CITRATE 50 UG/ML
50 INJECTION, SOLUTION INTRAMUSCULAR; INTRAVENOUS
Status: DISCONTINUED | OUTPATIENT
Start: 2024-12-10 | End: 2024-12-10 | Stop reason: HOSPADM

## 2024-12-10 RX ORDER — ATORVASTATIN CALCIUM 20 MG/1
20 TABLET, FILM COATED ORAL NIGHTLY
Status: DISCONTINUED | OUTPATIENT
Start: 2024-12-10 | End: 2024-12-17 | Stop reason: HOSPADM

## 2024-12-10 RX ORDER — ONDANSETRON 2 MG/ML
4 INJECTION INTRAMUSCULAR; INTRAVENOUS EVERY 6 HOURS PRN
Status: DISCONTINUED | OUTPATIENT
Start: 2024-12-10 | End: 2024-12-17 | Stop reason: HOSPADM

## 2024-12-10 RX ORDER — FLUMAZENIL 0.1 MG/ML
0.2 INJECTION INTRAVENOUS AS NEEDED
Status: DISCONTINUED | OUTPATIENT
Start: 2024-12-10 | End: 2024-12-10 | Stop reason: HOSPADM

## 2024-12-10 RX ORDER — BUPIVACAINE HYDROCHLORIDE 5 MG/ML
INJECTION, SOLUTION EPIDURAL; INTRACAUDAL
Status: COMPLETED | OUTPATIENT
Start: 2024-12-10 | End: 2024-12-10

## 2024-12-10 RX ORDER — EPHEDRINE SULFATE 5 MG/ML
5 INJECTION INTRAVENOUS ONCE AS NEEDED
Status: DISCONTINUED | OUTPATIENT
Start: 2024-12-10 | End: 2024-12-10 | Stop reason: HOSPADM

## 2024-12-10 RX ORDER — IPRATROPIUM BROMIDE AND ALBUTEROL SULFATE 2.5; .5 MG/3ML; MG/3ML
3 SOLUTION RESPIRATORY (INHALATION) ONCE AS NEEDED
Status: DISCONTINUED | OUTPATIENT
Start: 2024-12-10 | End: 2024-12-10 | Stop reason: HOSPADM

## 2024-12-10 RX ORDER — DIPHENHYDRAMINE HYDROCHLORIDE 50 MG/ML
12.5 INJECTION INTRAMUSCULAR; INTRAVENOUS
Status: DISCONTINUED | OUTPATIENT
Start: 2024-12-10 | End: 2024-12-10 | Stop reason: HOSPADM

## 2024-12-10 RX ORDER — ACETAMINOPHEN 500 MG
1000 TABLET ORAL EVERY 6 HOURS
Status: DISCONTINUED | OUTPATIENT
Start: 2024-12-10 | End: 2024-12-17 | Stop reason: HOSPADM

## 2024-12-10 RX ORDER — MIRTAZAPINE 15 MG/1
7.5 TABLET, FILM COATED ORAL NIGHTLY
Status: DISCONTINUED | OUTPATIENT
Start: 2024-12-10 | End: 2024-12-13

## 2024-12-10 RX ORDER — OXYCODONE HYDROCHLORIDE 5 MG/1
5 TABLET ORAL ONCE AS NEEDED
Status: DISCONTINUED | OUTPATIENT
Start: 2024-12-10 | End: 2024-12-10 | Stop reason: HOSPADM

## 2024-12-10 RX ORDER — ONDANSETRON 2 MG/ML
INJECTION INTRAMUSCULAR; INTRAVENOUS AS NEEDED
Status: DISCONTINUED | OUTPATIENT
Start: 2024-12-10 | End: 2024-12-10 | Stop reason: SURG

## 2024-12-10 RX ADMIN — Medication 10 ML: at 22:06

## 2024-12-10 RX ADMIN — SODIUM CHLORIDE, POTASSIUM CHLORIDE, SODIUM LACTATE AND CALCIUM CHLORIDE 1000 ML: 600; 310; 30; 20 INJECTION, SOLUTION INTRAVENOUS at 06:46

## 2024-12-10 RX ADMIN — GLYCOPYRROLATE 0.2 MG: 0.2 INJECTION INTRAMUSCULAR; INTRAVENOUS at 08:50

## 2024-12-10 RX ADMIN — ALVIMOPAN 12 MG: 12 CAPSULE ORAL at 22:06

## 2024-12-10 RX ADMIN — ROCURONIUM BROMIDE 10 MG: 10 INJECTION, SOLUTION INTRAVENOUS at 10:31

## 2024-12-10 RX ADMIN — HYDROMORPHONE HYDROCHLORIDE 0.5 MG: 1 INJECTION, SOLUTION INTRAMUSCULAR; INTRAVENOUS; SUBCUTANEOUS at 10:21

## 2024-12-10 RX ADMIN — ROCURONIUM BROMIDE 10 MG: 10 INJECTION, SOLUTION INTRAVENOUS at 10:04

## 2024-12-10 RX ADMIN — METHOCARBAMOL TABLETS 750 MG: 750 TABLET, COATED ORAL at 22:06

## 2024-12-10 RX ADMIN — METRONIDAZOLE 500 MG: 500 INJECTION, SOLUTION INTRAVENOUS at 08:36

## 2024-12-10 RX ADMIN — HEPARIN SODIUM 5000 UNITS: 5000 INJECTION INTRAVENOUS; SUBCUTANEOUS at 06:40

## 2024-12-10 RX ADMIN — ONDANSETRON 4 MG: 2 INJECTION, SOLUTION INTRAMUSCULAR; INTRAVENOUS at 10:25

## 2024-12-10 RX ADMIN — ROCURONIUM BROMIDE 20 MG: 10 INJECTION, SOLUTION INTRAVENOUS at 09:03

## 2024-12-10 RX ADMIN — SODIUM CHLORIDE, SODIUM LACTATE, POTASSIUM CHLORIDE, AND CALCIUM CHLORIDE: .6; .31; .03; .02 INJECTION, SOLUTION INTRAVENOUS at 08:28

## 2024-12-10 RX ADMIN — ROCURONIUM BROMIDE 50 MG: 10 INJECTION, SOLUTION INTRAVENOUS at 08:39

## 2024-12-10 RX ADMIN — MIRTAZAPINE 7.5 MG: 15 TABLET, FILM COATED ORAL at 22:06

## 2024-12-10 RX ADMIN — ALVIMOPAN 12 MG: 12 CAPSULE ORAL at 06:39

## 2024-12-10 RX ADMIN — FENTANYL CITRATE 100 MCG: 50 INJECTION, SOLUTION INTRAMUSCULAR; INTRAVENOUS at 08:38

## 2024-12-10 RX ADMIN — EPHEDRINE SULFATE 10 MG: 5 INJECTION INTRAVENOUS at 09:07

## 2024-12-10 RX ADMIN — ACETAMINOPHEN 1000 MG: 500 TABLET, FILM COATED ORAL at 17:39

## 2024-12-10 RX ADMIN — ACETAMINOPHEN 1000 MG: 500 TABLET, FILM COATED ORAL at 06:40

## 2024-12-10 RX ADMIN — PROPOFOL INJECTABLE EMULSION 150 MG: 10 INJECTION, EMULSION INTRAVENOUS at 08:39

## 2024-12-10 RX ADMIN — CEFTRIAXONE 2 G: 2 INJECTION, POWDER, FOR SOLUTION INTRAMUSCULAR; INTRAVENOUS at 07:20

## 2024-12-10 RX ADMIN — PROPOFOL INJECTABLE EMULSION 125 MCG/KG/MIN: 10 INJECTION, EMULSION INTRAVENOUS at 08:44

## 2024-12-10 RX ADMIN — SODIUM CHLORIDE, SODIUM LACTATE, POTASSIUM CHLORIDE, AND CALCIUM CHLORIDE: .6; .31; .03; .02 INJECTION, SOLUTION INTRAVENOUS at 11:21

## 2024-12-10 RX ADMIN — ATORVASTATIN CALCIUM 20 MG: 20 TABLET, FILM COATED ORAL at 22:06

## 2024-12-10 RX ADMIN — PREGABALIN 75 MG: 75 CAPSULE ORAL at 06:40

## 2024-12-10 RX ADMIN — LIDOCAINE HYDROCHLORIDE 100 MG: 20 SOLUTION INTRAVENOUS at 08:38

## 2024-12-10 RX ADMIN — BUPIVACAINE 20 ML: 13.3 INJECTION, SUSPENSION, LIPOSOMAL INFILTRATION at 08:40

## 2024-12-10 RX ADMIN — HYDROMORPHONE HYDROCHLORIDE 1 MG: 1 INJECTION, SOLUTION INTRAMUSCULAR; INTRAVENOUS; SUBCUTANEOUS at 11:51

## 2024-12-10 RX ADMIN — BUPIVACAINE HYDROCHLORIDE 20 ML: 5 INJECTION, SOLUTION EPIDURAL; INTRACAUDAL; PERINEURAL at 08:40

## 2024-12-10 RX ADMIN — DEXAMETHASONE SODIUM PHOSPHATE 8 MG: 4 INJECTION, SOLUTION INTRAMUSCULAR; INTRAVENOUS at 08:55

## 2024-12-10 RX ADMIN — METHOCARBAMOL TABLETS 750 MG: 750 TABLET, COATED ORAL at 17:28

## 2024-12-10 RX ADMIN — EPHEDRINE SULFATE 10 MG: 5 INJECTION INTRAVENOUS at 08:53

## 2024-12-10 RX ADMIN — PREGABALIN 25 MG: 25 CAPSULE ORAL at 22:06

## 2024-12-10 RX ADMIN — ROCURONIUM BROMIDE 10 MG: 10 INJECTION, SOLUTION INTRAVENOUS at 09:35

## 2024-12-10 RX ADMIN — MAGNESIUM SULFATE HEPTAHYDRATE 2 G: 500 INJECTION, SOLUTION INTRAMUSCULAR; INTRAVENOUS at 08:55

## 2024-12-10 NOTE — ANESTHESIA PROCEDURE NOTES
Airway  Date/Time: 12/10/2024 8:41 AM  Airway not difficult    General Information and Staff    Anesthesiologist: Eddi Almazan MD  CRNA/CAA: Davon Moseley CRNA  SRNA: Erik Pereira SRNA  Indications and Patient Condition  Indications for airway management: airway protection    Preoxygenated: yes  MILS not maintained throughout  Mask difficulty assessment: 2 - vent by mask + OA or adjuvant +/- NMBA    Final Airway Details  Final airway type: endotracheal airway      Successful airway: ETT  Cuffed: yes   Successful intubation technique: video laryngoscopy  Facilitating devices/methods: intubating stylet  Blade: Martinez  Blade size: 3  ETT size (mm): 7.5  Cormack-Lehane Classification: grade I - full view of glottis  Placement verified by: chest auscultation, capnometry and palpation of cuff   Cuff volume (mL): 9  Measured from: lips  ETT/EBT  to lips (cm): 22  Number of attempts at approach: 1  Assessment: lips, teeth, and gum same as pre-op and atraumatic intubation

## 2024-12-10 NOTE — PLAN OF CARE
Goal Outcome Evaluation:      Patient resting after surgery. Family at beside. Tolerating clear diet at this time. Care plan ongoing.

## 2024-12-10 NOTE — BRIEF OP NOTE
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    A Large Intestine, Left / Descending Colon Tissue TISSUE PATHOLOGY EXAM   Aakash Burden MD 12/10/24 1029 No    Description: Colostomy                  Drains:   Open Drain LUQ (Active)   Site Description Unable to view 12/10/24 1155   Dressing Status Dry;Intact;New drainage 12/10/24 1155   Drainage Appearance Bloody 12/10/24 1155   Status Unclamped 12/10/24 1155       [REMOVED] Open Drain Inferior;Midline Abdomen (Removed)       [REMOVED] Colostomy LUQ (Removed)       [REMOVED] Urethral Catheter Silicone 16 Fr. (Removed)       Findings:     Colostomy closed with hand sewn end to end   No intra- abdominal lesions noted   Liver with chemotherapy associated cirrhosis         Complications: none     Assistant: Carolyn June RN CSA  was responsible for performing the following activities: Retraction, Suction, Irrigation, Suturing, Closing, and Placing Dressing and their skilled assistance was necessary for the success of this case.    Aakash Burden MD     Date: 12/10/2024  Time: 11:55 EST

## 2024-12-10 NOTE — ANESTHESIA PREPROCEDURE EVALUATION
Anesthesia Evaluation     Patient summary reviewed and Nursing notes reviewed   NPO Solid Status: > 8 hours  NPO Liquid Status: > 2 hours           Airway   Mallampati: II  TM distance: >3 FB  Neck ROM: full  No difficulty expected  Dental - normal exam     Pulmonary - normal exam   Cardiovascular - normal exam    ECG reviewed    (+) hypertension, dysrhythmias Atrial Fib, hyperlipidemia      Neuro/Psych  GI/Hepatic/Renal/Endo    (+) GERD, GI bleeding , diabetes mellitus type 2    Musculoskeletal     Abdominal  - normal exam    Bowel sounds: normal.   Substance History      OB/GYN          Other   arthritis,   history of cancer remission                Anesthesia Plan    ASA 3     general with block and ERAS Protocol   total IV anesthesia  (Po Tylenol preop ordered    TAP block , r/b discussed)  intravenous induction     Anesthetic plan, risks, benefits, and alternatives have been provided, discussed and informed consent has been obtained with: patient.    Plan discussed with CRNA.    CODE STATUS:

## 2024-12-10 NOTE — ANESTHESIA POSTPROCEDURE EVALUATION
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        Post Anesthesia Care and Evaluation    Patient location during evaluation: PACU  Patient participation: complete - patient participated  Level of consciousness: awake  Pain scale: See nurse's notes for pain score.  Pain management: adequate    Airway patency: patent  Anesthetic complications: No anesthetic complications  PONV Status: none  Cardiovascular status: acceptable  Respiratory status: acceptable and spontaneous ventilation  Hydration status: acceptable    Comments: Patient seen and examined postoperatively; vital signs stable; SpO2 greater than or equal to 90%; cardiopulmonary status stable; nausea/vomiting adequately controlled; pain adequately controlled; no apparent anesthesia complications; patient discharged from anesthesia care when discharge criteria were met

## 2024-12-10 NOTE — ANESTHESIA PROCEDURE NOTES
Peripheral Block      Patient reassessed immediately prior to procedure    Patient location during procedure: OR  Start time: 12/10/2024 8:34 AM  Stop time: 12/10/2024 8:40 AM  Reason for block: at surgeon's request and post-op pain management  Performed by  Anesthesiologist: Eddi Almaazn MD  Assisted by: Srinivas Chin RN  Preanesthetic Checklist  Completed: patient identified, IV checked, site marked, risks and benefits discussed, surgical consent, monitors and equipment checked, pre-op evaluation and timeout performed  Prep:  Pt Position: supine  Sterile barriers:cap, gloves, mask and washed/disinfected hands  Prep: ChloraPrep  Patient monitoring: blood pressure monitoring, continuous pulse oximetry and EKG  Procedure    Sedation: no  Performed under: general  Guidance:ultrasound guided  Images:still images obtained, printed/placed on chart    Laterality:Bilateral  Block Type:TAP  Injection Technique:single-shot  Needle Type:echogenic  Needle Gauge:20 G  Resistance on Injection: none    Medications Used: bupivacaine liposome (EXPAREL) injection 1.3% - Perineural   20 mL - 12/10/2024 8:40:00 AM  bupivacaine PF (MARCAINE) injection 0.5% - Perineural   20 mL - 12/10/2024 8:40:00 AM      Medications  Preservative Free Saline:20ml  Comment:Exparel/0.5Bupiv/saline 10ml each on each side bilateral TAP    Post Assessment  Injection Assessment: negative aspiration for heme and incremental injection  Patient Tolerance:comfortable throughout block  Complications:no  Performed by: Eddi Almazan MD

## 2024-12-11 LAB
GLUCOSE BLDC GLUCOMTR-MCNC: 117 MG/DL (ref 70–105)
GLUCOSE BLDC GLUCOMTR-MCNC: 87 MG/DL (ref 70–105)
GLUCOSE BLDC GLUCOMTR-MCNC: 96 MG/DL (ref 70–105)
GLUCOSE BLDC GLUCOMTR-MCNC: 99 MG/DL (ref 70–105)
LAB AP CASE REPORT: NORMAL
LAB AP CASE REPORT: NORMAL
PATH REPORT.FINAL DX SPEC: NORMAL
PATH REPORT.FINAL DX SPEC: NORMAL
PATH REPORT.GROSS SPEC: NORMAL
PATH REPORT.GROSS SPEC: NORMAL

## 2024-12-11 PROCEDURE — 82948 REAGENT STRIP/BLOOD GLUCOSE: CPT

## 2024-12-11 PROCEDURE — 97161 PT EVAL LOW COMPLEX 20 MIN: CPT

## 2024-12-11 PROCEDURE — 25010000002 HEPARIN (PORCINE) PER 1000 UNITS: Performed by: STUDENT IN AN ORGANIZED HEALTH CARE EDUCATION/TRAINING PROGRAM

## 2024-12-11 PROCEDURE — 99024 POSTOP FOLLOW-UP VISIT: CPT | Performed by: STUDENT IN AN ORGANIZED HEALTH CARE EDUCATION/TRAINING PROGRAM

## 2024-12-11 RX ADMIN — PANTOPRAZOLE SODIUM 40 MG: 40 TABLET, DELAYED RELEASE ORAL at 05:55

## 2024-12-11 RX ADMIN — HEPARIN SODIUM 5000 UNITS: 5000 INJECTION INTRAVENOUS; SUBCUTANEOUS at 05:56

## 2024-12-11 RX ADMIN — METHOCARBAMOL TABLETS 750 MG: 750 TABLET, COATED ORAL at 08:40

## 2024-12-11 RX ADMIN — ACETAMINOPHEN 1000 MG: 500 TABLET, FILM COATED ORAL at 18:02

## 2024-12-11 RX ADMIN — PREGABALIN 25 MG: 25 CAPSULE ORAL at 14:40

## 2024-12-11 RX ADMIN — PREGABALIN 25 MG: 25 CAPSULE ORAL at 05:55

## 2024-12-11 RX ADMIN — ACETAMINOPHEN 1000 MG: 500 TABLET, FILM COATED ORAL at 05:55

## 2024-12-11 RX ADMIN — ALVIMOPAN 12 MG: 12 CAPSULE ORAL at 08:39

## 2024-12-11 RX ADMIN — METHOCARBAMOL TABLETS 750 MG: 750 TABLET, COATED ORAL at 12:05

## 2024-12-11 RX ADMIN — AMIODARONE HYDROCHLORIDE 200 MG: 200 TABLET ORAL at 08:39

## 2024-12-11 RX ADMIN — ALVIMOPAN 12 MG: 12 CAPSULE ORAL at 21:01

## 2024-12-11 RX ADMIN — SERTRALINE HYDROCHLORIDE 25 MG: 25 TABLET, FILM COATED ORAL at 08:39

## 2024-12-11 RX ADMIN — Medication 10 ML: at 21:03

## 2024-12-11 RX ADMIN — METHOCARBAMOL TABLETS 750 MG: 750 TABLET, COATED ORAL at 18:02

## 2024-12-11 RX ADMIN — ACETAMINOPHEN 1000 MG: 500 TABLET, FILM COATED ORAL at 12:05

## 2024-12-11 RX ADMIN — Medication 10 ML: at 08:42

## 2024-12-11 RX ADMIN — METOPROLOL SUCCINATE 50 MG: 50 TABLET, EXTENDED RELEASE ORAL at 08:39

## 2024-12-11 RX ADMIN — MIRTAZAPINE 7.5 MG: 15 TABLET, FILM COATED ORAL at 21:01

## 2024-12-11 RX ADMIN — HEPARIN SODIUM 5000 UNITS: 5000 INJECTION INTRAVENOUS; SUBCUTANEOUS at 14:40

## 2024-12-11 RX ADMIN — ATORVASTATIN CALCIUM 20 MG: 20 TABLET, FILM COATED ORAL at 21:01

## 2024-12-11 RX ADMIN — PREGABALIN 25 MG: 25 CAPSULE ORAL at 21:01

## 2024-12-11 RX ADMIN — METHOCARBAMOL TABLETS 750 MG: 750 TABLET, COATED ORAL at 21:01

## 2024-12-11 RX ADMIN — HEPARIN SODIUM 5000 UNITS: 5000 INJECTION INTRAVENOUS; SUBCUTANEOUS at 21:02

## 2024-12-11 NOTE — THERAPY EVALUATION
Patient Name: Viktor Atkins  : 1950    MRN: [id number]                              Today's Date: 2024       Admit Date: 12/10/2024    Visit Dx:     ICD-10-CM ICD-9-CM   1. Colostomy prolapse  K94.09 569.69   2. Malignant neoplasm of sigmoid colon  C18.7 153.3     Patient Active Problem List   Diagnosis    Anxiety disorder    Essential hypertension    Palpitations    Mixed hyperlipidemia    GI bleeding    Rectal bleeding    GI bleed    Colon cancer    PAF (paroxysmal atrial fibrillation)    Wound infection    Severe malnutrition    Rectosigmoid cancer    Hypersomnia    Port-A-Cath in place    Colostomy prolapse     Past Medical History:   Diagnosis Date    Arthritis     Atrial fibrillation     Benign essential HTN     Cancer     Diabetes mellitus     GERD (gastroesophageal reflux disease)     Hyperlipidemia, mixed     Palpitations      Past Surgical History:   Procedure Laterality Date    CARDIAC CATHETERIZATION      CHOLECYSTECTOMY      COLON RESECTION WITH ILEOSTOMY N/A 3/30/2024    Procedure: COLON RESECTION LOW ANTERIOR LAPAROSCOPIC, COLONAL ANASTOMOSIS, DIVERTING LOOP ILEOSTOMY WITH DAVINCI ROBOT;  Surgeon: Aakash Burden MD;  Location: [facility] MAIN OR;  Service: Robotics - DaVinci;  Laterality: N/A;    COLONOSCOPY N/A 3/27/2024    Procedure: COLONOSCOPY with polypectomy x 18 and sigmoid colon mass biopsies with endscopic spot tattooing;  Surgeon: Fili Quintero MD;  Location: [facility] ENDOSCOPY;  Service: Gastroenterology;  Laterality: N/A;  sigmoid mass at 25 cm    COLONOSCOPY N/A 2024    Procedure: COLONOSCOPY VIA COLOSTOMY WITH POLYPECTOMY X6 AND CLIPPING X1;  Surgeon: Aakash Burden MD;  Location: [facility] ENDOSCOPY;  Service: General;  Laterality: N/A;  POST: POLYPS    COLOSTOMY N/A 2024    Procedure: DIAGNOSTIC LAPAROSCOPY, DIVERTING OSTOMY, POSSIBLE REVISION OF ANASTAMOSIS;  Surgeon: Aakash Burden MD;  Location: [facility] MAIN OR;  Service: General;  Laterality: N/A;    KNEE SURGERY       SIGMOIDOSCOPY N/A 3/30/2024    Procedure: SIGMOIDOSCOPY;  Surgeon: Aakash Burden MD;  Location: [facility] MAIN OR;  Service: Gastroenterology;  Laterality: N/A;    VENOUS ACCESS DEVICE (PORT) INSERTION N/A 4/30/2024    Procedure: INSERTION VENOUS ACCESS DEVICE;  Surgeon: Aakash Burden MD;  Location: [facility] MAIN OR;  Service: General;  Laterality: N/A;      General Information       Row Name 12/11/24 1140          Physical Therapy Time and Intention    Document Type evaluation  -CM     Mode of Treatment physical therapy  -CM       Row Name 12/11/24 1140          General Information    Patient Profile Reviewed yes  -CM     Prior Level of Function independent:;community mobility;gait;driving  -CM     Existing Precautions/Restrictions no known precautions/restrictions  -CM     Barriers to Rehab medically complex  -CM       Row Name 12/11/24 1140          Living Environment    People in Home spouse  -CM       Row Name 12/11/24 1140          Home Main Entrance    Number of Stairs, Main Entrance none  -CM       Row Name 12/11/24 1140          Stairs Within Home, Primary    Number of Stairs, Within Home, Primary none  -CM       Row Name 12/11/24 1140          Cognition    Orientation Status (Cognition) oriented x 4;other (see comments)  very pleasant  -CM               User Key  (r) = Recorded By, (t) = Taken By, (c) = Cosigned By      Initials Name Provider Type    CM Clara Sellers, PT Physical Therapist                   Mobility       Row Name 12/11/24 1141          Bed Mobility    Bed Mobility bed mobility (all) activities  -CM     All Activities, [place] (Bed Mobility) not tested  -CM     Comment, (Bed Mobility) up in chair when PT arrived  -CM       Row Name 12/11/24 1141          Bed-Chair Transfer    Bed-Chair [place] (Transfers) not tested  -CM       Row Name 12/11/24 1141          Sit-Stand Transfer    Sit-Stand [place] (Transfers) independent  -CM       Row Name 12/11/24 1141           Gait/Stairs (Locomotion)    [place] Level (Gait) independent  -CM     Assistive Device (Gait) other (see comments)  gait belt, non skid socks  -CM     Patient was able to Ambulate yes  -CM     Distance in Feet (Gait) 400  very mild lateral sway, but no overt loss of balance. Mild variation in direction, but pt manages independently.  -CM               User Key  (r) = Recorded By, (t) = Taken By, (c) = Cosigned By      Initials Name Provider Type    Clara Cazares, PT Physical Therapist                   Obj/Interventions       Row Name 12/11/24 1142          Range of Motion Comprehensive    General Range of Motion no range of motion deficits identified  -CM       [facility] Name 12/11/24 1142          Strength Comprehensive (MMT)    General Manual Muscle Testing (MMT) Assessment no strength deficits identified  -CM       Row Name 12/11/24 1142          Balance    Balance Assessment sitting static balance;standing dynamic balance;sitting dynamic balance;standing static balance  -CM     Static Sitting Balance independent  -CM     Dynamic Sitting Balance independent  -CM     Position, Sitting Balance sitting in chair  -CM     Static Standing Balance independent  -CM     Dynamic Standing Balance independent  -CM     Position/Device Used, Standing Balance unsupported  -CM       [facility] Name 12/11/24 1142          Sensory Assessment (Somatosensory)    Sensory Assessment (Somatosensory) sensation intact  -CM               User Key  (r) = Recorded By, (t) = Taken By, (c) = Cosigned By      Initials Name Provider Type    Clara Cazares, PT Physical Therapist                   Goals/Plan    No documentation.                  Clinical Impression       Row Name 12/11/24 1142          Pain    Pretreatment Pain Rating 0/10 - no pain  -CM     Posttreatment Pain Rating 0/10 - no pain  -CM     Pain Location abdomen  -CM     Pain Management Interventions activity modification encouraged;exercise or physical activity  "utilized;nursing notified  -     Response to Pain Interventions activity level improved;activity participation with decreased pain  -CM     Pre/Posttreatment Pain Comment denies pain; reports abdomen is only \"sore\"  -CM       Row Name 12/11/24 1142          Plan of Care Review    Plan of Care Reviewed With patient  -CM     Progress improving  -CM     Outcome Evaluation 73 yo male seen following colostomy takedown on 12/10/24. Pt now POD1. PMH: a fib; perforated diverticulitis w/ colostomy, colon ca. At baseline, pt is completely independent for community ambulation w/o assistive device. Reports he walked all around [place], [place] just a week ago. Lives w/ wife in single level home w/ no stairs to enter. Today, pt is independent for all mobility. Demonstrates normal strength and ROM. Able to amb 400 ft well. No need for skilled PT. Instructed pt to ambulate around unit 4 x day until d/c. Informed RN. Recommend home w/ family at d/c. Will sign off.  -CM       Row Name 12/11/24 1146          Therapy Assessment/Plan (PT)    Criteria for Skilled Interventions Met (PT) no;no problems identified which require skilled intervention  -CM     Therapy Frequency (PT) evaluation only  -CM       Row Name 12/11/24 1146          Vital Signs    O2 Delivery Pre Treatment room air  -CM     O2 Delivery Intra Treatment room air  -CM     O2 Delivery Post Treatment room air  -CM       Row Name 12/11/24 1146          Positioning and Restraints    Pre-Treatment Position sitting in chair/recliner  -CM     Post Treatment Position chair  -CM     In Chair notified nsg;sitting;call light within reach;legs elevated  -CM               User Key  (r) = Recorded By, (t) = Taken By, (c) = Cosigned By      Initials Name Provider Type    Clara Cazares, PT Physical Therapist                   Outcome Measures       Row Name 12/11/24 1146 12/11/24 0800       How much help from another person do you currently need...    Turning from your back to " your side while in flat bed without using bedrails? 4  -CM 4  -LB (r) SN (t) LB (c)    Moving from lying on back to sitting on the side of a flat bed without bedrails? 4  -CM 4  -LB (r) SN (t) LB (c)    Moving to and from a bed to a chair (including a wheelchair)? 4  -CM 4  -LB (r) SN (t) LB (c)    Standing up from a chair using your arms (e.g., wheelchair, bedside chair)? 4  -CM 3  -LB (r) SN (t) LB (c)    Climbing 3-5 steps with a railing? 4  -CM 3  -LB (r) SN (t) LB (c)    To walk in hospital room? 4  -CM 4  -LB (r) SN (t) LB (c)    AM-PAC 6 Clicks Score (PT) 24  -CM 22  -SN              User Key  (r) = Recorded By, (t) = Taken By, (c) = Cosigned By      Initials Name Provider Type    Clara Cazares, PT Physical Therapist    Holley Peters RN Registered Nurse    Crystal Fulton RN Registered Nurse                                 Physical Therapy Education       Title: PT OT SLP Therapies (Done)       Topic: Physical Therapy (Done)       Point: Mobility training (Done)       Learning Progress Summary            Patient Acceptance, E,TB, VU,DU by FLORY at 12/11/2024 1146                      Point: Home exercise program (Done)       Learning Progress Summary            Patient Acceptance, E,TB, VU,DU by  at 12/11/2024 1146                      Point: Body mechanics (Done)       Learning Progress Summary            Patient Acceptance, E,TB, VU,DU by FLORY at 12/11/2024 1146                      Point: Precautions (Done)       Learning Progress Summary            Patient Acceptance, E,TB, VU,DU by  at 12/11/2024 1146                                      User Key       Initials Effective Dates Name Provider Type Discipline     06/16/21 -  Clara Sellers, PT Physical Therapist PT                  PT Recommendation and Plan     Progress: improving  Outcome Evaluation: 75 yo male seen following colostomy takedown on 12/10/24. Pt now POD1. PMH: a fib; perforated diverticulitis w/ colostomy, colon  ca. At baseline, pt is completely independent for community ambulation w/o assistive device. Reports he walked all around [place], [place] just a week ago. Lives w/ wife in single level home w/ no stairs to enter. Today, pt is independent for all mobility. Demonstrates normal strength and ROM. Able to amb 400 ft well. No need for skilled PT. Instructed pt to ambulate around unit 4 x day until d/c. Informed RN. Recommend home w/ family at d/c. Will sign off.     Time Calculation:   PT Evaluation Complexity  History, PT Evaluation Complexity: 1-2 personal factors and/or comorbidities  Examination of Body Systems (PT Eval Complexity): total of 4 or more elements  Clinical Presentation (PT Evaluation Complexity): stable  Clinical Decision Making (PT Evaluation Complexity): low complexity  Overall Complexity (PT Evaluation Complexity): low complexity     PT Charges       Row Name 12/11/24 1147             Time Calculation    Start Time 0938  -CM      Stop Time 0951  -CM      Time Calculation (min) 13 min  -CM      PT Received On 12/11/24  -CM         Time Calculation- PT    Total Timed Code Minutes- PT 0 minute(s)  -CM                User Key  (r) = Recorded By, (t) = Taken By, (c) = Cosigned By      Initials Name Provider Type    CM Clara Sellers, PT Physical Therapist                  Therapy Charges for Today       Code Description Service Date Service Provider Modifiers Qty    [id number]  PT EVAL LOW COMPLEXITY 3 12/11/2024 Clara Sellers, PT GP 1            PT G-Codes  AM-PAC 6 Clicks Score (PT): 24  PT Discharge Summary  Anticipated Discharge Disposition (PT): home with assist    Clara Sellers PT  12/11/2024

## 2024-12-11 NOTE — CASE MANAGEMENT/SOCIAL WORK
Discharge Planning Assessment   Leif     Patient Name: Viktor Atkins  MRN: [id number]  Today's Date: 12/11/2024    Admit Date: 12/10/2024    Plan: Home with wife, Wife will transport at discharge.   Discharge Needs Assessment       Row Name 12/11/24 1428       Living Environment    People in Home spouse    Name(s) of People in Home Ninfa    Current Living Arrangements home    Potentially Unsafe Housing Conditions none    In the past 12 months has the electric, gas, oil, or water company threatened to shut off services in your home? No    Primary Care Provided by self    Provides Primary Care For no one    Family Caregiver if Needed spouse    Family Caregiver Names Ninfa    Quality of Family Relationships helpful;involved;supportive    Able to Return to Prior Arrangements yes       Resource/Environmental Concerns    Resource/Environmental Concerns none    Transportation Concerns none       Transportation Needs    In the past 12 months, has lack of transportation kept you from medical appointments or from getting medications? no    In the past 12 months, has lack of transportation kept you from meetings, work, or from getting things needed for daily living? No       Food Insecurity    Within the past 12 months, you worried that your food would run out before you got the money to buy more. Never true    Within the past 12 months, the food you bought just didn't last and you didn't have money to get more. Never true       Transition Planning    Patient/Family Anticipates Transition to home with family    Patient/Family Anticipated Services at Transition none    Transportation Anticipated family or friend will provide       Discharge Needs Assessment    Readmission Within the Last 30 Days no previous admission in last 30 days    Equipment Currently Used at Home colostomy/ostomy supplies    Concerns to be Addressed care coordination/care conferences;discharge planning    Do you want help finding or keeping work or  a job? I do not need or want help    Do you want help with school or training? For example, starting or completing job training or getting a high school diploma, GED or equivalent No    Anticipated Changes Related to Illness none    Equipment Needed After Discharge none    Provided Post Acute Provider List? N/A    N/A Provider List Comment denies dc needs                   Discharge Plan       Row Name 12/11/24 1429       Plan    Plan Home with wife, Wife will transport at discharge.    Patient/Family in Agreement with Plan yes    Plan Comments Patient lives at home with wife.  Patient drives, Wife will transport at discharge. Patient performs ADL. PCP and pharmacy confirmed. Denies financial assistance needs for medication and/or food. Denies HH and/or rehab needs.                  Continued Care and Services - Admitted Since 12/10/2024    No active coordination exists for this encounter.       Expected Discharge Date and Time       Expected Discharge Date Expected Discharge Time    Dec 12, 2024            Demographic Summary       Row Name 12/11/24 1428       General Information    Admission Type inpatient    Arrived From home;PACU/recovery room    Required Notices Provided Important Message from Medicare    Referral Source admission list    Reason for Consult discharge planning    Preferred Language English       Contact Information    Permission Granted to Share Info With                    Functional Status       Row Name 12/11/24 1428       Functional Status    Usual Activity Tolerance good    Current Activity Tolerance good       Functional Status, IADL    Medications independent    Meal Preparation independent    Housekeeping independent    Laundry independent    Shopping independent       Mental Status    General Appearance WDL WDL       Mental Status Summary    Recent Changes in Mental Status/Cognitive Functioning no changes                Avani Goins RN    AME  1  [email or web address]  Office [phone] cell [phone]

## 2024-12-11 NOTE — PLAN OF CARE
Goal Outcome Evaluation:  Plan of Care Reviewed With: patient        Progress: improving  Outcome Evaluation: 75 yo male seen following colostomy takedown on 12/10/24. Pt now POD1. PMH: a fib; perforated diverticulitis w/ colostomy, colon ca. At baseline, pt is completely independent for community ambulation w/o assistive device. Reports he walked all around [place], [place] just a week ago. Lives w/ wife in single level home w/ no stairs to enter. Today, pt is independent for all mobility. Demonstrates normal strength and ROM. Able to amb 400 ft well. No need for skilled PT. Instructed pt to ambulate around unit 4 x day until d/c. Informed RN. Recommend home w/ family at d/c. Will sign off.

## 2024-12-11 NOTE — PROGRESS NOTES
Colorectal Surgery Progress Note    Pt. Name/Age/:  Viktor Atkins   74 y.o.    1950         Med. Record Number:   [id number]  Date of admission:  12/10/2024      Service(s): Colorectal Surgery      Subjective    Doing well.  Reports some soreness over his incision   Denies nausea  Tolerating liquid diet   Afebrile and sinus     Objective  Vitals:     Patient Vitals for the past 24 hrs:   BP Temp Temp src Pulse Resp SpO2   24 0512 168/71 97.5 °F (36.4 °C) Oral 69 21 92 %   24 0017 169/80 97.6 °F (36.4 °C) Oral 68 20 93 %   12/10/24 2126 170/85 97.7 °F (36.5 °C) Oral 74 22 93 %   12/10/24 1537 161/87 97.6 °F (36.4 °C) Oral -- 21 --   12/10/24 1512 156/74 98.5 °F (36.9 °C) Oral 66 22 97 %   12/10/24 1425 161/76 -- -- 63 19 95 %   12/10/24 1325 154/79 -- -- 60 19 94 %   12/10/24 1310 155/76 -- -- 116 20 94 %   12/10/24 1255 141/73 -- -- 98 16 93 %   12/10/24 1240 155/71 -- -- 91 17 91 %   12/10/24 1225 152/73 -- -- 107 18 90 %   12/10/24 1210 159/71 -- -- 63 17 97 %   12/10/24 1155 151/66 -- -- 113 17 96 %   12/10/24 1140 156/73 -- -- 63 17 97 %   12/10/24 1135 158/74 -- -- 63 20 96 %   12/10/24 1130 161/77 -- -- 64 24 95 %   12/10/24 1125 167/75 97.6 °F (36.4 °C) Oral 65 20 93 %           Wt. Admission: Weight: 103 kg (227 lb 3.2 oz)     Wt. Current: Weight: 103 kg (227 lb 3.2 oz)   Body mass index is 31.69 kg/m².      I&O:  Intake/Output Summary (Last 24 hours) at 2024 0804  Last data filed at 2024 0512  Gross per 24 hour   Intake 1357.02 ml   Output 2200 ml   Net -842.98 ml      1901 -  0700  In: 1357 [I.V.:1257]  Out: 2200 [Urine:2100]      Exam  General:  No acute distress, resting comfortably.  Cardiovascular: regular rate.  Respiratory: no increased work of breathing.  Abdomen:  Soft, appropriate incisional tenderness, non-distended. No diffuse guarding or rebound tenderness. Incisions c/d/I.  Extremities: warm, well-perfused extremities, no pitting edema.      Labs:      [unfilled]          Scheduled Meds:acetaminophen, 1,000 mg, Oral, Q6H  alvimopan, 12 mg, Oral, BID  amiodarone, 200 mg, Oral, Daily  atorvastatin, 20 mg, Oral, Nightly  heparin (porcine), 5,000 Units, Subcutaneous, Q8H  methocarbamol, 750 mg, Oral, 4x Daily  metoprolol succinate XL, 50 mg, Oral, Daily  mirtazapine, 7.5 mg, Oral, Nightly  pantoprazole, 40 mg, Oral, Q AM  pregabalin, 25 mg, Oral, Q8H  sertraline, 25 mg, Oral, Daily  sodium chloride, 10 mL, Intravenous, Q12H      Continuous Infusions:insulin, 0-100 Units/hr      PRN Meds:.  dextrose    dextrose    glucagon (human recombinant)    HYDROmorphone **AND** naloxone    ondansetron    Potassium Replacement - Follow Nurse / BPA Driven Protocol    sodium chloride    sodium chloride          Assessment  74 y.o. male s/p colostomy closure     Plan / Recommendations    Doing well and recovering   - encouraged out of bed   - continue with incentive spirometry   - continue with meds PRN   - maldonado removed and voiding freely   - advance diet as tolerated. Currently on liquids.   - DVT prophylaxis ordered       08:04 EST; 12/11/2024        Aakash Burden MD  Colon and Rectal Surgery   [facility]

## 2024-12-11 NOTE — PLAN OF CARE
Goal Outcome Evaluation:      Pt tolerating clear liquid diet. Pt only has c/o soreness to abdomen. Dressing was reinforced last night. Call light in reach. Plan of care ongoing.

## 2024-12-11 NOTE — PLAN OF CARE
Goal Outcome Evaluation:      Patient had no complaints of pain. Diet advanced to Diabetic, tolerated well. Family remains at beside. Continue with care plan.

## 2024-12-11 NOTE — OP NOTE
[facility] Operative Note   Name: Viktor Atkins  : 1950    Date of Surgery: 12/10/2024  Pre-op Diagnosis: Colostomy   Post-op Diagnosis: same  Procedure: Laparoscopic colostomy reversal   Surgeon: Aakash Burden MD   Assistants: Carolyn June, RN CSA  was responsible for performing the following activities: Retraction, Suction, Irrigation, Suturing, Closing, and Placing Dressing and their skilled assistance was necessary for the success of this case  Anesthesia: General Endotracheal Anesthesia   IV Fluids: refer to anesthesia record  Estimated Blood Loss: minimal  Drains: none  Implants: none  Specimen: colostomy site   Findings   1. Colostomy closed with hand sewn end to end   2. No intra- abdominal lesions noted   3. Liver with chemotherapy associated cirrhosis   Complications   No complications  Indication  Viktor Atkins is a 74 y.o. who had underwent low anterior resection with diverting loop colostomy for upper rectal cancer.  He has completed his adjuvant chemotherapy.  His colonoscopy did not show any residual tumor.  His PET scan confirmed no residual disease.  He is here for an elective colostomy closure.      Description of Procedure      After appropriate consent including risks, benefits and alternatives was obtained, the patient was brought to the OR, and anesthesia was then administered. Patient was placed in lithotomy position. All pao prominences were padded, antibiotics were given, and scds placed. Both arms were tucked with adequate cushioning.  The patient was prepped and drapped in usual sterile fashion.The dissection took place by first taking down the colostomy. This was done by first using cautery to divide the mucocutaneous junction. This was done circumferentially down to the fascia. The bowel was then completely freed from the fascia.    The 2 limbs of the colon were completely mobilized off the abdominal wall.  Faisal wound protrector was placed.      I was not enough length on the  colon to do a side-to-side a tension-free anastomosis.  There was also significant amount of mesentery and fat that made it bulky and challenging for a side-to-side anastomosis.  As such I decided to do end-to-end handsewn anastomosis.  The 2 ends of the colon leading to the colostomy opening were resected and the stapled across using a blue load NORA stapler.  The 2 staple lines were aligned.    A running 2 oh V-Loc was used to approximate the 2 staple line and use that as a second Vicryl stitch.  A transverse colotomy was made in both proximal and distal colon 1 cm from the staple line.  2 separate V-Loc's were then used 1 to go cranially and 1 caudally in a running fashion and full-thickness.  As we were coming around towards the anterior front, I used a canal stitch using the same V-Loc in both side.  Completion of the anastomosis, the V-Loc's were used as a Lembert stitch for second layer.  The anastomosis was noted to be patent.  A significant care was used to make sure lumen was patent.  The anastomosis was then placed into the abdomen.    The area was suction irrigated multiple times.  The hernia sac was then excised        The laparoscopic camera was inserted and the abdomen explored.  There were no intra-abdominal adhesions.  The liver was noted to have chemotherapy associated liver cirrhosis.  There was no lesions to suggest residual disease recurrence was noted.    At this time, the surgeon and the assistant changed gown and gloves.  A clean closure tray was used.  Posterior layer was completely mobilized and created a flap beneath the rectus muscle.  The posterior layer was closed in a running 0 Vicryl stitches.  Local anesthetic was administered.  The anterior fascia was mobilized for tension-free closure.  The fascia was closed in multiple interrupted #1 PDS in a figure of 8.  The wound once again was suction irrigated multiple times.  A Penrose drain was placed in the cavity and the skin was closed  with skin stapler.  Dressing was applied and case was concluded.     Aakash Burden MD  Colon and Rectal Surgery   [id number] [place], [address]  T: [phone]

## 2024-12-12 LAB
GLUCOSE BLDC GLUCOMTR-MCNC: 118 MG/DL (ref 70–105)
GLUCOSE BLDC GLUCOMTR-MCNC: 83 MG/DL (ref 70–105)
GLUCOSE BLDC GLUCOMTR-MCNC: 86 MG/DL (ref 70–105)
GLUCOSE BLDC GLUCOMTR-MCNC: 93 MG/DL (ref 70–105)

## 2024-12-12 PROCEDURE — 25010000002 HEPARIN (PORCINE) PER 1000 UNITS: Performed by: STUDENT IN AN ORGANIZED HEALTH CARE EDUCATION/TRAINING PROGRAM

## 2024-12-12 PROCEDURE — 82948 REAGENT STRIP/BLOOD GLUCOSE: CPT

## 2024-12-12 PROCEDURE — 25010000002 ONDANSETRON PER 1 MG: Performed by: STUDENT IN AN ORGANIZED HEALTH CARE EDUCATION/TRAINING PROGRAM

## 2024-12-12 PROCEDURE — 99024 POSTOP FOLLOW-UP VISIT: CPT | Performed by: STUDENT IN AN ORGANIZED HEALTH CARE EDUCATION/TRAINING PROGRAM

## 2024-12-12 RX ADMIN — HEPARIN SODIUM 5000 UNITS: 5000 INJECTION INTRAVENOUS; SUBCUTANEOUS at 14:14

## 2024-12-12 RX ADMIN — ONDANSETRON 4 MG: 2 INJECTION INTRAMUSCULAR; INTRAVENOUS at 18:06

## 2024-12-12 RX ADMIN — MIRTAZAPINE 7.5 MG: 15 TABLET, FILM COATED ORAL at 20:36

## 2024-12-12 RX ADMIN — METHOCARBAMOL TABLETS 750 MG: 750 TABLET, COATED ORAL at 12:15

## 2024-12-12 RX ADMIN — AMIODARONE HYDROCHLORIDE 200 MG: 200 TABLET ORAL at 08:32

## 2024-12-12 RX ADMIN — Medication 10 ML: at 20:37

## 2024-12-12 RX ADMIN — ACETAMINOPHEN 1000 MG: 500 TABLET, FILM COATED ORAL at 12:15

## 2024-12-12 RX ADMIN — PREGABALIN 25 MG: 25 CAPSULE ORAL at 21:56

## 2024-12-12 RX ADMIN — METHOCARBAMOL TABLETS 750 MG: 750 TABLET, COATED ORAL at 08:33

## 2024-12-12 RX ADMIN — ACETAMINOPHEN 1000 MG: 500 TABLET, FILM COATED ORAL at 05:35

## 2024-12-12 RX ADMIN — ATORVASTATIN CALCIUM 20 MG: 20 TABLET, FILM COATED ORAL at 20:36

## 2024-12-12 RX ADMIN — ACETAMINOPHEN 1000 MG: 500 TABLET, FILM COATED ORAL at 01:16

## 2024-12-12 RX ADMIN — HEPARIN SODIUM 5000 UNITS: 5000 INJECTION INTRAVENOUS; SUBCUTANEOUS at 05:36

## 2024-12-12 RX ADMIN — Medication 10 ML: at 08:33

## 2024-12-12 RX ADMIN — SERTRALINE HYDROCHLORIDE 25 MG: 25 TABLET, FILM COATED ORAL at 08:32

## 2024-12-12 RX ADMIN — HEPARIN SODIUM 5000 UNITS: 5000 INJECTION INTRAVENOUS; SUBCUTANEOUS at 21:56

## 2024-12-12 RX ADMIN — ALVIMOPAN 12 MG: 12 CAPSULE ORAL at 08:32

## 2024-12-12 RX ADMIN — PREGABALIN 25 MG: 25 CAPSULE ORAL at 05:35

## 2024-12-12 RX ADMIN — METHOCARBAMOL TABLETS 750 MG: 750 TABLET, COATED ORAL at 18:06

## 2024-12-12 RX ADMIN — ACETAMINOPHEN 1000 MG: 500 TABLET, FILM COATED ORAL at 18:07

## 2024-12-12 RX ADMIN — PREGABALIN 25 MG: 25 CAPSULE ORAL at 14:14

## 2024-12-12 RX ADMIN — ACETAMINOPHEN 1000 MG: 500 TABLET, FILM COATED ORAL at 23:57

## 2024-12-12 RX ADMIN — METHOCARBAMOL TABLETS 750 MG: 750 TABLET, COATED ORAL at 20:36

## 2024-12-12 RX ADMIN — ALVIMOPAN 12 MG: 12 CAPSULE ORAL at 20:36

## 2024-12-12 RX ADMIN — METOPROLOL SUCCINATE 50 MG: 50 TABLET, EXTENDED RELEASE ORAL at 08:33

## 2024-12-12 RX ADMIN — PANTOPRAZOLE SODIUM 40 MG: 40 TABLET, DELAYED RELEASE ORAL at 05:35

## 2024-12-12 NOTE — PLAN OF CARE
Goal Outcome Evaluation: Pt A&Ox4. Able to make needs known and call light in reach, family remained at bedside throughout shift. Pt ambulated in hallway and up to chair and tolerated well. Care plan ongoing.

## 2024-12-12 NOTE — PROGRESS NOTES
Colorectal Surgery Progress Note    Pt. Name/Age/:  Viktor Atkins   74 y.o.    1950         Med. Record Number:   [id number]  Date of admission:  12/10/2024      Service(s): Colorectal Surgery      Subjective    Doing well.  Reports some soreness over his incision   Denies nausea  Tolerating liquid diet   Had BM but just mucous   Afebrile and sinus     Objective  Vitals:     Patient Vitals for the past 24 hrs:   BP Temp Temp src Pulse Resp SpO2   24 0800 153/73 98.3 °F (36.8 °C) Oral 67 16 92 %   24 0412 175/83 97.5 °F (36.4 °C) Oral 62 18 93 %   24 1953 153/79 98.9 °F (37.2 °C) Oral 55 18 94 %   24 1658 155/83 98.8 °F (37.1 °C) Oral 62 16 94 %           Wt. Admission: Weight: 103 kg (227 lb 3.2 oz)     Wt. Current: Weight: 103 kg (227 lb 3.2 oz)   Body mass index is 31.69 kg/m².      I&O:  Intake/Output Summary (Last 24 hours) at 2024 1556  Last data filed at 2024 1300  Gross per 24 hour   Intake 1760 ml   Output --   Net 1760 ml     12/10 1901 -  0700  In: 1660 [P.O.:1660]  Out: 1450 [Urine:1450]      Exam  General:  No acute distress, resting comfortably.  Cardiovascular: regular rate.  Respiratory: no increased work of breathing.  Abdomen:  Soft, appropriate incisional tenderness, non-distended. No diffuse guarding or rebound tenderness. Incisions c/d/I.  Extremities: warm, well-perfused extremities, no pitting edema.      Labs:     [unfilled]          Scheduled Meds:acetaminophen, 1,000 mg, Oral, Q6H  alvimopan, 12 mg, Oral, BID  amiodarone, 200 mg, Oral, Daily  atorvastatin, 20 mg, Oral, Nightly  heparin (porcine), 5,000 Units, Subcutaneous, Q8H  methocarbamol, 750 mg, Oral, 4x Daily  metoprolol succinate XL, 50 mg, Oral, Daily  mirtazapine, 7.5 mg, Oral, Nightly  pantoprazole, 40 mg, Oral, Q AM  pregabalin, 25 mg, Oral, Q8H  sertraline, 25 mg, Oral, Daily  sodium chloride, 10 mL, Intravenous, Q12H      Continuous Infusions:insulin, 0-100 Units/hr      PRN  Meds:.  dextrose    dextrose    glucagon (human recombinant)    HYDROmorphone **AND** naloxone    ondansetron    Potassium Replacement - Follow Nurse / BPA Driven Protocol    sodium chloride    sodium chloride          Assessment  74 y.o. male s/p colostomy closure     Plan / Recommendations    Doing well and recovering   - encouraged out of bed   - continue with incentive spirometry   - continue with meds PRN   - maldonado removed and voiding freely   - advance diet as tolerated. Currently on liquids.   - DVT prophylaxis ordered   - possible home tomorrow     15:56 EST; 12/12/2024        Aakash Burden MD  Colon and Rectal Surgery   [facility]

## 2024-12-12 NOTE — PLAN OF CARE
Goal Outcome Evaluation:      Patient alert and oriented x 4. Able to make needs known. Patient ambulates around unit independently, tolerates well. Belching, not not passing gas. No complaints of pain, personal items and call light in reach. Plan of care is ongoing.

## 2024-12-13 ENCOUNTER — ANESTHESIA (OUTPATIENT)
Dept: PERIOP | Facility: HOSPITAL | Age: 74
DRG: 331 | End: 2024-12-13
Payer: MEDICARE

## 2024-12-13 ENCOUNTER — APPOINTMENT (OUTPATIENT)
Dept: CT IMAGING | Facility: HOSPITAL | Age: 74
DRG: 331 | End: 2024-12-13
Payer: MEDICARE

## 2024-12-13 ENCOUNTER — APPOINTMENT (OUTPATIENT)
Dept: GENERAL RADIOLOGY | Facility: HOSPITAL | Age: 74
DRG: 331 | End: 2024-12-13
Payer: MEDICARE

## 2024-12-13 ENCOUNTER — ANESTHESIA EVENT (OUTPATIENT)
Dept: PERIOP | Facility: HOSPITAL | Age: 74
DRG: 331 | End: 2024-12-13
Payer: MEDICARE

## 2024-12-13 LAB
ABO GROUP BLD: NORMAL
ANION GAP SERPL CALCULATED.3IONS-SCNC: 17.6 MMOL/L (ref 5–15)
BASOPHILS # BLD AUTO: 0.02 10*3/MM3 (ref 0–0.2)
BASOPHILS NFR BLD AUTO: 0.1 % (ref 0–1.5)
BLD GP AB SCN SERPL QL: NEGATIVE
BUN SERPL-MCNC: 19 MG/DL (ref 8–23)
BUN/CREAT SERPL: 15.4 (ref 7–25)
CALCIUM SPEC-SCNC: 9.3 MG/DL (ref 8.6–10.5)
CHLORIDE SERPL-SCNC: 110 MMOL/L (ref 98–107)
CO2 SERPL-SCNC: 15.4 MMOL/L (ref 22–29)
CREAT SERPL-MCNC: 1.23 MG/DL (ref 0.76–1.27)
DEPRECATED RDW RBC AUTO: 52.6 FL (ref 37–54)
EGFRCR SERPLBLD CKD-EPI 2021: 62 ML/MIN/1.73
EOSINOPHIL # BLD AUTO: 0 10*3/MM3 (ref 0–0.4)
EOSINOPHIL NFR BLD AUTO: 0 % (ref 0.3–6.2)
ERYTHROCYTE [DISTWIDTH] IN BLOOD BY AUTOMATED COUNT: 17.7 % (ref 12.3–15.4)
GLUCOSE BLDC GLUCOMTR-MCNC: 149 MG/DL (ref 70–105)
GLUCOSE SERPL-MCNC: 187 MG/DL (ref 65–99)
HCT VFR BLD AUTO: 35 % (ref 37.5–51)
HGB BLD-MCNC: 10.4 G/DL (ref 13–17.7)
IMM GRANULOCYTES # BLD AUTO: 0.09 10*3/MM3 (ref 0–0.05)
IMM GRANULOCYTES NFR BLD AUTO: 0.6 % (ref 0–0.5)
LYMPHOCYTES # BLD AUTO: 2.51 10*3/MM3 (ref 0.7–3.1)
LYMPHOCYTES NFR BLD AUTO: 15.5 % (ref 19.6–45.3)
MCH RBC QN AUTO: 24.3 PG (ref 26.6–33)
MCHC RBC AUTO-ENTMCNC: 29.7 G/DL (ref 31.5–35.7)
MCV RBC AUTO: 81.8 FL (ref 79–97)
MONOCYTES # BLD AUTO: 0.74 10*3/MM3 (ref 0.1–0.9)
MONOCYTES NFR BLD AUTO: 4.6 % (ref 5–12)
NEUTROPHILS NFR BLD AUTO: 12.85 10*3/MM3 (ref 1.7–7)
NEUTROPHILS NFR BLD AUTO: 79.2 % (ref 42.7–76)
NRBC BLD AUTO-RTO: 0 /100 WBC (ref 0–0.2)
PLATELET # BLD AUTO: 306 10*3/MM3 (ref 140–450)
PMV BLD AUTO: 12.1 FL (ref 6–12)
POTASSIUM SERPL-SCNC: 3.9 MMOL/L (ref 3.5–5.2)
RBC # BLD AUTO: 4.28 10*6/MM3 (ref 4.14–5.8)
RH BLD: POSITIVE
SODIUM SERPL-SCNC: 143 MMOL/L (ref 136–145)
T&S EXPIRATION DATE: NORMAL
WBC NRBC COR # BLD AUTO: 16.21 10*3/MM3 (ref 3.4–10.8)

## 2024-12-13 PROCEDURE — 25010000002 HYDROMORPHONE 1 MG/ML SOLUTION: Performed by: STUDENT IN AN ORGANIZED HEALTH CARE EDUCATION/TRAINING PROGRAM

## 2024-12-13 PROCEDURE — 25510000001 IOPAMIDOL PER 1 ML: Performed by: STUDENT IN AN ORGANIZED HEALTH CARE EDUCATION/TRAINING PROGRAM

## 2024-12-13 PROCEDURE — 74018 RADEX ABDOMEN 1 VIEW: CPT

## 2024-12-13 PROCEDURE — 80048 BASIC METABOLIC PNL TOTAL CA: CPT | Performed by: STUDENT IN AN ORGANIZED HEALTH CARE EDUCATION/TRAINING PROGRAM

## 2024-12-13 PROCEDURE — 82948 REAGENT STRIP/BLOOD GLUCOSE: CPT

## 2024-12-13 PROCEDURE — 86901 BLOOD TYPING SEROLOGIC RH(D): CPT | Performed by: ANESTHESIOLOGY

## 2024-12-13 PROCEDURE — 86850 RBC ANTIBODY SCREEN: CPT | Performed by: ANESTHESIOLOGY

## 2024-12-13 PROCEDURE — 25010000002 ONDANSETRON PER 1 MG: Performed by: STUDENT IN AN ORGANIZED HEALTH CARE EDUCATION/TRAINING PROGRAM

## 2024-12-13 PROCEDURE — 86900 BLOOD TYPING SEROLOGIC ABO: CPT | Performed by: ANESTHESIOLOGY

## 2024-12-13 PROCEDURE — 85025 COMPLETE CBC W/AUTO DIFF WBC: CPT | Performed by: STUDENT IN AN ORGANIZED HEALTH CARE EDUCATION/TRAINING PROGRAM

## 2024-12-13 PROCEDURE — 25810000003 LACTATED RINGERS SOLUTION: Performed by: STUDENT IN AN ORGANIZED HEALTH CARE EDUCATION/TRAINING PROGRAM

## 2024-12-13 PROCEDURE — 74177 CT ABD & PELVIS W/CONTRAST: CPT

## 2024-12-13 RX ORDER — SODIUM PHOSPHATE,MONO-DIBASIC 19G-7G/197
1 ENEMA (ML) RECTAL ONCE
Status: DISCONTINUED | OUTPATIENT
Start: 2024-12-13 | End: 2024-12-13

## 2024-12-13 RX ORDER — HEPARIN SODIUM (PORCINE) LOCK FLUSH IV SOLN 100 UNIT/ML 100 UNIT/ML
5 SOLUTION INTRAVENOUS AS NEEDED
Status: DISCONTINUED | OUTPATIENT
Start: 2024-12-13 | End: 2024-12-13 | Stop reason: HOSPADM

## 2024-12-13 RX ORDER — IOPAMIDOL 755 MG/ML
100 INJECTION, SOLUTION INTRAVASCULAR
Status: COMPLETED | OUTPATIENT
Start: 2024-12-13 | End: 2024-12-13

## 2024-12-13 RX ORDER — SODIUM CHLORIDE, SODIUM LACTATE, POTASSIUM CHLORIDE, CALCIUM CHLORIDE 600; 310; 30; 20 MG/100ML; MG/100ML; MG/100ML; MG/100ML
50 INJECTION, SOLUTION INTRAVENOUS CONTINUOUS
Status: DISCONTINUED | OUTPATIENT
Start: 2024-12-13 | End: 2024-12-13

## 2024-12-13 RX ORDER — SODIUM CHLORIDE 0.9 % (FLUSH) 0.9 %
20 SYRINGE (ML) INJECTION AS NEEDED
Status: DISCONTINUED | OUTPATIENT
Start: 2024-12-13 | End: 2024-12-13 | Stop reason: HOSPADM

## 2024-12-13 RX ORDER — SODIUM CHLORIDE 9 MG/ML
40 INJECTION, SOLUTION INTRAVENOUS AS NEEDED
Status: DISCONTINUED | OUTPATIENT
Start: 2024-12-13 | End: 2024-12-13 | Stop reason: HOSPADM

## 2024-12-13 RX ORDER — SODIUM CHLORIDE 0.9 % (FLUSH) 0.9 %
10 SYRINGE (ML) INJECTION EVERY 12 HOURS SCHEDULED
Status: DISCONTINUED | OUTPATIENT
Start: 2024-12-13 | End: 2024-12-13 | Stop reason: HOSPADM

## 2024-12-13 RX ORDER — SODIUM CHLORIDE 0.9 % (FLUSH) 0.9 %
10 SYRINGE (ML) INJECTION AS NEEDED
Status: DISCONTINUED | OUTPATIENT
Start: 2024-12-13 | End: 2024-12-13 | Stop reason: HOSPADM

## 2024-12-13 RX ADMIN — METHOCARBAMOL TABLETS 750 MG: 750 TABLET, COATED ORAL at 22:02

## 2024-12-13 RX ADMIN — IOPAMIDOL 100 ML: 755 INJECTION, SOLUTION INTRAVENOUS at 14:11

## 2024-12-13 RX ADMIN — PANTOPRAZOLE SODIUM 40 MG: 40 TABLET, DELAYED RELEASE ORAL at 05:40

## 2024-12-13 RX ADMIN — PREGABALIN 25 MG: 25 CAPSULE ORAL at 05:40

## 2024-12-13 RX ADMIN — Medication 10 ML: at 08:08

## 2024-12-13 RX ADMIN — PREGABALIN 25 MG: 25 CAPSULE ORAL at 21:59

## 2024-12-13 RX ADMIN — ALVIMOPAN 12 MG: 12 CAPSULE ORAL at 08:09

## 2024-12-13 RX ADMIN — SERTRALINE HYDROCHLORIDE 25 MG: 25 TABLET, FILM COATED ORAL at 08:09

## 2024-12-13 RX ADMIN — HYDROMORPHONE HYDROCHLORIDE 0.25 MG: 1 INJECTION, SOLUTION INTRAMUSCULAR; INTRAVENOUS; SUBCUTANEOUS at 14:23

## 2024-12-13 RX ADMIN — PREGABALIN 25 MG: 25 CAPSULE ORAL at 14:20

## 2024-12-13 RX ADMIN — HYDROMORPHONE HYDROCHLORIDE 0.25 MG: 1 INJECTION, SOLUTION INTRAMUSCULAR; INTRAVENOUS; SUBCUTANEOUS at 07:22

## 2024-12-13 RX ADMIN — METHOCARBAMOL TABLETS 750 MG: 750 TABLET, COATED ORAL at 07:23

## 2024-12-13 RX ADMIN — ACETAMINOPHEN 1000 MG: 500 TABLET, FILM COATED ORAL at 11:48

## 2024-12-13 RX ADMIN — ACETAMINOPHEN 1000 MG: 500 TABLET, FILM COATED ORAL at 05:40

## 2024-12-13 RX ADMIN — Medication 10 ML: at 21:59

## 2024-12-13 RX ADMIN — METHOCARBAMOL TABLETS 750 MG: 750 TABLET, COATED ORAL at 11:48

## 2024-12-13 RX ADMIN — AMIODARONE HYDROCHLORIDE 200 MG: 200 TABLET ORAL at 08:09

## 2024-12-13 RX ADMIN — ATORVASTATIN CALCIUM 20 MG: 20 TABLET, FILM COATED ORAL at 21:59

## 2024-12-13 RX ADMIN — ONDANSETRON 4 MG: 2 INJECTION INTRAMUSCULAR; INTRAVENOUS at 05:38

## 2024-12-13 RX ADMIN — METOPROLOL SUCCINATE 50 MG: 50 TABLET, EXTENDED RELEASE ORAL at 08:09

## 2024-12-13 RX ADMIN — SODIUM CHLORIDE, SODIUM LACTATE, POTASSIUM CHLORIDE, AND CALCIUM CHLORIDE 500 ML: .6; .31; .03; .02 INJECTION, SOLUTION INTRAVENOUS at 15:56

## 2024-12-13 NOTE — PLAN OF CARE
Goal Outcome Evaluation:   Patient alert and oriented x 4. Able to make needs known. Patient complains of slight discomfort in abdomen. Some nausea on and off. Ambulates around room and unit independently, tolerates well. Personal items and call light in reach. Plan of care is ongoing.

## 2024-12-13 NOTE — ANESTHESIA PREPROCEDURE EVALUATION
Anesthesia Evaluation     Patient summary reviewed and Nursing notes reviewed   NPO Solid Status: > 8 hours  NPO Liquid Status: > 8 hours           Airway   Mallampati: II  TM distance: >3 FB  Neck ROM: full  No difficulty expected  Dental - normal exam     Pulmonary - normal exam   Cardiovascular - normal exam    ECG reviewed    (+) hypertension, dysrhythmias Paroxysmal Atrial Fib, CHF Systolic <55%, hyperlipidemia      Neuro/Psych  (+) psychiatric history  GI/Hepatic/Renal/Endo    (+) GERD, GI bleeding , diabetes mellitus    Musculoskeletal     Abdominal  - normal exam    Bowel sounds: normal.   Substance History      OB/GYN          Other   arthritis, blood dyscrasia anemia,   history of cancer    ROS/Med Hx Other: Sinus bradycardia  Incomplete left bundle branch block  Low voltage, extremity leads  No change from prior tracing      3/24   Myocardial perfusion imaging indicates a normal myocardial perfusion study with no evidence of ischemia. Impressions are consistent with a low risk study.  ·  Left ventricular ejection fraction is moderately reduced (Calculated EF = 32%).  ·  This is normal Cardiolite imaging stress test with no evidence of ischemia or myocardial infarction.  Small fixed apical defect is noted which is thought to be attenuation artifact left ventricle size and function is normal on gated SPECT imaging.  No wall motion abnormality was noted.  Clinical correlation recommended.  Further recommendation as per ordering physician. .  ·  Findings consistent with an equivocal ECG stress test.        ·  Left ventricular systolic function is low normal. Calculated left ventricular EF = 46% Left ventricular ejection fraction appears to be 46 - 50%.  ·  The left ventricular cavity is mildly dilated.  ·  Left ventricular diastolic dysfunction is noted.  ·  The left atrial cavity is dilated.  ·  Estimated right ventricular systolic pressure from tricuspid regurgitation is normal (<35 mmHg).  ·  Dilation of  the aortic root is present.  4.4 cm  ·  Patient in atrial fibrillation during this study.      MPRESSION:  Impression:  1.Severe dilation of the cecum, ascending colon and transverse colon with abrupt transition zone at the distal transverse colon at the level of surgical anastomosis. The findings are worrisome for obstruction at the level of the surgical anastomosis.   Surgical consultation is advised.  2.No evidence of small bowel obstruction.  3.Interval colostomy takedown. The drainage catheter is kinked within the subcutaneous fat at the takedown site.  4.Additional findings include: Bibasilar atelectasis, NG tube in the stomach, uncomplicated sigmoid diverticulosis, cholecystectomy, mild prostatic enlargement, small fat-containing right inguinal hernia, suspected severe canal stenosis at L4-5.                             Anesthesia Plan    ASA 4 - emergent     general     intravenous induction     Anesthetic plan, risks, benefits, and alternatives have been provided, discussed and informed consent has been obtained with: patient.    Plan discussed with CRNA.        CODE STATUS:    Level Of Support Discussed With: Patient  Code Status (Patient has no pulse and is not breathing): CPR (Attempt to Resuscitate)  Medical Interventions (Patient has pulse or is breathing): Full

## 2024-12-13 NOTE — PLAN OF CARE
Goal Outcome Evaluation:      Pt is able to make needs known. Pain is managed with medication regimen. Pt is able to ambulate with stand by assist. Education is complete, call light is in reach, and no other needs at this time. Continue to monitor.

## 2024-12-13 NOTE — CASE MANAGEMENT/SOCIAL WORK
Continued Stay Note  [facility]     Patient Name: Viktor Atkins  MRN: [id number]  Today's Date: 12/13/2024    Admit Date: 12/10/2024    Plan: Home with wife, Wife will transport at discharge   Discharge Plan       Row Name 12/13/24 1547       Plan    Plan Home with wife, Wife will transport at discharge    Plan Comments DC barriers: today 12/13/24 laparotomy exploratory, 12/10//24 ostomy reversal ,  toleration of diet and return of bowel function   Plan is to discharge home with wife.                   Avani Goins RN    [facility]  [email or web address]  Office [phone]  Cell [phone]

## 2024-12-13 NOTE — NURSING NOTE
Patient states he has started vomiting a small amount of green substance, and that he has noticed his incision has became a little bloody after vomiting. Dressing on abdomen reinforced. Provider notified. Awaiting response.

## 2024-12-14 ENCOUNTER — APPOINTMENT (OUTPATIENT)
Dept: GENERAL RADIOLOGY | Facility: HOSPITAL | Age: 74
DRG: 331 | End: 2024-12-14
Payer: MEDICARE

## 2024-12-14 LAB — GLUCOSE BLDC GLUCOMTR-MCNC: 133 MG/DL (ref 70–105)

## 2024-12-14 PROCEDURE — 99024 POSTOP FOLLOW-UP VISIT: CPT | Performed by: STUDENT IN AN ORGANIZED HEALTH CARE EDUCATION/TRAINING PROGRAM

## 2024-12-14 PROCEDURE — 74018 RADEX ABDOMEN 1 VIEW: CPT

## 2024-12-14 PROCEDURE — 82948 REAGENT STRIP/BLOOD GLUCOSE: CPT

## 2024-12-14 PROCEDURE — 93010 ELECTROCARDIOGRAM REPORT: CPT | Performed by: STUDENT IN AN ORGANIZED HEALTH CARE EDUCATION/TRAINING PROGRAM

## 2024-12-14 PROCEDURE — 93005 ELECTROCARDIOGRAM TRACING: CPT | Performed by: STUDENT IN AN ORGANIZED HEALTH CARE EDUCATION/TRAINING PROGRAM

## 2024-12-14 RX ORDER — METOPROLOL TARTRATE 1 MG/ML
5 INJECTION, SOLUTION INTRAVENOUS EVERY 6 HOURS SCHEDULED
Status: DISCONTINUED | OUTPATIENT
Start: 2024-12-15 | End: 2024-12-17 | Stop reason: HOSPADM

## 2024-12-14 RX ADMIN — ACETAMINOPHEN 1000 MG: 500 TABLET, FILM COATED ORAL at 12:19

## 2024-12-14 RX ADMIN — METHOCARBAMOL TABLETS 750 MG: 750 TABLET, COATED ORAL at 12:19

## 2024-12-14 RX ADMIN — ATORVASTATIN CALCIUM 20 MG: 20 TABLET, FILM COATED ORAL at 20:59

## 2024-12-14 RX ADMIN — Medication 10 ML: at 08:54

## 2024-12-14 RX ADMIN — PANTOPRAZOLE SODIUM 40 MG: 40 TABLET, DELAYED RELEASE ORAL at 05:41

## 2024-12-14 RX ADMIN — SERTRALINE HYDROCHLORIDE 25 MG: 25 TABLET, FILM COATED ORAL at 08:52

## 2024-12-14 RX ADMIN — PREGABALIN 25 MG: 25 CAPSULE ORAL at 20:59

## 2024-12-14 RX ADMIN — METOPROLOL TARTRATE 5 MG: 1 INJECTION, SOLUTION INTRAVENOUS at 22:45

## 2024-12-14 RX ADMIN — PREGABALIN 25 MG: 25 CAPSULE ORAL at 14:33

## 2024-12-14 RX ADMIN — PREGABALIN 25 MG: 25 CAPSULE ORAL at 05:41

## 2024-12-14 RX ADMIN — METOPROLOL SUCCINATE 50 MG: 50 TABLET, EXTENDED RELEASE ORAL at 08:52

## 2024-12-14 RX ADMIN — METHOCARBAMOL TABLETS 750 MG: 750 TABLET, COATED ORAL at 20:59

## 2024-12-14 RX ADMIN — METHOCARBAMOL TABLETS 750 MG: 750 TABLET, COATED ORAL at 17:00

## 2024-12-14 RX ADMIN — METHOCARBAMOL TABLETS 750 MG: 750 TABLET, COATED ORAL at 08:52

## 2024-12-14 RX ADMIN — AMIODARONE HYDROCHLORIDE 200 MG: 200 TABLET ORAL at 08:52

## 2024-12-14 RX ADMIN — ACETAMINOPHEN 1000 MG: 500 TABLET, FILM COATED ORAL at 05:40

## 2024-12-14 RX ADMIN — ACETAMINOPHEN 1000 MG: 500 TABLET, FILM COATED ORAL at 17:00

## 2024-12-14 RX ADMIN — Medication 10 ML: at 20:59

## 2024-12-14 NOTE — PROGRESS NOTES
Colorectal Surgery Progress Note    Pt. Name/Age/:  Viktor Atkins   74 y.o.    1950         Med. Record Number:   [id number]  Date of admission:  12/10/2024      Service(s): Colorectal Surgery      Subjective    Reports some flatus and multiple BM last night   Reports feeling better   Denies nausea or burping   NGT with clear gastric output   Remain sinus and normotensive, afebrile     Objective  Vitals:     Patient Vitals for the past 24 hrs:   BP Temp Temp src Pulse Resp SpO2   24 0852 165/78 -- -- 81 -- --   24 0503 149/82 98.3 °F (36.8 °C) Oral 89 22 94 %   24 127/75 99.2 °F (37.3 °C) Axillary 98 25 92 %   24 1707 170/91 98 °F (36.7 °C) Oral -- (!) 29 --   24 1222 157/91 97.7 °F (36.5 °C) Oral 100 20 92 %           Wt. Admission: Weight: 103 kg (227 lb 3.2 oz)     Wt. Current: Weight: 103 kg (227 lb 3.2 oz)   Body mass index is 31.69 kg/m².      I&O:  Intake/Output Summary (Last 24 hours) at 2024 0911  Last data filed at 2024 0540  Gross per 24 hour   Intake 1050 ml   Output 4654 ml   Net -3604 ml      1901 -  0700  In: 1830 [P.O.:780]  Out: 4654       Exam  General:  No acute distress, resting comfortably.  Cardiovascular: regular rate.  Respiratory: no increased work of breathing.  Abdomen:  Soft, appropriate incisional tenderness, distended, non tender . No diffuse guarding or rebound tenderness. Incisions c/d/I.  Extremities: warm, well-perfused extremities, no pitting edema.      Labs:     [unfilled]          Scheduled Meds:acetaminophen, 1,000 mg, Oral, Q6H  amiodarone, 200 mg, Oral, Daily  atorvastatin, 20 mg, Oral, Nightly  heparin (porcine), 5,000 Units, Subcutaneous, Q8H  methocarbamol, 750 mg, Oral, 4x Daily  metoprolol succinate XL, 50 mg, Oral, Daily  pantoprazole, 40 mg, Oral, Q AM  pregabalin, 25 mg, Oral, Q8H  sertraline, 25 mg, Oral, Daily  sodium chloride, 10 mL, Intravenous, Q12H      Continuous Infusions:insulin, 0-100  Units/hr      PRN Meds:.  dextrose    dextrose    glucagon (human recombinant)    ondansetron    Potassium Replacement - Follow Nurse / BPA Driven Protocol    sodium chloride    sodium chloride          Assessment  74 y.o. male s/p colostomy closure     Plan / Recommendations    - multiple BM and flatus is encouraging, still appears distended  - we will get KUB today   - likely NGT will come out tomorrow if he continues to do well   - continue with DVT prophylaxis   - continue with fluids, I anticipate will start diet once NGT is removed   - will continue with close observation     09:11 EST; 12/14/2024        Aakash Burden MD  Colon and Rectal Surgery   [facility]

## 2024-12-14 NOTE — PLAN OF CARE
Goal Outcome Evaluation:  Patient has ambulated x 2 around unit, also had 3 loose bm's, no complaints of pain voiced, NG remains to LIWS thus far only 400 cc's output, mostly clear in color

## 2024-12-14 NOTE — PLAN OF CARE
Goal Outcome Evaluation:           Progress: improving        Pt alert and able to make needs known. Pt VS stable. Pt with no complaints of pain this shift. NGT in place and draining clear., personal items and call light within reach. Plan of care ongoing.

## 2024-12-15 LAB
GLUCOSE BLDC GLUCOMTR-MCNC: 120 MG/DL (ref 70–105)
GLUCOSE BLDC GLUCOMTR-MCNC: 147 MG/DL (ref 70–105)

## 2024-12-15 PROCEDURE — 82948 REAGENT STRIP/BLOOD GLUCOSE: CPT

## 2024-12-15 PROCEDURE — 99024 POSTOP FOLLOW-UP VISIT: CPT | Performed by: STUDENT IN AN ORGANIZED HEALTH CARE EDUCATION/TRAINING PROGRAM

## 2024-12-15 RX ORDER — POLYETHYLENE GLYCOL 3350 17 G/17G
17 POWDER, FOR SOLUTION ORAL DAILY
Status: DISCONTINUED | OUTPATIENT
Start: 2024-12-15 | End: 2024-12-17 | Stop reason: HOSPADM

## 2024-12-15 RX ADMIN — METOPROLOL TARTRATE 5 MG: 1 INJECTION, SOLUTION INTRAVENOUS at 17:43

## 2024-12-15 RX ADMIN — ACETAMINOPHEN 1000 MG: 500 TABLET, FILM COATED ORAL at 17:43

## 2024-12-15 RX ADMIN — AMIODARONE HYDROCHLORIDE 200 MG: 200 TABLET ORAL at 08:22

## 2024-12-15 RX ADMIN — METOPROLOL TARTRATE 5 MG: 1 INJECTION, SOLUTION INTRAVENOUS at 11:34

## 2024-12-15 RX ADMIN — ACETAMINOPHEN 1000 MG: 500 TABLET, FILM COATED ORAL at 05:53

## 2024-12-15 RX ADMIN — SERTRALINE HYDROCHLORIDE 25 MG: 25 TABLET, FILM COATED ORAL at 08:22

## 2024-12-15 RX ADMIN — PREGABALIN 25 MG: 25 CAPSULE ORAL at 05:53

## 2024-12-15 RX ADMIN — Medication 10 ML: at 20:47

## 2024-12-15 RX ADMIN — ATORVASTATIN CALCIUM 20 MG: 20 TABLET, FILM COATED ORAL at 20:46

## 2024-12-15 RX ADMIN — PREGABALIN 25 MG: 25 CAPSULE ORAL at 20:46

## 2024-12-15 RX ADMIN — METHOCARBAMOL TABLETS 750 MG: 750 TABLET, COATED ORAL at 08:22

## 2024-12-15 RX ADMIN — METHOCARBAMOL TABLETS 750 MG: 750 TABLET, COATED ORAL at 20:46

## 2024-12-15 RX ADMIN — METOPROLOL TARTRATE 5 MG: 1 INJECTION, SOLUTION INTRAVENOUS at 05:54

## 2024-12-15 RX ADMIN — PANTOPRAZOLE SODIUM 40 MG: 40 TABLET, DELAYED RELEASE ORAL at 05:53

## 2024-12-15 RX ADMIN — ACETAMINOPHEN 1000 MG: 500 TABLET, FILM COATED ORAL at 11:34

## 2024-12-15 RX ADMIN — METHOCARBAMOL TABLETS 750 MG: 750 TABLET, COATED ORAL at 17:43

## 2024-12-15 RX ADMIN — POLYETHYLENE GLYCOL 3350 17 G: 17 POWDER, FOR SOLUTION ORAL at 11:34

## 2024-12-15 RX ADMIN — METHOCARBAMOL TABLETS 750 MG: 750 TABLET, COATED ORAL at 11:34

## 2024-12-15 RX ADMIN — Medication 10 ML: at 08:22

## 2024-12-15 RX ADMIN — PREGABALIN 25 MG: 25 CAPSULE ORAL at 14:10

## 2024-12-15 NOTE — PLAN OF CARE
Goal Outcome Evaluation:  Patient noted with rhythm change on monitor at approximately 2145 went from sinus to afib to afib with RVR, would not sustain but for a few seconds then convert back for a period of time, due to change in rhythm EKG ordered, showed afib with rvr at a 150 bpm rate immediately after EKG obtained pt converted back to afib at 99 bpm, spoke with Dr. Burden, new orders to discontinue po Metoprolol and change to 5mg IV every 6 hours routinely. Metoprolol administered and patient now back in NSR with rate at 88 bpm. Patient remains with NG at [facility] with ice chips/sips. Pt has had 2 bm's thus far this shift.

## 2024-12-15 NOTE — PLAN OF CARE
Goal Outcome Evaluation:         Pt is able to make needs known. No c/o pain throughout shift. Pt is able to ambulate with stand by assist. Bath and linen change is complete. Dressing has been changed. Education is complete, call light is in reach, and no other needs at this time. Continue to monitor.

## 2024-12-15 NOTE — PROGRESS NOTES
Colorectal Surgery Progress Note    Pt. Name/Age/:  Viktor Atkins   74 y.o.    1950         Med. Record Number:   [id number]  Date of admission:  12/10/2024      Service(s): Colorectal Surgery      Subjective    Patient had multiple BM yesterday and overnight, reports passing flatus   Reports feeling better   Denies nausea or burping   NGT with clear gastric output     He was in afib with RVR last night. His metoprolol was changed to IV. He converted to sinus.     Objective  Vitals:     Patient Vitals for the past 24 hrs:   BP Temp Temp src Pulse Resp SpO2   12/15/24 0740 163/82 98.1 °F (36.7 °C) Oral 74 14 95 %   12/15/24 0514 159/95 98.5 °F (36.9 °C) Oral 86 22 94 %   12/15/24 0100 -- -- -- 89 -- --   24 2300 -- -- -- 76 -- --   24 2245 -- -- -- (!) 150 -- --   24 2112 164/83 97.9 °F (36.6 °C) Oral 99 -- --   24 2100 -- -- -- 108 -- --   24 1900 -- -- -- 97 -- --   24 1245 145/83 98.6 °F (37 °C) Oral -- 19 --   24 0852 165/78 -- -- 81 -- --           Wt. Admission: Weight: 103 kg (227 lb 3.2 oz)     Wt. Current: Weight: 103 kg (227 lb 3.2 oz)   Body mass index is 31.69 kg/m².      I&O:  Intake/Output Summary (Last 24 hours) at 12/15/2024 0756  Last data filed at 2024 1728  Gross per 24 hour   Intake 40 ml   Output 1800 ml   Net -1760 ml     1901 - 12/15 0700  In: 40 [P.O.:40]  Out: 2204       Exam  General:  No acute distress, resting comfortably.  Cardiovascular: regular rate.  Respiratory: no increased work of breathing.  Abdomen:  Soft, appropriate incisional tenderness, distended, non tender . No diffuse guarding or rebound tenderness. Incisions c/d/I.  Extremities: warm, well-perfused extremities, no pitting edema.      Labs:     [unfilled]          Scheduled Meds:acetaminophen, 1,000 mg, Oral, Q6H  amiodarone, 200 mg, Oral, Daily  atorvastatin, 20 mg, Oral, Nightly  heparin (porcine), 5,000 Units, Subcutaneous, Q8H  methocarbamol, 750 mg, Oral,  4x Daily  metoprolol tartrate, 5 mg, Intravenous, Q6H  pantoprazole, 40 mg, Oral, Q AM  pregabalin, 25 mg, Oral, Q8H  sertraline, 25 mg, Oral, Daily  sodium chloride, 10 mL, Intravenous, Q12H      Continuous Infusions:insulin, 0-100 Units/hr      PRN Meds:.  dextrose    dextrose    glucagon (human recombinant)    ondansetron    Potassium Replacement - Follow Nurse / BPA Driven Protocol    sodium chloride    sodium chloride          Assessment  74 y.o. male s/p colostomy closure     Plan / Recommendations    - multiple BM and flatus is encouraging  - discontinue NGT, we will start clear liquid and slowly advance.   - continue with DVT prophylaxis   - continue with fluids until diet started   - will continue with close observation     07:56 EST; 12/15/2024        Aakash Burden MD  Colon and Rectal Surgery   [facility]

## 2024-12-16 LAB
GLUCOSE BLDC GLUCOMTR-MCNC: 102 MG/DL (ref 70–105)
GLUCOSE BLDC GLUCOMTR-MCNC: 103 MG/DL (ref 70–105)
GLUCOSE BLDC GLUCOMTR-MCNC: 113 MG/DL (ref 70–105)
GLUCOSE BLDC GLUCOMTR-MCNC: 114 MG/DL (ref 70–105)

## 2024-12-16 PROCEDURE — 99024 POSTOP FOLLOW-UP VISIT: CPT | Performed by: STUDENT IN AN ORGANIZED HEALTH CARE EDUCATION/TRAINING PROGRAM

## 2024-12-16 PROCEDURE — 82948 REAGENT STRIP/BLOOD GLUCOSE: CPT

## 2024-12-16 PROCEDURE — 25010000002 HEPARIN (PORCINE) PER 1000 UNITS: Performed by: STUDENT IN AN ORGANIZED HEALTH CARE EDUCATION/TRAINING PROGRAM

## 2024-12-16 RX ADMIN — ATORVASTATIN CALCIUM 20 MG: 20 TABLET, FILM COATED ORAL at 20:51

## 2024-12-16 RX ADMIN — METHOCARBAMOL TABLETS 750 MG: 750 TABLET, COATED ORAL at 09:08

## 2024-12-16 RX ADMIN — METHOCARBAMOL TABLETS 750 MG: 750 TABLET, COATED ORAL at 17:32

## 2024-12-16 RX ADMIN — ACETAMINOPHEN 1000 MG: 500 TABLET, FILM COATED ORAL at 17:32

## 2024-12-16 RX ADMIN — PANTOPRAZOLE SODIUM 40 MG: 40 TABLET, DELAYED RELEASE ORAL at 05:29

## 2024-12-16 RX ADMIN — PREGABALIN 25 MG: 25 CAPSULE ORAL at 14:03

## 2024-12-16 RX ADMIN — METHOCARBAMOL TABLETS 750 MG: 750 TABLET, COATED ORAL at 11:33

## 2024-12-16 RX ADMIN — METOPROLOL TARTRATE 5 MG: 1 INJECTION, SOLUTION INTRAVENOUS at 17:32

## 2024-12-16 RX ADMIN — METHOCARBAMOL TABLETS 750 MG: 750 TABLET, COATED ORAL at 20:52

## 2024-12-16 RX ADMIN — Medication 10 ML: at 09:08

## 2024-12-16 RX ADMIN — POLYETHYLENE GLYCOL 3350 17 G: 17 POWDER, FOR SOLUTION ORAL at 09:08

## 2024-12-16 RX ADMIN — HEPARIN SODIUM 5000 UNITS: 5000 INJECTION INTRAVENOUS; SUBCUTANEOUS at 20:54

## 2024-12-16 RX ADMIN — ACETAMINOPHEN 1000 MG: 500 TABLET, FILM COATED ORAL at 05:29

## 2024-12-16 RX ADMIN — Medication 10 ML: at 20:52

## 2024-12-16 RX ADMIN — ACETAMINOPHEN 1000 MG: 500 TABLET, FILM COATED ORAL at 11:33

## 2024-12-16 RX ADMIN — PREGABALIN 25 MG: 25 CAPSULE ORAL at 05:29

## 2024-12-16 RX ADMIN — METOPROLOL TARTRATE 5 MG: 1 INJECTION, SOLUTION INTRAVENOUS at 05:29

## 2024-12-16 RX ADMIN — PREGABALIN 25 MG: 25 CAPSULE ORAL at 20:54

## 2024-12-16 RX ADMIN — METOPROLOL TARTRATE 5 MG: 1 INJECTION, SOLUTION INTRAVENOUS at 11:33

## 2024-12-16 RX ADMIN — AMIODARONE HYDROCHLORIDE 200 MG: 200 TABLET ORAL at 09:08

## 2024-12-16 RX ADMIN — SERTRALINE HYDROCHLORIDE 25 MG: 25 TABLET, FILM COATED ORAL at 09:08

## 2024-12-16 NOTE — PLAN OF CARE
Goal Outcome Evaluation:        Problem: Adult Inpatient Plan of Care  Goal: Plan of Care Review  Outcome: Progressing  Goal: Patient-Specific Goal (Individualized)  Outcome: Progressing  Goal: Absence of Hospital-Acquired Illness or Injury  Outcome: Progressing  Intervention: Identify and Manage Fall Risk  Recent Flowsheet Documentation  Taken 12/16/2024 1251 by Viri Holden RN  Safety Promotion/Fall Prevention:   safety round/check completed   room organization consistent   nonskid shoes/slippers when out of bed   clutter free environment maintained   activity supervised  Taken 12/16/2024 1000 by Viri Holden RN  Safety Promotion/Fall Prevention:   safety round/check completed   room organization consistent   nonskid shoes/slippers when out of bed   clutter free environment maintained   activity supervised  Taken 12/16/2024 0909 by Viri Holden RN  Safety Promotion/Fall Prevention:   safety round/check completed   room organization consistent   nonskid shoes/slippers when out of bed   clutter free environment maintained   activity supervised  Taken 12/16/2024 0800 by Viri Holden RN  Safety Promotion/Fall Prevention:   safety round/check completed   room organization consistent   nonskid shoes/slippers when out of bed   clutter free environment maintained   activity supervised  Intervention: Prevent Skin Injury  Recent Flowsheet Documentation  Taken 12/16/2024 0909 by Viri Holden RN  Body Position: position changed independently  Skin Protection:   incontinence pads utilized   silicone foam dressing in place   transparent dressing maintained  Intervention: Prevent Infection  Recent Flowsheet Documentation  Taken 12/16/2024 0909 by Viri Holden RN  Infection Prevention:   visitors restricted/screened   single patient room provided   rest/sleep promoted   personal protective equipment utilized   hand hygiene promoted  Goal: Optimal Comfort and Wellbeing  Outcome: Progressing  Intervention: Monitor Pain and  Promote Comfort  Recent Flowsheet Documentation  Taken 12/16/2024 1251 by Viri Holden RN  Pain Management Interventions: quiet environment facilitated  Taken 12/16/2024 0909 by Viri Holden RN  Pain Management Interventions:   quiet environment facilitated   relaxation techniques promoted   care clustered  Intervention: Provide Person-Centered Care  Recent Flowsheet Documentation  Taken 12/16/2024 0909 by Viri Holden RN  Trust Relationship/Rapport:   care explained   thoughts/feelings acknowledged   questions answered  Goal: Readiness for Transition of Care  Outcome: Progressing     Problem: Comorbidity Management  Goal: Maintenance of Behavioral Health Symptom Control  Outcome: Progressing  Intervention: Maintain Behavioral Health Symptom Control  Recent Flowsheet Documentation  Taken 12/16/2024 1251 by Viri Holden RN  Medication Review/Management: medications reviewed  Taken 12/16/2024 1000 by Viri Holden RN  Medication Review/Management: medications reviewed  Taken 12/16/2024 0909 by Viri Holden RN  Medication Review/Management: medications reviewed  Taken 12/16/2024 0800 by Viri Holden RN  Medication Review/Management: medications reviewed  Goal: Blood Pressure in Desired Range  Outcome: Progressing  Intervention: Maintain Blood Pressure Management  Recent Flowsheet Documentation  Taken 12/16/2024 1251 by Viri Holden RN  Medication Review/Management: medications reviewed  Taken 12/16/2024 1000 by Viri Holden RN  Medication Review/Management: medications reviewed  Taken 12/16/2024 0909 by Viri Holden RN  Medication Review/Management: medications reviewed  Taken 12/16/2024 0800 by Viri Holden RN  Medication Review/Management: medications reviewed     Problem: Fall Injury Risk  Goal: Absence of Fall and Fall-Related Injury  Outcome: Progressing  Intervention: Identify and Manage Contributors  Recent Flowsheet Documentation  Taken 12/16/2024 1251 by Viri Holden RN  Medication  Review/Management: medications reviewed  Taken 12/16/2024 1000 by Viri Holden RN  Medication Review/Management: medications reviewed  Taken 12/16/2024 0909 by Viri Holden RN  Medication Review/Management: medications reviewed  Taken 12/16/2024 0800 by Viri Holden RN  Medication Review/Management: medications reviewed  Intervention: Promote Injury-Free Environment  Recent Flowsheet Documentation  Taken 12/16/2024 1251 by Viri Holden RN  Safety Promotion/Fall Prevention:   safety round/check completed   room organization consistent   nonskid shoes/slippers when out of bed   clutter free environment maintained   activity supervised  Taken 12/16/2024 1000 by Viri Holden RN  Safety Promotion/Fall Prevention:   safety round/check completed   room organization consistent   nonskid shoes/slippers when out of bed   clutter free environment maintained   activity supervised  Taken 12/16/2024 0909 by Viri Holden RN  Safety Promotion/Fall Prevention:   safety round/check completed   room organization consistent   nonskid shoes/slippers when out of bed   clutter free environment maintained   activity supervised  Taken 12/16/2024 0800 by Viri Holden RN  Safety Promotion/Fall Prevention:   safety round/check completed   room organization consistent   nonskid shoes/slippers when out of bed   clutter free environment maintained   activity supervised     Problem: Sepsis/Septic Shock  Goal: Optimal Coping  Outcome: Progressing  Intervention: Support Patient and Family Response  Recent Flowsheet Documentation  Taken 12/16/2024 0909 by Viri Holden RN  Family/Support System Care: self-care encouraged  Goal: Absence of Bleeding  Outcome: Progressing  Intervention: Monitor and Manage Bleeding  Recent Flowsheet Documentation  Taken 12/16/2024 0909 by Viri Holden RN  Bleeding Management: dressing monitored  Goal: Blood Glucose Level Within Target Range  Outcome: Progressing  Goal: Absence of Infection Signs and  Symptoms  Outcome: Progressing  Intervention: Initiate Sepsis Management  Recent Flowsheet Documentation  Taken 12/16/2024 0909 by Viri Holden, RN  Infection Prevention:   visitors restricted/screened   single patient room provided   rest/sleep promoted   personal protective equipment utilized   hand hygiene promoted  Intervention: Promote Recovery  Recent Flowsheet Documentation  Taken 12/16/2024 1000 by Viri Holden, RN  Activity Management: up in chair  Taken 12/16/2024 0909 by Viri Holden, RN  Activity Management:   up in chair   up ad laney  Goal: Optimal Nutrition Delivery  Outcome: Progressing

## 2024-12-16 NOTE — CASE MANAGEMENT/SOCIAL WORK
Continued Stay Note   [facility]     Patient Name: Viktor Atkins  MRN: [id number]  Today's Date: 12/16/2024    Admit Date: 12/10/2024    Plan: Home with wife, Wife will transport at discharge   Discharge Plan       Row Name 12/16/24 1706       Plan    Plan Home with wife, Wife will transport at discharge    Plan Comments DC barriers: today 12/13/24 laparotomy exploratory, 12/10//24 ostomy reversal , toleration of diet. Plan is to discharge home with wife.                      Expected Discharge Date and Time       Expected Discharge Date Expected Discharge Time    Dec 17, 2024           Avani Goins RN    [facility] 1  [email or web address]  Office [phone]  Cell [phone]

## 2024-12-16 NOTE — PROGRESS NOTES
Colorectal Surgery Progress Note    Pt. Name/Age/:  Viktor Atkins   74 y.o.    1950         Med. Record Number:   [id number]  Date of admission:  12/10/2024      Service(s): Colorectal Surgery      Subjective    Patient had multiple BM yesterday and overnight, reports passing flatus   Reports feeling better   Denies nausea or burping   Tolerated liquid diet     Objective  Vitals:     Patient Vitals for the past 24 hrs:   BP Temp Temp src Pulse Resp SpO2   24 1133 147/70 -- -- 74 -- --   24 0908 -- -- -- 69 -- --   24 0529 -- -- -- 85 -- --   24 0405 159/87 98.2 °F (36.8 °C) Oral 72 18 95 %   24 0028 -- -- -- 62 -- 94 %   12/15/24 2027 164/80 98 °F (36.7 °C) Oral 68 21 99 %   12/15/24 1743 -- -- -- 78 -- --           Wt. Admission: Weight: 103 kg (227 lb 3.2 oz)     Wt. Current: Weight: 103 kg (227 lb 3.2 oz)   Body mass index is 31.69 kg/m².      I&O:  Intake/Output Summary (Last 24 hours) at 2024 1155  Last data filed at 12/15/2024 2027  Gross per 24 hour   Intake 360 ml   Output --   Net 360 ml      1901 -  0700  In: 1080 [P.O.:1080]  Out: -       Exam  General:  No acute distress, resting comfortably.  Cardiovascular: regular rate.  Respiratory: no increased work of breathing.  Abdomen:  Soft, appropriate incisional tenderness, distended, non tender . No diffuse guarding or rebound tenderness. Incisions c/d/I.  Extremities: warm, well-perfused extremities, no pitting edema.      Labs:     [unfilled]          Scheduled Meds:acetaminophen, 1,000 mg, Oral, Q6H  amiodarone, 200 mg, Oral, Daily  atorvastatin, 20 mg, Oral, Nightly  heparin (porcine), 5,000 Units, Subcutaneous, Q8H  methocarbamol, 750 mg, Oral, 4x Daily  metoprolol tartrate, 5 mg, Intravenous, Q6H  pantoprazole, 40 mg, Oral, Q AM  polyethylene glycol, 17 g, Oral, Daily  pregabalin, 25 mg, Oral, Q8H  sertraline, 25 mg, Oral, Daily  sodium chloride, 10 mL, Intravenous, Q12H      Continuous  Infusions:insulin, 0-100 Units/hr      PRN Meds:.  dextrose    dextrose    glucagon (human recombinant)    ondansetron    Potassium Replacement - Follow Nurse / BPA Driven Protocol    sodium chloride    sodium chloride          Assessment  74 y.o. male s/p colostomy closure     Plan / Recommendations    - advance to soft diet  - if tolerates soft diet, we will plan for discharge tomorrow   - continue with ERAS      11:55 EST; 12/16/2024        Aakash Burden MD  Colon and Rectal Surgery   [facility]

## 2024-12-16 NOTE — PLAN OF CARE
Goal Outcome Evaluation:  Patient tolerating clear liquid diet without issue, still continues to have bm's, states now more consistency in nature, bowel sounds more active

## 2024-12-17 ENCOUNTER — READMISSION MANAGEMENT (OUTPATIENT)
Dept: CALL CENTER | Facility: HOSPITAL | Age: 74
End: 2024-12-17
Payer: MEDICARE

## 2024-12-17 VITALS
TEMPERATURE: 98.7 F | SYSTOLIC BLOOD PRESSURE: 150 MMHG | HEART RATE: 58 BPM | HEIGHT: 71 IN | DIASTOLIC BLOOD PRESSURE: 58 MMHG | BODY MASS INDEX: 31.81 KG/M2 | RESPIRATION RATE: 20 BRPM | OXYGEN SATURATION: 95 % | WEIGHT: 227.2 LBS

## 2024-12-17 LAB
GLUCOSE BLDC GLUCOMTR-MCNC: 93 MG/DL (ref 70–105)
QT INTERVAL: 335 MS
QTC INTERVAL: 534 MS

## 2024-12-17 PROCEDURE — 99024 POSTOP FOLLOW-UP VISIT: CPT | Performed by: STUDENT IN AN ORGANIZED HEALTH CARE EDUCATION/TRAINING PROGRAM

## 2024-12-17 PROCEDURE — 25010000002 HEPARIN LOCK FLUSH PER 10 UNITS: Performed by: STUDENT IN AN ORGANIZED HEALTH CARE EDUCATION/TRAINING PROGRAM

## 2024-12-17 PROCEDURE — 82948 REAGENT STRIP/BLOOD GLUCOSE: CPT

## 2024-12-17 RX ORDER — HEPARIN SODIUM (PORCINE) LOCK FLUSH IV SOLN 100 UNIT/ML 100 UNIT/ML
500 SOLUTION INTRAVENOUS ONCE
Status: COMPLETED | OUTPATIENT
Start: 2024-12-17 | End: 2024-12-17

## 2024-12-17 RX ORDER — OXYCODONE HYDROCHLORIDE 5 MG/1
5 TABLET ORAL EVERY 8 HOURS PRN
Qty: 30 TABLET | Refills: 0 | Status: SHIPPED | OUTPATIENT
Start: 2024-12-17 | End: 2025-01-16

## 2024-12-17 RX ORDER — HEPARIN SODIUM (PORCINE) LOCK FLUSH IV SOLN 100 UNIT/ML 100 UNIT/ML
500 SOLUTION INTRAVENOUS ONCE
Status: DISCONTINUED | OUTPATIENT
Start: 2024-12-17 | End: 2024-12-17 | Stop reason: HOSPADM

## 2024-12-17 RX ADMIN — PANTOPRAZOLE SODIUM 40 MG: 40 TABLET, DELAYED RELEASE ORAL at 05:30

## 2024-12-17 RX ADMIN — METOPROLOL TARTRATE 5 MG: 1 INJECTION, SOLUTION INTRAVENOUS at 01:53

## 2024-12-17 RX ADMIN — POLYETHYLENE GLYCOL 3350 17 G: 17 POWDER, FOR SOLUTION ORAL at 08:31

## 2024-12-17 RX ADMIN — Medication 10 ML: at 08:31

## 2024-12-17 RX ADMIN — ACETAMINOPHEN 1000 MG: 500 TABLET, FILM COATED ORAL at 05:31

## 2024-12-17 RX ADMIN — ACETAMINOPHEN 1000 MG: 500 TABLET, FILM COATED ORAL at 01:53

## 2024-12-17 RX ADMIN — METHOCARBAMOL TABLETS 750 MG: 750 TABLET, COATED ORAL at 08:31

## 2024-12-17 RX ADMIN — HEPARIN 500 UNITS: 100 SYRINGE at 10:25

## 2024-12-17 RX ADMIN — SERTRALINE HYDROCHLORIDE 25 MG: 25 TABLET, FILM COATED ORAL at 08:31

## 2024-12-17 RX ADMIN — METOPROLOL TARTRATE 5 MG: 1 INJECTION, SOLUTION INTRAVENOUS at 05:30

## 2024-12-17 RX ADMIN — AMIODARONE HYDROCHLORIDE 200 MG: 200 TABLET ORAL at 08:31

## 2024-12-17 RX ADMIN — PREGABALIN 25 MG: 25 CAPSULE ORAL at 05:30

## 2024-12-17 NOTE — DISCHARGE INSTRUCTIONS
Post-Operative Discharge Instructions: Colon resection  [facility] [place] [facility] - [facility]      Your successful surgery marks an important step toward your recovery. To ensure a smooth and nielsen recovery process, please follow these post-operative discharge instructions:    1. Wound Care:    -Keep the incision areas clean and dry.  -Report any signs of infection such as increased redness, swelling, or discharge.  - Its ok to take a shower.     2. Pain Management:    -Take prescribed pain medications as directed.  - Take tylenol and motrin alternatively every three hours. You can take prescribed narcotics only if you have breakthrough pain.   - Do not drive if you have taken narcotic pain meds.       3. Activity:    - Begin with light activities like short walks around the house.  - Gradually increase activity levels as tolerated, but avoid strenuous activities for the first few weeks.  - Avoid heavy lifting (more than 10 pounds) until cleared by your surgeon.    4. Diet:    - Continue with regular diet. If you start feeling bloated or nauseas, back down to clear liquid diet and progress to a full liquid diet as tolerated.Gradually reintroduce solid foods.  - Stay well-hydrated and avoid carbonated beverages initially.    5. Medications:    -Resume regular medications as instructed by your healthcare provider.    6. Follow-up Appointments:    - Schedule and attend all follow-up appointments with your surgeon and healthcare team.  - Discuss any concerns or questions you may have about your recovery.    7. Signs of Complications:    Seek immediate medical attention if you experience:  -Persistent fever  -Increased abdominal pain  -Worsening redness, swelling, or discharge at the incision site  -Persistent nausea or vomiting  -Difficulty breathing or chest pain    8. Rest and Self-Care:    -Allow yourself time to rest and recover.  -Prioritize self-care and listen to your body.  -Reach out to friends, family, or support groups for  assistance and emotional support.    Remember, these instructions are general guidelines, and your surgeon may provide specific recommendations tailored to your case. If you have any questions or concerns during your recovery, don't hesitate to contact your healthcare provider.    Wishing you a smooth and speedy recovery!  Aakash Burden MD   Colon and Rectal Surgery   [facility] [place]-[address], [address]  T: [phone]

## 2024-12-17 NOTE — CASE MANAGEMENT/SOCIAL WORK
Case Management Discharge Note      Final Note: Routine home    Provided Post Acute Provider List?: N/A  N/A Provider List Comment: denies dc needs    Selected Continued Care - Discharged on 12/17/2024 Admission date: 12/10/2024 - Discharge disposition: Home or Self Care         Transportation Services  Private: Car    Final Discharge Disposition Code: 01 - home or self-care

## 2024-12-17 NOTE — PLAN OF CARE
Goal Outcome Evaluation:    Pt alert and oriented X 4 .  Plan today was to ambulate and monitor pt's tolerance to Metoprolol for A fib.  Pt's stable and vitals wnl .  Plan of care on going .

## 2024-12-17 NOTE — DISCHARGE SUMMARY
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 male who presented with  COLOSTOMY     Exam Today:    Procedures Performed:  Procedure(s):  COLOSTOMY CLOSURE        Consults:   Consults       No orders found from 11/11/2024 to 12/11/2024.             Discharge Disposition:  Home or Self Care    Discharge Medications:     Discharge Medications        New Medications        Instructions Start Date   oxyCODONE 5 MG immediate release tablet  Commonly known as: Roxicodone   5 mg, Oral, Every 8 Hours PRN             Continue These Medications        Instructions Start Date   amiodarone 200 MG tablet  Commonly known as: PACERONE   200 mg, Oral, Daily      amLODIPine 10 MG tablet  Commonly known as: NORVASC   10 mg, Oral, Daily      apixaban 5 MG tablet tablet  Commonly known as: ELIQUIS   2.5 mg, Every 12 Hours Scheduled      atorvastatin 20 MG tablet  Commonly known as: LIPITOR   20 mg, Nightly      Chlorhexidine Gluconate 4 % solution   1 Application, Topical, 2 Times Daily, Shower With Hibiclens Solution Twice The Day Before Surgery      EQL Omeprazole 20 MG tablet delayed-release  Generic drug: Omeprazole   1 tablet, Daily      metFORMIN 500 MG tablet  Commonly known as: GLUCOPHAGE   500 mg, Daily With Breakfast      metoprolol succinate XL 50 MG 24 hr tablet  Commonly known as: TOPROL-XL   50 mg, Oral, Daily      mirtazapine 15 MG tablet  Commonly known as: REMERON   15 mg, Oral, Every Night at Bedtime      multivitamin with minerals tablet tablet   1 tablet, Daily      ondansetron 8  MG tablet  Commonly known as: ZOFRAN   8 mg, Oral, 3 Times Daily PRN      sodium-potassium-magnesium sulfates 17.5-3.13-1.6 GM/177ML solution oral solution  Commonly known as: SUPREP   Step 1: 5 PM the day before your scheduled colonoscopy - Take your 1st dose of bowel prep Step 2: 5 AM - Take your 2nd dose of bowel prep FIRST DOSE: 5 PM the day before your scheduled colonoscopy. Pour one 6-ounce bottle of SUPREP liquid into the mixing container. Add cool drinking water to the 16-ounce line on the container and mix; this will dilute the concentrated solution. Drink all the liquid in the container at one time - using a straw will help. Drink two more 16-ounce glasses of water over the next hour. Watery bowel movements should begin in approximately one hour. SECOND DOSE: 5 AM On the day of your colonoscopy. Repeat steps 1-4. You may still have watery bowel movements after you finish drinking the solution.      Zoloft 25 MG tablet  Generic drug: sertraline   1 tablet, Daily               Discharge Diet: REGULAR DIET     Activity at Discharge: AS TOLERATED, AVOID LIFTING OVER 10 LBS     Follow-up Appointments:  Future Appointments   Date Time Provider Department Center   12/30/2024  8:40 AM Ana Cohen PA [facility] CRS NA [facility]   1/29/2025 10:00 AM REDD CC CT [facility] PET [facility]   2/5/2025  9:20 AM LAB MD [facility] LAB NA [facility]   2/5/2025  9:30 AM Jacob Dickey MD [facility] ONC NA [facility]   4/16/2025  9:45 AM Abimael Greenberg MD [facility] CAR NA P [facility] NA     Additional Instructions for the Follow-ups that You Need to Schedule       CBC & Differential   As directed      Manual Differential: No   Release to patient: Routine Release                Test Results Pending at Discharge:  Pending Results       Procedure [Order ID] Specimen - Date/Time    CBC & Differential [[id number]]     Specimen: Blood              Aakash Burden MD  12/17/24  14:00 EST    Time Spent on Discharge Activities: 10 MIN

## 2024-12-17 NOTE — PLAN OF CARE
Problem: Adult Inpatient Plan of Care  Goal: Absence of Hospital-Acquired Illness or Injury  Outcome: Progressing  Intervention: Identify and Manage Fall Risk  Recent Flowsheet Documentation  Taken 12/17/2024 0800 by Nohemi Hyatt RN  Safety Promotion/Fall Prevention:   safety round/check completed   fall prevention program maintained  Intervention: Prevent Skin Injury  Recent Flowsheet Documentation  Taken 12/17/2024 0800 by Nohemi Hyatt RN  Skin Protection: incontinence pads utilized  Intervention: Prevent Infection  Recent Flowsheet Documentation  Taken 12/17/2024 0800 by Nohemi Hyatt RN  Infection Prevention:   single patient room provided   hand hygiene promoted   Goal Outcome Evaluation:  Plan of Care Reviewed With: patient, spouse        Progress: improving

## 2024-12-18 NOTE — OUTREACH NOTE
Prep Survey      Flowsheet Row Responses   [facility] facility patient discharged from? [facility]   Is LACE score < 7 ? No   Eligibility Readm Mgmt   Discharge diagnosis Colostomy prolapse with closure this visit   Does the patient have one of the following disease processes/diagnoses(primary or secondary)? General Surgery   Does the patient have Home health ordered? No   Is there a DME ordered? No   Prep survey completed? Yes            RADHA STEIN - Registered Nurse

## 2024-12-19 ENCOUNTER — TELEPHONE (OUTPATIENT)
Age: 74
End: 2024-12-19
Payer: MEDICARE

## 2024-12-23 RX ORDER — METOPROLOL SUCCINATE 50 MG/1
50 TABLET, EXTENDED RELEASE ORAL DAILY
Qty: 30 TABLET | Refills: 1 | Status: SHIPPED | OUTPATIENT
Start: 2024-12-23

## 2024-12-30 ENCOUNTER — OFFICE VISIT (OUTPATIENT)
Age: 74
End: 2024-12-30
Payer: MEDICARE

## 2024-12-30 VITALS
HEIGHT: 71 IN | BODY MASS INDEX: 31.64 KG/M2 | DIASTOLIC BLOOD PRESSURE: 83 MMHG | WEIGHT: 226 LBS | OXYGEN SATURATION: 96 % | SYSTOLIC BLOOD PRESSURE: 149 MMHG | TEMPERATURE: 98.4 F | HEART RATE: 78 BPM

## 2024-12-30 DIAGNOSIS — Z93.3 COLOSTOMY IN PLACE: Primary | ICD-10-CM

## 2024-12-30 PROCEDURE — 3077F SYST BP >= 140 MM HG: CPT

## 2024-12-30 PROCEDURE — 3079F DIAST BP 80-89 MM HG: CPT

## 2024-12-30 PROCEDURE — 1159F MED LIST DOCD IN RCRD: CPT

## 2024-12-30 PROCEDURE — 99024 POSTOP FOLLOW-UP VISIT: CPT

## 2024-12-30 PROCEDURE — 1160F RVW MEDS BY RX/DR IN RCRD: CPT

## 2024-12-30 NOTE — PROGRESS NOTES
"Chief Complaint  Post-op (PO Colostomy Takedown 12/10)    Subjective        Viktor Atkins presents to [facility] COLORECTAL SURGERY status post colostomy takedown on 12/10/2024 with Dr. Burden.  Reports feeling well.  States he has been eating well without nausea or vomiting.  Denies fevers and chills.  Denies any abdominal pain.  Reported initially having numerous loose stools requiring Imodium followed by an episode of constipation requiring MiraLAX for relief.      Objective   Vital Signs:  /83 (BP Location: Left arm, Patient Position: Sitting, Cuff Size: Large Adult)   Pulse 78   Temp 98.4 °F (36.9 °C) (Infrared)   Ht 180.3 cm (71\")   Wt 103 kg (226 lb)   SpO2 96%   BMI 31.52 kg/m²   Estimated body mass index is 31.52 kg/m² as calculated from the following:    Height as of this encounter: 180.3 cm (71\").    Weight as of this encounter: 103 kg (226 lb).          Physical Exam  Constitutional:       General: He is not in acute distress.  Cardiovascular:      Rate and Rhythm: Normal rate.   Pulmonary:      Effort: Pulmonary effort is normal. No respiratory distress.   Abdominal:      General: There is distension.      Palpations: Abdomen is soft.      Tenderness: There is no abdominal tenderness. There is no guarding.      Comments: Soft, large abdomen.  Nontender to palpation.  Incision with staples and Penrose drain in place.  Scant amount of serous drainage on dressing.   Neurological:      Mental Status: He is alert. Mental status is at baseline.   Psychiatric:         Mood and Affect: Mood normal.         Behavior: Behavior normal.        Result Review :                Assessment and Plan   Diagnoses and all orders for this visit:    1. Colostomy in place (Primary)    status post colostomy takedown on 12/10/2024 with Dr. Burden.  Reports feeling well.  States he has been eating well without nausea or vomiting.  Denies fevers and chills.  Denies any abdominal pain.  Reported " initially having numerous loose stools requiring Imodium followed by an episode of constipation requiring MiraLAX for relief.  Discussed increasing daily fiber to promote regular bowel function.  Discussed importance of staying hydrated.  [place] and Penrose drain removed.  No lifting greater than 10 pounds.  No dietary restrictions.  Follow-up in 1 month.         Follow Up   No follow-ups on file.  Patient was given instructions and counseling regarding his condition or for health maintenance advice. Please see specific information pulled into the AVS if appropriate.

## 2025-01-05 NOTE — TELEPHONE ENCOUNTER
[facility] [place]  213/01  PROGRESS NOTE   Patient: Solo Truong  Today's Date: 1/5/2025    YOB: 1946  Admission Date: 1/1/2025    MRN: [id number]  Inpatient LOS: 0    Attending: Emmanuel Garcia DO  Hospital Day: Hospital Day: 5    Subjective   HISTORY AND SUBJECTIVE COMPLAINTS     Chief Complaint:   Left leg weakness    Interval History / Subjective:   No acute events overnight.  Weakness of the left leg is mildly better.  Denies any fevers, chills, nausea, vomiting, chest pain or shortness of breath.     Hospital Course:  None of the left lower extremity weakness.  Stroke workup underway.  MRI positive for acute stroke with chronic changes as well of previous stroke.    Planning for Inpatient rehab.  Still awaiting insurance authorization.      ROS:  Pertinent systems negative except as above.    Objective   PHYSICAL EXAMINATION     Vital 24 Hour Range Most Recent Value   Temperature Temp  Min: 97.8 °F (36.6 °C)  Max: 98.6 °F (37 °C) 97.9 °F (36.6 °C)   Pulse Pulse  Min: 61  Max: 69 64   Respiratory Resp  Min: 16  Max: 17 16   Blood Pressure BP  Min: 158/82  Max: 174/135 (!) 164/79   Pulse Oximetry SpO2  Min: 96 %  Max: 99 % 98 %   Arterial BP No data recorded     O2 No data recorded       Recorded Intake and Output:  Intake/Output Summary (Last 24 hours) at 1/5/2025 1355  Last data filed at 1/5/2025 1326  Gross per 24 hour   Intake 620 ml   Output 1150 ml   Net -530 ml      Recorded Last Stool Occurrence: 2 (01/05/25 0421)     Vital Most Recent Value First Value   Weight 101.8 kg (224 lb 6.4 oz) Weight: 101.6 kg (223 lb 15.8 oz)   Height 6' (182.9 cm) Height: 6' (182.9 cm)   BMI 30.43 N/A       General:  No acute distress.  HEENT: Normocephalic, atraumatic, PERRL, intact extraocular movement.  Neck:  Trachea is midline. No adenopathy.    Cardiovascular:  Regular rate and rhythm, normal S1, S2, no added sounds or murmurs.  Respiratory: Clear to auscultation bilaterally.  No wheezes, rales or  LVM FOR PATIENT TO RETURN MY CALL TO SCHEDULE FROM REFERRAL.   rhonchi.  Gastrointestinal:  Soft, nontender and nondistended, no hepatomegaly or splenomegaly. bowel sounds present.  Neuro:   Alert and Oriented to time, place, person. CN II-XII intact.  Focal weakness left lower extremity without sensory deficits.  Psychiatric:   Cooperative.  Appropriate mood & affect.  Normal judgment.  Skin:  Warm and dry without rash.  Extremities: No pitting edema of the lower extremities bilaterally, distal pulses intact.      TEST RESULTS     Labs: The Laboratory values listed below have been reviewed and pertinent findings discussed in the Assessment and Plan.    Laboratory values:   Recent Labs   Lab 01/05/25  0930 01/02/25  0712 01/01/25  0815   WBC 6.5 5.3 5.9   HGB 15.0 14.0 14.9   HCT 42.7 39.7 41.8    194 223         Recent Labs   Lab 01/05/25 0930 01/02/25  0712 01/01/25  0815   SODIUM 139 142 141   POTASSIUM 3.3* 3.5 3.7   CHLORIDE 103 105 105   CO2 27 29 27   CALCIUM 8.9 8.7 8.8   GLUCOSE 137* 126* 128*   BUN 11 13 13   CREATININE 0.89 0.92 1.00        No results available in last 24 hours      Radiology: Imaging studies have been reviewed and pertinent findings discussed in the Assessment and Plan.  No results found for any visits on 01/01/25 (from the past 48 hour(s)).       ANCILLARY ORDERS     Diet:  Cardiac; Yes, Medical Nutrition Management by Rd (Registered Dietitian) Diet  Telemetry: On  Consults:    IP CONSULT TO NEUROLOGY  IP CONSULT TO SMOKING CESSATION PROGRAM  IP CONSULT TO SOCIAL WORK  IP CONSULT TO PHYSICAL MEDICINE & REHAB  Therapy Orders:   PT and OT Orders Placed this Encounter   Procedures    Occupational Therapy Evaluation    Occupational Therapy Treatment    Physical Therapy Evaluation    Physical Therapy Treatment       ADVANCED DIRECTIVES     Code Status: Full Resuscitation         ASSESSMENT AND PLAN     #. Stroke, right corona radiata/basal ganglia acute versus subacute.  Chronic bilateral basal ganglia lacunar infarcts.  #. 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  - PT, OT, evaluation for intensive rehab program.  - Start Plavix for 21 days with aspirin. Further recommendations per neurology outpatient.   - Increase statin to high intensity.  - Neurology consulted.  - Echocardiogram reviewed, no shunt, no thrombus.    #. Mixed hyperlipidemia  - Continue high intensity statin as well as Tricor.    #. Cardiomyopathy  - Reviewed echocardiogram, stable at this time.  Monitor closely.    #. Alcohol use disorder, in remission  - Encouraged continued cessation.    Smoking status: Former Tobacco User    Nutrition status: Does Not Meet Criteria for Malnutrition   Body mass index is 30.43 kg/m². - Patient is obese with BMI 30-40  DVT Prophylaxis: Lovenox prophylactic dosing (dose adjusted as per renal function)       DISCHARGE PLANNING     The patient's treatment plans were discussed with patient and RN.    Discharge Planning    Barriers to discharge: Patient is not medically ready and needs to remain in the hospital today due to stroke workup  Anticipated discharge destination: Inpatient Rehab  Expected Discharge Date: 1/1/2025  patient medically ready, awaiting insurance authorization.                Emmanuel Garcia DO  Hospitalist

## 2025-01-08 ENCOUNTER — READMISSION MANAGEMENT (OUTPATIENT)
Dept: CALL CENTER | Facility: HOSPITAL | Age: 75
End: 2025-01-08
Payer: MEDICARE

## 2025-01-08 NOTE — OUTREACH NOTE
General Surgery Week 3 Survey      Flowsheet Row Responses   [facility], [place], operated by [facility] patient discharged from? [facility]   Does the patient have one of the following disease processes/diagnoses(primary or secondary)? General Surgery   Week 3 attempt successful? Yes   Call start time 1719   Call end time 1722   Discharge diagnosis Colostomy prolapse with closure this visit   Is patient permission given to speak with other caregiver? Yes   Person spoke with today (if not patient) and relationship Ninfa Atkins, spouse   Meds reviewed with patient/caregiver? Yes   Does the patient have all medications related to this admission filled (includes all antibiotics, pain medications, etc.) Yes   Is the patient taking all medications as directed (includes completed medication regime)? Yes   Does the patient have a follow up appointment scheduled with their surgeon? Yes   Has the patient kept scheduled appointments due by today? Yes   Comments Next appt with surgeon 1/27  230pm   Has home health visited the patient within 72 hours of discharge? N/A   Psychosocial issues? No   Did the patient receive a copy of their discharge instructions? Yes   Nursing interventions Reviewed instructions with patient   What is the patient's perception of their health status since discharge? Improving   Nursing interventions Nurse provided patient education   Is the patient /caregiver able to teach back basic post-op care? Keep incision areas clean,dry and protected   Is the patient/caregiver able to teach back signs and symptoms of incisional infection? Increased redness, swelling or pain at the incisonal site, Increased drainage or bleeding   Is the patient/caregiver able to teach back steps to recovery at home? Set small, achievable goals for return to baseline health, Rest and rebuild strength, gradually increase activity, Eat a well-balance diet   If the patient is a current smoker, are they able to teach back resources for cessation? Not a smoker    Is the patient/caregiver able to teach back the hierarchy of who to call/visit for symptoms/problems? PCP, Specialist, Home health nurse, Urgent Care, ED, 911 Yes   Week 3 call completed? Yes   Graduated Yes   Is the patient interested in additional calls from an ambulatory ? No   Would this patient benefit from a Referral to Amb Social Work? No   Wrap up additional comments Wife reports that she thinks patient is healing well. No questions or needs today.   Call end time 1722            ARPIT SHIRLEY - Registered Nurse

## 2025-01-27 ENCOUNTER — OFFICE VISIT (OUTPATIENT)
Age: 75
End: 2025-01-27
Payer: MEDICARE

## 2025-01-27 VITALS
BODY MASS INDEX: 31.22 KG/M2 | HEART RATE: 69 BPM | TEMPERATURE: 97.7 F | OXYGEN SATURATION: 100 % | HEIGHT: 71 IN | DIASTOLIC BLOOD PRESSURE: 86 MMHG | SYSTOLIC BLOOD PRESSURE: 159 MMHG | WEIGHT: 223 LBS

## 2025-01-27 DIAGNOSIS — C18.7 MALIGNANT NEOPLASM OF SIGMOID COLON: Primary | ICD-10-CM

## 2025-01-27 PROCEDURE — 3077F SYST BP >= 140 MM HG: CPT | Performed by: STUDENT IN AN ORGANIZED HEALTH CARE EDUCATION/TRAINING PROGRAM

## 2025-01-27 PROCEDURE — 99024 POSTOP FOLLOW-UP VISIT: CPT | Performed by: STUDENT IN AN ORGANIZED HEALTH CARE EDUCATION/TRAINING PROGRAM

## 2025-01-27 PROCEDURE — 1160F RVW MEDS BY RX/DR IN RCRD: CPT | Performed by: STUDENT IN AN ORGANIZED HEALTH CARE EDUCATION/TRAINING PROGRAM

## 2025-01-27 PROCEDURE — 3079F DIAST BP 80-89 MM HG: CPT | Performed by: STUDENT IN AN ORGANIZED HEALTH CARE EDUCATION/TRAINING PROGRAM

## 2025-01-27 PROCEDURE — 1159F MED LIST DOCD IN RCRD: CPT | Performed by: STUDENT IN AN ORGANIZED HEALTH CARE EDUCATION/TRAINING PROGRAM

## 2025-01-27 RX ORDER — SODIUM, POTASSIUM,MAG SULFATES 17.5-3.13G
SOLUTION, RECONSTITUTED, ORAL ORAL
Qty: 177 ML | Refills: 0 | Status: SHIPPED | OUTPATIENT
Start: 2025-01-27

## 2025-01-27 NOTE — PROGRESS NOTES
Colorectal Surgery Followup Note    ID:  Viktor Atkins;   : 1950  DATE OF VISIT: 2025    Chief Complaint  Post-op (PO Colostomy takedown 12/10/24)       Subjective    Mr. Atkins is status post colostomy closure.  He has recovered well.  He denies any complaints.  Is having regular bowel movements.  Denies any fevers or chills.  He denies any nausea or vomiting.  Exam  General:  No acute distress  Head: Normocephalic, atraumatic  Neuro: Alert and oriented    Abdomen:  Soft, non-tender, non-distended, no hernias, no hepatomegaly, no splenomegaly. No abnormal, audible bowel sounds.  Well-healed colostomy takedown site    Assessment  -Rectosigmoid cancer status post low anterior resection  -Status post completion of adjuvant chemotherapy  -Status post colostomy takedown    Plan / Recommendations  - Mr. Atkins has recovered well without any complications.  He is currently doing well  -I have recommended for him to start Metamucil fiber  -Continue with radiographic surveillance with Dr. Dickey   - I will plan for colonoscopy in 6 months   - follow up at the time of colonoscopy       Aakash Burden MD  Colon and Rectal Surgery   [facility]

## 2025-02-07 DIAGNOSIS — E43 SEVERE MALNUTRITION: ICD-10-CM

## 2025-02-07 DIAGNOSIS — C18.9 MALIGNANT NEOPLASM OF COLON, UNSPECIFIED PART OF COLON: ICD-10-CM

## 2025-02-07 RX ORDER — MIRTAZAPINE 15 MG/1
15 TABLET, FILM COATED ORAL
Qty: 30 TABLET | Refills: 1 | Status: SHIPPED | OUTPATIENT
Start: 2025-02-07 | End: 2025-02-07 | Stop reason: SDUPTHER

## 2025-02-07 RX ORDER — METOPROLOL SUCCINATE 50 MG/1
50 TABLET, EXTENDED RELEASE ORAL DAILY
Qty: 30 TABLET | Refills: 1 | Status: SHIPPED | OUTPATIENT
Start: 2025-02-07

## 2025-02-07 RX ORDER — MIRTAZAPINE 15 MG/1
15 TABLET, FILM COATED ORAL
Qty: 30 TABLET | Refills: 1 | Status: SHIPPED | OUTPATIENT
Start: 2025-02-07

## 2025-02-07 RX ORDER — AMLODIPINE BESYLATE 10 MG/1
10 TABLET ORAL DAILY
Qty: 30 TABLET | Refills: 0 | Status: SHIPPED | OUTPATIENT
Start: 2025-02-07

## 2025-02-07 NOTE — TELEPHONE ENCOUNTER
Caller: Ninfa Atkins    Relationship: Emergency Contact    Best call back number: [phone]    Requested Prescriptions:   Requested Prescriptions     Pending Prescriptions Disp Refills    metoprolol succinate XL (TOPROL-XL) 50 MG 24 hr tablet 30 tablet 1     Sig: Take 1 tablet by mouth Daily.    amLODIPine (NORVASC) 10 MG tablet 30 tablet 0     Sig: Take 1 tablet by mouth Daily.    mirtazapine (REMERON) 15 MG tablet 30 tablet 1     Sig: Take 1 tablet by mouth every night at bedtime.        Pharmacy where request should be sent: [facility] - [place] [id number] - [phone]  - [phone] FX     Last office visit with prescribing clinician: 11/6/2024   Last telemedicine visit with prescribing clinician: Visit date not found   Next office visit with prescribing clinician: 3/7/2025       Does the patient have less than a 3 day supply:  [] Yes  [x] No    Regulo Martel Rep   02/07/25 11:27 EST

## 2025-02-11 RX ORDER — AMIODARONE HYDROCHLORIDE 200 MG/1
200 TABLET ORAL DAILY
Qty: 90 TABLET | Refills: 3 | Status: SHIPPED | OUTPATIENT
Start: 2025-02-11

## 2025-02-24 RX ORDER — METOPROLOL SUCCINATE 50 MG/1
50 TABLET, EXTENDED RELEASE ORAL DAILY
Qty: 30 TABLET | Refills: 1 | OUTPATIENT
Start: 2025-02-24

## 2025-02-28 ENCOUNTER — HOSPITAL ENCOUNTER (OUTPATIENT)
Dept: PET IMAGING | Facility: HOSPITAL | Age: 75
Discharge: HOME | End: 2025-02-28
Payer: MEDICARE

## 2025-02-28 DIAGNOSIS — C18.9 MALIGNANT NEOPLASM OF COLON, UNSPECIFIED PART OF COLON: ICD-10-CM

## 2025-02-28 LAB
CREAT BLDA-MCNC: 1 MG/DL (ref 0.6–1.3)
EGFRCR SERPLBLD CKD-EPI 2021: 79.5 ML/MIN/1.73

## 2025-02-28 PROCEDURE — 74177 CT ABD & PELVIS W/CONTRAST: CPT

## 2025-02-28 PROCEDURE — 71260 CT THORAX DX C+: CPT

## 2025-02-28 PROCEDURE — 25510000001 IOPAMIDOL PER 1 ML: Performed by: STUDENT IN AN ORGANIZED HEALTH CARE EDUCATION/TRAINING PROGRAM

## 2025-02-28 PROCEDURE — 82565 ASSAY OF CREATININE: CPT

## 2025-02-28 RX ORDER — IOPAMIDOL 755 MG/ML
100 INJECTION, SOLUTION INTRAVASCULAR
Status: COMPLETED | OUTPATIENT
Start: 2025-02-28 | End: 2025-02-28

## 2025-02-28 RX ADMIN — IOPAMIDOL 100 ML: 755 INJECTION, SOLUTION INTRAVENOUS at 09:53

## 2025-03-03 NOTE — PROGRESS NOTES
HEMATOLOGY ONCOLOGY OUTPATIENT FOLLOW UP       Patient name: Viktor Atkins  : 1950  MRN: [id number]  Primary Care Physician: Gera Manriquez MD  Referring Physician: No ref. provider found  Reason For Consult: Colon cancer      History of Present Illness:  Patient is a 74 y.o. male who presented to the hospital with acute GI bleed he had bright red blood per rectum and presented to the emergency room    3/25/2024 CT chest abdomen pelvis with findings compatible with constipation, sigmoid diverticulosis without diverticulitis  3/27/2024 colonoscopy with distal sigmoid mid colon mass biopsy obtained this was positive for invasive moderately differentiated adenocarcinoma MSI testing was done was negative intact nuclear expression of MMR proteins  3/30/2024 low anterior resection, diverting loop ileostomy by Dr. Burden tumor found in rectosigmoid upper rectum area.  Final pathology with all margins negative, grade 2 tumor moderately differentiated, tumor invades through muscularis propria into the pericolonic or perirectal tissue.  4 out of 22 lymph nodes positive  Final stage T4 N2    24 - FOLFOX  24 - FOLFOX   2024 FOLFOX cycle 3  2024 FOLFOX c4  7/3/24 - FOLFOX c5  24 - CT imaging with no recurrence  2024: FOLFOX c6 - oxaliplatin dose reduced to 65 mg/m2   2024 FOLFOX c7    2024: Viktor is seen today for routine follow up. He continues to tolerate FOLFOX. He does report nausea around D4-5 of cycle, Zofran helps. He states tingling is stable, not worsening and denies pain. He has no new complaints today.       2024: Pt seen today for initial eval by me. He was previously being followed by Dr. Robin in our practice. He is on adjuvant chemotherapy with mFOLFOX for stage Iii colon cancer. Planned for C9 today. He has symptoms of poor taste, mild peripheral neuropathy, fatigue and diarrhea for 2-3 days after chemo. He also has a colostomy bag  following surgery.         10/9/2024: Viktor is seen today prior to treatment with cycle 12 FOLFOX.  Reports intermittent diarrhea and intermittent nausea both of which are managed with Imodium and Zofran.  He continues to have poor taste in his mouth that is affecting his appetite.  His weight is stable.  His neuropathy symptoms are stable.  Patient states last week he had increased fatigue and a low-grade fever.  He took Tylenol and reports no further fever noted.  He states he began to feel better after a few days and is now back at his baseline with no new concerns.     10/30/24: CT Chest/Abd/Pelvis W contrast:  Impression:   1. No acute CT abnormalities or evidence of metastatic disease in the chest   2. Stable enlarged periportal lymph node   3. Mid transverse colostomy and stable surgical changes with a sigmoid anastomosis     11/6/24: pt seen today for follow up. No new complaints reported. Has recovered well from treatment related adverse effects. Weight/appetite stable.    12/9/24: NM PET/CT:  Impression:  1.No abnormal metabolic activity to indicate metastatic disease.  2.Incidental CT findings as noted above    12/11/24: Colostomy Closure    2/28/25: CT Chest Abd Pelvis W contrast:  Impression:   1.No evidence of metastatic disease within the chest.   2.Stable small areas of peripheral scarring within the right upper lobe.   3. Postsurgical changes of the transverse colon and rectosigmoid colon.   4. Small volume perihepatic ascites which is increased from prior CT dated 12/13/2024. This is nonspecific in etiology. No clear peritoneal nodularity is identified.   5. Stable 12 mm short axis lymph node just superior to the common hepatic artery.   6. Hepatosplenomegaly     Subjective:   3.7.25: has some peripheral neuropathy, particularly in feet. Off Eliquis after episode of Bleeding in eye. Denied any Bloood in stools, abd pain, comnstipation or diarrhea, Weihgt/appetite stable.      Past Medical History:    Diagnosis Date    Arthritis     Atrial fibrillation     Benign essential HTN     Cancer     Diabetes mellitus     GERD (gastroesophageal reflux disease)     Hyperlipidemia, mixed     Palpitations        Past Surgical History:   Procedure Laterality Date    CARDIAC CATHETERIZATION      CHOLECYSTECTOMY      COLON RESECTION WITH ILEOSTOMY N/A 3/30/2024    Procedure: COLON RESECTION LOW ANTERIOR LAPAROSCOPIC, COLONAL ANASTOMOSIS, DIVERTING LOOP ILEOSTOMY WITH DAVINCI ROBOT;  Surgeon: Aakash Burden MD;  Location: [facility] MAIN OR;  Service: Robotics - DaVinci;  Laterality: N/A;    COLONOSCOPY N/A 3/27/2024    Procedure: COLONOSCOPY with polypectomy x 18 and sigmoid colon mass biopsies with endscopic spot tattooing;  Surgeon: Fili Quintero MD;  Location: [facility] ENDOSCOPY;  Service: Gastroenterology;  Laterality: N/A;  sigmoid mass at 25 cm    COLONOSCOPY N/A 12/9/2024    Procedure: COLONOSCOPY VIA COLOSTOMY WITH POLYPECTOMY X6 AND CLIPPING X1;  Surgeon: Aakash Burden MD;  Location: [facility] ENDOSCOPY;  Service: General;  Laterality: N/A;  POST: POLYPS    COLOSTOMY N/A 4/9/2024    Procedure: DIAGNOSTIC LAPAROSCOPY, DIVERTING OSTOMY, POSSIBLE REVISION OF ANASTAMOSIS;  Surgeon: Aakash Burden MD;  Location: [facility] MAIN OR;  Service: General;  Laterality: N/A;    COLOSTOMY CLOSURE N/A 12/10/2024    Procedure: COLOSTOMY TAKEDOWN laproscopic;  Surgeon: Aakash Burden MD;  Location: [facility] MAIN OR;  Service: General;  Laterality: N/A;    KNEE SURGERY      SIGMOIDOSCOPY N/A 3/30/2024    Procedure: SIGMOIDOSCOPY;  Surgeon: Aakash Burden MD;  Location: [facility] MAIN OR;  Service: Gastroenterology;  Laterality: N/A;    VENOUS ACCESS DEVICE (PORT) INSERTION N/A 4/30/2024    Procedure: INSERTION VENOUS ACCESS DEVICE;  Surgeon: Aakash Burden MD;  Location: [facility] MAIN OR;  Service: General;  Laterality: N/A;         Current Outpatient Medications:     amiodarone (PACERONE) 200 MG tablet, TAKE 1 TABLET EVERY DAY, Disp: 90 tablet, Rfl:  3    amLODIPine (NORVASC) 10 MG tablet, Take 1 tablet by mouth Daily., Disp: 30 tablet, Rfl: 0    apixaban (ELIQUIS) 5 MG tablet tablet, Take 0.5 tablets by mouth Every 12 (Twelve) Hours. Taking currently but switching to xarelto in about a month, Disp: , Rfl:     atorvastatin (LIPITOR) 20 MG tablet, Take 1 tablet by mouth Every Night., Disp: , Rfl:     Chlorhexidine Gluconate 4 % solution, Apply 1 Application topically to the appropriate area as directed 2 (Two) Times a Day. Shower With Hibiclens Solution Twice The Day Before Surgery, Disp: 236 mL, Rfl: 0    metFORMIN (GLUCOPHAGE) 500 MG tablet, Take 1 tablet by mouth Daily With Breakfast., Disp: , Rfl:     metoprolol succinate XL (TOPROL-XL) 50 MG 24 hr tablet, Take 1 tablet by mouth Daily., Disp: 30 tablet, Rfl: 1    mirtazapine (REMERON) 15 MG tablet, Take 1 tablet by mouth every night at bedtime., Disp: 30 tablet, Rfl: 1    Multiple Vitamins-Minerals (MULTIVITAMIN ADULT PO), Take 1 tablet by mouth Daily., Disp: , Rfl:     Omeprazole (EQL Omeprazole) 20 MG tablet delayed-release, Take 20 mg by mouth Daily., Disp: , Rfl:     ondansetron (ZOFRAN) 8 MG tablet, Take 1 tablet by mouth 3 (Three) Times a Day As Needed for Nausea or Vomiting., Disp: 30 tablet, Rfl: 5    sertraline (Zoloft) 25 MG tablet, Take 1 tablet by mouth Daily., Disp: , Rfl:     sodium-potassium-magnesium sulfates (SUPREP) 17.5-3.13-1.6 GM/177ML solution oral solution, PLEASE REPLACE WITH GOLYTELY 4000ML IF SUPREP IS NOT APPROVED BY INSURANCE 5 PM the day before your scheduled colonoscopy - Take your 1st dose of bowel prep 5 AM - Take your 2nd dose of bowel prep You may still have watery bowel movements after you finish drinking the solution., Disp: 177 mL, Rfl: 0    No Known Allergies    No family history on file.    Cancer-related family history is not on file.      Social History     Tobacco Use    Smoking status: Former     Passive exposure: Past    Smokeless tobacco: Never   Vaping Use     "Vaping status: Never Used   Substance Use Topics    Alcohol use: Never    Drug use: Never     Social History     Social History Narrative    Not on file       ROS:   Review of Systems   Constitutional:  Positive for fatigue. Negative for chills and fever. Appetite change: Patient reports taste changes. Weight is stable..  HENT:  Negative for ear pain, mouth sores, nosebleeds and sore throat.    Eyes:  Negative for photophobia and visual disturbance.   Respiratory:  Negative for shortness of breath, wheezing and stridor.    Cardiovascular:  Negative for chest pain and palpitations.   Gastrointestinal:  Negative for abdominal pain, diarrhea, nausea (Patient reports nausea around day 4-5 of cycle, Zofran helps) and vomiting.   Endocrine: Negative for cold intolerance and heat intolerance.   Genitourinary:  Negative for dysuria and hematuria.   Musculoskeletal:  Negative for joint swelling and neck stiffness.   Skin:  Negative for color change and rash.   Neurological:  Positive for numbness. Negative for seizures and syncope.   Hematological:  Negative for adenopathy.        No obvious bleeding   Psychiatric/Behavioral:  Negative for agitation, confusion and hallucinations.        Objective:    Vital Signs:  Vitals:    03/07/25 0928   BP: 178/82   Pulse: 58   SpO2: 97%   Weight: 100 kg (220 lb 9.6 oz)   Height: 180.3 cm (71\")   PainSc: 0-No pain       Body mass index is 30.77 kg/m².    ECOG  1 - Restricted in physically strenuous activity but ambulatory and able to carry out work of a light or sedentary nature, e.g., light house work, office work     Physical Exam:   Physical Exam  Vitals reviewed.   Constitutional:       Appearance: Normal appearance. He is not diaphoretic.   HENT:      Head: Normocephalic and atraumatic.   Eyes:      Pupils: Pupils are equal, round, and reactive to light.   Cardiovascular:      Rate and Rhythm: Normal rate and regular rhythm.      Pulses: Normal pulses.      Heart sounds: No murmur " heard.  Pulmonary:      Effort: Pulmonary effort is normal.      Breath sounds: Normal breath sounds.   Abdominal:      General: There is no distension.      Palpations: Abdomen is soft. There is no mass.      Tenderness: There is no abdominal tenderness.   Musculoskeletal:         General: Normal range of motion.      Cervical back: Normal range of motion and neck supple.   Skin:     General: Skin is warm.   Neurological:      General: No focal deficit present.      Mental Status: He is alert and oriented to person, place, and time.   Psychiatric:         Mood and Affect: Mood normal.         Lab Results - Last 18 Months   Lab Units 03/07/25  0928 12/13/24  0609 11/06/24  1014   WBC 10*3/mm3 6.46 16.21* 6.97   HEMOGLOBIN g/dL 7.9* 10.4* 11.0*   HEMATOCRIT % 28.7* 35.0* 36.6*   PLATELETS 10*3/mm3 211 306 150   MCV fL 76.7* 81.8 85.5     Lab Results - Last 18 Months   Lab Units 02/28/25  0857 12/13/24  0609 11/26/24  1131 11/06/24  1014 08/14/24  0801 07/31/24  0742 07/17/24  0752 07/03/24  0803   SODIUM mmol/L  --  143 142 143  --  143  --  140   POTASSIUM mmol/L  --  3.9 4.3 4.2  --  3.7  --  4.2   CHLORIDE mmol/L  --  110* 108* 109*  --  109*  --  107   CO2 mmol/L  --  15.4* 23.1 21.3*  --  23.2  --  22.8   BUN mg/dL  --  19 10 11  --  14  --  10   CREATININE mg/dL 1.00 1.23 0.86 0.95   < > 0.96   < > 0.94   CALCIUM mg/dL  --  9.3 9.3 9.0  --  9.0  --  8.9   BILIRUBIN mg/dL  --   --   --  0.3  --  0.2  --  0.3   ALK PHOS U/L  --   --   --  159*  --  151*  --  124*   ALT (SGPT) U/L  --   --   --  126*  --  73*  --  82*   AST (SGOT) U/L  --   --   --  213*  --  61*  --  67*   GLUCOSE mg/dL  --  187* 92 90  --  120*  --  109*    < > = values in this interval not displayed.       Lab Results   Component Value Date    GLUCOSE 187 (H) 12/13/2024    BUN 19 12/13/2024    CREATININE 1.00 02/28/2025    BCR 15.4 12/13/2024    K 3.9 12/13/2024    CO2 15.4 (L) 12/13/2024    CALCIUM 9.3 12/13/2024    ALBUMIN 4.1 11/06/2024     " (H) 11/06/2024     (H) 11/06/2024       No results for input(s): \"APTT\", \"INR\", \"PTT\" in the last 85931 hours.    Lab Results   Component Value Date    IRON 22 (L) 04/09/2024    TIBC 235 (L) 04/09/2024    FERRITIN 281.40 04/09/2024       No results found for: \"FOLATE\"    No results found for: \"OCCULTBLD\"    No results found for: \"RETICCTPCT\"  No results found for: \"XAAGSFGW46\"  No results found for: \"SPEP\", \"UPEP\"  No results found for: \"LDH\", \"URICACID\"  No results found for: \"ARON\", \"RF\", \"SEDRATE\"  No results found for: \"FIBRINOGEN\", \"HAPTOGLOBIN\"  Lab Results   Component Value Date    PTT 27.6 04/30/2018     No results found for: \"\"  Lab Results   Component Value Date    CEA 1.65 11/26/2024     No components found for: \"CA-19-9\"  No results found for: \"PSA\"  No results found for: \"SEDRATE\"       Assessment & Plan     Viktor Atkins is a 74 y.o.  male with sigmoid-rectal cancer status post sigmoid colectomy, low anterior resection with lymph node positive disease he is here to discuss adjuvant treatment options    Colon cancer  T4 N2 disease stage IIIc  Discussed risk of recurrence, prognosis discussed adjuvant treatment with FOLFOX versus XELOX for 6 months of treatment with idea trial patients who had N2 disease or T4 disease had inferior outcomes with 3 months of treatment as compared to 6 months of treatment after discussion we decided to pursue 6 months of FOLFOX adjuvant treatment  Now started treatment biggest side effect was fatigue. Discussed dose reduction vs continuing same for now we decided will continue same dose.   Nausea is improved, taste changes predominantly, weight is stable, fatigue is present. Neuropathy is becoming more permanent  CT images reviewed independently, no concern for disease recurrence/progression.  Dose reduction of oxaliplatin to 65 mg/m2. Patient continues to have some treatment adverse effects.  -Patient continues to experience some mild treatment related " side effects, no dose-limiting toxicities.  Labs reviewed, okay to proceed with C12 today. To complete total 12 cycles.  -Restaging scans reviewed, noted to have enlarged periportal adenopathy, I discussed this case with Radiology, some concern about progressive/recurrent disease.   -PET/CT was reported negative for metastatic disease in December 2024.   -Restaging scans as above showed stable findings, no concern for progressive disease.   -continue surveillance with repeat scans in 3 months.    Transaminitis: Suspected cirrhosis:  Likely chemotherapy related-noted mild, Some improvement noted.  Noted cirrhotic changes during Colostomy reversal  Continue to monitor CMP    Iron deficiency Anemia  Hemoglobin levels have declined to 7.9 which is an acute decline.  -Iron panel is consistent with REGINE.  -discussed with patient, some concern about post-anastomotic bleeding after colostomy reversal. Will request for CRS eval to consider repeat colonoscopy.  Recheck CBC in 2 weeks to evaluate need for RBC transfusion.    Colostomy:s/p colostomy Reversal in December 2024. Follows with CRS.    Hypertension  Blood pressure improved  Continue amlodipine    Diarrhea:  Resolved after chemotherapy.     3 month follow up with labs and scans, Check CBC in 2 weeks.

## 2025-03-07 ENCOUNTER — OFFICE VISIT (OUTPATIENT)
Dept: ONCOLOGY | Facility: CLINIC | Age: 75
End: 2025-03-07
Payer: MEDICARE

## 2025-03-07 ENCOUNTER — LAB (OUTPATIENT)
Dept: LAB | Facility: HOSPITAL | Age: 75
End: 2025-03-07
Payer: MEDICARE

## 2025-03-07 VITALS
OXYGEN SATURATION: 97 % | BODY MASS INDEX: 30.88 KG/M2 | DIASTOLIC BLOOD PRESSURE: 82 MMHG | WEIGHT: 220.6 LBS | HEIGHT: 71 IN | HEART RATE: 58 BPM | SYSTOLIC BLOOD PRESSURE: 178 MMHG

## 2025-03-07 DIAGNOSIS — D64.9 ANEMIA, UNSPECIFIED TYPE: ICD-10-CM

## 2025-03-07 DIAGNOSIS — C19 RECTOSIGMOID CANCER: ICD-10-CM

## 2025-03-07 DIAGNOSIS — R74.01 TRANSAMINITIS: ICD-10-CM

## 2025-03-07 DIAGNOSIS — C18.7 MALIGNANT NEOPLASM OF SIGMOID COLON: Primary | ICD-10-CM

## 2025-03-07 DIAGNOSIS — C18.9 MALIGNANT NEOPLASM OF COLON, UNSPECIFIED PART OF COLON: ICD-10-CM

## 2025-03-07 DIAGNOSIS — K74.60 HEPATIC CIRRHOSIS, UNSPECIFIED HEPATIC CIRRHOSIS TYPE, UNSPECIFIED WHETHER ASCITES PRESENT: ICD-10-CM

## 2025-03-07 DIAGNOSIS — Z95.828 PORT-A-CATH IN PLACE: ICD-10-CM

## 2025-03-07 DIAGNOSIS — C18.9 MALIGNANT NEOPLASM OF COLON, UNSPECIFIED PART OF COLON: Primary | ICD-10-CM

## 2025-03-07 LAB
ALBUMIN SERPL-MCNC: 4.1 G/DL (ref 3.5–5.2)
ALBUMIN/GLOB SERPL: 1.3 G/DL
ALP SERPL-CCNC: 172 U/L (ref 39–117)
ALT SERPL W P-5'-P-CCNC: 84 U/L (ref 1–41)
ANION GAP SERPL CALCULATED.3IONS-SCNC: 11.2 MMOL/L (ref 5–15)
AST SERPL-CCNC: 154 U/L (ref 1–40)
BASOPHILS # BLD AUTO: 0.04 10*3/MM3 (ref 0–0.2)
BASOPHILS NFR BLD AUTO: 0.6 % (ref 0–1.5)
BILIRUB SERPL-MCNC: 0.3 MG/DL (ref 0–1.2)
BUN SERPL-MCNC: 13 MG/DL (ref 8–23)
BUN/CREAT SERPL: 13.7 (ref 7–25)
CALCIUM SPEC-SCNC: 8.9 MG/DL (ref 8.6–10.5)
CEA SERPL-MCNC: 2.25 NG/ML
CHLORIDE SERPL-SCNC: 107 MMOL/L (ref 98–107)
CO2 SERPL-SCNC: 21.8 MMOL/L (ref 22–29)
CREAT SERPL-MCNC: 0.95 MG/DL (ref 0.76–1.27)
DEPRECATED RDW RBC AUTO: 47.2 FL (ref 37–54)
EGFRCR SERPLBLD CKD-EPI 2021: 84.5 ML/MIN/1.73
EOSINOPHIL # BLD AUTO: 0.19 10*3/MM3 (ref 0–0.4)
EOSINOPHIL NFR BLD AUTO: 2.9 % (ref 0.3–6.2)
ERYTHROCYTE [DISTWIDTH] IN BLOOD BY AUTOMATED COUNT: 17.9 % (ref 12.3–15.4)
FERRITIN SERPL-MCNC: 20.3 NG/ML (ref 30–400)
FOLATE SERPL-MCNC: >20 NG/ML (ref 4.78–24.2)
GLOBULIN UR ELPH-MCNC: 3.2 GM/DL
GLUCOSE SERPL-MCNC: 104 MG/DL (ref 65–99)
HAPTOGLOB SERPL-MCNC: 220 MG/DL (ref 30–200)
HCT VFR BLD AUTO: 28.7 % (ref 37.5–51)
HGB BLD-MCNC: 7.9 G/DL (ref 13–17.7)
IRON 24H UR-MRATE: 18 MCG/DL (ref 59–158)
IRON SATN MFR SERPL: 4 % (ref 20–50)
LDH SERPL-CCNC: 120 U/L (ref 135–225)
LYMPHOCYTES # BLD AUTO: 1.6 10*3/MM3 (ref 0.7–3.1)
LYMPHOCYTES NFR BLD AUTO: 24.8 % (ref 19.6–45.3)
MCH RBC QN AUTO: 21.1 PG (ref 26.6–33)
MCHC RBC AUTO-ENTMCNC: 27.5 G/DL (ref 31.5–35.7)
MCV RBC AUTO: 76.7 FL (ref 79–97)
MONOCYTES # BLD AUTO: 0.57 10*3/MM3 (ref 0.1–0.9)
MONOCYTES NFR BLD AUTO: 8.8 % (ref 5–12)
NEUTROPHILS NFR BLD AUTO: 4.06 10*3/MM3 (ref 1.7–7)
NEUTROPHILS NFR BLD AUTO: 62.9 % (ref 42.7–76)
PLATELET # BLD AUTO: 211 10*3/MM3 (ref 140–450)
PMV BLD AUTO: 11.9 FL (ref 6–12)
POTASSIUM SERPL-SCNC: 4.5 MMOL/L (ref 3.5–5.2)
PROT SERPL-MCNC: 7.3 G/DL (ref 6–8.5)
RBC # BLD AUTO: 3.74 10*6/MM3 (ref 4.14–5.8)
SODIUM SERPL-SCNC: 140 MMOL/L (ref 136–145)
TIBC SERPL-MCNC: 459 MCG/DL (ref 298–536)
TRANSFERRIN SERPL-MCNC: 308 MG/DL (ref 200–360)
VIT B12 BLD-MCNC: 592 PG/ML (ref 211–946)
WBC NRBC COR # BLD AUTO: 6.46 10*3/MM3 (ref 3.4–10.8)

## 2025-03-07 PROCEDURE — 82728 ASSAY OF FERRITIN: CPT | Performed by: STUDENT IN AN ORGANIZED HEALTH CARE EDUCATION/TRAINING PROGRAM

## 2025-03-07 PROCEDURE — 83615 LACTATE (LD) (LDH) ENZYME: CPT | Performed by: STUDENT IN AN ORGANIZED HEALTH CARE EDUCATION/TRAINING PROGRAM

## 2025-03-07 PROCEDURE — 84466 ASSAY OF TRANSFERRIN: CPT | Performed by: STUDENT IN AN ORGANIZED HEALTH CARE EDUCATION/TRAINING PROGRAM

## 2025-03-07 PROCEDURE — 82607 VITAMIN B-12: CPT | Performed by: STUDENT IN AN ORGANIZED HEALTH CARE EDUCATION/TRAINING PROGRAM

## 2025-03-07 PROCEDURE — 82746 ASSAY OF FOLIC ACID SERUM: CPT | Performed by: STUDENT IN AN ORGANIZED HEALTH CARE EDUCATION/TRAINING PROGRAM

## 2025-03-07 PROCEDURE — 36415 COLL VENOUS BLD VENIPUNCTURE: CPT

## 2025-03-07 PROCEDURE — 83540 ASSAY OF IRON: CPT | Performed by: STUDENT IN AN ORGANIZED HEALTH CARE EDUCATION/TRAINING PROGRAM

## 2025-03-07 PROCEDURE — 80053 COMPREHEN METABOLIC PANEL: CPT | Performed by: STUDENT IN AN ORGANIZED HEALTH CARE EDUCATION/TRAINING PROGRAM

## 2025-03-07 PROCEDURE — 83010 ASSAY OF HAPTOGLOBIN QUANT: CPT | Performed by: STUDENT IN AN ORGANIZED HEALTH CARE EDUCATION/TRAINING PROGRAM

## 2025-03-07 PROCEDURE — 82378 CARCINOEMBRYONIC ANTIGEN: CPT | Performed by: STUDENT IN AN ORGANIZED HEALTH CARE EDUCATION/TRAINING PROGRAM

## 2025-03-07 RX ORDER — ASPIRIN 81 MG/1
81 TABLET ORAL DAILY
COMMUNITY

## 2025-03-07 RX ORDER — GABAPENTIN 100 MG/1
100 CAPSULE ORAL 3 TIMES DAILY
Qty: 90 CAPSULE | Refills: 2 | Status: SHIPPED | OUTPATIENT
Start: 2025-03-07

## 2025-03-10 ENCOUNTER — RESULTS FOLLOW-UP (OUTPATIENT)
Dept: ONCOLOGY | Facility: CLINIC | Age: 75
End: 2025-03-10
Payer: MEDICARE

## 2025-03-10 ENCOUNTER — OFFICE VISIT (OUTPATIENT)
Age: 75
End: 2025-03-10
Payer: MEDICARE

## 2025-03-10 VITALS
HEIGHT: 71 IN | HEART RATE: 63 BPM | WEIGHT: 224 LBS | BODY MASS INDEX: 31.36 KG/M2 | OXYGEN SATURATION: 98 % | TEMPERATURE: 98.4 F | DIASTOLIC BLOOD PRESSURE: 84 MMHG | SYSTOLIC BLOOD PRESSURE: 154 MMHG

## 2025-03-10 DIAGNOSIS — C18.7 MALIGNANT NEOPLASM OF SIGMOID COLON: Primary | ICD-10-CM

## 2025-03-10 DIAGNOSIS — C18.9 MALIGNANT NEOPLASM OF COLON, UNSPECIFIED PART OF COLON: ICD-10-CM

## 2025-03-10 DIAGNOSIS — D64.9 ANEMIA, UNSPECIFIED TYPE: Primary | ICD-10-CM

## 2025-03-10 RX ORDER — FERROUS SULFATE 325(65) MG
325 TABLET ORAL
Qty: 30 TABLET | Refills: 2 | Status: SHIPPED | OUTPATIENT
Start: 2025-03-10

## 2025-03-10 NOTE — TELEPHONE ENCOUNTER
Spoke with patient, he has not taken oral iron in the past, prescription sent to patients pharmacy. Informed patient JamiaKAREN would like to get a urine and stool sample to evaluate for blood loss. Patient stated he was just at Dr Burden's office and he ordered labs including a stool sample patient is going to the hospital tomorrow to complete, UA orders added. Advised patient if he has any issues with his oral iron to notify our office.

## 2025-03-11 ENCOUNTER — LAB (OUTPATIENT)
Dept: LAB | Facility: HOSPITAL | Age: 75
End: 2025-03-11
Payer: MEDICARE

## 2025-03-11 DIAGNOSIS — C18.7 MALIGNANT NEOPLASM OF SIGMOID COLON: ICD-10-CM

## 2025-03-11 DIAGNOSIS — D64.9 ANEMIA, UNSPECIFIED TYPE: ICD-10-CM

## 2025-03-11 DIAGNOSIS — C18.9 MALIGNANT NEOPLASM OF COLON, UNSPECIFIED PART OF COLON: ICD-10-CM

## 2025-03-11 LAB
BACTERIA UR QL AUTO: NORMAL /HPF
BASOPHILS # BLD AUTO: 0.03 10*3/MM3 (ref 0–0.2)
BASOPHILS NFR BLD AUTO: 0.4 % (ref 0–1.5)
BILIRUB UR QL STRIP: NEGATIVE
CLARITY UR: CLEAR
COLOR UR: YELLOW
DEPRECATED RDW RBC AUTO: 49 FL (ref 37–54)
EOSINOPHIL # BLD AUTO: 0.16 10*3/MM3 (ref 0–0.4)
EOSINOPHIL NFR BLD AUTO: 2.1 % (ref 0.3–6.2)
ERYTHROCYTE [DISTWIDTH] IN BLOOD BY AUTOMATED COUNT: 17.6 % (ref 12.3–15.4)
GLUCOSE UR STRIP-MCNC: NEGATIVE MG/DL
HCT VFR BLD AUTO: 26.3 % (ref 37.5–51)
HEMOCCULT STL QL IA: POSITIVE
HGB BLD-MCNC: 7.2 G/DL (ref 13–17.7)
HGB UR QL STRIP.AUTO: NEGATIVE
HYALINE CASTS UR QL AUTO: NORMAL /LPF
IMM GRANULOCYTES # BLD AUTO: 0.02 10*3/MM3 (ref 0–0.05)
IMM GRANULOCYTES NFR BLD AUTO: 0.3 % (ref 0–0.5)
KETONES UR QL STRIP: NEGATIVE
LEUKOCYTE ESTERASE UR QL STRIP.AUTO: NEGATIVE
LYMPHOCYTES # BLD AUTO: 1.51 10*3/MM3 (ref 0.7–3.1)
LYMPHOCYTES NFR BLD AUTO: 20.1 % (ref 19.6–45.3)
MCH RBC QN AUTO: 20.9 PG (ref 26.6–33)
MCHC RBC AUTO-ENTMCNC: 27.4 G/DL (ref 31.5–35.7)
MCV RBC AUTO: 76.2 FL (ref 79–97)
MONOCYTES # BLD AUTO: 0.59 10*3/MM3 (ref 0.1–0.9)
MONOCYTES NFR BLD AUTO: 7.8 % (ref 5–12)
NEUTROPHILS NFR BLD AUTO: 5.21 10*3/MM3 (ref 1.7–7)
NEUTROPHILS NFR BLD AUTO: 69.3 % (ref 42.7–76)
NITRITE UR QL STRIP: NEGATIVE
NRBC BLD AUTO-RTO: 0 /100 WBC (ref 0–0.2)
PH UR STRIP.AUTO: 5.5 [PH] (ref 5–8)
PLATELET # BLD AUTO: 219 10*3/MM3 (ref 140–450)
PMV BLD AUTO: 12.5 FL (ref 6–12)
PROT UR QL STRIP: NEGATIVE
RBC # BLD AUTO: 3.45 10*6/MM3 (ref 4.14–5.8)
RBC # UR STRIP: NORMAL /HPF
REF LAB TEST METHOD: NORMAL
SP GR UR STRIP: 1.02 (ref 1–1.03)
SQUAMOUS #/AREA URNS HPF: NORMAL /HPF
UROBILINOGEN UR QL STRIP: NORMAL
WBC # UR STRIP: NORMAL /HPF
WBC NRBC COR # BLD AUTO: 7.52 10*3/MM3 (ref 3.4–10.8)

## 2025-03-11 PROCEDURE — 82274 ASSAY TEST FOR BLOOD FECAL: CPT

## 2025-03-11 PROCEDURE — 81001 URINALYSIS AUTO W/SCOPE: CPT

## 2025-03-11 PROCEDURE — 85025 COMPLETE CBC W/AUTO DIFF WBC: CPT

## 2025-03-11 PROCEDURE — 36415 COLL VENOUS BLD VENIPUNCTURE: CPT

## 2025-03-12 RX ORDER — SODIUM, POTASSIUM,MAG SULFATES 17.5-3.13G
SOLUTION, RECONSTITUTED, ORAL ORAL
Qty: 177 ML | Refills: 0 | Status: SHIPPED | OUTPATIENT
Start: 2025-03-12

## 2025-03-12 NOTE — H&P (VIEW-ONLY)
Colorectal Surgery Followup Note    ID:  Viktor Atkins;   : 1950  DATE OF VISIT: 3/11/2025    Chief Complaint  Follow-up (Follow up discuss labs )       Subjective    Patient recently has been having downtrending hemoglobin.  His last hemoglobin was 7.2 that trended down from above 10.  Patient denies any blood in the stool.  He denies any vomiting.  He reports his bowel movement is regular.  He denies any epigastric pain.  Exam  General:  No acute distress  Head: Normocephalic, atraumatic  Neuro: Alert and oriented    Abdomen:  Soft, non-tender, non-distended, no hernias, no hepatomegaly, no splenomegaly. No abnormal, audible bowel sounds.        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    -Iron deficiency anemia  Plan / Recommendations  -I have sent FOBT which turned positive.  He denies any bleeding with bowel movements or wiping.  This likely might be an upper GI bleed, less likely from the anastomosis.  -We will proceed with colonoscopy to rule out lower GI bleeding  -I will discuss with Dr. Dickey to continue with iron supplements/infusion  -Follow-up at the time of colonoscopy      Aakash Burden MD  Colon and Rectal Surgery   [facility]

## 2025-03-12 NOTE — PROGRESS NOTES
Colorectal Surgery Followup Note    ID:  Viktor Atkins;   : 1950  DATE OF VISIT: 3/11/2025    Chief Complaint  Follow-up (Follow up discuss labs )       Subjective    Patient recently has been having downtrending hemoglobin.  His last hemoglobin was 7.2 that trended down from above 10.  Patient denies any blood in the stool.  He denies any vomiting.  He reports his bowel movement is regular.  He denies any epigastric pain.  Exam  General:  No acute distress  Head: Normocephalic, atraumatic  Neuro: Alert and oriented    Abdomen:  Soft, non-tender, non-distended, no hernias, no hepatomegaly, no splenomegaly. No abnormal, audible bowel sounds.        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    -Iron deficiency anemia  Plan / Recommendations  -I have sent FOBT which turned positive.  He denies any bleeding with bowel movements or wiping.  This likely might be an upper GI bleed, less likely from the anastomosis.  -We will proceed with colonoscopy to rule out lower GI bleeding  -I will discuss with Dr. Dickey to continue with iron supplements/infusion  -Follow-up at the time of colonoscopy      Aakash Burden MD  Colon and Rectal Surgery   [facility]

## 2025-03-13 DIAGNOSIS — D64.9 ANEMIA, UNSPECIFIED TYPE: Primary | ICD-10-CM

## 2025-03-14 ENCOUNTER — LAB (OUTPATIENT)
Dept: LAB | Facility: HOSPITAL | Age: 75
End: 2025-03-14
Payer: MEDICARE

## 2025-03-14 DIAGNOSIS — D64.9 ANEMIA, UNSPECIFIED TYPE: ICD-10-CM

## 2025-03-14 PROBLEM — D62 ANEMIA ASSOCIATED WITH ACUTE BLOOD LOSS: Status: ACTIVE | Noted: 2025-03-14

## 2025-03-14 PROBLEM — D50.9 IDA (IRON DEFICIENCY ANEMIA): Status: ACTIVE | Noted: 2025-03-14

## 2025-03-14 LAB
BASOPHILS # BLD AUTO: 0.05 10*3/MM3 (ref 0–0.2)
BASOPHILS NFR BLD AUTO: 0.8 % (ref 0–1.5)
DEPRECATED RDW RBC AUTO: 47.2 FL (ref 37–54)
EOSINOPHIL # BLD AUTO: 0.18 10*3/MM3 (ref 0–0.4)
EOSINOPHIL NFR BLD AUTO: 2.8 % (ref 0.3–6.2)
ERYTHROCYTE [DISTWIDTH] IN BLOOD BY AUTOMATED COUNT: 18.2 % (ref 12.3–15.4)
HCT VFR BLD AUTO: 27.4 % (ref 37.5–51)
HGB BLD-MCNC: 7.7 G/DL (ref 13–17.7)
LYMPHOCYTES # BLD AUTO: 1.47 10*3/MM3 (ref 0.7–3.1)
LYMPHOCYTES NFR BLD AUTO: 22.5 % (ref 19.6–45.3)
MCH RBC QN AUTO: 21.3 PG (ref 26.6–33)
MCHC RBC AUTO-ENTMCNC: 28.1 G/DL (ref 31.5–35.7)
MCV RBC AUTO: 75.7 FL (ref 79–97)
MONOCYTES # BLD AUTO: 0.48 10*3/MM3 (ref 0.1–0.9)
MONOCYTES NFR BLD AUTO: 7.4 % (ref 5–12)
NEUTROPHILS NFR BLD AUTO: 4.35 10*3/MM3 (ref 1.7–7)
NEUTROPHILS NFR BLD AUTO: 66.5 % (ref 42.7–76)
PLATELET # BLD AUTO: 216 10*3/MM3 (ref 140–450)
PMV BLD AUTO: 11.7 FL (ref 6–12)
RBC # BLD AUTO: 3.62 10*6/MM3 (ref 4.14–5.8)
WBC NRBC COR # BLD AUTO: 6.53 10*3/MM3 (ref 3.4–10.8)

## 2025-03-14 PROCEDURE — 85025 COMPLETE CBC W/AUTO DIFF WBC: CPT

## 2025-03-14 PROCEDURE — 36415 COLL VENOUS BLD VENIPUNCTURE: CPT

## 2025-03-19 ENCOUNTER — ANESTHESIA EVENT (OUTPATIENT)
Dept: GASTROENTEROLOGY | Facility: HOSPITAL | Age: 75
End: 2025-03-19
Payer: MEDICARE

## 2025-03-19 ENCOUNTER — ANESTHESIA (OUTPATIENT)
Dept: GASTROENTEROLOGY | Facility: HOSPITAL | Age: 75
End: 2025-03-19
Payer: MEDICARE

## 2025-03-19 ENCOUNTER — HOSPITAL ENCOUNTER (OUTPATIENT)
Facility: HOSPITAL | Age: 75
Setting detail: HOSPITAL OUTPATIENT SURGERY
Discharge: HOME | End: 2025-03-19
Attending: STUDENT IN AN ORGANIZED HEALTH CARE EDUCATION/TRAINING PROGRAM | Admitting: STUDENT IN AN ORGANIZED HEALTH CARE EDUCATION/TRAINING PROGRAM
Payer: MEDICARE

## 2025-03-19 VITALS
DIASTOLIC BLOOD PRESSURE: 69 MMHG | WEIGHT: 212 LBS | HEIGHT: 71 IN | HEART RATE: 53 BPM | OXYGEN SATURATION: 96 % | RESPIRATION RATE: 15 BRPM | TEMPERATURE: 97.9 F | SYSTOLIC BLOOD PRESSURE: 150 MMHG | BODY MASS INDEX: 29.68 KG/M2

## 2025-03-19 DIAGNOSIS — C18.7 MALIGNANT NEOPLASM OF SIGMOID COLON: ICD-10-CM

## 2025-03-19 LAB — GLUCOSE BLDC GLUCOMTR-MCNC: 111 MG/DL (ref 70–105)

## 2025-03-19 PROCEDURE — 25810000003 SODIUM CHLORIDE 0.9 % SOLUTION: Performed by: STUDENT IN AN ORGANIZED HEALTH CARE EDUCATION/TRAINING PROGRAM

## 2025-03-19 PROCEDURE — 25010000002 PROPOFOL 10 MG/ML EMULSION

## 2025-03-19 PROCEDURE — 25010000002 LIDOCAINE 2% SOLUTION

## 2025-03-19 PROCEDURE — 82948 REAGENT STRIP/BLOOD GLUCOSE: CPT

## 2025-03-19 PROCEDURE — 45385 COLONOSCOPY W/LESION REMOVAL: CPT | Performed by: STUDENT IN AN ORGANIZED HEALTH CARE EDUCATION/TRAINING PROGRAM

## 2025-03-19 PROCEDURE — 88305 TISSUE EXAM BY PATHOLOGIST: CPT | Performed by: STUDENT IN AN ORGANIZED HEALTH CARE EDUCATION/TRAINING PROGRAM

## 2025-03-19 PROCEDURE — 25810000003 SODIUM CHLORIDE 0.9 % SOLUTION

## 2025-03-19 PROCEDURE — 45380 COLONOSCOPY AND BIOPSY: CPT | Performed by: STUDENT IN AN ORGANIZED HEALTH CARE EDUCATION/TRAINING PROGRAM

## 2025-03-19 DEVICE — DEV CLIP ENDO RESOLUTION360 CONTRL ROT 235CM: Type: IMPLANTABLE DEVICE | Site: TRANSVERSE COLON | Status: FUNCTIONAL

## 2025-03-19 RX ORDER — PROPOFOL 10 MG/ML
VIAL (ML) INTRAVENOUS CONTINUOUS PRN
Status: DISCONTINUED | OUTPATIENT
Start: 2025-03-19 | End: 2025-03-19 | Stop reason: SURG

## 2025-03-19 RX ORDER — LABETALOL HYDROCHLORIDE 5 MG/ML
5 INJECTION, SOLUTION INTRAVENOUS
Status: DISCONTINUED | OUTPATIENT
Start: 2025-03-19 | End: 2025-03-19 | Stop reason: HOSPADM

## 2025-03-19 RX ORDER — EPHEDRINE SULFATE 5 MG/ML
5 INJECTION INTRAVENOUS ONCE AS NEEDED
Status: DISCONTINUED | OUTPATIENT
Start: 2025-03-19 | End: 2025-03-19 | Stop reason: HOSPADM

## 2025-03-19 RX ORDER — SODIUM CHLORIDE 9 MG/ML
50 INJECTION, SOLUTION INTRAVENOUS CONTINUOUS
Status: DISCONTINUED | OUTPATIENT
Start: 2025-03-19 | End: 2025-03-19 | Stop reason: HOSPADM

## 2025-03-19 RX ORDER — MEPERIDINE HYDROCHLORIDE 25 MG/ML
12.5 INJECTION INTRAMUSCULAR; INTRAVENOUS; SUBCUTANEOUS
Status: DISCONTINUED | OUTPATIENT
Start: 2025-03-19 | End: 2025-03-19 | Stop reason: HOSPADM

## 2025-03-19 RX ORDER — IPRATROPIUM BROMIDE AND ALBUTEROL SULFATE 2.5; .5 MG/3ML; MG/3ML
3 SOLUTION RESPIRATORY (INHALATION) ONCE AS NEEDED
Status: DISCONTINUED | OUTPATIENT
Start: 2025-03-19 | End: 2025-03-19 | Stop reason: HOSPADM

## 2025-03-19 RX ORDER — HYDRALAZINE HYDROCHLORIDE 20 MG/ML
5 INJECTION INTRAMUSCULAR; INTRAVENOUS
Status: DISCONTINUED | OUTPATIENT
Start: 2025-03-19 | End: 2025-03-19 | Stop reason: HOSPADM

## 2025-03-19 RX ORDER — LIDOCAINE HYDROCHLORIDE 20 MG/ML
INJECTION, SOLUTION INFILTRATION; PERINEURAL AS NEEDED
Status: DISCONTINUED | OUTPATIENT
Start: 2025-03-19 | End: 2025-03-19 | Stop reason: SURG

## 2025-03-19 RX ORDER — DIPHENHYDRAMINE HYDROCHLORIDE 50 MG/ML
12.5 INJECTION, SOLUTION INTRAMUSCULAR; INTRAVENOUS
Status: DISCONTINUED | OUTPATIENT
Start: 2025-03-19 | End: 2025-03-19 | Stop reason: HOSPADM

## 2025-03-19 RX ORDER — ONDANSETRON 2 MG/ML
4 INJECTION INTRAMUSCULAR; INTRAVENOUS ONCE AS NEEDED
Status: DISCONTINUED | OUTPATIENT
Start: 2025-03-19 | End: 2025-03-19 | Stop reason: HOSPADM

## 2025-03-19 RX ORDER — SODIUM CHLORIDE 9 MG/ML
INJECTION, SOLUTION INTRAVENOUS CONTINUOUS PRN
Status: DISCONTINUED | OUTPATIENT
Start: 2025-03-19 | End: 2025-03-19 | Stop reason: SURG

## 2025-03-19 RX ADMIN — PROPOFOL 60 MG: 10 INJECTION, EMULSION INTRAVENOUS at 07:26

## 2025-03-19 RX ADMIN — SODIUM CHLORIDE 50 ML/HR: 9 INJECTION, SOLUTION INTRAVENOUS at 06:43

## 2025-03-19 RX ADMIN — PROPOFOL 150 MCG/KG/MIN: 10 INJECTION, EMULSION INTRAVENOUS at 07:26

## 2025-03-19 RX ADMIN — LIDOCAINE HYDROCHLORIDE 100 MG: 20 INJECTION, SOLUTION INFILTRATION; PERINEURAL at 07:26

## 2025-03-19 RX ADMIN — PROPOFOL 40 MG: 10 INJECTION, EMULSION INTRAVENOUS at 07:46

## 2025-03-19 RX ADMIN — SODIUM CHLORIDE: 9 INJECTION, SOLUTION INTRAVENOUS at 07:25

## 2025-03-19 NOTE — ANESTHESIA PREPROCEDURE EVALUATION
Anesthesia Evaluation     Patient summary reviewed and Nursing notes reviewed   no history of anesthetic complications:   NPO Solid Status: > 8 hours  NPO Liquid Status: > 8 hours           Airway   Mallampati: II  TM distance: >3 FB  Neck ROM: full  No difficulty expected  Dental - normal exam     Pulmonary - negative pulmonary ROS and normal exam   Cardiovascular - normal exam    ECG reviewed    (+) hypertension, dysrhythmias Paroxysmal Atrial Fib, CHF Systolic <55%, hyperlipidemia      Neuro/Psych  (+) psychiatric history  GI/Hepatic/Renal/Endo    (+) GERD, GI bleeding , diabetes mellitus    Musculoskeletal     Abdominal  - normal exam    Bowel sounds: normal.   Substance History      OB/GYN          Other   arthritis, blood dyscrasia anemia,   history of cancer    ROS/Med Hx Other: Sinus bradycardia  Incomplete left bundle branch block  Low voltage, extremity leads  No change from prior tracing      3/24   Myocardial perfusion imaging indicates a normal myocardial perfusion study with no evidence of ischemia. Impressions are consistent with a low risk study.  ·  Left ventricular ejection fraction is moderately reduced (Calculated EF = 32%).  ·  This is normal Cardiolite imaging stress test with no evidence of ischemia or myocardial infarction.  Small fixed apical defect is noted which is thought to be attenuation artifact left ventricle size and function is normal on gated SPECT imaging.  No wall motion abnormality was noted.  Clinical correlation recommended.  Further recommendation as per ordering physician. .  ·  Findings consistent with an equivocal ECG stress test.        ·  Left ventricular systolic function is low normal. Calculated left ventricular EF = 46% Left ventricular ejection fraction appears to be 46 - 50%.  ·  The left ventricular cavity is mildly dilated.  ·  Left ventricular diastolic dysfunction is noted.  ·  The left atrial cavity is dilated.  ·  Estimated right ventricular systolic  pressure from tricuspid regurgitation is normal (<35 mmHg).  ·  Dilation of the aortic root is present.  4.4 cm  ·  Patient in atrial fibrillation during this study.      MPRESSION:  Impression:  1.Severe dilation of the cecum, ascending colon and transverse colon with abrupt transition zone at the distal transverse colon at the level of surgical anastomosis. The findings are worrisome for obstruction at the level of the surgical anastomosis.   Surgical consultation is advised.  2.No evidence of small bowel obstruction.  3.Interval colostomy takedown. The drainage catheter is kinked within the subcutaneous fat at the takedown site.  4.Additional findings include: Bibasilar atelectasis, NG tube in the stomach, uncomplicated sigmoid diverticulosis, cholecystectomy, mild prostatic enlargement, small fat-containing right inguinal hernia, suspected severe canal stenosis at L4-5.                             Anesthesia Plan    ASA 3     general   total IV anesthesia  intravenous induction     Anesthetic plan, risks, benefits, and alternatives have been provided, discussed and informed consent has been obtained with: patient.    Plan discussed with CRNA.        CODE STATUS:

## 2025-03-19 NOTE — DISCHARGE INSTRUCTIONS
A responsible adult should stay with you and you should rest quietly for the rest of the day.    Do not drink alcohol, drive, operate any heavy machinery or power tools or make any legal/important decisions for the next 24 hours.     Progress your diet as tolerated.  If you begin to experience severe pain, increased shortness of breath, racing heartbeat or a fever above 101 F, seek immediate medical attention.     Follow up with MD as instructed. Call office for results in 3 to 5 days if needed.     Recommendation:      Monitor Hgb. If it continues to trend plan for EGD   Repeat colonoscopy in 1 year     Conclusion:  The diagnostic colonoscopy was completed successfully, and the entire colon was visualized without any complications. The patient tolerated the procedure well and was transferred to the recovery area in stable condition. Post-procedure instructions and follow-up were discussed with the patient and will be provided in written form.

## 2025-03-19 NOTE — ANESTHESIA POSTPROCEDURE EVALUATION
Patient: Viktor Atkins    Procedure Summary       Date: 03/19/25 Room / Location: [facility] ENDOSCOPY 4 / [facility] ENDOSCOPY    Anesthesia Start: 0725 Anesthesia Stop: 0803    Procedure: COLONOSCOPY with poylectomy x 4, and transverse colon anastomosis biopsy and endo clipping x 1 (Rectum) Diagnosis:       Malignant neoplasm of sigmoid colon      (Malignant neoplasm of sigmoid colon [C18.7])    Surgeons: Aakash Burden MD Provider: Emelia Whittington MD    Anesthesia Type: general ASA Status: 3            Anesthesia Type: general    Vitals  Vitals Value Taken Time   /68 03/19/25 08:26   Temp     Pulse 53 03/19/25 08:27   Resp 15 03/19/25 08:22   SpO2 97 % 03/19/25 08:27   Vitals shown include unfiled device data.        Post Anesthesia Care and Evaluation    Patient location during evaluation: PACU  Patient participation: complete - patient participated  Level of consciousness: awake and alert  Pain management: satisfactory to patient    Airway patency: patent  Anesthetic complications: No anesthetic complications  PONV Status: none  Cardiovascular status: acceptable  Respiratory status: acceptable  Hydration status: acceptable

## 2025-03-19 NOTE — OP NOTE
[facility] [place] [facility]  Colonoscopy Examination     Name: Viktor Atkins  : 1950  Date of Colonoscopy: 3/19/2025  Procedure: Diagnostic Colonoscopy for anemia   Surgeon: Aakash Burden MD   Anesthesia: Sedation   IV Fluids: refer to anesthesia record    Procedure Details:    Prior to the procedure, history and physical exam was performed. Patient's medication and allergies were reviewed. The risk and benefits of the procedure and sedation options and risks were discussed with the patient. All questions were answered and informed consent was obtained.    The patient was brought to the endoscopy suite and placed in the left lateral decubitus position. Conscious sedation was administered by the anesthesia team. Vital signs including blood pressure, heart rate, and oxygen saturation were monitored continuously throughout the procedure.    The colonoscope was introduced through the rectum and advanced through the entire colon under direct visualization. The scope was maneuvered carefully, and the mucosa of the rectum, sigmoid colon, descending colon, transverse colon, ascending colon, and cecum were thoroughly inspected. Adequate insufflation was maintained throughout the procedure for optimal visualization.    Findings:    Rectum: Grade 2 internal hemorrhoids with no lesions,polyps, or abnormalities.No sign of bleeding  Sigmoid colon: Rectosigmoid side to end anastomosis, patent, w/o lesion or abnormalities. No stricture. Large opening diverticulosis w/o sign of bleeding.                                                    Descending colon: Normal appearance with no lesions,polyps, or abnormalities.  Transverse colon: end to end anastomosis well healed, no stricture, no lesion. Three 5 mm polyps removed with cold biopsy forceps. 15 mm sessile polyp removed with hot snare.   Ascending colon: Normal appearance with no lesions,polyps, or abnormalities.  Cecum: Normal appearance with no lesions,polyps, or  abnormalities.    Procedure Images:          Interventions:    Transverse colon ( 15 mm, sessile) hot snare   Transverse colon ( 5 mm, diminutive, x 3) cold biopsy forceps  3. Transverse colon (anastomosis ) biopsy forceps   Specimens:  The removed polyp was retrieved and sent for histopathological examination to the pathology department for further analysis.    Recommendation:     Monitor Hgb. If it continues to trend plan for EGD   Repeat colonoscopy in 1 year    Conclusion:  The diagnostic colonoscopy was completed successfully, and the entire colon was visualized without any complications. The patient tolerated the procedure well and was transferred to the recovery area in stable condition. Post-procedure instructions and follow-up were discussed with the patient and will be provided in written form.    Aakash Burden MD  Colon and Rectal Surgery   [address] [place], [address]  T: [phone]

## 2025-03-20 ENCOUNTER — HOSPITAL ENCOUNTER (OUTPATIENT)
Dept: ONCOLOGY | Facility: HOSPITAL | Age: 75
Discharge: HOME | End: 2025-03-20
Admitting: STUDENT IN AN ORGANIZED HEALTH CARE EDUCATION/TRAINING PROGRAM
Payer: MEDICARE

## 2025-03-20 DIAGNOSIS — C18.9 MALIGNANT NEOPLASM OF COLON, UNSPECIFIED PART OF COLON: Primary | ICD-10-CM

## 2025-03-20 DIAGNOSIS — D64.9 ANEMIA, UNSPECIFIED TYPE: ICD-10-CM

## 2025-03-20 LAB
BASOPHILS # BLD AUTO: 0.02 10*3/MM3 (ref 0–0.2)
BASOPHILS NFR BLD AUTO: 0.3 % (ref 0–1.5)
DEPRECATED RDW RBC AUTO: 51 FL (ref 37–54)
EOSINOPHIL # BLD AUTO: 0.17 10*3/MM3 (ref 0–0.4)
EOSINOPHIL NFR BLD AUTO: 2.9 % (ref 0.3–6.2)
ERYTHROCYTE [DISTWIDTH] IN BLOOD BY AUTOMATED COUNT: 19.4 % (ref 12.3–15.4)
HCT VFR BLD AUTO: 27.7 % (ref 37.5–51)
HGB BLD-MCNC: 7.8 G/DL (ref 13–17.7)
LAB AP CASE REPORT: NORMAL
LAB AP DIAGNOSIS COMMENT: NORMAL
LYMPHOCYTES # BLD AUTO: 1.4 10*3/MM3 (ref 0.7–3.1)
LYMPHOCYTES NFR BLD AUTO: 23.5 % (ref 19.6–45.3)
MCH RBC QN AUTO: 21.5 PG (ref 26.6–33)
MCHC RBC AUTO-ENTMCNC: 28.2 G/DL (ref 31.5–35.7)
MCV RBC AUTO: 76.5 FL (ref 79–97)
MONOCYTES # BLD AUTO: 0.49 10*3/MM3 (ref 0.1–0.9)
MONOCYTES NFR BLD AUTO: 8.2 % (ref 5–12)
NEUTROPHILS NFR BLD AUTO: 3.88 10*3/MM3 (ref 1.7–7)
NEUTROPHILS NFR BLD AUTO: 65.1 % (ref 42.7–76)
PATH REPORT.FINAL DX SPEC: NORMAL
PATH REPORT.GROSS SPEC: NORMAL
PLATELET # BLD AUTO: 198 10*3/MM3 (ref 140–450)
PMV BLD AUTO: 12.9 FL (ref 6–12)
RBC # BLD AUTO: 3.62 10*6/MM3 (ref 4.14–5.8)
WBC NRBC COR # BLD AUTO: 5.96 10*3/MM3 (ref 3.4–10.8)

## 2025-03-20 PROCEDURE — 85025 COMPLETE CBC W/AUTO DIFF WBC: CPT | Performed by: STUDENT IN AN ORGANIZED HEALTH CARE EDUCATION/TRAINING PROGRAM

## 2025-03-20 PROCEDURE — 36591 DRAW BLOOD OFF VENOUS DEVICE: CPT

## 2025-03-20 PROCEDURE — 25010000002 HEPARIN LOCK FLUSH PER 10 UNITS: Performed by: STUDENT IN AN ORGANIZED HEALTH CARE EDUCATION/TRAINING PROGRAM

## 2025-03-20 RX ORDER — HEPARIN SODIUM (PORCINE) LOCK FLUSH IV SOLN 100 UNIT/ML 100 UNIT/ML
500 SOLUTION INTRAVENOUS AS NEEDED
Status: DISCONTINUED | OUTPATIENT
Start: 2025-03-20 | End: 2025-03-24 | Stop reason: HOSPADM

## 2025-03-20 RX ORDER — SODIUM CHLORIDE 0.9 % (FLUSH) 0.9 %
10 SYRINGE (ML) INJECTION AS NEEDED
OUTPATIENT
Start: 2025-03-20

## 2025-03-20 RX ORDER — HEPARIN SODIUM (PORCINE) LOCK FLUSH IV SOLN 100 UNIT/ML 100 UNIT/ML
500 SOLUTION INTRAVENOUS AS NEEDED
Status: CANCELLED | OUTPATIENT
Start: 2025-03-20

## 2025-03-20 RX ORDER — HEPARIN SODIUM (PORCINE) LOCK FLUSH IV SOLN 100 UNIT/ML 100 UNIT/ML
500 SOLUTION INTRAVENOUS AS NEEDED
OUTPATIENT
Start: 2025-03-20

## 2025-03-20 RX ORDER — HEPARIN SODIUM (PORCINE) LOCK FLUSH IV SOLN 100 UNIT/ML 100 UNIT/ML
500 SOLUTION INTRAVENOUS AS NEEDED
Status: DISCONTINUED | OUTPATIENT
Start: 2025-03-20 | End: 2025-03-21 | Stop reason: HOSPADM

## 2025-03-20 RX ORDER — SODIUM CHLORIDE 0.9 % (FLUSH) 0.9 %
10 SYRINGE (ML) INJECTION AS NEEDED
Status: DISCONTINUED | OUTPATIENT
Start: 2025-03-20 | End: 2025-03-24 | Stop reason: HOSPADM

## 2025-03-20 RX ORDER — SODIUM CHLORIDE 0.9 % (FLUSH) 0.9 %
10 SYRINGE (ML) INJECTION AS NEEDED
Status: DISCONTINUED | OUTPATIENT
Start: 2025-03-20 | End: 2025-03-21 | Stop reason: HOSPADM

## 2025-03-20 RX ORDER — SODIUM CHLORIDE 0.9 % (FLUSH) 0.9 %
10 SYRINGE (ML) INJECTION AS NEEDED
Status: CANCELLED | OUTPATIENT
Start: 2025-03-20

## 2025-03-20 RX ADMIN — Medication 300 UNITS: at 09:25

## 2025-03-20 RX ADMIN — Medication 10 ML: at 09:25

## 2025-03-21 DIAGNOSIS — D50.9 IRON DEFICIENCY ANEMIA, UNSPECIFIED IRON DEFICIENCY ANEMIA TYPE: ICD-10-CM

## 2025-03-21 DIAGNOSIS — D62 ANEMIA ASSOCIATED WITH ACUTE BLOOD LOSS: Primary | ICD-10-CM

## 2025-03-21 RX ORDER — HYDROCORTISONE SODIUM SUCCINATE 100 MG/2ML
100 INJECTION INTRAMUSCULAR; INTRAVENOUS AS NEEDED
OUTPATIENT
Start: 2025-04-01

## 2025-03-21 RX ORDER — DIPHENHYDRAMINE HYDROCHLORIDE 50 MG/ML
50 INJECTION INTRAMUSCULAR; INTRAVENOUS AS NEEDED
OUTPATIENT
Start: 2025-04-15

## 2025-03-21 RX ORDER — DIPHENHYDRAMINE HYDROCHLORIDE 50 MG/ML
50 INJECTION INTRAMUSCULAR; INTRAVENOUS AS NEEDED
OUTPATIENT
Start: 2025-04-08

## 2025-03-21 RX ORDER — FAMOTIDINE 10 MG/ML
20 INJECTION, SOLUTION INTRAVENOUS AS NEEDED
OUTPATIENT
Start: 2025-04-15

## 2025-03-21 RX ORDER — DIPHENHYDRAMINE HYDROCHLORIDE 50 MG/ML
50 INJECTION INTRAMUSCULAR; INTRAVENOUS AS NEEDED
OUTPATIENT
Start: 2025-04-01

## 2025-03-21 RX ORDER — HYDROCORTISONE SODIUM SUCCINATE 100 MG/2ML
100 INJECTION INTRAMUSCULAR; INTRAVENOUS AS NEEDED
OUTPATIENT
Start: 2025-04-08

## 2025-03-21 RX ORDER — FAMOTIDINE 10 MG/ML
20 INJECTION, SOLUTION INTRAVENOUS AS NEEDED
OUTPATIENT
Start: 2025-04-01

## 2025-03-21 RX ORDER — FAMOTIDINE 10 MG/ML
20 INJECTION, SOLUTION INTRAVENOUS AS NEEDED
OUTPATIENT
Start: 2025-04-08

## 2025-03-21 RX ORDER — HYDROCORTISONE SODIUM SUCCINATE 100 MG/2ML
100 INJECTION INTRAMUSCULAR; INTRAVENOUS AS NEEDED
OUTPATIENT
Start: 2025-04-15

## 2025-03-21 RX ORDER — SODIUM CHLORIDE 9 MG/ML
20 INJECTION, SOLUTION INTRAVENOUS ONCE
OUTPATIENT
Start: 2025-04-08

## 2025-03-21 RX ORDER — SODIUM CHLORIDE 9 MG/ML
20 INJECTION, SOLUTION INTRAVENOUS ONCE
OUTPATIENT
Start: 2025-04-01

## 2025-03-21 RX ORDER — SODIUM CHLORIDE 9 MG/ML
20 INJECTION, SOLUTION INTRAVENOUS ONCE
OUTPATIENT
Start: 2025-04-15

## 2025-03-27 ENCOUNTER — HOSPITAL ENCOUNTER (OUTPATIENT)
Dept: ONCOLOGY | Facility: HOSPITAL | Age: 75
Discharge: HOME | End: 2025-03-27
Admitting: STUDENT IN AN ORGANIZED HEALTH CARE EDUCATION/TRAINING PROGRAM
Payer: MEDICARE

## 2025-03-27 DIAGNOSIS — Z95.828 PORT-A-CATH IN PLACE: ICD-10-CM

## 2025-03-27 DIAGNOSIS — C18.9 MALIGNANT NEOPLASM OF COLON, UNSPECIFIED PART OF COLON: Primary | ICD-10-CM

## 2025-03-27 DIAGNOSIS — D64.9 ANEMIA, UNSPECIFIED TYPE: ICD-10-CM

## 2025-03-27 LAB
ALBUMIN SERPL-MCNC: 4 G/DL (ref 3.5–5.2)
ALBUMIN/GLOB SERPL: 1.3 G/DL
ALP SERPL-CCNC: 161 U/L (ref 39–117)
ALT SERPL W P-5'-P-CCNC: 55 U/L (ref 1–41)
ANION GAP SERPL CALCULATED.3IONS-SCNC: 11.3 MMOL/L (ref 5–15)
AST SERPL-CCNC: 84 U/L (ref 1–40)
BASOPHILS # BLD AUTO: 0.02 10*3/MM3 (ref 0–0.2)
BASOPHILS NFR BLD AUTO: 0.4 % (ref 0–1.5)
BILIRUB SERPL-MCNC: 0.3 MG/DL (ref 0–1.2)
BUN SERPL-MCNC: 11 MG/DL (ref 8–23)
BUN/CREAT SERPL: 12.5 (ref 7–25)
CALCIUM SPEC-SCNC: 9 MG/DL (ref 8.6–10.5)
CHLORIDE SERPL-SCNC: 111 MMOL/L (ref 98–107)
CO2 SERPL-SCNC: 21.7 MMOL/L (ref 22–29)
CREAT SERPL-MCNC: 0.88 MG/DL (ref 0.76–1.27)
DEPRECATED RDW RBC AUTO: 55.4 FL (ref 37–54)
EGFRCR SERPLBLD CKD-EPI 2021: 90.8 ML/MIN/1.73
EOSINOPHIL # BLD AUTO: 0.11 10*3/MM3 (ref 0–0.4)
EOSINOPHIL NFR BLD AUTO: 2.1 % (ref 0.3–6.2)
ERYTHROCYTE [DISTWIDTH] IN BLOOD BY AUTOMATED COUNT: 20.5 % (ref 12.3–15.4)
GLOBULIN UR ELPH-MCNC: 3.1 GM/DL
GLUCOSE SERPL-MCNC: 104 MG/DL (ref 65–99)
HCT VFR BLD AUTO: 29.3 % (ref 37.5–51)
HGB BLD-MCNC: 8 G/DL (ref 13–17.7)
LYMPHOCYTES # BLD AUTO: 1.15 10*3/MM3 (ref 0.7–3.1)
LYMPHOCYTES NFR BLD AUTO: 21.9 % (ref 19.6–45.3)
MCH RBC QN AUTO: 21.3 PG (ref 26.6–33)
MCHC RBC AUTO-ENTMCNC: 27.3 G/DL (ref 31.5–35.7)
MCV RBC AUTO: 78.1 FL (ref 79–97)
MONOCYTES # BLD AUTO: 0.53 10*3/MM3 (ref 0.1–0.9)
MONOCYTES NFR BLD AUTO: 10.1 % (ref 5–12)
NEUTROPHILS NFR BLD AUTO: 3.44 10*3/MM3 (ref 1.7–7)
NEUTROPHILS NFR BLD AUTO: 65.5 % (ref 42.7–76)
PLATELET # BLD AUTO: 181 10*3/MM3 (ref 140–450)
PMV BLD AUTO: 12.2 FL (ref 6–12)
POTASSIUM SERPL-SCNC: 4.4 MMOL/L (ref 3.5–5.2)
PROT SERPL-MCNC: 7.1 G/DL (ref 6–8.5)
RBC # BLD AUTO: 3.75 10*6/MM3 (ref 4.14–5.8)
SODIUM SERPL-SCNC: 144 MMOL/L (ref 136–145)
WBC NRBC COR # BLD AUTO: 5.25 10*3/MM3 (ref 3.4–10.8)

## 2025-03-27 PROCEDURE — 85025 COMPLETE CBC W/AUTO DIFF WBC: CPT | Performed by: STUDENT IN AN ORGANIZED HEALTH CARE EDUCATION/TRAINING PROGRAM

## 2025-03-27 PROCEDURE — 25010000002 HEPARIN LOCK FLUSH PER 10 UNITS: Performed by: STUDENT IN AN ORGANIZED HEALTH CARE EDUCATION/TRAINING PROGRAM

## 2025-03-27 PROCEDURE — 80053 COMPREHEN METABOLIC PANEL: CPT | Performed by: STUDENT IN AN ORGANIZED HEALTH CARE EDUCATION/TRAINING PROGRAM

## 2025-03-27 PROCEDURE — 36591 DRAW BLOOD OFF VENOUS DEVICE: CPT

## 2025-03-27 RX ORDER — SODIUM CHLORIDE 0.9 % (FLUSH) 0.9 %
10 SYRINGE (ML) INJECTION AS NEEDED
OUTPATIENT
Start: 2025-03-27

## 2025-03-27 RX ORDER — SODIUM CHLORIDE 0.9 % (FLUSH) 0.9 %
10 SYRINGE (ML) INJECTION AS NEEDED
Status: DISCONTINUED | OUTPATIENT
Start: 2025-03-27 | End: 2025-03-29 | Stop reason: HOSPADM

## 2025-03-27 RX ORDER — HEPARIN SODIUM (PORCINE) LOCK FLUSH IV SOLN 100 UNIT/ML 100 UNIT/ML
500 SOLUTION INTRAVENOUS AS NEEDED
OUTPATIENT
Start: 2025-03-27

## 2025-03-27 RX ORDER — HEPARIN SODIUM (PORCINE) LOCK FLUSH IV SOLN 100 UNIT/ML 100 UNIT/ML
500 SOLUTION INTRAVENOUS AS NEEDED
Status: DISCONTINUED | OUTPATIENT
Start: 2025-03-27 | End: 2025-03-29 | Stop reason: HOSPADM

## 2025-03-27 RX ADMIN — HEPARIN 500 UNITS: 100 SYRINGE at 10:21

## 2025-03-27 RX ADMIN — Medication 10 ML: at 10:21

## 2025-03-27 NOTE — PROGRESS NOTES
Port accessed and flushed with good blood return noted. 10cc of blood wasted prior to specimen collection. Blood specimen obtained and sent to lab for processing per protocol.  Port flushed with saline and heparin prior to needle removal. Pt declined AVS and is aware of next appointment and d/c'd.

## 2025-04-01 ENCOUNTER — HOSPITAL ENCOUNTER (OUTPATIENT)
Dept: ONCOLOGY | Facility: HOSPITAL | Age: 75
Discharge: HOME | End: 2025-04-01
Payer: MEDICARE

## 2025-04-01 VITALS
TEMPERATURE: 97 F | BODY MASS INDEX: 30.8 KG/M2 | HEIGHT: 71 IN | RESPIRATION RATE: 16 BRPM | SYSTOLIC BLOOD PRESSURE: 157 MMHG | HEART RATE: 104 BPM | DIASTOLIC BLOOD PRESSURE: 69 MMHG | WEIGHT: 220 LBS | OXYGEN SATURATION: 97 %

## 2025-04-01 DIAGNOSIS — D62 ANEMIA ASSOCIATED WITH ACUTE BLOOD LOSS: Primary | ICD-10-CM

## 2025-04-01 DIAGNOSIS — D50.9 IRON DEFICIENCY ANEMIA, UNSPECIFIED IRON DEFICIENCY ANEMIA TYPE: ICD-10-CM

## 2025-04-01 DIAGNOSIS — Z95.828 PORT-A-CATH IN PLACE: ICD-10-CM

## 2025-04-01 PROCEDURE — 25010000002 HEPARIN LOCK FLUSH PER 10 UNITS: Performed by: STUDENT IN AN ORGANIZED HEALTH CARE EDUCATION/TRAINING PROGRAM

## 2025-04-01 PROCEDURE — 96366 THER/PROPH/DIAG IV INF ADDON: CPT

## 2025-04-01 PROCEDURE — 96365 THER/PROPH/DIAG IV INF INIT: CPT

## 2025-04-01 PROCEDURE — 25810000003 SODIUM CHLORIDE 0.9 % SOLUTION: Performed by: NURSE PRACTITIONER

## 2025-04-01 PROCEDURE — 25010000002 IRON SUCROSE PER 1 MG: Performed by: NURSE PRACTITIONER

## 2025-04-01 RX ORDER — HEPARIN SODIUM (PORCINE) LOCK FLUSH IV SOLN 100 UNIT/ML 100 UNIT/ML
500 SOLUTION INTRAVENOUS AS NEEDED
OUTPATIENT
Start: 2025-04-01

## 2025-04-01 RX ORDER — SODIUM CHLORIDE 0.9 % (FLUSH) 0.9 %
10 SYRINGE (ML) INJECTION AS NEEDED
OUTPATIENT
Start: 2025-04-01

## 2025-04-01 RX ORDER — HEPARIN SODIUM (PORCINE) LOCK FLUSH IV SOLN 100 UNIT/ML 100 UNIT/ML
500 SOLUTION INTRAVENOUS AS NEEDED
Status: DISCONTINUED | OUTPATIENT
Start: 2025-04-01 | End: 2025-04-02 | Stop reason: HOSPADM

## 2025-04-01 RX ORDER — SODIUM CHLORIDE 0.9 % (FLUSH) 0.9 %
10 SYRINGE (ML) INJECTION AS NEEDED
Status: DISCONTINUED | OUTPATIENT
Start: 2025-04-01 | End: 2025-04-02 | Stop reason: HOSPADM

## 2025-04-01 RX ORDER — SODIUM CHLORIDE 9 MG/ML
20 INJECTION, SOLUTION INTRAVENOUS ONCE
Status: DISCONTINUED | OUTPATIENT
Start: 2025-04-01 | End: 2025-04-02 | Stop reason: HOSPADM

## 2025-04-01 RX ADMIN — IRON SUCROSE 300 MG: 20 INJECTION, SOLUTION INTRAVENOUS at 13:17

## 2025-04-01 RX ADMIN — Medication 10 ML: at 15:19

## 2025-04-01 RX ADMIN — Medication 500 UNITS: at 15:19

## 2025-04-01 NOTE — PROGRESS NOTES
Patient in clinic for C1 Venofer.  Port accessed with sterile technique and positive blood return noted. Patient tolerated infusion and waited 30 minute observation. Port de-accessed. Patient discharged, aware of next appointment.

## 2025-04-08 ENCOUNTER — HOSPITAL ENCOUNTER (OUTPATIENT)
Dept: ONCOLOGY | Facility: HOSPITAL | Age: 75
Discharge: HOME | End: 2025-04-08
Payer: MEDICARE

## 2025-04-08 VITALS
SYSTOLIC BLOOD PRESSURE: 177 MMHG | HEART RATE: 59 BPM | DIASTOLIC BLOOD PRESSURE: 80 MMHG | WEIGHT: 217.7 LBS | RESPIRATION RATE: 14 BRPM | OXYGEN SATURATION: 97 % | HEIGHT: 71 IN | BODY MASS INDEX: 30.48 KG/M2 | TEMPERATURE: 97.8 F

## 2025-04-08 DIAGNOSIS — C18.9 MALIGNANT NEOPLASM OF COLON, UNSPECIFIED PART OF COLON: ICD-10-CM

## 2025-04-08 DIAGNOSIS — D50.9 IRON DEFICIENCY ANEMIA, UNSPECIFIED IRON DEFICIENCY ANEMIA TYPE: ICD-10-CM

## 2025-04-08 DIAGNOSIS — D62 ANEMIA ASSOCIATED WITH ACUTE BLOOD LOSS: Primary | ICD-10-CM

## 2025-04-08 PROCEDURE — 25010000002 HEPARIN LOCK FLUSH PER 10 UNITS: Performed by: STUDENT IN AN ORGANIZED HEALTH CARE EDUCATION/TRAINING PROGRAM

## 2025-04-08 PROCEDURE — 96366 THER/PROPH/DIAG IV INF ADDON: CPT

## 2025-04-08 PROCEDURE — 25810000003 SODIUM CHLORIDE 0.9 % SOLUTION: Performed by: NURSE PRACTITIONER

## 2025-04-08 PROCEDURE — 96365 THER/PROPH/DIAG IV INF INIT: CPT

## 2025-04-08 PROCEDURE — 25010000002 IRON SUCROSE PER 1 MG: Performed by: NURSE PRACTITIONER

## 2025-04-08 RX ORDER — SODIUM CHLORIDE 0.9 % (FLUSH) 0.9 %
10 SYRINGE (ML) INJECTION AS NEEDED
OUTPATIENT
Start: 2025-04-08

## 2025-04-08 RX ORDER — SODIUM CHLORIDE 9 MG/ML
20 INJECTION, SOLUTION INTRAVENOUS ONCE
Status: DISCONTINUED | OUTPATIENT
Start: 2025-04-08 | End: 2025-04-09 | Stop reason: HOSPADM

## 2025-04-08 RX ORDER — HEPARIN SODIUM (PORCINE) LOCK FLUSH IV SOLN 100 UNIT/ML 100 UNIT/ML
500 SOLUTION INTRAVENOUS AS NEEDED
Status: DISCONTINUED | OUTPATIENT
Start: 2025-04-08 | End: 2025-04-09 | Stop reason: HOSPADM

## 2025-04-08 RX ORDER — HEPARIN SODIUM (PORCINE) LOCK FLUSH IV SOLN 100 UNIT/ML 100 UNIT/ML
500 SOLUTION INTRAVENOUS AS NEEDED
OUTPATIENT
Start: 2025-04-08

## 2025-04-08 RX ORDER — SODIUM CHLORIDE 0.9 % (FLUSH) 0.9 %
10 SYRINGE (ML) INJECTION AS NEEDED
Status: DISCONTINUED | OUTPATIENT
Start: 2025-04-08 | End: 2025-04-09 | Stop reason: HOSPADM

## 2025-04-08 RX ADMIN — IRON SUCROSE 300 MG: 20 INJECTION, SOLUTION INTRAVENOUS at 13:42

## 2025-04-08 RX ADMIN — Medication 10 ML: at 15:20

## 2025-04-08 RX ADMIN — Medication 500 UNITS: at 15:20

## 2025-04-08 NOTE — PROGRESS NOTES
Patient in clinic for C2 Venofer.  Port accessed with sterile technique and positive blood return noted.Noted labs from 3/7/25.  Patient tolerated infusion.  Port de-accessed. Printed AVS and discharged home

## 2025-04-14 DIAGNOSIS — C18.9 MALIGNANT NEOPLASM OF COLON, UNSPECIFIED PART OF COLON: ICD-10-CM

## 2025-04-14 DIAGNOSIS — E43 SEVERE MALNUTRITION: ICD-10-CM

## 2025-04-15 ENCOUNTER — HOSPITAL ENCOUNTER (OUTPATIENT)
Dept: ONCOLOGY | Facility: HOSPITAL | Age: 75
Discharge: HOME | End: 2025-04-15
Payer: MEDICARE

## 2025-04-15 VITALS
OXYGEN SATURATION: 93 % | SYSTOLIC BLOOD PRESSURE: 171 MMHG | RESPIRATION RATE: 14 BRPM | DIASTOLIC BLOOD PRESSURE: 69 MMHG | TEMPERATURE: 98.2 F | BODY MASS INDEX: 30.73 KG/M2 | HEIGHT: 71 IN | HEART RATE: 58 BPM | WEIGHT: 219.5 LBS

## 2025-04-15 DIAGNOSIS — D62 ANEMIA ASSOCIATED WITH ACUTE BLOOD LOSS: Primary | ICD-10-CM

## 2025-04-15 DIAGNOSIS — Z95.828 PORT-A-CATH IN PLACE: ICD-10-CM

## 2025-04-15 DIAGNOSIS — D50.9 IRON DEFICIENCY ANEMIA, UNSPECIFIED IRON DEFICIENCY ANEMIA TYPE: ICD-10-CM

## 2025-04-15 LAB
BASOPHILS # BLD AUTO: 0.04 10*3/MM3 (ref 0–0.2)
BASOPHILS NFR BLD AUTO: 0.7 % (ref 0–1.5)
DEPRECATED RDW RBC AUTO: 70.8 FL (ref 37–54)
EOSINOPHIL # BLD AUTO: 0.1 10*3/MM3 (ref 0–0.4)
EOSINOPHIL NFR BLD AUTO: 1.7 % (ref 0.3–6.2)
ERYTHROCYTE [DISTWIDTH] IN BLOOD BY AUTOMATED COUNT: 24.2 % (ref 12.3–15.4)
HCT VFR BLD AUTO: 31.9 % (ref 37.5–51)
HGB BLD-MCNC: 8.9 G/DL (ref 13–17.7)
LYMPHOCYTES # BLD AUTO: 1.12 10*3/MM3 (ref 0.7–3.1)
LYMPHOCYTES NFR BLD AUTO: 19.3 % (ref 19.6–45.3)
MCH RBC QN AUTO: 22.8 PG (ref 26.6–33)
MCHC RBC AUTO-ENTMCNC: 27.9 G/DL (ref 31.5–35.7)
MCV RBC AUTO: 81.8 FL (ref 79–97)
MONOCYTES # BLD AUTO: 0.44 10*3/MM3 (ref 0.1–0.9)
MONOCYTES NFR BLD AUTO: 7.6 % (ref 5–12)
NEUTROPHILS NFR BLD AUTO: 4.1 10*3/MM3 (ref 1.7–7)
NEUTROPHILS NFR BLD AUTO: 70.5 % (ref 42.7–76)
PLAT MORPH BLD: NORMAL
PLATELET # BLD AUTO: 138 10*3/MM3 (ref 140–450)
PMV BLD AUTO: ABNORMAL FL
RBC # BLD AUTO: 3.9 10*6/MM3 (ref 4.14–5.8)
RBC MORPH BLD: NORMAL
WBC MORPH BLD: NORMAL
WBC NRBC COR # BLD AUTO: 5.81 10*3/MM3 (ref 3.4–10.8)

## 2025-04-15 PROCEDURE — 96366 THER/PROPH/DIAG IV INF ADDON: CPT

## 2025-04-15 PROCEDURE — 96365 THER/PROPH/DIAG IV INF INIT: CPT

## 2025-04-15 PROCEDURE — 85007 BL SMEAR W/DIFF WBC COUNT: CPT | Performed by: NURSE PRACTITIONER

## 2025-04-15 PROCEDURE — 25010000002 IRON SUCROSE PER 1 MG: Performed by: NURSE PRACTITIONER

## 2025-04-15 PROCEDURE — 25810000003 SODIUM CHLORIDE 0.9 % SOLUTION: Performed by: NURSE PRACTITIONER

## 2025-04-15 PROCEDURE — 25010000002 HEPARIN LOCK FLUSH PER 10 UNITS: Performed by: STUDENT IN AN ORGANIZED HEALTH CARE EDUCATION/TRAINING PROGRAM

## 2025-04-15 PROCEDURE — 85025 COMPLETE CBC W/AUTO DIFF WBC: CPT | Performed by: NURSE PRACTITIONER

## 2025-04-15 RX ORDER — HEPARIN SODIUM (PORCINE) LOCK FLUSH IV SOLN 100 UNIT/ML 100 UNIT/ML
500 SOLUTION INTRAVENOUS AS NEEDED
Status: DISCONTINUED | OUTPATIENT
Start: 2025-04-15 | End: 2025-04-16 | Stop reason: HOSPADM

## 2025-04-15 RX ORDER — SODIUM CHLORIDE 0.9 % (FLUSH) 0.9 %
10 SYRINGE (ML) INJECTION AS NEEDED
OUTPATIENT
Start: 2025-04-15

## 2025-04-15 RX ORDER — SODIUM CHLORIDE 9 MG/ML
20 INJECTION, SOLUTION INTRAVENOUS ONCE
Status: DISCONTINUED | OUTPATIENT
Start: 2025-04-15 | End: 2025-04-16 | Stop reason: HOSPADM

## 2025-04-15 RX ORDER — SODIUM CHLORIDE 0.9 % (FLUSH) 0.9 %
10 SYRINGE (ML) INJECTION AS NEEDED
Status: DISCONTINUED | OUTPATIENT
Start: 2025-04-15 | End: 2025-04-16 | Stop reason: HOSPADM

## 2025-04-15 RX ORDER — METOPROLOL SUCCINATE 50 MG/1
50 TABLET, EXTENDED RELEASE ORAL DAILY
Qty: 60 TABLET | Refills: 5 | Status: SHIPPED | OUTPATIENT
Start: 2025-04-15

## 2025-04-15 RX ORDER — HEPARIN SODIUM (PORCINE) LOCK FLUSH IV SOLN 100 UNIT/ML 100 UNIT/ML
500 SOLUTION INTRAVENOUS AS NEEDED
OUTPATIENT
Start: 2025-04-15

## 2025-04-15 RX ORDER — AMLODIPINE BESYLATE 10 MG/1
10 TABLET ORAL DAILY
Qty: 30 TABLET | Refills: 11 | Status: SHIPPED | OUTPATIENT
Start: 2025-04-15

## 2025-04-15 RX ORDER — MIRTAZAPINE 15 MG/1
15 TABLET, FILM COATED ORAL
Qty: 60 TABLET | Refills: 5 | Status: SHIPPED | OUTPATIENT
Start: 2025-04-15

## 2025-04-15 RX ADMIN — HEPARIN 500 UNITS: 100 SYRINGE at 15:28

## 2025-04-15 RX ADMIN — IRON SUCROSE 300 MG: 20 INJECTION, SOLUTION INTRAVENOUS at 13:44

## 2025-04-15 RX ADMIN — Medication 10 ML: at 15:28

## 2025-04-15 NOTE — PROGRESS NOTES
Pt here for Dose #3  Venofer 300mg/CBC .  Port accessed with sterile technique and positive blood return noted Infusion given per MAR.  Pt declines 30 min observation after Venofer,  AVS provided to pt.

## 2025-04-21 ENCOUNTER — OFFICE VISIT (OUTPATIENT)
Age: 75
End: 2025-04-21
Payer: MEDICARE

## 2025-04-21 VITALS
SYSTOLIC BLOOD PRESSURE: 147 MMHG | WEIGHT: 219 LBS | DIASTOLIC BLOOD PRESSURE: 78 MMHG | HEART RATE: 64 BPM | BODY MASS INDEX: 30.66 KG/M2 | OXYGEN SATURATION: 98 % | HEIGHT: 71 IN | TEMPERATURE: 97.3 F

## 2025-04-21 DIAGNOSIS — C18.7 MALIGNANT NEOPLASM OF SIGMOID COLON: Primary | ICD-10-CM

## 2025-04-21 PROCEDURE — 3077F SYST BP >= 140 MM HG: CPT | Performed by: STUDENT IN AN ORGANIZED HEALTH CARE EDUCATION/TRAINING PROGRAM

## 2025-04-21 PROCEDURE — 1159F MED LIST DOCD IN RCRD: CPT | Performed by: STUDENT IN AN ORGANIZED HEALTH CARE EDUCATION/TRAINING PROGRAM

## 2025-04-21 PROCEDURE — G2211 COMPLEX E/M VISIT ADD ON: HCPCS | Performed by: STUDENT IN AN ORGANIZED HEALTH CARE EDUCATION/TRAINING PROGRAM

## 2025-04-21 PROCEDURE — 99213 OFFICE O/P EST LOW 20 MIN: CPT | Performed by: STUDENT IN AN ORGANIZED HEALTH CARE EDUCATION/TRAINING PROGRAM

## 2025-04-21 PROCEDURE — 1160F RVW MEDS BY RX/DR IN RCRD: CPT | Performed by: STUDENT IN AN ORGANIZED HEALTH CARE EDUCATION/TRAINING PROGRAM

## 2025-04-21 PROCEDURE — 3078F DIAST BP <80 MM HG: CPT | Performed by: STUDENT IN AN ORGANIZED HEALTH CARE EDUCATION/TRAINING PROGRAM

## 2025-04-22 NOTE — PROGRESS NOTES
Colorectal Surgery Followup Note    ID:  Viktor Atkins;   : 1950  DATE OF VISIT: 2025    Chief Complaint  Follow-up (Follow up Malignant neoplasm of sigmoid colon)       Subjective     Mr. Atkins underwent colonoscopy for downtrending hemoglobin.  There was no bleeding identified on the colon.  He denies any bleeding with bowel movements.  His last hemoglobin has improved to 8.9 from 8.0  Exam  General:  No acute distress  Head: Normocephalic, atraumatic  Neuro: Alert and oriented     Abdomen:  Soft, non-tender, non-distended, no hernias, no hepatomegaly, no splenomegaly. No abnormal, audible bowel sounds.           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    -Iron deficiency anemia  Plan / Recommendations  - No bleeding identified in the colon over the anus.  Hemoglobin currently stable and trending up.   - Continue with-iron supplements  -Repeat CBC in 4 weeks  -If hemoglobin further downtrends, we will plan for EGD to rule out upper GI bleed  -Continue with PPI  -Follow-up after lab results        Aakash Burden MD  Colon and Rectal Surgery   Jackie Yadav

## 2025-04-24 ENCOUNTER — HOSPITAL ENCOUNTER (OUTPATIENT)
Dept: ONCOLOGY | Facility: HOSPITAL | Age: 75
Discharge: HOME | End: 2025-04-24
Admitting: STUDENT IN AN ORGANIZED HEALTH CARE EDUCATION/TRAINING PROGRAM
Payer: MEDICARE

## 2025-04-24 DIAGNOSIS — Z95.828 PORT-A-CATH IN PLACE: Primary | ICD-10-CM

## 2025-04-24 PROCEDURE — 96523 IRRIG DRUG DELIVERY DEVICE: CPT

## 2025-04-24 PROCEDURE — 25010000002 HEPARIN LOCK FLUSH PER 10 UNITS: Performed by: STUDENT IN AN ORGANIZED HEALTH CARE EDUCATION/TRAINING PROGRAM

## 2025-04-24 RX ORDER — HEPARIN SODIUM (PORCINE) LOCK FLUSH IV SOLN 100 UNIT/ML 100 UNIT/ML
500 SOLUTION INTRAVENOUS AS NEEDED
Status: DISCONTINUED | OUTPATIENT
Start: 2025-04-24 | End: 2025-04-26 | Stop reason: HOSPADM

## 2025-04-24 RX ORDER — HEPARIN SODIUM (PORCINE) LOCK FLUSH IV SOLN 100 UNIT/ML 100 UNIT/ML
500 SOLUTION INTRAVENOUS AS NEEDED
OUTPATIENT
Start: 2025-04-24

## 2025-04-24 RX ORDER — SODIUM CHLORIDE 0.9 % (FLUSH) 0.9 %
10 SYRINGE (ML) INJECTION AS NEEDED
Status: DISCONTINUED | OUTPATIENT
Start: 2025-04-24 | End: 2025-04-26 | Stop reason: HOSPADM

## 2025-04-24 RX ORDER — SODIUM CHLORIDE 0.9 % (FLUSH) 0.9 %
10 SYRINGE (ML) INJECTION AS NEEDED
OUTPATIENT
Start: 2025-04-24

## 2025-04-24 RX ADMIN — HEPARIN 500 UNITS: 100 SYRINGE at 09:22

## 2025-04-24 RX ADMIN — Medication 10 ML: at 09:22

## 2025-04-24 NOTE — PROGRESS NOTES
Pt to port chair for PF. Port accessed using sterile technique and flushed with good blood return noted. No labs collected. Port flushed with saline and heparin prior to needle removal. Pt aware of next appt and discharged home.

## 2025-06-03 NOTE — PROGRESS NOTES
HEMATOLOGY ONCOLOGY OUTPATIENT FOLLOW UP       Patient name: Viktor Atkins  : 1950  MRN: [id number]  Primary Care Physician: Gera Manriquez MD  Referring Physician: No ref. provider found  Reason For Consult: Colon cancer      History of Present Illness:  Patient is a 75 y.o. male who presented to the hospital with acute GI bleed he had bright red blood per rectum and presented to the emergency room    3/25/2024 CT chest abdomen pelvis with findings compatible with constipation, sigmoid diverticulosis without diverticulitis  3/27/2024 colonoscopy with distal sigmoid mid colon mass biopsy obtained this was positive for invasive moderately differentiated adenocarcinoma MSI testing was done was negative intact nuclear expression of MMR proteins  3/30/2024 low anterior resection, diverting loop ileostomy by Dr. Burden tumor found in rectosigmoid upper rectum area.  Final pathology with all margins negative, grade 2 tumor moderately differentiated, tumor invades through muscularis propria into the pericolonic or perirectal tissue.  4 out of 22 lymph nodes positive  Final stage T4 N2    24 - FOLFOX  24 - FOLFOX   2024 FOLFOX cycle 3  2024 FOLFOX c4  7/3/24 - FOLFOX c5  24 - CT imaging with no recurrence  2024: FOLFOX c6 - oxaliplatin dose reduced to 65 mg/m2   2024 FOLFOX c7    2024: Viktor is seen today for routine follow up. He continues to tolerate FOLFOX. He does report nausea around D4-5 of cycle, Zofran helps. He states tingling is stable, not worsening and denies pain. He has no new complaints today.       2024: Pt seen today for initial eval by me. He was previously being followed by Dr. Robin in our practice. He is on adjuvant chemotherapy with mFOLFOX for stage Iii colon cancer. Planned for C9 today. He has symptoms of poor taste, mild peripheral neuropathy, fatigue and diarrhea for 2-3 days after chemo. He also has a colostomy bag  following surgery.     10/9/2024: Viktor is seen today prior to treatment with cycle 12 FOLFOX.  Reports intermittent diarrhea and intermittent nausea both of which are managed with Imodium and Zofran.  He continues to have poor taste in his mouth that is affecting his appetite.  His weight is stable.  His neuropathy symptoms are stable.  Patient states last week he had increased fatigue and a low-grade fever.  He took Tylenol and reports no further fever noted.  He states he began to feel better after a few days and is now back at his baseline with no new concerns.     10/30/24: CT Chest/Abd/Pelvis W contrast:  Impression:   1. No acute CT abnormalities or evidence of metastatic disease in the chest   2. Stable enlarged periportal lymph node   3. Mid transverse colostomy and stable surgical changes with a sigmoid anastomosis     11/6/24: pt seen today for follow up. No new complaints reported. Has recovered well from treatment related adverse effects. Weight/appetite stable.    12/9/24: NM PET/CT:  Impression:  1.No abnormal metabolic activity to indicate metastatic disease.  2.Incidental CT findings as noted above    12/11/24: Colostomy Closure    2/28/25: CT Chest Abd Pelvis W contrast:  Impression:   1.No evidence of metastatic disease within the chest.   2.Stable small areas of peripheral scarring within the right upper lobe.   3. Postsurgical changes of the transverse colon and rectosigmoid colon.   4. Small volume perihepatic ascites which is increased from prior CT dated 12/13/2024. This is nonspecific in etiology. No clear peritoneal nodularity is identified.   5. Stable 12 mm short axis lymph node just superior to the common hepatic artery.   6. Hepatosplenomegaly       3.7.25: has some peripheral neuropathy, particularly in feet. Off Eliquis after episode of Bleeding in eye. Denied any Bloood in stools, abd pain, comnstipation or diarrhea, Weihgt/appetite stable.    3/19/25: Surveillance  Colonoscopy:  Findings:  Rectum: Grade 2 internal hemorrhoids with no lesions,polyps, or abnormalities.No sign of bleeding  Sigmoid colon: Rectosigmoid side to end anastomosis, patent, w/o lesion or abnormalities. No stricture. Large opening diverticulosis w/o sign of bleeding.                                                    Descending colon: Normal appearance with no lesions,polyps, or abnormalities.  Transverse colon: end to end anastomosis well healed, no stricture, no lesion. Three 5 mm polyps removed with cold biopsy forceps. 15 mm sessile polyp removed with hot snare.   Ascending colon: Normal appearance with no lesions,polyps, or abnormalities.  Cecum: Normal appearance with no lesions,polyps, or abnormalities.      6/5/25: CT Chest Abd Pelvis W contrast:  Impression:   1.No evidence of metastatic disease within the chest, abdomen or pelvis.   2.Postsurgical changes from partial colonic resection and anastomoses. No evidence of local recurrence or metastatic disease.   3.Hepatic steatosis and mild cirrhotic morphology, hepatosplenomegaly and findings indicating portal hypertension including perisplenic and gastric varices.   4.Additional findings as given above.     Subjective:  6/10/25: Patient continues to do well clinically.  Denied any new complaints since last visit.  On oral iron supplementation.  Has good compliance and tolerance.  He has not been seen by GI yet [for liver cirrhosis, was previously referred].  Reported having some anxiety about his CT scan findings.  Noted elevated BP today, however stated that BP is better controlled at home.    Past Medical History:   Diagnosis Date    Arthritis     Atrial fibrillation     Benign essential HTN     Cancer     Diabetes mellitus     GERD (gastroesophageal reflux disease)     Hyperlipidemia, mixed     Palpitations        Past Surgical History:   Procedure Laterality Date    CARDIAC CATHETERIZATION      CHOLECYSTECTOMY      COLON RESECTION WITH  ILEOSTOMY N/A 3/30/2024    Procedure: COLON RESECTION LOW ANTERIOR LAPAROSCOPIC, COLONAL ANASTOMOSIS, DIVERTING LOOP ILEOSTOMY WITH DAVINCI ROBOT;  Surgeon: Aakash Burden MD;  Location: [facility] MAIN OR;  Service: Robotics - DaVinci;  Laterality: N/A;    COLONOSCOPY N/A 3/27/2024    Procedure: COLONOSCOPY with polypectomy x 18 and sigmoid colon mass biopsies with endscopic spot tattooing;  Surgeon: Fili Quintero MD;  Location: [facility] ENDOSCOPY;  Service: Gastroenterology;  Laterality: N/A;  sigmoid mass at 25 cm    COLONOSCOPY N/A 12/9/2024    Procedure: COLONOSCOPY VIA COLOSTOMY WITH POLYPECTOMY X6 AND CLIPPING X1;  Surgeon: Aakash Burden MD;  Location: [facility] ENDOSCOPY;  Service: General;  Laterality: N/A;  POST: POLYPS    COLONOSCOPY N/A 3/19/2025    Procedure: COLONOSCOPY with poylectomy x 4, and transverse colon anastomosis biopsy and endo clipping x 1;  Surgeon: Aakash Burden MD;  Location: [facility] ENDOSCOPY;  Service: General;  Laterality: N/A;    COLOSTOMY N/A 4/9/2024    Procedure: DIAGNOSTIC LAPAROSCOPY, DIVERTING OSTOMY, POSSIBLE REVISION OF ANASTAMOSIS;  Surgeon: Aakash Burden MD;  Location: [facility] MAIN OR;  Service: General;  Laterality: N/A;    COLOSTOMY CLOSURE N/A 12/10/2024    Procedure: COLOSTOMY TAKEDOWN laproscopic;  Surgeon: Aakash Burden MD;  Location: [facility] MAIN OR;  Service: General;  Laterality: N/A;    KNEE SURGERY      SIGMOIDOSCOPY N/A 3/30/2024    Procedure: SIGMOIDOSCOPY;  Surgeon: Aakash Burden MD;  Location: [facility] MAIN OR;  Service: Gastroenterology;  Laterality: N/A;    VENOUS ACCESS DEVICE (PORT) INSERTION N/A 4/30/2024    Procedure: INSERTION VENOUS ACCESS DEVICE;  Surgeon: Aakash Burden MD;  Location: [facility] MAIN OR;  Service: General;  Laterality: N/A;         Current Outpatient Medications:     amiodarone (PACERONE) 200 MG tablet, TAKE 1 TABLET EVERY DAY, Disp: 90 tablet, Rfl: 3    amLODIPine (NORVASC) 10 MG tablet, TAKE 1 TABLET EVERY DAY, Disp: 30 tablet, Rfl: 11     apixaban (ELIQUIS) 5 MG tablet tablet, Take 0.5 tablets by mouth Every 12 (Twelve) Hours. Taking currently but switching to xarelto in about a month, Disp: , Rfl:     aspirin 81 MG EC tablet, Take 1 tablet by mouth Daily., Disp: , Rfl:     atorvastatin (LIPITOR) 20 MG tablet, Take 1 tablet by mouth Every Night., Disp: , Rfl:     Chlorhexidine Gluconate 4 % solution, Apply 1 Application topically to the appropriate area as directed 2 (Two) Times a Day. Shower With Hibiclens Solution Twice The Day Before Surgery, Disp: 236 mL, Rfl: 0    ferrous sulfate 325 (65 FE) MG tablet, Take 1 tablet by mouth Daily With Breakfast., Disp: 30 tablet, Rfl: 2    gabapentin (NEURONTIN) 100 MG capsule, Take 1 capsule by mouth 3 (Three) Times a Day., Disp: 90 capsule, Rfl: 2    metFORMIN (GLUCOPHAGE) 500 MG tablet, Take 1 tablet by mouth Daily With Breakfast., Disp: , Rfl:     metoprolol succinate XL (TOPROL-XL) 50 MG 24 hr tablet, TAKE 1 TABLET EVERY DAY, Disp: 60 tablet, Rfl: 5    mirtazapine (REMERON) 15 MG tablet, TAKE 1 TABLET AT BEDTIME, Disp: 60 tablet, Rfl: 5    Multiple Vitamins-Minerals (MULTIVITAMIN ADULT PO), Take 1 tablet by mouth Daily., Disp: , Rfl:     Omeprazole (EQL Omeprazole) 20 MG tablet delayed-release, Take 20 mg by mouth Daily., Disp: , Rfl:     ondansetron (ZOFRAN) 8 MG tablet, Take 1 tablet by mouth 3 (Three) Times a Day As Needed for Nausea or Vomiting., Disp: 30 tablet, Rfl: 5    sertraline (Zoloft) 25 MG tablet, Take 1 tablet by mouth Daily., Disp: , Rfl:     sodium-potassium-magnesium sulfates (SUPREP) 17.5-3.13-1.6 GM/177ML solution oral solution, PLEASE REPLACE WITH GOLYTELY 4000ML IF SUPREP IS NOT APPROVED BY INSURANCE 5 PM the day before your scheduled colonoscopy - Take your 1st dose of bowel prep 5 AM - Take your 2nd dose of bowel prep You may still have watery bowel movements after you finish drinking the solution., Disp: 177 mL, Rfl: 0    sodium-potassium-magnesium sulfates (SUPREP) 17.5-3.13-1.6  GM/177ML solution oral solution, PLEASE REPLACE WITH GOLYTELY 4000ML IF SUPREP IS NOT APPROVED BY INSURANCE   5 PM the day before your scheduled colonoscopy - Take your 1st dose of bowel prep   5 AM - Take your 2nd dose of bowel prep   You may still have watery bowel movements after you finish drinking the solution., Disp: 177 mL, Rfl: 0    No Known Allergies    No family history on file.    Cancer-related family history is not on file.      Social History     Tobacco Use    Smoking status: Former     Passive exposure: Past    Smokeless tobacco: Never   Vaping Use    Vaping status: Never Used   Substance Use Topics    Alcohol use: Never    Drug use: Never     Social History     Social History Narrative    Not on file       ROS:   Review of Systems   Constitutional:  Positive for fatigue. Negative for chills and fever. Appetite change: Patient reports taste changes. Weight is stable..  HENT:  Negative for ear pain, mouth sores, nosebleeds and sore throat.    Eyes:  Negative for photophobia and visual disturbance.   Respiratory:  Negative for shortness of breath, wheezing and stridor.    Cardiovascular:  Negative for chest pain and palpitations.   Gastrointestinal:  Negative for abdominal pain, diarrhea, nausea (Patient reports nausea around day 4-5 of cycle, Zofran helps) and vomiting.   Endocrine: Negative for cold intolerance and heat intolerance.   Genitourinary:  Negative for dysuria and hematuria.   Musculoskeletal:  Negative for joint swelling and neck stiffness.   Skin:  Negative for color change and rash.   Neurological:  Positive for numbness. Negative for seizures and syncope.   Hematological:  Negative for adenopathy.        No obvious bleeding   Psychiatric/Behavioral:  Negative for agitation, confusion and hallucinations.        Objective:    Vital Signs:  There were no vitals filed for this visit.      There is no height or weight on file to calculate BMI.    ECOG  1 - Restricted in physically strenuous  activity but ambulatory and able to carry out work of a light or sedentary nature, e.g., light house work, office work     Physical Exam:   Physical Exam  Vitals reviewed.   Constitutional:       Appearance: Normal appearance. He is not diaphoretic.   HENT:      Head: Normocephalic and atraumatic.   Eyes:      Pupils: Pupils are equal, round, and reactive to light.   Cardiovascular:      Rate and Rhythm: Normal rate and regular rhythm.      Pulses: Normal pulses.      Heart sounds: No murmur heard.  Pulmonary:      Effort: Pulmonary effort is normal.      Breath sounds: Normal breath sounds.   Abdominal:      General: There is no distension.      Palpations: Abdomen is soft. There is no mass.      Tenderness: There is no abdominal tenderness.   Musculoskeletal:         General: Normal range of motion.      Cervical back: Normal range of motion and neck supple.   Skin:     General: Skin is warm.   Neurological:      General: No focal deficit present.      Mental Status: He is alert and oriented to person, place, and time.   Psychiatric:         Mood and Affect: Mood normal.         Lab Results - Last 18 Months   Lab Units 04/15/25  1434 03/27/25  1021 03/20/25  0919   WBC 10*3/mm3 5.81 5.25 5.96   HEMOGLOBIN g/dL 8.9* 8.0* 7.8*   HEMATOCRIT % 31.9* 29.3* 27.7*   PLATELETS 10*3/mm3 138* 181 198   MCV fL 81.8 78.1* 76.5*     Lab Results - Last 18 Months   Lab Units 03/27/25  1021 03/07/25  0928 02/28/25  0857 12/13/24  0609 11/26/24  1131 11/06/24  1014   SODIUM mmol/L 144 140  --  143   < > 143   POTASSIUM mmol/L 4.4 4.5  --  3.9   < > 4.2   CHLORIDE mmol/L 111* 107  --  110*   < > 109*   CO2 mmol/L 21.7* 21.8*  --  15.4*   < > 21.3*   BUN mg/dL 11 13  --  19   < > 11   CREATININE mg/dL 0.88 0.95 1.00 1.23   < > 0.95   CALCIUM mg/dL 9.0 8.9  --  9.3   < > 9.0   BILIRUBIN mg/dL 0.3 0.3  --   --   --  0.3   ALK PHOS U/L 161* 172*  --   --   --  159*   ALT (SGPT) U/L 55* 84*  --   --   --  126*   AST (SGOT) U/L 84* 154*   "--   --   --  213*   GLUCOSE mg/dL 104* 104*  --  187*   < > 90    < > = values in this interval not displayed.       Lab Results   Component Value Date    GLUCOSE 104 (H) 03/27/2025    BUN 11 03/27/2025    CREATININE 0.88 03/27/2025    BCR 12.5 03/27/2025    K 4.4 03/27/2025    CO2 21.7 (L) 03/27/2025    CALCIUM 9.0 03/27/2025    ALBUMIN 4.0 03/27/2025    AST 84 (H) 03/27/2025    ALT 55 (H) 03/27/2025       No results for input(s): \"APTT\", \"INR\", \"PTT\" in the last 20721 hours.    Lab Results   Component Value Date    IRON 18 (L) 03/07/2025    TIBC 459 03/07/2025    FERRITIN 20.30 (L) 03/07/2025       Lab Results   Component Value Date    FOLATE >20.00 03/07/2025       No results found for: \"OCCULTBLD\"    No results found for: \"RETICCTPCT\"  Lab Results   Component Value Date    BMGQZRKA08 592 03/07/2025     No results found for: \"SPEP\", \"UPEP\"  LDH   Date Value Ref Range Status   03/07/2025 120 (L) 135 - 225 U/L Final     No results found for: \"ARON\", \"RF\", \"SEDRATE\"  Lab Results   Component Value Date    HAPTOGLOBIN 220 (H) 03/07/2025     Lab Results   Component Value Date    PTT 27.6 04/30/2018     No results found for: \"\"  Lab Results   Component Value Date    CEA 2.25 03/07/2025     No components found for: \"CA-19-9\"  No results found for: \"PSA\"  No results found for: \"SEDRATE\"       CASE SUMMARY:  Viktor Atkins is a 75 y.o.  male with sigmoid-rectal cancer status post sigmoid colectomy, low anterior resection with lymph node positive disease .  Discussed risk of recurrence, prognosis discussed adjuvant treatment with FOLFOX versus XELOX for 6 months of treatment with idea trial patients who had N2 disease or T4 disease had inferior outcomes with 3 months of treatment as compared to 6 months of treatment after discussion we decided to pursue 6 months of FOLFOX adjuvant treatment  Now started treatment biggest side effect was fatigue. Discussed dose reduction vs continuing same for now we decided will " continue same dose.   Nausea is improved, taste changes predominantly, weight is stable, fatigue is present. Neuropathy is becoming more permanent  CT images reviewed independently, no concern for disease recurrence/progression.  Dose reduction of oxaliplatin to 65 mg/m2. Patient continues to have some treatment adverse effects.  -Patient continues to experience some mild treatment related side effects, no dose-limiting toxicities.  Labs reviewed, okay to proceed with C12 today. To complete total 12 cycles.  -Restaging scans reviewed, noted to have enlarged periportal adenopathy, I discussed this case with Radiology, some concern about progressive/recurrent disease.   -PET/CT was reported negative for metastatic disease in December 2024.     Assessment & Plan     Colon cancer: pT3N2a (AJCC Stage Group IIIB)  -s/p LAR followed by adjuvant FOLFOX X 12 cycles, completed in October 2024. On surveillance.  -6/10/25: Restaging scans as above showed stable findings, no concern for progressive disease.   -colonoscopy in March 2025 was reported negative.  -continue surveillance with repeat scans in 4 months.    Transaminitis:   Suspected cirrhosis:  Likely chemotherapy related-noted mild  Noted cirrhotic changes during Colostomy reversal  Continue to monitor CMP  He was previously referred to GI for further evaluation/EGD.     Iron deficiency Anemia  Hemoglobin levels have declined to 7.9 which is an acute decline.  -Iron panel is consistent with REGINE.  -discussed with patient, some concern about post-anastomotic bleeding after colostomy reversal. Colonoscopy as above, noted internal hemorrhoids, but no other abnormal findings.  Repeat Iron levels pending.      Colostomy:s/p colostomy Reversal in December 2024. Follows with [facility].    Hypertension  Blood pressure improved  Continue amlodipine    Diarrhea:  Resolved after chemotherapy.     4 month follow up with labs and scans, sooner as needed.

## 2025-06-05 ENCOUNTER — HOSPITAL ENCOUNTER (OUTPATIENT)
Dept: PET IMAGING | Facility: HOSPITAL | Age: 75
Discharge: HOME | End: 2025-06-05
Admitting: STUDENT IN AN ORGANIZED HEALTH CARE EDUCATION/TRAINING PROGRAM
Payer: MEDICARE

## 2025-06-05 DIAGNOSIS — C18.9 MALIGNANT NEOPLASM OF COLON, UNSPECIFIED PART OF COLON: ICD-10-CM

## 2025-06-05 LAB
CREAT BLDA-MCNC: 1 MG/DL (ref 0.6–1.3)
EGFRCR SERPLBLD CKD-EPI 2021: 79 ML/MIN/1.73

## 2025-06-05 PROCEDURE — 82565 ASSAY OF CREATININE: CPT

## 2025-06-05 PROCEDURE — 74177 CT ABD & PELVIS W/CONTRAST: CPT

## 2025-06-05 PROCEDURE — 71260 CT THORAX DX C+: CPT

## 2025-06-05 PROCEDURE — 25510000001 IOPAMIDOL PER 1 ML: Performed by: STUDENT IN AN ORGANIZED HEALTH CARE EDUCATION/TRAINING PROGRAM

## 2025-06-05 RX ORDER — IOPAMIDOL 755 MG/ML
100 INJECTION, SOLUTION INTRAVASCULAR
Status: COMPLETED | OUTPATIENT
Start: 2025-06-05 | End: 2025-06-05

## 2025-06-05 RX ADMIN — IOPAMIDOL 100 ML: 755 INJECTION, SOLUTION INTRAVENOUS at 09:44

## 2025-06-10 ENCOUNTER — HOSPITAL ENCOUNTER (OUTPATIENT)
Dept: ONCOLOGY | Facility: HOSPITAL | Age: 75
Discharge: HOME | End: 2025-06-10
Admitting: STUDENT IN AN ORGANIZED HEALTH CARE EDUCATION/TRAINING PROGRAM
Payer: MEDICARE

## 2025-06-10 ENCOUNTER — OFFICE VISIT (OUTPATIENT)
Dept: ONCOLOGY | Facility: CLINIC | Age: 75
End: 2025-06-10
Payer: MEDICARE

## 2025-06-10 VITALS
BODY MASS INDEX: 30.91 KG/M2 | WEIGHT: 220.8 LBS | DIASTOLIC BLOOD PRESSURE: 95 MMHG | SYSTOLIC BLOOD PRESSURE: 187 MMHG | HEIGHT: 71 IN | OXYGEN SATURATION: 97 % | HEART RATE: 56 BPM

## 2025-06-10 DIAGNOSIS — D50.9 IRON DEFICIENCY ANEMIA, UNSPECIFIED IRON DEFICIENCY ANEMIA TYPE: ICD-10-CM

## 2025-06-10 DIAGNOSIS — C18.9 MALIGNANT NEOPLASM OF COLON, UNSPECIFIED PART OF COLON: ICD-10-CM

## 2025-06-10 DIAGNOSIS — K74.60 HEPATIC CIRRHOSIS, UNSPECIFIED HEPATIC CIRRHOSIS TYPE, UNSPECIFIED WHETHER ASCITES PRESENT: ICD-10-CM

## 2025-06-10 DIAGNOSIS — C19 RECTOSIGMOID CANCER: ICD-10-CM

## 2025-06-10 DIAGNOSIS — D64.9 ANEMIA, UNSPECIFIED TYPE: Primary | ICD-10-CM

## 2025-06-10 DIAGNOSIS — R74.01 TRANSAMINITIS: ICD-10-CM

## 2025-06-10 DIAGNOSIS — C18.9 MALIGNANT NEOPLASM OF COLON, UNSPECIFIED PART OF COLON: Primary | ICD-10-CM

## 2025-06-10 LAB
ALBUMIN SERPL-MCNC: 4.2 G/DL (ref 3.5–5.2)
ALBUMIN/GLOB SERPL: 1.4 G/DL
ALP SERPL-CCNC: 158 U/L (ref 39–117)
ALT SERPL W P-5'-P-CCNC: 112 U/L (ref 1–41)
ANION GAP SERPL CALCULATED.3IONS-SCNC: 9.7 MMOL/L (ref 5–15)
AST SERPL-CCNC: 114 U/L (ref 1–40)
BASOPHILS # BLD AUTO: 0.03 10*3/MM3 (ref 0–0.2)
BASOPHILS NFR BLD AUTO: 0.5 % (ref 0–1.5)
BILIRUB SERPL-MCNC: 0.2 MG/DL (ref 0–1.2)
BUN SERPL-MCNC: 13.9 MG/DL (ref 8–23)
BUN/CREAT SERPL: 17 (ref 7–25)
CALCIUM SPEC-SCNC: 8.9 MG/DL (ref 8.6–10.5)
CEA SERPL-MCNC: 2.54 NG/ML
CHLORIDE SERPL-SCNC: 107 MMOL/L (ref 98–107)
CO2 SERPL-SCNC: 21.3 MMOL/L (ref 22–29)
CREAT SERPL-MCNC: 0.82 MG/DL (ref 0.76–1.27)
DEPRECATED RDW RBC AUTO: 65.2 FL (ref 37–54)
EGFRCR SERPLBLD CKD-EPI 2021: 92.2 ML/MIN/1.73
EOSINOPHIL # BLD AUTO: 0.1 10*3/MM3 (ref 0–0.4)
EOSINOPHIL NFR BLD AUTO: 1.5 % (ref 0.3–6.2)
ERYTHROCYTE [DISTWIDTH] IN BLOOD BY AUTOMATED COUNT: 20.9 % (ref 12.3–15.4)
FERRITIN SERPL-MCNC: 55.2 NG/ML (ref 30–400)
GLOBULIN UR ELPH-MCNC: 2.9 GM/DL
GLUCOSE SERPL-MCNC: 92 MG/DL (ref 65–99)
HCT VFR BLD AUTO: 38.3 % (ref 37.5–51)
HGB BLD-MCNC: 11.9 G/DL (ref 13–17.7)
IRON 24H UR-MRATE: 170 MCG/DL (ref 59–158)
IRON SATN MFR SERPL: 38 % (ref 20–50)
LYMPHOCYTES # BLD AUTO: 1.28 10*3/MM3 (ref 0.7–3.1)
LYMPHOCYTES NFR BLD AUTO: 19.6 % (ref 19.6–45.3)
MCH RBC QN AUTO: 27.4 PG (ref 26.6–33)
MCHC RBC AUTO-ENTMCNC: 31.1 G/DL (ref 31.5–35.7)
MCV RBC AUTO: 88.2 FL (ref 79–97)
MONOCYTES # BLD AUTO: 0.47 10*3/MM3 (ref 0.1–0.9)
MONOCYTES NFR BLD AUTO: 7.2 % (ref 5–12)
NEUTROPHILS NFR BLD AUTO: 4.66 10*3/MM3 (ref 1.7–7)
NEUTROPHILS NFR BLD AUTO: 71.2 % (ref 42.7–76)
PLATELET # BLD AUTO: 105 10*3/MM3 (ref 140–450)
POTASSIUM SERPL-SCNC: 4.3 MMOL/L (ref 3.5–5.2)
PROT SERPL-MCNC: 7.1 G/DL (ref 6–8.5)
RBC # BLD AUTO: 4.34 10*6/MM3 (ref 4.14–5.8)
SODIUM SERPL-SCNC: 138 MMOL/L (ref 136–145)
TIBC SERPL-MCNC: 443 MCG/DL (ref 298–536)
TRANSFERRIN SERPL-MCNC: 297 MG/DL (ref 200–360)
WBC NRBC COR # BLD AUTO: 6.54 10*3/MM3 (ref 3.4–10.8)

## 2025-06-10 PROCEDURE — 1126F AMNT PAIN NOTED NONE PRSNT: CPT | Performed by: STUDENT IN AN ORGANIZED HEALTH CARE EDUCATION/TRAINING PROGRAM

## 2025-06-10 PROCEDURE — 82728 ASSAY OF FERRITIN: CPT | Performed by: STUDENT IN AN ORGANIZED HEALTH CARE EDUCATION/TRAINING PROGRAM

## 2025-06-10 PROCEDURE — 83540 ASSAY OF IRON: CPT | Performed by: STUDENT IN AN ORGANIZED HEALTH CARE EDUCATION/TRAINING PROGRAM

## 2025-06-10 PROCEDURE — 3077F SYST BP >= 140 MM HG: CPT | Performed by: STUDENT IN AN ORGANIZED HEALTH CARE EDUCATION/TRAINING PROGRAM

## 2025-06-10 PROCEDURE — 84466 ASSAY OF TRANSFERRIN: CPT | Performed by: STUDENT IN AN ORGANIZED HEALTH CARE EDUCATION/TRAINING PROGRAM

## 2025-06-10 PROCEDURE — 99214 OFFICE O/P EST MOD 30 MIN: CPT | Performed by: STUDENT IN AN ORGANIZED HEALTH CARE EDUCATION/TRAINING PROGRAM

## 2025-06-10 PROCEDURE — 25010000002 HEPARIN LOCK FLUSH PER 10 UNITS: Performed by: STUDENT IN AN ORGANIZED HEALTH CARE EDUCATION/TRAINING PROGRAM

## 2025-06-10 PROCEDURE — 85025 COMPLETE CBC W/AUTO DIFF WBC: CPT | Performed by: STUDENT IN AN ORGANIZED HEALTH CARE EDUCATION/TRAINING PROGRAM

## 2025-06-10 PROCEDURE — 80053 COMPREHEN METABOLIC PANEL: CPT | Performed by: STUDENT IN AN ORGANIZED HEALTH CARE EDUCATION/TRAINING PROGRAM

## 2025-06-10 PROCEDURE — 3080F DIAST BP >= 90 MM HG: CPT | Performed by: STUDENT IN AN ORGANIZED HEALTH CARE EDUCATION/TRAINING PROGRAM

## 2025-06-10 PROCEDURE — 82378 CARCINOEMBRYONIC ANTIGEN: CPT | Performed by: STUDENT IN AN ORGANIZED HEALTH CARE EDUCATION/TRAINING PROGRAM

## 2025-06-10 PROCEDURE — 36591 DRAW BLOOD OFF VENOUS DEVICE: CPT

## 2025-06-10 RX ORDER — SODIUM CHLORIDE 0.9 % (FLUSH) 0.9 %
10 SYRINGE (ML) INJECTION AS NEEDED
OUTPATIENT
Start: 2025-06-10

## 2025-06-10 RX ORDER — HEPARIN SODIUM (PORCINE) LOCK FLUSH IV SOLN 100 UNIT/ML 100 UNIT/ML
500 SOLUTION INTRAVENOUS AS NEEDED
OUTPATIENT
Start: 2025-06-10

## 2025-06-10 RX ORDER — SODIUM CHLORIDE 0.9 % (FLUSH) 0.9 %
10 SYRINGE (ML) INJECTION AS NEEDED
Status: DISCONTINUED | OUTPATIENT
Start: 2025-06-10 | End: 2025-06-11 | Stop reason: HOSPADM

## 2025-06-10 RX ORDER — HEPARIN SODIUM (PORCINE) LOCK FLUSH IV SOLN 100 UNIT/ML 100 UNIT/ML
500 SOLUTION INTRAVENOUS AS NEEDED
Status: DISCONTINUED | OUTPATIENT
Start: 2025-06-10 | End: 2025-06-11 | Stop reason: HOSPADM

## 2025-06-10 RX ADMIN — HEPARIN 500 UNITS: 100 SYRINGE at 10:09

## 2025-06-10 RX ADMIN — Medication 10 ML: at 10:09

## 2025-06-10 NOTE — ADDENDUM NOTE
Encounter addended by: Huma Grossman on: 6/10/2025 10:55 AM   Actions taken: Child order released for a procedure order

## 2025-06-10 NOTE — PROGRESS NOTES
Port accessed and flushed with good blood return noted. 10cc of blood wasted prior to specimen collection. Blood specimen obtained and sent to lab for processing per protocol.  Port flushed with saline and heparin prior to needle removal. Pt to lobby for md bendert.

## 2025-07-03 RX ORDER — FERROUS SULFATE 325(65) MG
325 TABLET ORAL
Qty: 30 TABLET | Refills: 2 | Status: SHIPPED | OUTPATIENT
Start: 2025-07-03

## 2025-07-15 ENCOUNTER — TRANSCRIBE ORDERS (OUTPATIENT)
Dept: ADMINISTRATIVE | Facility: HOSPITAL | Age: 75
End: 2025-07-15
Payer: MEDICARE

## 2025-07-15 ENCOUNTER — OFFICE (OUTPATIENT)
Dept: URBAN - METROPOLITAN AREA CLINIC 64 | Facility: CLINIC | Age: 75
End: 2025-07-15
Payer: MEDICARE

## 2025-07-15 VITALS
DIASTOLIC BLOOD PRESSURE: 85 MMHG | DIASTOLIC BLOOD PRESSURE: 87 MMHG | WEIGHT: 224 LBS | SYSTOLIC BLOOD PRESSURE: 162 MMHG | HEART RATE: 61 BPM | SYSTOLIC BLOOD PRESSURE: 166 MMHG | HEIGHT: 71 IN

## 2025-07-15 DIAGNOSIS — R93.2 ABNORMAL FINDINGS ON IMAGING OF BILIARY TRACT: Primary | ICD-10-CM

## 2025-07-15 DIAGNOSIS — Z86.0101 PERSONAL HISTORY OF ADENOMATOUS AND SERRATED COLON POLYPS: ICD-10-CM

## 2025-07-15 DIAGNOSIS — Z85.038 PERSONAL HISTORY OF OTHER MALIGNANT NEOPLASM OF LARGE INTEST: ICD-10-CM

## 2025-07-15 DIAGNOSIS — R93.2 ABNORMAL FINDINGS ON DIAGNOSTIC IMAGING OF LIVER AND BILIARY: ICD-10-CM

## 2025-07-15 PROCEDURE — 99214 OFFICE O/P EST MOD 30 MIN: CPT | Performed by: INTERNAL MEDICINE

## 2025-07-21 ENCOUNTER — OFFICE VISIT (OUTPATIENT)
Age: 75
End: 2025-07-21
Payer: MEDICARE

## 2025-07-21 VITALS
HEIGHT: 71 IN | WEIGHT: 226 LBS | TEMPERATURE: 98.6 F | DIASTOLIC BLOOD PRESSURE: 91 MMHG | SYSTOLIC BLOOD PRESSURE: 117 MMHG | OXYGEN SATURATION: 95 % | BODY MASS INDEX: 31.64 KG/M2 | HEART RATE: 96 BPM

## 2025-07-21 DIAGNOSIS — C18.7 MALIGNANT NEOPLASM OF SIGMOID COLON: Primary | ICD-10-CM

## 2025-07-21 PROCEDURE — 99213 OFFICE O/P EST LOW 20 MIN: CPT | Performed by: STUDENT IN AN ORGANIZED HEALTH CARE EDUCATION/TRAINING PROGRAM

## 2025-07-21 PROCEDURE — 1160F RVW MEDS BY RX/DR IN RCRD: CPT | Performed by: STUDENT IN AN ORGANIZED HEALTH CARE EDUCATION/TRAINING PROGRAM

## 2025-07-21 PROCEDURE — 3074F SYST BP LT 130 MM HG: CPT | Performed by: STUDENT IN AN ORGANIZED HEALTH CARE EDUCATION/TRAINING PROGRAM

## 2025-07-21 PROCEDURE — 3080F DIAST BP >= 90 MM HG: CPT | Performed by: STUDENT IN AN ORGANIZED HEALTH CARE EDUCATION/TRAINING PROGRAM

## 2025-07-21 PROCEDURE — 1159F MED LIST DOCD IN RCRD: CPT | Performed by: STUDENT IN AN ORGANIZED HEALTH CARE EDUCATION/TRAINING PROGRAM

## 2025-07-22 NOTE — PROGRESS NOTES
Colorectal Surgery Followup Note    ID:  Viktor Atkins;   : 1950  DATE OF VISIT: 2025    Chief Complaint  Follow-up (Follow up Malignant neoplasm of sigmoid colon)       Subjective     Mr. Atkins reports no complaints.  He denies any blood in the stool.  He denies any nausea or vomiting.  He denies any abdominal pain.  He is scheduled to undergo EGD with Dr. Quintero.    Exam  General:  No acute distress  Head: Normocephalic, atraumatic  Neuro: Alert and oriented     Abdomen:  Soft, non-tender, non-distended, no hernias, no hepatomegaly, no splenomegaly. No abnormal, audible bowel sounds.           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    -Iron deficiency anemia  Plan / Recommendations  - Screening EGD with Dr. Quintero, I will follow-up with the results  -Continue with PPI  -His recent CT chest abdomen pelvis did not show any sign of recurrence.   - He will need repeat colonoscopy in January  -Follow-up 5 to 6 months         Aakash Burden MD  Colon and Rectal Surgery   [facility]

## 2025-07-24 ENCOUNTER — HOSPITAL ENCOUNTER (OUTPATIENT)
Dept: ULTRASOUND IMAGING | Facility: HOSPITAL | Age: 75
Discharge: HOME | End: 2025-07-24
Admitting: INTERNAL MEDICINE
Payer: MEDICARE

## 2025-07-24 DIAGNOSIS — R93.2 ABNORMAL FINDINGS ON IMAGING OF BILIARY TRACT: ICD-10-CM

## 2025-07-24 PROCEDURE — 76705 ECHO EXAM OF ABDOMEN: CPT

## 2025-07-29 ENCOUNTER — HOSPITAL ENCOUNTER (OUTPATIENT)
Dept: ONCOLOGY | Facility: HOSPITAL | Age: 75
Discharge: HOME | End: 2025-07-29
Admitting: STUDENT IN AN ORGANIZED HEALTH CARE EDUCATION/TRAINING PROGRAM
Payer: MEDICARE

## 2025-07-29 DIAGNOSIS — C18.9 MALIGNANT NEOPLASM OF COLON, UNSPECIFIED PART OF COLON: Primary | ICD-10-CM

## 2025-07-29 PROCEDURE — 25010000002 HEPARIN LOCK FLUSH PER 10 UNITS: Performed by: STUDENT IN AN ORGANIZED HEALTH CARE EDUCATION/TRAINING PROGRAM

## 2025-07-29 PROCEDURE — 96523 IRRIG DRUG DELIVERY DEVICE: CPT

## 2025-07-29 RX ORDER — HEPARIN SODIUM (PORCINE) LOCK FLUSH IV SOLN 100 UNIT/ML 100 UNIT/ML
500 SOLUTION INTRAVENOUS AS NEEDED
OUTPATIENT
Start: 2025-07-29

## 2025-07-29 RX ORDER — SODIUM CHLORIDE 0.9 % (FLUSH) 0.9 %
10 SYRINGE (ML) INJECTION AS NEEDED
OUTPATIENT
Start: 2025-07-29

## 2025-07-29 RX ORDER — HEPARIN SODIUM (PORCINE) LOCK FLUSH IV SOLN 100 UNIT/ML 100 UNIT/ML
500 SOLUTION INTRAVENOUS AS NEEDED
Status: DISCONTINUED | OUTPATIENT
Start: 2025-07-29 | End: 2025-07-30 | Stop reason: HOSPADM

## 2025-07-29 RX ORDER — SODIUM CHLORIDE 0.9 % (FLUSH) 0.9 %
10 SYRINGE (ML) INJECTION AS NEEDED
Status: DISCONTINUED | OUTPATIENT
Start: 2025-07-29 | End: 2025-07-30 | Stop reason: HOSPADM

## 2025-07-29 RX ADMIN — HEPARIN 500 UNITS: 100 SYRINGE at 09:08

## 2025-07-29 RX ADMIN — Medication 10 ML: at 09:08

## 2025-07-29 NOTE — PROGRESS NOTES
Port accessed and flushed with good blood return noted. No labs collected. Port flushed with saline and heparin prior to needle removal. Pt tolerated it well.

## 2025-08-05 ENCOUNTER — OFFICE VISIT (OUTPATIENT)
Dept: CARDIOLOGY | Facility: CLINIC | Age: 75
End: 2025-08-05
Payer: MEDICARE

## 2025-08-05 VITALS
HEART RATE: 58 BPM | HEIGHT: 71 IN | OXYGEN SATURATION: 96 % | RESPIRATION RATE: 16 BRPM | WEIGHT: 226 LBS | BODY MASS INDEX: 31.64 KG/M2 | DIASTOLIC BLOOD PRESSURE: 73 MMHG | SYSTOLIC BLOOD PRESSURE: 159 MMHG

## 2025-08-05 DIAGNOSIS — I10 ESSENTIAL HYPERTENSION: ICD-10-CM

## 2025-08-05 DIAGNOSIS — I48.0 PAF (PAROXYSMAL ATRIAL FIBRILLATION): Primary | ICD-10-CM

## 2025-08-05 DIAGNOSIS — E78.2 MIXED HYPERLIPIDEMIA: ICD-10-CM

## 2025-08-05 PROCEDURE — 1159F MED LIST DOCD IN RCRD: CPT | Performed by: NURSE PRACTITIONER

## 2025-08-05 PROCEDURE — 99214 OFFICE O/P EST MOD 30 MIN: CPT | Performed by: NURSE PRACTITIONER

## 2025-08-05 PROCEDURE — 3077F SYST BP >= 140 MM HG: CPT | Performed by: NURSE PRACTITIONER

## 2025-08-05 PROCEDURE — 93000 ELECTROCARDIOGRAM COMPLETE: CPT | Performed by: NURSE PRACTITIONER

## 2025-08-05 PROCEDURE — 1160F RVW MEDS BY RX/DR IN RCRD: CPT | Performed by: NURSE PRACTITIONER

## 2025-08-05 PROCEDURE — 3078F DIAST BP <80 MM HG: CPT | Performed by: NURSE PRACTITIONER

## 2025-08-05 RX ORDER — AMIODARONE HYDROCHLORIDE 200 MG/1
100 TABLET ORAL DAILY
Start: 2025-08-05

## 2025-08-18 ENCOUNTER — PREP FOR SURGERY (OUTPATIENT)
Dept: OTHER | Facility: HOSPITAL | Age: 75
End: 2025-08-18
Payer: MEDICARE

## 2025-08-18 DIAGNOSIS — Z12.11 ENCOUNTER FOR SCREENING COLONOSCOPY: Primary | ICD-10-CM

## 2025-08-18 RX ORDER — SODIUM CHLORIDE 9 MG/ML
30 INJECTION, SOLUTION INTRAVENOUS CONTINUOUS PRN
OUTPATIENT
Start: 2025-08-18 | End: 2025-08-19

## 2025-08-19 ENCOUNTER — ANESTHESIA EVENT (OUTPATIENT)
Dept: GASTROENTEROLOGY | Facility: HOSPITAL | Age: 75
End: 2025-08-19
Payer: MEDICARE

## 2025-08-20 ENCOUNTER — ANESTHESIA (OUTPATIENT)
Dept: GASTROENTEROLOGY | Facility: HOSPITAL | Age: 75
End: 2025-08-20
Payer: MEDICARE

## 2025-08-20 ENCOUNTER — HOSPITAL ENCOUNTER (OUTPATIENT)
Facility: HOSPITAL | Age: 75
Setting detail: HOSPITAL OUTPATIENT SURGERY
Discharge: HOME | End: 2025-08-20
Attending: INTERNAL MEDICINE | Admitting: INTERNAL MEDICINE
Payer: MEDICARE

## 2025-08-20 ENCOUNTER — ON CAMPUS - OUTPATIENT (OUTPATIENT)
Dept: URBAN - METROPOLITAN AREA HOSPITAL 85 | Facility: HOSPITAL | Age: 75
End: 2025-08-20
Payer: MEDICARE

## 2025-08-20 DIAGNOSIS — R74.01 ELEVATION OF LEVELS OF LIVER TRANSAMINASE LEVELS: ICD-10-CM

## 2025-08-20 DIAGNOSIS — I86.4 GASTRIC VARICES: ICD-10-CM

## 2025-08-20 DIAGNOSIS — R93.2 ABNORMAL FINDINGS ON DIAGNOSTIC IMAGING OF LIVER AND BILIARY: ICD-10-CM

## 2025-08-20 DIAGNOSIS — I85.00 ESOPHAGEAL VARICES WITHOUT BLEEDING: ICD-10-CM

## 2025-08-20 DIAGNOSIS — K31.7 POLYP OF STOMACH AND DUODENUM: ICD-10-CM

## 2025-08-20 DIAGNOSIS — K74.60 UNSPECIFIED CIRRHOSIS OF LIVER: ICD-10-CM

## 2025-08-20 PROCEDURE — 25010000002 LIDOCAINE PF 2% 2 % SOLUTION: Performed by: NURSE ANESTHETIST, CERTIFIED REGISTERED

## 2025-08-20 PROCEDURE — 25010000002 CEFTRIAXONE PER 250 MG: Performed by: NURSE ANESTHETIST, CERTIFIED REGISTERED

## 2025-08-20 PROCEDURE — 43242 EGD US FINE NEEDLE BX/ASPIR: CPT | Performed by: INTERNAL MEDICINE

## 2025-08-20 PROCEDURE — 43255 EGD CONTROL BLEEDING ANY: CPT | Mod: 59 | Performed by: INTERNAL MEDICINE

## 2025-08-20 PROCEDURE — 43239 EGD BIOPSY SINGLE/MULTIPLE: CPT | Mod: 59 | Performed by: INTERNAL MEDICINE

## 2025-08-20 PROCEDURE — 25010000002 FENTANYL CITRATE (PF) 100 MCG/2ML SOLUTION: Performed by: NURSE ANESTHETIST, CERTIFIED REGISTERED

## 2025-08-20 PROCEDURE — 25010000002 METRONIDAZOLE 500 MG/100ML SOLUTION: Performed by: NURSE ANESTHETIST, CERTIFIED REGISTERED

## 2025-08-20 PROCEDURE — 25010000002 GLYCOPYRROLATE 0.2 MG/ML SOLUTION: Performed by: NURSE ANESTHETIST, CERTIFIED REGISTERED

## 2025-08-20 PROCEDURE — 25010000002 PROPOFOL 10 MG/ML EMULSION: Performed by: NURSE ANESTHETIST, CERTIFIED REGISTERED

## 2025-08-20 PROCEDURE — 25810000003 SODIUM CHLORIDE 0.9 % SOLUTION: Performed by: NURSE ANESTHETIST, CERTIFIED REGISTERED

## 2025-08-20 PROCEDURE — 25010000002 PROPOFOL 200 MG/20ML EMULSION: Performed by: NURSE ANESTHETIST, CERTIFIED REGISTERED

## 2025-08-20 RX ORDER — GLYCOPYRROLATE 0.2 MG/ML
INJECTION INTRAMUSCULAR; INTRAVENOUS AS NEEDED
Status: DISCONTINUED | OUTPATIENT
Start: 2025-08-20 | End: 2025-08-20 | Stop reason: SURG

## 2025-08-20 RX ORDER — LIDOCAINE HYDROCHLORIDE 20 MG/ML
INJECTION, SOLUTION EPIDURAL; INFILTRATION; INTRACAUDAL; PERINEURAL AS NEEDED
Status: DISCONTINUED | OUTPATIENT
Start: 2025-08-20 | End: 2025-08-20 | Stop reason: SURG

## 2025-08-20 RX ORDER — PROPOFOL 10 MG/ML
INJECTION, EMULSION INTRAVENOUS AS NEEDED
Status: DISCONTINUED | OUTPATIENT
Start: 2025-08-20 | End: 2025-08-20 | Stop reason: SURG

## 2025-08-20 RX ORDER — METRONIDAZOLE 500 MG/100ML
INJECTION, SOLUTION INTRAVENOUS AS NEEDED
Status: DISCONTINUED | OUTPATIENT
Start: 2025-08-20 | End: 2025-08-20 | Stop reason: SURG

## 2025-08-20 RX ORDER — FENTANYL CITRATE 50 UG/ML
INJECTION, SOLUTION INTRAMUSCULAR; INTRAVENOUS AS NEEDED
Status: DISCONTINUED | OUTPATIENT
Start: 2025-08-20 | End: 2025-08-20 | Stop reason: SURG

## 2025-08-20 RX ORDER — SODIUM CHLORIDE 9 MG/ML
INJECTION, SOLUTION INTRAVENOUS CONTINUOUS PRN
Status: DISCONTINUED | OUTPATIENT
Start: 2025-08-20 | End: 2025-08-20 | Stop reason: SURG

## 2025-08-20 RX ADMIN — METRONIDAZOLE 500 MG: 500 INJECTION, SOLUTION INTRAVENOUS at 11:08

## 2025-08-20 RX ADMIN — PROPOFOL 125 MCG/KG/MIN: 10 INJECTION, EMULSION INTRAVENOUS at 10:32

## 2025-08-20 RX ADMIN — LIDOCAINE HYDROCHLORIDE 100 MG: 20 INJECTION, SOLUTION EPIDURAL; INFILTRATION; INTRACAUDAL; PERINEURAL at 10:31

## 2025-08-20 RX ADMIN — SODIUM CHLORIDE 2 G: 900 INJECTION INTRAVENOUS at 11:10

## 2025-08-20 RX ADMIN — FENTANYL CITRATE 25 MCG: 50 INJECTION, SOLUTION INTRAMUSCULAR; INTRAVENOUS at 10:38

## 2025-08-20 RX ADMIN — PROPOFOL 70 MG: 10 INJECTION, EMULSION INTRAVENOUS at 10:31

## 2025-08-20 RX ADMIN — FENTANYL CITRATE 25 MCG: 50 INJECTION, SOLUTION INTRAMUSCULAR; INTRAVENOUS at 10:50

## 2025-08-20 RX ADMIN — GLYCOPYRROLATE 0.1 MG: 0.2 SOLUTION INTRAMUSCULAR; INTRAVENOUS at 10:51

## 2025-08-20 RX ADMIN — FENTANYL CITRATE 25 MCG: 50 INJECTION, SOLUTION INTRAMUSCULAR; INTRAVENOUS at 11:09

## 2025-08-20 RX ADMIN — PROPOFOL 30 MG: 10 INJECTION, EMULSION INTRAVENOUS at 10:34

## 2025-08-20 RX ADMIN — SODIUM CHLORIDE: 9 INJECTION, SOLUTION INTRAVENOUS at 10:24

## 2025-08-20 RX ADMIN — FENTANYL CITRATE 25 MCG: 50 INJECTION, SOLUTION INTRAMUSCULAR; INTRAVENOUS at 11:03

## 2025-08-21 ENCOUNTER — PREP FOR SURGERY (OUTPATIENT)
Dept: OTHER | Facility: HOSPITAL | Age: 75
End: 2025-08-21
Payer: MEDICARE

## 2025-08-21 DIAGNOSIS — Z12.11 ENCOUNTER FOR SCREENING COLONOSCOPY: Primary | ICD-10-CM

## (undated) DEVICE — IRRIGATOR BULB ASEPTO 60CC STRL

## (undated) DEVICE — PAD, GROUNDING, UNIVERSAL, SPLIT, 9': Brand: MEDLINE

## (undated) DEVICE — SUT ETHLN 2/0 PS 18IN 585H

## (undated) DEVICE — MARYLAND JAW LAPAROSCOPIC SEALER/DIVIDER COATED: Brand: LIGASURE

## (undated) DEVICE — 40580 - THE PINK PAD - ADVANCED TRENDELENBURG POSITIONING KIT: Brand: 40580 - THE PINK PAD - ADVANCED TRENDELENBURG POSITIONING KIT

## (undated) DEVICE — PK ENDO GI 50

## (undated) DEVICE — SOLUTION,WATER,IRRIGATION,1000ML,STERILE: Brand: MEDLINE

## (undated) DEVICE — GOWN,REINFORCE,POLY,SIRUS,BREATH SLV,XLG: Brand: MEDLINE

## (undated) DEVICE — SEAL

## (undated) DEVICE — BLANKT WARM UPPR/BDY ARM/OUT 57X196CM

## (undated) DEVICE — SUT 1/0 27 PDS II VIO MONO CT Z353H

## (undated) DEVICE — 1/4 IN. X 18 IN. LENGTH: Brand: SILICONE TUBING, PENROSE DRAIN

## (undated) DEVICE — SNAR POLYP HOTSNARE/BRAIDED OVL/MINI 7F 2.8X10MM 230CM 1P/U

## (undated) DEVICE — Device

## (undated) DEVICE — SPNG LAP PREWSH SFTPK 18X18IN STRL PK/5

## (undated) DEVICE — SUT SILK 0 TIES 30IN A306H

## (undated) DEVICE — PASS SUT PRO BARIATRIC XL W/TROC SWABS

## (undated) DEVICE — PK MINOR LAPAROTOMY 50

## (undated) DEVICE — SUT PDS 3/0 PLS MONO 1X27IN SH VIL PDP316H

## (undated) DEVICE — ARM DRAPE

## (undated) DEVICE — GENERAL LAPAROSCOPY CDS: Brand: MEDLINE INDUSTRIES, INC.

## (undated) DEVICE — BLADELESS OBTURATOR: Brand: WECK VISTA

## (undated) DEVICE — STAPLER 60: Brand: SUREFORM

## (undated) DEVICE — PENCL HND ROCKRSWTCH HOLSTR EZ CLEAN TP CRD 10FT

## (undated) DEVICE — SNAR POLYP HOTSNARE/BRAIDED OVL/LG 7F 2.8X24MM 230CM 1P/U

## (undated) DEVICE — LEGGINGS, PAIR, 33X51 XL, STERILE: Brand: MEDLINE

## (undated) DEVICE — THE CARR-LOCKE INJECTION NEEDLE IS A SINGLE USE, DISPOSABLE, FLEXIBLE SHEATH INJECTION NEEDLE USED FOR THE INJECTION OF VARIOUS TYPES OF MEDIA THROUGH FLEXIBLE ENDOSCOPES.

## (undated) DEVICE — SUT SILK 2/0 FS BLK 18IN 685G

## (undated) DEVICE — YANKAUER,BULB TIP,W/O VENT,RIGID,STERILE: Brand: MEDLINE

## (undated) DEVICE — 2, DISPOSABLE SUCTION/IRRIGATOR WITH DISPOSABLE TIP: Brand: STRYKEFLOW

## (undated) DEVICE — 3M™ IOBAN™ 2 ANTIMICROBIAL INCISE DRAPE 6650EZ: Brand: IOBAN™ 2

## (undated) DEVICE — SUT MNCRYL 4/0 PS2 27IN UD MCP426H

## (undated) DEVICE — C-ARM: Brand: UNBRANDED

## (undated) DEVICE — SYR LUERLOK 30CC

## (undated) DEVICE — TROC BLADLES ANCHORPORT/OPTI LP 8X120MM 1P/U

## (undated) DEVICE — TUBING, SUCTION, 1/4" X 12', STRAIGHT: Brand: MEDLINE

## (undated) DEVICE — ENDOPATH XCEL BLADELESS TROCARS WITH STABILITY SLEEVES: Brand: ENDOPATH XCEL

## (undated) DEVICE — STAPLER, SKIN, 35W, A: Brand: MEDLINE INDUSTRIES, INC.

## (undated) DEVICE — ELECTRD BLD EZ CLN MOD XLNG 2.75IN

## (undated) DEVICE — GLOVE,SURG,SENSICARE SLT,LF,PF,6.5: Brand: MEDLINE

## (undated) DEVICE — SYR CONTRL LUERLOK 10CC

## (undated) DEVICE — SUT VIC 0 UR6 27IN VCP603H

## (undated) DEVICE — SOL IRR NACL 0.9PCT BO 1000ML

## (undated) DEVICE — PROXIMATE RH ROTATING HEAD SKIN STAPLERS (35 WIDE) CONTAINS 35 STAINLESS STEEL STAPLES: Brand: PROXIMATE

## (undated) DEVICE — 3M™ PATIENT PLATE, CORDED, SPLIT, LARGE, 40 PER CASE, 1179: Brand: 3M™

## (undated) DEVICE — ST TBG AIRSEAL FLTR TRI LUM

## (undated) DEVICE — ENDOPATH 5MM CURVED SCISSORS WITH MONOPOLAR CAUTERY: Brand: ENDOPATH

## (undated) DEVICE — PK MAJ LAPAROTOMY 50

## (undated) DEVICE — SHEET,DRAPE,53X77,STERILE: Brand: MEDLINE

## (undated) DEVICE — 450 ML BOTTLE OF 0.05% CHLORHEXIDINE GLUCONATE IN 99.95% STERILE WATER FOR IRRIGATION, USP AND APPLICATOR.: Brand: IRRISEPT ANTIMICROBIAL WOUND LAVAGE

## (undated) DEVICE — SUT ANTIBAC PDS PLS CT/2 SZ0 27IN VIL PDP334H

## (undated) DEVICE — TOWEL,OR,DSP,ST,NATURAL,DLX,4/PK,20PK/CS: Brand: MEDLINE

## (undated) DEVICE — ADHS SKIN PREMIERPRO EXOFIN TOPICAL HI/VISC .5ML

## (undated) DEVICE — TOTAL TRAY, DB, 100% SILI FOLEY, 16FR 10: Brand: MEDLINE

## (undated) DEVICE — Device: Brand: BLACK EYE

## (undated) DEVICE — COLUMN DRAPE

## (undated) DEVICE — TBG PENCL TELESCP MEGADYNE SMOKE EVAC 15FT

## (undated) DEVICE — SLV SCD CALF HEMOFORCE DVT THERP REPROC MD

## (undated) DEVICE — KT SURG TURNOVER 050

## (undated) DEVICE — TRAP WIDEEYE POLYP

## (undated) DEVICE — ENDOPATH PNEUMONEEDLE INSUFFLATION NEEDLES WITH LUER LOCK CONNECTORS 150MM: Brand: ENDOPATH

## (undated) DEVICE — UNDRGLV SURG BIOGEL PIMICROINDICATOR SYNTH SZ7.5PF STRL PR/2

## (undated) DEVICE — BLAKE SILICONE DRAIN, 19 FR ROUND, HUBLESS WITH 1/4" BENDABLE TROCAR: Brand: BLAKE

## (undated) DEVICE — REDUCER: Brand: ENDOWRIST

## (undated) DEVICE — SUT VIC 3/0 SH CR8 18IN J864D

## (undated) DEVICE — LAPAROSCOPIC ACCESS SYSTEM: Brand: ALEXIS LAPAROSCOPIC SYSTEM

## (undated) DEVICE — CVR HNDL LT CAM LB54

## (undated) DEVICE — INTENDED FOR TISSUE SEPARATION, AND OTHER PROCEDURES THAT REQUIRE A SHARP SURGICAL BLADE TO PUNCTURE OR CUT.: Brand: BARD-PARKER ® CARBON RIB-BACK BLADES

## (undated) DEVICE — DRSNG WND GZ PAD BORDERED 4X8IN STRL

## (undated) DEVICE — ANTIBACTERIAL UNDYED BRAIDED (POLYGLACTIN 910), SYNTHETIC ABSORBABLE SUTURE: Brand: COATED VICRYL

## (undated) DEVICE — ANTIBACTERIAL UNDYED BRAIDED (POLYGLACTIN 910), SYNTHETIC ABSORBABLE SURGICAL SUTURE: Brand: COATED VICRYL

## (undated) DEVICE — NDL HYPO PRECISIONGLIDE/REG 18G 11/2 PNK

## (undated) DEVICE — PENCL SMOKE/EVAC MEGADYNE TELESCP 15FT

## (undated) DEVICE — SUT PDS 0 CT 36IN VIO PDP358T

## (undated) DEVICE — ABDOMINAL BINDER: Brand: DEROYAL

## (undated) DEVICE — TOWEL,OR,DSP,ST,BLUE,STD,4/PK,20PK/CS: Brand: MEDLINE

## (undated) DEVICE — THE STERILE LIGHT HANDLE COVER IS USED WITH STERIS SURGICAL LIGHTING AND VISUALIZATION SYSTEMS.

## (undated) DEVICE — INSUFFLATION TUBING SET, ENDOFLATOR 50: Brand: N.A.

## (undated) DEVICE — GOWN,SLEEVE,STERILE,W/CSR WRAP,1/P: Brand: MEDLINE

## (undated) DEVICE — ENDOPATH XCEL WITH OPTIVIEW TECHNOLOGY BLADELESS TROCARS WITH STABILITY SLEEVES: Brand: ENDOPATH XCEL OPTIVIEW

## (undated) DEVICE — HYDROGEL COATED LATEX URINE METER FOLEY TRAY,16 FR/CH (5.3 MM), 5 ML CATHETER PRE-CONNECTED TO 2000 ML DRAINAGE BAG WITH NEEDLE SAMPLING: Brand: DOVER

## (undated) DEVICE — LAPAROVUE VISIBILITY SYSTEM LAPAROSCOPIC SOLUTIONS: Brand: LAPAROVUE

## (undated) DEVICE — DRN WND EVAC BULB 100CC

## (undated) DEVICE — SINGLE-USE BIOPSY FORCEPS: Brand: RADIAL JAW 4

## (undated) DEVICE — VESSEL SEALER EXTEND: Brand: ENDOWRIST

## (undated) DEVICE — SYRINGE,TOOMEY,IRRIGATION,70CC,STERILE: Brand: MEDLINE

## (undated) DEVICE — HANDPIECE SET WITH COAXIAL MULTI-ORIFICE TIP AND SUCTION TUBE: Brand: INTERPULSE

## (undated) DEVICE — 1/2 IN. X 18 IN. LENGTH: Brand: SILICONE TUBING, PENROSE DRAIN

## (undated) DEVICE — PAD,ABDOMINAL,5"X9",STERILE,LF,1/PK: Brand: MEDLINE INDUSTRIES, INC.

## (undated) DEVICE — COVER,MAYO STAND,STERILE: Brand: MEDLINE

## (undated) DEVICE — ENDOPATH XCEL BLUNT TIP TROCARS WITH SMOOTH SLEEVES: Brand: ENDOPATH XCEL

## (undated) DEVICE — DRSNG WND BORDR/ADHS NONADHR/GZ LF 4X4IN STRL

## (undated) DEVICE — ENDOPATH PNEUMONEEDLE INSUFFLATION NEEDLES WITH LUER LOCK CONNECTORS 120MM: Brand: ENDOPATH

## (undated) DEVICE — TIP COVER ACCESSORY

## (undated) DEVICE — 3M™ STERI-DRAPE™ INSTRUMENT POUCH 9097: Brand: 3M™ STERI-DRAPE™

## (undated) DEVICE — SUT ABS VICRLY/PLS COAT BR 3/0 NO/NDL TIE/12X18IN VIL

## (undated) DEVICE — THE STERILE CAMERA HANDLE COVER IS FOR USE WITH THE STERIS SURGICAL LIGHTING AND VISUALIZATION SYSTEMS.

## (undated) DEVICE — SOL IRRIG NACL 1000ML

## (undated) DEVICE — COUNT NDL FOAM STRIP W/MAG 60CT

## (undated) DEVICE — ENDOPATH 5MM ENDOSCOPIC BLUNT TIP DISSECTORS (12 POUCHES CONTAINING 3 DISSECTORS EACH): Brand: ENDOPATH

## (undated) DEVICE — WOUND RETRACTOR AND PROTECTOR: Brand: ALEXIS O WOUND PROTECTOR-RETRACTOR

## (undated) DEVICE — SUT ABS VICRLY/PLS COAT BR 2/0 NO/NDL TIE/12X18IN VIL

## (undated) DEVICE — SUT PDS 2/0 CT2 27IN VIL PDP333H